# Patient Record
Sex: MALE | Race: WHITE | NOT HISPANIC OR LATINO | Employment: OTHER | URBAN - METROPOLITAN AREA
[De-identification: names, ages, dates, MRNs, and addresses within clinical notes are randomized per-mention and may not be internally consistent; named-entity substitution may affect disease eponyms.]

---

## 2017-10-30 ENCOUNTER — APPOINTMENT (OUTPATIENT)
Dept: OCCUPATIONAL MEDICINE | Facility: CLINIC | Age: 77
End: 2017-10-30

## 2017-10-30 PROCEDURE — 99000 SPECIMEN HANDLING OFFICE-LAB: CPT | Performed by: PHYSICIAN ASSISTANT

## 2019-02-22 ENCOUNTER — TRANSFERRED RECORDS (OUTPATIENT)
Dept: HEALTH INFORMATION MANAGEMENT | Facility: CLINIC | Age: 79
End: 2019-02-22

## 2020-05-18 ENCOUNTER — TRANSFERRED RECORDS (OUTPATIENT)
Dept: HEALTH INFORMATION MANAGEMENT | Facility: CLINIC | Age: 80
End: 2020-05-18

## 2020-05-19 ENCOUNTER — TRANSFERRED RECORDS (OUTPATIENT)
Dept: HEALTH INFORMATION MANAGEMENT | Facility: CLINIC | Age: 80
End: 2020-05-19

## 2020-06-11 ENCOUNTER — TRANSCRIBE ORDERS (OUTPATIENT)
Dept: OTHER | Age: 80
End: 2020-06-11

## 2020-06-11 DIAGNOSIS — R42 GIDDINESS: ICD-10-CM

## 2020-06-11 DIAGNOSIS — R42 DIZZINESS: Primary | ICD-10-CM

## 2020-06-17 ENCOUNTER — TELEPHONE (OUTPATIENT)
Dept: OTOLARYNGOLOGY | Facility: CLINIC | Age: 80
End: 2020-06-17

## 2020-06-22 NOTE — TELEPHONE ENCOUNTER
1. Have you noticed any changes in hearing? No  2. Do you have ringing, buzzing, or other sounds in your ears or head, this is also referred to as Tinnitus? Yes  3. When and where was your last hearing test? Doesn't know when but [facility]  4. Do you feel lightheaded or foggy? Yes  5. Do you have a spinning sensation? Yes  6. Is there any specific position that can bring on dizziness? random  7. Does looking up cause dizziness? No  8. Does getting in and our of bed cause dizziness? Yes  9. Does turning over in bed increase or cause dizziness? No  10. Does bending over cause dizziness? No  11. Is there anything that you can do to prevent the dizziness? Taken meds  12. Has the dizziness gotten better with time? No  13. Have you seen Physical Therapy for dizziness? (Please indicate clinic and as much of the location as possible): No  14. Are you being referred to a specific physician? No  15. Have you been evaluated/treated for your dizziness at any other location?  (If yes,obtian as much clinic/provider/locaiton as possible) Yes. (If yes answer the following questions:)   Have you seen any ENT, Neurology, or other providers for these symptoms?             Yes, If yes, where? [facility]  if yes, who?    Have you had any balance or Audiology testing? Yes, If yes, where? [facility] if yes, who?  Have you had an MRI or CT scan of your head or neck? Yes, If yes, where? [facility] if yes, who?     Would you like to receive your Release of Information by mail or e-mail?  mail

## 2020-06-23 NOTE — TELEPHONE ENCOUNTER
FUTURE VISIT INFORMATION      FUTURE VISIT INFORMATION:    Date: 7/21/2020    Time: 8:15AM    Location: [facility]  REFERRAL INFORMATION:    Referring provider:  [facility]    Referring providers clinic:  [facility]    Reason for visit/diagnosis  Vertigo- Referred Service, Zach Diaz MD     RECORDS REQUESTED FROM:       Clinic name Comments Records Status Imaging Status    [facility] 5/19/2020 ENT in patient note   *ENT notes dating as far back as 12/2001 2/22/19 audiogram     5/17/2020 [facility] Head   *imaging reports dating as far back as 2016 Scanned in EPIC req 6/23/2020 - PACS   [facility] imaging 2/6/2020 CT Head and CT [facility] Everywhere  req 6/23/2020 - PACS                             6/23/2020 1:33PM [facility] a [facility] ([facility] Film room to upload) - [facility]   7/8/2020 2:34PM all images in PACS - [facility]

## 2020-07-21 ENCOUNTER — VIRTUAL VISIT (OUTPATIENT)
Dept: OTOLARYNGOLOGY | Facility: CLINIC | Age: 80
End: 2020-07-21
Payer: COMMERCIAL

## 2020-07-21 ENCOUNTER — PRE VISIT (OUTPATIENT)
Dept: OTOLARYNGOLOGY | Facility: CLINIC | Age: 80
End: 2020-07-21

## 2020-07-21 VITALS — WEIGHT: 255 LBS | HEIGHT: 72 IN | BODY MASS INDEX: 34.54 KG/M2

## 2020-07-21 DIAGNOSIS — R42 DIZZINESS: Primary | ICD-10-CM

## 2020-07-21 RX ORDER — RIVAROXABAN 20 MG/1
TABLET, FILM COATED ORAL
COMMUNITY
Start: 2020-07-03 | End: 2023-01-16

## 2020-07-21 RX ORDER — ASPIRIN 81 MG/1
81 TABLET, CHEWABLE ORAL
COMMUNITY
Start: 2019-01-21 | End: 2023-01-16

## 2020-07-21 RX ORDER — ATORVASTATIN CALCIUM 80 MG/1
80 TABLET, FILM COATED ORAL AT BEDTIME
Status: ON HOLD | COMMUNITY
Start: 2019-12-27 | End: 2023-03-26

## 2020-07-21 RX ORDER — METFORMIN HCL 500 MG
TABLET, EXTENDED RELEASE 24 HR ORAL
COMMUNITY
Start: 2020-03-12 | End: 2023-01-16

## 2020-07-21 RX ORDER — SPIRONOLACTONE 25 MG/1
TABLET ORAL
COMMUNITY
Start: 2020-03-12 | End: 2023-01-16

## 2020-07-21 ASSESSMENT — PATIENT HEALTH QUESTIONNAIRE - PHQ9: SUM OF ALL RESPONSES TO PHQ QUESTIONS 1-9: 18

## 2020-07-21 ASSESSMENT — MIFFLIN-ST. JEOR: SCORE: 1914.67

## 2020-07-21 ASSESSMENT — PAIN SCALES - GENERAL: PAINLEVEL: EXTREME PAIN (8)

## 2020-07-21 NOTE — NURSING NOTE
Pt scored 18 on phq-9 but pt has someone who he sees and they check on him every 10 days. Vanessa GARCIA RN is aware of this also.     Merry Sorenson LPN

## 2020-07-21 NOTE — PATIENT INSTRUCTIONS
1. You were seen in the ENT Clinic today by Dr.Nissen.  If you have any questions or concerns after your appointment, please call   - Option 1: ENT Clinic: [phone]  - Option 2: Vanessa (Dr.Nissen's Nurse): [phone]    2.   Recommend return to PT for assessment and fall prevention    3.  Recommend BTE Hearing aids    Vanessa RN  Care Coordinator  [facility]- Otolaryngology  [phone]

## 2020-07-21 NOTE — PROGRESS NOTES
"David Thomson is a 80 year old male who is being evaluated via a billable telephone visit.      The patient has been notified of following:     \"This telephone visit will be conducted via a call between you and your physician/provider. We have found that certain health care needs can be provided without the need for a physical exam.  This service lets us provide the care you need with a short phone conversation.  If a prescription is necessary we can send it directly to your pharmacy.  If lab work is needed we can place an order for that and you can then stop by our lab to have the test done at a later time.    Telephone visits are billed at different rates depending on your insurance coverage. During this emergency period, for some insurers they may be billed the same as an in-person visit.  Please reach out to your insurance provider with any questions.    If during the course of the call the physician/provider feels a telephone visit is not appropriate, you will not be charged for this service.\"    Patient has given verbal consent for Telephone visit?  Yes    What phone number would you like to be contacted at? [phone]    How would you like to obtain your AVS? Mail a copy    Phone call duration: 51 minutes      CHIEF COMPLAINT:  Dizziness    HISTORY OF PRESENT ILLNESS: Pt is a 80 year old \"seen\" virtually today for dizziness.  He has had problems since October of last fall. He did have a brief problem in 2011 while in [place], but that passed and he had no problems until this past October.  Since October he has had issues, initially would get episodes of spinning vertigo with N/V and diarrhea.  He would be incapicitated for several hours, the spinning, N/V could last up to 4 hours. He doesn't remember hearing fluctuation or tinnitus change. 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 The right has " been worse than the left for years, again no fluctuation noted. Has had tinnitus for years, again no fluctuation or change with the episodes.  His medications were changed, new ones added and 3 others taken away in March. He hasn't had anymore of the severe episodes since then.  Now he will get where he will be off balance and may fall, lasts only for a second. He has trouble getting up, he thinks more from his hip which was replaced years ago. Once he is up, he is fine and can get around without issues, etc.  He drives, able to get around, etc.  He gets these falls maybe twice a week. Other than the falls, again he is ok.  He has been to physical therapy, doesn't feel much help. Hasn't seen them for awhile. He did have a VNG performed which showed no unilateral weakness to be present. Rotational Chair showed central signs.  CT has been negative. Cardiology and neurology has been consulted with no definitive findings.    ALLERGIES:    Allergies   Allergen Reactions     Benzalkonium      Lisinopril      Piroxicam      Polysorbate  [Bay Oil]      Prazosin      Other reaction(s): Nightmares     Urea      Other reaction(s): Eruption of skin, Eruption of skin       HABITS: Social History    Substance and Sexual Activity      Alcohol use: Not on file     History   Smoking Status     Never Smoker   Smokeless Tobacco     Never Used         PAST MEDICAL HISTORY: Reviewed from chart    Past Medical History:   Diagnosis Date     Hyperlipidaemia      Hypertension      Pre-diabetes        FAMILY HISTORY/SOCIAL HISTORY:   Family History   Problem Relation Age of Onset     Heart Disease Mother      Heart Disease Father       Social History     Socioeconomic History     Marital status: Single     Spouse name: Not on file     Number of children: Not on file     Years of education: Not on file     Highest education level: Not on file   Occupational History     Not on file   Social Needs     Financial resource strain: Not on file      "Food insecurity     Worry: Not on file     Inability: Not on file     Transportation needs     Medical: Not on file     Non-medical: Not on file   Tobacco Use     Smoking status: Never Smoker     Smokeless tobacco: Never Used   Substance and Sexual Activity     Alcohol use: Not on file     Drug use: Not on file     Sexual activity: Not on file   Lifestyle     Physical activity     Days per week: Not on file     Minutes per session: Not on file     Stress: Not on file   Relationships     Social connections     Talks on phone: Not on file     Gets together: Not on file     Attends [employer]ist service: Not on file     Active member of club or organization: Not on file     Attends meetings of clubs or organizations: Not on file     Relationship status: Not on file     Intimate partner violence     Fear of current or ex partner: Not on file     Emotionally abused: Not on file     Physically abused: Not on file     Forced sexual activity: Not on file   Other Topics Concern     Parent/sibling w/ CABG, MI or angioplasty before 65F 55M? 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 Wears bilateral hearing aids, ITE. Symmetrical discriminations.    VNG:  No asymmetry noted on calorics    Imaging negative      IMPRESSION AND PLAN:   1. Dizziness:  Discussed in detail today. Certainly sounds like \"episodes\" he was having last fall could be menieres with description of spinning for hours with N/V. Hasn't had those occur since March with changing of medication. He says 3 meds were taken away and new ones added, not sure what.  Again no \"episodes since then.  Complains now of falling 2 or 3 times a week.  No spins, just off balance and will fall. When he gets up he's ok and can function, no residual off balance.  He's not aware of any hearing " fluctuation, no change in tinnitus. Feel this sudden off balance not ear related, not sure what causes that. With VNG showing normal calorics and no associated hearing or tinnitus change, again difficult to say ear related or cause.  Would recommend return to PT for assessment and fall prevention.  2. Bilateral SNHL:  Would recommend BTE hearing aids for better amplification.  3. Bilateral Tinnitus: Result of SNHL, hearing aids best to help with this, monitor      Thank you very much for the opportunity to participate in the care of your patient.    Rick L Nissen MD

## 2020-07-21 NOTE — LETTER
"7/21/2020       RE: David Thomson  [address] [facility] [address]     Dear Colleague,    Thank you for referring your patient, David Thomson, to the [facility] EAR NOSE AND THROAT at [facility]. Please see a copy of my visit note below.    David Thomson is a 80 year old male who is being evaluated via a billable telephone visit.        Phone call duration: 51 minutes      CHIEF COMPLAINT:  Dizziness    HISTORY OF PRESENT ILLNESS: Pt is a 80 year old \"seen\" virtually today for dizziness.  He has had problems since October of last fall. He did have a brief problem in 2011 while in [place], but that passed and he had no problems until this past October.  Since October he has had issues, initially would get episodes of spinning vertigo with N/V and diarrhea.  He would be incapicitated for several hours, the spinning, N/V could last up to 4 hours. He doesn't remember hearing fluctuation or tinnitus change. 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 The right has been worse than the left for years, again no fluctuation noted. Has had tinnitus for years, again no fluctuation or change with the episodes.  His medications were changed, new ones added and 3 others taken away in March. He hasn't had anymore of the severe episodes since then.  Now he will get where he will be off balance and may fall, lasts only for a second. He has trouble getting up, he thinks more from his hip which was replaced years ago. Once he is up, he is fine and can get around without issues, etc.  He drives, able to get around, etc.  He gets these falls maybe twice a week. Other than the falls, again he is ok.  He has been to physical therapy, doesn't feel much help. Hasn't seen them for awhile. He did have a VNG performed which showed no unilateral weakness to be present. Rotational Chair " showed central signs.  CT has been negative. Cardiology and neurology has been consulted with no definitive findings.    ALLERGIES:    Allergies   Allergen Reactions     Benzalkonium      Lisinopril      Piroxicam      Polysorbate  [Bay Oil]      Prazosin      Other reaction(s): Nightmares     Urea      Other reaction(s): Eruption of skin, Eruption of skin       HABITS: Social History    Substance and Sexual Activity      Alcohol use: Not on file     History   Smoking Status     Never Smoker   Smokeless Tobacco     Never Used         PAST MEDICAL HISTORY: Reviewed from chart    Past Medical History:   Diagnosis Date     Hyperlipidaemia      Hypertension      Pre-diabetes        FAMILY HISTORY/SOCIAL HISTORY:   Family History   Problem Relation Age of Onset     Heart Disease Mother      Heart Disease Father       Social History     Socioeconomic History     Marital status: Single     Spouse name: Not on file     Number of children: Not on file     Years of education: Not on file     Highest education level: Not on file   Occupational History     Not on file   Social Needs     Financial resource strain: Not on file     Food insecurity     Worry: Not on file     Inability: Not on file     Transportation needs     Medical: Not on file     Non-medical: Not on file   Tobacco Use     Smoking status: Never Smoker     Smokeless tobacco: Never Used   Substance and Sexual Activity     Alcohol use: Not on file     Drug use: Not on file     Sexual activity: Not on file   Lifestyle     Physical activity     Days per week: Not on file     Minutes per session: Not on file     Stress: Not on file   Relationships     Social connections     Talks on phone: Not on file     Gets together: Not on file     Attends Cheondoism service: Not on file     Active member of club or organization: Not on file     Attends meetings of clubs or organizations: Not on file     Relationship status: Not on file     Intimate partner violence     Fear of  "current or ex partner: Not on file     Emotionally abused: Not on file     Physically abused: Not on file     Forced sexual activity: Not on file   Other Topics Concern     Parent/sibling w/ CABG, MI or angioplasty before 65F 55M? 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 Wears bilateral hearing aids, ITE. Symmetrical discriminations.    VNG:  No asymmetry noted on calorics    Imaging negative      IMPRESSION AND PLAN:   1. Dizziness:  Discussed in detail today. Certainly sounds like \"episodes\" he was having last fall could be menieres with description of spinning for hours with N/V. Hasn't had those occur since March with changing of medication. He says 3 meds were taken away and new ones added, not sure what.  Again no \"episodes since then.  Complains now of falling 2 or 3 times a week.  No spins, just off balance and will fall. When he gets up he's ok and can function, no residual off balance.  He's not aware of any hearing fluctuation, no change in tinnitus. Feel this sudden off balance not ear related, not sure what causes that. With VNG showing normal calorics and no associated hearing or tinnitus change, again difficult to say ear related or cause.  Would recommend return to PT for assessment and fall prevention.  2. Bilateral SNHL:  Would recommend BTE hearing aids for better amplification.  3. Bilateral Tinnitus: Result of SNHL, hearing aids best to help with this, monitor      Thank you very much for the opportunity to participate in the care of your patient.    Rick L Nissen MD                    "

## 2020-07-21 NOTE — LETTER
Hearing Aid Medical Clearance    David Thomson  July 22, 2020        This patient has received a medical examination and may be considered a suitable candidate for a hearing aid.         Physician:____________Rick Nissen, MD______________________________________

## 2020-07-23 ENCOUNTER — TELEPHONE (OUTPATIENT)
Dept: AUDIOLOGY | Facility: CLINIC | Age: 80
End: 2020-07-23

## 2020-12-23 ENCOUNTER — ANCILLARY PROCEDURE (OUTPATIENT)
Dept: GENERAL RADIOLOGY | Facility: CLINIC | Age: 80
End: 2020-12-23
Attending: PODIATRIST
Payer: COMMERCIAL

## 2020-12-23 ENCOUNTER — OFFICE VISIT (OUTPATIENT)
Dept: PODIATRY | Facility: CLINIC | Age: 80
End: 2020-12-23
Payer: COMMERCIAL

## 2020-12-23 VITALS
BODY MASS INDEX: 32.47 KG/M2 | DIASTOLIC BLOOD PRESSURE: 60 MMHG | SYSTOLIC BLOOD PRESSURE: 136 MMHG | HEIGHT: 73 IN | WEIGHT: 245 LBS

## 2020-12-23 DIAGNOSIS — M79.672 PAIN IN BOTH FEET: ICD-10-CM

## 2020-12-23 DIAGNOSIS — M19.072 PRIMARY OSTEOARTHRITIS OF LEFT FOOT: ICD-10-CM

## 2020-12-23 DIAGNOSIS — M79.671 PAIN IN BOTH FEET: ICD-10-CM

## 2020-12-23 DIAGNOSIS — M21.621 TAILOR'S BUNION OF RIGHT FOOT: ICD-10-CM

## 2020-12-23 DIAGNOSIS — L85.9 HYPERKERATOSIS: Primary | ICD-10-CM

## 2020-12-23 DIAGNOSIS — Q66.6 PES VALGUS: ICD-10-CM

## 2020-12-23 PROCEDURE — 11055 PARING/CUTG B9 HYPRKER LES 1: CPT | Performed by: PODIATRIST

## 2020-12-23 PROCEDURE — 73630 X-RAY EXAM OF FOOT: CPT | Mod: TC | Performed by: RADIOLOGY

## 2020-12-23 PROCEDURE — 99203 OFFICE O/P NEW LOW 30 MIN: CPT | Mod: 25 | Performed by: PODIATRIST

## 2020-12-23 ASSESSMENT — PAIN SCALES - GENERAL: PAINLEVEL: MODERATE PAIN (5)

## 2020-12-23 ASSESSMENT — MIFFLIN-ST. JEOR: SCORE: 1885.19

## 2020-12-23 NOTE — PROGRESS NOTES
HPI:  David Thomson is a 80 year old male who is seen in consultation at [facility] request of [facility].    Pt presents for eval of:   (Onset, Location, L/R, Character, Treatments, Injury if yes)    XR Left and Right foot 12/23/2020     Right 5th toe stepped on by a steer. Lateral and dorsal Left foot pain.    Works as a farmer and construction.      Weight management plan: Patient was referred to their PCP to discuss a diet and exercise plan.      ROS:  10 point ROS neg other than the symptoms noted above in the HPI.    Patient Active Problem List   Diagnosis     CARDIOVASCULAR SCREENING; LDL GOAL LESS THAN 100     Abnormal glucose     Hypertension     Hyperlipidemia with target LDL less than 100       PAST MEDICAL HISTORY:   Past Medical History:   Diagnosis Date     Hyperlipidaemia      Hypertension      Pre-diabetes         PAST SURGICAL HISTORY:   Past Surgical History:   Procedure Laterality Date     BACK SURGERY       GALLBLADDER SURGERY       HIP SURGERY       INSERT STIMULATOR DORSAL COLUMN Right 4/19/2016    Procedure: INSERT STIMULATOR DORSAL COLUMN;  Surgeon: Zoë Clemons DO;  Location: [facility] OR     NECK SURGERY       WRIST SURGERY          MEDICATIONS:   Current Outpatient Medications:      amLODIPine (NORVASC) 10 MG tablet, Take 1 tablet (10 mg) by mouth daily, Disp: , Rfl:      aspirin (ASA) 81 MG chewable tablet, Take 81 mg by mouth, Disp: , Rfl:      losartan (COZAAR) 50 MG tablet, Take 1 tablet (50 mg) by mouth daily, Disp: , Rfl:      pravastatin (PRAVACHOL) 40 MG tablet, Take 0.5 tablets (20 mg) by mouth daily, Disp: , Rfl:      spironolactone (ALDACTONE) 25 MG tablet, , Disp: , Rfl:      vitamin  B complex with vitamin C (VITAMIN  B COMPLEX) TABS, Take 2 tablets by mouth daily, Disp: , Rfl:      XARELTO ANTICOAGULANT 20 MG TABS tablet, , Disp: , Rfl:      atorvastatin (LIPITOR) 80 MG tablet, Take 40 mg by mouth, Disp: , Rfl:      clindamycin (CLINDAMAX) 1 % gel, Apply topically 2 times daily,  Disp: , Rfl:      hydrOXYzine (VISTARIL) 50 MG capsule, Take 1 capsule (50 mg) by mouth 4 times daily as needed (spasm or pain or itching), Disp: 120 capsule, Rfl: 0     metFORMIN (GLUCOPHAGE) 1000 MG tablet, Take 0.5 tablets (500 mg) by mouth 2 times daily (with meals), Disp: , Rfl:      metFORMIN (GLUCOPHAGE-XR) 500 MG 24 hr tablet, , Disp: , Rfl:      omeprazole (PRILOSEC) 20 MG DR capsule, , Disp: , Rfl:      oxyCODONE (ROXICODONE) 5 MG immediate release tablet, Take 1-2 tablets (5-10 mg) by mouth every 4 hours as needed for moderate to severe pain, Disp: 80 tablet, Rfl: 0     pantoprazole (PROTONIX) 40 MG enteric coated tablet, Take 1 tablet (40 mg) by mouth daily Take 30-60 minutes before a meal., Disp: , Rfl:      ALLERGIES:    Allergies   Allergen Reactions     Benzalkonium      Lisinopril      Piroxicam      Polysorbate  [Bay Oil]      Prazosin      Other reaction(s): Nightmares     Urea      Other reaction(s): Eruption of skin, Eruption of skin        SOCIAL HISTORY:   Social History     Socioeconomic History     Marital status: Single     Spouse name: Not on file     Number of children: Not on file     Years of education: Not on file     Highest education level: Not on file   Occupational History     Not on file   Social Needs     Financial resource strain: Not on file     Food insecurity     Worry: Not on file     Inability: Not on file     Transportation needs     Medical: Not on file     Non-medical: Not on file   Tobacco Use     Smoking status: Never Smoker     Smokeless tobacco: Never Used   Substance and Sexual Activity     Alcohol use: Not on file     Drug use: Not on file     Sexual activity: Not on file   Lifestyle     Physical activity     Days per week: Not on file     Minutes per session: Not on file     Stress: Not on file   Relationships     Social connections     Talks on phone: Not on file     Gets together: Not on file     Attends Nondenominational service: Not on file     Active member of club  "or organization: Not on file     Attends meetings of clubs or organizations: Not on file     Relationship status: Not on file     Intimate partner violence     Fear of current or ex partner: Not on file     Emotionally abused: Not on file     Physically abused: Not on file     Forced sexual activity: Not on file   Other Topics Concern     Parent/sibling w/ CABG, MI or angioplasty before 65F 55M? Not Asked   Social History Narrative     Not on file        FAMILY HISTORY:   Family History   Problem Relation Age of Onset     Heart Disease Mother      Heart Disease Father         EXAM:Vitals: /60 (BP Location: Left arm, Patient Position: Sitting, Cuff Size: Adult Regular)   Ht 1.854 m (6' 1\")   Wt 111.1 kg (245 lb)   BMI 32.32 kg/m    BMI= Body mass index is 32.32 kg/m .    General appearance: Patient is alert and fully cooperative with history & exam.  No sign of distress is noted during the visit.     Psychiatric: Affect is pleasant & appropriate.  Patient appears motivated to improve health.     Respiratory: Breathing is regular & unlabored while sitting.     HEENT: Hearing is intact to spoken word.  Speech is clear.  No gross evidence of visual impairment that would impact ambulation.     Vascular: DP & PT pulses are 1/4 bilateral, CFT delayed, varicosities noted bilateral.  Mild generalized edema.  Temperature warm to warm proximal to distal.     Neurologic: Lower extremity sensation is intact to light touch.  No evidence of weakness or contracture in the lower extremities.  No evidence of neuropathy.    Dermatologic: Mildly diminished texture turgor tone about the integument.  Painful nucleated hyperkeratotic lesion noted about the plantar lateral fifth metatarsal head.    Musculoskeletal: Patient is ambulatory without assistive device or brace.  Patient has gross forefoot valgus rigid hammertoes that are not reducible.  However with palpation of these there were no painful areas.  All symptoms today are " associated about the left lateral fifth metatarsal right foot hyperkeratosis.  He also describes some minor mild symptoms about the midfoot of the left foot and there is palpable crepitus throughout range of motion of the ankle, subtalar midtarsal joints bilateral.    Radiographs: Diminished calcaneal inclination angle.  Contracture of the lesser digits.  Elevated tailor's bunion angle.     ASSESSMENT:       ICD-10-CM    1. Hyperkeratosis  L85.9 TRIM HYPERKERATOTIC SKIN LESION, ONE   2. Pes valgus  Q66.6 TRIM HYPERKERATOTIC SKIN LESION, ONE   3. Tailor's bunion of right foot  M21.621    4. Primary osteoarthritis of left foot  M19.072         PLAN:  Reviewed patient's chart in [facility].      12/23/2020   Obtained and interpreted bilateral radiographs  Discussed etiology of osteoarthritis and treatment options.  Also discussed etiology and treatment options regarding pes valgus and hammertoes and tailor's bunions.  Also discussed the hyperkeratosis.  I debrided 1 symptomatic hyperkeratosis sharply and this provided relief.  Recommended at home debridement.  Written instructions to obtain assistance with nail debridement and/or hyperkeratosis with foot care certified nurses.  All questions were answered and he may follow-up as needed.    Nick Olvera DPM

## 2020-12-23 NOTE — PATIENT INSTRUCTIONS
"Nail Debridement    A high quality instrument makes trimming toenails MUCH easier.  Search ebay for any 5\" nail nipper manufactured by reliable brands such as Miltex, Integra or Jarit as these quality instruments will help manage difficult nails more effectively and comfortably. We use Miltex -SS.  A physician is not necessary to trim nails even if you are taking blood thinners or are diabetic.  Your family or care givers may help manage your toenails.      Trim or sand the nails once weekly.  Do not wait until they are long and painful or trimming will become too difficult and painful and will increase your risk of complications or infection.  A course file or 120 grit sandpaper on a sanding block can be helpful.  For very thick nails many people prefer battery operated goldsmith such as an Amope', Personal Pedi and Emjoi for regular use or heavy painful callouses or thick toenails.    Trim or skive any portion of nail that is thick, loose, crumbling, or not well attached. Do not tear the nail away, but rather cut them with a nail nipperor sand or sand them down.  You may follow up with your Podiatric Physician if you have pain, bleeding, infection, questions or other concerns.      You may also contact the following Registered Nurses for further help with nail debridement and minor hygiene concnerns.  They may come to your home or meet them at their clinic to trim your toenails and soak your feet, as well as monitor for any complications that would require evaluation by a Physician.      [facility]  Denise Lyons RN  Phone & text [phone]    [facility]  Nicole Hernandez RN  Office [phone]    [facility]  Shayy Dove RN  [phone]   Call or text for appointment  Some home visits and has a clinic at:  [address] [place] [id number]    [employer] [facility]  Stephanie Gutierres RN  [phone]    [facility]  [phone]  [place], [place], [place]    [facility]  [phone]  [email or web address] [facility]    For up to date list and to find foot care nurses in other communities visit [employer]es Association website:  afcna.org.     Calluses, Corns, IPKs, Porokeratosis    When there is excessive friction or pressure on the skin, the body responds by making the skin thicker.  While this may protect the deeper structures, the thickened skin can take up more space and thus increase pressure over a bony prominence or become an open sore or skin ulcer as this skin becomes less flexible.    Flat, diffuse thickening are simple calluses and they are usually caused by friction.  Often these are the result of rubbing on a shoe or going barefoot.    Calluses with a central core between the toes are called corns.  These often result from prominent joints on adjacent toes rubbing together.  Theses are often a symptom of bone malalignment and will usually recur unless the underlying bones are addressed.    Many of these lesions can be kept comfortable with routine maintenance. This consists of filing them with a Ped Egg, callus file, or 120 grit sandpaper on a block, every day during your bath or shower.  Most people prefer battery operated goldsmith such as an Amope', Personal Pedi and Emjoi for regular use or heavy painful callouses.  Heavy creams or ointments can be applied 1-2 times every day to keep them soft. Toe spacers can be used for corns, gel pads can be used for other lesions on the bottom of the foot. If there is a deformity noted, such as a prominent bone, often this can be addressed to minimize recurrence. However, sometimes the pressure and lesion simply migrates to another spot after surgery, so it is not a guaranteed cure.     If you have severe callouses and cracking, you may apply heavy ointments that you scoop up such as Cetaphil cream, Eucerin, Aquaphor or  Vaseline.  Be sure to obtain cream or ointment in these brands and not lotion (lotion is water based and not durable enough for feet). For more aggressive help apply heavy creams or ointment under occlusive dressings such as Saran Wrap or Jelly Feet while sleeping.   Jelly Feet can be obtained at [email or web address].     To be successful with managing hyperkeratotic skin, you must manage hygiene daily.  Apply the cream once or twice EVERY day.  At your bath or shower time is the easiest time to work on this when skin is most soft.  There is no medical or surgical treatment that will absolutely eliminate many of these symptoms.      Pedifix is a reliable source for all sorts of foot pads, cushions, or interdigital spacers and foot appliances. Go to www.Site Tour.FREEjit or request a catalog at 0-706-[employer].        Please call with any additional questions.

## 2020-12-23 NOTE — LETTER
12/23/2020         RE: David Thomson  [id number] [facility] [id number]        Dear Colleague,    Thank you for referring your patient, David Thomson, to the [facility]. Please see a copy of my visit note below.    HPI:  David Thomson is a 80 year old male who is seen in [facility].    Pt presents for eval of:   (Onset, Location, L/R, Character, Treatments, Injury if yes)    XR Left and Right foot 12/23/2020     Right 5th toe stepped on by a steer. Lateral and dorsal Left foot pain.    Works as a farmer and construction.      Weight management plan: Patient was referred to their PCP to discuss a diet and exercise plan.      ROS:  10 point ROS neg other than the symptoms noted above in the HPI.    Patient Active Problem List   Diagnosis     CARDIOVASCULAR SCREENING; LDL GOAL LESS THAN 100     Abnormal glucose     Hypertension     Hyperlipidemia with target LDL less than 100       PAST MEDICAL HISTORY:   Past Medical History:   Diagnosis Date     Hyperlipidaemia      Hypertension      Pre-diabetes         PAST SURGICAL HISTORY:   Past Surgical History:   Procedure Laterality Date     BACK SURGERY       GALLBLADDER SURGERY       HIP SURGERY       INSERT STIMULATOR DORSAL COLUMN Right 4/19/2016    Procedure: INSERT STIMULATOR DORSAL COLUMN;  Surgeon: Zoë Clemons DO;  Location: [facility] OR     NECK SURGERY       WRIST SURGERY          MEDICATIONS:   Current Outpatient Medications:      amLODIPine (NORVASC) 10 MG tablet, Take 1 tablet (10 mg) by mouth daily, Disp: , Rfl:      aspirin (ASA) 81 MG chewable tablet, Take 81 mg by mouth, Disp: , Rfl:      losartan (COZAAR) 50 MG tablet, Take 1 tablet (50 mg) by mouth daily, Disp: , Rfl:      pravastatin (PRAVACHOL) 40 MG tablet, Take 0.5 tablets (20 mg) by mouth daily, Disp: , Rfl:      spironolactone (ALDACTONE) 25 MG tablet, , Disp: , Rfl:      vitamin  B complex with vitamin C (VITAMIN  B COMPLEX)  TABS, Take 2 tablets by mouth daily, Disp: , Rfl:      XARELTO ANTICOAGULANT 20 MG TABS tablet, , Disp: , Rfl:      atorvastatin (LIPITOR) 80 MG tablet, Take 40 mg by mouth, Disp: , Rfl:      clindamycin (CLINDAMAX) 1 % gel, Apply topically 2 times daily, Disp: , Rfl:      hydrOXYzine (VISTARIL) 50 MG capsule, Take 1 capsule (50 mg) by mouth 4 times daily as needed (spasm or pain or itching), Disp: 120 capsule, Rfl: 0     metFORMIN (GLUCOPHAGE) 1000 MG tablet, Take 0.5 tablets (500 mg) by mouth 2 times daily (with meals), Disp: , Rfl:      metFORMIN (GLUCOPHAGE-XR) 500 MG 24 hr tablet, , Disp: , Rfl:      omeprazole (PRILOSEC) 20 MG DR capsule, , Disp: , Rfl:      oxyCODONE (ROXICODONE) 5 MG immediate release tablet, Take 1-2 tablets (5-10 mg) by mouth every 4 hours as needed for moderate to severe pain, Disp: 80 tablet, Rfl: 0     pantoprazole (PROTONIX) 40 MG enteric coated tablet, Take 1 tablet (40 mg) by mouth daily Take 30-60 minutes before a meal., Disp: , Rfl:      ALLERGIES:    Allergies   Allergen Reactions     Benzalkonium      Lisinopril      Piroxicam      Polysorbate  [Bay Oil]      Prazosin      Other reaction(s): Nightmares     Urea      Other reaction(s): Eruption of skin, Eruption of skin        SOCIAL HISTORY:   Social History     Socioeconomic History     Marital status: Single     Spouse name: Not on file     Number of children: Not on file     Years of education: Not on file     Highest education level: Not on file   Occupational History     Not on file   Social Needs     Financial resource strain: Not on file     Food insecurity     Worry: Not on file     Inability: Not on file     Transportation needs     Medical: Not on file     Non-medical: Not on file   Tobacco Use     Smoking status: Never Smoker     Smokeless tobacco: Never Used   Substance and Sexual Activity     Alcohol use: Not on file     Drug use: Not on file     Sexual activity: Not on file   Lifestyle     Physical activity     Days  "per week: Not on file     Minutes per session: Not on file     Stress: Not on file   Relationships     Social connections     Talks on phone: Not on file     Gets together: Not on file     Attends Protestant service: Not on file     Active member of club or organization: Not on file     Attends meetings of clubs or organizations: Not on file     Relationship status: Not on file     Intimate partner violence     Fear of current or ex partner: Not on file     Emotionally abused: Not on file     Physically abused: Not on file     Forced sexual activity: Not on file   Other Topics Concern     Parent/sibling w/ CABG, MI or angioplasty before 65F 55M? Not Asked   Social History Narrative     Not on file        FAMILY HISTORY:   Family History   Problem Relation Age of Onset     Heart Disease Mother      Heart Disease Father         EXAM:Vitals: /60 (BP Location: Left arm, Patient Position: Sitting, Cuff Size: Adult Regular)   Ht 1.854 m (6' 1\")   Wt 111.1 kg (245 lb)   BMI 32.32 kg/m    BMI= Body mass index is 32.32 kg/m .    General appearance: Patient is alert and fully cooperative with history & exam.  No sign of distress is noted during the visit.     Psychiatric: Affect is pleasant & appropriate.  Patient appears motivated to improve health.     Respiratory: Breathing is regular & unlabored while sitting.     HEENT: Hearing is intact to spoken word.  Speech is clear.  No gross evidence of visual impairment that would impact ambulation.     Vascular: DP & PT pulses are 1/4 bilateral, CFT delayed, varicosities noted bilateral.  Mild generalized edema.  Temperature warm to warm proximal to distal.     Neurologic: Lower extremity sensation is intact to light touch.  No evidence of weakness or contracture in the lower extremities.  No evidence of neuropathy.    Dermatologic: Mildly diminished texture turgor tone about the integument.  Painful nucleated hyperkeratotic lesion noted about the plantar lateral fifth " metatarsal head.    Musculoskeletal: Patient is ambulatory without assistive device or brace.  Patient has gross forefoot valgus rigid hammertoes that are not reducible.  However with palpation of these there were no painful areas.  All symptoms today are associated about the left lateral fifth metatarsal right foot hyperkeratosis.  He also describes some minor mild symptoms about the midfoot of the left foot and there is palpable crepitus throughout range of motion of the ankle, subtalar midtarsal joints bilateral.    Radiographs: Diminished calcaneal inclination angle.  Contracture of the lesser digits.  Elevated tailor's bunion angle.     ASSESSMENT:       ICD-10-CM    1. Hyperkeratosis  L85.9 TRIM HYPERKERATOTIC SKIN LESION, ONE   2. Pes valgus  Q66.6 TRIM HYPERKERATOTIC SKIN LESION, ONE   3. Tailor's bunion of right foot  M21.621    4. Primary osteoarthritis of left foot  M19.072         PLAN:  Reviewed patient's chart in [facility].      12/23/2020   Obtained and interpreted bilateral radiographs  Discussed etiology of osteoarthritis and treatment options.  Also discussed etiology and treatment options regarding pes valgus and hammertoes and tailor's bunions.  Also discussed the hyperkeratosis.  I debrided 1 symptomatic hyperkeratosis sharply and this provided relief.  Recommended at home debridement.  Written instructions to obtain assistance with nail debridement and/or hyperkeratosis with foot care certified nurses.  All questions were answered and he may follow-up as needed.    Nick Olvera DPM          Again, thank you for allowing me to participate in the care of your patient.        Sincerely,        Nick Olvera DPM

## 2021-06-01 ENCOUNTER — RECORDS - HEALTHEAST (OUTPATIENT)
Dept: ADMINISTRATIVE | Facility: CLINIC | Age: 81
End: 2021-06-01

## 2021-10-28 ENCOUNTER — TRANSFERRED RECORDS (OUTPATIENT)
Dept: HEALTH INFORMATION MANAGEMENT | Facility: CLINIC | Age: 81
End: 2021-10-28

## 2022-02-18 ENCOUNTER — TRANSFERRED RECORDS (OUTPATIENT)
Dept: HEALTH INFORMATION MANAGEMENT | Facility: CLINIC | Age: 82
End: 2022-02-18

## 2023-01-12 ENCOUNTER — MEDICAL CORRESPONDENCE (OUTPATIENT)
Dept: HEALTH INFORMATION MANAGEMENT | Facility: CLINIC | Age: 83
End: 2023-01-12

## 2023-01-15 ENCOUNTER — TRANSFERRED RECORDS (OUTPATIENT)
Dept: HEALTH INFORMATION MANAGEMENT | Facility: CLINIC | Age: 83
End: 2023-01-15

## 2023-01-16 ENCOUNTER — TRANSFERRED RECORDS (OUTPATIENT)
Dept: HEALTH INFORMATION MANAGEMENT | Facility: CLINIC | Age: 83
End: 2023-01-16

## 2023-01-16 ENCOUNTER — HOSPITAL ENCOUNTER (INPATIENT)
Facility: CLINIC | Age: 83
LOS: 6 days | Discharge: ANOTHER HOSPITAL | DRG: 559 | End: 2023-01-23
Attending: EMERGENCY MEDICINE | Admitting: HOSPITALIST
Payer: COMMERCIAL

## 2023-01-16 DIAGNOSIS — R52 INTRACTABLE PAIN: ICD-10-CM

## 2023-01-16 DIAGNOSIS — M25.552 HIP PAIN, LEFT: ICD-10-CM

## 2023-01-16 PROCEDURE — 96374 THER/PROPH/DIAG INJ IV PUSH: CPT | Performed by: EMERGENCY MEDICINE

## 2023-01-16 PROCEDURE — 250N000011 HC RX IP 250 OP 636: Performed by: EMERGENCY MEDICINE

## 2023-01-16 PROCEDURE — 99285 EMERGENCY DEPT VISIT HI MDM: CPT | Performed by: EMERGENCY MEDICINE

## 2023-01-16 PROCEDURE — 96375 TX/PRO/DX INJ NEW DRUG ADDON: CPT | Performed by: EMERGENCY MEDICINE

## 2023-01-16 PROCEDURE — 99222 1ST HOSP IP/OBS MODERATE 55: CPT | Mod: AI | Performed by: HOSPITALIST

## 2023-01-16 PROCEDURE — 99285 EMERGENCY DEPT VISIT HI MDM: CPT | Mod: 25 | Performed by: EMERGENCY MEDICINE

## 2023-01-16 PROCEDURE — 250N000013 HC RX MED GY IP 250 OP 250 PS 637: Performed by: HOSPITALIST

## 2023-01-16 PROCEDURE — G0378 HOSPITAL OBSERVATION PER HR: HCPCS

## 2023-01-16 PROCEDURE — 96372 THER/PROPH/DIAG INJ SC/IM: CPT | Performed by: EMERGENCY MEDICINE

## 2023-01-16 RX ORDER — GABAPENTIN 300 MG/1
300 CAPSULE ORAL AT BEDTIME
Status: DISCONTINUED | OUTPATIENT
Start: 2023-01-16 | End: 2023-01-19

## 2023-01-16 RX ORDER — APIXABAN 5 MG/1
5 TABLET, FILM COATED ORAL EVERY 12 HOURS
Status: ON HOLD | COMMUNITY
Start: 2022-09-28 | End: 2023-01-29

## 2023-01-16 RX ORDER — HYDROMORPHONE HCL IN WATER/PF 6 MG/30 ML
0.2 PATIENT CONTROLLED ANALGESIA SYRINGE INTRAVENOUS
Status: DISCONTINUED | OUTPATIENT
Start: 2023-01-16 | End: 2023-01-17

## 2023-01-16 RX ORDER — ASPIRIN 81 MG/1
81 TABLET ORAL AT BEDTIME
Status: DISCONTINUED | OUTPATIENT
Start: 2023-01-16 | End: 2023-01-23 | Stop reason: HOSPADM

## 2023-01-16 RX ORDER — FAMOTIDINE 20 MG/1
20 TABLET, FILM COATED ORAL AT BEDTIME
Status: ON HOLD | COMMUNITY
Start: 2022-09-28 | End: 2023-01-29

## 2023-01-16 RX ORDER — FLUTICASONE PROPIONATE 50 MCG
2 SPRAY, SUSPENSION (ML) NASAL DAILY
Status: DISCONTINUED | OUTPATIENT
Start: 2023-01-17 | End: 2023-01-23 | Stop reason: HOSPADM

## 2023-01-16 RX ORDER — HYDROXYZINE HYDROCHLORIDE 50 MG/1
50 TABLET, FILM COATED ORAL 4 TIMES DAILY PRN
Status: DISCONTINUED | OUTPATIENT
Start: 2023-01-16 | End: 2023-01-22

## 2023-01-16 RX ORDER — AMOXICILLIN 250 MG
1 CAPSULE ORAL 2 TIMES DAILY
Status: DISCONTINUED | OUTPATIENT
Start: 2023-01-16 | End: 2023-01-23 | Stop reason: HOSPADM

## 2023-01-16 RX ORDER — HYDROMORPHONE HYDROCHLORIDE 1 MG/ML
0.5 INJECTION, SOLUTION INTRAMUSCULAR; INTRAVENOUS; SUBCUTANEOUS
Status: DISCONTINUED | OUTPATIENT
Start: 2023-01-16 | End: 2023-01-16

## 2023-01-16 RX ORDER — OXYCODONE HYDROCHLORIDE 5 MG/1
5 TABLET ORAL EVERY 4 HOURS PRN
Status: DISCONTINUED | OUTPATIENT
Start: 2023-01-16 | End: 2023-01-19

## 2023-01-16 RX ORDER — ONDANSETRON 2 MG/ML
4 INJECTION INTRAMUSCULAR; INTRAVENOUS EVERY 6 HOURS PRN
Status: DISCONTINUED | OUTPATIENT
Start: 2023-01-16 | End: 2023-01-23 | Stop reason: HOSPADM

## 2023-01-16 RX ORDER — LORAZEPAM 2 MG/ML
1 INJECTION INTRAMUSCULAR ONCE
Status: COMPLETED | OUTPATIENT
Start: 2023-01-16 | End: 2023-01-16

## 2023-01-16 RX ORDER — ATORVASTATIN CALCIUM 40 MG/1
80 TABLET, FILM COATED ORAL AT BEDTIME
Status: DISCONTINUED | OUTPATIENT
Start: 2023-01-16 | End: 2023-01-23 | Stop reason: HOSPADM

## 2023-01-16 RX ORDER — AMOXICILLIN 250 MG
2 CAPSULE ORAL 2 TIMES DAILY
Status: DISCONTINUED | OUTPATIENT
Start: 2023-01-16 | End: 2023-01-23 | Stop reason: HOSPADM

## 2023-01-16 RX ORDER — GAUZE BANDAGE 2" X 2"
100 BANDAGE TOPICAL DAILY
Status: ON HOLD | COMMUNITY
Start: 2022-09-30 | End: 2023-03-26

## 2023-01-16 RX ORDER — HYDROMORPHONE HYDROCHLORIDE 1 MG/ML
0.5 INJECTION, SOLUTION INTRAMUSCULAR; INTRAVENOUS; SUBCUTANEOUS
Status: COMPLETED | OUTPATIENT
Start: 2023-01-16 | End: 2023-01-16

## 2023-01-16 RX ORDER — ONDANSETRON 4 MG/1
4 TABLET, ORALLY DISINTEGRATING ORAL EVERY 6 HOURS PRN
Status: DISCONTINUED | OUTPATIENT
Start: 2023-01-16 | End: 2023-01-23 | Stop reason: HOSPADM

## 2023-01-16 RX ORDER — SACUBITRIL AND VALSARTAN 97; 103 MG/1; MG/1
1 TABLET, FILM COATED ORAL 2 TIMES DAILY
Status: ON HOLD | COMMUNITY
Start: 2022-10-15 | End: 2023-03-16

## 2023-01-16 RX ORDER — ACETAMINOPHEN 325 MG/1
650 TABLET ORAL EVERY 6 HOURS PRN
Status: DISCONTINUED | OUTPATIENT
Start: 2023-01-16 | End: 2023-01-17

## 2023-01-16 RX ORDER — FLUTICASONE PROPIONATE 50 MCG
2 SPRAY, SUSPENSION (ML) NASAL DAILY
COMMUNITY
End: 2023-03-09

## 2023-01-16 RX ORDER — GABAPENTIN 300 MG/1
300 CAPSULE ORAL AT BEDTIME
Status: ON HOLD | COMMUNITY
Start: 2022-07-25 | End: 2023-01-24

## 2023-01-16 RX ORDER — NALOXONE HYDROCHLORIDE 0.4 MG/ML
0.2 INJECTION, SOLUTION INTRAMUSCULAR; INTRAVENOUS; SUBCUTANEOUS
Status: DISCONTINUED | OUTPATIENT
Start: 2023-01-16 | End: 2023-01-23 | Stop reason: HOSPADM

## 2023-01-16 RX ORDER — FAMOTIDINE 20 MG/1
20 TABLET, FILM COATED ORAL AT BEDTIME
Status: DISCONTINUED | OUTPATIENT
Start: 2023-01-16 | End: 2023-01-23 | Stop reason: HOSPADM

## 2023-01-16 RX ORDER — NALOXONE HYDROCHLORIDE 0.4 MG/ML
0.4 INJECTION, SOLUTION INTRAMUSCULAR; INTRAVENOUS; SUBCUTANEOUS
Status: DISCONTINUED | OUTPATIENT
Start: 2023-01-16 | End: 2023-01-23 | Stop reason: HOSPADM

## 2023-01-16 RX ORDER — FOLIC ACID 1 MG/1
1 TABLET ORAL DAILY
Status: DISCONTINUED | OUTPATIENT
Start: 2023-01-17 | End: 2023-01-23 | Stop reason: HOSPADM

## 2023-01-16 RX ORDER — FOLIC ACID 1 MG/1
1 TABLET ORAL DAILY
Status: ON HOLD | COMMUNITY
Start: 2022-11-05 | End: 2023-03-26

## 2023-01-16 RX ORDER — ACETAMINOPHEN 650 MG/1
650 SUPPOSITORY RECTAL EVERY 6 HOURS PRN
Status: DISCONTINUED | OUTPATIENT
Start: 2023-01-16 | End: 2023-01-17

## 2023-01-16 RX ORDER — METOPROLOL SUCCINATE 25 MG/1
12.5 TABLET, EXTENDED RELEASE ORAL DAILY
Status: ON HOLD | COMMUNITY
Start: 2022-11-12 | End: 2023-03-26

## 2023-01-16 RX ADMIN — FAMOTIDINE 20 MG: 20 TABLET, FILM COATED ORAL at 20:49

## 2023-01-16 RX ADMIN — ATORVASTATIN CALCIUM 80 MG: 40 TABLET, FILM COATED ORAL at 20:49

## 2023-01-16 RX ADMIN — HYDROMORPHONE HYDROCHLORIDE 0.5 MG: 1 INJECTION, SOLUTION INTRAMUSCULAR; INTRAVENOUS; SUBCUTANEOUS at 13:06

## 2023-01-16 RX ADMIN — SACUBITRIL AND VALSARTAN 1 TABLET: 97; 103 TABLET, FILM COATED ORAL at 20:48

## 2023-01-16 RX ADMIN — ASPIRIN 81 MG: 81 TABLET, COATED ORAL at 20:49

## 2023-01-16 RX ADMIN — APIXABAN 5 MG: 5 TABLET, FILM COATED ORAL at 20:49

## 2023-01-16 RX ADMIN — OXYCODONE HYDROCHLORIDE 5 MG: 5 TABLET ORAL at 23:52

## 2023-01-16 RX ADMIN — HYDROMORPHONE HYDROCHLORIDE 0.5 MG: 1 INJECTION, SOLUTION INTRAMUSCULAR; INTRAVENOUS; SUBCUTANEOUS at 14:23

## 2023-01-16 RX ADMIN — GABAPENTIN 300 MG: 300 CAPSULE ORAL at 20:49

## 2023-01-16 RX ADMIN — OXYCODONE HYDROCHLORIDE 5 MG: 5 TABLET ORAL at 19:44

## 2023-01-16 RX ADMIN — LORAZEPAM 1 MG: 2 INJECTION INTRAMUSCULAR; INTRAVENOUS at 15:04

## 2023-01-16 RX ADMIN — SENNOSIDES AND DOCUSATE SODIUM 1 TABLET: 50; 8.6 TABLET ORAL at 20:48

## 2023-01-16 ASSESSMENT — ACTIVITIES OF DAILY LIVING (ADL)
ADLS_ACUITY_SCORE: 37
ADLS_ACUITY_SCORE: 37
ADLS_ACUITY_SCORE: 39
ADLS_ACUITY_SCORE: 39
ADLS_ACUITY_SCORE: 37
ADLS_ACUITY_SCORE: 39

## 2023-01-16 NOTE — ED NOTES
ED Nursing criteria listed below was addressed during verbal handoff:     Abnormal vitals: No  Abnormal results: No  Med Reconciliation completed: Yes  Meds given in ED: Yes  Any Overdue Meds: No  Core Measures: N/A  Isolation: No  Special needs: No  Skin assessment: Yes    Observation Patient  Education provided: Yes

## 2023-01-16 NOTE — H&P
[facility]    History and Physical - Hospitalist Service       Date of Admission:  1/16/2023    Assessment & Plan      David Thomson is a 82 year old male admitted on 1/16/2023. He presented with intractable left hip pain    Left hip pain  Previous left hip replacement 1999  Suspected hardware failure   - hip replaced about 24 years prior   - about 1 month prior to admission the patient was having worsening hip pain   - [facility]   - to our ED today and was still unable to bear weight despite analgesics   - vitals stable   - labs unremarkable and no sign of infection   - [facility]: [facility] and not yet available   - will admit for observation   - pt ordered   - prn pain medications ordered   - stool softeners ordered to help avoid constipation   - continue home gabapentin   - continue home hydroxyzine   - will consult orthopedics    Coronary artery disease  Essential hypertension  History of CABG   - continue aspirin   - continue lipitor   - continue entresto   - continue metoprolol    Mechanical heart valve   - continue eliquis    Prediabetes   - not on metformin   - follow up outpatient     Diet:   heart healthy  DVT Prophylaxis: DOAC  Rice Catheter: Not present  Lines: None     Cardiac Monitoring: None  Code Status:   full code    Clinically Significant Risk Factors Present on Admission               # Drug Induced Coagulation Defect: home medication list includes an anticoagulant medication                 Disposition Plan      Expected Discharge Date: 01/17/2023                  Bryn Vera MD  Hospitalist Service  [facility]  Securely message with Page365 (more info)  Text page via [employer] Paging/Directory     ______________________________________________________________________    Chief Complaint   Hip pain    History is obtained from the patient    History of  Present Illness   David Thomson is a 82 year old male with previous left hip arthroplasty who presented with intractable hip pain. Patient's hip was replaced about 24 years prior. It was working well until a few months prior to admission. Hip pain started insidiously and got progressively worse. The patient went to the ED a few times for worsening hip pain and was stabilized each time prior with adjustments to his medications. Pain would get worse again, as it did prior to this admission. This time in the ED though, the patient's pain was not able to be controlled despite iv dilaudid and benzo's. When seen by this service in the ED, the patient reported that he was feeling ok as long as he was in bed. He reported being unable to bear weight due to pain though. No other concerns or new symptoms. Remaining ROS negative. No fevers, no chills, no chest pain, no cough, no difficulty in breathing, no abdominal pain, no nausea, no vomiting, no rashes, and no swelling.          Past Medical History    Past Medical History:   Diagnosis Date     Hyperlipidaemia      Hypertension      Pre-diabetes        Past Surgical History   Past Surgical History:   Procedure Laterality Date     BACK SURGERY       GALLBLADDER SURGERY       HIP SURGERY       INSERT STIMULATOR DORSAL COLUMN Right 4/19/2016    Procedure: INSERT STIMULATOR DORSAL COLUMN;  Surgeon: Zoë Clemons DO;  Location: [facility] OR     NECK SURGERY       WRIST SURGERY         Prior to Admission Medications   Prior to Admission Medications   Prescriptions Last Dose Informant Patient Reported? Taking?   ELIQUIS ANTICOAGULANT 5 MG tablet 1/16/2023 at am Care Giver Yes Yes   Sig: Take 5 mg by mouth every 12 hours Am and bedtime   ENTRESTO  MG per tablet 1/16/2023 at am Care Giver Yes Yes   Sig: Take 1 tablet by mouth 2 times daily Morning and bedtime   aspirin (ASA) 81 MG EC tablet 1/15/2023 at hs Care Giver Yes Yes   Sig: Take 81 mg by mouth At Bedtime    atorvastatin (LIPITOR) 80 MG tablet 1/15/2023 at hs Care Giver Yes Yes   Sig: Take 80 mg by mouth At Bedtime   famotidine (PEPCID) 20 MG tablet 1/15/2023 at hs Care Giver Yes Yes   Sig: Take 20 mg by mouth At Bedtime   fluticasone (FLONASE) 50 MCG/ACT nasal spray 1/16/2023 at am Care Giver Yes Yes   Sig: Spray 2 sprays into both nostrils daily   folic acid (FOLVITE) 1 MG tablet 1/16/2023 Care Giver Yes Yes   Sig: Take 1 mg by mouth daily   gabapentin (NEURONTIN) 300 MG capsule 1/15/2023 at hs Care Giver Yes Yes   Sig: Take 300 mg by mouth At Bedtime   hydrOXYzine (VISTARIL) 50 MG capsule Unknown Care Giver No Yes   Sig: Take 1 capsule (50 mg) by mouth 4 times daily as needed (spasm or pain or itching)   metoprolol succinate ER (TOPROL XL) 25 MG 24 hr tablet 1/16/2023 at am Care Giver Yes Yes   Sig: Take 12.5 mg by mouth daily   omeprazole (PRILOSEC) 20 MG DR capsule 1/16/2023 at am Care Giver Yes Yes   Sig: Take 20 mg by mouth 2 times daily Am and HS   vitamin B1 (THIAMINE) 100 MG tablet 1/16/2023 at am Care Giver Yes Yes   Sig: Take 100 mg by mouth daily      Facility-Administered Medications: None        Review of Systems    The 10 point Review of Systems is negative other than noted in the HPI      Physical Exam   Vital Signs: Temp: 98.2  F (36.8  C) Temp src: Oral BP: (!) 158/84 Pulse: 71   Resp: 18 SpO2: 98 %      Weight: 253 lbs 0 oz    Gen:  no acute distress; lying in bed  HEENT:  Anicteric sclera, hearing intact to voice  Resp:  breathing normally on room air. No rales, wheezing, or stridor  Card:  normal rate, normal rhythm. No murmurs appreciated. Mechanical click appreciated  Abd:  Soft, non-tender, non-distended, normoactive bowel sounds are present  Musc:  Normal strength. Decreased range of motion at left hip due to pain  Psych:  Alert; oriented to person, place and time, not anxious, not agitated  Extr:  no edema      Medical Decision Making       60 MINUTES SPENT BY ME on the date of service doing  chart review, history, exam, documentation & further activities per the note.  MANAGEMENT DISCUSSED with the following over the past 24 hours: Patient, ED provider, charge nurse   NOTE(S)/MEDICAL RECORDS REVIEWED over the past 24 hours: yes      Data

## 2023-01-16 NOTE — PROGRESS NOTES
S-(situation): Patient registered to Observation. Patient arrived to room 269 via cart from ED.     B-(background): Hip pain, left, intractable pain.     A-(assessment): Patient alert and oriented. Vital signs stable. Lung sounds clear. 9/10 pain in left hip. Saline locked. Scattered scabs and bruises on bilateral upper extremitates. Moderately impaired mobility on LLE.     R-(recommendations): Orders and observation goals reviewed with patient.     Nursing Observation criteria listed below was met:    Skin issues/needs documented:NA  Isolation needs addressed and Signage up: NA  Fall Prevention: Education given and documented: NA  Education Assessment documented:Yes  Admission Education Documented: Yes  New medication patient education completed and documented (Possible Side Effects of Common Medications handout): Yes  OBS video/handout Reviewed & Documented: Yes  Allergies Reviewed: Yes  Medication Reconciliation Complete: Yes  Home medications if not able to send immediately home with family stored here: NA  Reminder note placed in discharge instructions of home meds: NA  Patient has discharge needs (If yes, please explain): Yes  Patient discharge preferences addressed and charted on white board:  Yes  Provider notified that patient has arrived to the unit: Yes

## 2023-01-16 NOTE — ED TRIAGE NOTES
"PT c/o left hip pain.  Multiple falls over the last month -\"I just loose my balance\". .  Pt reports that he had a left hip replacement in 99 and fell a while ago, had a CT scan that showed a crew loose.  [facility] and prednisone, but are unable to get in for surgery anytime soon.  Concern for safety as he lives alone and cant move around his house.      Triage Assessment     Row Name 01/16/23 1238       Triage Assessment (Adult)    Airway WDL WDL       Respiratory WDL    Respiratory WDL WDL       Cardiac WDL    Cardiac WDL WDL              "

## 2023-01-16 NOTE — ED PROVIDER NOTES
History     Chief Complaint   Patient presents with     Hip Pain     HPI  David Thomson is a 82 year old male who presents with ongoing issues with left hip pain.  Patient had a previous hip replacement surgery and had done well since 1999.  Over the last few weeks he has had increasing left hip pain.  [facility] showing a loose screw or hardware and would probably require surgery unfortunately they are unsure when he gets surgery due to staffing issues.  [facility] due to pain issues that he has not been able to tolerate the pain at home.  He has previous back issues and does have a spine stimulator.  [facility] Ne[facility]in and added prednisone for his ongoing hip issues.  The discharge directions do not appear that he was placed on any pain meds.  He does have a topical capsain he  used without significant relief.  He was brought in by ambulance.  Paramedics apparently found the patient on his floor naked as he was unable to ambulate or get himself off the floor.    Allergies:  Allergies   Allergen Reactions     Benzalkonium      Lisinopril      Piroxicam      Polysorbate  [Bay Oil]      Prazosin      Other reaction(s): Nightmares     Urea      Other reaction(s): Eruption of skin, Eruption of skin       Problem List:    Patient Active Problem List    Diagnosis Date Noted     CARDIOVASCULAR SCREENING; LDL GOAL LESS THAN 100 03/03/2015     Priority: Medium     Abnormal glucose 03/03/2015     Priority: Medium     Problem list name updated by automated process. Provider to review       Hypertension 03/03/2015     Priority: Medium     Hyperlipidemia with target LDL less than 100 03/03/2015     Priority: Medium     Diagnosis updated by automated process. Provider to review and confirm.          Past Medical History:    Past Medical History:   Diagnosis Date     Hyperlipidaemia      Hypertension      Pre-diabetes        Past Surgical  History:    Past Surgical History:   Procedure Laterality Date     BACK SURGERY       GALLBLADDER SURGERY       HIP SURGERY       INSERT STIMULATOR DORSAL COLUMN Right 4/19/2016    Procedure: INSERT STIMULATOR DORSAL COLUMN;  Surgeon: Zoë Clemons DO;  Location: [facility] OR     NECK SURGERY       WRIST SURGERY         Family History:    Family History   Problem Relation Age of Onset     Heart Disease Mother      Heart Disease Father        Social History:  Marital Status:  Single [1]  Social History     Tobacco Use     Smoking status: Never     Smokeless tobacco: Never        Medications:    aspirin (ASA) 81 MG EC tablet  atorvastatin (LIPITOR) 80 MG tablet  ELIQUIS ANTICOAGULANT 5 MG tablet  ENTRESTO  MG per tablet  famotidine (PEPCID) 20 MG tablet  fluticasone (FLONASE) 50 MCG/ACT nasal spray  folic acid (FOLVITE) 1 MG tablet  gabapentin (NEURONTIN) 300 MG capsule  hydrOXYzine (VISTARIL) 50 MG capsule  metoprolol succinate ER (TOPROL XL) 25 MG 24 hr tablet  omeprazole (PRILOSEC) 20 MG DR capsule  vitamin B1 (THIAMINE) 100 MG tablet          Review of Systems all other systems are reviewed and are negative.    Physical Exam   BP: (!) 158/84  Pulse: 71  Temp: 98.2  F (36.8  C)  Resp: 18  Weight: 114.8 kg (253 lb)  SpO2: 98 %      Physical Exam General alert male in moderate distress.  Increased pain with movement.  No joint effusion at the hip.  Limited range of motion due to pain.  No significant leg swelling and negative Homans.  When patient attempted to sit up he had a audible click in his left hip.  Increased pain thereafter.    ED Course                 Procedures              Critical Care time:  none               No results found for this or any previous visit (from the past 24 hour(s)).    Medications   HYDROmorphone (PF) (DILAUDID) injection 0.5 mg (0.5 mg Intravenous Given 1/16/23 1423)   HYDROmorphone (PF) (DILAUDID) injection 0.5 mg (0.5 mg Intramuscular Given 1/16/23 1306)   LORazepam (ATIVAN)  injection 1 mg (1 mg Intravenous Given 1/16/23 1504)     Initially patient was given IM and subsequent IV Dilaudid without severe pain change.  He is given Ativan.  With recent CT imaging x2 x-rays were not repeated  Assessments & Plan (with Medical Decision Making)   David Thomson is a 82 year old male who presents with ongoing issues with left hip pain.  Patient had a previous hip replacement surgery and had done well since 1999.  Over the last few weeks he has had increasing left hip pain.  [facility] 2 CTs showing a loose screw or hardware and would probably require surgery unfortunately they are unsure when he gets surgery due to staffing issues.  [facility] due to pain issues that he has not been able to tolerate the pain at home.  He has previous back issues and does have a spine stimulator.  [facility] increased his Neurontin and added prednisone for his ongoing hip issues.  The discharge directions do not appear that he was placed on any pain meds.  He does have a topical capsain he  used without significant relief.  He was brought in by ambulance.  Paramedics apparently found the patient on his floor naked as he was unable to ambulate or get himself off the floor.  On presentation patient is moderate pain.  Difficulty with manipulation of the hip and he was unable to ambulate.  Despite doses of IM and IV Dilaudid and Ativan patient was unable to ambulate or tolerate.  We will attempt to get records from the [facility] as its not available in Care Everywhere regarding his CT and their plan for him.  I spoke to  from the hospital service and he will assume care on admission for pain control.  I have reviewed the nursing notes.    I have reviewed the findings, diagnosis, plan and need for follow up with the patient.       Medical Decision Making  The patient presented with a problem that is a chronic illness mild to moderate exacerbation,  progression, or side effect of treatment.    The patient's evaluation involved:  history and exam without other MDM data elements    The patient's management involved a decision regarding hospitalization.        New Prescriptions    No medications on file       Final diagnoses:   Hip pain, left   Intractable pain       1/16/2023   [facility] EMERGENCY DEPT     Pernell Hall MD  01/16/23 [id number]       Pernell Hall MD  01/16/23 8421

## 2023-01-16 NOTE — ED NOTES
[facility] called for [facility] - spoke with [facility] at the [facility] no beds avail.   [facility] notification line called - NOTIFICATION # [id number]

## 2023-01-17 ENCOUNTER — APPOINTMENT (OUTPATIENT)
Dept: PHYSICAL THERAPY | Facility: CLINIC | Age: 83
DRG: 559 | End: 2023-01-17
Attending: HOSPITALIST
Payer: COMMERCIAL

## 2023-01-17 ENCOUNTER — APPOINTMENT (OUTPATIENT)
Dept: CT IMAGING | Facility: CLINIC | Age: 83
DRG: 559 | End: 2023-01-17
Attending: NURSE PRACTITIONER
Payer: COMMERCIAL

## 2023-01-17 ENCOUNTER — APPOINTMENT (OUTPATIENT)
Dept: GENERAL RADIOLOGY | Facility: CLINIC | Age: 83
DRG: 559 | End: 2023-01-17
Attending: PEDIATRICS
Payer: COMMERCIAL

## 2023-01-17 PROBLEM — R52 INTRACTABLE PAIN: Status: ACTIVE | Noted: 2023-01-17

## 2023-01-17 PROBLEM — M25.552 HIP PAIN, LEFT: Status: ACTIVE | Noted: 2023-01-17

## 2023-01-17 LAB
ANION GAP SERPL CALCULATED.3IONS-SCNC: 7 MMOL/L (ref 3–14)
BUN SERPL-MCNC: 19 MG/DL (ref 7–30)
CALCIUM SERPL-MCNC: 8.8 MG/DL (ref 8.5–10.1)
CHLORIDE BLD-SCNC: 110 MMOL/L (ref 94–109)
CO2 SERPL-SCNC: 25 MMOL/L (ref 20–32)
CREAT SERPL-MCNC: 0.89 MG/DL (ref 0.66–1.25)
ERYTHROCYTE [DISTWIDTH] IN BLOOD BY AUTOMATED COUNT: 14.4 % (ref 10–15)
GFR SERPL CREATININE-BSD FRML MDRD: 87 ML/MIN/1.73M2
GLUCOSE BLD-MCNC: 168 MG/DL (ref 70–99)
HCT VFR BLD AUTO: 36.6 % (ref 40–53)
HGB BLD-MCNC: 12.4 G/DL (ref 13.3–17.7)
MCH RBC QN AUTO: 31.8 PG (ref 26.5–33)
MCHC RBC AUTO-ENTMCNC: 33.9 G/DL (ref 31.5–36.5)
MCV RBC AUTO: 94 FL (ref 78–100)
PLATELET # BLD AUTO: 139 10E3/UL (ref 150–450)
POTASSIUM BLD-SCNC: 3.7 MMOL/L (ref 3.4–5.3)
RBC # BLD AUTO: 3.9 10E6/UL (ref 4.4–5.9)
SODIUM SERPL-SCNC: 142 MMOL/L (ref 133–144)
WBC # BLD AUTO: 8.2 10E3/UL (ref 4–11)

## 2023-01-17 PROCEDURE — G0378 HOSPITAL OBSERVATION PER HR: HCPCS

## 2023-01-17 PROCEDURE — 73502 X-RAY EXAM HIP UNI 2-3 VIEWS: CPT

## 2023-01-17 PROCEDURE — 250N000013 HC RX MED GY IP 250 OP 250 PS 637: Performed by: PEDIATRICS

## 2023-01-17 PROCEDURE — 36415 COLL VENOUS BLD VENIPUNCTURE: CPT | Performed by: HOSPITALIST

## 2023-01-17 PROCEDURE — 250N000011 HC RX IP 250 OP 636: Performed by: HOSPITALIST

## 2023-01-17 PROCEDURE — 96376 TX/PRO/DX INJ SAME DRUG ADON: CPT

## 2023-01-17 PROCEDURE — 99222 1ST HOSP IP/OBS MODERATE 55: CPT | Performed by: PHYSICIAN ASSISTANT

## 2023-01-17 PROCEDURE — 80048 BASIC METABOLIC PNL TOTAL CA: CPT | Performed by: HOSPITALIST

## 2023-01-17 PROCEDURE — 72192 CT PELVIS W/O DYE: CPT

## 2023-01-17 PROCEDURE — 85027 COMPLETE CBC AUTOMATED: CPT | Performed by: HOSPITALIST

## 2023-01-17 PROCEDURE — 250N000013 HC RX MED GY IP 250 OP 250 PS 637: Performed by: NURSE PRACTITIONER

## 2023-01-17 PROCEDURE — 120N000001 HC R&B MED SURG/OB

## 2023-01-17 PROCEDURE — 250N000013 HC RX MED GY IP 250 OP 250 PS 637: Performed by: HOSPITALIST

## 2023-01-17 PROCEDURE — 97162 PT EVAL MOD COMPLEX 30 MIN: CPT | Mod: GP | Performed by: PHYSICAL THERAPIST

## 2023-01-17 RX ORDER — LIDOCAINE 4 G/G
2 PATCH TOPICAL
Status: DISCONTINUED | OUTPATIENT
Start: 2023-01-18 | End: 2023-01-17

## 2023-01-17 RX ORDER — HYDROMORPHONE HCL IN WATER/PF 6 MG/30 ML
0.2 PATIENT CONTROLLED ANALGESIA SYRINGE INTRAVENOUS
Status: DISCONTINUED | OUTPATIENT
Start: 2023-01-17 | End: 2023-01-23 | Stop reason: HOSPADM

## 2023-01-17 RX ORDER — HYDROMORPHONE HYDROCHLORIDE 1 MG/ML
0.5 INJECTION, SOLUTION INTRAMUSCULAR; INTRAVENOUS; SUBCUTANEOUS EVERY 4 HOURS PRN
Status: DISCONTINUED | OUTPATIENT
Start: 2023-01-17 | End: 2023-01-17

## 2023-01-17 RX ORDER — LIDOCAINE 4 G/G
2 PATCH TOPICAL EVERY 24 HOURS
Status: DISCONTINUED | OUTPATIENT
Start: 2023-01-17 | End: 2023-01-23 | Stop reason: HOSPADM

## 2023-01-17 RX ORDER — ACETAMINOPHEN 325 MG/1
975 TABLET ORAL 3 TIMES DAILY
Status: DISCONTINUED | OUTPATIENT
Start: 2023-01-17 | End: 2023-01-23 | Stop reason: HOSPADM

## 2023-01-17 RX ADMIN — OXYCODONE HYDROCHLORIDE 5 MG: 5 TABLET ORAL at 14:15

## 2023-01-17 RX ADMIN — GABAPENTIN 300 MG: 300 CAPSULE ORAL at 21:37

## 2023-01-17 RX ADMIN — SENNOSIDES AND DOCUSATE SODIUM 1 TABLET: 50; 8.6 TABLET ORAL at 21:37

## 2023-01-17 RX ADMIN — FAMOTIDINE 20 MG: 20 TABLET, FILM COATED ORAL at 21:38

## 2023-01-17 RX ADMIN — FOLIC ACID 1 MG: 1 TABLET ORAL at 08:28

## 2023-01-17 RX ADMIN — ATORVASTATIN CALCIUM 80 MG: 40 TABLET, FILM COATED ORAL at 21:38

## 2023-01-17 RX ADMIN — APIXABAN 5 MG: 5 TABLET, FILM COATED ORAL at 08:28

## 2023-01-17 RX ADMIN — SENNOSIDES AND DOCUSATE SODIUM 1 TABLET: 50; 8.6 TABLET ORAL at 08:28

## 2023-01-17 RX ADMIN — ACETAMINOPHEN 975 MG: 325 TABLET, FILM COATED ORAL at 21:37

## 2023-01-17 RX ADMIN — METOPROLOL SUCCINATE 12.5 MG: 25 TABLET, EXTENDED RELEASE ORAL at 08:28

## 2023-01-17 RX ADMIN — HYDROXYZINE HYDROCHLORIDE 50 MG: 50 TABLET, FILM COATED ORAL at 06:03

## 2023-01-17 RX ADMIN — ACETAMINOPHEN 975 MG: 325 TABLET, FILM COATED ORAL at 14:15

## 2023-01-17 RX ADMIN — APIXABAN 5 MG: 5 TABLET, FILM COATED ORAL at 21:38

## 2023-01-17 RX ADMIN — LIDOCAINE 2 PATCH: 560 PATCH PERCUTANEOUS; TOPICAL; TRANSDERMAL at 15:56

## 2023-01-17 RX ADMIN — SACUBITRIL AND VALSARTAN 1 TABLET: 97; 103 TABLET, FILM COATED ORAL at 21:37

## 2023-01-17 RX ADMIN — HYDROMORPHONE HYDROCHLORIDE 0.2 MG: 0.2 INJECTION, SOLUTION INTRAMUSCULAR; INTRAVENOUS; SUBCUTANEOUS at 15:57

## 2023-01-17 RX ADMIN — ASPIRIN 81 MG: 81 TABLET, COATED ORAL at 21:38

## 2023-01-17 RX ADMIN — FLUTICASONE PROPIONATE 2 SPRAY: 50 SPRAY, METERED NASAL at 08:28

## 2023-01-17 RX ADMIN — OXYCODONE HYDROCHLORIDE 5 MG: 5 TABLET ORAL at 06:03

## 2023-01-17 RX ADMIN — SACUBITRIL AND VALSARTAN 1 TABLET: 97; 103 TABLET, FILM COATED ORAL at 08:28

## 2023-01-17 RX ADMIN — ACETAMINOPHEN 975 MG: 325 TABLET, FILM COATED ORAL at 09:03

## 2023-01-17 ASSESSMENT — ACTIVITIES OF DAILY LIVING (ADL)
ADLS_ACUITY_SCORE: 39
CHANGE_IN_FUNCTIONAL_STATUS_SINCE_ONSET_OF_CURRENT_ILLNESS/INJURY: NO
DIFFICULTY_EATING/SWALLOWING: NO
WEAR_GLASSES_OR_BLIND: NO
ADLS_ACUITY_SCORE: 28
ADLS_ACUITY_SCORE: 38
ADLS_ACUITY_SCORE: 39
ADLS_ACUITY_SCORE: 39
FALL_HISTORY_WITHIN_LAST_SIX_MONTHS: YES
ADLS_ACUITY_SCORE: 39
ADLS_ACUITY_SCORE: 39
WALKING_OR_CLIMBING_STAIRS_DIFFICULTY: NO
CONCENTRATING,_REMEMBERING_OR_MAKING_DECISIONS_DIFFICULTY: NO
DOING_ERRANDS_INDEPENDENTLY_DIFFICULTY: NO
ADLS_ACUITY_SCORE: 39
ADLS_ACUITY_SCORE: 39
TOILETING_ISSUES: NO
ADLS_ACUITY_SCORE: 39
ADLS_ACUITY_SCORE: 39
DRESSING/BATHING_DIFFICULTY: NO
ADLS_ACUITY_SCORE: 38

## 2023-01-17 NOTE — PROGRESS NOTES
Ortho Note: (Full consult to come later)    1. Brief HPI-history of left hip arthroplasty 1999.  Left hip pain over the last 6 weeks and worked up at [facility] with a couple CT scans showing screw loose and pushing against the nerve per the patient's story.  No surgery was set up yet he states.  Pain worse now and cannot bear weight, sharp from lateral hip to knee, slightly better this morning.  2.  Ortho surgeons-discussed this case with both Dr. Carrera who is on-call today and Dr. Camacho who is on-call yesterday.  They are both agree this patient should be transferred secondary extensive work-up which has already been [facility] the [facility] and unfortunately none of the surgeons here would be doing this revision with a complex loose screw pushing it on a nerve.  3. Hospitalist-Dr. Moreno taken over for  this AM.  I sent secure text to Dr. Moreno with the above conclusion from the surgeons.    Ar Kitchen PA-C

## 2023-01-17 NOTE — CONSULTS
[facility]    Orthopedics Consultation     Date of Admission:  1/16/2023  Date of Consult (When I saw the patient): 01/17/23    Assessment & Plan     1. Pain-controlled better this morning with gabapentin and hydroxyzine, Tylenol, 2 doses of Dilaudid, oxycodone 5 mg.  2. DVT Prophylaxis/atrial fibrillation treatment-patient on Eliquis 5 mg twice a day  3.  Orthopedic surgeons-discussed this case with both Dr. Carrera who is on-call today and Dr. Camacho who is on-call yesterday.  They are both agree this patient should be transferred secondary extensive work-up which has already been [facility] the [facility] and unfortunately none of the surgeons here would be doing this revision with a complex loose screw pushing it on a nerve  4. Hospitalist-Dr. Moreno taken over for  this AM.  I sent secure text to Dr. Moreno with the above conclusion from the surgeons.  He also talked to Dr. Carrera directly.  5. Plan-transfer patient.  The thought is that since there is already an extensive work-up which has been started [facility] and the surgeons here would not be doing a big revision surgery that they would like the patient transferred.    David Thomson is a 82 year old male who was admitted on 1/16/2023. I was asked to see the patient for Dr. Carrera and Dr. Camacho because a consult was placed for orthopedics the night before.  I discussed this patient in depth with Dr. Carrera and Dr. Camacho as well as Dr. Moreno.    Active Problems:    * No active hospital problems. *      Ar Kitchen PA-C    Code Status    Full Code    Reason for Consult   Reason for consult: I was asked by Dr. Carrera in Dr. Camacho to evaluate this patient for the medicine service.    Primary Care Physician   Physician No Ref-Primary    Chief Complaint   Increasing left hip pain over the last 6 weeks and status post left total hip arthroplasty, possible prosthesis failure    History is obtained from the patient    History  of Present Illness   David Thomson is a 82 year old male who presents with increasing left hip pain over the last 6 weeks which has gotten to the point where he has difficulty bearing weight on the left lower extremity.  Patient has gone through an extensive work-up at the [facility] and he states had about 2 CT scans where they came up with the conclusion that there was a screw that was loose but it was pushing against a nerve.  No orthopedic surgery has been set up as of yet.  Patient has had decreased pain while in the hospital as of this morning but still has lateral left hip pain that radiates to his knee and is sharp in nature.    Past Medical History   I have reviewed this patient's medical history and updated it with pertinent information if needed.   Past Medical History:   Diagnosis Date     Hyperlipidaemia      Hypertension      Pre-diabetes        Past Surgical History   I have reviewed this patient's surgical history and updated it with pertinent information if needed.  Past Surgical History:   Procedure Laterality Date     BACK SURGERY       GALLBLADDER SURGERY       HIP SURGERY       INSERT STIMULATOR DORSAL COLUMN Right 4/19/2016    Procedure: INSERT STIMULATOR DORSAL COLUMN;  Surgeon: Zoë Clemons DO;  Location: [facility] OR     NECK SURGERY       WRIST SURGERY         Prior to Admission Medications   Prior to Admission Medications   Prescriptions Last Dose Informant Patient Reported? Taking?   ELIQUIS ANTICOAGULANT 5 MG tablet 1/16/2023 at am Care Giver Yes Yes   Sig: Take 5 mg by mouth every 12 hours Am and bedtime   ENTRESTO  MG per tablet 1/16/2023 at am Care Giver Yes Yes   Sig: Take 1 tablet by mouth 2 times daily Morning and bedtime   aspirin (ASA) 81 MG EC tablet 1/15/2023 at hs Care Giver Yes Yes   Sig: Take 81 mg by mouth At Bedtime   atorvastatin (LIPITOR) 80 MG tablet 1/15/2023 at hs Care Giver Yes Yes   Sig: Take 80 mg by mouth At Bedtime   famotidine (PEPCID) 20 MG tablet  1/15/2023 at hs Care Giver Yes Yes   Sig: Take 20 mg by mouth At Bedtime   fluticasone (FLONASE) 50 MCG/ACT nasal spray 1/16/2023 at am Care Giver Yes Yes   Sig: Spray 2 sprays into both nostrils daily   folic acid (FOLVITE) 1 MG tablet 1/16/2023 Care Giver Yes Yes   Sig: Take 1 mg by mouth daily   gabapentin (NEURONTIN) 300 MG capsule 1/15/2023 at hs Care Giver Yes Yes   Sig: Take 300 mg by mouth At Bedtime   hydrOXYzine (VISTARIL) 50 MG capsule Unknown Care Giver No Yes   Sig: Take 1 capsule (50 mg) by mouth 4 times daily as needed (spasm or pain or itching)   metoprolol succinate ER (TOPROL XL) 25 MG 24 hr tablet 1/16/2023 at am Care Giver Yes Yes   Sig: Take 12.5 mg by mouth daily   omeprazole (PRILOSEC) 20 MG DR capsule 1/16/2023 at am Care Giver Yes Yes   Sig: Take 20 mg by mouth 2 times daily Am and HS   vitamin B1 (THIAMINE) 100 MG tablet 1/16/2023 at am Care Giver Yes Yes   Sig: Take 100 mg by mouth daily      Facility-Administered Medications: None     Allergies   Allergies   Allergen Reactions     Benzalkonium      Lisinopril      Piroxicam      Polysorbate  [Bay Oil]      Prazosin      Other reaction(s): Nightmares     Urea      Other reaction(s): Eruption of skin, Eruption of skin       Social History   I have reviewed this patient's social history and updated it with pertinent information if needed. David Thomson  reports that he has never smoked. He has never used smokeless tobacco.    Family History   I have reviewed this patient's family history and updated it with pertinent information if needed.   Family History   Problem Relation Age of Onset     Heart Disease Mother      Heart Disease Father        Review of Systems   CONSTITUTIONAL: NEGATIVE for fever, chills  RESP: NEGATIVE for significant cough or SOB  CV: NEGATIVE for chest pain, palpitations or peripheral edema  MUSCULOSKELETAL: Increasing left hip pain over the last 6 weeks as mentioned above.  NEUROVASCULAR: No numbness tingling  burning or electrical pain.    Physical Exam   Temp: 98  F (36.7  C) Temp src: Oral BP: 135/77 Pulse: 65   Resp: 17 SpO2: 97 % O2 Device: None (Room air)    Vital Signs with Ranges  Temp:  [97.6  F (36.4  C)-98.2  F (36.8  C)] 98  F (36.7  C)  Pulse:  [62-71] 65  Resp:  [16-18] 17  BP: (113-158)/(58-84) 135/77  SpO2:  [95 %-98 %] 97 %  253 lbs 0 oz    OBJECTIVE:  CMS intact left LE, DF/PF intact, 5 out of 5 strength  Able to do a straight leg raise and fire quad on the left lower extremity.  Mild tenderness to palpation of left lateral hip   Left with minimal swelling and no erythema or ecchymosis  Incision well-healed with no erythema swelling or drainage.  Passive: Flexion to 70 degrees, internal rotation to 15 degrees, external rotation to 40 degrees.  All range of motion without catching locking or major pain, he does have some discomfort with internal and external rotation but it is not significant.    Bilateral calves soft and non tender    Constitutional: Awake, alert, cooperative, no apparent distress, and appears stated age.  Respiratory: No increased work of breathing, good air exchange  Cardiovascular: Not examined  GI: Not examined  Lymph/Hematologic: No no lymphadenopathy at the left hip  Genitourinary:  Deferred  Skin: No bruising or bleeding, normal skin color, texture, turgor, no redness, warmth, or swelling, no rashes, no lesions, no abnormal moles, nails normal without discoloration or clubbing and no jaundice.  Musculoskeletal: Exam as mentioned above.  Neurologic: Awake, alert, oriented to name  Neuropsychiatric: Calm, normal eye contact, alert, normal affect    Data   Results for orders placed or performed during the hospital encounter of 01/16/23 (from the past 24 hour(s))   Basic metabolic panel   Result Value Ref Range    Sodium 142 133 - 144 mmol/L    Potassium 3.7 3.4 - 5.3 mmol/L    Chloride 110 (H) 94 - 109 mmol/L    Carbon Dioxide (CO2) 25 20 - 32 mmol/L    Anion Gap 7 3 - 14 mmol/L     Urea Nitrogen 19 7 - 30 mg/dL    Creatinine 0.89 0.66 - 1.25 mg/dL    Calcium 8.8 8.5 - 10.1 mg/dL    Glucose 168 (H) 70 - 99 mg/dL    GFR Estimate 87 >60 mL/min/1.73m2   CBC with platelets   Result Value Ref Range    WBC Count 8.2 4.0 - 11.0 10e3/uL    RBC Count 3.90 (L) 4.40 - 5.90 10e6/uL    Hemoglobin 12.4 (L) 13.3 - 17.7 g/dL    Hematocrit 36.6 (L) 40.0 - 53.0 %    MCV 94 78 - 100 fL    MCH 31.8 26.5 - 33.0 pg    MCHC 33.9 31.5 - 36.5 g/dL    RDW 14.4 10.0 - 15.0 %    Platelet Count 139 (L) 150 - 450 10e3/uL

## 2023-01-17 NOTE — PROGRESS NOTES
Requiring IV pain medications.  Start capnography monitoring in setting of IV opioid use.  Switched to inpatient status.      ANA LILIA Valenzuela, CNP  Hospital Medicine Service, [facility]

## 2023-01-17 NOTE — PROGRESS NOTES
[facility]    Medicine Progress Note - Hospitalist Service    Date of Admission:  1/16/2023    Assessment & Plan      David Thomson is a 82 year old male, former [employer] who was admitted on 1/16/2023. He presented with intractable left hip pain.     # Intractable Left hip pain  # Previous left hip replacement 1999  # Suspected hardware failure  Continues to have significant left hip pain with position changes, mobility with radiating left thigh and left knee pain. He is unable to bear weight on left leg.  As needed oxycodone.  If unable to tolerate p.o. oxycodone or pain not controlled with p.o. oxycodone.  As needed IV opioid medication with parameters.  Continue stool softeners to help avoid constipation.  Continue home dose gabapentin.  Continue home dose hydroxyzine.  [facility]  Recommend we obtain CT during admission to assess for hardware failure, hairline fracture or dislocation.  Assessed by physical therapy.  Unsafe to discharge home.  Per physical therapy discharging to TCU not beneficial at this point given patient's level of pain and mobility.  [facility] declines [facility].  He is open to being transferred in the [facility].  Addendum, physical therapy states severe left hip pain with attempted left hip abduction.  Patient admitted to physical therapy he has been falling at home the past 3 weeks. ? Traumatic left hip injury.   Patient reported to physical therapy severe left hip pain agony, he could go shoot himself.  Revisited with patient.  Patient contracts for safety.  I will obtain CT imaging and discuss imaging with our orthopedic team.  Plans for possible transfer to higher level of care.     Orthopedic A & P  1. Pain-controlled better this morning with gabapentin and hydroxyzine, Tylenol, 2 doses of Dilaudid, oxycodone 5 mg.  2. DVT Prophylaxis/atrial fibrillation treatment-patient on Eliquis 5 mg twice a day  3.  Orthopedic  surgeons-discussed this case with both Dr. Carrera who is on-call today and Dr. Camacho who is on-call yesterday.  They are both agree this patient should be transferred secondary extensive work-up which [facility] and unfortunately none of the surgeons here would be doing this revision with a complex loose screw pushing it on a nerve  4. Hospitalist-Dr. Moreno taken over for  this AM.  I sent secure text to Dr. Moreno with the above conclusion from the surgeons.  He also talked to Dr. Carrera directly.  5. Plan-transfer patient.  The thought is that since there is already an extensive work-up which [facility] and the surgeons here would not be doing a big revision surgery that they would like the patient transferred.      # Coronary artery disease  # Essential hypertension  # History of CABG   - continue home dose aspirin   - continue home dose lipitor   - continue entresto   - continue metoprolol    # Mechanical heart valve   - continue home dose eliquis    # Prediabetes   - not on Metformin   - follow up outpatient       Diet: Combination Diet Low Saturated Fat Diet    DVT Prophylaxis: DOAC  Rice Catheter: Not present  Lines: None     Cardiac Monitoring: None  Code Status: Full Code        Disposition Plan      Expected Discharge Date: 01/17/2023        Discharge Comments: XR, PT Eval, determing if he can go home with planned future operation or needs to transfer to another facility for operation.        The patient's care was discussed with the Attending Physician, Dr. Moreno, Bedside Nurse, Care Coordinator/ and Patient.    ANA LILIA Valenzuela CNP  Hospitalist Service  [facility]  Securely message with [employer] (more info)  Text page via [employer] Paging/Directory   ______________________________________________________________________    Interval History   Significant left hip pain with activity, position changes  Some pain  relief last night slept at least 6 hours.   Hopping on right leg, unable to bear weight on left due to left hip pain  Does not [facility]  Former [employer]    Physical Exam   Vital Signs: Temp: (!) 96.6  F (35.9  C) Temp src: Axillary BP: 114/53 Pulse: 59   Resp: 13 SpO2: 95 % O2 Device: None (Room air)    Weight: 253 lbs 0 oz    General appearance: alert and cooperative  Lungs: clear to auscultation bilaterally  Heart: regular rate and rhythm, S1, S2 normal  Abdomen: soft, non-tender  Extremities:   Subjective severe pain with sudden left hip movements. Surgical scar left lateral hip.  Unable to bear weight left leg  Subjective radiating left knee pain  Neurologic: moving all 4 extremities spontaneously, no focal weakness.      Medical Decision Making     45 MINUTES SPENT BY ME on the date of service doing chart review, history, exam, documentation & further activities per the note.      Data     I have personally reviewed the following data over the past 24 hrs:    8.2  \   12.4 (L)   / 139 (L)     142 110 (H) 19 /  168 (H)   3.7 25 0.89 \

## 2023-01-17 NOTE — PROGRESS NOTES
PRIMARY DIAGNOSIS: ACUTE PAIN  OUTPATIENT/OBSERVATION GOALS TO BE MET BEFORE DISCHARGE:  1. Pain Status: Improved-controlled with oral pain medications.    2. Return to near baseline physical activity: No    3. Cleared for discharge by consultants (if involved): N/A    Discharge Planner Nurse   Safe discharge environment identified: Yes  Barriers to discharge: Yes       Entered by: Mirela Moore RN 01/16/2023 7:02 PM     Please review provider order for any additional goals.   Nurse to notify provider when observation goals have been met and patient is ready for discharge.

## 2023-01-17 NOTE — PROGRESS NOTES
"   01/17/23 1400   Appointment Info   Signing Clinician's Name / Credentials (PT) Loreto Leger PT, DPT, ATC, LAT       Present no   Living Environment   People in Home alone   Current Living Arrangements house   Home Accessibility   (no stiars)   Transportation Anticipated none   Living Environment Comments slab on grade, narrow environment unable to fit the walker in the home. flat bed. walk in shower. notes working to remodel home with environmental marla. .   Self-Care   Usual Activity Tolerance moderate   Current Activity Tolerance fair   Regular Exercise No   Fall history within last six months yes   Number of times patient has fallen within last six months   (too many to count, in the last month more than prior to but before hip worsened was falling once a week)   Activity/Exercise/Self-Care Comment IND with mobility and self cares until 1 month ago, significantly decline 1 week ago.   General Information   Onset of Illness/Injury or Date of Surgery 01/16/23   Referring Physician Dr. Vera   Patient/Family Therapy Goals Statement (PT) \"I just want to get the damn thing fixed\"   Pertinent History of Current Problem (include personal factors and/or comorbidities that impact the POC) Patient is a 82 year old male, registered OBSERVATION status from the ED due to left hip pain. Patient with history of R JUDE in 1999 and known spine stimulator for chroinc back pain. Per orthopedic PA note: \"discussed this case with both Dr. Carrera who is on-call today and Dr. Camacho who is on-call yesterday.  They are both agree this patient should be transferred secondary extensive [facility] and unfortunately none of the surgeons here would be doing this revision with a complex loose screw pushing it on a nerve\". Per Hernan BATRES requesting mobility assessment to determine disposition prior to surgical intervention. xray 1/17/23 notes :Postoperative " "changes left total hip arthroplasty. Components appear well seated. There is an area of endosteal thinning within the left femur which may represent a focal area of osteolysis. Right hip negative for fracture. Diffuse demineralization. Pelvis negative for fracture.\"   Existing Precautions/Restrictions fall   Weight-Bearing Status - LUE full weight-bearing   Weight-Bearing Status - RUE full weight-bearing   Weight-Bearing Status - LLE weight-bearing as tolerated   Weight-Bearing Status - RLE full weight-bearing   General Observations PT eval and treat for DC recs Activity orders: ambulate, WBAT per verbal from Dr. Carrera to Dr. Moreno, [facility]   Cognition   Affect/Mental Status (Cognition) agitated;WFL   Orientation Status (Cognition) oriented x 4   Follows Commands (Cognition) WFL   Pain Assessment   Patient Currently in Pain Yes, see Vital Sign flowsheet  (9/10 pain with assistance for hip abduction with shifting. in short sitting 8/10 with LLE supported, with WB 10/10)   Posture    Posture Forward head position;Protracted shoulders   Posture Comments most comfortable with hip flexed 60 degrees, reclined, knee flexed and lower leg supported   Range of Motion (ROM)   ROM Comment unable to formally asses LLE due to instability with hip abd attempt   Strength (Manual Muscle Testing)   Strength Comments fair quad set. painful SLR. Bilat UE WFL for increased WB during mobility   Bed Mobility   Bed Mobility supine-sit;sit-supine   Supine-Sit [place] (Bed Mobility) modified independence   Sit-Supine [place] (Bed Mobility) modified independence   Bed Mobility Limitations decreased ability to use legs for bridging/pushing   Impairments Contributing to Impaired Bed Mobility pain   Assistive Device (Bed Mobility) bed rails   Comment, (Bed Mobility) with moving LLE into bed, aided patient in managing the leg into abduction demonstrates crepitus with severe pain requiring 10 minutes for " calming.   Transfers   Transfers sit-stand transfer;bed-chair transfer;toilet transfer   Transfer Safety Concerns Noted decreased step length;decreased weight-shifting ability   Impairments Contributing to Impaired Transfers pain   Bed-Chair Transfer   Assistive Device (Bed-Chair Transfers) walker, front-wheeled   Bed-Chair [place] (Transfers) contact guard   Comment, (Bed-Chair Transfer) TTWB through LLE   Sit-Stand Transfer   Sit-Stand [place] (Transfers) minimum assist (75% patient effort)   Assistive Device (Sit-Stand Transfers) walker, front-wheeled   Toilet Transfer   [place] Level (Toilet Transfer) contact guard   Assistive Device (Toilet Transfer) grab bars/safety frame;walker, front-wheeled   Type (Toilet Transfer) stand-sit;sit-stand   Gait/Stairs (Locomotion)   [place] Level (Gait) contact guard   Assistive Device (Gait) walker, front-wheeled   Distance in Feet 10   Pattern (Gait) step-to   Deviations/Abnormal Patterns (Gait) stride length decreased;base of support, narrow;gait speed decreased;antalgic   Maintains Weight-bearing Status (Gait)   (TTWB LLE self selected)   Balance   Balance Comments IND short sitting. Sit to stand with right side trunk lean and poor stability, MIN assist to correct and fully rise. Standing with right weight shift and UE support   Sensory Examination   Sensory Perception Comments hypersenstive throughout LLE   Muscle Tone   Muscle Tone Comments quad and hamstring spasm   Clinical Impression   Criteria for Skilled Therapeutic Intervention Yes, treatment indicated   PT Diagnosis (PT) unstable left hip derangement. impaired mobility. muscle weakness, impaired WB tolerance, impaired balance, limited joint range of motion throughout LLE   Influenced by the following impairments frequent falls and left hip dysfunction progressively worsening   Functional limitations due to impairments unable to care for self or moblize safely in his home. use of walker for safe  mobility.   Clinical Presentation (PT Evaluation Complexity) Unstable/Unpredictable   Clinical Presentation Rationale left hip shift with severe pain, uncontrolled pain, unable to tolerate WB more than TTWB   Clinical Decision Making (Complexity) moderate complexity   Planned Therapy Interventions (PT) bed mobility training;gait training;home exercise program;ROM (range of motion);strengthening;patient/family education;transfer training;home program guidelines;progressive activity/exercise   Anticipated Equipment Needs at Discharge (PT)   (TBD at next level of care)   Risk & Benefits of therapy have been explained evaluation/treatment results reviewed;participants voiced agreement with care plan;participants included;patient   Clinical Impression Comments Patient presents with signs and symptoms consistent with unstable fracture. He would benefit from additional medical work up prior to further PT mobility training and discharge planning.   PT Total Evaluation Time   PT Claudia Moderate Complexity Minutes (40332) 45   Physical Therapy Goals   PT Frequency Daily   PT Predicted Duration/Target Date for Goal Attainment 01/24/23   PT Goals Bed Mobility;Transfers;Gait   PT: Bed Mobility Independent;Supine to/from sit   PT: Transfers Modified independent;Sit to/from stand;Assistive device   PT: Gait Modified independent;50 feet;Assistive device  (AD must be able to fit within the space of his home.)   PT Discharge Planning   PT Discharge Recommendation (DC Rec) Transitional Care Facility  (to be determined based on medical management and hip stability)   PT Rationale for DC Rec Patient from home alone, IND as 1 month ago. Notes falling weekly for the last 6 months and daily for the past month. Patient declines space for a walker to fit in his home and does not have support in the home at time of discharge. Given the instability of the LLE patient requires further medical work up and planning for the limb dysfunction prior  to continued mobilization and intervention with therapy. Patient would beenfit from TCU placement once the pain is controlled and limb stabilized for safe disposition.   PT Brief overview of current status MOD IND bed mobility with 9/10 pain with bedrail. LLE shifting and 10/10 painful with AAROM hip abduction in bed. Sit to/from stand, toilet transfer MIN assist with walker. Bed to chair transfer TTWB LLE and walker CGA. Ambulated 10' with TTWB LLE, walker and CGA for safety.   Total Session Time   Total Session Time (sum of timed and untimed services) 45     Thank you for your referral.  Loreto Leger, PT, DPT, ATC, LAT    [facility]  O: [phone]  E: [email or web address]

## 2023-01-17 NOTE — PLAN OF CARE
Goal Outcome Evaluation:      Plan of Care Reviewed With: patient    Overall Patient Progress: no changeOverall Patient Progress: no change    Outcome Evaluation: Patient continues admission related to L Hip pain has loose scews from past procedure. Patient is A&O X4, VSS, continues nonweight bearing, stands to use urinal. Continues to complain of pain in the left hip radiating to the left leg and knee, utilized PRN Oxy X3 Atarax X1. Has been having difficulty urinating, was able to void while standing 600ml. Will continue to monitor

## 2023-01-18 ENCOUNTER — APPOINTMENT (OUTPATIENT)
Dept: PHYSICAL THERAPY | Facility: CLINIC | Age: 83
DRG: 559 | End: 2023-01-18
Payer: COMMERCIAL

## 2023-01-18 ENCOUNTER — APPOINTMENT (OUTPATIENT)
Dept: GENERAL RADIOLOGY | Facility: CLINIC | Age: 83
DRG: 559 | End: 2023-01-18
Attending: NURSE PRACTITIONER
Payer: COMMERCIAL

## 2023-01-18 LAB
ABO/RH(D): NORMAL
ALBUMIN SERPL BCG-MCNC: 3.9 G/DL (ref 3.5–5.2)
ALBUMIN UR-MCNC: NEGATIVE MG/DL
ALP SERPL-CCNC: 86 U/L (ref 40–129)
ALT SERPL W P-5'-P-CCNC: 17 U/L (ref 10–50)
ANION GAP SERPL CALCULATED.3IONS-SCNC: 10 MMOL/L (ref 7–15)
ANTIBODY SCREEN: NEGATIVE
APPEARANCE UR: CLEAR
APTT PPP: 39 SECONDS (ref 22–38)
AST SERPL W P-5'-P-CCNC: 18 U/L (ref 10–50)
BACTERIA #/AREA URNS HPF: ABNORMAL /HPF
BILIRUB SERPL-MCNC: 1.8 MG/DL
BILIRUB UR QL STRIP: NEGATIVE
BUN SERPL-MCNC: 17.2 MG/DL (ref 8–23)
CALCIUM SERPL-MCNC: 9.2 MG/DL (ref 8.8–10.2)
CHLORIDE SERPL-SCNC: 102 MMOL/L (ref 98–107)
COLOR UR AUTO: YELLOW
CREAT SERPL-MCNC: 0.93 MG/DL (ref 0.67–1.17)
CRP SERPL-MCNC: <3 MG/L
DEPRECATED HCO3 PLAS-SCNC: 27 MMOL/L (ref 22–29)
ERYTHROCYTE [DISTWIDTH] IN BLOOD BY AUTOMATED COUNT: 14.6 % (ref 10–15)
FLUAV RNA SPEC QL NAA+PROBE: NEGATIVE
FLUBV RNA RESP QL NAA+PROBE: NEGATIVE
GFR SERPL CREATININE-BSD FRML MDRD: 83 ML/MIN/1.73M2
GLUCOSE SERPL-MCNC: 139 MG/DL (ref 70–99)
GLUCOSE UR STRIP-MCNC: NEGATIVE MG/DL
HCT VFR BLD AUTO: 39.5 % (ref 40–53)
HGB BLD-MCNC: 13.2 G/DL (ref 13.3–17.7)
HGB UR QL STRIP: NEGATIVE
HYALINE CASTS: 6 /LPF
INR PPP: 1.39 (ref 0.85–1.15)
KETONES UR STRIP-MCNC: NEGATIVE MG/DL
LEUKOCYTE ESTERASE UR QL STRIP: NEGATIVE
MCH RBC QN AUTO: 31.4 PG (ref 26.5–33)
MCHC RBC AUTO-ENTMCNC: 33.4 G/DL (ref 31.5–36.5)
MCV RBC AUTO: 94 FL (ref 78–100)
MUCOUS THREADS #/AREA URNS LPF: PRESENT /LPF
NITRATE UR QL: NEGATIVE
PH UR STRIP: 5.5 [PH] (ref 5–7)
PLATELET # BLD AUTO: 142 10E3/UL (ref 150–450)
POTASSIUM SERPL-SCNC: 3.9 MMOL/L (ref 3.4–5.3)
PROT SERPL-MCNC: 6.4 G/DL (ref 6.4–8.3)
RBC # BLD AUTO: 4.21 10E6/UL (ref 4.4–5.9)
RBC URINE: <1 /HPF
RSV RNA SPEC NAA+PROBE: NEGATIVE
SARS-COV-2 RNA RESP QL NAA+PROBE: NEGATIVE
SODIUM SERPL-SCNC: 139 MMOL/L (ref 136–145)
SP GR UR STRIP: 1.02 (ref 1–1.03)
SPECIMEN EXPIRATION DATE: NORMAL
UROBILINOGEN UR STRIP-MCNC: 2 MG/DL
WBC # BLD AUTO: 7.2 10E3/UL (ref 4–11)
WBC URINE: 1 /HPF

## 2023-01-18 PROCEDURE — 87637 SARSCOV2&INF A&B&RSV AMP PRB: CPT | Performed by: NURSE PRACTITIONER

## 2023-01-18 PROCEDURE — 71045 X-RAY EXAM CHEST 1 VIEW: CPT

## 2023-01-18 PROCEDURE — 97110 THERAPEUTIC EXERCISES: CPT | Mod: GP | Performed by: PHYSICAL THERAPIST

## 2023-01-18 PROCEDURE — 81003 URINALYSIS AUTO W/O SCOPE: CPT | Performed by: NURSE PRACTITIONER

## 2023-01-18 PROCEDURE — 250N000013 HC RX MED GY IP 250 OP 250 PS 637: Performed by: NURSE PRACTITIONER

## 2023-01-18 PROCEDURE — 250N000013 HC RX MED GY IP 250 OP 250 PS 637: Performed by: PEDIATRICS

## 2023-01-18 PROCEDURE — 86901 BLOOD TYPING SEROLOGIC RH(D): CPT | Performed by: NURSE PRACTITIONER

## 2023-01-18 PROCEDURE — 85027 COMPLETE CBC AUTOMATED: CPT | Performed by: NURSE PRACTITIONER

## 2023-01-18 PROCEDURE — 85610 PROTHROMBIN TIME: CPT | Performed by: NURSE PRACTITIONER

## 2023-01-18 PROCEDURE — 86140 C-REACTIVE PROTEIN: CPT | Performed by: NURSE PRACTITIONER

## 2023-01-18 PROCEDURE — 85730 THROMBOPLASTIN TIME PARTIAL: CPT | Performed by: NURSE PRACTITIONER

## 2023-01-18 PROCEDURE — 120N000001 HC R&B MED SURG/OB

## 2023-01-18 PROCEDURE — 36415 COLL VENOUS BLD VENIPUNCTURE: CPT | Performed by: NURSE PRACTITIONER

## 2023-01-18 PROCEDURE — 80053 COMPREHEN METABOLIC PANEL: CPT | Performed by: NURSE PRACTITIONER

## 2023-01-18 PROCEDURE — 250N000013 HC RX MED GY IP 250 OP 250 PS 637: Performed by: HOSPITALIST

## 2023-01-18 RX ORDER — CODEINE PHOSPHATE AND GUAIFENESIN 10; 100 MG/5ML; MG/5ML
5 SOLUTION ORAL EVERY 4 HOURS PRN
Status: DISCONTINUED | OUTPATIENT
Start: 2023-01-18 | End: 2023-01-19

## 2023-01-18 RX ORDER — BENZONATATE 100 MG/1
100 CAPSULE ORAL 3 TIMES DAILY PRN
Status: DISCONTINUED | OUTPATIENT
Start: 2023-01-18 | End: 2023-01-23 | Stop reason: HOSPADM

## 2023-01-18 RX ADMIN — SENNOSIDES AND DOCUSATE SODIUM 2 TABLET: 50; 8.6 TABLET ORAL at 08:06

## 2023-01-18 RX ADMIN — SACUBITRIL AND VALSARTAN 1 TABLET: 97; 103 TABLET, FILM COATED ORAL at 21:23

## 2023-01-18 RX ADMIN — GABAPENTIN 300 MG: 300 CAPSULE ORAL at 21:22

## 2023-01-18 RX ADMIN — OXYCODONE HYDROCHLORIDE 5 MG: 5 TABLET ORAL at 12:56

## 2023-01-18 RX ADMIN — ACETAMINOPHEN 975 MG: 325 TABLET, FILM COATED ORAL at 15:52

## 2023-01-18 RX ADMIN — SACUBITRIL AND VALSARTAN 1 TABLET: 97; 103 TABLET, FILM COATED ORAL at 08:05

## 2023-01-18 RX ADMIN — SENNOSIDES AND DOCUSATE SODIUM 2 TABLET: 50; 8.6 TABLET ORAL at 21:22

## 2023-01-18 RX ADMIN — OXYCODONE HYDROCHLORIDE 5 MG: 5 TABLET ORAL at 03:08

## 2023-01-18 RX ADMIN — ACETAMINOPHEN 975 MG: 325 TABLET, FILM COATED ORAL at 08:06

## 2023-01-18 RX ADMIN — OXYCODONE HYDROCHLORIDE 5 MG: 5 TABLET ORAL at 20:04

## 2023-01-18 RX ADMIN — FOLIC ACID 1 MG: 1 TABLET ORAL at 08:05

## 2023-01-18 RX ADMIN — LIDOCAINE 2 PATCH: 560 PATCH PERCUTANEOUS; TOPICAL; TRANSDERMAL at 15:53

## 2023-01-18 RX ADMIN — FLUTICASONE PROPIONATE 2 SPRAY: 50 SPRAY, METERED NASAL at 08:05

## 2023-01-18 RX ADMIN — ASPIRIN 81 MG: 81 TABLET, COATED ORAL at 21:23

## 2023-01-18 RX ADMIN — ATORVASTATIN CALCIUM 80 MG: 40 TABLET, FILM COATED ORAL at 21:23

## 2023-01-18 RX ADMIN — METOPROLOL SUCCINATE 12.5 MG: 25 TABLET, EXTENDED RELEASE ORAL at 08:05

## 2023-01-18 RX ADMIN — APIXABAN 5 MG: 5 TABLET, FILM COATED ORAL at 21:22

## 2023-01-18 RX ADMIN — FAMOTIDINE 20 MG: 20 TABLET, FILM COATED ORAL at 21:23

## 2023-01-18 RX ADMIN — ACETAMINOPHEN 975 MG: 325 TABLET, FILM COATED ORAL at 21:22

## 2023-01-18 RX ADMIN — OXYCODONE HYDROCHLORIDE 5 MG: 5 TABLET ORAL at 08:05

## 2023-01-18 RX ADMIN — APIXABAN 5 MG: 5 TABLET, FILM COATED ORAL at 08:05

## 2023-01-18 ASSESSMENT — ACTIVITIES OF DAILY LIVING (ADL)
ADLS_ACUITY_SCORE: 30
ADLS_ACUITY_SCORE: 30
ADLS_ACUITY_SCORE: 28
ADLS_ACUITY_SCORE: 28
ADLS_ACUITY_SCORE: 30
ADLS_ACUITY_SCORE: 30
ADLS_ACUITY_SCORE: 28
ADLS_ACUITY_SCORE: 28
ADLS_ACUITY_SCORE: 29
ADLS_ACUITY_SCORE: 30
ADLS_ACUITY_SCORE: 28
ADLS_ACUITY_SCORE: 30

## 2023-01-18 NOTE — PLAN OF CARE
Goal Outcome Evaluation:      Plan of Care Reviewed With: patient    Overall Patient Progress: no changeOverall Patient Progress: no change    Outcome Evaluation: Patient alert and oriented. Vital signs stable. L hip pain contuines today. IV dialudad and PO oxycodone given for pain. Walked with 1 assist to get into shower today.

## 2023-01-18 NOTE — UTILIZATION REVIEW
Admission Status; Secondary Review Determination     Admission Date: 1/16/2023 12:29 PM       Under the authority of the Utilization Management Committee, the utilization review process indicated a secondary review on the above patient.  The review outcome is based on review of the medical records, discussions with staff, and applying clinical experience noted on the date of the review.        (x)      Inpatient Status Appropriate - This patient's medical care is consistent with medical management for inpatient care and reasonable inpatient medical practice.       RATIONALE FOR DETERMINATION      Brief clinical presentation, information copied from the chart, abbreviated and edited for relevant content:     Patient is a 82 year old male HD#2 Left Hip Femoral Step Prosthesis Loosening s/p Left Total Hip Arthroplasty from [facility]. Xray: Pelvis showing loosening femoral stem with bone response distally to the stem.  No fracture dislocation or tumor.  CT: Femoral stem loosening noted. Admitted for pain control, ortho consult which have [facility] and likely surgical revision. Currently awaiting transfer.       At the time of admission with the information available to the attending physician, more than 2 nights hospital complex care was anticipated. Also, there was a risk of adverse outcome if patient was treated outpatient or observation. High intensity of services anticipated. Inpatient admission appropriate based on Medicare guidelines.       The information on this document is developed by the utilization review team in order for the business office to ensure compliance.  This only denotes the appropriateness of proper admission status and does not reflect the quality of care rendered.         The definitions of Inpatient Status and Observation Status used in making the determination above are those provided in the CMS  Coverage Manual, Chapter 1 and Chapter 6, section 70.4.      Sincerely,      Christiane Oneill MD   Utilization Review/ Case Management  [facility].

## 2023-01-18 NOTE — PROGRESS NOTES
[facility]    Medicine Progress Note - Hospitalist Service    Date of Admission:  1/16/2023    Assessment & Plan      David Thomson is a 82 year old male, former [employer], anticoagulated on Eliquis for mechanical heart valve, who was admitted on 1/16/2023. He presented with intractable left hip pain.     # Intractable Left hip pain, possible traumatic left hip injury. Reports of recent fall prior to admission.  # Previous left hip replacement 1999  # Suspected hardware failure  Left hip pain with subsequent radiating pain down left anterior thigh and right knee.  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  Moderate size left hip joint effusion.  No fracture or joint malalignment.  Postoperative and degenerative changes in lower lumbar spine and mild bilateral sacroiliac and right hip degenerative arthrosis.  Hemoglobin 13.2  Left hip pain requiring IV opioid medication.  Currently utilizing p.o. oxycodone with mild to moderate relief.   Continues to have significant left hip pain with position changes, mobility with radiating left thigh and left knee pain. He is unable to bear weight on left leg.   Continue stool softeners to help avoid constipation.  Continue home dose gabapentin.  Continue home dose hydroxyzine.  [facility] are [facility].  Recommend we obtain CT during admission to assess for hardware failure, hairline fracture or dislocation.  Assessed by physical therapy.  Unsafe to discharge home.  Per physical therapy discharging to TCU not beneficial at this point given patient's level of pain and mobility.  Discussion with patient regarding [facility] to [facility].  He is open to [facility] to the [facility] as long he will be seen by Ortho surgeon with possible surgery repair in the near future.  Patient declines to return back to the [facility].  He is open to being transferred in the [facility].  Our unit coordinator contacted   [facility] who directed her to contact [facility].  Our contact information relayed to [facility] team (Dr. Doris Alvarenga).  I also contacted [facility] emergency department who will relay information for Ortho team to call us back.  Per outside medical records [facility] orthopedic surgery consultation note dated January 12, 2023,  Plan was for patient to follow-up in clinic for further evaluation of his hardware.    Orthopedic A & P  1. Pain-controlled better this morning with gabapentin and hydroxyzine, Tylenol, 2 doses of Dilaudid, oxycodone 5 mg.  2. DVT Prophylaxis/atrial fibrillation treatment-patient on Eliquis 5 mg twice a day  3.  Orthopedic surgeons-discussed this case with both Dr. Carrera who is on-call today and Dr. Camacho who is on-call yesterday.  They are both agree this patient should be transferred secondary extensive work-up which has already [facility] and unfortunately none of the surgeons here would be doing this revision with a complex loose screw pushing it on a nerve  4. Hospitalist-Dr. Moreno taken over for  this AM.  I sent secure text to Dr. Moreno with the above conclusion from the surgeons.  He also talked to Dr. Carrera directly.  5. Plan-transfer patient.  The thought is that since there is already an extensive work-up which [facility] started [facility] and the surgeons here would not be doing a big revision surgery that they would like the patient transferred.    # Suicidal Ideation: Resolved. Contracts for safety.  01/17/2023, secondhand information, it was reported by physical therapy that patient was contemplating suicide due to excruciating left hip pain.  Patient has contracted for safety multiple times including during our visit today.    # Urinary Retention:  # History Benign prostatic hypertrophy with outflow obstruction:  # History laser TURP in 2007:  Urinary retention overnight bladder scan 599 straight cath x1  PRN Bladder scan. Straight cath >300 mL. Rice  catheter for urinary retention > 24 hours.   Check UA/UC    # Coronary artery disease  # Essential hypertension  # History of CABG   - continue home dose aspirin   - continue home dose lipitor   - continue entresto   - continue metoprolol    # Mechanical heart valve   - continue home dose eliquis    # Prediabetes   - not on Metformin   - follow up outpatient              Diet: Combination Diet Low Saturated Fat Diet    DVT Prophylaxis: DOAC  Rice Catheter: Not present  Lines: None     Cardiac Monitoring: None  Code Status: Full Code         Disposition Plan:    Discharge date to be determined  Destination/Discharge Comments: Tentative transfer to higher level of care    The patient's care was discussed with the Attending Physician, Dr. Moreno, Bedside Nurse, Care Coordinator/ and Patient.    ANA LILIA Valenzuela CNP  Hospitalist Service  [facility]  Securely message with [employer] (more info)  Text page via [facility] Paging/Directory   ______________________________________________________________________    Interval History   Leg pain moderate to severe, utilizing p.o. oxycodone.  Urinary retention overnight bladder scan 599 straight cath x1    Physical Exam   Vital Signs: Temp: 96.8  F (36  C) Temp src: Oral BP: 98/53 Pulse: 62   Resp: 16 SpO2: 92 % O2 Device: None (Room air)    Weight: 253 lbs 0 oz  General appearance: alert and cooperative, sitting in recliner  Lungs: clear to auscultation bilaterally  Heart: regular rate and rhythm, S1, S2 normal  Abdomen: soft, non-tender  Extremities: Subjective severe pain with sudden left hip movements or repositioning.  Surgical scar left lateral hip.  Unable to bear weight left leg   Subjective radiating left knee pain  Neurologic: moving all 4 extremities spontaneously, no focal weakness.           Medical Decision Making       45 MINUTES SPENT BY ME on the date of service doing chart review, history, exam, documentation & further  activities per the note.   Coordinating phone call to the [facility] team.    Data     I have personally reviewed the following data over the past 24 hrs:    7.2  \   13.2 (L)   / 142 (L)     139 102 17.2 /  139 (H)   3.9 27 0.93 \       ALT: 17 AST: 18 AP: 86 TBILI: 1.8 (H)   ALB: 3.9 TOT PROTEIN: 6.4 LIPASE: N/A       INR:  1.39 (H) PTT:  39 (H)   D-dimer:  N/A Fibrinogen:  N/A       Imaging results reviewed over the past 24 hrs:   Recent Results (from the past 24 hour(s))   CT Pelvis Bone wo Contrast    Narrative    CT PELVIS BONE WO CONTRAST 1/17/2023 4:24 PM    INDICATION: 82-year-old patient with left-sided hip pain. Previous  history of left total hip arthroplasty.  COMPARISON: 1/17/2023 radiographs.    TECHNIQUE: Noncontrast. Axial, sagittal and coronal thin-section  reconstruction. Dose reduction techniques were used.   CONTRAST: None.    FINDINGS:     BONES AND JOINTS:  -Left total hip arthroplasty. The acetabular component is intact. The  femoral component is symmetrically positioned within the acetabular  cup, without evidence of polyethylene wear. There is a circumferential  bone resorption at the interface of the femoral stem and bone-cement.  Additionally there is endosteal scalloping at the medial margin of the  femur proximal metadiaphysis cervical and this measures 2.4 cm in  length and involves roughly 50% of the cortical width. No evidence of  periprosthetic fracture component subsidence. Hypertrophic changes at  the left acetabular rim. Moderate-sized left hip joint effusion.  -Postoperative changes at the L4-5 interspace with interbody device.  Moderate-advanced lower lumbar facet arthrosis.  -Mild bilateral sacroiliac degenerative arthrosis.  -Mild right hip degenerative arthrosis.    MUSCULATURE AND SOFT TISSUES:  -Mild fatty atrophy of the left iliopsoas muscle. Advanced fatty  atrophy of the left gluteus minimus muscle. Mild fatty atrophy of the  left gluteus medius muscle.  -Implantable  stimulator device in the subcutaneous fat of the right  buttock, with lead extending cephalad.  -No free fluid in the pelvis.  -Colonic diverticulosis without evidence of diverticulitis.  -Small bilateral fat-containing inguinal hernias.  -Atherosclerotic calcification.      Impression    IMPRESSION:  1.  Left total hip arthroplasty. Femoral component loosening with  periprosthetic bone resorption and endosteal scalloping.  2.  Moderate-sized left hip joint effusion.  3.  No fracture or joint malalignment.  4.  Postoperative and degenerative changes in the lower lumbar spine.  5.  Mild bilateral sacroiliac and right hip degenerative arthrosis.      GUY ESPULVEDA MD         SYSTEM ID:  [id number]

## 2023-01-18 NOTE — PROGRESS NOTES
"[facility]    Progress note    82 year old male HD#2 Left Hip Femoral Step Prosthesis Loosening s/p Left Total Hip Arthroplasty from [facility]  S: Patient seen at bedside in no apparent distress, alert and pleasant.  He denies numbness, tingling or major pain issues except when he is ambulating.  Overall he feels his pain is getting a little bit better.  He states he still having trouble ambulating.  I discussed stem loosening noted on CT scan and x-ray with the patient.  Discussed how our surgeons do not do revisions often and we would like to get him in the hands of someone that does.  He understands this.  He states [facility] would be [facility] if [facility] would [facility] the [facility] done which he does not have as much confidence in.  He is okay transferring to a facility that has surgeons that do revision hips.  O: CMS intact left LE, DF/PF intact and 5 out of 5 strength.  Able to do a straight leg raise.       Left hip with no swelling and no erythema or ecchymosis        Gentle easy range of motion of left hip with no catching or locking but slight pain.       Bilateral calves soft and non tender    IMAGING:  Xray: Pelvis showing loosening femoral stem with bone response distally to the stem.  No fracture dislocation or tumor.  CT: Femoral stem loosening noted    Vital signs:  Temp: 97.1  F (36.2  C) Temp src: Oral BP: 114/64 Pulse: 56   Resp: 18 SpO2: 94 % O2 Device: None (Room air)     Weight: 114.8 kg (253 lb)  Estimated body mass index is 33.38 kg/m  as calculated from the following:    Height as of 12/23/20: 1.854 m (6' 1\").    Weight as of this encounter: 114.8 kg (253 lb).      Hemoglobin   Date Value Ref Range Status   01/18/2023 13.2 (L) 13.3 - 17.7 g/dL Final     INR   Date Value Ref Range Status   01/18/2023 1.39 (H) 0.85 - 1.15 Final   04/19/2016 1.10 0.86 - 1.14 Final         A/P:  1. Pain-controlled better this morning with gabapentin and hydroxyzine, Tylenol, " oxycodone 5 mg. Not taking Dilaudid IV anymore.  2. DVT Prophylaxis/atrial fibrillation treatment-patient on Eliquis 5 mg twice a day  3.  Orthopedic surgeons-discussed this case with both Dr. Carrera who is on-call yesterday and Dr. Camacho who is on-call on 1/16.  Unfortunately the surgeons here are not surgeons that do a lot of hip revisions and it is felt that the patient should have a surgeon that does a lot of hip revisions.  Transfer [facility] is thought to [facility] best or [facility].  4. Hospitalist-secure message sent to ELSI Roth this morning.  5. PT-recommending TCU however wants patient to be more stable before transfer.  6. Social Work-RN states SW working to see if he has coverage for TCU  7. Imaging: We have no access to [facility] or imaging from [facility].  Primary service/hospitalist ordered imaging to assist for better transfer.  Imaging shows signs of femoral stem loosening.  8. Plan-Ortho Surgeons would like patient transferred.  They stated to get the patients pain controlled then [facility], if he will be agreeable to it, or to a facility that has surgeons who do a lot of hip revisions.  They feel this would be in his best interest.      Ar Kitchen PA-C  1/18/2023

## 2023-01-18 NOTE — PLAN OF CARE
Goal Outcome Evaluation:      Plan of Care Reviewed With: patient    Overall Patient Progress: improvingOverall Patient Progress: improving    Outcome Evaluation: Patient is alert and oriented X4. VSS, O2 stable on RA. Continues to complain of L hip pain that radiates down his leg and into his knee. Utilized PRN Oxy X1 for pain. Complaint of urine retention this morning, was bladder scanned to show 599, attempt to urinate in urinal standing up was unsucessful, patient refused straight cath and was able to urinate after sitting on the toilet. Ambulates A1 with walker and partial weight bearing on L leg. Now inpatient status. Will continue to monitor.

## 2023-01-18 NOTE — CONSULTS
Care Management Initial Consult    General Information  Assessment completed with: chart review       Primary Care Provider verified and updated as needed:     Readmission within the last 30 days:        Reason for Consult: discharge planning  Advance Care Planning:          Communication Assessment  Patient's communication style: spoken language (English or Bilingual)    Hearing Difficulty or Deaf: yes   Wear Glasses or Blind: no    Cognitive  Cognitive/Neuro/Behavioral: WDL                      Living Environment:   People in home:       Current living Arrangements:        Able to return to prior arrangements:         Family/Social Support:  Care provided by:    Provides care for:                  Description of Support System:           Current Resources:   Patient receiving home care services:       Community Resources:    Equipment currently used at home: none  Supplies currently used at home:      Employment/Financial:  Employment Status:          Financial Concerns:        Lifestyle & Psychosocial Needs:  Social Determinants of Health     Tobacco Use: Not on file   Alcohol Use: Not on file   Financial Resource Strain: Not on file   Food Insecurity: Not on file   Transportation Needs: Not on file   Physical Activity: Not on file   Stress: Not on file   Social Connections: Not on file   Intimate Partner Violence: Not on file   Depression: At risk     PHQ-2 Score: 3   Housing Stability: Not on file       Functional Status:  Prior to admission patient needed assistance:          Mental Health Status:          Chemical Dependency Status:          Values/Beliefs:  Spiritual, Cultural Beliefs, Congregational Practices, Values that affect care:                 Additional Information:  Patient admitted to [facility] with Hip Pain.  [facility].  He stated to provider that he is [facility].  After calling around, writer found out that patient's primary care is at the  [facility], Dr. Sherri Gonzalez [phone], NSA for clinic is [phone].  Attempted to call Dr. Gonzalez's office.  Received a call back from a  staff at [facility], Jenny Koo [phone].  Jenny stated that she can see in the medical record that patient had an ortho consult.  Jenny states patient was to be called in the near future to schedule a follow up appt.  No surgery was discussed or scheduled in the medical record, per Jenny.      If the medical team at [facility] thinks patient needs immediate surgery, the [facility] transfer line can be called at   [phone].    [facility] [phone]/ [facility] [phone]  Care Management

## 2023-01-18 NOTE — PROGRESS NOTES
S-(situation): Patient changed to inpatient status today @1610.    B-(background): Patient status change due to observation goals not being met.     A-(assessment): Patient continues admission related to Left Hip pain, that radiates up his leg and into his L knee. Patients VSS, utilizes PRN Oxy, Atarax and Dilaudid for pain as needed. Ambulates A1 with walker. Bilat lungs clear diminished. Admission questions completed.    R-(recommendations): Will monitor patient per MD orders.      Inpatient nursing criteria listed below were met:    Adult Profile completedYes  Health care directives status obtained and documented: Yes  VTE prophylaxis orders: Elequis  (FYI: Asprin is not an approved anticoagulation for DVT prophylaxis)  SCD's Documented: Yes  Vaccine assessment done and vaccine ordered if needed. Yes  My Chart patient sign up addressed and documented: Yes  Care Plan initiated: Yes  Discharge planning review completed (admission navigator) Yes

## 2023-01-19 ENCOUNTER — APPOINTMENT (OUTPATIENT)
Dept: PHYSICAL THERAPY | Facility: CLINIC | Age: 83
DRG: 559 | End: 2023-01-19
Payer: COMMERCIAL

## 2023-01-19 LAB
CK SERPL-CCNC: 27 U/L (ref 39–308)
HOLD SPECIMEN: NORMAL

## 2023-01-19 PROCEDURE — 250N000013 HC RX MED GY IP 250 OP 250 PS 637: Performed by: INTERNAL MEDICINE

## 2023-01-19 PROCEDURE — 36415 COLL VENOUS BLD VENIPUNCTURE: CPT | Performed by: NURSE PRACTITIONER

## 2023-01-19 PROCEDURE — 120N000001 HC R&B MED SURG/OB

## 2023-01-19 PROCEDURE — 82550 ASSAY OF CK (CPK): CPT | Performed by: NURSE PRACTITIONER

## 2023-01-19 PROCEDURE — 97530 THERAPEUTIC ACTIVITIES: CPT | Mod: GP | Performed by: PHYSICAL THERAPIST

## 2023-01-19 PROCEDURE — 250N000013 HC RX MED GY IP 250 OP 250 PS 637: Performed by: NURSE PRACTITIONER

## 2023-01-19 PROCEDURE — 250N000013 HC RX MED GY IP 250 OP 250 PS 637: Performed by: PEDIATRICS

## 2023-01-19 PROCEDURE — 250N000013 HC RX MED GY IP 250 OP 250 PS 637: Performed by: HOSPITALIST

## 2023-01-19 RX ORDER — GABAPENTIN 300 MG/1
300 CAPSULE ORAL 3 TIMES DAILY
Status: DISCONTINUED | OUTPATIENT
Start: 2023-01-19 | End: 2023-01-20

## 2023-01-19 RX ORDER — GUAIFENESIN 200 MG/10ML
5 LIQUID ORAL EVERY 4 HOURS PRN
Status: DISCONTINUED | OUTPATIENT
Start: 2023-01-19 | End: 2023-01-23 | Stop reason: HOSPADM

## 2023-01-19 RX ADMIN — ACETAMINOPHEN 975 MG: 325 TABLET, FILM COATED ORAL at 13:12

## 2023-01-19 RX ADMIN — ACETAMINOPHEN 975 MG: 325 TABLET, FILM COATED ORAL at 21:31

## 2023-01-19 RX ADMIN — GABAPENTIN 300 MG: 300 CAPSULE ORAL at 21:31

## 2023-01-19 RX ADMIN — APIXABAN 5 MG: 5 TABLET, FILM COATED ORAL at 08:17

## 2023-01-19 RX ADMIN — SENNOSIDES AND DOCUSATE SODIUM 1 TABLET: 50; 8.6 TABLET ORAL at 08:17

## 2023-01-19 RX ADMIN — BENZONATATE 100 MG: 100 CAPSULE ORAL at 11:56

## 2023-01-19 RX ADMIN — ASPIRIN 81 MG: 81 TABLET, COATED ORAL at 21:30

## 2023-01-19 RX ADMIN — Medication 1 MG: at 22:50

## 2023-01-19 RX ADMIN — OXYCODONE HYDROCHLORIDE 5 MG: 5 TABLET ORAL at 08:29

## 2023-01-19 RX ADMIN — SENNOSIDES AND DOCUSATE SODIUM 1 TABLET: 50; 8.6 TABLET ORAL at 21:31

## 2023-01-19 RX ADMIN — SACUBITRIL AND VALSARTAN 1 TABLET: 97; 103 TABLET, FILM COATED ORAL at 21:30

## 2023-01-19 RX ADMIN — ACETAMINOPHEN 975 MG: 325 TABLET, FILM COATED ORAL at 08:16

## 2023-01-19 RX ADMIN — OXYCODONE HYDROCHLORIDE 5 MG: 5 TABLET ORAL at 03:31

## 2023-01-19 RX ADMIN — FAMOTIDINE 20 MG: 20 TABLET, FILM COATED ORAL at 21:31

## 2023-01-19 RX ADMIN — HYDROXYZINE HYDROCHLORIDE 50 MG: 50 TABLET, FILM COATED ORAL at 22:51

## 2023-01-19 RX ADMIN — TRAMADOL HYDROCHLORIDE 25 MG: 50 TABLET, COATED ORAL at 13:13

## 2023-01-19 RX ADMIN — GUAIFENESIN AND CODEINE PHOSPHATE 5 ML: 100; 10 SOLUTION ORAL at 00:42

## 2023-01-19 RX ADMIN — LIDOCAINE 2 PATCH: 560 PATCH PERCUTANEOUS; TOPICAL; TRANSDERMAL at 14:45

## 2023-01-19 RX ADMIN — GABAPENTIN 300 MG: 300 CAPSULE ORAL at 13:12

## 2023-01-19 RX ADMIN — APIXABAN 5 MG: 5 TABLET, FILM COATED ORAL at 21:31

## 2023-01-19 RX ADMIN — FOLIC ACID 1 MG: 1 TABLET ORAL at 08:17

## 2023-01-19 RX ADMIN — FLUTICASONE PROPIONATE 2 SPRAY: 50 SPRAY, METERED NASAL at 08:18

## 2023-01-19 RX ADMIN — TRAMADOL HYDROCHLORIDE 25 MG: 50 TABLET, COATED ORAL at 21:30

## 2023-01-19 RX ADMIN — METOPROLOL SUCCINATE 12.5 MG: 25 TABLET, EXTENDED RELEASE ORAL at 08:17

## 2023-01-19 RX ADMIN — GUAIFENESIN 5 ML: 200 SOLUTION ORAL at 10:59

## 2023-01-19 RX ADMIN — SACUBITRIL AND VALSARTAN 1 TABLET: 97; 103 TABLET, FILM COATED ORAL at 08:17

## 2023-01-19 RX ADMIN — ATORVASTATIN CALCIUM 80 MG: 40 TABLET, FILM COATED ORAL at 21:30

## 2023-01-19 ASSESSMENT — ACTIVITIES OF DAILY LIVING (ADL)
ADLS_ACUITY_SCORE: 30

## 2023-01-19 NOTE — PROGRESS NOTES
Care Management Follow Up    Length of Stay (days): 2    Expected Discharge Date: 01/23/2023     Concerns to be Addressed:       Patient plan of care discussed at interdisciplinary rounds: Yes    Anticipated Discharge Disposition: TCU     Anticipated Discharge Services:    Anticipated Discharge DME:      Patient/family educated on Medicare website which has current facility and service quality ratings:      Education Provided on the Discharge Plan:      Patient/Family in Agreement with the Plan:      Referrals Placed by CM/SW:      Private pay costs discussed: TBD    Additional Information:  Reviewed case with provider, Colleen.    Colleen spoke with the orthopedic team at the [facility].  [place] team states that patient can not be transferred to the [facility] for the hip procedure and will need to have pain controlled and transfer to a TCU until he can have outpatient surgery.    Unclear what insurance patient has.  Financial counselor looked into this and states patient has Medicare Part A and VA.    TCU referral pending at:    - [facility] (Main Phone: [phone] Admissions Phone: [phone] Fax: [phone])    Spoke with Jenny, \[facility]\"" VA utilization [phone].  She was able to provide phone contact for Carissa Watkins [phone].  Carissa is the community care specialist for the [facility].  Left Carissa a voicemail to see if she can assist with checking patient's VA service connection to see if he as coverage for TCU.    Discussed with patient and he is in agreement.    VIET Salgado  [facility] [phone]/ [place] [phone]  Care Management

## 2023-01-19 NOTE — DISCHARGE INSTRUCTIONS
VA follow up appointment:  Tuesday January 24th at 1:45pm  [facility] with Dr. Sherri Gonzalez  Any questions please call [phone]    Ortho appointment:  February 13th at 1:30

## 2023-01-19 NOTE — PROVIDER NOTIFICATION
Provider contacted regarding pt's request for PRN cough medicine. PRN tessalon and robitussin ordered.

## 2023-01-19 NOTE — PLAN OF CARE
Goal Outcome Evaluation:      Plan of Care Reviewed With: patient    Overall Patient Progress: improvingOverall Patient Progress: improving    Outcome Evaluation: patient A&O, prn oxy and robitussin, room air, ice on left knee for pain, edge of bed for meals, ck 27

## 2023-01-19 NOTE — PROGRESS NOTES
09:05 Call out to [facility]. Ortho resident pager [phone]  09:11 Received call back Dr. Miriam Thompson.  Discussed case with Dr. Thompson. As resident they cannot accept transfers and this writer will need to discuss case with staff Ortho physician. Dr. Mauro Busby [phone]  09:17: Paged Dr. Busby.  Received call back from Dr. Busby.  Discussed case with Dr. Busby who stated VA beds limited and  recommended manage pain control and transfer to skilled nursing facility and patient will be seen outpatient [facility] Ortho clinic.    Updated patient,  and Dr. Moreno.      ANA LILIA Valnezuela, [facility] Medicine Service, [facility].

## 2023-01-19 NOTE — PROGRESS NOTES
[facility]    Medicine Progress Note - Hospitalist Service    Date of Admission:  1/16/2023    Assessment & Plan      David Thomson is a 82 year old male, former [employer], anticoagulated on Eliquis for mechanical heart valve, who was admitted on 1/16/2023. He presented with intractable left hip pain.     # Intractable Left hip pain, possible traumatic left hip injury. Reports of recent fall prior to admission.  # Previous left hip replacement 1999  # Suspected hardware failure  Left hip pain with subsequent radiating pain down left anterior thigh and right knee.  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  Moderate size left hip joint effusion.  No fracture or joint malalignment.  Postoperative and degenerative changes in lower lumbar spine and mild bilateral sacroiliac and right hip degenerative arthrosis.  01/18: Hemoglobin 13.2  Negative inflammatory markers less likely for septic joint.   Check CK Total rule out rhabdo   Repeat inflammatory markers, CBC, CMP 01/20  Has not required IV hydromorphone in 24 hours.  Continue scheduled Tylenol 975 3 times daily  Discontinue p.o. oxycodone and schedule tramadol 25 mg twice daily.  Monitor for sedation/confusion.    Increase Gabapentin 300 mg at bedtime to 300 mg 3 times daily   Continue stool softeners to help avoid constipation.  Continue home dose hydroxyzine.  Assessed by physical therapy.  Unsafe to discharge home. Patient initially was not willing to [facility] to the [facility].  Long discussion with patient regarding ongoing left orthopedic management at the [facility].  Patient agrees to follow-up with the VA.    09:05 Call out to [facility]. Ortho resident pager [phone]  09:11 Received call back Dr. Miriam Thompson.  Discussed case with Dr. Thompson. As resident they cannot accept transfers and this writer will need to discuss case with staff Ortho physician. Dr. Mauro Busby  [phone]  09:17: Paged Dr. Busby.  Received call back from Dr. Busby.  Discussed case with Dr. Busby who stated [facility] beds limited and  recommended manage pain control and transfer to skilled nursing facility and patient will be seen outpatient [facility] Ortho clinic.  Updated patient,  and Dr. Moreno.  Patient is agreeable to TCU.    Orthopedic A & P 01/18  1. Pain-controlled better this morning with gabapentin and hydroxyzine, Tylenol, oxycodone 5 mg. Not taking Dilaudid IV anymore.  2. DVT Prophylaxis/atrial fibrillation treatment-patient on Eliquis 5 mg twice a day  3.  Orthopedic surgeons-discussed this case with both Dr. Carrera who is on-call yesterday and Dr. Camacho who is on-call on 1/16.  Unfortunately the surgeons here are not surgeons that do a lot of hip revisions and it is felt that the patient should have a surgeon that does a lot of hip revisions.  Transfer to the [facility] is thought to be best or else [facility] where [facility].  4. Hospitalist-secure message sent to ELSI Roth this morning.  5. PT-recommending TCU however wants patient to be more stable before transfer.  6. Social Work-RN states SW working to see if he has coverage for TCU  7. Imaging: We have no access to records or imaging from [facility].  Primary service/hospitalist ordered imaging to assist for better transfer.  Imaging shows signs of femoral stem loosening.  8. Plan-Ortho Surgeons would like patient transferred.  They stated to get the patients pain controlled then transfer back to [facility] where the work up took place, if he will be agreeable to it, or to a facility that has surgeons who do a lot of hip revisions.  They feel this would be in his best interest.        # Suicidal Ideation: Resolved. Contracts for safety.  01/17/2023, secondhand information, it was reported by physical therapy that patient was contemplating suicide due to excruciating left hip pain.  Patient has contracted for safety.    #  Bilateral Hand Paraesthesia, Chronic:   # Peripheral Neuropathy (H):  No acute neuro deficit.  Continue Gabapentin.    # Urinary Retention:  # History Benign prostatic hypertrophy with outflow obstruction:  # History laser TURP in 2007:  01/18: Urinary retention overnight bladder scan 599 straight cath x1  PRN Bladder scan. Straight cath >300 mL. Rice catheter for urinary retention > 24 hours.  01/18 Urinalysis negative for urinary tract infection.    # Non Productive Cough:  Onset 2 days ago.  Negative COVID and influenza screen.  Chest x-ray negative for acute airspace disease.  No hypoxia.  Not requiring supplemental oxygen.  Tessalon Perles, Robitussin with codeine changed to plain Robitussin in setting of opioid medication for left hip pain.  Anticoagulated on Eliquis, less likely for pulmonary embolism.  Change in vital signs, clinical status, hypoxia.  Reasonable CTA chest rule out PE.       # Coronary artery disease  # Essential hypertension  # History of CABG   - continue home dose aspirin   - continue home dose lipitor   - continue entresto   - continue metoprolol    # Mechanical heart valve   - continue home dose eliquis    # Prediabetes   - not on Metformin   - follow up outpatient             Diet: Combination Diet Low Saturated Fat Diet    DVT Prophylaxis: DOAC  Rice Catheter: Not present  Lines: None     Cardiac Monitoring: None  Code Status: Full Code        Disposition Plan      Expected Discharge Date: 01/23/2023      Destination: home  Discharge Comments: [facility] ortho recommendations: Outpatient ortho. Pain control and TCU.        The patient's care was discussed with the Attending Physician, Dr. Moreno, Bedside Nurse, Care Coordinator/ and Patient.    ANA LILIA Valenzuela CNP  Hospitalist Service  [facility]  Securely message with [employer] (more info)  Text page via [facility] Paging/Theoremy    ______________________________________________________________________    Interval History   Overnights nonproductive cough, chest pain or shortness of breath.  As needed Robitussin and Tessalon pearls.  Currently left hip pain mild to moderate, he was able to sleep after breakfast this morning.  Bilateral hand numbness, chronic per patient report.    Physical Exam   Vital Signs: Temp: 97.2  F (36.2  C) Temp src: Oral BP: 124/59 Pulse: 58   Resp: 16 SpO2: 93 % O2 Device: None (Room air)    Weight: 253 lbs 0 oz      General appearance: Sleeping soundly, easily wakes to voice and light tactile stimuli  Lungs: clear to auscultation bilaterally  Heart: regular rate and rhythm, S1, S2 normal  Abdomen: soft, obese, non-tender  Extremities: Active range of motion logrolled to right side without severe left hip pain.  Winced once otherwise comfortable.   Surgical scar left lateral hip.  Unable to bear weight left leg   Subjective radiating left knee pain  Neurologic: moving all 4 extremities spontaneously, no focal weakness.      Medical Decision Making     40 MINUTES SPENT BY ME on the date of service doing chart review, history, exam, documentation & further activities per the note.      Data     I have personally reviewed the following data over the past 24 hrs:    Imaging results reviewed over the past 24 hrs:   Recent Results (from the past 24 hour(s))   XR Chest Port 1 View    Narrative    CHEST ONE VIEW PORTABLE  1/18/2023 11:22 AM     HISTORY: Cough.    COMPARISON: None.    FINDINGS:  Postop changes of the chest indicate probable prior CABG.  There are epidural stimulation leads projected over the lower thoracic  region. Anterior inferior cervical spine fusion hardware is partially  imaged. Cardiac silhouette appears prominent which may be partially  due to the AP technique. Lungs are grossly clear. No pneumothorax or  significant pleural fluid collection is identified. Right  posterolateral sixth and seventh rib  fractures are of indeterminate  age but are likely chronic.       Impression    IMPRESSION: No definite etiology for patient's cough is identified.  Multiple probably chronic findings as described above.

## 2023-01-19 NOTE — PLAN OF CARE
Goal Outcome Evaluation:      Plan of Care Reviewed With: patient    Overall Patient Progress: no changeOverall Patient Progress: no change    Outcome Evaluation: A&Ox4. VSS. PRN Oxy x1 for pain. Robitussin x1 for cough. Voiding adequately this shift. Had BM. A1 w/gait belt and walker. Baseline numbness in both feet. Ice applied to L knee for pain that radiates from hip to knee.

## 2023-01-19 NOTE — PLAN OF CARE
Goal Outcome Evaluation:      Plan of Care Reviewed With: patient    Overall Patient Progress: no changeOverall Patient Progress: no change    Outcome Evaluation: Pt is A&O; VSS on room air. PRN Oxycodone given for hip/knee pain X2. UA collected. Bladder scan done at 1800 208ml. Ice applied to knee. 600 ml urine output with an unmeasured this shift.

## 2023-01-20 ENCOUNTER — APPOINTMENT (OUTPATIENT)
Dept: PHYSICAL THERAPY | Facility: CLINIC | Age: 83
DRG: 559 | End: 2023-01-20
Payer: COMMERCIAL

## 2023-01-20 LAB
ALBUMIN SERPL BCG-MCNC: 3.8 G/DL (ref 3.5–5.2)
ALP SERPL-CCNC: 84 U/L (ref 40–129)
ALT SERPL W P-5'-P-CCNC: 15 U/L (ref 10–50)
ANION GAP SERPL CALCULATED.3IONS-SCNC: 10 MMOL/L (ref 7–15)
AST SERPL W P-5'-P-CCNC: 18 U/L (ref 10–50)
BASOPHILS # BLD AUTO: 0 10E3/UL (ref 0–0.2)
BASOPHILS NFR BLD AUTO: 1 %
BILIRUB SERPL-MCNC: 2.2 MG/DL
BUN SERPL-MCNC: 23.1 MG/DL (ref 8–23)
CALCIUM SERPL-MCNC: 9.3 MG/DL (ref 8.8–10.2)
CHLORIDE SERPL-SCNC: 100 MMOL/L (ref 98–107)
CREAT SERPL-MCNC: 0.97 MG/DL (ref 0.67–1.17)
CRP SERPL-MCNC: 38.75 MG/L
DEPRECATED HCO3 PLAS-SCNC: 25 MMOL/L (ref 22–29)
EOSINOPHIL # BLD AUTO: 0.2 10E3/UL (ref 0–0.7)
EOSINOPHIL NFR BLD AUTO: 2 %
ERYTHROCYTE [DISTWIDTH] IN BLOOD BY AUTOMATED COUNT: 14.6 % (ref 10–15)
GFR SERPL CREATININE-BSD FRML MDRD: 79 ML/MIN/1.73M2
GLUCOSE SERPL-MCNC: 143 MG/DL (ref 70–99)
HCT VFR BLD AUTO: 37.7 % (ref 40–53)
HGB BLD-MCNC: 12.8 G/DL (ref 13.3–17.7)
IMM GRANULOCYTES # BLD: 0 10E3/UL
IMM GRANULOCYTES NFR BLD: 1 %
LACTATE SERPL-SCNC: 1 MMOL/L (ref 0.7–2)
LYMPHOCYTES # BLD AUTO: 1.6 10E3/UL (ref 0.8–5.3)
LYMPHOCYTES NFR BLD AUTO: 20 %
MCH RBC QN AUTO: 31.6 PG (ref 26.5–33)
MCHC RBC AUTO-ENTMCNC: 34 G/DL (ref 31.5–36.5)
MCV RBC AUTO: 93 FL (ref 78–100)
MONOCYTES # BLD AUTO: 0.6 10E3/UL (ref 0–1.3)
MONOCYTES NFR BLD AUTO: 8 %
NEUTROPHILS # BLD AUTO: 5.5 10E3/UL (ref 1.6–8.3)
NEUTROPHILS NFR BLD AUTO: 68 %
NRBC # BLD AUTO: 0 10E3/UL
NRBC BLD AUTO-RTO: 0 /100
PLATELET # BLD AUTO: 143 10E3/UL (ref 150–450)
POTASSIUM SERPL-SCNC: 4 MMOL/L (ref 3.4–5.3)
PROT SERPL-MCNC: 6.3 G/DL (ref 6.4–8.3)
RBC # BLD AUTO: 4.05 10E6/UL (ref 4.4–5.9)
SODIUM SERPL-SCNC: 135 MMOL/L (ref 136–145)
WBC # BLD AUTO: 8 10E3/UL (ref 4–11)

## 2023-01-20 PROCEDURE — 250N000013 HC RX MED GY IP 250 OP 250 PS 637: Performed by: HOSPITALIST

## 2023-01-20 PROCEDURE — 86140 C-REACTIVE PROTEIN: CPT | Performed by: NURSE PRACTITIONER

## 2023-01-20 PROCEDURE — 250N000013 HC RX MED GY IP 250 OP 250 PS 637: Performed by: PEDIATRICS

## 2023-01-20 PROCEDURE — 250N000011 HC RX IP 250 OP 636: Performed by: NURSE PRACTITIONER

## 2023-01-20 PROCEDURE — 80053 COMPREHEN METABOLIC PANEL: CPT | Performed by: NURSE PRACTITIONER

## 2023-01-20 PROCEDURE — 120N000001 HC R&B MED SURG/OB

## 2023-01-20 PROCEDURE — 97530 THERAPEUTIC ACTIVITIES: CPT | Mod: GP | Performed by: PHYSICAL THERAPIST

## 2023-01-20 PROCEDURE — 83605 ASSAY OF LACTIC ACID: CPT | Performed by: NURSE PRACTITIONER

## 2023-01-20 PROCEDURE — 85025 COMPLETE CBC W/AUTO DIFF WBC: CPT | Performed by: NURSE PRACTITIONER

## 2023-01-20 PROCEDURE — 36415 COLL VENOUS BLD VENIPUNCTURE: CPT | Performed by: NURSE PRACTITIONER

## 2023-01-20 PROCEDURE — 250N000013 HC RX MED GY IP 250 OP 250 PS 637: Performed by: NURSE PRACTITIONER

## 2023-01-20 RX ORDER — GABAPENTIN 300 MG/1
600 CAPSULE ORAL 3 TIMES DAILY
Status: DISCONTINUED | OUTPATIENT
Start: 2023-01-20 | End: 2023-01-21

## 2023-01-20 RX ORDER — TRAMADOL HYDROCHLORIDE 50 MG/1
50 TABLET ORAL 3 TIMES DAILY
Status: DISCONTINUED | OUTPATIENT
Start: 2023-01-20 | End: 2023-01-21

## 2023-01-20 RX ADMIN — SACUBITRIL AND VALSARTAN 1 TABLET: 97; 103 TABLET, FILM COATED ORAL at 08:36

## 2023-01-20 RX ADMIN — ACETAMINOPHEN 975 MG: 325 TABLET, FILM COATED ORAL at 08:30

## 2023-01-20 RX ADMIN — SENNOSIDES AND DOCUSATE SODIUM 1 TABLET: 50; 8.6 TABLET ORAL at 08:31

## 2023-01-20 RX ADMIN — SACUBITRIL AND VALSARTAN 1 TABLET: 97; 103 TABLET, FILM COATED ORAL at 22:12

## 2023-01-20 RX ADMIN — APIXABAN 5 MG: 5 TABLET, FILM COATED ORAL at 08:31

## 2023-01-20 RX ADMIN — GUAIFENESIN 5 ML: 200 SOLUTION ORAL at 08:30

## 2023-01-20 RX ADMIN — GABAPENTIN 300 MG: 300 CAPSULE ORAL at 13:05

## 2023-01-20 RX ADMIN — GABAPENTIN 600 MG: 300 CAPSULE ORAL at 20:47

## 2023-01-20 RX ADMIN — GABAPENTIN 300 MG: 300 CAPSULE ORAL at 08:31

## 2023-01-20 RX ADMIN — TRAMADOL HYDROCHLORIDE 25 MG: 50 TABLET, COATED ORAL at 08:31

## 2023-01-20 RX ADMIN — FAMOTIDINE 20 MG: 20 TABLET, FILM COATED ORAL at 20:47

## 2023-01-20 RX ADMIN — ASPIRIN 81 MG: 81 TABLET, COATED ORAL at 20:47

## 2023-01-20 RX ADMIN — TRAMADOL HYDROCHLORIDE 50 MG: 50 TABLET, COATED ORAL at 20:47

## 2023-01-20 RX ADMIN — ACETAMINOPHEN 975 MG: 325 TABLET, FILM COATED ORAL at 20:47

## 2023-01-20 RX ADMIN — METOPROLOL SUCCINATE 12.5 MG: 25 TABLET, EXTENDED RELEASE ORAL at 08:31

## 2023-01-20 RX ADMIN — TRAMADOL HYDROCHLORIDE 50 MG: 50 TABLET, COATED ORAL at 13:05

## 2023-01-20 RX ADMIN — SENNOSIDES AND DOCUSATE SODIUM 1 TABLET: 50; 8.6 TABLET ORAL at 20:47

## 2023-01-20 RX ADMIN — HYDROXYZINE HYDROCHLORIDE 50 MG: 50 TABLET, FILM COATED ORAL at 22:21

## 2023-01-20 RX ADMIN — ATORVASTATIN CALCIUM 80 MG: 40 TABLET, FILM COATED ORAL at 20:47

## 2023-01-20 RX ADMIN — LIDOCAINE 2 PATCH: 560 PATCH PERCUTANEOUS; TOPICAL; TRANSDERMAL at 13:05

## 2023-01-20 RX ADMIN — ACETAMINOPHEN 975 MG: 325 TABLET, FILM COATED ORAL at 13:05

## 2023-01-20 RX ADMIN — FOLIC ACID 1 MG: 1 TABLET ORAL at 08:31

## 2023-01-20 RX ADMIN — APIXABAN 5 MG: 5 TABLET, FILM COATED ORAL at 20:47

## 2023-01-20 RX ADMIN — FLUTICASONE PROPIONATE 2 SPRAY: 50 SPRAY, METERED NASAL at 08:36

## 2023-01-20 RX ADMIN — HYDROMORPHONE HYDROCHLORIDE 0.2 MG: 0.2 INJECTION, SOLUTION INTRAMUSCULAR; INTRAVENOUS; SUBCUTANEOUS at 07:26

## 2023-01-20 RX ADMIN — HYDROXYZINE HYDROCHLORIDE 50 MG: 50 TABLET, FILM COATED ORAL at 17:37

## 2023-01-20 ASSESSMENT — ACTIVITIES OF DAILY LIVING (ADL)
ADLS_ACUITY_SCORE: 30
ADLS_ACUITY_SCORE: 30
ADLS_ACUITY_SCORE: 28
ADLS_ACUITY_SCORE: 30
ADLS_ACUITY_SCORE: 28
ADLS_ACUITY_SCORE: 30
ADLS_ACUITY_SCORE: 30
ADLS_ACUITY_SCORE: 28
ADLS_ACUITY_SCORE: 28
ADLS_ACUITY_SCORE: 30

## 2023-01-20 NOTE — PLAN OF CARE
Problem: Plan of Care - These are the overarching goals to be used throughout the patient stay.    Goal: Plan of Care Review  Description: The Plan of Care Review/Shift note should be completed every shift.  The Outcome Evaluation is a brief statement about your assessment that the patient is improving, declining, or no change.  This information will be displayed automatically on your shift note.  Outcome: Progressing  Flowsheets (Taken 1/20/2023 0244)  Outcome Evaluation: Pt. A&O x4. VSS on RA. L hip and knee pain down leg 4/10. Scheduled meds given with no relief. Gave PRN Atarax at 2250 and patient sleeping since.  Plan of Care Reviewed With: patient  Overall Patient Progress: improving

## 2023-01-20 NOTE — PROGRESS NOTES
Care Management Follow Up    Length of Stay (days): 3    Expected Discharge Date: 01/23/2023     Concerns to be Addressed:       Patient plan of care discussed at interdisciplinary rounds: Yes    Anticipated Discharge Disposition:  TBD     Anticipated Discharge Services:    Anticipated Discharge DME:      Patient/family educated on Medicare website which has current facility and service quality ratings:    Education Provided on the Discharge Plan:    Patient/Family in Agreement with the Plan:      Referrals Placed by CM/SW:    Private pay costs discussed: Not applicable    Additional Information:  Spoke with Carissa Watkins [phone] at [facility].  Carissa was able to run VA benefits for patient and since he is only 60% service connected with the VA, he does NOT qualify for TCU through the VA.    Patient could potentially go to a TCU under Medicare Part A, with 20 days covered with a skilled need.      Once pain is managed, Care Management will assist with disposition.      VIET Salgado  [facility] [phone]/ [place] [phone]  Care Management

## 2023-01-20 NOTE — PLAN OF CARE
Goal Outcome Evaluation:      Plan of Care Reviewed With: patient    Overall Patient Progress: improvingOverall Patient Progress: improving    Outcome Evaluation: Patient alert and oriented. Vitals stable. Room air. L hip pain. Stands at bedside to use urine, 500mL ouptut. Sat at edge of bed to eat supper.

## 2023-01-20 NOTE — PLAN OF CARE
Goal Outcome Evaluation:      Plan of Care Reviewed With: patient    Overall Patient Progress: no changeOverall Patient Progress: no change    Outcome Evaluation: Pt is A&Ox4. VSS on RA. Pain rated 6/10 to L. Hip/Knee. Scheduled Tramadol, Gabapentin and Tylenol given with little relief. Ice pack applied to L. Knee with relief reported per patient.

## 2023-01-20 NOTE — PROGRESS NOTES
[facility]    Medicine Progress Note - Hospitalist Service    Date of Admission:  1/16/2023    Assessment & Plan      David Thomson is a 82 year old male, former [employer], anticoagulated on Eliquis for mechanical heart valve, who was admitted on 1/16/2023. He presented with intractable left hip pain.     # Intractable Left hip pain, possible traumatic left hip injury. Reports of recent fall prior to admission.  # Previous left hip replacement 1999  # Suspected hardware failure  Left hip pain with subsequent radiating pain down left anterior thigh and right knee.  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  Moderate size left hip joint effusion.  No fracture or joint malalignment.  Postoperative and degenerative changes in lower lumbar spine and mild bilateral sacroiliac and right hip degenerative arthrosis.   Assessed by physical therapy.  Unsafe to discharge home or TCU.   01/19: Hemoglobin 12.8  CRP <3.00->38.75  CK stable  Pain control:   Increase Tramadol from 25 mg BID to 50 mg TID  Increase gabapentin 300 mg 3 times daily to 600 mg 3 times daily  Monitor for sedation/confusion.    PRN every 2 hours IV Hydromorphone 0.2 mg  PRN 4 times daily Hydroxyzine 50 mg    Continue stool softeners to help avoid constipation.    01/20/2023  Multidisciplinary meeting, in patient's best interest, it is felt ethics consult warranted.  Discussed case with  on-call Dr. Schoen who suggested we contact [facility] since [facility] and is requiring parenteral pain management.  Local orthopedic team feel unable to manage.   Attempted to have our staff ortho providers [facility] to discuss case with [facility]. No response.   Discussed case with Rashad Kitchen Ortho PA [facility] who will discuss case with [facility] ortho.   Paged [facility] staff ortho and trauma. No response.   Paged [facility]  staff ortho back up. No response.  Discussed case with Dr. Schoen. If no response from [facility] tomorrow, contact [facility] and [facility] for ortho bed availability. Dr. Schoen not  on-call this weekend, but he is available if needed. Appreciate Rashad Kitchen and Dr. Schoen's assistance.       01/19/2023  09:05 Call out to [facility]. Ortho resident pager [phone]  09:11 Received call back Dr. Miriam Thompson.  Discussed case with Dr. Thompson. As resident they cannot accept transfers and this writer will need to discuss case with staff Ortho physician. Dr. Mauro Busby [phone]  09:17: Paged Dr. Busby.  Received call back from Dr. Busby.  Discussed case with Dr. Busby who stated [facility] and  recommended manage pain control and transfer to skilled nursing facility and patient will be [facility].  Updated patient,  and Dr. Moreno.  Patient is agreeable to TCU.    Orthopedic A & P 01/18  1. Pain-controlled better this morning with gabapentin and hydroxyzine, Tylenol, oxycodone 5 mg. Not taking Dilaudid IV anymore.  2. DVT Prophylaxis/atrial fibrillation treatment-patient on Eliquis 5 mg twice a day  3.  Orthopedic surgeons-discussed this case with both Dr. Carrera who is on-call yesterday and Dr. Camacho who is on-call on 1/16.  Unfortunately the surgeons here are not surgeons that do a lot of hip revisions and it is felt that the patient should have a surgeon that does a lot of hip revisions.  Transfer to the [facility] is thought to be [facility] or [facility]  4. Hospitalist-secure message sent to ELSI Roth this morning.  5. PT-recommending TCU however wants patient to be more stable before transfer.  6. Social Work-RN states SW working to see if he has coverage for TCU  7. Imaging: We have no access to records or imaging from [facility].  Primary service/hospitalist ordered imaging to assist for better transfer.   Imaging shows signs of femoral stem loosening.  8. Plan-Ortho Surgeons would like patient transferred.  They stated to get the patients pain controlled then [facility] to [facility], if he will be agreeable to it, or to a facility that has surgeons who do a lot of hip revisions.  They feel this would be in his best interest.        # Suicidal Ideation: Resolved. Contracts for safety.  01/17/2023, secondhand information, it was reported by physical therapy that patient was contemplating suicide due to excruciating left hip pain.  Patient has contracted for safety.    # Bilateral Hand Paraesthesia, Chronic:   # Peripheral Neuropathy (H):  No acute neuro deficit.  Continue Gabapentin.    # Urinary Retention:  # History Benign prostatic hypertrophy with outflow obstruction:  # History laser TURP in 2007:  01/18: Urinary retention overnight bladder scan 599 straight cath x1  PRN Bladder scan. Straight cath >300 mL. Rice catheter for urinary retention > 24 hours.  01/18 Urinalysis negative for urinary tract infection.    # Non Productive Cough: Improving  Negative COVID and influenza screen.  Chest x-ray negative for acute airspace disease.  No hypoxia.  Not requiring supplemental oxygen.  Tessalon Perles, Robitussin with codeine changed to plain Robitussin in setting of opioid medication for left hip pain.  Anticoagulated on Eliquis, less likely for pulmonary embolism.  Change in vital signs, clinical status, hypoxia.  Reasonable CTA chest rule out PE.       # Coronary artery disease  # Essential hypertension  # History of CABG   - continue home dose aspirin   - continue home dose lipitor   - continue entresto   - continue metoprolol    # Mechanical heart valve   - continue home dose eliquis    # Prediabetes   - not on Metformin   - follow up outpatient               Diet: Combination Diet Low Saturated Fat Diet    DVT Prophylaxis: DOAC  Rice Catheter: Not present  Lines: None     Cardiac  Monitoring: None  Code Status: Full Code      Clinically Significant Risk Factors     Disposition Plan     Expected Discharge Date: 01/23/2023      Destination: home  Discharge Comments: [facility]o. Pain control and TCU. need d/c disposition.        The patient's care was discussed with the Attending Physician, Dr. Moreno, Bedside Nurse, Care Coordinator/ and Patient.    ANA LILIA Valenzuela CNP  Hospitalist Service  [facility]  Securely message with [employer] (more info)  Text page via [employer] Paging/Directory   ______________________________________________________________________    Interval History   Required IV hydromorphone overnights  He is moving a little better but still gets frequent sharp left hip pains.    Physical Exam   Vital Signs: Temp: 97.8  F (36.6  C) Temp src: Oral BP: 106/56 Pulse: 61   Resp: 16 SpO2: 94 % O2 Device: None (Room air)    Weight: 253 lbs 0 oz  General appearance: Sleeping soundly, easily wakes to voice and light tactile stimuli  Lungs: clear to auscultation bilaterally  Heart: regular rate and rhythm, S1, S2 normal  Abdomen: soft, obese, non-tender  Extremities: Active range of motion logrolled to siting position, severe left hip pain once started adduction movement left hip    Surgical scar left lateral hip.  Unable to bear weight left leg   Subjective radiating left knee pain  Neurologic: moving all 4 extremities spontaneously, no focal weakness.           Medical Decision Making     60 MINUTES SPENT BY ME on the date of service doing chart review, history, exam, documentation & further activities per the note.      Data     I have personally reviewed the following data over the past 24 hrs:    8.0  \   12.8 (L)   / 143 (L)     135 (L) 100 23.1 (H) /  143 (H)   4.0 25 0.97 \       ALT: 15 AST: 18 AP: 84 TBILI: 2.2 (H)   ALB: 3.8 TOT PROTEIN: 6.3 (L) LIPASE: N/A       Procal: N/A CRP: 38.75 (H) Lactic Acid:  1.0          no

## 2023-01-20 NOTE — PLAN OF CARE
Goal Outcome Evaluation:      Plan of Care Reviewed With: patient          Outcome Evaluation: Pt is A&O x 4. Ice compress applied LLE. Complained of 6/10 pain of BLE, PRN Atarax given.   /56 (BP Location: Left arm)   Pulse 61   Temp 97.8  F (36.6  C) (Oral)   Resp 16   Wt 114.8 kg (253 lb)   SpO2 94%   BMI 33.38 kg/m

## 2023-01-21 PROCEDURE — 250N000013 HC RX MED GY IP 250 OP 250 PS 637: Performed by: NURSE PRACTITIONER

## 2023-01-21 PROCEDURE — 250N000011 HC RX IP 250 OP 636: Performed by: HOSPITALIST

## 2023-01-21 PROCEDURE — 250N000013 HC RX MED GY IP 250 OP 250 PS 637: Performed by: PEDIATRICS

## 2023-01-21 PROCEDURE — 250N000011 HC RX IP 250 OP 636: Performed by: NURSE PRACTITIONER

## 2023-01-21 PROCEDURE — 250N000013 HC RX MED GY IP 250 OP 250 PS 637: Performed by: HOSPITALIST

## 2023-01-21 PROCEDURE — 120N000001 HC R&B MED SURG/OB

## 2023-01-21 RX ORDER — GABAPENTIN 300 MG/1
900 CAPSULE ORAL 3 TIMES DAILY
Status: DISCONTINUED | OUTPATIENT
Start: 2023-01-21 | End: 2023-01-22

## 2023-01-21 RX ORDER — OXYCODONE HYDROCHLORIDE 5 MG/1
5 TABLET ORAL 4 TIMES DAILY
Status: DISCONTINUED | OUTPATIENT
Start: 2023-01-21 | End: 2023-01-21

## 2023-01-21 RX ADMIN — APIXABAN 5 MG: 5 TABLET, FILM COATED ORAL at 20:04

## 2023-01-21 RX ADMIN — ACETAMINOPHEN 975 MG: 325 TABLET, FILM COATED ORAL at 09:42

## 2023-01-21 RX ADMIN — METOPROLOL SUCCINATE 12.5 MG: 25 TABLET, EXTENDED RELEASE ORAL at 09:57

## 2023-01-21 RX ADMIN — OXYCODONE HYDROCHLORIDE 7.5 MG: 5 TABLET ORAL at 16:19

## 2023-01-21 RX ADMIN — ATORVASTATIN CALCIUM 80 MG: 40 TABLET, FILM COATED ORAL at 20:04

## 2023-01-21 RX ADMIN — SACUBITRIL AND VALSARTAN 1 TABLET: 97; 103 TABLET, FILM COATED ORAL at 20:21

## 2023-01-21 RX ADMIN — GABAPENTIN 600 MG: 300 CAPSULE ORAL at 09:42

## 2023-01-21 RX ADMIN — GABAPENTIN 900 MG: 300 CAPSULE ORAL at 20:04

## 2023-01-21 RX ADMIN — GABAPENTIN 900 MG: 300 CAPSULE ORAL at 13:48

## 2023-01-21 RX ADMIN — ACETAMINOPHEN 975 MG: 325 TABLET, FILM COATED ORAL at 13:48

## 2023-01-21 RX ADMIN — SACUBITRIL AND VALSARTAN 1 TABLET: 97; 103 TABLET, FILM COATED ORAL at 10:11

## 2023-01-21 RX ADMIN — ASPIRIN 81 MG: 81 TABLET, COATED ORAL at 20:04

## 2023-01-21 RX ADMIN — FOLIC ACID 1 MG: 1 TABLET ORAL at 09:42

## 2023-01-21 RX ADMIN — APIXABAN 5 MG: 5 TABLET, FILM COATED ORAL at 09:43

## 2023-01-21 RX ADMIN — FLUTICASONE PROPIONATE 2 SPRAY: 50 SPRAY, METERED NASAL at 10:11

## 2023-01-21 RX ADMIN — SENNOSIDES AND DOCUSATE SODIUM 1 TABLET: 50; 8.6 TABLET ORAL at 09:42

## 2023-01-21 RX ADMIN — HYDROMORPHONE HYDROCHLORIDE 0.2 MG: 0.2 INJECTION, SOLUTION INTRAMUSCULAR; INTRAVENOUS; SUBCUTANEOUS at 08:01

## 2023-01-21 RX ADMIN — ACETAMINOPHEN 975 MG: 325 TABLET, FILM COATED ORAL at 20:04

## 2023-01-21 RX ADMIN — OXYCODONE HYDROCHLORIDE 7.5 MG: 5 TABLET ORAL at 20:04

## 2023-01-21 RX ADMIN — ONDANSETRON 4 MG: 4 TABLET, ORALLY DISINTEGRATING ORAL at 14:26

## 2023-01-21 RX ADMIN — FAMOTIDINE 20 MG: 20 TABLET, FILM COATED ORAL at 20:04

## 2023-01-21 RX ADMIN — OXYCODONE HYDROCHLORIDE 5 MG: 5 TABLET ORAL at 09:43

## 2023-01-21 RX ADMIN — LIDOCAINE 2 PATCH: 560 PATCH PERCUTANEOUS; TOPICAL; TRANSDERMAL at 13:48

## 2023-01-21 ASSESSMENT — ACTIVITIES OF DAILY LIVING (ADL)
ADLS_ACUITY_SCORE: 30

## 2023-01-21 NOTE — PROGRESS NOTES
Received message from Dr. Schoen who spoke with Dr. Irene, head of ortho service line and she will assist in getting patient down to [facility]. Continue to work with SOC and ortho team. If there doesn't appear to be any plan forming tomorrow, check with [facility] and [facility] as previously discussed. Per Dr. Irene she didn't think surgery would happen on the weekend but we could possibly move Mr. Thomson to [facility].    Notified Charge SHERRI Walker to relay message update for morning rounds.       ANA LILIA Valenzuela, CNP  Hospital Medicine Service, [facility]

## 2023-01-21 NOTE — PLAN OF CARE
Goal Outcome Evaluation:                 Outcome Evaluation: Patient alert and oriented, VSS, LS clear, BSA, noted to have some frequency and small voids, bladder scanned for 5 ml, abdomen soft non tender. Patient prefers to stand at bedside with walker to use urinal, tolerated well with assistance to standing position right left hand/arm to protect left hip. Patient requesting time outside of room, provided with staff assisted wheelchair ride in hallways x 2-3 this shift. Patient reporting pain still after scheduled interventions, received atarax x .1. Patient reported lidocaine patches to lower back and ice packs to left knee/mid-leg on dorsal and underside aspects was helpful for his pain. Patient pending transfer to alternative hospital for orthopedic procedure.

## 2023-01-21 NOTE — PROGRESS NOTES
Received text message from Rashad Kitchen Ortho PA [facility]. He discussed case with Dr. Fallon, [facility] Ortho. Dr. Fallon accepted patient as long as they have a bed available.   I will follow up in am.    Colleen

## 2023-01-21 NOTE — PROGRESS NOTES
[facility]    Medicine Progress Note - Hospitalist Service    Date of Admission:  1/16/2023    Assessment & Plan      David Thomson is a 82 year old male, former [employer], anticoagulated on Eliquis for mechanical heart valve, who was admitted on 1/16/2023. He presented with intractable left hip pain.     # Intractable Left hip pain, possible traumatic left hip injury. Reports of recent fall prior to admission.  # Previous left hip replacement 1999  # Suspected hardware failure  Left hip pain with subsequent radiating pain down left anterior thigh and right knee.  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  Moderate size left hip joint effusion.  No fracture or joint malalignment.  Postoperative and degenerative changes in lower lumbar spine and mild bilateral sacroiliac and right hip degenerative arthrosis.   Assessed by physical therapy.  Unsafe to discharge home or TCU.   01/19: Hemoglobin 12.8  CRP <3.00->38.75  CK stable  Pain control:   Tramadol switched to oxycodone per patient request.  Oxycodone 5 mg every 4 hours, noontime RN reports little relief, increase oxycodone to 7.5 mg every 4 hours.  Increase gabapentin from 600 mg TID to 900 mg TID  Monitor for sedation/confusion.    PRN every 2 hours IV Hydromorphone 0.2 mg  PRN 4 times daily Hydroxyzine 50 mg    Continue stool softeners to help avoid constipation.    01/21/2023  Last night I was informed by José Manuel Garcia [facility] that he discussed case with Dr. Kavin Fallon, [facility] Ortho team who accepted patient pending bed availability.  This morning I contacted SOC, patient was not on waiting list.  He has been placed on waiting list and SOC will call with update later today.  If they do not call by end of day, I will touch base with them prior to off-duty shift.  I have updated patient on above.  He is concerned about ambulance cost.  Discussed with [facility]  , uncertain whether he might not get a bill, given [facility] patient, [facility] hopefully will cover ambulance cost and [facility] hospitalization.  Updated patient who agrees with above.   18:00 Called SOC, No beds.     01/20/2023  Multidisciplinary meeting, in patient's best interest, it is felt ethics consult warranted.  Discussed case with  on-call Dr. Schoen who suggested we contact [facility] orthopedic service about transfer since [facility] unwilling to [facility] in transfer and is requiring parenteral pain management.  Local orthopedic team feel unable to manage.   Attempted to have our staff ortho providers [facility] to discuss case with [facility]. No response.   Discussed case with Rashad Kitchen, Ortho PA [facility] who will discuss case with [facility] ortho.   Paged [facility] staff ortho and trauma. No response.   Paged [facility] staff ortho back up. No response.  Discussed case with Dr. Schoen. If no response from [facility] tomorrow, contact [facility] and [facility] for ortho bed availability. Dr. Schoen not  on-call this weekend, but he is available if needed. Appreciate Rashad Kitchen and Dr. Schoen's assistance.       01/19/2023  09:05 Call out to [facility]. Ortho resident pager [phone]  09:11 Received call back Dr. Miriam Thompson.  Discussed case with Dr. Thompson. As resident they cannot accept transfers and this writer will need to discuss case with staff Ortho physician. Dr. Mauro Busby [phone]  09:17: Paged Dr. Busby.  Received call back from Dr. Busby.  Discussed case with Dr. Busby who stated [facility] beds [facility] and  recommended manage pain control and transfer to skilled nursing facility and patient will be seen outpatient [facility]o [facility].  Updated patient,  and Dr. Moreno.  Patient is agreeable to TCU.    Orthopedic A & P 01/18  1. Pain-controlled better this morning with gabapentin and hydroxyzine, Tylenol, oxycodone 5 mg. Not taking Dilaudid IV anymore.  2. DVT Prophylaxis/atrial  fibrillation treatment-patient on Eliquis 5 mg twice a day  3.  Orthopedic surgeons-discussed this case with both Dr. Carrera who is on-call yesterday and Dr. Camacho who is on-call on 1/16.  Unfortunately the surgeons here are not surgeons that do a lot of hip revisions and it is felt that the patient should have a surgeon that does a lot of hip revisions.  Transfer to the [facility] is thought to be best or [facility].  4. Hospitalist-secure message sent to ELSI Roth this morning.  5. PT-recommending TCU however wants patient to be more stable before transfer.  6. Social Work-RN states SW working to see if he has coverage for TCU  7. Imaging: We have no access to records or imaging from [facility].  Primary service/hospitalist ordered imaging to assist for better transfer.  Imaging shows signs of femoral stem loosening.  8. Plan-Ortho Surgeons would like patient transferred.  They stated to get the patients pain controlled then [facility] to [facility], if he will be agreeable to it, or to a facility that has surgeons who do a lot of hip revisions.  They feel this would be in his best interest.        # Suicidal Ideation: Resolved. Contracts for safety.  01/17/2023, secondhand information, it was reported by physical therapy that patient was contemplating suicide due to excruciating left hip pain.  Patient has contracted for safety.    # Bilateral Hand Paraesthesia, Chronic:   # Peripheral Neuropathy (H):  No acute neuro deficit.  Continue Gabapentin.    # Urinary Retention:  # History Benign prostatic hypertrophy with outflow obstruction:  # History laser TURP in 2007:  01/18: Urinary retention overnight bladder scan 599 straight cath x1  PRN Bladder scan. Straight cath >300 mL. Rice catheter for urinary retention > 24 hours.  01/18 Urinalysis negative for urinary tract infection.    # Non Productive Cough: Improving  Negative COVID and influenza  screen.  Chest x-ray negative for acute airspace disease.  No hypoxia.  Not requiring supplemental oxygen.  Tessalon Perles, Robitussin with codeine changed to plain Robitussin in setting of opioid medication for left hip pain.  Anticoagulated on Eliquis, less likely for pulmonary embolism.  Change in vital signs, clinical status, hypoxia.  Reasonable CTA chest rule out PE.       # Coronary artery disease  # Essential hypertension  # History of CABG   - continue home dose aspirin   - continue home dose lipitor   - continue entresto   - continue metoprolol    # Mechanical heart valve   - continue home dose eliquis    # Prediabetes   - not on Metformin   - follow up outpatient                 Diet: Combination Diet Low Saturated Fat Diet    DVT Prophylaxis: DOAC  Rice Catheter: Not present  Lines: None     Cardiac Monitoring: None  Code Status: Full Code      Clinically Significant Risk Factors                                Disposition Plan         The patient's care was discussed with the Attending Physician, Dr. Alcantar, Bedside Nurse, Care Coordinator/ and Patient.    ANA LILIA Valenzuela CNP  Hospitalist Service  [facility]  Securely message with [employer] (more info)  Text page via [employer] Paging/Directory   ____________________________________________________  Interval History   Minimal to moderate relief from scheduled pain meds, requesting oxycodone back which seemed to help.     Physical Exam   Vital Signs: Temp: 96.8  F (36  C) Temp src: Oral BP: 126/68 Pulse: 66   Resp: 16 SpO2: 96 % O2 Device: None (Room air)    Weight: 253 lbs 0 oz  General appearance: alert and cooperative  Lungs: clear to auscultation bilaterally  Heart: regular rate and rhythm, S1, S2 normal  Abdomen: soft, obese, non-tender  Extremities: Severe left hip pain with hip abduction  Neurologic: moving all 4 extremities spontaneously, no focal weakness.        Medical Decision Making     45  MINUTES SPENT BY ME on the date of service doing chart review, history, exam, documentation & further activities per the note.      Data

## 2023-01-22 ENCOUNTER — APPOINTMENT (OUTPATIENT)
Dept: GENERAL RADIOLOGY | Facility: CLINIC | Age: 83
DRG: 559 | End: 2023-01-22
Attending: NURSE PRACTITIONER
Payer: COMMERCIAL

## 2023-01-22 ENCOUNTER — APPOINTMENT (OUTPATIENT)
Dept: CT IMAGING | Facility: CLINIC | Age: 83
DRG: 559 | End: 2023-01-22
Attending: NURSE PRACTITIONER
Payer: COMMERCIAL

## 2023-01-22 VITALS
HEART RATE: 62 BPM | RESPIRATION RATE: 15 BRPM | BODY MASS INDEX: 33.38 KG/M2 | TEMPERATURE: 98.9 F | OXYGEN SATURATION: 96 % | SYSTOLIC BLOOD PRESSURE: 139 MMHG | WEIGHT: 253 LBS | DIASTOLIC BLOOD PRESSURE: 72 MMHG

## 2023-01-22 PROBLEM — G92.9 TOXIC ENCEPHALOPATHY: Status: ACTIVE | Noted: 2023-01-22

## 2023-01-22 PROBLEM — R33.9 URINARY RETENTION: Chronic | Status: ACTIVE | Noted: 2023-01-22

## 2023-01-22 PROBLEM — N17.9 ACUTE KIDNEY INJURY (H): Status: ACTIVE | Noted: 2023-01-22

## 2023-01-22 LAB
ALBUMIN SERPL BCG-MCNC: 3.7 G/DL (ref 3.5–5.2)
ALBUMIN UR-MCNC: NEGATIVE MG/DL
ALP SERPL-CCNC: 81 U/L (ref 40–129)
ALT SERPL W P-5'-P-CCNC: 16 U/L (ref 10–50)
ANION GAP SERPL CALCULATED.3IONS-SCNC: 10 MMOL/L (ref 7–15)
ANION GAP SERPL CALCULATED.3IONS-SCNC: 12 MMOL/L (ref 7–15)
ANION GAP SERPL CALCULATED.3IONS-SCNC: 13 MMOL/L (ref 7–15)
ANION GAP SERPL CALCULATED.3IONS-SCNC: 13 MMOL/L (ref 7–15)
APPEARANCE UR: CLEAR
AST SERPL W P-5'-P-CCNC: 22 U/L (ref 10–50)
BASE EXCESS BLDV CALC-SCNC: -0.4 MMOL/L (ref -7.7–1.9)
BASE EXCESS BLDV CALC-SCNC: -2.1 MMOL/L (ref -7.7–1.9)
BASE EXCESS BLDV CALC-SCNC: 0.3 MMOL/L (ref -7.7–1.9)
BASOPHILS # BLD AUTO: 0 10E3/UL (ref 0–0.2)
BASOPHILS NFR BLD AUTO: 0 %
BILIRUB SERPL-MCNC: 1.7 MG/DL
BILIRUB UR QL STRIP: NEGATIVE
BUN SERPL-MCNC: 53.5 MG/DL (ref 8–23)
BUN SERPL-MCNC: 57.4 MG/DL (ref 8–23)
BUN SERPL-MCNC: 59.2 MG/DL (ref 8–23)
BUN SERPL-MCNC: 61.4 MG/DL (ref 8–23)
CALCIUM SERPL-MCNC: 8.6 MG/DL (ref 8.8–10.2)
CALCIUM SERPL-MCNC: 8.7 MG/DL (ref 8.8–10.2)
CALCIUM SERPL-MCNC: 8.7 MG/DL (ref 8.8–10.2)
CALCIUM SERPL-MCNC: 9.1 MG/DL (ref 8.8–10.2)
CHLORIDE SERPL-SCNC: 98 MMOL/L (ref 98–107)
CHLORIDE SERPL-SCNC: 99 MMOL/L (ref 98–107)
COLOR UR AUTO: YELLOW
CREAT SERPL-MCNC: 1.41 MG/DL (ref 0.67–1.17)
CREAT SERPL-MCNC: 1.57 MG/DL (ref 0.67–1.17)
CREAT SERPL-MCNC: 1.62 MG/DL (ref 0.67–1.17)
CREAT SERPL-MCNC: 1.65 MG/DL (ref 0.67–1.17)
CREAT UR-MCNC: 105.3 MG/DL
CRP SERPL-MCNC: 33.89 MG/L
DEPRECATED HCO3 PLAS-SCNC: 22 MMOL/L (ref 22–29)
DEPRECATED HCO3 PLAS-SCNC: 23 MMOL/L (ref 22–29)
DEPRECATED HCO3 PLAS-SCNC: 24 MMOL/L (ref 22–29)
DEPRECATED HCO3 PLAS-SCNC: 26 MMOL/L (ref 22–29)
EOSINOPHIL # BLD AUTO: 0.2 10E3/UL (ref 0–0.7)
EOSINOPHIL NFR BLD AUTO: 2 %
ERYTHROCYTE [DISTWIDTH] IN BLOOD BY AUTOMATED COUNT: 14.6 % (ref 10–15)
FLUAV RNA SPEC QL NAA+PROBE: NEGATIVE
FLUBV RNA RESP QL NAA+PROBE: NEGATIVE
FRACT EXCRET NA UR+SERPL-RTO: 0.5 %
GFR SERPL CREATININE-BSD FRML MDRD: 42 ML/MIN/1.73M2
GFR SERPL CREATININE-BSD FRML MDRD: 43 ML/MIN/1.73M2
GFR SERPL CREATININE-BSD FRML MDRD: 44 ML/MIN/1.73M2
GFR SERPL CREATININE-BSD FRML MDRD: 50 ML/MIN/1.73M2
GLUCOSE BLDC GLUCOMTR-MCNC: 249 MG/DL (ref 70–99)
GLUCOSE SERPL-MCNC: 153 MG/DL (ref 70–99)
GLUCOSE SERPL-MCNC: 176 MG/DL (ref 70–99)
GLUCOSE SERPL-MCNC: 198 MG/DL (ref 70–99)
GLUCOSE SERPL-MCNC: 206 MG/DL (ref 70–99)
GLUCOSE UR STRIP-MCNC: NEGATIVE MG/DL
HCO3 BLDV-SCNC: 24 MMOL/L (ref 21–28)
HCO3 BLDV-SCNC: 27 MMOL/L (ref 21–28)
HCO3 BLDV-SCNC: 27 MMOL/L (ref 21–28)
HCT VFR BLD AUTO: 37.7 % (ref 40–53)
HGB BLD-MCNC: 12.5 G/DL (ref 13.3–17.7)
HGB UR QL STRIP: NEGATIVE
HYALINE CASTS: 4 /LPF
IMM GRANULOCYTES # BLD: 0 10E3/UL
IMM GRANULOCYTES NFR BLD: 0 %
KETONES UR STRIP-MCNC: NEGATIVE MG/DL
LEUKOCYTE ESTERASE UR QL STRIP: NEGATIVE
LYMPHOCYTES # BLD AUTO: 1.2 10E3/UL (ref 0.8–5.3)
LYMPHOCYTES NFR BLD AUTO: 13 %
MAGNESIUM SERPL-MCNC: 2.2 MG/DL (ref 1.7–2.3)
MCH RBC QN AUTO: 31.3 PG (ref 26.5–33)
MCHC RBC AUTO-ENTMCNC: 33.2 G/DL (ref 31.5–36.5)
MCV RBC AUTO: 95 FL (ref 78–100)
MONOCYTES # BLD AUTO: 0.9 10E3/UL (ref 0–1.3)
MONOCYTES NFR BLD AUTO: 10 %
MUCOUS THREADS #/AREA URNS LPF: PRESENT /LPF
NEUTROPHILS # BLD AUTO: 6.8 10E3/UL (ref 1.6–8.3)
NEUTROPHILS NFR BLD AUTO: 75 %
NITRATE UR QL: NEGATIVE
NRBC # BLD AUTO: 0 10E3/UL
NRBC BLD AUTO-RTO: 0 /100
NT-PROBNP SERPL-MCNC: 361 PG/ML (ref 0–1800)
O2/TOTAL GAS SETTING VFR VENT: 21 %
O2/TOTAL GAS SETTING VFR VENT: 21 %
O2/TOTAL GAS SETTING VFR VENT: 28 %
PCO2 BLDV: 45 MM HG (ref 40–50)
PCO2 BLDV: 49 MM HG (ref 40–50)
PCO2 BLDV: 55 MM HG (ref 40–50)
PH BLDV: 7.3 [PH] (ref 7.32–7.43)
PH BLDV: 7.33 [PH] (ref 7.32–7.43)
PH BLDV: 7.35 [PH] (ref 7.32–7.43)
PH UR STRIP: 5 [PH] (ref 5–7)
PHOSPHATE SERPL-MCNC: 5.3 MG/DL (ref 2.5–4.5)
PLATELET # BLD AUTO: 148 10E3/UL (ref 150–450)
PO2 BLDV: 41 MM HG (ref 25–47)
PO2 BLDV: 60 MM HG (ref 25–47)
PO2 BLDV: 63 MM HG (ref 25–47)
POTASSIUM SERPL-SCNC: 4.9 MMOL/L (ref 3.4–5.3)
POTASSIUM SERPL-SCNC: 5.1 MMOL/L (ref 3.4–5.3)
POTASSIUM SERPL-SCNC: 5.2 MMOL/L (ref 3.4–5.3)
POTASSIUM SERPL-SCNC: 5.3 MMOL/L (ref 3.4–5.3)
PROT SERPL-MCNC: 6.6 G/DL (ref 6.4–8.3)
RBC # BLD AUTO: 3.99 10E6/UL (ref 4.4–5.9)
RBC URINE: 0 /HPF
RSV RNA SPEC NAA+PROBE: NEGATIVE
SARS-COV-2 RNA RESP QL NAA+PROBE: NEGATIVE
SODIUM SERPL-SCNC: 133 MMOL/L (ref 136–145)
SODIUM SERPL-SCNC: 134 MMOL/L (ref 136–145)
SODIUM SERPL-SCNC: 135 MMOL/L (ref 136–145)
SODIUM SERPL-SCNC: 136 MMOL/L (ref 136–145)
SODIUM UR-SCNC: 50 MMOL/L
SP GR UR STRIP: 1.02 (ref 1–1.03)
UROBILINOGEN UR STRIP-MCNC: NORMAL MG/DL
WBC # BLD AUTO: 9.1 10E3/UL (ref 4–11)
WBC URINE: 0 /HPF

## 2023-01-22 PROCEDURE — 80053 COMPREHEN METABOLIC PANEL: CPT | Performed by: NURSE PRACTITIONER

## 2023-01-22 PROCEDURE — 258N000002 HC RX IP 258 OP 250: Performed by: NURSE PRACTITIONER

## 2023-01-22 PROCEDURE — 83735 ASSAY OF MAGNESIUM: CPT | Performed by: NURSE PRACTITIONER

## 2023-01-22 PROCEDURE — 250N000013 HC RX MED GY IP 250 OP 250 PS 637: Performed by: PEDIATRICS

## 2023-01-22 PROCEDURE — 70450 CT HEAD/BRAIN W/O DYE: CPT | Mod: 76

## 2023-01-22 PROCEDURE — 250N000013 HC RX MED GY IP 250 OP 250 PS 637: Performed by: NURSE PRACTITIONER

## 2023-01-22 PROCEDURE — 84300 ASSAY OF URINE SODIUM: CPT | Performed by: FAMILY MEDICINE

## 2023-01-22 PROCEDURE — 83880 ASSAY OF NATRIURETIC PEPTIDE: CPT | Performed by: NURSE PRACTITIONER

## 2023-01-22 PROCEDURE — 120N000001 HC R&B MED SURG/OB

## 2023-01-22 PROCEDURE — 258N000003 HC RX IP 258 OP 636: Performed by: NURSE PRACTITIONER

## 2023-01-22 PROCEDURE — 85025 COMPLETE CBC W/AUTO DIFF WBC: CPT | Performed by: NURSE PRACTITIONER

## 2023-01-22 PROCEDURE — 84100 ASSAY OF PHOSPHORUS: CPT | Performed by: NURSE PRACTITIONER

## 2023-01-22 PROCEDURE — 258N000003 HC RX IP 258 OP 636: Performed by: FAMILY MEDICINE

## 2023-01-22 PROCEDURE — 36415 COLL VENOUS BLD VENIPUNCTURE: CPT | Performed by: FAMILY MEDICINE

## 2023-01-22 PROCEDURE — 250N000011 HC RX IP 250 OP 636: Performed by: HOSPITALIST

## 2023-01-22 PROCEDURE — 82310 ASSAY OF CALCIUM: CPT | Performed by: FAMILY MEDICINE

## 2023-01-22 PROCEDURE — 250N000013 HC RX MED GY IP 250 OP 250 PS 637: Performed by: HOSPITALIST

## 2023-01-22 PROCEDURE — 80048 BASIC METABOLIC PNL TOTAL CA: CPT | Performed by: NURSE PRACTITIONER

## 2023-01-22 PROCEDURE — 86140 C-REACTIVE PROTEIN: CPT | Performed by: NURSE PRACTITIONER

## 2023-01-22 PROCEDURE — 87637 SARSCOV2&INF A&B&RSV AMP PRB: CPT | Performed by: NURSE PRACTITIONER

## 2023-01-22 PROCEDURE — 81001 URINALYSIS AUTO W/SCOPE: CPT | Performed by: FAMILY MEDICINE

## 2023-01-22 PROCEDURE — 82803 BLOOD GASES ANY COMBINATION: CPT | Performed by: NURSE PRACTITIONER

## 2023-01-22 PROCEDURE — 70450 CT HEAD/BRAIN W/O DYE: CPT

## 2023-01-22 PROCEDURE — 36415 COLL VENOUS BLD VENIPUNCTURE: CPT | Performed by: NURSE PRACTITIONER

## 2023-01-22 PROCEDURE — 71045 X-RAY EXAM CHEST 1 VIEW: CPT

## 2023-01-22 RX ORDER — POLYETHYLENE GLYCOL 3350 17 G/17G
17 POWDER, FOR SOLUTION ORAL
Status: CANCELLED | OUTPATIENT
Start: 2023-01-24

## 2023-01-22 RX ORDER — ASPIRIN 81 MG/1
81 TABLET ORAL AT BEDTIME
Status: CANCELLED | OUTPATIENT
Start: 2023-01-22

## 2023-01-22 RX ORDER — NALOXONE HYDROCHLORIDE 0.4 MG/ML
0.4 INJECTION, SOLUTION INTRAMUSCULAR; INTRAVENOUS; SUBCUTANEOUS
Status: CANCELLED | OUTPATIENT
Start: 2023-01-22

## 2023-01-22 RX ORDER — ATORVASTATIN CALCIUM 40 MG/1
80 TABLET, FILM COATED ORAL AT BEDTIME
Status: CANCELLED | OUTPATIENT
Start: 2023-01-22

## 2023-01-22 RX ORDER — GABAPENTIN 300 MG/1
600 CAPSULE ORAL 3 TIMES DAILY
Status: DISCONTINUED | OUTPATIENT
Start: 2023-01-22 | End: 2023-01-23 | Stop reason: HOSPADM

## 2023-01-22 RX ORDER — NALOXONE HYDROCHLORIDE 0.4 MG/ML
0.2 INJECTION, SOLUTION INTRAMUSCULAR; INTRAVENOUS; SUBCUTANEOUS
Status: CANCELLED | OUTPATIENT
Start: 2023-01-22

## 2023-01-22 RX ORDER — FOLIC ACID 1 MG/1
1 TABLET ORAL DAILY
Status: CANCELLED | OUTPATIENT
Start: 2023-01-23

## 2023-01-22 RX ORDER — GUAIFENESIN 200 MG/10ML
5 LIQUID ORAL EVERY 4 HOURS PRN
Status: CANCELLED | OUTPATIENT
Start: 2023-01-22

## 2023-01-22 RX ORDER — SODIUM CHLORIDE 450 MG/100ML
INJECTION, SOLUTION INTRAVENOUS CONTINUOUS
Status: CANCELLED | OUTPATIENT
Start: 2023-01-22

## 2023-01-22 RX ORDER — AMOXICILLIN 250 MG
2 CAPSULE ORAL 2 TIMES DAILY
Status: CANCELLED | OUTPATIENT
Start: 2023-01-22

## 2023-01-22 RX ORDER — ONDANSETRON 4 MG/1
4 TABLET, ORALLY DISINTEGRATING ORAL EVERY 6 HOURS PRN
Status: CANCELLED | OUTPATIENT
Start: 2023-01-22

## 2023-01-22 RX ORDER — POLYETHYLENE GLYCOL 3350 17 G/17G
17 POWDER, FOR SOLUTION ORAL
Status: DISCONTINUED | OUTPATIENT
Start: 2023-01-22 | End: 2023-01-23 | Stop reason: HOSPADM

## 2023-01-22 RX ORDER — LIDOCAINE 4 G/G
2 PATCH TOPICAL EVERY 24 HOURS
Status: CANCELLED | OUTPATIENT
Start: 2023-01-23

## 2023-01-22 RX ORDER — AMOXICILLIN 250 MG
1 CAPSULE ORAL 2 TIMES DAILY
Status: CANCELLED | OUTPATIENT
Start: 2023-01-22

## 2023-01-22 RX ORDER — ONDANSETRON 2 MG/ML
4 INJECTION INTRAMUSCULAR; INTRAVENOUS EVERY 6 HOURS PRN
Status: CANCELLED | OUTPATIENT
Start: 2023-01-22

## 2023-01-22 RX ORDER — BISACODYL 10 MG
10 SUPPOSITORY, RECTAL RECTAL DAILY PRN
Status: CANCELLED | OUTPATIENT
Start: 2023-01-22

## 2023-01-22 RX ORDER — FLUTICASONE PROPIONATE 50 MCG
2 SPRAY, SUSPENSION (ML) NASAL DAILY
Status: CANCELLED | OUTPATIENT
Start: 2023-01-23

## 2023-01-22 RX ORDER — SODIUM CHLORIDE 450 MG/100ML
INJECTION, SOLUTION INTRAVENOUS CONTINUOUS
Status: DISCONTINUED | OUTPATIENT
Start: 2023-01-22 | End: 2023-01-23 | Stop reason: HOSPADM

## 2023-01-22 RX ORDER — FAMOTIDINE 20 MG/1
20 TABLET, FILM COATED ORAL AT BEDTIME
Status: CANCELLED | OUTPATIENT
Start: 2023-01-22

## 2023-01-22 RX ORDER — BISACODYL 10 MG
10 SUPPOSITORY, RECTAL RECTAL DAILY PRN
Status: DISCONTINUED | OUTPATIENT
Start: 2023-01-22 | End: 2023-01-23 | Stop reason: HOSPADM

## 2023-01-22 RX ORDER — ACETAMINOPHEN 325 MG/1
975 TABLET ORAL 3 TIMES DAILY
Status: CANCELLED | OUTPATIENT
Start: 2023-01-22

## 2023-01-22 RX ADMIN — ATORVASTATIN CALCIUM 80 MG: 40 TABLET, FILM COATED ORAL at 21:33

## 2023-01-22 RX ADMIN — ACETAMINOPHEN 975 MG: 325 TABLET, FILM COATED ORAL at 09:50

## 2023-01-22 RX ADMIN — ACETAMINOPHEN 975 MG: 325 TABLET, FILM COATED ORAL at 21:33

## 2023-01-22 RX ADMIN — OXYCODONE HYDROCHLORIDE 7.5 MG: 5 TABLET ORAL at 12:37

## 2023-01-22 RX ADMIN — LIDOCAINE 2 PATCH: 560 PATCH PERCUTANEOUS; TOPICAL; TRANSDERMAL at 13:24

## 2023-01-22 RX ADMIN — SACUBITRIL AND VALSARTAN 1 TABLET: 97; 103 TABLET, FILM COATED ORAL at 09:51

## 2023-01-22 RX ADMIN — ONDANSETRON 4 MG: 4 TABLET, ORALLY DISINTEGRATING ORAL at 13:51

## 2023-01-22 RX ADMIN — FAMOTIDINE 20 MG: 20 TABLET, FILM COATED ORAL at 21:34

## 2023-01-22 RX ADMIN — SODIUM CHLORIDE 250 ML: 9 INJECTION, SOLUTION INTRAVENOUS at 07:44

## 2023-01-22 RX ADMIN — SACUBITRIL AND VALSARTAN 1 TABLET: 97; 103 TABLET, FILM COATED ORAL at 21:33

## 2023-01-22 RX ADMIN — ACETAMINOPHEN 975 MG: 325 TABLET, FILM COATED ORAL at 13:24

## 2023-01-22 RX ADMIN — OXYCODONE HYDROCHLORIDE 7.5 MG: 5 TABLET ORAL at 07:44

## 2023-01-22 RX ADMIN — SODIUM CHLORIDE, POTASSIUM CHLORIDE, SODIUM LACTATE AND CALCIUM CHLORIDE 500 ML: 600; 310; 30; 20 INJECTION, SOLUTION INTRAVENOUS at 12:37

## 2023-01-22 RX ADMIN — SODIUM CHLORIDE: 4.5 INJECTION, SOLUTION INTRAVENOUS at 16:52

## 2023-01-22 RX ADMIN — POLYETHYLENE GLYCOL 3350 17 G: 17 POWDER, FOR SOLUTION ORAL at 13:24

## 2023-01-22 RX ADMIN — SENNOSIDES AND DOCUSATE SODIUM 1 TABLET: 50; 8.6 TABLET ORAL at 09:51

## 2023-01-22 RX ADMIN — GABAPENTIN 600 MG: 300 CAPSULE ORAL at 13:29

## 2023-01-22 RX ADMIN — METOPROLOL SUCCINATE 12.5 MG: 25 TABLET, EXTENDED RELEASE ORAL at 09:50

## 2023-01-22 RX ADMIN — SENNOSIDES AND DOCUSATE SODIUM 1 TABLET: 50; 8.6 TABLET ORAL at 21:34

## 2023-01-22 RX ADMIN — FOLIC ACID 1 MG: 1 TABLET ORAL at 09:51

## 2023-01-22 RX ADMIN — APIXABAN 5 MG: 5 TABLET, FILM COATED ORAL at 09:51

## 2023-01-22 RX ADMIN — FLUTICASONE PROPIONATE 2 SPRAY: 50 SPRAY, METERED NASAL at 09:50

## 2023-01-22 ASSESSMENT — ACTIVITIES OF DAILY LIVING (ADL)
ADLS_ACUITY_SCORE: 30
ADLS_ACUITY_SCORE: 29
ADLS_ACUITY_SCORE: 30

## 2023-01-22 NOTE — SIGNIFICANT EVENT
Significant Event Note    Time of event: 6:42 AM January 22, 2023    Description of event:  Patient has recurrent retention with over 500 on bladder scan.  New acute kidney injury as well with creatinine increasing from 0.9 up to 1.41 with BUN/creatinine ratio of 37.6 today (increased from 23 yesterday and 18 the day prior).      Plan:  -will place rollins catheter given ongoing retention and new acute kidney injury in case obstruction is contributing.  Start monitoring strict intake and output.  -perform UA and Fena on obtained urine to evaluate etiology of acute kidney injury further  -give  ml bolus over the next two hours given suspected prerenal etiology given BUN/creatinine ratio as above.  -re-evaluate renal function in a few hours.    Lashay Alcantar MD

## 2023-01-22 NOTE — SIGNIFICANT EVENT
Significant Event Note    Time of event: 5:08 PM January 22, 2023    Description of event:  Patient alternates between drowsiness and periods of alertness.  Confused.  Still requiring supplemental oxygen due to hypoxia.  Left hip pain controlled.  Needs 2 staff assist for transfers.     Plan:  Hold gabapentin, oxycodone, hydromorphone, aspirin and apixaban. CT head noted hypodense left frontoparietal subdural fluid collection without significant mass effect, midline shift or herniation.   No evidence of an acute transcortical confluent infarct. Chronic right cerebellar hemisphere lacunar infarct.  Presumed chronic small vessel ischemic changes.  There is questionable mild left frontal scalp swelling.     Remote history of possible traumatic fall prior to admission.  Given altered mental status, anticoagulated on Apixaban, will obtain MRI brain rule out acute infarct/hematoma/intracranial etiology.  I have paged neurology for further guidance regarding CT findings.    Dr. Ugarte neurology called back, reviewed imaging. No acute findings, agrees with MRI brain and repeat Head CT in 6 hours (at 20:00) from initial CT.     Discussed with: supervising physician, Dr. Alcantar.    ANA LILIA Valenzuela CNP

## 2023-01-22 NOTE — PROGRESS NOTES
[facility]    Medicine Progress Note - Hospitalist Service    Date of Admission:  1/16/2023    Assessment & Plan      David Thomson is a 82 year old male, former [employer], anticoagulated on Eliquis for mechanical heart valve, who was admitted on 1/16/2023 for intractable left hip pain with suspected hardware failure s/p possible traumatic left hip injury.     # Intractable Left hip pain, possible traumatic left hip injury. Reports of recent fall prior to admission.  # Previous left hip replacement 1999  # Suspected hardware failure  Left hip pain with subsequent radiating pain down left anterior thigh and right knee.  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  Moderate size left hip joint effusion.  No fracture or joint malalignment.  Postoperative and degenerative changes in lower lumbar spine and mild bilateral sacroiliac and right hip degenerative arthrosis.   Assessed by physical therapy.  Unsafe to discharge home or TCU.   01/19: Hemoglobin 12.8  CRP <3.00->38.75->33.89  CK 27  Pain control:   Oxycodone 7.5 mg every 4 hours  Gabapentin 900 mg TID-> decrease 600 mg TID in setting of renal insufficiency, if renal function continues to climb, may need decrease dose again.  Discontinued Hydroxyzine in setting of altered mental status  Use seldom if possible, PRN IV Hydromorphone 0.2 mg  Continue stool softeners to help avoid constipation.    01/22/2023  Overnights recurrent urinary retention 549 mL, Rice catheter placed. Creatinine 0.97-> 1.41.  Received IV NS fluid bolus this morning.  Recheck creatinine 1.65  Gabapentin adjusted.  Discontinued hydroxyzine.   complaints of lightheadedness when standing up this morning.  He is anticoagulated on apixaban lets check head CT rule out acute neuro etiology.  Suspect combination of opioids, possible hypercapnia, ISHAN, obesity hypoventilation syndrome.   When I saw him this morning he  appeared altered, I placed him on pulse oximetry 83 to 86%.  Started oxygen 2 L nasal cannula he recovered quickly less than one minute with oxygenation 95 to 96%. Capnography reading 48-50. VBG pH 7.30, PCO2 55, PO2 41, Bicarb 24  He does report he has no hip pain at the moment.  Transfer bed status  11:00 Contacted [facility], no beds.  12:25 [facility]. On divert.  12:28 [facility]. Attempted call, no answer x 2.   12:30 [facility]. No beds.  12:48 [facility]. No beds.  12:49 [facility]. Closed to transfers.  13:35 [facility], have beds, likely not today, will put patient on waiting list. Requested face sheet, insurance information, push all imaging to [facility].     I have left voicemail for emergency contact Phillip Thomson [phone] for call back.     Discussed with Dr. Alcantar  Discussed with Dr. Schoen who will contact [facility] and Dr. Pulido to see if they could facilitate bed availability at the .         01/21/2023  Last night I was informed by Rashad Kitchen, Ortho PA [facility] that he discussed case with Dr. Kavin Fallon, [facility] Ortho team who accepted patient pending bed availability.  This morning I contacted SOC, patient was not on waiting list.  He has been placed on waiting list and SOC will call with update later today.  If they do not call by end of day, I will touch base with them prior to off-duty shift.  I have updated patient on above.  He is concerned about ambulance cost.  Discussed with Dayana , uncertain whether he might not get a bill, given [facility] patient, [facility] hopefully will cover ambulance cost and [facility] hospitalization.  Updated patient who agrees with above.   18:00 Called SOC, No beds.     01/20/2023  Multidisciplinary meeting, in patient's best interest, it is felt ethics consult warranted.  Discussed case with  on-call Dr. Schoen who suggested we contact [facility] orthopedic service about transfer since [facility] unwilling to [facility] in  transfer and is requiring parenteral pain management.  Local orthopedic team feel unable to manage.   Attempted to have our staff ortho providers [facility] to discuss case with [facility]. No response.   Discussed case with Rashad Kitchen, Ortho PA [facility] who will discuss case with [facility] ortho.   Paged [facility] staff ortho and trauma. No response.   Paged [facility] staff ortho back up. No response.  Discussed case with Dr. Schoen. If no response from [facility] tomorrow, contact [facility] and [facility] for ortho bed availability. Dr. Schoen not  on-call this weekend, but he is available if needed. Appreciate Rashad Kitchen and Dr. Schoen's assistance.       01/19/2023  09:05 Call out to [facility]. Ortho resident pager [phone]  09:11 Received call back Dr. Miriam Thompson.  Discussed case with Dr. Thompson. As resident they cannot accept transfers and this writer will need to discuss case with staff Ortho physician. Dr. Mauro Busby [phone]  09:17: Paged Dr. Bsuby.  Received call back from Dr. Busby.  Discussed case with Dr. Busby who stated [facility] and  recommended manage pain control and transfer to skilled nursing facility and [facility] will [facility].  Updated patient,  and Dr. Moreno.  Patient is agreeable to TCU.    Orthopedic A & P 01/18  1. Pain-controlled better this morning with gabapentin and hydroxyzine, Tylenol, oxycodone 5 mg. Not taking Dilaudid IV anymore.  2. DVT Prophylaxis/atrial fibrillation treatment-patient on Eliquis 5 mg twice a day  3.  Orthopedic surgeons-discussed this case with both Dr. Carrera who is on-call yesterday and Dr. Camacho who is on-call on 1/16.  Unfortunately the surgeons here are not surgeons that do a lot of hip revisions and it is felt that the patient should have a surgeon that does a lot of hip revisions.  Transfer to the [facility] is thought to be best or [facility].  4.  Hospitalist-secure message sent to ELSI Roth this morning.  5. PT-recommending TCU however wants patient to be more stable before transfer.  6. Social Work-RN states SW working to see if he has coverage for TCU  7. Imaging: We have no access to records or imaging from [facility].  Primary service/hospitalist ordered imaging to assist for better transfer.  Imaging shows signs of femoral stem loosening.  8. Plan-Ortho Surgeons would like patient transferred.  They stated to get the patients pain controlled then [facility], if he will be agreeable to it, or to a facility that has surgeons who do a lot of hip revisions.  They feel this would be in his best interest.      # Acute Hypercapnic Respiratory Failure:  # Respiratory Acidosis: Improved  Differentials: medication side effect, CHF, PE, Pneumonia, possible undiagnosed ISHAN, Obesity hypoventilation syndrome   New oxygen parameters in setting of ISHAN, avoid CO2 retention  Serial VBGs, may require biPAP for persistent hypercapnia and/or decreased mental status.  Chest x-ray negative for acute airspace disease, , Negative covid and influenza  Anticoagulated on Eliquis, less likely for pulmonary embolism, not ruled out given worsening BRITTNEY.     # Acute Kidney Injury: Felt to be prerenal secondary to dehyrdration  Creatinine up trend 0.97->1.41->1.65, GFR 79->50->42; BUN 23.1->53.5->57.4  Received normal saline IV bolus 500 mL and LR IV bolus 500 mL, monitor potassium 5.3  Once LR bolus completed start IV maintenance NS 75 mL an hour  History of congestive heart failure, trying to avoid fluid overload.  Serial BMP every 12 hours. Encourage oral hydration  Renally adjust/discontinue medications.    # Encephalopathy, suspected combination possible toxic, metabolic: Secondary to combination of opioids, benzodiazepines, neuropathic medications and dehydration.    Altered mental status work-up: Head CT non contrast, VBG, capnography and  cardiac monitoring, aspiration and fall precautions, unfortunately he needs opioids for intractable left hip pain control.  Continue to monitor closely, we have finally got him to a point where he has no pain, adjust pain medication diligently.    # Rib Fractures: felt to be chronic. Chest x-ray 01/18: Right posterolateral sixth and seventh rib fractures are of indeterminate age but are likely chronic.   No reports of rib pain. Supportive care.     # Suicidal Ideation: Resolved. Contracts for safety.  01/17/2023, secondhand information, it was reported by physical therapy that patient was contemplating suicide due to excruciating left hip pain.  Patient has contracted for safety.    # Bilateral Hand Paraesthesia, Chronic:   # Peripheral Neuropathy (H):  No acute neuro deficit.  Continue Gabapentin.    # Urinary Retention:  # History Benign prostatic hypertrophy with outflow obstruction:  # History laser TURP in 2007:  Repeat urinary retention bladder scan 549 mL, rollins catheter placed.   01/18: Urinary retention overnight bladder scan 599 straight cath x1  UA/UC, renal artery ultrasound with kidneys tomorrow  Make sure on bowel regimen to help avoid constipation      # Non Productive Cough:   Negative COVID and influenza screen.  Chest x-ray negative for acute airspace disease.  No hypoxia.  Not requiring supplemental oxygen.  Tessalon Perles, Robitussin with codeine changed to plain Robitussin in setting of opioid medication for left hip pain.  Anticoagulated on Eliquis, less likely for pulmonary embolism.      # Coronary artery disease  # Essential hypertension  # History of CABG   - continue home dose aspirin   - continue home dose lipitor   - continue entresto   - continue metoprolol    # Mechanical heart valve   - continue home dose eliquis    # Prediabetes   - not on Metformin   - follow up outpatient                   Diet: Combination Diet Low Saturated Fat Diet    DVT Prophylaxis: DOAC  Rollins Catheter:  PRESENT, indication: Retention  Lines: None     Cardiac Monitoring: ACTIVE order. Indication: Tachyarrhythmias, acute (48 hours)  Code Status: Full Code      Clinically Significant Risk Factors                                Disposition Plan      Expected Discharge Date: 01/23/2023      Destination: home  Discharge Comments: Accepted by Dr. Kavin Fallon, [facility] Orth[facility]. Awaiting bed availability.    The patient's care was discussed with the Attending Physician, Dr. Alcantar, Bedside Nurse, Care Coordinator/, Patient and Patient's Family.    ANA LILIA Valenzuela CNP  Hospitalist Service  [facility]  Securely message with [employer] (more info)  Text page via [facility] Paging/Directory   ______________________________________________________________________    Interval History   Overnights Urinary retention 549 mL, rollins placed  Tried to stand this morning felt lightheaded  Currently no hip pain while lying in bed    Physical Exam   Vital Signs: Temp: 99  F (37.2  C) Temp src: Oral BP: 130/84 Pulse: 76   Resp: 16 SpO2: 96 % O2 Device: Nasal cannula Oxygen Delivery: 2 LPM  Weight: 253 lbs 0 oz    General appearance: Drowsy, wakes to voice and falls back asleep, follows commands appropriately  Head: Atraumatic, pupils reactive to light, R>L, patient reports history eye injury, kicked by horse?  Lungs: diminished lung sounds  Heart: regular, irregular S1, S2 normal  Abdomen: soft, obese, non-tender  Extremities: Left hip pain with sudden movements  Neurologic: upper extremity tremor, moving all 4 extremities spontaneously, no focal weakness.      Medical Decision Making     45 MINUTES SPENT BY ME on the date of service doing chart review, history, exam, documentation & further activities per the note.      Data     I have personally reviewed the following data over the past 24 hrs:    9.1  \   12.5 (L)   / 148 (L)     135 (L) 99 57.4 (H) /  176 (H)   5.3 23 1.65 (H) \       ALT: 16 AST:  22 AP: 81 TBILI: 1.7 (H)   ALB: 3.7 TOT PROTEIN: 6.6 LIPASE: N/A       Procal: N/A CRP: 33.89 (H) Lactic Acid: N/A

## 2023-01-22 NOTE — PLAN OF CARE
Goal Outcome Evaluation:                 Outcome Evaluation: Patient is alert and oriented, VSS. However, patient noted to be more lethargic this shift. When VS checked while patient sleeping. noted to have O2 sats below 90%, placed on 2 LPM nasal cannula. Patient this morning reporting feeling mildly lightheaded when standing at bedside, BP obtained 130/84. Patient experiencing hesitancy with voids at HS and during day shift, when went to void this a.m. patient was unable to void. Tried turnign water on and modifying position with no success. Bladder scaned 549, and rollins placed per provider order with 800 out. Patient tolerated well, reports increased abdominal comfort and reports might need to have bowel movement today.

## 2023-01-22 NOTE — PLAN OF CARE
Goal Outcome Evaluation:      Plan of Care Reviewed With: patient    Overall Patient Progress: no changeOverall Patient Progress: no change    Outcome Evaluation: Pt is A&O; VSS. Voiding adequately. Scheduled Oxycodone 7.5mg for pain control, see MAR. PRN Zofran and Dilaudid given, see MAR. Good oral intake. Ice compress applied LLE.

## 2023-01-23 ENCOUNTER — APPOINTMENT (OUTPATIENT)
Dept: ULTRASOUND IMAGING | Facility: CLINIC | Age: 83
DRG: 559 | End: 2023-01-23
Attending: STUDENT IN AN ORGANIZED HEALTH CARE EDUCATION/TRAINING PROGRAM
Payer: COMMERCIAL

## 2023-01-23 ENCOUNTER — APPOINTMENT (OUTPATIENT)
Dept: INTERVENTIONAL RADIOLOGY/VASCULAR | Facility: CLINIC | Age: 83
DRG: 559 | End: 2023-01-23
Attending: PHYSICIAN ASSISTANT
Payer: COMMERCIAL

## 2023-01-23 ENCOUNTER — HOSPITAL ENCOUNTER (EMERGENCY)
Facility: CLINIC | Age: 83
DRG: 559 | End: 2023-01-23
Attending: STUDENT IN AN ORGANIZED HEALTH CARE EDUCATION/TRAINING PROGRAM | Admitting: STUDENT IN AN ORGANIZED HEALTH CARE EDUCATION/TRAINING PROGRAM
Payer: COMMERCIAL

## 2023-01-23 ENCOUNTER — APPOINTMENT (OUTPATIENT)
Dept: PHYSICAL THERAPY | Facility: CLINIC | Age: 83
DRG: 559 | End: 2023-01-23
Attending: STUDENT IN AN ORGANIZED HEALTH CARE EDUCATION/TRAINING PROGRAM
Payer: COMMERCIAL

## 2023-01-23 ENCOUNTER — APPOINTMENT (OUTPATIENT)
Dept: CARDIOLOGY | Facility: CLINIC | Age: 83
DRG: 559 | End: 2023-01-23
Attending: STUDENT IN AN ORGANIZED HEALTH CARE EDUCATION/TRAINING PROGRAM
Payer: COMMERCIAL

## 2023-01-23 ENCOUNTER — HOSPITAL ENCOUNTER (INPATIENT)
Facility: CLINIC | Age: 83
LOS: 6 days | Discharge: HOME HEALTH | DRG: 559 | End: 2023-01-29
Attending: STUDENT IN AN ORGANIZED HEALTH CARE EDUCATION/TRAINING PROGRAM | Admitting: HOSPITALIST
Payer: COMMERCIAL

## 2023-01-23 DIAGNOSIS — R52 INTRACTABLE PAIN: ICD-10-CM

## 2023-01-23 DIAGNOSIS — M21.952 DEFORMITY OF LEFT HIP JOINT: Primary | ICD-10-CM

## 2023-01-23 DIAGNOSIS — S06.5XAA SUBDURAL HEMATOMA (H): Chronic | ICD-10-CM

## 2023-01-23 DIAGNOSIS — M25.552 HIP PAIN, LEFT: ICD-10-CM

## 2023-01-23 PROBLEM — M21.959 HIP DEFORMITY: Status: ACTIVE | Noted: 2023-01-23

## 2023-01-23 LAB
ALBUMIN SERPL BCG-MCNC: 3.5 G/DL (ref 3.5–5.2)
ALP SERPL-CCNC: 77 U/L (ref 40–129)
ALT SERPL W P-5'-P-CCNC: 9 U/L (ref 10–50)
ANION GAP SERPL CALCULATED.3IONS-SCNC: 12 MMOL/L (ref 7–15)
ANION GAP SERPL CALCULATED.3IONS-SCNC: 12 MMOL/L (ref 7–15)
APTT PPP: 48 SECONDS (ref 22–38)
AST SERPL W P-5'-P-CCNC: 29 U/L (ref 10–50)
ATRIAL RATE - MUSE: 68 BPM
BILIRUB SERPL-MCNC: 2 MG/DL
BUN SERPL-MCNC: 65.4 MG/DL (ref 8–23)
BUN SERPL-MCNC: 68.1 MG/DL (ref 8–23)
CALCIUM SERPL-MCNC: 8.6 MG/DL (ref 8.8–10.2)
CALCIUM SERPL-MCNC: 8.8 MG/DL (ref 8.8–10.2)
CHLORIDE SERPL-SCNC: 103 MMOL/L (ref 98–107)
CHLORIDE SERPL-SCNC: 106 MMOL/L (ref 98–107)
CREAT SERPL-MCNC: 1.45 MG/DL (ref 0.67–1.17)
CREAT SERPL-MCNC: 1.58 MG/DL (ref 0.67–1.17)
DEPRECATED HCO3 PLAS-SCNC: 20 MMOL/L (ref 22–29)
DEPRECATED HCO3 PLAS-SCNC: 21 MMOL/L (ref 22–29)
DIASTOLIC BLOOD PRESSURE - MUSE: NORMAL MMHG
ERYTHROCYTE [DISTWIDTH] IN BLOOD BY AUTOMATED COUNT: 15.3 % (ref 10–15)
FIBRINOGEN PPP-MCNC: 642 MG/DL (ref 170–490)
GFR SERPL CREATININE-BSD FRML MDRD: 44 ML/MIN/1.73M2
GFR SERPL CREATININE-BSD FRML MDRD: 49 ML/MIN/1.73M2
GLUCOSE SERPL-MCNC: 161 MG/DL (ref 70–99)
GLUCOSE SERPL-MCNC: 171 MG/DL (ref 70–99)
HBA1C MFR BLD: 5.5 %
HCT VFR BLD AUTO: 33.6 % (ref 40–53)
HGB BLD-MCNC: 11.1 G/DL (ref 13.3–17.7)
INR PPP: 1.66 (ref 0.85–1.15)
INTERPRETATION ECG - MUSE: NORMAL
LVEF ECHO: NORMAL
MCH RBC QN AUTO: 31.7 PG (ref 26.5–33)
MCHC RBC AUTO-ENTMCNC: 33 G/DL (ref 31.5–36.5)
MCV RBC AUTO: 96 FL (ref 78–100)
NT-PROBNP SERPL-MCNC: 544 PG/ML (ref 0–1800)
P AXIS - MUSE: 76 DEGREES
PLATELET # BLD AUTO: 122 10E3/UL (ref 150–450)
POTASSIUM SERPL-SCNC: 4.8 MMOL/L (ref 3.4–5.3)
POTASSIUM SERPL-SCNC: 5.1 MMOL/L (ref 3.4–5.3)
PR INTERVAL - MUSE: 212 MS
PROT SERPL-MCNC: 6.1 G/DL (ref 6.4–8.3)
QRS DURATION - MUSE: 174 MS
QT - MUSE: 438 MS
QTC - MUSE: 465 MS
R AXIS - MUSE: 9 DEGREES
RBC # BLD AUTO: 3.5 10E6/UL (ref 4.4–5.9)
SODIUM SERPL-SCNC: 136 MMOL/L (ref 136–145)
SODIUM SERPL-SCNC: 138 MMOL/L (ref 136–145)
SYSTOLIC BLOOD PRESSURE - MUSE: NORMAL MMHG
T AXIS - MUSE: 148 DEGREES
VENTRICULAR RATE- MUSE: 68 BPM
WBC # BLD AUTO: 7.1 10E3/UL (ref 4–11)

## 2023-01-23 PROCEDURE — 93010 ELECTROCARDIOGRAM REPORT: CPT | Performed by: INTERNAL MEDICINE

## 2023-01-23 PROCEDURE — 10005 FNA BX W/US GDN 1ST LES: CPT

## 2023-01-23 PROCEDURE — 250N000013 HC RX MED GY IP 250 OP 250 PS 637: Performed by: NURSE PRACTITIONER

## 2023-01-23 PROCEDURE — 250N000013 HC RX MED GY IP 250 OP 250 PS 637

## 2023-01-23 PROCEDURE — 36415 COLL VENOUS BLD VENIPUNCTURE: CPT | Performed by: NURSE PRACTITIONER

## 2023-01-23 PROCEDURE — 250N000009 HC RX 250: Performed by: RADIOLOGY

## 2023-01-23 PROCEDURE — 93005 ELECTROCARDIOGRAM TRACING: CPT

## 2023-01-23 PROCEDURE — 85384 FIBRINOGEN ACTIVITY: CPT

## 2023-01-23 PROCEDURE — 0S9B3ZZ DRAINAGE OF LEFT HIP JOINT, PERCUTANEOUS APPROACH: ICD-10-PCS | Performed by: RADIOLOGY

## 2023-01-23 PROCEDURE — 255N000002 HC RX 255 OP 636: Performed by: STUDENT IN AN ORGANIZED HEALTH CARE EDUCATION/TRAINING PROGRAM

## 2023-01-23 PROCEDURE — 97530 THERAPEUTIC ACTIVITIES: CPT | Mod: GP

## 2023-01-23 PROCEDURE — 80053 COMPREHEN METABOLIC PANEL: CPT

## 2023-01-23 PROCEDURE — 36415 COLL VENOUS BLD VENIPUNCTURE: CPT

## 2023-01-23 PROCEDURE — 76770 US EXAM ABDO BACK WALL COMP: CPT | Mod: 26 | Performed by: RADIOLOGY

## 2023-01-23 PROCEDURE — 999N000128 HC STATISTIC PERIPHERAL IV START W/O US GUIDANCE

## 2023-01-23 PROCEDURE — 87102 FUNGUS ISOLATION CULTURE: CPT | Performed by: PHYSICIAN ASSISTANT

## 2023-01-23 PROCEDURE — 83880 ASSAY OF NATRIURETIC PEPTIDE: CPT | Performed by: STUDENT IN AN ORGANIZED HEALTH CARE EDUCATION/TRAINING PROGRAM

## 2023-01-23 PROCEDURE — 250N000013 HC RX MED GY IP 250 OP 250 PS 637: Performed by: STUDENT IN AN ORGANIZED HEALTH CARE EDUCATION/TRAINING PROGRAM

## 2023-01-23 PROCEDURE — 87075 CULTR BACTERIA EXCEPT BLOOD: CPT | Performed by: PHYSICIAN ASSISTANT

## 2023-01-23 PROCEDURE — 93306 TTE W/DOPPLER COMPLETE: CPT | Mod: 26 | Performed by: INTERNAL MEDICINE

## 2023-01-23 PROCEDURE — 85027 COMPLETE CBC AUTOMATED: CPT

## 2023-01-23 PROCEDURE — 76770 US EXAM ABDO BACK WALL COMP: CPT

## 2023-01-23 PROCEDURE — 258N000003 HC RX IP 258 OP 636: Performed by: STUDENT IN AN ORGANIZED HEALTH CARE EDUCATION/TRAINING PROGRAM

## 2023-01-23 PROCEDURE — 89050 BODY FLUID CELL COUNT: CPT | Performed by: PHYSICIAN ASSISTANT

## 2023-01-23 PROCEDURE — 258N000002 HC RX IP 258 OP 250: Performed by: NURSE PRACTITIONER

## 2023-01-23 PROCEDURE — 99207 PR NO BILLABLE SERVICE THIS VISIT: CPT | Performed by: ORTHOPAEDIC SURGERY

## 2023-01-23 PROCEDURE — 85610 PROTHROMBIN TIME: CPT

## 2023-01-23 PROCEDURE — 10007 FNA BX W/FLUOR GDN 1ST LES: CPT | Mod: GC | Performed by: RADIOLOGY

## 2023-01-23 PROCEDURE — 97161 PT EVAL LOW COMPLEX 20 MIN: CPT | Mod: GP

## 2023-01-23 PROCEDURE — 85730 THROMBOPLASTIN TIME PARTIAL: CPT

## 2023-01-23 PROCEDURE — 83036 HEMOGLOBIN GLYCOSYLATED A1C: CPT | Performed by: STUDENT IN AN ORGANIZED HEALTH CARE EDUCATION/TRAINING PROGRAM

## 2023-01-23 PROCEDURE — 120N000011 HC R&B TRANSPLANT UMMC

## 2023-01-23 PROCEDURE — 99222 1ST HOSP IP/OBS MODERATE 55: CPT | Mod: GC | Performed by: NEUROLOGICAL SURGERY

## 2023-01-23 PROCEDURE — 99223 1ST HOSP IP/OBS HIGH 75: CPT | Mod: AI | Performed by: HOSPITALIST

## 2023-01-23 PROCEDURE — 999N000157 HC STATISTIC RCP TIME EA 10 MIN

## 2023-01-23 PROCEDURE — 999N000208 ECHOCARDIOGRAM COMPLETE

## 2023-01-23 RX ORDER — AMOXICILLIN 250 MG
2 CAPSULE ORAL 2 TIMES DAILY PRN
Status: DISCONTINUED | OUTPATIENT
Start: 2023-01-23 | End: 2023-01-29 | Stop reason: HOSPADM

## 2023-01-23 RX ORDER — FLUTICASONE PROPIONATE 50 MCG
2 SPRAY, SUSPENSION (ML) NASAL DAILY
Status: DISCONTINUED | OUTPATIENT
Start: 2023-01-23 | End: 2023-01-24

## 2023-01-23 RX ORDER — NALOXONE HYDROCHLORIDE 0.4 MG/ML
0.4 INJECTION, SOLUTION INTRAMUSCULAR; INTRAVENOUS; SUBCUTANEOUS
Status: DISCONTINUED | OUTPATIENT
Start: 2023-01-23 | End: 2023-01-29 | Stop reason: HOSPADM

## 2023-01-23 RX ORDER — GABAPENTIN 300 MG/1
300 CAPSULE ORAL AT BEDTIME
Status: DISCONTINUED | OUTPATIENT
Start: 2023-01-23 | End: 2023-01-23

## 2023-01-23 RX ORDER — ACETAMINOPHEN 325 MG/1
975 TABLET ORAL 3 TIMES DAILY
Status: DISCONTINUED | OUTPATIENT
Start: 2023-01-23 | End: 2023-01-29 | Stop reason: HOSPADM

## 2023-01-23 RX ORDER — ATORVASTATIN CALCIUM 40 MG/1
80 TABLET, FILM COATED ORAL AT BEDTIME
Status: DISCONTINUED | OUTPATIENT
Start: 2023-01-23 | End: 2023-01-29 | Stop reason: HOSPADM

## 2023-01-23 RX ORDER — ACETAMINOPHEN 325 MG/1
650 TABLET ORAL EVERY 6 HOURS PRN
Status: DISCONTINUED | OUTPATIENT
Start: 2023-01-23 | End: 2023-01-29 | Stop reason: HOSPADM

## 2023-01-23 RX ORDER — NALOXONE HYDROCHLORIDE 0.4 MG/ML
0.2 INJECTION, SOLUTION INTRAMUSCULAR; INTRAVENOUS; SUBCUTANEOUS
Status: DISCONTINUED | OUTPATIENT
Start: 2023-01-23 | End: 2023-01-23

## 2023-01-23 RX ORDER — LIDOCAINE HYDROCHLORIDE 10 MG/ML
1-30 INJECTION, SOLUTION EPIDURAL; INFILTRATION; INTRACAUDAL; PERINEURAL
Status: COMPLETED | OUTPATIENT
Start: 2023-01-23 | End: 2023-01-23

## 2023-01-23 RX ORDER — FOLIC ACID 1 MG/1
1 TABLET ORAL DAILY
Status: DISCONTINUED | OUTPATIENT
Start: 2023-01-23 | End: 2023-01-24

## 2023-01-23 RX ORDER — ONDANSETRON 4 MG/1
4 TABLET, ORALLY DISINTEGRATING ORAL EVERY 6 HOURS PRN
Status: DISCONTINUED | OUTPATIENT
Start: 2023-01-23 | End: 2023-01-29 | Stop reason: HOSPADM

## 2023-01-23 RX ORDER — HYDROXYZINE HYDROCHLORIDE 50 MG/1
50 TABLET, FILM COATED ORAL 4 TIMES DAILY PRN
Status: DISCONTINUED | OUTPATIENT
Start: 2023-01-23 | End: 2023-01-25

## 2023-01-23 RX ORDER — ACETAMINOPHEN 650 MG/1
650 SUPPOSITORY RECTAL EVERY 6 HOURS PRN
Status: DISCONTINUED | OUTPATIENT
Start: 2023-01-23 | End: 2023-01-29 | Stop reason: HOSPADM

## 2023-01-23 RX ORDER — AMOXICILLIN 250 MG
2 CAPSULE ORAL 2 TIMES DAILY
Status: DISCONTINUED | OUTPATIENT
Start: 2023-01-23 | End: 2023-01-29 | Stop reason: HOSPADM

## 2023-01-23 RX ORDER — BISACODYL 10 MG
10 SUPPOSITORY, RECTAL RECTAL DAILY PRN
Status: DISCONTINUED | OUTPATIENT
Start: 2023-01-23 | End: 2023-01-29 | Stop reason: HOSPADM

## 2023-01-23 RX ORDER — ONDANSETRON 2 MG/ML
4 INJECTION INTRAMUSCULAR; INTRAVENOUS EVERY 6 HOURS PRN
Status: DISCONTINUED | OUTPATIENT
Start: 2023-01-23 | End: 2023-01-29 | Stop reason: HOSPADM

## 2023-01-23 RX ORDER — NALOXONE HYDROCHLORIDE 0.4 MG/ML
0.4 INJECTION, SOLUTION INTRAMUSCULAR; INTRAVENOUS; SUBCUTANEOUS
Status: DISCONTINUED | OUTPATIENT
Start: 2023-01-23 | End: 2023-01-23

## 2023-01-23 RX ORDER — ONDANSETRON 2 MG/ML
4 INJECTION INTRAMUSCULAR; INTRAVENOUS EVERY 6 HOURS PRN
Status: DISCONTINUED | OUTPATIENT
Start: 2023-01-23 | End: 2023-01-23

## 2023-01-23 RX ORDER — NALOXONE HYDROCHLORIDE 0.4 MG/ML
0.2 INJECTION, SOLUTION INTRAMUSCULAR; INTRAVENOUS; SUBCUTANEOUS
Status: DISCONTINUED | OUTPATIENT
Start: 2023-01-23 | End: 2023-01-29 | Stop reason: HOSPADM

## 2023-01-23 RX ORDER — LIDOCAINE 40 MG/G
CREAM TOPICAL
Status: DISCONTINUED | OUTPATIENT
Start: 2023-01-23 | End: 2023-01-29 | Stop reason: HOSPADM

## 2023-01-23 RX ORDER — GABAPENTIN 300 MG/1
300 CAPSULE ORAL 3 TIMES DAILY
Status: DISCONTINUED | OUTPATIENT
Start: 2023-01-23 | End: 2023-01-24

## 2023-01-23 RX ORDER — ASPIRIN 81 MG/1
81 TABLET ORAL AT BEDTIME
Status: DISCONTINUED | OUTPATIENT
Start: 2023-01-23 | End: 2023-01-29 | Stop reason: HOSPADM

## 2023-01-23 RX ORDER — AMOXICILLIN 250 MG
1 CAPSULE ORAL 2 TIMES DAILY
Status: DISCONTINUED | OUTPATIENT
Start: 2023-01-23 | End: 2023-01-29 | Stop reason: HOSPADM

## 2023-01-23 RX ORDER — AMOXICILLIN 250 MG
1 CAPSULE ORAL 2 TIMES DAILY PRN
Status: DISCONTINUED | OUTPATIENT
Start: 2023-01-23 | End: 2023-01-29 | Stop reason: HOSPADM

## 2023-01-23 RX ORDER — FOLIC ACID 1 MG/1
1 TABLET ORAL DAILY
Status: DISCONTINUED | OUTPATIENT
Start: 2023-01-23 | End: 2023-01-29 | Stop reason: HOSPADM

## 2023-01-23 RX ORDER — FAMOTIDINE 20 MG/1
20 TABLET, FILM COATED ORAL AT BEDTIME
Status: DISCONTINUED | OUTPATIENT
Start: 2023-01-23 | End: 2023-01-24

## 2023-01-23 RX ORDER — OMEPRAZOLE
20 KIT
Status: DISCONTINUED | OUTPATIENT
Start: 2023-01-23 | End: 2023-01-23

## 2023-01-23 RX ORDER — PANTOPRAZOLE SODIUM 40 MG/1
40 TABLET, DELAYED RELEASE ORAL 2 TIMES DAILY
Status: DISCONTINUED | OUTPATIENT
Start: 2023-01-23 | End: 2023-01-29 | Stop reason: HOSPADM

## 2023-01-23 RX ORDER — SODIUM CHLORIDE 450 MG/100ML
INJECTION, SOLUTION INTRAVENOUS CONTINUOUS
Status: DISCONTINUED | OUTPATIENT
Start: 2023-01-23 | End: 2023-01-26

## 2023-01-23 RX ORDER — FLUTICASONE PROPIONATE 50 MCG
2 SPRAY, SUSPENSION (ML) NASAL DAILY
Status: DISCONTINUED | OUTPATIENT
Start: 2023-01-23 | End: 2023-01-29 | Stop reason: HOSPADM

## 2023-01-23 RX ORDER — GUAIFENESIN 200 MG/10ML
5 LIQUID ORAL EVERY 4 HOURS PRN
Status: DISCONTINUED | OUTPATIENT
Start: 2023-01-23 | End: 2023-01-29 | Stop reason: HOSPADM

## 2023-01-23 RX ORDER — ATORVASTATIN CALCIUM 40 MG/1
80 TABLET, FILM COATED ORAL EVERY EVENING
Status: DISCONTINUED | OUTPATIENT
Start: 2023-01-23 | End: 2023-01-23

## 2023-01-23 RX ORDER — POLYETHYLENE GLYCOL 3350 17 G/17G
17 POWDER, FOR SOLUTION ORAL
Status: DISCONTINUED | OUTPATIENT
Start: 2023-01-24 | End: 2023-01-29 | Stop reason: HOSPADM

## 2023-01-23 RX ORDER — LIDOCAINE 4 G/G
2 PATCH TOPICAL EVERY 24 HOURS
Status: DISCONTINUED | OUTPATIENT
Start: 2023-01-23 | End: 2023-01-29 | Stop reason: HOSPADM

## 2023-01-23 RX ORDER — ONDANSETRON 4 MG/1
4 TABLET, ORALLY DISINTEGRATING ORAL EVERY 6 HOURS PRN
Status: DISCONTINUED | OUTPATIENT
Start: 2023-01-23 | End: 2023-01-23

## 2023-01-23 RX ORDER — ATORVASTATIN CALCIUM 40 MG/1
80 TABLET, FILM COATED ORAL AT BEDTIME
Status: DISCONTINUED | OUTPATIENT
Start: 2023-01-23 | End: 2023-01-24

## 2023-01-23 RX ORDER — HYDROMORPHONE HCL IN WATER/PF 6 MG/30 ML
0.2 PATIENT CONTROLLED ANALGESIA SYRINGE INTRAVENOUS
Status: DISCONTINUED | OUTPATIENT
Start: 2023-01-23 | End: 2023-01-29 | Stop reason: HOSPADM

## 2023-01-23 RX ADMIN — LIDOCAINE HYDROCHLORIDE 4 ML: 10 INJECTION, SOLUTION EPIDURAL; INFILTRATION; INTRACAUDAL; PERINEURAL at 15:17

## 2023-01-23 RX ADMIN — Medication 40 MG: at 07:59

## 2023-01-23 RX ADMIN — LIDOCAINE PATCH 4% 2 PATCH: 40 PATCH TOPICAL at 17:02

## 2023-01-23 RX ADMIN — FLUTICASONE PROPIONATE 2 SPRAY: 50 SPRAY, METERED NASAL at 16:16

## 2023-01-23 RX ADMIN — THIAMINE HCL TAB 100 MG 100 MG: 100 TAB at 16:12

## 2023-01-23 RX ADMIN — GABAPENTIN 300 MG: 300 CAPSULE ORAL at 07:59

## 2023-01-23 RX ADMIN — Medication 12.5 MG: at 07:59

## 2023-01-23 RX ADMIN — SODIUM CHLORIDE: 4.5 INJECTION, SOLUTION INTRAVENOUS at 22:42

## 2023-01-23 RX ADMIN — SACUBITRIL AND VALSARTAN 1 TABLET: 97; 103 TABLET, FILM COATED ORAL at 20:21

## 2023-01-23 RX ADMIN — FOLIC ACID 1 MG: 1 TABLET ORAL at 16:14

## 2023-01-23 RX ADMIN — PANTOPRAZOLE SODIUM 40 MG: 40 TABLET, DELAYED RELEASE ORAL at 20:21

## 2023-01-23 RX ADMIN — Medication 12.5 MG: at 18:26

## 2023-01-23 RX ADMIN — ACETAMINOPHEN 650 MG: 325 TABLET ORAL at 08:01

## 2023-01-23 RX ADMIN — SODIUM CHLORIDE, POTASSIUM CHLORIDE, SODIUM LACTATE AND CALCIUM CHLORIDE 500 ML: 600; 310; 30; 20 INJECTION, SOLUTION INTRAVENOUS at 17:46

## 2023-01-23 RX ADMIN — HUMAN ALBUMIN MICROSPHERES AND PERFLUTREN 6 ML: 10; .22 INJECTION, SOLUTION INTRAVENOUS at 09:10

## 2023-01-23 RX ADMIN — ATORVASTATIN CALCIUM 80 MG: 40 TABLET, FILM COATED ORAL at 21:47

## 2023-01-23 RX ADMIN — GABAPENTIN 300 MG: 300 CAPSULE ORAL at 20:20

## 2023-01-23 RX ADMIN — ACETAMINOPHEN 975 MG: 325 TABLET, FILM COATED ORAL at 16:18

## 2023-01-23 RX ADMIN — FAMOTIDINE 20 MG: 20 TABLET, FILM COATED ORAL at 21:47

## 2023-01-23 RX ADMIN — ACETAMINOPHEN 975 MG: 325 TABLET, FILM COATED ORAL at 20:19

## 2023-01-23 ASSESSMENT — ACTIVITIES OF DAILY LIVING (ADL)
ADLS_ACUITY_SCORE: 39
ADLS_ACUITY_SCORE: 39
ADLS_ACUITY_SCORE: 43
ADLS_ACUITY_SCORE: 31
ADLS_ACUITY_SCORE: 28
ADLS_ACUITY_SCORE: 39
ADLS_ACUITY_SCORE: 43

## 2023-01-23 NOTE — PROGRESS NOTES
Verbal report given to Minda at [facility] unit 7A. Patient to be transported going to room 7211.

## 2023-01-23 NOTE — DISCHARGE SUMMARY
[facility]  Hospitalist Discharge Summary      Date of Admission:  1/16/2023  Date of Discharge:  01/22/2023  Discharging Provider: ANA LILIA Valenzuela CNP  Discharge Service: Hospitalist Service    Discharge Diagnoses   Delirium  Encephalopathy, suspect toxic, metabolic  Acute Hypercapnic Respiratory Failure  Acute Kidney Injury  Intractable Left Hip Pain  History Falls  JUDE (H)  ISHAN  CAD  CABG  HTN  CHF  AVR Mechanical Heart Valve  Atrial Fibrillation  Long term anticoagulation   Urinary Retention  BPH  Morbid Obesity          Follow-ups Needed After Discharge       Unresulted Labs Ordered in the Past 30 Days of this Admission     No orders found from 12/17/2022 to 1/17/2023.          Discharge Disposition   Discharged to home  Condition at discharge: Stable      Hospital Course   82 year old mal, former [employer] with history of atrial fibrillation, AVR anticoagulated on Apixaban, CAD, CABG, CHF, ISHAN, BPH,  HTN, JUDE in 1999 admitted on 1/16/2023 for intractable left hip pain with suspected hardware failure s/p possible traumatic left hip injury s/p possible falls prior to admission now with worsening altered mental status/delirium, acute kidney injury, intractable pain and found to have subdural fluid collection on CT done for altered mental status on 1/22.  No in-hospital falls but altered mental status continues to worsen.  Patient requires evaluation by medicine and possibly neurosurgery but also ortho to determine plan for hip although hardware failure seems subacute/chronic albeit worsening.    Patient accepted by Dr. Freedom Graham.  Multiple voice messages left for emergency contact ally Thomson [phone].  Tried calling second emergency contact listed Trina [phone], person who answered stated wrong number.     Please see inpatient hospital course for further details.        Consultations This Hospital Stay   PHYSICAL THERAPY ADULT IP CONSULT  ORTHOPEDIC SURGERY IP  CONSULT  CARE MANAGEMENT / SOCIAL WORK IP CONSULT  ETHICS IP CONSULT  ETHICS IP CONSULT    Code Status   Full Code    Time Spent on this Encounter   I, ANA LILIA Valenzuela CNP, personally saw the patient today and spent less than or equal to 30 minutes discharging this patient.        ANA LILIA Valenzuela CNP  [address] MEDICAL SURGICAL  [address] [facility]   [place] [address]  Phone: [phone]  ______________________________________________________________________    Physical Exam   Vital Signs: Temp: 98.9  F (37.2  C) Temp src: Axillary BP: 116/49 Pulse: 77   Resp: 14 SpO2: 94 % O2 Device: Nasal cannula Oxygen Delivery: 1.5 LPM  Weight: 253 lbs 0 oz  General appearance: Drowsy, wakes to voice and falls back to sleep  Lungs: diminished lung sounds bilaterally  Heart: regular, irregular S1, S2 normal  Abdomen: soft, obese, non-tender  Extremities: Left hip pain with sudden movements  Neurologic: upper extremity tremor, moving all 4 extremities spontaneously, no focal weakness.         Primary Care Physician   Physician No Ref-Primary    Discharge Orders   No discharge procedures on file.    Significant Results and Procedures   Most Recent 3 CBC's:Recent Labs   Lab Test 01/22/23  0554 01/20/23  0559 01/18/23  0609   WBC 9.1 8.0 7.2   HGB 12.5* 12.8* 13.2*   MCV 95 93 94   * 143* 142*     Most Recent 3 BMP's:Recent Labs   Lab Test 01/22/23  1415 01/22/23  1021 01/22/23  0554   * 135* 136   POTASSIUM 5.2 5.3 5.1   CHLORIDE 99 99 99   CO2 22 23 24   BUN 59.2* 57.4* 53.5*   CR 1.62* 1.65* 1.41*   ANIONGAP 12 13 13   ANDREW 8.7* 8.7* 9.1   * 176* 153*     Most Recent 2 LFT's:Recent Labs   Lab Test 01/22/23  0554 01/20/23  0559   AST 22 18   ALT 16 15   ALKPHOS 81 84   BILITOTAL 1.7* 2.2*     Most Recent 3 INR's:Recent Labs   Lab Test 01/18/23  0609 04/19/16  1410   INR 1.39* 1.10     Most Recent 3 BNP's:Recent Labs   Lab Test 01/22/23  1021   NTBNPI 361     Most Recent  Hemoglobin A1c:No lab results found.  Most Recent Urinalysis:Recent Labs   Lab Test 01/22/23  0701   COLOR Yellow   APPEARANCE Clear   URINEGLC Negative   URINEBILI Negative   URINEKETONE Negative   SG 1.025   UBLD Negative   URINEPH 5.0   PROTEIN Negative   NITRITE Negative   LEUKEST Negative   RBCU 0   WBCU 0     Most Recent CPK:Recent Labs   Lab Test 01/19/23  1239   CKT 27*   ,   Results for orders placed or performed during the hospital encounter of 01/16/23   XR Hip Left 2-3 Views    Narrative    XR HIP LEFT 2-3 VIEWS 1/17/2023 10:02 AM     HISTORY: s/p JUDE in 1999, acute left hip pain, [facility]  showed loosened femoral component, ?interim radiographic change    COMPARISON: None.         Impression    IMPRESSION: Postoperative changes left total hip arthroplasty.  Components appear well seated. There is an area of endosteal thinning  within the left femur which may represent a focal area of osteolysis.  Right hip negative for fracture. Diffuse demineralization. Pelvis  negative for fracture.    WYATT MCKEON MD         SYSTEM ID:  [id number]   CT Pelvis Bone wo Contrast    Narrative    CT PELVIS BONE WO CONTRAST 1/17/2023 4:24 PM    INDICATION: 82-year-old patient with left-sided hip pain. Previous  history of left total hip arthroplasty.  COMPARISON: 1/17/2023 radiographs.    TECHNIQUE: Noncontrast. Axial, sagittal and coronal thin-section  reconstruction. Dose reduction techniques were used.   CONTRAST: None.    FINDINGS:     BONES AND JOINTS:  -Left total hip arthroplasty. The acetabular component is intact. The  femoral component is symmetrically positioned within the acetabular  cup, without evidence of polyethylene wear. There is a circumferential  bone resorption at the interface of the femoral stem and bone-cement.  Additionally there is endosteal scalloping at the medial margin of the  femur proximal metadiaphysis cervical and this measures 2.4 cm in  length and involves roughly 50% of the  cortical width. No evidence of  periprosthetic fracture component subsidence. Hypertrophic changes at  the left acetabular rim. Moderate-sized left hip joint effusion.  -Postoperative changes at the L4-5 interspace with interbody device.  Moderate-advanced lower lumbar facet arthrosis.  -Mild bilateral sacroiliac degenerative arthrosis.  -Mild right hip degenerative arthrosis.    MUSCULATURE AND SOFT TISSUES:  -Mild fatty atrophy of the left iliopsoas muscle. Advanced fatty  atrophy of the left gluteus minimus muscle. Mild fatty atrophy of the  left gluteus medius muscle.  -Implantable stimulator device in the subcutaneous fat of the right  buttock, with lead extending cephalad.  -No free fluid in the pelvis.  -Colonic diverticulosis without evidence of diverticulitis.  -Small bilateral fat-containing inguinal hernias.  -Atherosclerotic calcification.      Impression    IMPRESSION:  1.  Left total hip arthroplasty. Femoral component loosening with  periprosthetic bone resorption and endosteal scalloping.  2.  Moderate-sized left hip joint effusion.  3.  No fracture or joint malalignment.  4.  Postoperative and degenerative changes in the lower lumbar spine.  5.  Mild bilateral sacroiliac and right hip degenerative arthrosis.      GUY SEPULVEDA MD         SYSTEM ID:  [id number]   XR Chest Port 1 View    Narrative    CHEST ONE VIEW PORTABLE  1/18/2023 11:22 AM     HISTORY: Cough.    COMPARISON: None.    FINDINGS:  Postop changes of the chest indicate probable prior CABG.  There are epidural stimulation leads projected over the lower thoracic  region. Anterior inferior cervical spine fusion hardware is partially  imaged. Cardiac silhouette appears prominent which may be partially  due to the AP technique. Lungs are grossly clear. No pneumothorax or  significant pleural fluid collection is identified. Right  posterolateral sixth and seventh rib fractures are of indeterminate  age but are likely chronic.        Impression    IMPRESSION: No definite etiology for patient's cough is identified.  Multiple probably chronic findings as described above.    PATY DE LEÓN MD         SYSTEM ID:  [id number]   CT Head w/o contrast*    Narrative    EXAM: CT HEAD W/O CONTRAST  LOCATION: [facility]  DATE/TIME: 1/22/2023 2:11 PM    INDICATION: Altered mental status.  COMPARISON: CT/CTA dated 05/18/2020.  TECHNIQUE: Routine CT Head without IV contrast. Multiplanar reformats. Dose reduction techniques were used.    FINDINGS:  INTRACRANIAL CONTENTS: A predominantly hypodense left frontoparietal subdural fluid collection measuring up to 4 mm (series 4 image 22). No significant mass effect, midline shift, or herniation. A questionable 1.0 cm left thalamocapsular hypodensity   (series 2 image 15). No evidence of an acute transcortical confluent infarct. A chronic right cerebellar hemisphere lacunar infarct. Mild presumed chronic small vessel ischemic changes. Mild generalized volume loss. No interval ventriculomegaly.     VISUALIZED ORBITS/SINUSES/MASTOIDS: No acute intraorbital abnormality. Suggestion of right staphyloma. Status post functional endoscopic sinus surgery. Mildly increased moderate mucosal thickening/secretions scattered about the paranasal sinuses. A   similar small right mastoid effusion.    BONES/SOFT TISSUES: No acute calvarial injury. Questionable mild left frontal scalp swelling.      Impression    IMPRESSION:  1.  A hypodense left frontoparietal subdural fluid collection without significant mass effect or midline shift.  2.  Suggestion of mild left frontal scalp swelling. Correlate with history of recent trauma. No acute calvarial injury.  3.  A questionable left thalamocapsular hypodensity, favored to represent volume averaging artifact. If there is concern for an acute infarct, further assessment with brain MRI should be considered.  4.  Mildly increased paranasal sinus disease with layering  secretions. Correlate with acute sinusitis..   XR Chest Port 1 View    Narrative    EXAM: XR CHEST PORT 1 VIEW  LOCATION: [facility]  DATE/TIME: 1/22/2023 3:09 PM    INDICATION: hypoxia  COMPARISON: 1/18/2023      Impression    IMPRESSION: No focal consolidation or pleural effusion. Stable enlarged cardiac silhouette. Median sternotomy. Mild pulmonary vascular congestion. Stable posterior right 6-7 rib fractures. Cervical spinal hardware. Metallic leads project over the lower   thoracic/upper lumbar spine.       Discharge Medications   Current Discharge Medication List      CONTINUE these medications which have NOT CHANGED    Details   aspirin (ASA) 81 MG EC tablet Take 81 mg by mouth At Bedtime      atorvastatin (LIPITOR) 80 MG tablet Take 80 mg by mouth At Bedtime      ELIQUIS ANTICOAGULANT 5 MG tablet Take 5 mg by mouth every 12 hours Am and bedtime      ENTRESTO  MG per tablet Take 1 tablet by mouth 2 times daily Morning and bedtime      famotidine (PEPCID) 20 MG tablet Take 20 mg by mouth At Bedtime      fluticasone (FLONASE) 50 MCG/ACT nasal spray Spray 2 sprays into both nostrils daily      folic acid (FOLVITE) 1 MG tablet Take 1 mg by mouth daily      gabapentin (NEURONTIN) 300 MG capsule Take 300 mg by mouth At Bedtime      hydrOXYzine (VISTARIL) 50 MG capsule Take 1 capsule (50 mg) by mouth 4 times daily as needed (spasm or pain or itching)  Qty: 120 capsule, Refills: 0    Associated Diagnoses: Surgery, elective      metoprolol succinate ER (TOPROL XL) 25 MG 24 hr tablet Take 12.5 mg by mouth daily      omeprazole (PRILOSEC) 20 MG DR capsule Take 20 mg by mouth 2 times daily Am and HS      vitamin B1 (THIAMINE) 100 MG tablet Take 100 mg by mouth daily           Allergies   Allergies   Allergen Reactions     Benzalkonium      Lisinopril      Piroxicam      Polysorbate  [Bay Oil]      Prazosin      Other reaction(s): Nightmares     Urea      Other reaction(s): Eruption  of skin, Eruption of skin

## 2023-01-23 NOTE — H&P
[facility]    History and Physical - Medicine Service, MAROON TEAM        Date of Admission:  1/23/2023    Assessment & Plan      David Thomson is a 82 year old male admitted on 1/23/2023. He has a history of atrial fibrillation on Elliquis, TAVR, JUDE in 1999 who initially presented to OSH for worsening left hip pain found to have loosening of his prior femoral stem placement. He was transferred to [facility] for evaluation and possible intervention by orthopedics. Also with new onset worsening altered mental status/delirium found to have stable subdural hematoma on CT x2 completed 1/22/2023.    Left hip pain  Left Hip Femoral Step Prosthesis Loosening  Hx left hip replacement (1999)  [facility] for 6 weeks and difficulty bearing weight despite optimized pain control regimen. XR showed loosening femoral stem with bone response distally to the stem. Pelvic CT confirmed loosening of the femoral stem with periprosthetic bone reabsorption and endosteal scalloping, but no fracture or joint malalignment. Also with moderate size left hip joint effusion. Patient transferred for assessment by orthopedics for possible surgical intervention.  - Orthopedics consult, appreciate recs  - PTA gabapentin 600mg TID (renal reduced dose)  - PTA oxycodone 7.5mg q4hr  - PTA IV dilaudid 0.2mg for breakthrough pain    Encephalopathy   Subdural Hematoma, stable  Hx of Dorsal Column Spinal Stimulator Insertion, 2016  Per chart review, patient was noted to have worsening encephalopathy over the 2-4 days leading up to transfer. Nursing at OSH reported patient was having hallucinating and noted irregularity of his left pupil. Stroke code was called 1/22.  No focal neurological deficits, facial asymmetry, or weakness of the extremities. Right pupil is small and regular, left pupil with teardrop irregularity.  Patient with history of lens  replacement in L eye. CT Head with left frontoparietal subdural fluid. No falls during admission at OSH. Case reviewed by OSH stroke neurologist who did not recommend any further treatment prior transfer. He is otherwise vitally stable, A&Ox4 on arrival. No leukocytosis and Lactic acid 1.0. No significant electrolyte abnormalities on BMP. VBG at OSH with pH 7.35 and pCO2 49.  UA noninfectious. Delirium possibly related to polypharmacy with IV pain meds.   - [facility] imaging and follow up on spinal stimulator as he will likely undergo hip surgery with ortho soon  - Q4hr neuro checks    BRITTNEY  On 1/22 patient noted to have Cr 1.57 (baseline 0.8-0.9). FeNa 0.5, likely pre-renal. UA non-infectious. Etiologies considered include dehydration, gabapentin (previously on 900mg TID until 1/22).   - Hold Entresto 97-103mg in setting of BRITTNEY    CAD  Hx of CABG  Essential HTN  TAVR  HLD  Denies any chest pain, palpitations this admission.    - Hold aspirin   - Hold Atorvastatin 80mg QHS   - Hold Entresto 97-103mg in setting of BRITTNEY   - continue Metoprolol 12.5mg   - Hold Eliquis in setting of subdural hematoma    GERD  - PTA omeprazole     Diet:  Regular  DVT Prophylaxis: Pneumatic Compression Devices  Rice Catheter: Not present  Fluids: none  Lines: None     Cardiac Monitoring: None  Code Status:  Full    Clinically Significant Risk Factors Present on Admission               # Drug Induced Coagulation Defect: home medication list includes an anticoagulant medication      # Acute Respiratory Failure: Documented O2 saturation < 91%.  Continue supplemental oxygen as needed             Disposition Plan         The patient's care and treatment plan will be formally discussed in the morning      Emilie Martines MD  Medicine Service, [facility]  Securely message with Zang (more info)  Text page via [facility] Paging/Directory   See signed in  "provider for up to date coverage information    Post-rounds addendum  Subjective: Pain controlled, denies recent orthopnea or PND.   Objective: Reclined in bed, no acute distress, heart RRR, abdomen soft/NT/ND, lungs with end expiratory wheezes, left groin/hip pain with movement of LLE, visible skin intact.   Assessment: 81 yo M with h/o CABGx3, TAVR, Afib on eliquis, and L hip replacement in 1999 transferred to [facility] for ortho evaluation, found to have stable SDH at OSH.  Plan:   - Ortho consult, recommendations pending  - Neurosurgery consult, SBP goal < 140 and hold anticoagulation  - TTE with normal EF  - [facility] with pre-operative risk assessment  - Pain control with Tylenol, oxycodone po prn, and dilaudid IV prn    Vargas Uriarte MD  Internal Medicine and Pediatrics, PGY2  ______________________________________________________________________    Chief Complaint   \"Transfer for hip surgery\"    History is obtained from the patient    History of Present Illness   David Thomson is a 82 year old male admitted on 1/23/2023. He has a history of atrial fibrillation on Elliquis, TAVR, JUDE in 1999 who initially presented to OSH for worsening left hip pain found to have loosening of his prior femoral stem placement. He was transferred to [facility] for evaluation and possible intervention by orthopedics. Also with new onset worsening altered mental status/delirium found to have stable subdural hematoma on CT x2 completed 1/22/2023.    Patient states that he noticed increasing left hip pain for 6 weeks and difficulty bearing weight despite trying to treat with oral medications recommended by his family medicine physician. He went to the [facility] where he was found to have a \"loose screw\" and it was recommended he be transferred to the  where he originally had his arthroplasty done in 1999. He states that his pain level for his left hip is currently at a 4 and controlled " on the regimen from the OSH. He denies any other concerns, including shortness of breath, chest pain, abdominal pain, diarrhea or constipation.       Past Medical History    Past Medical History:   Diagnosis Date     Hyperlipidaemia      Hypertension      Pre-diabetes        Past Surgical History   Past Surgical History:   Procedure Laterality Date     BACK SURGERY       GALLBLADDER SURGERY       HIP SURGERY       INSERT STIMULATOR DORSAL COLUMN Right 4/19/2016    Procedure: INSERT STIMULATOR DORSAL COLUMN;  Surgeon: Zoë Clemons DO;  Location: [facility] OR     NECK SURGERY       WRIST SURGERY         Prior to Admission Medications   Prior to Admission Medications   Prescriptions Last Dose Informant Patient Reported? Taking?   ELIQUIS ANTICOAGULANT 5 MG tablet  Care Giver Yes No   Sig: Take 5 mg by mouth every 12 hours Am and bedtime   ENTRESTO  MG per tablet  Care Giver Yes No   Sig: Take 1 tablet by mouth 2 times daily Morning and bedtime   aspirin (ASA) 81 MG EC tablet  Care Giver Yes No   Sig: Take 81 mg by mouth At Bedtime   atorvastatin (LIPITOR) 80 MG tablet  Care Giver Yes No   Sig: Take 80 mg by mouth At Bedtime   famotidine (PEPCID) 20 MG tablet  Care Giver Yes No   Sig: Take 20 mg by mouth At Bedtime   fluticasone (FLONASE) 50 MCG/ACT nasal spray  Care Giver Yes No   Sig: Spray 2 sprays into both nostrils daily   folic acid (FOLVITE) 1 MG tablet  Care Giver Yes No   Sig: Take 1 mg by mouth daily   gabapentin (NEURONTIN) 300 MG capsule  Care Giver Yes No   Sig: Take 300 mg by mouth At Bedtime   hydrOXYzine (VISTARIL) 50 MG capsule  Care Giver No No   Sig: Take 1 capsule (50 mg) by mouth 4 times daily as needed (spasm or pain or itching)   metoprolol succinate ER (TOPROL XL) 25 MG 24 hr tablet  Care Giver Yes No   Sig: Take 12.5 mg by mouth daily   omeprazole (PRILOSEC) 20 MG DR capsule  Care Giver Yes No   Sig: Take 20 mg by mouth 2 times daily Am and HS   vitamin B1 (THIAMINE) 100 MG tablet  Care  Giver Yes No   Sig: Take 100 mg by mouth daily      Facility-Administered Medications: None        Review of Systems    The 8 point Review of Systems is negative other than noted in the HPI or here.     Social History   I have reviewed this patient's social history and updated it with pertinent information if needed.  Social History     Tobacco Use     Smoking status: Never     Smokeless tobacco: Never       Family History   I have reviewed this patient's family history and updated it with pertinent information if needed.  Family History   Problem Relation Age of Onset     Heart Disease Mother      Heart Disease Father        Allergies   Allergies   Allergen Reactions     Benzalkonium      Lisinopril      Piroxicam      Polysorbate  [Bay Oil]      Prazosin      Other reaction(s): Nightmares     Urea      Other reaction(s): Eruption of skin, Eruption of skin        Physical Exam   Vital Signs: Temp: 98.9  F (37.2  C) Temp src: Oral BP: 108/55 Pulse: 61   Resp: 16 SpO2: 93 % O2 Device: Nasal cannula Oxygen Delivery: 1.5 LPM  Weight: 0 lbs 0 oz    Constitutional: awake, alert, cooperative, no apparent distress, and appears stated age  ENT: Normocephalic, atraumatic, Left pupil does reveal significant teardrop irregularity. Right pupil is small.   Respiratory: No increased work of breathing, good air exchange, clear to auscultation bilaterally, no crackles or wheezing  Cardiovascular: regular rate and rhythm, normal S1 and S2, and mechanical click appreciated  GI:  soft, non-distended, non-tender  Musculoskeletal: There is no redness, warmth, or swelling of the lower extremities  Neurologic: Awake, alert, oriented to name, place and time. Normal strength of the bilateral lower extremities, although decreased ROM of the L hip due to pain.    Data   CT Head w/o contrast*     Narrative     EXAM: CT HEAD W/O CONTRAST  LOCATION: [facility]  DATE/TIME: 1/22/2023 2:11 PM     INDICATION: Altered  mental status.  COMPARISON: CT/CTA dated 05/18/2020.  TECHNIQUE: Routine CT Head without IV contrast. Multiplanar reformats. Dose reduction techniques were used.     FINDINGS:  INTRACRANIAL CONTENTS: A predominantly hypodense left frontoparietal subdural fluid collection measuring up to 4 mm (series 4 image 22). No significant mass effect, midline shift, or herniation. A questionable 1.0 cm left thalamocapsular hypodensity   (series 2 image 15). No evidence of an acute transcortical confluent infarct. A chronic right cerebellar hemisphere lacunar infarct. Mild presumed chronic small vessel ischemic changes. Mild generalized volume loss. No interval ventriculomegaly.      VISUALIZED ORBITS/SINUSES/MASTOIDS: No acute intraorbital abnormality. Suggestion of right staphyloma. Status post functional endoscopic sinus surgery. Mildly increased moderate mucosal thickening/secretions scattered about the paranasal sinuses. A   similar small right mastoid effusion.     BONES/SOFT TISSUES: No acute calvarial injury. Questionable mild left frontal scalp swelling.        Impression     IMPRESSION:  1.  A hypodense left frontoparietal subdural fluid collection without significant mass effect or midline shift.  2.  Suggestion of mild left frontal scalp swelling. Correlate with history of recent trauma. No acute calvarial injury.  3.  A questionable left thalamocapsular hypodensity, favored to represent volume averaging artifact. If there is concern for an acute infarct, further assessment with brain MRI should be considered.  4.  Mildly increased paranasal sinus disease with layering secretions. Correlate with acute sinusitis..

## 2023-01-23 NOTE — PROGRESS NOTES
I saw the patient at bedside as a code stroke was called.  Nurse reports he has been hallucinating and noticed his left pupil was irregular.  No focal neurological deficits or weakness specifically.  Patient does have very small intracranial hematoma known with scheduled transfer out [facility].  Last CT approximately 2 hours ago.  Patient has no facial asymmetry.  Full strength in upper extremities.  Right pupil is small and regular.  Left pupil does reveal significant teardrop irregularity.  Patient does report to me he has had lens replacement in this eye.  It appears likely the pupillary changes may be related to this previous eye surgery.  No indication for further treatment at this time other than transfer as planned.  Stroke neurologist of note did sign an while I was in the room and in agreement with this.

## 2023-01-23 NOTE — PROCEDURES
[facility]    Procedure: IR Procedure Note    Date/Time: 1/23/2023 3:14 PM  Performed by: Irwin Ashton MD  Authorized by: Ar Friedman MD       UNIVERSAL PROTOCOL   Site Marked: NA  Prior Images Obtained and Reviewed:  Yes  Required items: Required blood products, implants, devices and special equipment available    Patient identity confirmed:  Verbally with patient, arm band, provided demographic data and hospital-assigned identification number  Patient was reevaluated immediately before administering moderate or deep sedation or anesthesia  Confirmation Checklist:  Patient's identity using two indicators, relevant allergies, procedure was appropriate and matched the consent or emergent situation and correct equipment/implants were available  Time out: Immediately prior to the procedure a time out was called    Universal Protocol: the Joint Commission Universal Protocol was followed    Preparation: Patient was prepped and draped in usual sterile fashion       ANESTHESIA    Anesthesia: Local infiltration  Local Anesthetic:  Lidocaine 1% without epinephrine      SEDATION    Patient Sedated: No    See dictated procedure note for full details.  Findings: L Hip aspiration, no fluid was able to be aspirated. Approximately 2 ml saline injected and aspirated successfully, sent for labs.    Specimens: none    Complications: None    Condition: Stable    Plan: L Hip aspiration, no fluid was able to be aspirated. Approximately 2 ml saline injected and aspirated successfully, sent for labs.      PROCEDURE  Describe Procedure: L Hip aspiration, no fluid was able to be aspirated. Approximately 2 ml saline injected and aspirated successfully, sent for labs.  Patient Tolerance:  Patient tolerated the procedure well with no immediate complications  Length of time physician/provider present for 1:1 monitoring during sedation: 20

## 2023-01-23 NOTE — PLAN OF CARE
/55 (BP Location: Left arm)   Pulse 61   Temp 98.9  F (37.2  C) (Oral)   Resp 16   SpO2 96%     [id number]  Patient was admitted from [facility] via EMS. He arrived w/ 3 totes. States he has clothes, shoes, and cellphone. He is VSS on 2L of O2 via oxy mask d/t desatting during sleep. He answered all orientation questions correctly although intermittent confusion at times. He is also lethargic and tele. Regular diet. R and L PIV SL. Denies nausea. Endorses 4/10 pain on LLE. Rice in place for retention, adequate output. No bm this shift. LLE . Scattered bruising. Assist x2, walker. Continue plan of care.

## 2023-01-23 NOTE — CONSULTS
Impression: left hip pain, left hip femoral step prosthesis loosening, left hip replacement 1999  -increasing left hip pain for 6 weeks  -elevated CRP    Plan: Fluoroscopy guided left hip aspiration  -send for cell count, gram stain and cultures    [facility] [id number]

## 2023-01-23 NOTE — PLAN OF CARE
Admitted/transferred from: [facility]  2 RN full   skin assessment completed by Minda Hamilton RN and Regla ECHOLS RN.  Skin assessment finding: Scattered bruising. Healed scars on his chest, back, LLE.   Interventions/actions: No adjustments needed at this time.      Will continue to monitor.                           no

## 2023-01-23 NOTE — CONSULTS
[facility] Orthopaedic Surgery Consultation    David Thomson MRN# [id number]   Age: 82 year old YOB: 1940   Date of Admission: 1/23/2023    Reason for consult: 81 yo M with h/o JUDE 1999, now 6 weeks L hip pain and loosening of fem stem placement.    Requesting physician: Freedom Graham*   Level of consult: Consult, follow and place orders            Impression and Recommendation (Resident / Clinician):   Impression:  David Thomson is a 82 year old male with a significant PMH of JUDE in 1999 and afib on Eliquis  who presents with left hip pain and has not been weightbearing for about 2 weeks.     Recommendations:  - operative plan pending hip aspiration results  - [facility] for [facility] intervention pending medical clearance    Activity: NWB LLE  Wound Cares/Dressing/Splint: none from an orthopedic standpoint  DVT PPx: DVT ppx per primary team  Antibiotic: Antibiotic selection per primary team  Cultures: Will follow cultures.   Labs: No additional labs at this time  Imaging: No further imaging needed at this time  PT/OT: Work on ROM, strengthening, gait training, mobility, and ADLs  Dispo: Per Primary team    Operative Plan: TBD      Thank you for allowing me to participate in this patient's care. Please do not hesitate to contact me with any questions or concerns.    Seb Logan DO  PGY-1  Orthopaedic Surgery  Pager: [phone]     Please page me directly with any questions/concerns during regular weekday hours before 5 pm. If there is no response, if it is a weekend, or if it is during evening hours then please page the orthopedic surgery resident on call.           Chief Complaint:   Left hip pain          History of Present Illness (Resident / Clinician):   David Thomson is a 82 year old male with PMH of JUDE in 1999 and afib on Eliquis with severe left hip pain and concerns for loosening of the components of his previous arthroplasty. Over the last few weeks he has  had increasing left hip pain.      [facility] showing a loose screw or hardware and would probably require surgery unfortunately they are unsure when he gets surgery due to staffing issues.  [facility] due to pain issues that he has not been able to tolerate the pain at home.  He has previous back issues and does have a spine stimulator.  [facility] and added prednisone for his ongoing hip issues.  The discharge directions do not appear that he was placed on any pain meds.  He does have a topical capsain he  used without significant relief.  He was brought in by ambulance.  Paramedics apparently found the patient on his floor naked as he was unable to ambulate or get himself off the floor.    This most recent visit in the ED at [facility], the patient's pain was not able to be controlled despite iv dilaudid and benzo's. When seen by the medicine service in the ED at [facility], the patient reported that he was feeling ok as long as he was in bed. He reported being unable to bear weight due to pain though. He also had some difficulties with urinary hesitancy vs retention.    Accepted to transfer to [facility] by Dr. Fallon. Patient arrived at [facility] 1/23/23 1AM for evaluation and possible intervention by orthopedics. Also with new onset worsening altered mental status/delirium found to have stable subdural hematoma on CT x2 completed 1/22/2023.    Today, patient endorses left hip pain and states that he is unable to bear weight on his left leg for about 2 weeks.  He has radiating pain from the left lateral going over the anterior thigh and down the leg medially and laterally to the foot.    History obtained from patient interview and chart review. All pertinent ROS information is included above.           Past Medical History:     Past Medical History:   Diagnosis Date     Hyperlipidaemia      Hypertension      Pre-diabetes      Patient  denies any personal history of bleeding disorders, clotting disorders, or adverse reactions to anesthesia.         Past Surgical History:     Past Surgical History:   Procedure Laterality Date     BACK SURGERY       GALLBLADDER SURGERY       HIP SURGERY       INSERT STIMULATOR DORSAL COLUMN Right 4/19/2016    Procedure: INSERT STIMULATOR DORSAL COLUMN;  Surgeon: Zoë Clemons DO;  Location: [facility] OR     NECK SURGERY       WRIST SURGERY              Social History:     Social History     Socioeconomic History     Marital status: Single     Spouse name: Not on file     Number of children: Not on file     Years of education: Not on file     Highest education level: Not on file   Occupational History     Not on file   Tobacco Use     Smoking status: Never     Smokeless tobacco: Never   Substance and Sexual Activity     Alcohol use: Not on file     Drug use: Not on file     Sexual activity: Not on file   Other Topics Concern     Parent/sibling w/ CABG, MI or angioplasty before 65F 55M? Not Asked   Social History Narrative     Not on file     Social Determinants of Health     Financial Resource Strain: Not on file   Food Insecurity: Not on file   Transportation Needs: Not on file   Physical Activity: Not on file   Stress: Not on file   Social Connections: Not on file   Intimate Partner Violence: Not on file   Housing Stability: Not on file             Family History:     Family History   Problem Relation Age of Onset     Heart Disease Mother      Heart Disease Father        Patient denies known family history of bleeding, clotting, or anesthesia related complications.            Allergies:     Allergies   Allergen Reactions     Benzalkonium      Lisinopril      Piroxicam      Polysorbate  [Bay Oil]      Prazosin      Other reaction(s): Nightmares     Urea      Other reaction(s): Eruption of skin, Eruption of skin             Medications:     Prior to Admission medications    Medication Sig Last Dose Taking? Auth  Provider Long Term End Date   aspirin (ASA) 81 MG EC tablet Take 81 mg by mouth At Bedtime   Reported, Patient No    atorvastatin (LIPITOR) 80 MG tablet Take 80 mg by mouth At Bedtime   Reported, Patient Yes    ELIQUIS ANTICOAGULANT 5 MG tablet Take 5 mg by mouth every 12 hours Am and bedtime   Reported, Patient     ENTRESTO  MG per tablet Take 1 tablet by mouth 2 times daily Morning and bedtime   Reported, Patient     famotidine (PEPCID) 20 MG tablet Take 20 mg by mouth At Bedtime   Reported, Patient     fluticasone (FLONASE) 50 MCG/ACT nasal spray Spray 2 sprays into both nostrils daily   Reported, Patient     folic acid (FOLVITE) 1 MG tablet Take 1 mg by mouth daily   Reported, Patient     gabapentin (NEURONTIN) 300 MG capsule Take 300 mg by mouth At Bedtime   Reported, Patient Yes    hydrOXYzine (VISTARIL) 50 MG capsule Take 1 capsule (50 mg) by mouth 4 times daily as needed (spasm or pain or itching)   Ham Gutierrez PA-C     metoprolol succinate ER (TOPROL XL) 25 MG 24 hr tablet Take 12.5 mg by mouth daily   Reported, Patient Yes    omeprazole (PRILOSEC) 20 MG DR capsule Take 20 mg by mouth 2 times daily Am and HS   Reported, Patient     vitamin B1 (THIAMINE) 100 MG tablet Take 100 mg by mouth daily   Reported, Patient       Medication reviewed with patient and in chart.           Physical Exam:     Vitals:    01/23/23 0146 01/23/23 0327 01/23/23 0620   BP: 108/55  97/54   BP Location: Left arm  Left arm   Pulse: 61  71   Resp: 16  16   Temp: 98.9  F (37.2  C)  98.4  F (36.9  C)   TempSrc: Oral  Oral   SpO2: 93% 96% 95%     General: Patient is awake, alert, and appropriate; following commands and is in NAD. Appears lethargic. Falling asleep during conversation, but easily woken and redirected.  Lungs: Breaths nonlabored, without wheezes or stridor  Heart/Cardiovascular: Regular pulse, no peripheral cyanosis  Right lower Extremity: No deformity, skin intact. No significant tenderness to  palpation over thigh, knee, leg, ankle/foot. No pain with ROM hip/knee/ankle. Motor intact distally TA/GSC/EHL/FHL. SILT sp/dp/tibial/saph/sural nerves. DP/PT pulses palpable, 2+, toes warm and well perfused   Left lower Extremity: Dressings from IR aspiration c/d/i over anterior hip. No deformity, skin intact. TTP over lateral thigh. No significant tenderness to palpation over knee, leg, ankle/foot. ROM exam of hip and knee limited by pain. No pain with ROM ankle. Motor intact distally TA/GSC/EHL/FHL. SILT sp/dp/tibial/saph/sural nerves. DP/PT pulses palpable, 2+, toes warm and well perfused           Imaging:   Review of imaging below demonstrates femoral stem loosening.     XR Chest Port 1 View    Result Date: 1/22/2023  EXAM: XR CHEST PORT 1 VIEW LOCATION: [facility] DATE/TIME: 1/22/2023 3:09 PM INDICATION: hypoxia COMPARISON: 1/18/2023     IMPRESSION: No focal consolidation or pleural effusion. Stable enlarged cardiac silhouette. Median sternotomy. Mild pulmonary vascular congestion. Stable posterior right 6-7 rib fractures. Cervical spinal hardware. Metallic leads project over the lower thoracic/upper lumbar spine.    XR Chest Port 1 View    Result Date: 1/20/2023  CHEST ONE VIEW PORTABLE  1/18/2023 11:22 AM HISTORY: Cough. COMPARISON: None. FINDINGS:  Postop changes of the chest indicate probable prior CABG. There are epidural stimulation leads projected over the lower thoracic region. Anterior inferior cervical spine fusion hardware is partially imaged. Cardiac silhouette appears prominent which may be partially due to the AP technique. Lungs are grossly clear. No pneumothorax or significant pleural fluid collection is identified. Right posterolateral sixth and seventh rib fractures are of indeterminate age but are likely chronic.     IMPRESSION: No definite etiology for patient's cough is identified. Multiple probably chronic findings as described above. PATY DE LEÓN MD    SYSTEM ID:  [id number]    XR Hip Left 2-3 Views    Result Date: 1/17/2023  XR HIP LEFT 2-3 VIEWS 1/17/2023 10:02 AM HISTORY: s/p JUDE in 1999, acute left hip pain, [facility] component, ?interim radiographic change COMPARISON: None.     IMPRESSION: Postoperative changes left total hip arthroplasty. Components appear well seated. There is an area of endosteal thinning within the left femur which may represent a focal area of osteolysis. Right hip negative for fracture. Diffuse demineralization. Pelvis negative for fracture. WYATT MCKEON MD   SYSTEM ID:  [id number]    CT Head w/o contrast*    Result Date: 1/22/2023  EXAM: CT HEAD W/O CONTRAST LOCATION: [facility] DATE/TIME: 1/22/2023 8:24 PM INDICATION: Confusion, anticoagulated on eliquis. compare with CT earlier todat at 14:11 COMPARISON: 11/22/2023 performed at 1405 TECHNIQUE: Routine CT Head without IV contrast. Multiplanar reformats. Dose reduction techniques were used. FINDINGS: INTRACRANIAL CONTENTS: Tiny subdural hematoma noted on prior exam overlying left frontoparietal region is less conspicuous on the current exam. No new hemorrhage. Mild presumed chronic small vessel ischemic changes. Mild generalized volume loss. No hydrocephalus. VISUALIZED ORBITS/SINUSES/MASTOIDS: No intraorbital abnormality. Mild mucosal thickening paranasal sinuses with small amount of scattered fluid. No middle ear or mastoid effusion. BONES/SOFT TISSUES: No acute abnormality.     IMPRESSION: 1.  Tiny subdural hematoma noted on prior exam overlying left frontoparietal region is less conspicuous on the current exam. 2.  No other significant change. 3.  No new hemorrhage.    CT Head w/o contrast*    Result Date: 1/22/2023  EXAM: CT HEAD W/O CONTRAST LOCATION: [facility] DATE/TIME: 1/22/2023 2:11 PM INDICATION: Altered mental status. COMPARISON: CT/CTA dated 05/18/2020. TECHNIQUE: Routine CT  Head without IV contrast. Multiplanar reformats. Dose reduction techniques were used. FINDINGS: INTRACRANIAL CONTENTS: A predominantly hypodense left frontoparietal subdural fluid collection measuring up to 4 mm (series 4 image 22). No significant mass effect, midline shift, or herniation. A questionable 1.0 cm left thalamocapsular hypodensity (series 2 image 15). No evidence of an acute transcortical confluent infarct. A chronic right cerebellar hemisphere lacunar infarct. Mild presumed chronic small vessel ischemic changes. Mild generalized volume loss. No interval ventriculomegaly. VISUALIZED ORBITS/SINUSES/MASTOIDS: No acute intraorbital abnormality. Suggestion of right staphyloma. Status post functional endoscopic sinus surgery. Mildly increased moderate mucosal thickening/secretions scattered about the paranasal sinuses. A similar small right mastoid effusion. BONES/SOFT TISSUES: No acute calvarial injury. Questionable mild left frontal scalp swelling.     IMPRESSION: 1.  A hypodense left frontoparietal subdural fluid collection without significant mass effect or midline shift. 2.  Suggestion of mild left frontal scalp swelling. Correlate with history of recent trauma. No acute calvarial injury. 3.  A questionable left thalamocapsular hypodensity, favored to represent volume averaging artifact. If there is concern for an acute infarct, further assessment with brain MRI should be considered. 4.  Mildly increased paranasal sinus disease with layering secretions. Correlate with acute sinusitis..    CT Pelvis Bone wo Contrast    Result Date: 1/17/2023  CT PELVIS BONE WO CONTRAST 1/17/2023 4:24 PM INDICATION: 82-year-old patient with left-sided hip pain. Previous history of left total hip arthroplasty. COMPARISON: 1/17/2023 radiographs. TECHNIQUE: Noncontrast. Axial, sagittal and coronal thin-section reconstruction. Dose reduction techniques were used. CONTRAST: None. FINDINGS: BONES AND JOINTS: -Left total hip  arthroplasty. The acetabular component is intact. The femoral component is symmetrically positioned within the acetabular cup, without evidence of polyethylene wear. There is a circumferential bone resorption at the interface of the femoral stem and bone-cement. Additionally there is endosteal scalloping at the medial margin of the femur proximal metadiaphysis cervical and this measures 2.4 cm in length and involves roughly 50% of the cortical width. No evidence of periprosthetic fracture component subsidence. Hypertrophic changes at the left acetabular rim. Moderate-sized left hip joint effusion. -Postoperative changes at the L4-5 interspace with interbody device. Moderate-advanced lower lumbar facet arthrosis. -Mild bilateral sacroiliac degenerative arthrosis. -Mild right hip degenerative arthrosis. MUSCULATURE AND SOFT TISSUES: -Mild fatty atrophy of the left iliopsoas muscle. Advanced fatty atrophy of the left gluteus minimus muscle. Mild fatty atrophy of the left gluteus medius muscle. -Implantable stimulator device in the subcutaneous fat of the right buttock, with lead extending cephalad. -No free fluid in the pelvis. -Colonic diverticulosis without evidence of diverticulitis. -Small bilateral fat-containing inguinal hernias. -Atherosclerotic calcification.     IMPRESSION: 1.  Left total hip arthroplasty. Femoral component loosening with periprosthetic bone resorption and endosteal scalloping. 2.  Moderate-sized left hip joint effusion. 3.  No fracture or joint malalignment. 4.  Postoperative and degenerative changes in the lower lumbar spine. 5.  Mild bilateral sacroiliac and right hip degenerative arthrosis. GUY SEPULVEDA MD   SYSTEM ID:  [id number]           Labs:   CBC:  Lab Results   Component Value Date    WBC 7.1 01/23/2023    HGB 11.1 (L) 01/23/2023     (L) 01/23/2023       BMP:  Lab Results   Component Value Date     01/23/2023    POTASSIUM 4.8 01/23/2023    CHLORIDE 103 01/23/2023     CO2 21 (L) 01/23/2023    BUN 68.1 (H) 01/23/2023    CR 1.58 (H) 01/23/2023    ANIONGAP 12 01/23/2023    ANDREW 8.6 (L) 01/23/2023     (H) 01/23/2023       Inflammatory Markers:  Lab Results   Component Value Date    WBC 7.1 01/23/2023    CRP 33.89 (H) 01/22/2023

## 2023-01-23 NOTE — PLAN OF CARE
"Vital signs:  Temp: 98.3  F (36.8  C) Temp src: Oral BP: 126/65 Pulse: 65   Resp: 16 SpO2: 98 % O2 Device: Nasal cannula Oxygen Delivery: 1 LPM Height: 185.4 cm (6' 0.99\") Weight: 119.7 kg (263 lb 14.3 oz)    7a-3p: VSS, weaned to 1L o2. Tolerated diet, pain controlled with tylenol. Pt is alert, oriented x4 with intermittent confusion/ delirium. Echo, renal US complete. Pt down for left hip needle aspiration. OOB to commode with 2x assist. +smear bm. Rice in place with adequate UOP.       Problem: Plan of Care - These are the overarching goals to be used throughout the patient stay.    Goal: Plan of Care Review  Description: The Plan of Care Review/Shift note should be completed every shift.  The Outcome Evaluation is a brief statement about your assessment that the patient is improving, declining, or no change.  This information will be displayed automatically on your shift note.  Outcome: Progressing  Flowsheets (Taken 1/23/2023 1420)  Plan of Care Reviewed With: patient  Overall Patient Progress: improving   Goal Outcome Evaluation:      Plan of Care Reviewed With: patient    Overall Patient Progress: improvingOverall Patient Progress: improving           "

## 2023-01-23 NOTE — PROGRESS NOTES
"   01/23/23 0915   Appointment Info   Signing Clinician's Name / Credentials (PT) Anna Salter, PT, DPT   Living Environment   People in Home alone   Current Living Arrangements house   Home Accessibility no concerns   Transportation Anticipated health plan transportation   Living Environment Comments Pt has no stair concerns; lives in a house alone; reports he has been working to remodel the house due to it being narrow to fit walker.   Self-Care   Usual Activity Tolerance moderate   Current Activity Tolerance fair   Regular Exercise No   Equipment Currently Used at Home walker, rolling;cane, quad   Fall history within last six months yes   Number of times patient has fallen within last six months   (too many times to count)   General Information   Onset of Illness/Injury or Date of Surgery 01/23/23   Referring Physician Freedom Graham MD   Patient/Family Therapy Goals Statement (PT) \"i want to get this hip fixed\"   Pertinent History of Current Problem (include personal factors and/or comorbidities that impact the POC) Per EMR: David Thomson is a 82 year old male admitted on 1/23/2023. He has a history of atrial fibrillation on Elliquis, TAVR, JUDE in 1999 who initially presented to OSH for worsening left hip pain found to have loosening of his prior femoral stem placement. He was transferred to [facility] for evaluation and possible intervention by orthopedics. Also with new onset worsening altered mental status/delirium found to have stable subdural hematoma on CT x2 completed 1/22/2023.   Weight-Bearing Status - LLE weight-bearing as tolerated   Cognition   Affect/Mental Status (Cognition) WFL;low arousal/lethargic   Orientation Status (Cognition) oriented x 4   Follows Commands (Cognition) WFL   Pain Assessment   Patient Currently in Pain Yes, see Vital Sign flowsheet   Posture    Posture Forward head position;Protracted shoulders   Range of Motion (ROM)   Range of Motion Hip, Left (Group)  (limited " secondary to pain)   Strength (Manual Muscle Testing)   Strength (Manual Muscle Testing) strength is WFL   Bed Mobility   Bed Mobility supine-sit;sit-supine   Supine-Sit Conger (Bed Mobility) modified independence   Sit-Supine Conger (Bed Mobility) modified independence   Bed Mobility Limitations decreased ability to use legs for bridging/pushing   Impairments Contributing to Impaired Bed Mobility pain   Assistive Device (Bed Mobility) bed rails   Transfers   Transfers sit-stand transfer;bed-chair transfer;toilet transfer   Maintains Weight-bearing Status (Transfers) verbal cues to maintain   Transfer Safety Concerns Noted decreased weight-shifting ability   Impairments Contributing to Impaired Transfers pain   Bed-Chair Transfer   Bed-Chair Conger (Transfers) contact guard   Assistive Device (Bed-Chair Transfers) walker, front-wheeled   Comment, (Bed-Chair Transfer) WBAT through LLE   Sit-Stand Transfer   Sit-Stand Conger (Transfers) minimum assist (75% patient effort)   Assistive Device (Sit-Stand Transfers) walker, front-wheeled   Gait/Stairs (Locomotion)   Conger Level (Gait) contact guard   Assistive Device (Gait) walker, front-wheeled   Distance in Feet 15   Pattern (Gait) step-to   Clinical Impression   Criteria for Skilled Therapeutic Intervention Yes, treatment indicated   PT Diagnosis (PT) impaired functional mobility   Influenced by the following impairments frequent falls and left hip dysfunction progressively worsening   Functional limitations due to impairments unable to care for self or moblize safely in his home. use of walker for safe mobility.   Clinical Presentation (PT Evaluation Complexity) Evolving/Changing   Clinical Presentation Rationale clinical judgement   Clinical Decision Making (Complexity) moderate complexity   Planned Therapy Interventions (PT) bed mobility training;gait training;home exercise program;ROM (range of motion);strengthening;patient/family  education;transfer training;home program guidelines;progressive activity/exercise;risk factor education   Anticipated Equipment Needs at Discharge (PT) other (see comments)  (TBD)   Risk & Benefits of therapy have been explained evaluation/treatment results reviewed;participants voiced agreement with care plan;participants included;patient   PT Total Evaluation Time   PT Eval, Low Complexity Minutes (55805) 8   Physical Therapy Goals   PT Frequency 5x/week   PT Predicted Duration/Target Date for Goal Attainment 02/06/23   PT Goals Bed Mobility;Transfers;Gait   PT: Bed Mobility Modified independent;Within precautions;Supine to/from sit   PT: Transfers Modified independent;Assistive device;Sit to/from stand;Bed to/from chair   PT: Gait Modified independent;Standard walker;150 feet   Therapeutic Activity   Therapeutic Activities: dynamic activities to improve functional performance Minutes (75558) 26   Symptoms Noted During/After Treatment Fatigue   Treatment Detail/Skilled Intervention Pt supine in bed upon arrival; agreeable to session but requests to use commode first. Initiated bed mobility trainign and transfer training to get pt to commode.Min A with bed mobility requiring cueing and assist with L hip off the bed. Able to push through R leg to lift hip up and log roll over the L side. Good UE strength to push off from sidelying. Once sat at EOB pt reports feeling pain in L leg. Cued to have L leg placed further forward to offload pressure. Cued to sit to stand with R leg placed slightly more behind to promote R weight shifft and reliance. Pt able to stand with CGAx1 but nursing staff also present in room to help in this instance. CGAx1 with stand pivot transfer to commode. Once completed; CGAx1 standing from commode to FWW. Amb 20' total in room with WBAT and heavy contact guard assist. Pain limiting further activity.   PT Discharge Planning   PT Plan standing tolerance, pivot transfer to chair, pre gait and gait  in room   PT Discharge Recommendation (DC Rec) Transitional Care Facility   PT Rationale for DC Rec Patient from home alone, IND as of 1 month ago. Notes frequent falls and given mobility limitations, falls and lack of support at home patient requires TCU placement at time of DC for mobility training and safe placement prior to and following surgery.   PT Brief overview of current status CGA with stand pivot transfer to chair and sit to stand   Total Session Time   Timed Code Treatment Minutes 26   Total Session Time (sum of timed and untimed services) 34

## 2023-01-23 NOTE — PROGRESS NOTES
[facility]  Transfer Triage Note    Date of call: 01/22/23  Time of call: 6:08 PM    Current Patient Location: [facility]   Current Level of Care: Med Surg    Vitals: Temp: 98.9  F (37.2  C) Temp src: Axillary BP: 116/49 Pulse: 77   Resp: 14 SpO2: 94 %   Weight: 114.8 kg (253 lb)  O2 Device: Nasal cannula at Oxygen Delivery: 1.5 LPM  Diagnosis: possible SDH, hip pain, acute kidney injury, delirium  Is COVID-19 a concern? No  Reason for requested transfer: Further diagnostic work up, management, and consultation for specialized care   Isolation Needs: None    Outside Records: Available  Additional records may be faxed to [phone].    Transfer accepted: Yes  Stability of Patient: Patient is vitally stable, with no critical labs, and will likely remain stable throughout the transfer process  Level of Care Needed: Med Surg  Telemetry Needed:  None  Expected Time of Arrival for Transfer: 0-8 hours  Arrival Location:  [facility]    Recommendations for Management and Stabilization: Given    Additional Comments:  82 year old man with history of atrial fibrillation, AVR, JUDE in 1999 with worsening hip pain now presenting after falls with severe hip pain since 1/16, now with worsening altered mental status/delirium, acute kidney injury, intractable pain and found to have subdural fluid collection on CT done for altered mental status on 1/22.  No in-hospital falls but altered mental status continues to worsen.    Patient requires evaluation by medicine and possibly neurosurgery but also ortho to determine plan for hip although hardware failure seems subacute/chronic albeit worsening.    Given worsening and need for multdisciplinary care will transfer to [facility].  Discussed with neurosurgery and plan for repeat heat CT at 2000.  Of note patient on apixaban and would typically reverse but doesn't appear acute and if  stable, perhaps prudent to hold instead of reverse- re-evaluate after next CT.   Stop apixaban for now    Plan transfer to [facility] and accepted to medicine service.  If needs hip operation on west can transfer after stabilized.     Freedom Graham MD

## 2023-01-23 NOTE — IR NOTE
Patient Name: David Thomson  Medical Record Number: [id number]  Today's Date: 1/23/2023    Procedure: Image guided left hip aspiration  Proceduralist: Dr. Friedman, Dr. Ashton  Pathology present: na    Procedure Start: 1438   Procedure end: 1512  Sedation medications administered: local lidocaine 1%.     Report given to: Pura POLANCO 7A  : jorge    Other Notes: Pt arrived to IR room 4 from . Consent reviewed. Pt denies any questions or concerns regarding procedure. Pt positioned supine and monitored per protocol. Pt tolerated procedure without any noted complications. Pt transferred back to .

## 2023-01-23 NOTE — PLAN OF CARE
Physical Therapy Discharge Summary    Reason for therapy discharge:    Change in medical status.    Progress towards therapy goal(s). See goals on Care Plan in [facility] electronic health record for goal details.  Goals not met.  Barriers to achieving goals:   transfered to higher level of care.    Therapy recommendation(s):    PT services to consult at next level of care      Thank you for your referral.  Loreto Leger, PT, DPT, ATC, LAT    [facility]  O: [phone]  E: [email or web address]

## 2023-01-23 NOTE — PROVIDER NOTIFICATION
RN was called to room to assess patient for neuro changes. Patients left pupil was irregular with slow response to light, lethargic, and visually hallucinating. Code stroke was called due to pupil changes. Blood sugar reading was 249.   Upon further assessment it was found that patient has had eye surgery in the left eye with lens placement. No prior pupil assessment was performed and assessing RN thought that this was a new change given that she did not get report of unequal pupils. Both ER provider and on-call provider agreed patient is stable, no stroke. No further work up needed, patient can still be transferred to [address] as previously planned.   Denise Engel RN

## 2023-01-23 NOTE — PLAN OF CARE
Goal Outcome Evaluation:      Plan of Care Reviewed With: patient    Overall Patient Progress: no changeOverall Patient Progress: no change    Outcome Evaluation: Patient change in mentation had fluctuated throughout the day. Patient has been confused throughout the shift with dituation, time and place, easily redirected. Patient has been sleeping throughout the shift. VSS on 1.5-2L O2. 0.45% NS 75ml/hr.  Eliquis held for left front oparietal subdural fluid collection, see CT results. Gabapentin held, due to acute kidney injury. Oxycodone held.  Urethral rollins in place with adequate output. Pt is an assist of 2 to ambulate/pivot transfer. Patient has been accepting to [facility].

## 2023-01-23 NOTE — CONSULTS
"[facility]       NEUROSURGERY CONSULTATION    This consultation was requested by Dr. Vargas Uriarte  from the Medicine service.      Reason for Consultation: New Subdural Hematoma in the setting of altered mental status.     HPI:  Mr. David Thomson is a 82 years old male patient with PTA Eliquis 5 mg q12 and ASA 81 mg daily with history of CABG, HTN, TAVR, HLD and left hip replacement on 1999. We have been consulted for evaluation of left sided \"tiny\" SDH that has been unchanged in the 6 hrs CT head follow up. Patient was transferred from OSH for possible surgical intervention by Ortho for \"loose screw\" from left hip arthroplasty performed here at the .     Per chart review, patient was noted to have worsening encephalopathy over the 2-4 days leading up to transfer. Patient was evaluated at OSH by stroke neurologist who did not recommend any further treatment prior. On arrival, A&Ox4 with delirium possibly related to polypharmacy with IV pain meds. Infectious work up turned out negative. Current laboratory showing acute kidney injury, increased levels in urea nitrogen and abnormally high blood glucose levels.      No recent fevers, chills, nausea, vomiting, chest pain, shortness of breath, and denies headaches, weakness, LOC, numbness/paresthesias in extremities, changes in sensation, taste, smell, nor trouble speaking.    PAST MEDICAL HISTORY:   Past Medical History:   Diagnosis Date     Hyperlipidaemia      Hypertension      Pre-diabetes        PAST SURGICAL HISTORY:   Past Surgical History:   Procedure Laterality Date     BACK SURGERY       GALLBLADDER SURGERY       HIP SURGERY       INSERT STIMULATOR DORSAL COLUMN Right 4/19/2016    Procedure: INSERT STIMULATOR DORSAL COLUMN;  Surgeon: Zoë Clemons DO;  Location: [facility] OR     NECK SURGERY       WRIST SURGERY         FAMILY HISTORY:   Family History   Problem Relation Age of Onset     Heart Disease Mother      " Heart Disease Father        SOCIAL HISTORY:   Social History     Tobacco Use     Smoking status: Never     Smokeless tobacco: Never   Substance Use Topics     Alcohol use: Not on file       MEDICATIONS:  No current outpatient medications on file.       Allergies:  Allergies   Allergen Reactions     Benzalkonium      Lisinopril      Piroxicam      Polysorbate  [Bay Oil]      Prazosin      Other reaction(s): Nightmares     Urea      Other reaction(s): Eruption of skin, Eruption of skin       ROS: 10 point ROS of systems including Constitutional, Eyes, Respiratory, Cardiovascular, Gastroenterology, Genitourinary, Integumentary, Muscularskeletal, Psychiatric were all negative except for pertinent positives noted in my HPI.    Physical exam:   Blood pressure 97/54, pulse 71, temperature 98.4  F (36.9  C), temperature source Oral, resp. rate 16, SpO2 95 %.  General: awake and alert  HEENT: Left sided tear drop pupil with a history of lens surgery. Right sided 3 mm pupil reactive. Finger counting in all the visual fields fields with both eyes.  PULM: breathing comfortably on room air  MSK: normal  NEUROLOGIC:  -- Awake; Alert; oriented x 3  -- Follows commands briskly  -- +repetition, calculation, and naming  -- Speech fluent, spontaneous. No aphasia or dysarthria.  -- no gaze preference. No apparent hemineglect.  Cranial Nerves:  -- visual fields full to confrontation, PERRL 3-2mm bilat and brisk, extraocular movements intact  -- face symmetrical, tongue midline  -- sensory V1-V3 intact bilaterally  -- palate elevates symmetrically, uvula midline  -- hearing grossly intact bilat  -- Trapezii 5/5 strength bilat symmetric  -- Cerebellar: Finger nose finger without dysmetria, intact rapid alternating motions bilaterally    Motor:  Normal bulk / tone; no tremor, rigidity, or bradykinesia.  No muscle wasting or fasciculations  No Pronator Drift. Left lower extremity ROM decreased due to pain.      Delt Bi Tri Hand  Flexion/  Extension Iliopsoas Quadriceps Hamstrings Tibialis Anterior Gastroc    C5 C6 C7 C8/T1 L2 L3 L4-S1 L4 S1   R 5 5 5 5 5 5 5 5 5   L 5 5 5 5 4 5 5 5 5     Sensory:  intact to LT x 4 extremities       Reflexes: no clonus       Bi Tri BR Pricila Pat Ach Bab     C5-6 C7-8 C6 UMN L2-4 S1 UMN   R 2+ 2+ 2+ Norm 2+ 2+ Norm   L 2+ 2+ 2+ Norm 2+ 2+ Norm      Gait: Deferred      IMAGING:  CT head 1/22/2022 at 14:11:   IMPRESSION:  1.  A hypodense left frontoparietal subdural fluid collection without significant mass effect or midline shift.  2.  Suggestion of mild left frontal scalp swelling. Correlate with history of recent trauma. No acute calvarial injury.  3.  A questionable left thalamocapsular hypodensity, favored to represent volume averaging artifact. If there is concern for an acute infarct, further assessment with brain MRI should be considered.  4.  Mildly increased paranasal sinus disease with layering secretions. Correlate with acute sinusitis.    CT head 1/22/2023 at 20:24:    1.  Tiny subdural hematoma noted on prior exam overlying left frontoparietal region is less conspicuous on the current exam.  2.  No other significant change.  3.  No new hemorrhage.      LABS:   Last Comprehensive Metabolic Panel:  Sodium   Date Value Ref Range Status   01/23/2023 136 136 - 145 mmol/L Final     Potassium   Date Value Ref Range Status   01/23/2023 4.8 3.4 - 5.3 mmol/L Final   01/17/2023 3.7 3.4 - 5.3 mmol/L Final   04/19/2016 4.2 3.4 - 5.3 mmol/L Final     Chloride   Date Value Ref Range Status   01/23/2023 103 98 - 107 mmol/L Final   01/17/2023 110 (H) 94 - 109 mmol/L Final     Carbon Dioxide (CO2)   Date Value Ref Range Status   01/23/2023 21 (L) 22 - 29 mmol/L Final   01/17/2023 25 20 - 32 mmol/L Final     Anion Gap   Date Value Ref Range Status   01/23/2023 12 7 - 15 mmol/L Final   01/17/2023 7 3 - 14 mmol/L Final     Glucose   Date Value Ref Range Status   01/23/2023 161 (H) 70 - 99 mg/dL Final   01/17/2023 168  (H) 70 - 99 mg/dL Final     GLUCOSE BY METER POCT   Date Value Ref Range Status   01/22/2023 249 (H) 70 - 99 mg/dL Final     Urea Nitrogen   Date Value Ref Range Status   01/23/2023 68.1 (H) 8.0 - 23.0 mg/dL Final   01/17/2023 19 7 - 30 mg/dL Final     Creatinine   Date Value Ref Range Status   01/23/2023 1.58 (H) 0.67 - 1.17 mg/dL Final   04/19/2016 0.85 0.66 - 1.25 mg/dL Final     GFR Estimate   Date Value Ref Range Status   01/23/2023 44 (L) >60 mL/min/1.73m2 Final     Comment:     Effective December 21, 2021 eGFRcr in adults is calculated using the 2021 CKD-EPI creatinine equation which includes age and gender (Kobe rushing al., [place], DOI: 10.1056/LSNZlw2595901)   04/19/2016 89 >60 mL/min/1.7m2 Final     Comment:     Non  GFR Calc     Calcium   Date Value Ref Range Status   01/23/2023 8.6 (L) 8.8 - 10.2 mg/dL Final     Lab Results   Component Value Date    WBC 7.1 01/23/2023     Lab Results   Component Value Date    RBC 3.50 01/23/2023     Lab Results   Component Value Date    HGB 11.1 01/23/2023     Lab Results   Component Value Date    HCT 33.6 01/23/2023     Lab Results   Component Value Date    MCV 96 01/23/2023     Lab Results   Component Value Date    MCH 31.7 01/23/2023     Lab Results   Component Value Date    MCHC 33.0 01/23/2023     Lab Results   Component Value Date    RDW 15.3 01/23/2023     Lab Results   Component Value Date     01/23/2023     INR   Date Value Ref Range Status   01/18/2023 1.39 (H) 0.85 - 1.15 Final   04/19/2016 1.10 0.86 - 1.14 Final      aPTT   Date Value Ref Range Status   01/18/2023 39 (H) 22 - 38 Seconds Final      ASSESSMENT:  Mr. David Thomson is a 82 years old male patient on Eliquis 5mg q 12 (for bioprostheticTAVR and DVT prophylaxis) and ASA 81 mg (for CABG), which have been held since 1/16 and 1/15 respectively, and history left hip replacement on 1999. Patient was transferred for evaluation by the Orthopedics team in our facility for possible left hip  revision in the setting of intractable pain. We have been consulted for a small left sided, frontoparietal SDH without changes on her 6 hr follow up CT scan in an OSH. Patient currently drowsy, alert and oriented x4, ROM decreased in the left lower extremity due to pain. Other wise, patient 5/5 in strength and following commands.       RECOMMENDATIONS:  - No neurosurgical intervention indicated at this time   - Continue holding anticoagulation. We would reassess after possible orthopaedic intervention  - SBP <140       Brady Moore MD  GenSurg/NSG Preliminary Resident  Department of Neurosurgery    The patient was discussed with Dr. Juan Luis Oh, neurosurgery chief resident, and discussed with Dr. Downing, neurosurgery staff.

## 2023-01-24 ENCOUNTER — APPOINTMENT (OUTPATIENT)
Dept: ULTRASOUND IMAGING | Facility: CLINIC | Age: 83
DRG: 559 | End: 2023-01-24
Attending: NURSE PRACTITIONER
Payer: COMMERCIAL

## 2023-01-24 LAB
ALBUMIN SERPL BCG-MCNC: 3.4 G/DL (ref 3.5–5.2)
ALP SERPL-CCNC: 70 U/L (ref 40–129)
ALT SERPL W P-5'-P-CCNC: 12 U/L (ref 10–50)
ANION GAP SERPL CALCULATED.3IONS-SCNC: 11 MMOL/L (ref 7–15)
ANION GAP SERPL CALCULATED.3IONS-SCNC: 11 MMOL/L (ref 7–15)
AST SERPL W P-5'-P-CCNC: 27 U/L (ref 10–50)
BASE EXCESS BLDV CALC-SCNC: -1.3 MMOL/L (ref -7.7–1.9)
BASE EXCESS BLDV CALC-SCNC: -1.5 MMOL/L (ref -7.7–1.9)
BASOPHILS # BLD AUTO: 0 10E3/UL (ref 0–0.2)
BASOPHILS NFR BLD AUTO: 1 %
BILIRUB SERPL-MCNC: 1.6 MG/DL
BUN SERPL-MCNC: 63 MG/DL (ref 8–23)
BUN SERPL-MCNC: 63 MG/DL (ref 8–23)
CALCIUM SERPL-MCNC: 8.3 MG/DL (ref 8.8–10.2)
CALCIUM SERPL-MCNC: 8.3 MG/DL (ref 8.8–10.2)
CHLORIDE SERPL-SCNC: 107 MMOL/L (ref 98–107)
CHLORIDE SERPL-SCNC: 107 MMOL/L (ref 98–107)
CREAT SERPL-MCNC: 1.37 MG/DL (ref 0.67–1.17)
CREAT SERPL-MCNC: 1.37 MG/DL (ref 0.67–1.17)
CRP SERPL-MCNC: 87.6 MG/L
DEPRECATED HCO3 PLAS-SCNC: 20 MMOL/L (ref 22–29)
DEPRECATED HCO3 PLAS-SCNC: 20 MMOL/L (ref 22–29)
EOSINOPHIL # BLD AUTO: 0.1 10E3/UL (ref 0–0.7)
EOSINOPHIL NFR BLD AUTO: 2 %
ERYTHROCYTE [DISTWIDTH] IN BLOOD BY AUTOMATED COUNT: 15.3 % (ref 10–15)
GFR SERPL CREATININE-BSD FRML MDRD: 52 ML/MIN/1.73M2
GFR SERPL CREATININE-BSD FRML MDRD: 52 ML/MIN/1.73M2
GLUCOSE SERPL-MCNC: 137 MG/DL (ref 70–99)
GLUCOSE SERPL-MCNC: 137 MG/DL (ref 70–99)
HCO3 BLDV-SCNC: 24 MMOL/L (ref 21–28)
HCO3 BLDV-SCNC: 25 MMOL/L (ref 21–28)
HCT VFR BLD AUTO: 31.8 % (ref 40–53)
HGB BLD-MCNC: 10.5 G/DL (ref 13.3–17.7)
IMM GRANULOCYTES # BLD: 0 10E3/UL
IMM GRANULOCYTES NFR BLD: 1 %
LYMPHOCYTES # BLD AUTO: 1.3 10E3/UL (ref 0.8–5.3)
LYMPHOCYTES NFR BLD AUTO: 22 %
MCH RBC QN AUTO: 31.6 PG (ref 26.5–33)
MCHC RBC AUTO-ENTMCNC: 33 G/DL (ref 31.5–36.5)
MCV RBC AUTO: 96 FL (ref 78–100)
MONOCYTES # BLD AUTO: 0.7 10E3/UL (ref 0–1.3)
MONOCYTES NFR BLD AUTO: 12 %
NEUTROPHILS # BLD AUTO: 3.6 10E3/UL (ref 1.6–8.3)
NEUTROPHILS NFR BLD AUTO: 62 %
NRBC # BLD AUTO: 0 10E3/UL
NRBC BLD AUTO-RTO: 0 /100
O2/TOTAL GAS SETTING VFR VENT: 1 %
O2/TOTAL GAS SETTING VFR VENT: 2 %
PCO2 BLDV: 44 MM HG (ref 40–50)
PCO2 BLDV: 48 MM HG (ref 40–50)
PH BLDV: 7.33 [PH] (ref 7.32–7.43)
PH BLDV: 7.35 [PH] (ref 7.32–7.43)
PLATELET # BLD AUTO: 130 10E3/UL (ref 150–450)
PO2 BLDV: 60 MM HG (ref 25–47)
PO2 BLDV: 88 MM HG (ref 25–47)
POTASSIUM SERPL-SCNC: 4.8 MMOL/L (ref 3.4–5.3)
POTASSIUM SERPL-SCNC: 4.8 MMOL/L (ref 3.4–5.3)
PROT SERPL-MCNC: 5.7 G/DL (ref 6.4–8.3)
RBC # BLD AUTO: 3.32 10E6/UL (ref 4.4–5.9)
SODIUM SERPL-SCNC: 138 MMOL/L (ref 136–145)
SODIUM SERPL-SCNC: 138 MMOL/L (ref 136–145)
WBC # BLD AUTO: 5.7 10E3/UL (ref 4–11)

## 2023-01-24 PROCEDURE — 99232 SBSQ HOSP IP/OBS MODERATE 35: CPT

## 2023-01-24 PROCEDURE — 250N000013 HC RX MED GY IP 250 OP 250 PS 637: Performed by: STUDENT IN AN ORGANIZED HEALTH CARE EDUCATION/TRAINING PROGRAM

## 2023-01-24 PROCEDURE — 82803 BLOOD GASES ANY COMBINATION: CPT | Performed by: NURSE PRACTITIONER

## 2023-01-24 PROCEDURE — 85025 COMPLETE CBC W/AUTO DIFF WBC: CPT | Performed by: NURSE PRACTITIONER

## 2023-01-24 PROCEDURE — 93975 VASCULAR STUDY: CPT

## 2023-01-24 PROCEDURE — 36415 COLL VENOUS BLD VENIPUNCTURE: CPT | Performed by: NURSE PRACTITIONER

## 2023-01-24 PROCEDURE — 250N000013 HC RX MED GY IP 250 OP 250 PS 637

## 2023-01-24 PROCEDURE — 258N000002 HC RX IP 258 OP 250: Performed by: NURSE PRACTITIONER

## 2023-01-24 PROCEDURE — 93975 VASCULAR STUDY: CPT | Mod: 26 | Performed by: STUDENT IN AN ORGANIZED HEALTH CARE EDUCATION/TRAINING PROGRAM

## 2023-01-24 PROCEDURE — 80053 COMPREHEN METABOLIC PANEL: CPT | Performed by: NURSE PRACTITIONER

## 2023-01-24 PROCEDURE — 250N000011 HC RX IP 250 OP 636: Performed by: STUDENT IN AN ORGANIZED HEALTH CARE EDUCATION/TRAINING PROGRAM

## 2023-01-24 PROCEDURE — 86140 C-REACTIVE PROTEIN: CPT | Performed by: NURSE PRACTITIONER

## 2023-01-24 PROCEDURE — 250N000011 HC RX IP 250 OP 636

## 2023-01-24 PROCEDURE — 99232 SBSQ HOSP IP/OBS MODERATE 35: CPT | Mod: GC | Performed by: INTERNAL MEDICINE

## 2023-01-24 PROCEDURE — 250N000013 HC RX MED GY IP 250 OP 250 PS 637: Performed by: NURSE PRACTITIONER

## 2023-01-24 PROCEDURE — 120N000011 HC R&B TRANSPLANT UMMC

## 2023-01-24 RX ORDER — ONDANSETRON 4 MG/1
4 TABLET, ORALLY DISINTEGRATING ORAL 3 TIMES DAILY PRN
COMMUNITY
End: 2023-03-09

## 2023-01-24 RX ORDER — OLANZAPINE 10 MG/2ML
5 INJECTION, POWDER, FOR SOLUTION INTRAMUSCULAR 2 TIMES DAILY PRN
Status: DISCONTINUED | OUTPATIENT
Start: 2023-01-24 | End: 2023-01-29 | Stop reason: HOSPADM

## 2023-01-24 RX ORDER — CODEINE PHOSPHATE AND GUAIFENESIN 10; 100 MG/5ML; MG/5ML
10 SOLUTION ORAL EVERY 4 HOURS PRN
COMMUNITY
End: 2023-03-09

## 2023-01-24 RX ORDER — HEPARIN SODIUM 5000 [USP'U]/.5ML
5000 INJECTION, SOLUTION INTRAVENOUS; SUBCUTANEOUS EVERY 8 HOURS
Status: DISCONTINUED | OUTPATIENT
Start: 2023-01-24 | End: 2023-01-29 | Stop reason: HOSPADM

## 2023-01-24 RX ORDER — GABAPENTIN 100 MG/1
100 CAPSULE ORAL DAILY PRN
Status: ON HOLD | COMMUNITY
End: 2023-01-24

## 2023-01-24 RX ORDER — GABAPENTIN 300 MG/1
300 CAPSULE ORAL 3 TIMES DAILY
Status: ON HOLD | COMMUNITY
Start: 2023-01-05 | End: 2023-01-29

## 2023-01-24 RX ORDER — GABAPENTIN 100 MG/1
100 CAPSULE ORAL 3 TIMES DAILY
Status: DISCONTINUED | OUTPATIENT
Start: 2023-01-24 | End: 2023-01-29 | Stop reason: HOSPADM

## 2023-01-24 RX ADMIN — PANTOPRAZOLE SODIUM 40 MG: 40 TABLET, DELAYED RELEASE ORAL at 09:08

## 2023-01-24 RX ADMIN — ACETAMINOPHEN 975 MG: 325 TABLET, FILM COATED ORAL at 09:05

## 2023-01-24 RX ADMIN — SENNOSIDES AND DOCUSATE SODIUM 1 TABLET: 50; 8.6 TABLET ORAL at 20:04

## 2023-01-24 RX ADMIN — FOLIC ACID 1 MG: 1 TABLET ORAL at 09:08

## 2023-01-24 RX ADMIN — ACETAMINOPHEN 975 MG: 325 TABLET, FILM COATED ORAL at 20:05

## 2023-01-24 RX ADMIN — ATORVASTATIN CALCIUM 80 MG: 40 TABLET, FILM COATED ORAL at 22:03

## 2023-01-24 RX ADMIN — ACETAMINOPHEN 975 MG: 325 TABLET, FILM COATED ORAL at 13:30

## 2023-01-24 RX ADMIN — FLUTICASONE PROPIONATE 2 SPRAY: 50 SPRAY, METERED NASAL at 09:09

## 2023-01-24 RX ADMIN — Medication 12.5 MG: at 09:09

## 2023-01-24 RX ADMIN — LIDOCAINE PATCH 4% 2 PATCH: 40 PATCH TOPICAL at 17:57

## 2023-01-24 RX ADMIN — OLANZAPINE 5 MG: 10 INJECTION, POWDER, FOR SOLUTION INTRAMUSCULAR at 23:37

## 2023-01-24 RX ADMIN — PANTOPRAZOLE SODIUM 40 MG: 40 TABLET, DELAYED RELEASE ORAL at 20:04

## 2023-01-24 RX ADMIN — GABAPENTIN 100 MG: 100 CAPSULE ORAL at 20:04

## 2023-01-24 RX ADMIN — HEPARIN SODIUM 5000 UNITS: 5000 INJECTION, SOLUTION INTRAVENOUS; SUBCUTANEOUS at 13:30

## 2023-01-24 RX ADMIN — GABAPENTIN 300 MG: 300 CAPSULE ORAL at 13:30

## 2023-01-24 RX ADMIN — THIAMINE HCL TAB 100 MG 100 MG: 100 TAB at 09:06

## 2023-01-24 RX ADMIN — GABAPENTIN 300 MG: 300 CAPSULE ORAL at 09:06

## 2023-01-24 RX ADMIN — HEPARIN SODIUM 5000 UNITS: 5000 INJECTION, SOLUTION INTRAVENOUS; SUBCUTANEOUS at 22:03

## 2023-01-24 RX ADMIN — SODIUM CHLORIDE: 4.5 INJECTION, SOLUTION INTRAVENOUS at 12:04

## 2023-01-24 ASSESSMENT — ACTIVITIES OF DAILY LIVING (ADL)
ADLS_ACUITY_SCORE: 35
DEPENDENT_IADLS:: MEDICATION MANAGEMENT
ADLS_ACUITY_SCORE: 42
ADLS_ACUITY_SCORE: 35
ADLS_ACUITY_SCORE: 42
ADLS_ACUITY_SCORE: 35
ADLS_ACUITY_SCORE: 43
ADLS_ACUITY_SCORE: 38
ADLS_ACUITY_SCORE: 38
ADLS_ACUITY_SCORE: 42
ADLS_ACUITY_SCORE: 35
ADLS_ACUITY_SCORE: 42
ADLS_ACUITY_SCORE: 35

## 2023-01-24 NOTE — PROGRESS NOTES
[facility]    Progress Note - Medicine Service, PAMELLA TEAM 5       Date of Admission:  1/23/2023    Assessment & Plan   David Thomson is a 82 year old male admitted on 1/23/2023. He has a history of atrial fibrillation on Elliquis, TAVR, JUDE in 1999 who initially presented to OSH for worsening left hip pain found to have loosening of his prior femoral stem placement. He was transferred to [facility] for evaluation and possible intervention by orthopedics. Also with new onset worsening altered mental status/delirium found to have stable subdural hematoma on CT x2 completed 1/22/2023.     Today:  -Weaned to room air  -Planning for transfer to [facility] for orthopedic surgery preparation    Left hip pain  Left Hip Femoral Step Prosthesis Loosening  Hx left hip replacement (1999)  [facility] and difficulty bearing weight despite optimized pain control regimen. XR showed loosening femoral stem with bone response distally to the stem. Pelvic CT confirmed loosening of the femoral stem with periprosthetic bone reabsorption and endosteal scalloping, but no fracture or joint malalignment. Also with moderate size left hip joint effusion. Patient transferred for assessment by orthopedics for possible surgical intervention.  - Orthopedics consult, appreciate recs  - PTA gabapentin 100mg TID (renal reduced dose, dose reduced to 1/24 per pharmacy recommendation)  - oxycodone 7.5mg q4hr  - IV dilaudid 0.2mg for breakthrough pain     Encephalopathy   Subdural Hematoma, stable  Hx of Dorsal Column Spinal Stimulator Insertion, 2016  -Neurosurgery consulted and following  -Plan to repeat head CT without contrast prior to orthopedic operation in coming days     Pre-renal BRITTNEY secondary to volume depletion  On 1/22 patient noted to have Cr 1.57 (baseline 0.8-0.9). FeNa 0.5, likely pre-renal. UA non-infectious. Etiologies  "considered include dehydration, gabapentin (previously on 900mg TID until 1/22).   - Hold Entresto 97-103mg in setting of BRITTNEY     CAD  Hx of CABG  Essential HTN  TAVR  HLD  Atrial fibrillation  Denies any chest pain, palpitations this admission.    - Hold aspirin   - Hold Atorvastatin 80mg QHS   - Hold Entresto 97-103mg in setting of BRITTNEY   - continue Metoprolol 12.5mg   - Hold Eliquis in setting of subdural hematoma, on chronically for Atrial fibrillation     GERD  - PTA omeprazole       Diet: Combination Diet Low Saturated Fat Diet    DVT Prophylaxis: Heparin SQ  Rice Catheter: PRESENT, indication: Retention, Retention  Fluids: None  Lines: None     Cardiac Monitoring: ACTIVE order. Indication: Tachyarrhythmias, acute (48 hours)  Code Status: Full Code      Clinically Significant Risk Factors                # Thrombocytopenia: Lowest platelets = 122 in last 2 days, will monitor for bleeding         # Obesity: Estimated body mass index is 34.82 kg/m  as calculated from the following:    Height as of this encounter: 1.854 m (6' 0.99\").    Weight as of this encounter: 119.7 kg (263 lb 14.3 oz)., PRESENT ON ADMISSION         Disposition Plan     Pending orthopedic surgical planning    The patient's care was discussed with the Attending Physician, Dr. Elliott, Bedside Nurse and Patient.    Vargas Uriarte MD  Medicine Service, [address]  Securely message with makerist (more info)  Text page via [facility] Paging/Directory   See signed in provider for up to date coverage information  ______________________________________________________________________    Interval History   Afebrile and hemodynamically stable overnight.  Weaned to room air.  Reports pain moderately controlled on left lower extremity.  No chest pain or increased work of breathing.    Physical Exam   Vital Signs: Temp: 98.1  F (36.7  C) Temp src: Oral BP: 104/60 Pulse: 67   Resp: 18 SpO2: 96 % O2 " Device: Nasal cannula Oxygen Delivery: 2 LPM  Weight: 263 lbs 14.25 oz    General: Sitting in chair in no acute distress   HEENT: Moist mucous membranes  CV: Regular rate and rhythm no murmur  Resp: Lungs clear bilaterally, breathing comfortably on room air  Abd: Soft nontender nondistended  Extremities: Left hip pain with movement of all 4 extremity, no edema  Skin: Visible skin intact  Neuro: Alert and oriented answering questions appropriately    Medical Decision Making         Data   ------------------------- PAST 24 HR DATA REVIEWED -----------------------------------------------

## 2023-01-24 NOTE — PHARMACY-ADMISSION MEDICATION HISTORY
Admission Medication History Completed by Pharmacy    See [facility] Admission Navigator for allergy information, preferred outpatient pharmacy, prior to admission medications and immunization status.     Medication History Sources:     [facility]    Obtained medication list from [facility], home service agency that comes on Mondays to set up patients medications ([phone]; fax [phone])    Last dose times based on MAR from [facility]    Changes made to PTA medication list (reason):    Added:   o Camphor-eucalyptus-menthol  o Carbamide peroxide  o Guaifenesin-codeine    Deleted:   o Hydroxyzine 50mg as needed for spasm or pain or itching (not on [facility] list or on list from [facility]) (prescription was from 2016)    Changed:   o Gabapentin 300mg by mouth at bedtime changed to 300mg by mouth three times daily for nerve pain (per [facility] most recent prescription on 1/5/2023; medication list from [facility] gabapentin dose is 100mg daily for pain and numbness at bed)    Additional Information:    Mr Thomson does not recall his medications and says a nurse comes to set them up. Obtained medication list from home care facility which was used to compile the list of PTA medications.    Message left with [facility]  at [phone] to see if able to supply any additional information.    [facility]ed [facility] to verify gabapentin prescriptions:   - Gabapentin 100mg three times daily for nerve pain prescribed and dispensed 12/22/2022 (#270 for 90 day supply)   - Gabapentin 300mg three times daily for nerve pain prescribed in ED and dispensed 1/5/2023 (#90 for 30 day supply)    Paged resident Dr. Uriarte to consider decrease inpatient gabapentin from 300mg three times daily to 100mg by mouth three times daily given this was new for patient on 1/5/2023 and if RN setting up medications he may not have been taking since it wasn't on RN medication list. Gabapentin dose  had been increased to 600mg three times daily at [facility].     Prior to Admission medications    Medication Sig Last Dose Taking? Auth Provider Long Term End Date   aspirin (ASA) 81 MG EC tablet Take 81 mg by mouth At Bedtime 1/21/2023 at 8 PM Yes Reported, Patient No    atorvastatin (LIPITOR) 80 MG tablet Take 80 mg by mouth At Bedtime 1/22/2023 at 0930 PM Yes Reported, Patient Yes    CAMPHOR-EUCALYPTUS-MENTHOL EX Apply thin layer four times a day as needed for itching  Yes Unknown, Entered By History     carbamide peroxide (DEBROX) 6.5 % otic solution 3 drops 3 times daily as needed for other 1-2 days prior to seeing nurse in ear wax clinic  Each ear or both ears  Yes Unknown, Entered By History     ELIQUIS ANTICOAGULANT 5 MG tablet Take 5 mg by mouth every 12 hours Am and bedtime 1/22/2023 at 0950 AM Yes Reported, Patient     ENTRESTO  MG per tablet Take 1 tablet by mouth 2 times daily Morning and bedtime 1/22/2023 at 930 PM Yes Reported, Patient     famotidine (PEPCID) 20 MG tablet Take 20 mg by mouth At Bedtime 1/22/2023 at 0930 PM Yes Reported, Patient     fluticasone (FLONASE) 50 MCG/ACT nasal spray Spray 2 sprays into both nostrils daily 1/22/2023 at 0950 AM Yes Reported, Patient     folic acid (FOLVITE) 1 MG tablet Take 1 mg by mouth daily 1/22/2023 at 0950 AM Yes Reported, Patient     gabapentin (NEURONTIN) 300 MG capsule Take 300 mg by mouth 3 times daily For numbness 1/22/2023 at 1329 Yes Unknown, Entered By History No    guaiFENesin-codeine (ROBITUSSIN AC) 100-10 MG/5ML solution Take 10 mLs by mouth every 4 hours as needed for cough 1/19/2023 at 0042 Yes Unknown, Entered By History     metoprolol succinate ER (TOPROL XL) 25 MG 24 hr tablet Take 12.5 mg by mouth daily 1/22/2023 at 0950 AM Yes Reported, Patient Yes    omeprazole (PRILOSEC) 20 MG DR capsule Take 20 mg by mouth 2 times daily Am and HS  Yes Reported, Patient     ondansetron (ZOFRAN ODT) 4 MG ODT tab Take 4 mg by mouth 3 times daily  as needed for nausea  Yes Unknown, Entered By History     vitamin B1 (THIAMINE) 100 MG tablet Take 100 mg by mouth daily  Yes Reported, Patient       Date completed: 01/24/23    Medication history completed by: Yola Almaraz, Pharm.D., BCPS, [facility]  Pager [phone]

## 2023-01-24 NOTE — PROGRESS NOTES
"/55 (BP Location: Left arm)   Pulse 64   Temp 98.2  F (36.8  C) (Oral)   Resp 18   Ht 1.854 m (6' 0.99\")   Wt 119.7 kg (263 lb 14.3 oz)   SpO2 92%   BMI 34.82 kg/m      Shift: 2474-0362  Isolation Status: none  VS: stable on 2L oxy mask, afebrile  Neuro: Aox4, but with intermittent confusion  Behaviors: pleasantly confused  BG: none  Labs: reviewed  Respiratory: WNL  Cardiac: WNL  Pain/Nausea: 8/10 hip pain, managed with scheduled Tyl  PRN: none given  Diet: Combination diet, NPO at midnight for renogram in AM  LDA: Left PIV infusing, Right PIV saline locked,  rollins in place with good output  GI/: 3 BM this evening  Mobility: Assist 2 with walker and gait belt  Plan: Continue plan of care. Will notify team with any changes.   "

## 2023-01-24 NOTE — PROGRESS NOTES
"[facility], [place]   Neurosurgery Progress Note:    Date of service: 1/24/2023    Assessment: David Thomson is a 82 year old male on Eliquis 5mg q 12 (for Atrial fibrillation) and ASA 81 mg (for CABG), which have been held since 1/16 and 1/15 respectively, and history left hip replacement on 1999. Patient was transferred for evaluation by the Orthopedics team in our facility for possible left hip revision. We have been consulted for a small left sided, frontoparietal SDH with decrease in size on 6 hour follow up CT.    Clinically Significant Risk Factors Present on Admission            # Obesity: Estimated body mass index is 34.82 kg/m  as calculated from the following:    Height as of this encounter: 1.854 m (6' 0.99\").    Weight as of this encounter: 119.7 kg (263 lb 14.3 oz).   # HTN  # Pre-diabetes          Recommendations:  - No neurosurgical intervention indicated at this time  - OK to start patient on subcutaneous Heparin at prophylactic dosing for DVT prophylaxis from neurosurgical perspective  - Continue to hold all other anticoagulation in anticipation of possible orthopedic intervention  - Recommend repeat CT scan before orthopedic surgery.  - Serial Neuro exams  - SBP <140  - All other cares per primary team  - We will continue to follow      Alessandra Pruitt PA-C  Neurosurgery Department  Pager: [phone]      I have spent a total of 35 minutes total time counseling, coordination, and chart review, over 50% of the time was spent in direct patient care.       Interval History:  No acute events overnight. Patient reports minor headache this morning. Neurologic exam stable.           Objective:   Temp:  [97.7  F (36.5  C)-98.5  F (36.9  C)] 97.7  F (36.5  C)  Pulse:  [60-69] 60  Resp:  [16-20] 18  BP: ()/(51-65) 102/54  SpO2:  [92 %-98 %] 94 %  I/O last 3 completed shifts:  In: 1460 [P.O.:960; IV Piggyback:500]  Out: 2105 [Urine:2105]    Gen: Appears comfortable, " NAD  Neurologic:  - Alert & Oriented x3  - Follows commands briskly  - Speech fluent, spontaneous. No aphasia or dysarthria.  - No gaze preference. No apparent hemineglect.  - Left pupil enlarged with teardrop irregularity, right pupil regular and reactive, EOMI  - Strong eye closure, jaw clench, and cheek puff  - Face symmetric with sensation intact to light touch  - Tongue protrudes midline  - Trapezii and sternocleidomastoid muscles 5/5 bilaterally  - No pronator drift     Del Tr Bi WE WF Gr   R 5 5 5 5 5 5   L 5 5 5 5 5 5    HF KE KF DF PF EHL   R 5 5 5 5 5 5   L 5 5 5 5 5 5     Reflexes 2+ throughout    Sensation intact and symmetric to light touch throughout    LABS  Recent Labs   Lab Test 23  0557 23  0644 23  0554   WBC 5.7 7.1 9.1   HGB 10.5* 11.1* 12.5*   MCV 96 96 95   * 122* 148*       Recent Labs   Lab Test 23  0557 23  2129 23  0644     138 138 136   POTASSIUM 4.8  4.8 5.1 4.8   CHLORIDE 107  107 106 103   CO2 20*  20* 20* 21*   BUN 63.0*  63.0* 65.4* 68.1*   CR 1.37*  1.37* 1.45* 1.58*   ANIONGAP 11  11 12 12   ANDREW 8.3*  8.3* 8.8 8.6*   *  137* 171* 161*       IMAGING    Recent Results (from the past 24 hour(s))   Echocardiogram Complete   Result Value    LVEF  55-60%    Narrative    [id number]  [id number]  [id number]  [id number]^LUDMILANOAH^AQUILES     [facility]  Echocardiography Laboratory  [address] [place] [id number]     Name: BIBI QUINONES  MRN: [id number]  : 1940  Study Date: 2023 08:25 AM  Age: 82 yrs  Gender: Male  Patient Location: [facility]  Reason For Study: Dizziness  Ordering Physician: AQUILES GUZMAN  Referring Physician: TOD MCCABE  Performed By: Saida Reyes  Height: 73 in     BP: 97/54 mmHg  ______________________________________________________________________________  Procedure  Complete Portable Echo Adult. Contrast Optison. Patient was given 6 ml  mixture  of 3 ml Optison and 6 ml saline. 3 ml wasted.     ______________________________________________________________________________  Interpretation Summary  Technically difficult study limited views obtained.  Left ventricular size, wall motion and function are normal. The ejection  fraction is 55-60%.  Right ventricular function, chamber size, wall motion, and thickness are  normal.  There is a bioprosthetic aortic valve (probably TAVR) with a mean gradient of  14 mmHg; details of the valve are unclear but this is likely within normal  limits.  IVC diameter <2.1 cm collapsing >50% with sniff suggests a normal RA pressure  of 3 mmHg.  No pericardial effusion is present.  There is no prior study for direct comparison.     ______________________________________________________________________________  Left Ventricle  Left ventricular size, wall motion and function are normal. The ejection  fraction is 55-60%. Left ventricular wall thickness cannot evaluate. Left  ventricular diastolic function is indeterminate.     Right Ventricle  Right ventricular function, chamber size, wall motion, and thickness are  normal.     Atria  The right atria appears normal. Moderate left atrial enlargement is present.     Mitral Valve  The mitral valve is normal. Mild mitral annular calcification is present.     Aortic Valve  There is a bioprosthetic aortic valve (probably TAVR) with a mean gradient of  14 mmHg; details of the valve are unclear but this is likely within normal  limits. The calculated aortic valve are is 1.2 cm^2.     Tricuspid Valve  The tricuspid valve is normal. The peak velocity of the tricuspid regurgitant  jet is not obtainable. Trace tricuspid insufficiency is present.     Pulmonic Valve  On Doppler interrogation, there is no significant stenosis or regurgitation.     Vessels  The inferior vena cava was normal in size with preserved respiratory  variability. The aorta root cannot be assessed. Mild dilatation  of the aorta  is present. Ascending aorta 4.1 cm. IVC diameter <2.1 cm collapsing >50% with  sniff suggests a normal RA pressure of 3 mmHg.     Pericardium  No pericardial effusion is present.     Compared to Previous Study  There is no prior study for direct comparison.     Attestation  I have personally viewed the imaging and agree with the interpretation and  report as documented by the fellow, Marco Rushing, and/or edited by me.     ______________________________________________________________________________  MMode/2D Measurements & Calculations  IVSd: 0.97 cm  LVIDd: 4.3 cm  LVIDs: 3.4 cm  LVPWd: 1.0 cm     FS: 19.5 %  LV mass(C)d: 139.9 grams  Ao root diam: 3.7 cm  asc Aorta Diam: 4.1 cm  LVOT diam: 2.1 cm  LVOT area: 3.5 cm2  LA Volume (BP): 70.6 ml  RWT: 0.48     Doppler Measurements & Calculations  MV E max jose juan: 51.9 cm/sec  MV A max jose juan: 72.3 cm/sec  MV E/A: 0.72  MV dec slope: 155.0 cm/sec2  MV dec time: 0.33 sec  Ao V2 max: 256.0 cm/sec  Ao max P.2 mmHg  Ao V2 mean: 156.0 cm/sec  Ao mean P.0 mmHg  Ao V2 VTI: 39.1 cm  ARUN(I,D): 1.2 cm2  ARUN(V,D): 1.1 cm2  LV V1 max P.7 mmHg  LV V1 max: 81.4 cm/sec  LV V1 VTI: 14.0 cm  SV(LVOT): 48.5 ml  AV Jose Juan Ratio (DI): 0.32  E/E' av.6     Lateral E/e': 4.3  Medial E/e': 7.0     ______________________________________________________________________________  Report approved by: Sheri Lucero 2023 09:45 AM         US Renal Complete Non-Vascular    Narrative    EXAMINATION: US RENAL COMPLETE NON-VASCULAR, 2023 10:46 AM     COMPARISON: None.    HISTORY: BRITTNEY with history of BPH    TECHNIQUE: The kidneys and bladder were scanned in the standard  fashion with specialized ultrasound transducer(s) using both gray  scale and limited color/spectral Doppler techniques.    FINDINGS:    Right kidney: Visualization is limited. Measures 11.6 cm in length.  Parenchyma is of normal thickness and echogenicity. No focal mass. No  hydronephrosis.    Left  kidney: Measures 12.6 cm in length. Parenchyma is of normal  thickness and echogenicity. 1.4 cm simple cyst in the superior pole.  No focal mass. No hydronephrosis.     Bladder: Decompressed by Rice catheter.      Impression    IMPRESSION:  1.  No hydronephrosis.  2.  Urinary bladder is decompressed by Rice catheter.    I have personally reviewed the examination and initial interpretation  and I agree with the findings.    MAGGI GARCIA MD         SYSTEM ID:  [id number]   IR Fine Needle Aspiration w Ultrasound    Narrative    PROCEDURES :  1. Ultrasound guided left hip aspiration  2. Fluoroscopic-guided left hip aspiration    Clinical History: Post left hip replacement, concern for prostatic  joint infection    Comparisons: 1/17/2023    Attending: FLO Friedman MD    Fellow: THEA Ashton MD    Medications: The patient was sedated for the procedure    Medications:   10 cc lidocaine for local anesthesia     Fluoroscopic time: 4.3 minutes    PROCEDURE: The patient understood the limitations, alternatives, and  risks of the procedure and requested the procedure be performed. Both  written and oral consent were obtained.    With the patient in the supine position limited pre-procedural  ultrasound performed demonstrated trace left hip joint effusion.    Under ultrasound guidance 20-gauge spinal needle was advanced into the  fluid. Multiple attempts were made at aspiration without significant  fluid.    Decision was made to convert fluoroscopic guidance. Under fluoroscopic  guidance a 20-gauge spinal needle was advanced to the prosthetic  lateral head neck junction. Multiple attempts were made at aspiration  without significant return of fluid. Approximately 1 to 2 cc of saline  was injected through the needle and aspirated successfully. Similar to  labs.      Impression    IMPRESSION:   No significant aspiration of fluid with ultrasound or fluoroscopic  guidance. Approximately 1-2 cc of saline was injected into the  hip  joint and aspirated and sent for labs.

## 2023-01-24 NOTE — CONSULTS
Care Management Initial Consult    General Information  Assessment completed with: Patient, VM-chart review,    Type of CM/SW Visit: Initial Assessment    Primary Care Provider verified and updated as needed: Yes ([facility], Dr. Sherri Gonzalez [phone], NSA for clinic is [phone])   Readmission within the last 30 days:        Reason for Consult: discharge planning (elevated readmission risk score)  Advance Care Planning:            Communication Assessment  Patient's communication style: spoken language (English or Bilingual)    Hearing Difficulty or Deaf: yes   Wear Glasses or Blind: yes    Cognitive  Cognitive/Neuro/Behavioral: .WDL except, orientation  Level of Consciousness: alert  Arousal Level: opens eyes spontaneously  Orientation: disoriented to (intermittently confused)  Mood/Behavior: calm, cooperative  Best Language: 0 - No aphasia  Speech: clear, spontaneous    Living Environment:   People in home: alone     Current living Arrangements: house      Able to return to prior arrangements: yes       Family/Social Support:  Care provided by: self, homecare agency  Provides care for: no one  Marital Status:    (Ex wife and son)          Description of Support System: Supportive         Current Resources:   Patient receiving home care services: Yes  Skilled Home Care Services: Skilled Nursing ([facility] Team [phone])  Community Resources: Home Care ([facility] Management [facility])  Equipment currently used at home: cane, quad, cane, straight, walker, rolling  Supplies currently used at home: None    Employment/Financial:  Employment Status:  (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)        Financial Concerns: No concerns identified           Lifestyle & Psychosocial Needs:  Social Determinants of Health     Tobacco Use: Not on file   Alcohol Use: Not on file   Financial Resource Strain: Not on file   Food Insecurity: Not on file    Transportation Needs: Not on file   Physical Activity: Not on file   Stress: Not on file   Social Connections: Not on file   Intimate Partner Violence: Not on file   Depression: At risk     PHQ-2 Score: 3   Housing Stability: Not on file       Functional Status:  Prior to admission patient needed assistance:   Dependent ADLs:: Independent  Dependent IADLs:: Medication Management       Mental Health Status:  Mental Health Status: No Current Concerns       Chemical Dependency Status:  Chemical Dependency Status: No Current Concerns             Values/Beliefs:  Spiritual, Cultural Beliefs, Holiness Practices, Values that affect care:                 Additional Information:    Pt transferred from [facility] for orthopedic evaluation secondary to worsening left hip pain found to have loosening of his prior femoral stem placement.  Pt with medical history significant for  atrial fibrillation on Elliquis, TAVR, JUDE in 1999.  PT/OT, Ortho and Neurosurgery consulted.  CMA d/t elevated readmission risk score.  PT currently anticipates TCU placement however final plan pending ortho recommendations and pending surgical intervention.  Possible transfer to  for on going medical management.  Per chart review [facility] CM team noted that pt is only 60% service connected through the [facility], he does NOT qualify for TCU through the [facility].  Pt has Medicare part A coverage which would provide 20 days of a TCU stay.      Met with pt.  Introduced RNCC role. Pt notes that his ex wife and son are primary support stystem.  Per pt he is normally independent with all his own care needs, still driving. .  Pt states he has a home health RN through [employer] that assists him with medication set up.  Pt aware that final discharge plan pending on going medical work up and final therapy recommendations.  Pt notes no concerns or questions at this time.     Writer spoke with [facility] [employer] who confirmed pt is open to this agency for every  other week RN visits for medication management.   Home care RN services are covered through the [facility].     RNCC will continue to monitor.    Previous Home Care Services:  [facility] [phone]  RN     Yasmin Clemens, RN BSN, PHN, ACM-RN  7A RN Care Coordinator  Phone: [phone]  Pager [phone]    To contact the weekend RNCC  [place] (0800 [phone] 1630) Saturday and Sunday    Units: 4A, 4C, 4E, 5A and 5B- Pager 1: [phone]    Units: 6A, 6B, 6C, 6D- Pager 2: [phone]    Units: 7A, 7B, 7C, 7D, and 5C-Pager 3: [phone]    [facility] ([phone]) Saturday and Sunday    Units: 5 Ortho, 8A, 10 ICU, & Pediatric Units-Pager 4: [phone]    1/24/2023 2:54 PM

## 2023-01-24 NOTE — PROGRESS NOTES
Brief Transfer Note    Please see daily progress note from medicine team for more details.     Plan to transfer patient to the [facility] from [facility] for orthopedic procedure.    [facility], could not bear weight. Hx of JUDE in 1999. Per Ortho, felt to be due loosening of the prothesis. He had aspiration done by IR 1/23. He also had encephalopathy prior to transfer - CT head from several days ago showed small subdural hematoma which has been stable on several CTs now. Seen by neurosurgery and OK for DVT ppx, recommend holding Eliquis for Afib. Encephalopathy now resolved. Ortho requests t/f to [facility] for orthopedic procedure yet to be determined.     Discussed with Dr. Juan Elliott ([facility]) and Dr. Catalina Coronado ([facility]) who accepts transfer. No beds available but anticipate transfer to [facility] in next 1-2 days.     Faraz Smith MD ([place])  Internal Medicine/Pediatrics  Hospitalist

## 2023-01-24 NOTE — PLAN OF CARE
"Shift: 9636-7841  /54 (BP Location: Left arm)   Pulse 60   Temp 97.7  F (36.5  C) (Oral)   Resp 18   Ht 1.854 m (6' 0.99\")   Wt 119.7 kg (263 lb 14.3 oz)   SpO2 94%   BMI 34.82 kg/m       VSS on 1L oxy mask, on Tele and cont pulse ox.  BG- none  Labs- AM labs pending  Pain/Nausea/PRN'S- denies pain and nausea.Schdeuled tylenol for pain.  Diet- low fat diet  LDA- L PIV infusing, right PIV SL  Gtt/IVF- 0.45% NS @75 ml/hr  GI/- Rice cathter 650 ml output, one BM overnight.  Activity- 1-2 Assist to commode  Plan- Renogram today 1/24    "

## 2023-01-24 NOTE — PROGRESS NOTES
Discussed with Triage MD. Dr. Eulalia Smith. Pt will be transferred to  in 1-2 days. Orthopedics has been consulted, as well as  Neurosurgery for SDH.

## 2023-01-25 ENCOUNTER — APPOINTMENT (OUTPATIENT)
Dept: PHYSICAL THERAPY | Facility: CLINIC | Age: 83
DRG: 559 | End: 2023-01-25
Attending: STUDENT IN AN ORGANIZED HEALTH CARE EDUCATION/TRAINING PROGRAM
Payer: COMMERCIAL

## 2023-01-25 LAB
ALBUMIN SERPL BCG-MCNC: 3.6 G/DL (ref 3.5–5.2)
ALBUMIN UR-MCNC: 20 MG/DL
ALP SERPL-CCNC: 68 U/L (ref 40–129)
ALT SERPL W P-5'-P-CCNC: 14 U/L (ref 10–50)
ANION GAP SERPL CALCULATED.3IONS-SCNC: 13 MMOL/L (ref 7–15)
APPEARANCE UR: ABNORMAL
AST SERPL W P-5'-P-CCNC: 29 U/L (ref 10–50)
BACTERIA #/AREA URNS HPF: ABNORMAL /HPF
BILIRUB SERPL-MCNC: 1.7 MG/DL
BILIRUB UR QL STRIP: NEGATIVE
BUN SERPL-MCNC: 42.1 MG/DL (ref 8–23)
CALCIUM SERPL-MCNC: 9.1 MG/DL (ref 8.8–10.2)
CHLORIDE SERPL-SCNC: 109 MMOL/L (ref 98–107)
COLOR UR AUTO: YELLOW
CREAT SERPL-MCNC: 1.19 MG/DL (ref 0.67–1.17)
DEPRECATED HCO3 PLAS-SCNC: 21 MMOL/L (ref 22–29)
ERYTHROCYTE [DISTWIDTH] IN BLOOD BY AUTOMATED COUNT: 14.7 % (ref 10–15)
GFR SERPL CREATININE-BSD FRML MDRD: 62 ML/MIN/1.73M2
GLUCOSE SERPL-MCNC: 98 MG/DL (ref 70–99)
GLUCOSE UR STRIP-MCNC: NEGATIVE MG/DL
HCT VFR BLD AUTO: 32.3 % (ref 40–53)
HGB BLD-MCNC: 10.5 G/DL (ref 13.3–17.7)
HGB UR QL STRIP: ABNORMAL
KETONES UR STRIP-MCNC: NEGATIVE MG/DL
LEUKOCYTE ESTERASE UR QL STRIP: ABNORMAL
MCH RBC QN AUTO: 31.3 PG (ref 26.5–33)
MCHC RBC AUTO-ENTMCNC: 32.5 G/DL (ref 31.5–36.5)
MCV RBC AUTO: 96 FL (ref 78–100)
MUCOUS THREADS #/AREA URNS LPF: PRESENT /LPF
NITRATE UR QL: NEGATIVE
PH UR STRIP: 5 [PH] (ref 5–7)
PLATELET # BLD AUTO: 139 10E3/UL (ref 150–450)
POTASSIUM SERPL-SCNC: 4.1 MMOL/L (ref 3.4–5.3)
PROT SERPL-MCNC: 6.2 G/DL (ref 6.4–8.3)
RBC # BLD AUTO: 3.36 10E6/UL (ref 4.4–5.9)
RBC URINE: 81 /HPF
SODIUM SERPL-SCNC: 143 MMOL/L (ref 136–145)
SP GR UR STRIP: 1.01 (ref 1–1.03)
SQUAMOUS EPITHELIAL: 1 /HPF
TRANSITIONAL EPI: <1 /HPF
UROBILINOGEN UR STRIP-MCNC: 2 MG/DL
WBC # BLD AUTO: 5.1 10E3/UL (ref 4–11)
WBC URINE: 58 /HPF

## 2023-01-25 PROCEDURE — 99232 SBSQ HOSP IP/OBS MODERATE 35: CPT | Performed by: NURSE PRACTITIONER

## 2023-01-25 PROCEDURE — 120N000011 HC R&B TRANSPLANT UMMC

## 2023-01-25 PROCEDURE — 97112 NEUROMUSCULAR REEDUCATION: CPT | Mod: GP

## 2023-01-25 PROCEDURE — 250N000011 HC RX IP 250 OP 636: Performed by: STUDENT IN AN ORGANIZED HEALTH CARE EDUCATION/TRAINING PROGRAM

## 2023-01-25 PROCEDURE — 93005 ELECTROCARDIOGRAM TRACING: CPT

## 2023-01-25 PROCEDURE — 36415 COLL VENOUS BLD VENIPUNCTURE: CPT | Performed by: NURSE PRACTITIONER

## 2023-01-25 PROCEDURE — 80053 COMPREHEN METABOLIC PANEL: CPT | Performed by: NURSE PRACTITIONER

## 2023-01-25 PROCEDURE — 97530 THERAPEUTIC ACTIVITIES: CPT | Mod: GP

## 2023-01-25 PROCEDURE — 87086 URINE CULTURE/COLONY COUNT: CPT | Performed by: STUDENT IN AN ORGANIZED HEALTH CARE EDUCATION/TRAINING PROGRAM

## 2023-01-25 PROCEDURE — 250N000013 HC RX MED GY IP 250 OP 250 PS 637: Performed by: NURSE PRACTITIONER

## 2023-01-25 PROCEDURE — 99232 SBSQ HOSP IP/OBS MODERATE 35: CPT | Mod: GC | Performed by: INTERNAL MEDICINE

## 2023-01-25 PROCEDURE — 93010 ELECTROCARDIOGRAM REPORT: CPT | Performed by: INTERNAL MEDICINE

## 2023-01-25 PROCEDURE — 250N000013 HC RX MED GY IP 250 OP 250 PS 637: Performed by: STUDENT IN AN ORGANIZED HEALTH CARE EDUCATION/TRAINING PROGRAM

## 2023-01-25 PROCEDURE — 258N000002 HC RX IP 258 OP 250: Performed by: NURSE PRACTITIONER

## 2023-01-25 PROCEDURE — 250N000011 HC RX IP 250 OP 636

## 2023-01-25 PROCEDURE — 81001 URINALYSIS AUTO W/SCOPE: CPT | Performed by: STUDENT IN AN ORGANIZED HEALTH CARE EDUCATION/TRAINING PROGRAM

## 2023-01-25 PROCEDURE — 85014 HEMATOCRIT: CPT | Performed by: STUDENT IN AN ORGANIZED HEALTH CARE EDUCATION/TRAINING PROGRAM

## 2023-01-25 RX ORDER — HALOPERIDOL 5 MG/ML
2 INJECTION INTRAMUSCULAR ONCE
Status: COMPLETED | OUTPATIENT
Start: 2023-01-25 | End: 2023-01-25

## 2023-01-25 RX ORDER — HALOPERIDOL 5 MG/ML
2 INJECTION INTRAMUSCULAR
Status: COMPLETED | OUTPATIENT
Start: 2023-01-25 | End: 2023-01-25

## 2023-01-25 RX ORDER — HALOPERIDOL 5 MG/ML
2 INJECTION INTRAMUSCULAR
Status: DISCONTINUED | OUTPATIENT
Start: 2023-01-25 | End: 2023-01-25

## 2023-01-25 RX ORDER — HALOPERIDOL 5 MG/ML
INJECTION INTRAMUSCULAR
Status: COMPLETED
Start: 2023-01-25 | End: 2023-01-25

## 2023-01-25 RX ADMIN — HEPARIN SODIUM 5000 UNITS: 5000 INJECTION, SOLUTION INTRAVENOUS; SUBCUTANEOUS at 16:21

## 2023-01-25 RX ADMIN — FOLIC ACID 1 MG: 1 TABLET ORAL at 10:07

## 2023-01-25 RX ADMIN — SENNOSIDES AND DOCUSATE SODIUM 2 TABLET: 50; 8.6 TABLET ORAL at 10:07

## 2023-01-25 RX ADMIN — THIAMINE HCL TAB 100 MG 100 MG: 100 TAB at 10:07

## 2023-01-25 RX ADMIN — ACETAMINOPHEN 975 MG: 325 TABLET, FILM COATED ORAL at 19:58

## 2023-01-25 RX ADMIN — HALOPERIDOL LACTATE 2 MG: 5 INJECTION, SOLUTION INTRAMUSCULAR at 04:29

## 2023-01-25 RX ADMIN — SODIUM CHLORIDE: 4.5 INJECTION, SOLUTION INTRAVENOUS at 02:03

## 2023-01-25 RX ADMIN — GABAPENTIN 100 MG: 100 CAPSULE ORAL at 19:58

## 2023-01-25 RX ADMIN — HEPARIN SODIUM 5000 UNITS: 5000 INJECTION, SOLUTION INTRAVENOUS; SUBCUTANEOUS at 08:33

## 2023-01-25 RX ADMIN — ACETAMINOPHEN 975 MG: 325 TABLET, FILM COATED ORAL at 10:04

## 2023-01-25 RX ADMIN — ACETAMINOPHEN 975 MG: 325 TABLET, FILM COATED ORAL at 14:18

## 2023-01-25 RX ADMIN — GABAPENTIN 100 MG: 100 CAPSULE ORAL at 14:18

## 2023-01-25 RX ADMIN — HALOPERIDOL LACTATE 2 MG: 5 INJECTION, SOLUTION INTRAMUSCULAR at 05:49

## 2023-01-25 RX ADMIN — ATORVASTATIN CALCIUM 80 MG: 40 TABLET, FILM COATED ORAL at 21:27

## 2023-01-25 RX ADMIN — FLUTICASONE PROPIONATE 2 SPRAY: 50 SPRAY, METERED NASAL at 10:05

## 2023-01-25 RX ADMIN — HALOPERIDOL LACTATE 5 MG: 5 INJECTION, SOLUTION INTRAMUSCULAR at 04:05

## 2023-01-25 RX ADMIN — SENNOSIDES AND DOCUSATE SODIUM 1 TABLET: 50; 8.6 TABLET ORAL at 19:58

## 2023-01-25 RX ADMIN — GABAPENTIN 100 MG: 100 CAPSULE ORAL at 10:06

## 2023-01-25 RX ADMIN — SODIUM CHLORIDE: 4.5 INJECTION, SOLUTION INTRAVENOUS at 15:48

## 2023-01-25 ASSESSMENT — ACTIVITIES OF DAILY LIVING (ADL)
ADLS_ACUITY_SCORE: 37
ADLS_ACUITY_SCORE: 35
ADLS_ACUITY_SCORE: 37
ADLS_ACUITY_SCORE: 35
ADLS_ACUITY_SCORE: 37
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
## 2023-01-25 NOTE — PROGRESS NOTES
[facility]    Progress Note - Medicine Service, P[facility]A TEAM 5       Date of Admission:  1/23/2023    Assessment & Plan   David Thomson is a 82 year old male admitted on 1/23/2023. He has a history of atrial fibrillation on Elliquis, TAVR, JUDE in 1999 who initially presented to OSH for worsening left hip pain found to have loosening of his prior femoral stem placement. He was transferred to [facility] for evaluation and possible intervention by orthopedics. Also with new onset worsening altered mental status/delirium found to have stable subdural hematoma on CT x2 completed 1/22/2023.     Today:  -Delirious overnight, received Haldol  -EKG with normal QTc    Left hip pain  Left Hip Femoral Step Prosthesis Loosening  Hx left hip replacement (1999)  [facility] 6 [facility] and difficulty bearing weight despite optimized pain control regimen. XR showed loosening femoral stem with bone response distally to the stem. Pelvic CT confirmed loosening of the femoral stem with periprosthetic bone reabsorption and endosteal scalloping, but no fracture or joint malalignment. Also with moderate size left hip joint effusion. Patient transferred for assessment by orthopedics for possible surgical intervention.  - Orthopedics consult, appreciate recs  - PTA gabapentin 100mg TID (renal reduced dose, dose reduced to 1/24 per pharmacy recommendation)  - oxycodone 7.5mg q4hr  - IV dilaudid 0.2mg for breakthrough pain     Subdural Hematoma, stable  Hx of Dorsal Column Spinal Stimulator Insertion, 2016  -Neurosurgery consulted and following  -Plan to repeat head CT without contrast prior to orthopedic operation in coming days     Hospital acquired delirium  Delirious the night of 1/24-1/25 trying to leave, talking about stolen money. Received Zyprexa 5mg, haldol 5mg, 2mg, and 2mg. Sleepy but oriented the following morning.  - 1:1 for  "delirium  - Delirium precautions  - Avoid benzos  - ensure adequate pain control    Pre-renal BRITTNEY secondary to volume depletion - improving  On 1/22 patient noted to have Cr 1.57 (baseline 0.8-0.9). FeNa 0.5, likely pre-renal. UA non-infectious. Etiologies considered include dehydration, gabapentin (previously on 900mg TID until 1/22).   - Hold Entresto 97-103mg in setting of BRITTNEY     CAD  Hx of CABG  Essential HTN  TAVR  HLD  Atrial fibrillation  Denies any chest pain, palpitations this admission.    - Hold aspirin   - Hold Atorvastatin 80mg QHS   - Hold Entresto 97-103mg in setting of BRITTNEY   - continue Metoprolol 12.5mg   - Hold Eliquis in setting of subdural hematoma, on chronically for Atrial fibrillation     GERD  - PTA omeprazole       Diet: Combination Diet Low Saturated Fat Diet    DVT Prophylaxis: Heparin SQ  Rice Catheter: PRESENT, indication: Retention, Retention  Fluids: None  Lines: None     Cardiac Monitoring: ACTIVE order. Indication: Tachyarrhythmias, acute (48 hours)  Code Status: Full Code      Clinically Significant Risk Factors          # Hypocalcemia: Lowest Ca = 8.3 mg/dL in last 2 days, will monitor and replace as appropriate     # Hypoalbuminemia: Lowest albumin = 3.4 g/dL at 1/24/2023  5:57 AM, will monitor as appropriate   # Thrombocytopenia: Lowest platelets = 122 in last 2 days, will monitor for bleeding         # Obesity: Estimated body mass index is 34.82 kg/m  as calculated from the following:    Height as of this encounter: 1.854 m (6' 0.99\").    Weight as of this encounter: 119.7 kg (263 lb 14.3 oz)., PRESENT ON ADMISSION         Disposition Plan     Pending orthopedic surgical planning    The patient's care was discussed with the Attending Physician, Dr. Elliott, Bedside Nurse and Patient.    Vargas Uriarte MD  Medicine Service, [facility] TEAM [address] [place] [address]  Securely message with [email or web address] (more info)  Text page via [employer] Paging/Directory   See " signed in provider for up to date coverage information  ______________________________________________________________________    Interval History   Delirious overnight.  Afebrile and hemodynamically stable.  Received Zyprexa and Haldol overnight.  Sleepy this morning and slightly confused but acting appropriately.  Ongoing left hip pain moderately controlled.    Physical Exam   Vital Signs: Temp: 97.7  F (36.5  C) Temp src: Oral BP: 99/55 Pulse: 66   Resp: 16 SpO2: 95 % O2 Device: Nasal cannula Oxygen Delivery: 2 LPM  Weight: 263 lbs 14.25 oz    General: Alert sitting in bed no acute distress  HEENT: Moist mucous membranes, anisicoria  CV: Regular rate and rhythm no murmur  Resp: Clear to auscultation bilaterally breathing comfortably on 2 L oxygen mask  Abd: Soft nontender nondistended  Extremities: Tenderness with movement of left lower extremity no swelling visible skin intact  Neuro: Alert, oriented to self, knows he is in the hospital but not that he is at [facility] not oriented day of the week.  Anisocoria, cranial nerves intact, 5 out of 5 strength in all extremities, intact sensation    Medical Decision Making         Data   ------------------------- PAST 24 HR DATA REVIEWED -----------------------------------------------

## 2023-01-25 NOTE — PLAN OF CARE
"Vital signs:  Temp: 97.8  F (36.6  C) Temp src: Oral BP: 105/56 Pulse: 52   Resp: 18 SpO2: 94 % O2 Device: None (Room air) Oxygen Delivery: 2 LPM Height: 185.4 cm (6' 0.99\") Weight: 119.7 kg (263 lb 14.3 oz)    7a-3p: VSS, pt requiring oxygen while sleeping due to sleep apnea, stable on RA while awake. Intermittently angela, EKG complete and MD aware, AM metop held.  Pt remains confused, lethargic for most of shift, but is calm, cooperative and following commands. Tolerated breakfast, +bm. Rice cath removed and pt voided, . Urine sample sent to lab. Oob to chair x2. Plan for transfer to [facility] when bed is available for orthopedic sx.     Problem: Plan of Care - These are the overarching goals to be used throughout the patient stay.    Goal: Plan of Care Review  Description: The Plan of Care Review/Shift note should be completed every shift.  The Outcome Evaluation is a brief statement about your assessment that the patient is improving, declining, or no change.  This information will be displayed automatically on your shift note.  Outcome: Progressing  Flowsheets (Taken 1/25/2023 1421)  Plan of Care Reviewed With: patient  Overall Patient Progress: improving   Goal Outcome Evaluation:      Plan of Care Reviewed With: patient    Overall Patient Progress: improvingOverall Patient Progress: improving           "
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
## 2023-01-25 NOTE — PLAN OF CARE
Orthopedic Surgery    New order for repeat left hip aspiration to be done with IR. Aerobic cultures were not able to be completed as fluid was sent to lab in anaerobic tube only.     IR consulted.       Dorota Wright PA-C  1/25/2023 4:26 PM  [facility]  Orthopaedic Surgery

## 2023-01-25 NOTE — PLAN OF CARE
"/52 (BP Location: Left arm)   Pulse 62   Temp 97.4  F (36.3  C) (Oral)   Resp 16   Ht 1.854 m (6' 0.99\")   Wt 119.7 kg (263 lb 14.3 oz)   SpO2 93%   BMI 34.82 kg/m       Patient consistently oriented to self. Intermittently disoriented to time, place, and situation. VS stable. Patient on room air. Patient denies pain. Patient denies nausea. Urine Output - rollins catheter in place. Bowel Function - Multiple BMs during shift. Nutrition - Combination low saturated fat. PIV - 1/5 NS @ 75 ml/hr. PIV - saline locked. Activity - Assist of one with walker. Bed alarm and chair alarm in use. Plan of Care - Will continue to monitor and notify care team of any changes. Plan for patient to transfer to [facility] in 1 to 2 days.   "

## 2023-01-25 NOTE — PROGRESS NOTES
"Orthopedic Surgery Progress Note   1/24/2023    Subjective: No acute events overnight. Pain well controlled at rest. Tolerating diet. Voiding spontaneously. Denies fever or chills, CP, SOB, numbness or tingling, motor dysfunction or weakness. Expresses frustration with prolonged hospital course.     Exam:  /52 (BP Location: Left arm)   Pulse 62   Temp 97.4  F (36.3  C) (Oral)   Resp 16   Ht 1.854 m (6' 0.99\")   Wt 119.7 kg (263 lb 14.3 oz)   SpO2 93%   BMI 34.82 kg/m    Gen: Awake, alert, NAD  Resp: breathing equal and non-labored  Extremities:  LLE: Toes wwp, DP 2+, bcr in all toes. Compartments soft and tolerates passive stretch of toes. +EHL/FHL/GSC/TA with /5 strength. SILT SP/DP/Sa/Morrison/T. Tenderness to palpation over lateral thigh. No pain with hip ROM.    Labs:  Recent Labs   Lab 01/24/23  0557 01/23/23  0644 01/22/23  0554 01/20/23  0559   WBC 5.7 7.1 9.1 8.0   HGB 10.5* 11.1* 12.5* 12.8*   * 122* 148* 143*     Recent Labs   Lab 01/24/23  0557 01/23/23  2129 01/23/23  0644 01/22/23  2152 01/22/23  2140 01/22/23  1415 01/22/23  1021     138 138 136  --  134*   < > 135*   POTASSIUM 4.8  4.8 5.1 4.8  --  4.9   < > 5.3   CHLORIDE 107  107 106 103  --  98   < > 99   CO2 20*  20* 20* 21*  --  26   < > 23   BUN 63.0*  63.0* 65.4* 68.1*  --  61.4*   < > 57.4*   CR 1.37*  1.37* 1.45* 1.58*  --  1.57*   < > 1.65*   *  137* 171* 161* 249* 206*   < > 176*   MAG  --   --   --   --   --   --  2.2   PHOS  --   --   --   --   --   --  5.3*    < > = values in this interval not displayed.     Recent Labs   Lab 01/23/23  1220 01/18/23  0609   INR 1.66* 1.39*   PTT 48* 39*     No lab results found in last 7 days.    Invalid input(s): ESR    Assessment:   David Thomson is a 82 year old male with a significant PMH of JUDE in 1999 and afib on Eliquis  who was admitted with left hip pain and inability to weightbear for the past 2 weeks. S/P left hip aspiration by IR on 1/23, cultures pending, " no growth to date    Plan for 1/24/23  - Plan for transfer to [place] pending bed availability and medical clearance.   - Continue to monitor culture results.  - Operative plan pending culture results.     Plan:  Activity: NWB LLE  Wound Cares/Dressing/Splint: none from an orthopedic standpoint  DVT PPx: DVT ppx per primary team  Antibiotic: Antibiotic selection per primary team  Cultures: Will follow cultures.   Labs: No additional labs at this time  Imaging: No further imaging needed at this time  PT/OT: Work on ROM, strengthening, gait training, mobility, and ADLs  Dispo: Per Primary team     Operative Plan: TBD    CURTIS BLANK PA-C  1/24/2023 6:21 PM  Orthopaedic Surgery     Thank you for allowing me to participate in this patient's care. Please page me directly any questions/concerns.   Securely message with the Vocera Web Console (learn more here)  Text page via [employer] Paging/Directory    If there is no response, if it is a weekend, or if it is during evening hours, please page the orthopaedic surgery resident on call via [employer] Paging/Directory

## 2023-01-25 NOTE — PROGRESS NOTES
"[facility], [place]   Neurosurgery Progress Note:    Date of service: 1/25/2023    Assessment: David Thomson is a 82 year old male on Eliquis 5mg q 12 (for Atrial fibrillation) and ASA 81 mg (for CABG), which have been held since 1/16 and 1/15 respectively, and history left hip replacement on 1999. Patient was transferred for evaluation by the Orthopedics team in our facility for possible left hip revision. We have been consulted for a small left sided, frontoparietal SDH with decrease in size on 6 hour follow up CT.    Clinically Significant Risk Factors Present on Admission            # Obesity: Estimated body mass index is 34.82 kg/m  as calculated from the following:    Height as of this encounter: 1.854 m (6' 0.99\").    Weight as of this encounter: 119.7 kg (263 lb 14.3 oz).   # HTN  # Pre-diabetes          Recommendations:  - No neurosurgical intervention indicated at this time  - OK to start patient on subcutaneous Heparin at prophylactic dosing from neurosurgical perspective  - Continue to hold all other anticoagulation in anticipation of possible orthopedic intervention  - Recommend repeat CT scan before orthopedic surgery, please page Neurosurgery to review imaging once completed.   - Serial Neuro exams  - SBP <140  - All other cares per primary team  - We will continue to follow      ANA LILIA Olivares, CNP  Department of Neurosurgery  Pager: [phone]        I have spent a total of 35 minutes total time counseling, coordination, and chart review, over 50% of the time was spent in direct patient care.       Interval History:  Continues to deny headache, nausea, vomiting.  Orthopedics plans to transfer patient to [place] for possible operative intervention.          Objective:   Temp:  [97.4  F (36.3  C)-97.9  F (36.6  C)] 97.7  F (36.5  C)  Pulse:  [60-80] 66  Resp:  [16] 16  BP: ()/(52-81) 99/55  SpO2:  [93 %-95 %] 95 %  I/O last 3 completed shifts:  In: 900 " [I.V.:900]  Out: 2870 [Urine:2870]    Gen: Appears comfortable, NAD  Neurologic:  - Alert & Oriented x3  - Follows commands briskly  - Speech fluent, spontaneous. No aphasia or dysarthria.  - No gaze preference. No apparent hemineglect.  - Left pupil enlarged with teardrop irregularity, right pupil regular and reactive, EOMI  - Strong eye closure, jaw clench, and cheek puff  - Face symmetric with sensation intact to light touch  - Tongue protrudes midline  - Trapezii and sternocleidomastoid muscles 5/5 bilaterally  - No pronator drift     Del Tr Bi WE WF Gr   R 5 5 5 5 5 5   L 5 5 5 5 5 5    HF KE KF DF PF EHL   R 5 5 5 5 5 5   L 5 5 5 5 5 5     Reflexes 2+ throughout    Sensation intact and symmetric to light touch throughout    LABS  Recent Labs   Lab Test 23  0557 23  0644 23  0554   WBC 5.7 7.1 9.1   HGB 10.5* 11.1* 12.5*   MCV 96 96 95   * 122* 148*       Recent Labs   Lab Test 23  0557 23  2129 23  0644     138 138 136   POTASSIUM 4.8  4.8 5.1 4.8   CHLORIDE 107  107 106 103   CO2 20*  20* 20* 21*   BUN 63.0*  63.0* 65.4* 68.1*   CR 1.37*  1.37* 1.45* 1.58*   ANIONGAP 11  11 12 12   ANDREW 8.3*  8.3* 8.8 8.6*   *  137* 171* 161*       IMAGING    Recent Results (from the past 24 hour(s))   Echocardiogram Complete   Result Value    LVEF  55-60%    Narrative    [id number]  [id number]  [id number]  [id number]^MEGAN^AQUILES     [facility],[place]  Echocardiography Laboratory  [address] [place] [id number]     Name: BIBI QUINONES  MRN: [id number]  : 1940  Study Date: 2023 08:25 AM  Age: 82 yrs  Gender: Male  Patient [facility]  Reason For Study: Dizziness  Ordering Physician: AQUILES GUZMAN  Referring Physician: TOD MCCABE  Performed By: Saida Reyes  Height: 73 in     BP: 97/54 mmHg  ______________________________________________________________________________  Procedure  Complete  Portable Echo Adult. Contrast Optison. Patient was given 6 ml mixture  of 3 ml Optison and 6 ml saline. 3 ml wasted.     ______________________________________________________________________________  Interpretation Summary  Technically difficult study limited views obtained.  Left ventricular size, wall motion and function are normal. The ejection  fraction is 55-60%.  Right ventricular function, chamber size, wall motion, and thickness are  normal.  There is a bioprosthetic aortic valve (probably TAVR) with a mean gradient of  14 mmHg; details of the valve are unclear but this is likely within normal  limits.  IVC diameter <2.1 cm collapsing >50% with sniff suggests a normal RA pressure  of 3 mmHg.  No pericardial effusion is present.  There is no prior study for direct comparison.     ______________________________________________________________________________  Left Ventricle  Left ventricular size, wall motion and function are normal. The ejection  fraction is 55-60%. Left ventricular wall thickness cannot evaluate. Left  ventricular diastolic function is indeterminate.     Right Ventricle  Right ventricular function, chamber size, wall motion, and thickness are  normal.     Atria  The right atria appears normal. Moderate left atrial enlargement is present.     Mitral Valve  The mitral valve is normal. Mild mitral annular calcification is present.     Aortic Valve  There is a bioprosthetic aortic valve (probably TAVR) with a mean gradient of  14 mmHg; details of the valve are unclear but this is likely within normal  limits. The calculated aortic valve are is 1.2 cm^2.     Tricuspid Valve  The tricuspid valve is normal. The peak velocity of the tricuspid regurgitant  jet is not obtainable. Trace tricuspid insufficiency is present.     Pulmonic Valve  On Doppler interrogation, there is no significant stenosis or regurgitation.     Vessels  The inferior vena cava was normal in size with preserved  respiratory  variability. The aorta root cannot be assessed. Mild dilatation of the aorta  is present. Ascending aorta 4.1 cm. IVC diameter <2.1 cm collapsing >50% with  sniff suggests a normal RA pressure of 3 mmHg.     Pericardium  No pericardial effusion is present.     Compared to Previous Study  There is no prior study for direct comparison.     Attestation  I have personally viewed the imaging and agree with the interpretation and  report as documented by the fellow, Marco Rushing, and/or edited by me.     ______________________________________________________________________________  MMode/2D Measurements & Calculations  IVSd: 0.97 cm  LVIDd: 4.3 cm  LVIDs: 3.4 cm  LVPWd: 1.0 cm     FS: 19.5 %  LV mass(C)d: 139.9 grams  Ao root diam: 3.7 cm  asc Aorta Diam: 4.1 cm  LVOT diam: 2.1 cm  LVOT area: 3.5 cm2  LA Volume (BP): 70.6 ml  RWT: 0.48     Doppler Measurements & Calculations  MV E max jose juan: 51.9 cm/sec  MV A max jose juan: 72.3 cm/sec  MV E/A: 0.72  MV dec slope: 155.0 cm/sec2  MV dec time: 0.33 sec  Ao V2 max: 256.0 cm/sec  Ao max P.2 mmHg  Ao V2 mean: 156.0 cm/sec  Ao mean P.0 mmHg  Ao V2 VTI: 39.1 cm  ARUN(I,D): 1.2 cm2  ARUN(V,D): 1.1 cm2  LV V1 max P.7 mmHg  LV V1 max: 81.4 cm/sec  LV V1 VTI: 14.0 cm  SV(LVOT): 48.5 ml  AV Jose Juan Ratio (DI): 0.32  E/E' av.6     Lateral E/e': 4.3  Medial E/e': 7.0     ______________________________________________________________________________  Report approved by: Sheri Lucero 2023 09:45 AM         US Renal Complete Non-Vascular    Narrative    EXAMINATION: US RENAL COMPLETE NON-VASCULAR, 2023 10:46 AM     COMPARISON: None.    HISTORY: BRITTNEY with history of BPH    TECHNIQUE: The kidneys and bladder were scanned in the standard  fashion with specialized ultrasound transducer(s) using both gray  scale and limited color/spectral Doppler techniques.    FINDINGS:    Right kidney: Visualization is limited. Measures 11.6 cm in length.  Parenchyma is of  normal thickness and echogenicity. No focal mass. No  hydronephrosis.    Left kidney: Measures 12.6 cm in length. Parenchyma is of normal  thickness and echogenicity. 1.4 cm simple cyst in the superior pole.  No focal mass. No hydronephrosis.     Bladder: Decompressed by Rice catheter.      Impression    IMPRESSION:  1.  No hydronephrosis.  2.  Urinary bladder is decompressed by Rice catheter.    I have personally reviewed the examination and initial interpretation  and I agree with the findings.    MAGGI GARCIA MD         SYSTEM ID:  [id number]   IR Fine Needle Aspiration w Ultrasound    Narrative    PROCEDURES :  1. Ultrasound guided left hip aspiration  2. Fluoroscopic-guided left hip aspiration    Clinical History: Post left hip replacement, concern for prostatic  joint infection    Comparisons: 1/17/2023    Attending: FLO Friedman MD    Fellow: THEA Ashton MD    Medications: The patient was sedated for the procedure    Medications:   10 cc lidocaine for local anesthesia     Fluoroscopic time: 4.3 minutes    PROCEDURE: The patient understood the limitations, alternatives, and  risks of the procedure and requested the procedure be performed. Both  written and oral consent were obtained.    With the patient in the supine position limited pre-procedural  ultrasound performed demonstrated trace left hip joint effusion.    Under ultrasound guidance 20-gauge spinal needle was advanced into the  fluid. Multiple attempts were made at aspiration without significant  fluid.    Decision was made to convert fluoroscopic guidance. Under fluoroscopic  guidance a 20-gauge spinal needle was advanced to the prosthetic  lateral head neck junction. Multiple attempts were made at aspiration  without significant return of fluid. Approximately 1 to 2 cc of saline  was injected through the needle and aspirated successfully. Similar to  labs.      Impression    IMPRESSION:   No significant aspiration of fluid with ultrasound or  fluoroscopic  guidance. Approximately 1-2 cc of saline was injected into the hip  joint and aspirated and sent for labs.

## 2023-01-26 ENCOUNTER — APPOINTMENT (OUTPATIENT)
Dept: INTERVENTIONAL RADIOLOGY/VASCULAR | Facility: CLINIC | Age: 83
DRG: 559 | End: 2023-01-26
Attending: NURSE PRACTITIONER
Payer: COMMERCIAL

## 2023-01-26 ENCOUNTER — APPOINTMENT (OUTPATIENT)
Dept: CT IMAGING | Facility: CLINIC | Age: 83
DRG: 559 | End: 2023-01-26
Attending: STUDENT IN AN ORGANIZED HEALTH CARE EDUCATION/TRAINING PROGRAM
Payer: COMMERCIAL

## 2023-01-26 ENCOUNTER — APPOINTMENT (OUTPATIENT)
Dept: PHYSICAL THERAPY | Facility: CLINIC | Age: 83
DRG: 559 | End: 2023-01-26
Attending: STUDENT IN AN ORGANIZED HEALTH CARE EDUCATION/TRAINING PROGRAM
Payer: COMMERCIAL

## 2023-01-26 LAB
ALBUMIN SERPL BCG-MCNC: 3.7 G/DL (ref 3.5–5.2)
ALP SERPL-CCNC: 73 U/L (ref 40–129)
ALT SERPL W P-5'-P-CCNC: 15 U/L (ref 10–50)
ANION GAP SERPL CALCULATED.3IONS-SCNC: 14 MMOL/L (ref 7–15)
AST SERPL W P-5'-P-CCNC: 28 U/L (ref 10–50)
ATRIAL RATE - MUSE: 54 BPM
BACTERIA UR CULT: ABNORMAL
BILIRUB SERPL-MCNC: 1.3 MG/DL
BUN SERPL-MCNC: 27.4 MG/DL (ref 8–23)
CALCIUM SERPL-MCNC: 9 MG/DL (ref 8.8–10.2)
CHLORIDE SERPL-SCNC: 107 MMOL/L (ref 98–107)
CREAT SERPL-MCNC: 0.99 MG/DL (ref 0.67–1.17)
CRP SERPL-MCNC: 45.5 MG/L
DEPRECATED HCO3 PLAS-SCNC: 20 MMOL/L (ref 22–29)
DIASTOLIC BLOOD PRESSURE - MUSE: NORMAL MMHG
ERYTHROCYTE [DISTWIDTH] IN BLOOD BY AUTOMATED COUNT: 14.6 % (ref 10–15)
GFR SERPL CREATININE-BSD FRML MDRD: 77 ML/MIN/1.73M2
GLUCOSE BLDC GLUCOMTR-MCNC: 121 MG/DL (ref 70–99)
GLUCOSE BLDC GLUCOMTR-MCNC: 143 MG/DL (ref 70–99)
GLUCOSE SERPL-MCNC: 95 MG/DL (ref 70–99)
GRAM STAIN RESULT: NORMAL
GRAM STAIN RESULT: NORMAL
HCT VFR BLD AUTO: 32.7 % (ref 40–53)
HGB BLD-MCNC: 11.1 G/DL (ref 13.3–17.7)
INTERPRETATION ECG - MUSE: NORMAL
MCH RBC QN AUTO: 31.5 PG (ref 26.5–33)
MCHC RBC AUTO-ENTMCNC: 33.9 G/DL (ref 31.5–36.5)
MCV RBC AUTO: 93 FL (ref 78–100)
P AXIS - MUSE: 37 DEGREES
PLATELET # BLD AUTO: 149 10E3/UL (ref 150–450)
POTASSIUM SERPL-SCNC: 4.1 MMOL/L (ref 3.4–5.3)
PR INTERVAL - MUSE: 216 MS
PROT SERPL-MCNC: 6.4 G/DL (ref 6.4–8.3)
QRS DURATION - MUSE: 178 MS
QT - MUSE: 476 MS
QTC - MUSE: 451 MS
R AXIS - MUSE: -26 DEGREES
RBC # BLD AUTO: 3.52 10E6/UL (ref 4.4–5.9)
SODIUM SERPL-SCNC: 141 MMOL/L (ref 136–145)
SYSTOLIC BLOOD PRESSURE - MUSE: NORMAL MMHG
T AXIS - MUSE: 66 DEGREES
VENTRICULAR RATE- MUSE: 54 BPM
WBC # BLD AUTO: 4.4 10E3/UL (ref 4–11)

## 2023-01-26 PROCEDURE — 70450 CT HEAD/BRAIN W/O DYE: CPT | Mod: MG

## 2023-01-26 PROCEDURE — 97110 THERAPEUTIC EXERCISES: CPT | Mod: GP

## 2023-01-26 PROCEDURE — 99232 SBSQ HOSP IP/OBS MODERATE 35: CPT | Performed by: NURSE PRACTITIONER

## 2023-01-26 PROCEDURE — 250N000013 HC RX MED GY IP 250 OP 250 PS 637: Performed by: STUDENT IN AN ORGANIZED HEALTH CARE EDUCATION/TRAINING PROGRAM

## 2023-01-26 PROCEDURE — 250N000013 HC RX MED GY IP 250 OP 250 PS 637

## 2023-01-26 PROCEDURE — G1010 CDSM STANSON: HCPCS | Mod: GC | Performed by: RADIOLOGY

## 2023-01-26 PROCEDURE — 87070 CULTURE OTHR SPECIMN AEROBIC: CPT | Performed by: PHYSICIAN ASSISTANT

## 2023-01-26 PROCEDURE — 0S9B3ZZ DRAINAGE OF LEFT HIP JOINT, PERCUTANEOUS APPROACH: ICD-10-PCS | Performed by: RADIOLOGY

## 2023-01-26 PROCEDURE — 99232 SBSQ HOSP IP/OBS MODERATE 35: CPT | Mod: GC | Performed by: INTERNAL MEDICINE

## 2023-01-26 PROCEDURE — 87075 CULTR BACTERIA EXCEPT BLOOD: CPT | Performed by: PHYSICIAN ASSISTANT

## 2023-01-26 PROCEDURE — 10007 FNA BX W/FLUOR GDN 1ST LES: CPT

## 2023-01-26 PROCEDURE — 250N000009 HC RX 250: Performed by: RADIOLOGY

## 2023-01-26 PROCEDURE — 70450 CT HEAD/BRAIN W/O DYE: CPT | Mod: 26 | Performed by: RADIOLOGY

## 2023-01-26 PROCEDURE — 36415 COLL VENOUS BLD VENIPUNCTURE: CPT | Performed by: STUDENT IN AN ORGANIZED HEALTH CARE EDUCATION/TRAINING PROGRAM

## 2023-01-26 PROCEDURE — 85027 COMPLETE CBC AUTOMATED: CPT | Performed by: STUDENT IN AN ORGANIZED HEALTH CARE EDUCATION/TRAINING PROGRAM

## 2023-01-26 PROCEDURE — 250N000013 HC RX MED GY IP 250 OP 250 PS 637: Performed by: NURSE PRACTITIONER

## 2023-01-26 PROCEDURE — 97530 THERAPEUTIC ACTIVITIES: CPT | Mod: GP

## 2023-01-26 PROCEDURE — 120N000011 HC R&B TRANSPLANT UMMC

## 2023-01-26 PROCEDURE — 83518 IMMUNOASSAY DIPSTICK: CPT | Performed by: PHYSICIAN ASSISTANT

## 2023-01-26 PROCEDURE — 86140 C-REACTIVE PROTEIN: CPT | Performed by: STUDENT IN AN ORGANIZED HEALTH CARE EDUCATION/TRAINING PROGRAM

## 2023-01-26 PROCEDURE — 250N000011 HC RX IP 250 OP 636: Performed by: STUDENT IN AN ORGANIZED HEALTH CARE EDUCATION/TRAINING PROGRAM

## 2023-01-26 PROCEDURE — 80053 COMPREHEN METABOLIC PANEL: CPT | Performed by: NURSE PRACTITIONER

## 2023-01-26 PROCEDURE — 77002 NEEDLE LOCALIZATION BY XRAY: CPT | Mod: 26 | Performed by: RADIOLOGY

## 2023-01-26 PROCEDURE — 258N000002 HC RX IP 258 OP 250: Performed by: NURSE PRACTITIONER

## 2023-01-26 PROCEDURE — 87205 SMEAR GRAM STAIN: CPT | Performed by: PHYSICIAN ASSISTANT

## 2023-01-26 PROCEDURE — 20610 DRAIN/INJ JOINT/BURSA W/O US: CPT | Mod: LT | Performed by: RADIOLOGY

## 2023-01-26 RX ADMIN — OXYCODONE HYDROCHLORIDE 7.5 MG: 5 TABLET ORAL at 04:59

## 2023-01-26 RX ADMIN — HEPARIN SODIUM 5000 UNITS: 5000 INJECTION, SOLUTION INTRAVENOUS; SUBCUTANEOUS at 00:28

## 2023-01-26 RX ADMIN — GABAPENTIN 100 MG: 100 CAPSULE ORAL at 08:00

## 2023-01-26 RX ADMIN — POLYETHYLENE GLYCOL 3350 17 G: 17 POWDER, FOR SOLUTION ORAL at 14:27

## 2023-01-26 RX ADMIN — OXYCODONE HYDROCHLORIDE 7.5 MG: 5 TABLET ORAL at 22:09

## 2023-01-26 RX ADMIN — SODIUM CHLORIDE: 4.5 INJECTION, SOLUTION INTRAVENOUS at 04:50

## 2023-01-26 RX ADMIN — ATORVASTATIN CALCIUM 80 MG: 40 TABLET, FILM COATED ORAL at 22:09

## 2023-01-26 RX ADMIN — FOLIC ACID 1 MG: 1 TABLET ORAL at 08:00

## 2023-01-26 RX ADMIN — LIDOCAINE HYDROCHLORIDE 10 ML: 10 INJECTION, SOLUTION EPIDURAL; INFILTRATION; INTRACAUDAL; PERINEURAL at 17:02

## 2023-01-26 RX ADMIN — ACETAMINOPHEN 975 MG: 325 TABLET, FILM COATED ORAL at 08:01

## 2023-01-26 RX ADMIN — THIAMINE HCL TAB 100 MG 100 MG: 100 TAB at 08:00

## 2023-01-26 RX ADMIN — SENNOSIDES AND DOCUSATE SODIUM 2 TABLET: 50; 8.6 TABLET ORAL at 08:00

## 2023-01-26 RX ADMIN — OXYCODONE HYDROCHLORIDE 7.5 MG: 5 TABLET ORAL at 09:47

## 2023-01-26 RX ADMIN — OXYCODONE HYDROCHLORIDE 7.5 MG: 5 TABLET ORAL at 17:55

## 2023-01-26 RX ADMIN — HEPARIN SODIUM 5000 UNITS: 5000 INJECTION, SOLUTION INTRAVENOUS; SUBCUTANEOUS at 16:09

## 2023-01-26 RX ADMIN — Medication 12.5 MG: at 08:00

## 2023-01-26 ASSESSMENT — ACTIVITIES OF DAILY LIVING (ADL)
ADLS_ACUITY_SCORE: 36
ADLS_ACUITY_SCORE: 37
ADLS_ACUITY_SCORE: 36
ADLS_ACUITY_SCORE: 37
ADLS_ACUITY_SCORE: 36
ADLS_ACUITY_SCORE: 36
ADLS_ACUITY_SCORE: 37
ADLS_ACUITY_SCORE: 37
ADLS_ACUITY_SCORE: 36
ADLS_ACUITY_SCORE: 37

## 2023-01-26 NOTE — PLAN OF CARE
"VS: /56 (BP Location: Right arm)   Pulse 52   Temp 97.8  F (36.6  C) (Oral)   Resp 18   Ht 1.854 m (6' 0.99\")   Wt 119.7 kg (263 lb 14.3 oz)   SpO2 94%   BMI 34.82 kg/m        Neuro: was able to arouse to voice at beginning of shift and was alert and oriented to self. Around 1900 after supper was alert and oriented X4   Cardio: has a tele on pulses has been in the 50's   Respiratory: is on 2L at night with oxymask   GI/: continent of bowel and bladder   Skin: intact   Diet:  Regular and has been able to tolerate it    Labs: pending   BG: none   LDA:  PIV with 1/2 NS at 75  Mobility:  Up with assist 1 and a walker   Pain: denied pain was given scheduled Tylenol   PRN medications: none   Changes: pt is more alert and plan to go to the [facility] for surgery   Plan of Care:  Pending                         "

## 2023-01-26 NOTE — PLAN OF CARE
"/59 (BP Location: Left arm)   Pulse 63   Temp 97.7  F (36.5  C) (Oral)   Resp 18   Ht 1.854 m (6' 0.99\")   Wt 119.7 kg (263 lb 14.3 oz)   SpO2 100%   BMI 34.82 kg/m   AVSS on 2L facemask. Patient continues to have left hip pain.Pt states that his pain is pretty bad with movement and not too bad at rest. Prn Oxycodone 7.5 mg x 1.  Patient denies nausea. Urine Output - voided x 2, not measured. Bowel Function - No BM overnight.  Activity - up with 1 assist. Sitter in room. Sitter reports 1 time pt was looking at his IV and touching it. Sitter was able to redirect him and tell him not to touch it. Sitter also reported that most of the time when the pt wanted to get up he would tell him but 1 time he started to get up suddenly without saying anything first.                           "

## 2023-01-26 NOTE — PROGRESS NOTES
"CLINICAL NUTRITION SERVICES - BRIEF NOTE    Received positive admission risk screen for \"unsure\" weight loss.  Per chart review, weight has increased in the last month and overall seems stable with years past.  Patient confused, but still eating per nursing documentation.  If concerned that confusion is impacting oral intake, recommend enter nutrition consult and/or order oral supplements (Ensure Plus High Protein) with meals.     Wt Readings from Last 15 Encounters:   01/23/23 119.7 kg (263 lb 14.3 oz)   01/16/23 114.8 kg (253 lb)   12/23/20 111.1 kg (245 lb)   07/21/20 115.7 kg (255 lb)   04/19/16 126.1 kg (278 lb)   03/03/15 129.1 kg (284 lb 9.6 oz)     Carissa Oglesby, MS, RD, LD, CCTD, CNSC  7A/Obs unit pager [phone]  Weekend pager [phone]    "

## 2023-01-26 NOTE — PLAN OF CARE
"Vital signs:  Temp: 97.5  F (36.4  C) Temp src: Oral BP: (!) 140/55 Pulse: 60   Resp: 18 SpO2: 97 % O2 Device: None (Room air) Oxygen Delivery: 2 LPM Height: 185.4 cm (6' 0.99\") Weight: 119.7 kg (263 lb 14.3 oz)    7a-7p: Pt is alert and oriented x3, VSS. PRN oxy given for pain x2. Up with x1 assist. +voids, no bm this shift, miralax given. Down for needle aspiration left hip. Repeat CT head complete. Plan for transfer to TCU.     Problem: Plan of Care - These are the overarching goals to be used throughout the patient stay.    Goal: Plan of Care Review  Description: The Plan of Care Review/Shift note should be completed every shift.  The Outcome Evaluation is a brief statement about your assessment that the patient is improving, declining, or no change.  This information will be displayed automatically on your shift note.  Outcome: Progressing  Flowsheets (Taken 1/26/2023 1741)  Plan of Care Reviewed With: patient  Overall Patient Progress: improving   Goal Outcome Evaluation:      Plan of Care Reviewed With: patient    Overall Patient Progress: improvingOverall Patient Progress: improving           "

## 2023-01-26 NOTE — PROGRESS NOTES
"[facility], [place]   Neurosurgery Progress Note:    Date of service: 1/25/2023    Assessment: David Thomson is a 82 year old male on Eliquis 5mg q 12 (for Atrial fibrillation) and ASA 81 mg (for CABG), which have been held since 1/16 and 1/15 respectively, and history left hip replacement on 1999. Patient was transferred for evaluation by the Orthopedics team in our facility for possible left hip revision. We have been consulted for a small left sided, frontoparietal SDH with decrease in size on 6 hour follow up CT.    Clinically Significant Risk Factors Present on Admission            # Obesity: Estimated body mass index is 34.82 kg/m  as calculated from the following:    Height as of this encounter: 1.854 m (6' 0.99\").    Weight as of this encounter: 119.7 kg (263 lb 14.3 oz).   # HTN  # Pre-diabetes          Recommendations:  - No neurosurgical intervention indicated at this time  - OK to start patient on subcutaneous Heparin at prophylactic dosing from neurosurgical perspective  - Continue to hold all other anticoagulation in anticipation of possible orthopedic intervention  - Recommend repeat CT scan before orthopedic surgery, please page Neurosurgery to review imaging once completed.   - Serial Neuro exams  - All other cares per primary team  - Neurosurgery will follow peripherally, please page Neurosurgery to review head CT prior to Orthopedic surgery or with any change in examination.        ANA LILIA Olivares, CNP  Department of Neurosurgery  Pager: [phone]        I have spent a total of 35 minutes total time counseling, coordination, and chart review, over 50% of the time was spent in direct patient care.       Interval History:  Denies headache today, states he has been intermittently dizzy when lying in bed, currently denies symptoms.  Orthopedics plans to repeat left hip aspiration in IR.          Objective:   Temp:  [97.6  F (36.4  C)-97.8  F (36.6  C)] 97.7  F " (36.5  C)  Pulse:  [52-64] 63  Resp:  [16-18] 18  BP: (105-146)/(56-63) 112/59  SpO2:  [94 %-100 %] 100 %  I/O last 3 completed shifts:  In: 1686.25 [P.O.:560; I.V.:1126.25]  Out: 1675 [Urine:1675]    Gen: Appears comfortable, NAD  Neurologic:  - Alert & Oriented x3  - Follows commands briskly  - Speech fluent, spontaneous. No aphasia or dysarthria.  - No gaze preference. No apparent hemineglect.  - Left pupil enlarged with teardrop irregularity, right pupil regular and reactive, EOMI  - Strong eye closure, jaw clench, and cheek puff  - Face symmetric with sensation intact to light touch  - Tongue protrudes midline  - Trapezii and sternocleidomastoid muscles 5/5 bilaterally  - No pronator drift     Del Tr Bi WE WF Gr   R 5 5 5 5 5 5   L 5 5 5 5 5 5    HF KE KF DF PF EHL   R 5 5 5 5 5 5   L 5 5 5 5 5 5     Reflexes 2+ throughout    Sensation intact and symmetric to light touch throughout    LABS  Recent Labs   Lab Test 23  0455 23  0737 23  0557   WBC 4.4 5.1 5.7   HGB 11.1* 10.5* 10.5*   MCV 93 96 96   * 139* 130*       Recent Labs   Lab Test 23  0455 23  0737 23  0557    143 138  138   POTASSIUM 4.1 4.1 4.8  4.8   CHLORIDE 107 109* 107  107   CO2 20* 21* 20*  20*   BUN 27.4* 42.1* 63.0*  63.0*   CR 0.99 1.19* 1.37*  1.37*   ANIONGAP 14 13 11  11   ANDREW 9.0 9.1 8.3*  8.3*   GLC 95 98 137*  137*       IMAGING    Recent Results (from the past 24 hour(s))   Echocardiogram Complete   Result Value    LVEF  55-60%    Narrative    [id number]  [id number]  [id number]  [id number]^MEGAN^[facility],[place]  Echocardiography Laboratory  [address] [place] [id number]     Name: BIBI QUINONES  MRN: [id number]  : 1940  Study Date: 2023 08:25 AM  Age: 82 yrs  Gender: Male  Patient Location: [facility]  Reason For Study: Dizziness  Ordering Physician: AQUILES GUZMAN  Referring Physician: TOD MCCABE  By: Saida Reyes  Height: 73 in     BP: 97/54 mmHg  ______________________________________________________________________________  Procedure  Complete Portable Echo Adult. Contrast Optison. Patient was given 6 ml mixture  of 3 ml Optison and 6 ml saline. 3 ml wasted.     ______________________________________________________________________________  Interpretation Summary  Technically difficult study limited views obtained.  Left ventricular size, wall motion and function are normal. The ejection  fraction is 55-60%.  Right ventricular function, chamber size, wall motion, and thickness are  normal.  There is a bioprosthetic aortic valve (probably TAVR) with a mean gradient of  14 mmHg; details of the valve are unclear but this is likely within normal  limits.  IVC diameter <2.1 cm collapsing >50% with sniff suggests a normal RA pressure  of 3 mmHg.  No pericardial effusion is present.  There is no prior study for direct comparison.     ______________________________________________________________________________  Left Ventricle  Left ventricular size, wall motion and function are normal. The ejection  fraction is 55-60%. Left ventricular wall thickness cannot evaluate. Left  ventricular diastolic function is indeterminate.     Right Ventricle  Right ventricular function, chamber size, wall motion, and thickness are  normal.     Atria  The right atria appears normal. Moderate left atrial enlargement is present.     Mitral Valve  The mitral valve is normal. Mild mitral annular calcification is present.     Aortic Valve  There is a bioprosthetic aortic valve (probably TAVR) with a mean gradient of  14 mmHg; details of the valve are unclear but this is likely within normal  limits. The calculated aortic valve are is 1.2 cm^2.     Tricuspid Valve  The tricuspid valve is normal. The peak velocity of the tricuspid regurgitant  jet is not obtainable. Trace tricuspid insufficiency is present.     Pulmonic Valve  On  Doppler interrogation, there is no significant stenosis or regurgitation.     Vessels  The inferior vena cava was normal in size with preserved respiratory  variability. The aorta root cannot be assessed. Mild dilatation of the aorta  is present. Ascending aorta 4.1 cm. IVC diameter <2.1 cm collapsing >50% with  sniff suggests a normal RA pressure of 3 mmHg.     Pericardium  No pericardial effusion is present.     Compared to Previous Study  There is no prior study for direct comparison.     Attestation  I have personally viewed the imaging and agree with the interpretation and  report as documented by the fellow, Marco Rushing, and/or edited by me.     ______________________________________________________________________________  MMode/2D Measurements & Calculations  IVSd: 0.97 cm  LVIDd: 4.3 cm  LVIDs: 3.4 cm  LVPWd: 1.0 cm     FS: 19.5 %  LV mass(C)d: 139.9 grams  Ao root diam: 3.7 cm  asc Aorta Diam: 4.1 cm  LVOT diam: 2.1 cm  LVOT area: 3.5 cm2  LA Volume (BP): 70.6 ml  RWT: 0.48     Doppler Measurements & Calculations  MV E max jose juan: 51.9 cm/sec  MV A max jose juan: 72.3 cm/sec  MV E/A: 0.72  MV dec slope: 155.0 cm/sec2  MV dec time: 0.33 sec  Ao V2 max: 256.0 cm/sec  Ao max P.2 mmHg  Ao V2 mean: 156.0 cm/sec  Ao mean P.0 mmHg  Ao V2 VTI: 39.1 cm  ARUN(I,D): 1.2 cm2  ARUN(V,D): 1.1 cm2  LV V1 max P.7 mmHg  LV V1 max: 81.4 cm/sec  LV V1 VTI: 14.0 cm  SV(LVOT): 48.5 ml  AV Jose Juan Ratio (DI): 0.32  E/E' av.6     Lateral E/e': 4.3  Medial E/e': 7.0     ______________________________________________________________________________  Report approved by: Sheri Lucero 2023 09:45 AM         US Renal Complete Non-Vascular    Narrative    EXAMINATION: US RENAL COMPLETE NON-VASCULAR, 2023 10:46 AM     COMPARISON: None.    HISTORY: BRITTNEY with history of BPH    TECHNIQUE: The kidneys and bladder were scanned in the standard  fashion with specialized ultrasound transducer(s) using both gray  scale and  limited color/spectral Doppler techniques.    FINDINGS:    Right kidney: Visualization is limited. Measures 11.6 cm in length.  Parenchyma is of normal thickness and echogenicity. No focal mass. No  hydronephrosis.    Left kidney: Measures 12.6 cm in length. Parenchyma is of normal  thickness and echogenicity. 1.4 cm simple cyst in the superior pole.  No focal mass. No hydronephrosis.     Bladder: Decompressed by Rice catheter.      Impression    IMPRESSION:  1.  No hydronephrosis.  2.  Urinary bladder is decompressed by Rice catheter.    I have personally reviewed the examination and initial interpretation  and I agree with the findings.    MAGGI GARCIA MD         SYSTEM ID:  [id number]   IR Fine Needle Aspiration w Ultrasound    Narrative    PROCEDURES :  1. Ultrasound guided left hip aspiration  2. Fluoroscopic-guided left hip aspiration    Clinical History: Post left hip replacement, concern for prostatic  joint infection    Comparisons: 1/17/2023    Attending: FLO Friedman MD    Fellow: THEA Ashton MD    Medications: The patient was sedated for the procedure    Medications:   10 cc lidocaine for local anesthesia     Fluoroscopic time: 4.3 minutes    PROCEDURE: The patient understood the limitations, alternatives, and  risks of the procedure and requested the procedure be performed. Both  written and oral consent were obtained.    With the patient in the supine position limited pre-procedural  ultrasound performed demonstrated trace left hip joint effusion.    Under ultrasound guidance 20-gauge spinal needle was advanced into the  fluid. Multiple attempts were made at aspiration without significant  fluid.    Decision was made to convert fluoroscopic guidance. Under fluoroscopic  guidance a 20-gauge spinal needle was advanced to the prosthetic  lateral head neck junction. Multiple attempts were made at aspiration  without significant return of fluid. Approximately 1 to 2 cc of saline  was injected through the  needle and aspirated successfully. Similar to  labs.      Impression    IMPRESSION:   No significant aspiration of fluid with ultrasound or fluoroscopic  guidance. Approximately 1-2 cc of saline was injected into the hip  joint and aspirated and sent for labs.

## 2023-01-26 NOTE — PROGRESS NOTES
"Orthopedic Surgery Progress Note   1/25/2023    Subjective: No acute events overnight. Pain well controlled at rest. Tolerating diet. Voiding spontaneously. Denies fever or chills, CP, SOB, numbness or tingling, motor dysfunction or weakness. Expresses frustration with prolonged hospital course, and expresses desire to discharge to home while awaiting final cultures to determine surgical plan.    Plan for repeat left hip aspiration today with IR to send for aerobic, anaerobic, gram stain and Synovasure.    Exam:  /64 (BP Location: Left arm, Cuff Size: Adult Regular)   Pulse 67   Temp 98.3  F (36.8  C) (Oral)   Resp 18   Ht 1.854 m (6' 0.99\")   Wt 119.7 kg (263 lb 14.3 oz)   SpO2 95%   BMI 34.82 kg/m    Gen: Awake, alert, NAD  Resp: breathing equal and non-labored  Extremities:  LLE: Toes wwp, DP 2+, bcr in all toes. Compartments soft and tolerates passive stretch of toes. +EHL/FHL/GSC/TA with /5 strength. SILT SP/DP/Sa/Morrison/T. Tenderness to palpation over lateral thigh. No pain with hip ROM.    Labs:    Recent Labs   Lab 01/26/23  0455 01/25/23  0737 01/24/23  0557 01/23/23  0644   WBC 4.4 5.1 5.7 7.1   HGB 11.1* 10.5* 10.5* 11.1*   * 139* 130* 122*     Recent Labs   Lab 01/26/23  1336 01/26/23  1003 01/26/23  0455 01/25/23  0737 01/24/23  0557 01/23/23  2129 01/22/23  1415 01/22/23  1021   NA  --   --  141 143 138  138 138   < > 135*   POTASSIUM  --   --  4.1 4.1 4.8  4.8 5.1   < > 5.3   CHLORIDE  --   --  107 109* 107  107 106   < > 99   CO2  --   --  20* 21* 20*  20* 20*   < > 23   BUN  --   --  27.4* 42.1* 63.0*  63.0* 65.4*   < > 57.4*   CR  --   --  0.99 1.19* 1.37*  1.37* 1.45*   < > 1.65*   * 143* 95 98 137*  137* 171*   < > 176*   MAG  --   --   --   --   --   --   --  2.2   PHOS  --   --   --   --   --   --   --  5.3*    < > = values in this interval not displayed.     Recent Labs   Lab 01/23/23  1220   INR 1.66*   PTT 48*     No lab results found in last 7 days.    Invalid " input(s): ESR    Assessment:   David Thomson is a 82 year old male with a significant PMH of JUDE in 1999 and afib on Eliquis  who was admitted with left hip pain and inability to weightbear for the past 2 weeks. S/P left hip aspiration by IR on 1/23, this was a dry tap and synovial washings were sent for cultures.  However due to incorrect specimens aerobic culture was not obtained.  Recommended repeat IR aspiration attempt to send for aerobic, anaerobic, gram stain and Synovasure.  This to be completed today.    Initially, plan was to send patient to [place] to await surgical management.  Patient requesting to discharge to home pending cultures.  Okay from an orthopedic standpoint to discharge home after IR hip aspiration and follow-up outpatient to discuss surgical options as long as cleared from a medical standpoint as well as safe from a PT/OT standpoint. OK for protected WBAT to the LLE with walker    Orthopedics will continue to chart check cultures, but follow peripherally.  Please page if patient is discharging and we will arrange outpatient follow-up.    Plan:  Activity: Protected WABT to the LLE with walker.   Wound Cares/Dressing/Splint: none from an orthopedic standpoint  DVT PPx: DVT ppx per primary team  Antibiotic: Antibiotic selection per primary team  Cultures: Will follow cultures.   Labs: No additional labs at this time  Imaging: No further imaging needed at this time  PT/OT: Work on ROM, strengthening, gait training, mobility, and ADLs  Dispo: Per Primary team. OK to discharge from ortho perspective after repeat hip aspiration.      Operative Plan: LORRIE BLANK PA-C  1/26/2023 3:10 PM  Orthopaedic Surgery     Thank you for allowing me to participate in this patient's care. Please page me directly any questions/concerns.   Securely message with the Voc[employer] Web Console (learn more here)  Text page via [employer] Paging/Directory    If there is no response, if it is a weekend, or if it is  during evening hours, please page the orthopaedic surgery resident on call via [facility] Paging/Directory

## 2023-01-26 NOTE — CONSULTS
"    Interventional Radiology Consult Service Note    Patient is on IR schedule 1/26 for a image guided left hips aspiration, send dx labs.   Labs WNL for procedure. COVID neg.    No NPO required.  Consent will be done prior to procedure.     Please contact the IR charge RN at [phone] for estimated time of procedure.     Case discussed with Dr. Viveros from IR and page out to Dorota Wright PA-C. This is a 82 year old male with a history of atrial fibrillation on Elliquis, TAVR, JUDE in 1999 who initially presented to OSH for worsening left hip pain found to have loosening of his prior femoral stem placement. He was transferred to [facility] 1/23/23 for evaluation and possible intervention by orthopedics. Also with new onset worsening altered mental status/delirium found to have stable subdural hematoma on CT x2 completed 1/22/2023. IR is consulted for repeat left hip aspiration. Left hip aspiration completed 1/23 was not a technical failure. There was no fluid to aspirate. Unclear that re-attempt of the same procedure will provide any different outcome. There is no new imaging to suggest there is fluid present in this joint. Will bring patient down and look with imaging.    Dx labs per orthopedics.     Expected date of discharge: TBD    Vitals:   /59 (BP Location: Left arm)   Pulse 63   Temp 97.7  F (36.5  C) (Oral)   Resp 18   Ht 1.854 m (6' 0.99\")   Wt 119.7 kg (263 lb 14.3 oz)   SpO2 100%   BMI 34.82 kg/m      Pertinent Labs:     Lab Results   Component Value Date    WBC 4.4 01/26/2023    WBC 5.1 01/25/2023    WBC 5.7 01/24/2023       Lab Results   Component Value Date    HGB 11.1 01/26/2023    HGB 10.5 01/25/2023    HGB 10.5 01/24/2023       Lab Results   Component Value Date     01/26/2023     01/25/2023     01/24/2023       Lab Results   Component Value Date    INR 1.66 (H) 01/23/2023    INR 1.10 04/19/2016    PTT 48 (H) 01/23/2023       Lab Results   Component Value Date    " POTASSIUM 4.1 01/26/2023    POTASSIUM 3.7 01/17/2023    POTASSIUM 4.2 04/19/2016        ANA LILIA Bhatia CNP  Interventional Radiology  Pager: [phone]

## 2023-01-26 NOTE — PROCEDURES
[facility]    Procedure: IR Procedure Note    Date/Time: 1/26/2023 5:30 PM  Performed by: Irwin Ashton MD  Authorized by: Solomon Viveros MD       UNIVERSAL PROTOCOL   Site Marked: NA  Prior Images Obtained and Reviewed:  Yes  Required items: Required blood products, implants, devices and special equipment available    Patient identity confirmed:  Verbally with patient, arm band, provided demographic data and hospital-assigned identification number  Patient was reevaluated immediately before administering moderate or deep sedation or anesthesia  Confirmation Checklist:  Patient's identity using two indicators, relevant allergies, procedure was appropriate and matched the consent or emergent situation and correct equipment/implants were available  Time out: Immediately prior to the procedure a time out was called    Universal Protocol: the Joint Commission Universal Protocol was followed    Preparation: Patient was prepped and draped in usual sterile fashion       ANESTHESIA    Anesthesia: Local infiltration  Local Anesthetic:  Lidocaine 1% without epinephrine    See dictated procedure note for full details.  Findings: L hip aspiration, no significant fluid aspirated. Saline injected into joint, able to aspirate 2 ml of joint fluid.     Specimens: none    Complications: None    Condition: Stable    Plan: L hip aspiration, no significant fluid aspirated. Saline injected into joint, able to aspirate 2 ml of joint fluid.       PROCEDURE  Describe Procedure: L hip aspiration, no significant fluid aspirated. Saline injected into joint, able to aspirate 2 ml of joint fluid.   Patient Tolerance:  Patient tolerated the procedure well with no immediate complications  Length of time physician/provider present for 1:1 monitoring during sedation: 30

## 2023-01-26 NOTE — PROGRESS NOTES
[facility]    Progress Note - Medicine Service, PAMELLA TEAM 5       Date of Admission:  1/23/2023    Assessment & Plan   David Thomson is a 82 year old male admitted on 1/23/2023. He has a history of atrial fibrillation on Elliquis, TAVR, JUDE in 1999 who initially presented to OSH for worsening left hip pain found to have loosening of his prior femoral stem placement. He was transferred to [facility] for evaluation and possible intervention by orthopedics. Also with new onset worsening altered mental status/delirium found to have stable subdural hematoma on CT x2 completed 1/22/2023.     Today:  -IR consult for repeat left hip aspiration  -Repeat head CT in anticipation of possible dischrge    Left hip pain  Left Hip Femoral Step Prosthesis Loosening  Hx left hip replacement (1999)  [facility] and difficulty bearing weight despite optimized pain control regimen. XR showed loosening femoral stem with bone response distally to the stem. Pelvic CT confirmed loosening of the femoral stem with periprosthetic bone reabsorption and endosteal scalloping, but no fracture or joint malalignment. Also with moderate size left hip joint effusion. Patient transferred for assessment by orthopedics for possible surgical intervention.  - Orthopedics consult, appreciate recs  - PTA gabapentin 100mg TID (renal reduced dose, dose reduced to 1/24 per pharmacy recommendation)  - oxycodone 7.5mg q4hr  - IV dilaudid 0.2mg for breakthrough pain     Subdural Hematoma, stable  Hx of Dorsal Column Spinal Stimulator Insertion, 2016  -Neurosurgery consulted and following  -Plan to repeat head CT without contrast prior to orthopedic operation in coming days     Hospital acquired delirium  Delirious the night of 1/24-1/25 trying to leave, talking about stolen money. Received Zyprexa 5mg, haldol 5mg, 2mg, and 2mg. Sleepy but oriented  "the following morning.  - 1:1 for delirium  - Delirium precautions  - Avoid benzos  - ensure adequate pain control    Pre-renal BRITTNEY secondary to volume depletion - improving  On 1/22 patient noted to have Cr 1.57 (baseline 0.8-0.9). FeNa 0.5, likely pre-renal. UA non-infectious. Etiologies considered include dehydration, gabapentin (previously on 900mg TID until 1/22).   - Hold Entresto 97-103mg in setting of BRITTNEY     CAD  Hx of CABG  Essential HTN  TAVR  HLD  Atrial fibrillation  Denies any chest pain, palpitations this admission.    - Hold aspirin   - Hold Atorvastatin 80mg QHS   - Hold Entresto 97-103mg in setting of BRITTNEY   - continue Metoprolol 12.5mg   - Hold Eliquis in setting of subdural hematoma, on chronically for Atrial fibrillation     GERD  - PTA omeprazole       Diet: Combination Diet Low Saturated Fat Diet    DVT Prophylaxis: Heparin SQ  Rice Catheter: Not present  Fluids: None  Lines: None     Cardiac Monitoring: ACTIVE order. Indication: Tachyarrhythmias, acute (48 hours)  Code Status: Full Code      Clinically Significant Risk Factors              # Hypoalbuminemia: Lowest albumin = 3.4 g/dL at 1/24/2023  5:57 AM, will monitor as appropriate           # Obesity: Estimated body mass index is 34.82 kg/m  as calculated from the following:    Height as of this encounter: 1.854 m (6' 0.99\").    Weight as of this encounter: 119.7 kg (263 lb 14.3 oz)., PRESENT ON ADMISSION         Disposition Plan     Pending orthopedic surgical planning    The patient's care was discussed with the Attending Physician, Dr. Elliott, Bedside Nurse and Patient.    Vargas Uriarte MD  Medicine Service, [facility] TEAM [address] [place] [address]  Securely message with [employer] (more info)  Text page via [facility] Paging/Directory   See signed in provider for up to date coverage information  ______________________________________________________________________    Interval History   Afebrile and " hemodynamically stable overnight.  No acute events.  Continues to have left lower extremity pain.    Physical Exam   Vital Signs: Temp: 97.7  F (36.5  C) Temp src: Oral BP: 112/59 Pulse: 63   Resp: 18 SpO2: 100 % O2 Device: Nasal cannula Oxygen Delivery: 2 LPM  Weight: 263 lbs 14.25 oz    General: Alert, sitting in bedside chair, no distress  HEENT: MMM  CV: RRR, no M  Resp: CTABL, breathing comfortable on room air  Abd: S, NT, ND  Extremities: No edema  Skin: visible skin intact  Neuro: A&O, answering questions appropriately      Medical Decision Making         Data   ------------------------- PAST 24 HR DATA REVIEWED -----------------------------------------------

## 2023-01-26 NOTE — IR NOTE
Patient Name: David Thomson  Medical Record Number: [id number]  Today's Date: 1/26/2023    Procedure: left hip aspiration  Proceduralist: Rosemary Viveros and Poli    Procedure Start: 1655  Procedure end: 1720Sedation medications administered: none     Report given to:     Other Notes: Pt arrived to IR room 1 from . Consent reviewed. Pt denies any questions or concerns regarding procedure. Pt positioned supine and monitored per protocol. Pt tolerated procedure without any noted complications. Pt transferred back to .

## 2023-01-27 ENCOUNTER — APPOINTMENT (OUTPATIENT)
Dept: OCCUPATIONAL THERAPY | Facility: CLINIC | Age: 83
DRG: 559 | End: 2023-01-27
Attending: STUDENT IN AN ORGANIZED HEALTH CARE EDUCATION/TRAINING PROGRAM
Payer: COMMERCIAL

## 2023-01-27 ENCOUNTER — APPOINTMENT (OUTPATIENT)
Dept: PHYSICAL THERAPY | Facility: CLINIC | Age: 83
DRG: 559 | End: 2023-01-27
Attending: STUDENT IN AN ORGANIZED HEALTH CARE EDUCATION/TRAINING PROGRAM
Payer: COMMERCIAL

## 2023-01-27 PROCEDURE — 250N000013 HC RX MED GY IP 250 OP 250 PS 637: Performed by: STUDENT IN AN ORGANIZED HEALTH CARE EDUCATION/TRAINING PROGRAM

## 2023-01-27 PROCEDURE — 97530 THERAPEUTIC ACTIVITIES: CPT | Mod: GO | Performed by: OCCUPATIONAL THERAPIST

## 2023-01-27 PROCEDURE — 120N000011 HC R&B TRANSPLANT UMMC

## 2023-01-27 PROCEDURE — 250N000011 HC RX IP 250 OP 636: Performed by: STUDENT IN AN ORGANIZED HEALTH CARE EDUCATION/TRAINING PROGRAM

## 2023-01-27 PROCEDURE — 250N000013 HC RX MED GY IP 250 OP 250 PS 637

## 2023-01-27 PROCEDURE — 250N000013 HC RX MED GY IP 250 OP 250 PS 637: Performed by: NURSE PRACTITIONER

## 2023-01-27 PROCEDURE — 97535 SELF CARE MNGMENT TRAINING: CPT | Mod: GO | Performed by: OCCUPATIONAL THERAPIST

## 2023-01-27 PROCEDURE — 97530 THERAPEUTIC ACTIVITIES: CPT | Mod: GP

## 2023-01-27 PROCEDURE — 97110 THERAPEUTIC EXERCISES: CPT | Mod: GP

## 2023-01-27 PROCEDURE — 97116 GAIT TRAINING THERAPY: CPT | Mod: GP

## 2023-01-27 PROCEDURE — 99232 SBSQ HOSP IP/OBS MODERATE 35: CPT | Mod: GC | Performed by: INTERNAL MEDICINE

## 2023-01-27 PROCEDURE — 97165 OT EVAL LOW COMPLEX 30 MIN: CPT | Mod: GO | Performed by: OCCUPATIONAL THERAPIST

## 2023-01-27 PROCEDURE — 97110 THERAPEUTIC EXERCISES: CPT | Mod: GO | Performed by: OCCUPATIONAL THERAPIST

## 2023-01-27 RX ADMIN — OXYCODONE HYDROCHLORIDE 7.5 MG: 5 TABLET ORAL at 16:52

## 2023-01-27 RX ADMIN — FLUTICASONE PROPIONATE 2 SPRAY: 50 SPRAY, METERED NASAL at 08:05

## 2023-01-27 RX ADMIN — SENNOSIDES AND DOCUSATE SODIUM 1 TABLET: 50; 8.6 TABLET ORAL at 20:54

## 2023-01-27 RX ADMIN — ATORVASTATIN CALCIUM 80 MG: 40 TABLET, FILM COATED ORAL at 20:54

## 2023-01-27 RX ADMIN — HEPARIN SODIUM 5000 UNITS: 5000 INJECTION, SOLUTION INTRAVENOUS; SUBCUTANEOUS at 00:28

## 2023-01-27 RX ADMIN — GABAPENTIN 100 MG: 100 CAPSULE ORAL at 20:53

## 2023-01-27 RX ADMIN — OXYCODONE HYDROCHLORIDE 7.5 MG: 5 TABLET ORAL at 10:50

## 2023-01-27 RX ADMIN — ACETAMINOPHEN 975 MG: 325 TABLET, FILM COATED ORAL at 08:08

## 2023-01-27 RX ADMIN — Medication 12.5 MG: at 08:03

## 2023-01-27 RX ADMIN — SENNOSIDES AND DOCUSATE SODIUM 2 TABLET: 50; 8.6 TABLET ORAL at 08:04

## 2023-01-27 RX ADMIN — THIAMINE HCL TAB 100 MG 100 MG: 100 TAB at 08:03

## 2023-01-27 RX ADMIN — GABAPENTIN 100 MG: 100 CAPSULE ORAL at 08:03

## 2023-01-27 RX ADMIN — FOLIC ACID 1 MG: 1 TABLET ORAL at 08:04

## 2023-01-27 RX ADMIN — ACETAMINOPHEN 975 MG: 325 TABLET, FILM COATED ORAL at 20:52

## 2023-01-27 ASSESSMENT — ACTIVITIES OF DAILY LIVING (ADL)
ADLS_ACUITY_SCORE: 36
PREVIOUS_RESPONSIBILITIES: MEAL PREP;HOUSEKEEPING;LAUNDRY;SHOPPING;YARDWORK;FINANCES;MEDICATION MANAGEMENT;DRIVING
ADLS_ACUITY_SCORE: 36

## 2023-01-27 NOTE — PLAN OF CARE
"/69 (BP Location: Left arm)   Pulse 75   Temp 98  F (36.7  C) (Oral)   Resp 18   Ht 1.854 m (6' 0.99\")   Wt 115.5 kg (254 lb 11.2 oz)   SpO2 92%   BMI 33.61 kg/m      Shift: 7a-730p  VS: stable on RA, afebrile  Cardiac: WDL, HRR, CMS intact  Neuro: Aox4, calm and cooperative but restless and impulsive at times. Does report numbness/tingling in BLE as well as tingling in BUE.   BG: None  Labs: lab holiday  Respiratory: Lungs diminished throughout, denies cough or SOB. Maintains oxygen saturation > 90% on RA.   Pain/Nausea/PRN: Does report LLE pain 8-10/10 and using PRN oxycodone for pain relief. Denies nausea this shift.   Diet: Low saturated fat and tolerates well.   LDA: PIV SL  GI/: Continent of bowel and bladder. LBM was 1/27/23. Voids without difficulty, not saving.   Skin: WDL.   Mobility: Up to bathroom in room with A1 using walker.   Plan: Continue to monitor, TCU recommended but patient does not want a TCU. Ortho consult completed, see note.     Will give report to oncoming nurse. Pt left in stable condition, care relinquished at this time.     "

## 2023-01-27 NOTE — PROGRESS NOTES
[facility]    Progress Note - Medicine Service, PAMELLA TEAM 5       Date of Admission:  1/23/2023    Assessment & Plan   David Thomson is a 82 year old male admitted on 1/23/2023. He has a history of atrial fibrillation on Elliquis, TAVR, JUDE in 1999 who initially presented to OSH for worsening left hip pain found to have loosening of his prior femoral stem placement. He was transferred to [facility] for evaluation and possible intervention by orthopedics. Also with new onset worsening altered mental status/delirium found to have stable subdural hematoma on CT x2 completed 1/22/2023.     Today:  - Discussed recommendation for TCU with patient today. 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 Given his history of multiple falls recently leading to a small SDH, this is not currently a safe option. Left message with son to gather collateral on safe home going plan.     Left hip pain  Left Hip Femoral Step Prosthesis Loosening  Hx left hip replacement (1999)  [facility] for [facility] weeks and difficulty bearing weight despite optimized pain control regimen. XR showed loosening femoral stem with bone response distally to the stem. Pelvic CT confirmed loosening of the femoral stem with periprosthetic bone reabsorption and endosteal scalloping, but no fracture or joint malalignment. Also with moderate size left hip joint effusion. Patient transferred for assessment by orthopedics for possible surgical intervention.  - Orthopedics consult, appreciate recs  - PTA gabapentin 100mg TID (renal reduced dose, dose reduced to 1/24 per pharmacy recommendation)  - oxycodone 7.5mg q4hr     Subdural Hematoma, stable  Hx of Dorsal Column Spinal Stimulator Insertion, 2016  -Neurosurgery consulted and following  -Plan to repeat head CT without contrast prior to orthopedic operation in coming days     Hospital acquired delirium  Delirious  "the night of 1/24-1/25 trying to leave, talking about stolen money. Received Zyprexa 5mg, haldol 5mg, 2mg, and 2mg. Sleepy but oriented the following morning.  - 1:1 for delirium  - Delirium precautions  - Avoid benzos  - ensure adequate pain control    Pre-renal BRITTNEY secondary to volume depletion - improving  On 1/22 patient noted to have Cr 1.57 (baseline 0.8-0.9). FeNa 0.5, likely pre-renal. UA non-infectious. Etiologies considered include dehydration, gabapentin (previously on 900mg TID until 1/22).   - Hold Entresto 97-103mg in setting of BRITTNEY     CAD  Hx of CABG  Essential HTN  TAVR  HLD  Atrial fibrillation  Denies any chest pain, palpitations this admission.    - Hold aspirin   - Hold Atorvastatin 80mg QHS   - Hold Entresto 97-103mg in setting of BRITTNEY   - continue Metoprolol 12.5mg   - Hold Eliquis in setting of subdural hematoma, on chronically for Atrial fibrillation     GERD  - PTA omeprazole       Diet: Combination Diet Low Saturated Fat Diet    DVT Prophylaxis: Heparin SQ  Rice Catheter: Not present  Fluids: None  Lines: None     Cardiac Monitoring: None  Code Status: Full Code      Clinically Significant Risk Factors              # Hypoalbuminemia: Lowest albumin = 3.4 g/dL at 1/24/2023  5:57 AM, will monitor as appropriate           # Obesity: Estimated body mass index is 34.82 kg/m  as calculated from the following:    Height as of this encounter: 1.854 m (6' 0.99\").    Weight as of this encounter: 119.7 kg (263 lb 14.3 oz).          Disposition Plan      PT/OT recommending TCU. Patient insistent 1/27 on going home but does not seem to understand the risks. Will get collateral from patients family today.    The patient's care was discussed with the Attending Physician, Dr. Elliott, Bedside Nurse and Patient.    Vargas Uriarte MD  Medicine Service, [facility] TEAM [address] [place] [address]  Securely message with [employer] (more info)  Text page via [facility] Paging/Directory   See " signed in provider for up to date coverage information  ______________________________________________________________________    Interval History   Afebrile and hemodynamically stable overnight. No acute events. Slightly confused per nursing this morning.  Discussion regarding possible discharge to TCU today.  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  Discussed the risks of going home including more falls and hitting his head making his subdural hematoma worse, patient seemed dismissed these concerns.  He expressed frustration with prolonged course hospital course and not you receiving surgery.    Physical Exam   Vital Signs: Temp: 98.2  F (36.8  C) Temp src: Oral BP: (!) 142/66 Pulse: 71   Resp: 18 SpO2: 92 % O2 Device: None (Room air)    Weight: 263 lbs 14.25 oz    General: Alert, sitting in bed, non-toxic and comfortable appearing  HEENT: MMM  CV: RRR, no M  Resp: CTABL, on 1L NC  Extremities: No edema  Skin: visible skin intact  Neuro: A&O, answering questions appropriately      Medical Decision Making         Data   ------------------------- PAST 24 HR DATA REVIEWED -----------------------------------------------

## 2023-01-27 NOTE — PLAN OF CARE
"BP (!) 140/69 (BP Location: Left arm)   Pulse 72   Temp 98.7  F (37.1  C) (Oral)   Resp 16   Ht 1.854 m (6' 0.99\")   Wt 119.7 kg (263 lb 14.3 oz)   SpO2 94%   BMI 34.82 kg/m      Shift: 7898-5150  VS: Vitally stable.   Neuro: Aox3. Disoriented to place.   Respiratory: RA   Cardiac: WDL  Pain/Nausea: PRN Oxycodone for pain.   Diet: Low fat diet   IV Acess: PIV SL   GI/: Voiding adequately. 1 BM during shift.    Skin: Generalized bruises.   Mobility: Assist 1x w/walker   Events/Education: Upcoming psych and ortho consult.   Plan: Continue with POC.   "

## 2023-01-27 NOTE — PROGRESS NOTES
01/27/23 1500   Appointment Info   Signing Clinician's Name / Credentials (OT) sher gaspar ot/l   Living Environment   People in Home alone   Current Living Arrangements house  (farm house)   Self-Care   Usual Activity Tolerance moderate   Current Activity Tolerance fair   Equipment Currently Used at Home cane, quad;cane, straight;grab bar, toilet;grab bar, tub/shower;walker, rolling   Fall history within last six months yes   Number of times patient has fallen within last six months   (too many to count)   Activity/Exercise/Self-Care Comment pt reports mod I wiht basic ADL at baseline.   Instrumental Activities of Daily Living (IADL)   Previous Responsibilities meal prep;housekeeping;laundry;shopping;yardwork;finances;medication management;driving   IADL Comments pt say RN every three weeks to set up medications.   General Information   Referring Physician Vargas Uriarte   Patient/Family Therapy Goal Statement (OT) I want to go home, I have bills to pay   Additional Occupational Profile Info/Pertinent History of Current Problem David Thomson is a 82 year old male admitted on 1/23/2023. He has a history of atrial fibrillation on Elliquis, TAVR, JUDE in 1999 who initially presented to OSH for worsening left hip pain found to have loosening of his prior femoral stem placement. He was transferred to [facility] for evaluation and possible intervention by orthopedics. Also with new onset worsening altered mental status/delirium found to have stable subdural hematoma on CT x2 completed 1/22/2023.   Existing Precautions/Restrictions fall   General Observations and Info pt argumentative at times, participating in OT but requiring encouragement.   Cognitive Status Examination   Orientation Status orientation to person, place and time   Affect/Mental Status (Cognitive) agitated   Memory Deficit moderate deficit   Executive Function Deficit insight/awareness of deficits   Cognitive Status Comments further testing required.    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   [facility] Domains assessed: orientation, memory, attention, executive functions   Visual Perception   Visual Impairment/Limitations WFL   Sensory   Sensory Quick Adds sensation intact   Range of Motion Comprehensive   General Range of Motion no range of motion deficits identified   Strength Comprehensive (MMT)   General Manual Muscle Testing (MMT) Assessment other (see comments)   Comment, General Manual Muscle Testing (MMT) Assessment overall deconditioning   Coordination   Coordination Comments pt with shuffling steps, B UE WFL   Bed Mobility   Bed Mobility supine-sit   Supine-Sit [place] (Bed Mobility) moderate assist (50% patient effort)   Transfers   Transfers bed-chair transfer;sit-stand transfer   Transfer Skill: Bed to Chair/Chair to Bed   Bed-Chair [place] (Transfers) minimum assist (75% patient effort)   Sit-Stand Transfer   Sit-Stand [place] (Transfers) minimum assist (75% patient effort)   Balance   Balance Assessment standing balance: dynamic   Balance Comments poor   Activities of Daily Living   BADL Assessment/Intervention lower body dressing   Lower Body Dressing Assessment/Training   [place] Level (Lower Body Dressing) maximum assist (25% patient effort)   Clinical Impression   Criteria for Skilled Therapeutic Interventions Met (OT) Yes, treatment indicated   OT Diagnosis decreased ADL I   Assessment of Occupational Performance 5 or more Performance Deficits   Identified Performance Deficits dressing, bathing, driving, cooking, home making, leisure.   Planned Therapy Interventions (OT) ADL retraining;IADL retraining;cognition;strengthening;transfer training;home program guidelines;progressive activity/exercise;risk factor education   Clinical Decision Making Complexity (OT) low  complexity   Risk & Benefits of therapy have been explained evaluation/treatment results reviewed;care plan/treatment goals reviewed;risks/benefits reviewed;participants voiced agreement with care plan;current/potential barriers reviewed;participants included;patient   Clinical Impression Comments Pt presents to OT with overall deconditioning and cignitive deficits leading to decreased ADL I   OT Total Evaluation Time   OT Eval, Low Complexity Minutes (16424) 10   OT Goals   Therapy Frequency (OT) 5 times/wk   OT Predicted Duration/Target Date for Goal Attainment 02/02/23   OT Goals Hygiene/Grooming;Lower Body Dressing;Toilet Transfer/Toileting;Cognition   OT: Hygiene/Grooming independent;while standing   OT: Lower Body Dressing Independent;including set-up/clothing retrieval   OT: Toilet Transfer/Toileting Independent;toilet transfer;cleaning and garment management   OT: Cognitive Patient/caregiver will verbalize understanding of cognitive assessment results/recommendations as needed for safe discharge planning

## 2023-01-28 LAB — GLUCOSE BLDC GLUCOMTR-MCNC: 164 MG/DL (ref 70–99)

## 2023-01-28 PROCEDURE — 120N000011 HC R&B TRANSPLANT UMMC

## 2023-01-28 PROCEDURE — 250N000013 HC RX MED GY IP 250 OP 250 PS 637

## 2023-01-28 PROCEDURE — 250N000011 HC RX IP 250 OP 636: Performed by: STUDENT IN AN ORGANIZED HEALTH CARE EDUCATION/TRAINING PROGRAM

## 2023-01-28 PROCEDURE — 99233 SBSQ HOSP IP/OBS HIGH 50: CPT | Performed by: INTERNAL MEDICINE

## 2023-01-28 PROCEDURE — 250N000013 HC RX MED GY IP 250 OP 250 PS 637: Performed by: NURSE PRACTITIONER

## 2023-01-28 PROCEDURE — 250N000013 HC RX MED GY IP 250 OP 250 PS 637: Performed by: STUDENT IN AN ORGANIZED HEALTH CARE EDUCATION/TRAINING PROGRAM

## 2023-01-28 RX ADMIN — ACETAMINOPHEN 975 MG: 325 TABLET, FILM COATED ORAL at 07:49

## 2023-01-28 RX ADMIN — GABAPENTIN 100 MG: 100 CAPSULE ORAL at 07:49

## 2023-01-28 RX ADMIN — ACETAMINOPHEN 975 MG: 325 TABLET, FILM COATED ORAL at 13:59

## 2023-01-28 RX ADMIN — SENNOSIDES AND DOCUSATE SODIUM 1 TABLET: 50; 8.6 TABLET ORAL at 20:13

## 2023-01-28 RX ADMIN — OXYCODONE HYDROCHLORIDE 7.5 MG: 5 TABLET ORAL at 23:55

## 2023-01-28 RX ADMIN — FOLIC ACID 1 MG: 1 TABLET ORAL at 07:48

## 2023-01-28 RX ADMIN — SENNOSIDES AND DOCUSATE SODIUM 1 TABLET: 50; 8.6 TABLET ORAL at 07:48

## 2023-01-28 RX ADMIN — THIAMINE HCL TAB 100 MG 100 MG: 100 TAB at 07:50

## 2023-01-28 RX ADMIN — FLUTICASONE PROPIONATE 2 SPRAY: 50 SPRAY, METERED NASAL at 07:49

## 2023-01-28 RX ADMIN — ATORVASTATIN CALCIUM 80 MG: 40 TABLET, FILM COATED ORAL at 21:32

## 2023-01-28 RX ADMIN — GABAPENTIN 100 MG: 100 CAPSULE ORAL at 13:59

## 2023-01-28 RX ADMIN — Medication 12.5 MG: at 07:48

## 2023-01-28 RX ADMIN — GABAPENTIN 100 MG: 100 CAPSULE ORAL at 20:13

## 2023-01-28 RX ADMIN — SACUBITRIL AND VALSARTAN 1 TABLET: 97; 103 TABLET, FILM COATED ORAL at 20:14

## 2023-01-28 RX ADMIN — POLYETHYLENE GLYCOL 3350 17 G: 17 POWDER, FOR SOLUTION ORAL at 13:59

## 2023-01-28 ASSESSMENT — ACTIVITIES OF DAILY LIVING (ADL)
ADLS_ACUITY_SCORE: 34
TRANSFERRING: 0-->ASSISTANCE NEEDED (DEVELOPMENTALLY APPROPRIATE)
ADLS_ACUITY_SCORE: 34
CHANGE_IN_FUNCTIONAL_STATUS_SINCE_ONSET_OF_CURRENT_ILLNESS/INJURY: YES
FALL_HISTORY_WITHIN_LAST_SIX_MONTHS: YES
ADLS_ACUITY_SCORE: 34
WEAR_GLASSES_OR_BLIND: YES
ADLS_ACUITY_SCORE: 36
ADLS_ACUITY_SCORE: 34
VISION_MANAGEMENT: GLASSES
ADLS_ACUITY_SCORE: 34
ADLS_ACUITY_SCORE: 34
DIFFICULTY_EATING/SWALLOWING: NO
DOING_ERRANDS_INDEPENDENTLY_DIFFICULTY: YES
ADLS_ACUITY_SCORE: 34
NUMBER_OF_TIMES_PATIENT_HAS_FALLEN_WITHIN_LAST_SIX_MONTHS: 10
CONCENTRATING,_REMEMBERING_OR_MAKING_DECISIONS_DIFFICULTY: NO
WALKING_OR_CLIMBING_STAIRS: AMBULATION DIFFICULTY, REQUIRES EQUIPMENT
ADLS_ACUITY_SCORE: 34
WALKING_OR_CLIMBING_STAIRS_DIFFICULTY: YES
ADLS_ACUITY_SCORE: 34
TOILETING_ISSUES: NO
DRESSING/BATHING_DIFFICULTY: NO
TRANSFERRING: 1-->ASSISTANCE (EQUIPMENT/PERSON) NEEDED

## 2023-01-28 NOTE — PLAN OF CARE
"1900-0730    Reason for Admission:L hip pain  Neuro: irregular pupil size, baseline from previous trauma. CT showed stable subdural hematoma. Intermittent confusion during night shift.  Behavior: WDL except impulsiveness   Activity: Up w/1 and walker/gait belt  Vital: /71 (BP Location: Left arm)   Pulse 81   Temp 99.5  F (37.5  C) (Oral)   Resp 18   Ht 1.854 m (6' 0.99\")   Wt 115.5 kg (254 lb 11.2 oz)   SpO2 93%   BMI 33.61 kg/m    LDAs: PIV  Cardiac: WDL  Respiratory: RA  GI/: GI frequency   Skin: wdl  Pain: PRN oral medication for pain  Diet: low sat fat     Plan: needs TCU placement      "

## 2023-01-28 NOTE — PROGRESS NOTES
"Care Management Follow Up    Additional Information:  PT & OT are recommending TCU. PT reporting pt lives alone, unsafe to discharge home at this time, he is below baseline.   SLUMS done by OT: score 22, range of 21-26 equals mild neurocognitive disorder.  Left hip aspiration was done yesterday and cultures are pending. Pt is medically ready for discharge.   Attempted to meet with pt today for discharge planning, but he was not available at the time. 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   Late this afternoon observed NA assisting pt to get out of bed and ambulate to the bathroom. Pt was unsteady, tended to lean backward, using a walker. Needed 1 assist.     On the facesheet sonPhillip and \"relative\" Trina, were listed. I called sonPhillip. He has not been involved and is not currently up to date. He recommended I call pt's wife, Trina or pt's son, Valente. He didn't have Valente's phone number handy and Valente is not listed on the facesheet. Noted Phillip's speech was slow, but he was appropriate.   Attempted to call Trina using the numbers on the facesheet: [phone], this was a wrong number, reached a different person. The other number listed, [phone], this was not a working number.   Asked nurseMagdaleno to see if pt had phone numbers for Trina or Valente. Magdaleno was able to obtain info from pt. Trina lives in [place], phone: [phone]. SonValente lives in [place], cell: [phone] or home: [phone].     Called Trina and spoke with her. She is pt's wife, they are currently . They have not lived together for 10 years. They file taxes together. She has texted with pt, but has not spoken with him since Mario. She has not spoken with a doctor (most likely since contact info was incorrect). I did not explore the reasons why they don't live together. She was aware he has been hospitalized. Briefly informed her pt had a hip aspiration, " waiting on culture results. Will have the doctor contact her tomorrow with a medical update. Informed her I help with discharge planning. Currently a TCU stay is recommended. Trina said she is not able to be a caregiver. She is willing to receive information, but otherwise is not able to be involved. She has breast cancer. Trina said pt has PTSD. [facility] Trina wasn't sure if Dr Trevino was a Psychologist of Psychiatrist. In the past Trina went on some of pt's visits with Dr Trevino.   Trina said pt's son, Valente is the best contact. Reviewed with Trina she would be his decision-maker, unless pt has a healthcare directive naming someone else as a decision-maker. She can defer this to another person, such as Valente, or collaborate with Valente. Trina said she will talk with Valente. I will also call Valente. Provided Trina with the phone number for 7A.     Called pt's son, Valente and reviewed pt was transferred [facility] to [facility] for further evaluation of his hip. A left hip aspiration was done and culture results are pending. Informed him pt also has a SDH. Will ask the doctor call him with update on medical condition and plan. Informed Valente pt has had some confusion here, had a Sitter, but that was discontinued. Valente said pt is not used to taking medications. He is on Oxycodone here. 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 Valente said pt was normal before, alert, stubborn; living independently. Has severe PTSD. Discussed with Valente working with pt & family on a discharge plan. It may be beneficial to have Valente on the phone when we speak with pt. Valente in agreement.    Updated Charge Nurse and pt's nurse, Magdaleno with information about pt's wife and son, Valente. Magdaleno reported pt was a POW and had an inpt Psych stay 12 months ago, details of this stay need to be confirmed.  Discussed with nursing assessing pt's room environment to make sure he feels safe, such as sufficient light, being careful when waking him up.      Plan:    --Request doctors call pt's wife, Trina and sonValente with update on pt's condition and plan.   --SW for discharge planning. TCU recommended. Would be beneficial to include sonValente. Message left for the Weekend SW. Since pt has some impaired cognition and insight it would be beneficial to have family involved.         Trina, wife (lives in [place]).  Phone: [phone]     Valente son (lives in [place])  Cell:  [phone]  Home:  [phone]           Christiane Sapp, RN Care Coordinator   [facility]  On 1- RNCC coverage for Unit 7A, call [phone] or Page: [phone].        To contact the weekend RNCC  [place] (0800 - 1630) Saturday and Sunday    Units: 4A, 4C, 4E, 5A and 5B- Pager 1: [phone]    Units: 6A, 6B, 6C, 6D- Pager 2: [phone]    Units: 7A, 7B, 7C, 7D, and 5C-Pager 3: [phone]

## 2023-01-28 NOTE — PROGRESS NOTES
[facility]    Medicine Progress Note - Medicine Service, PAMELLA TEAM 5    Date of Admission:  1/23/2023    Assessment & Plan   David Thomson is a 82 year old male admitted on 1/23/2023. He has a history of atrial fibrillation on Elliquis, TAVR, JUDE in 1999 who initially presented to OSH for worsening left hip pain found to have loosening of his prior femoral stem placement. He was transferred to [facility] for evaluation and possible intervention by orthopedics. Also with new onset worsening altered mental status/delirium found to have stable subdural hematoma on CT x2 completed 1/22/2023.     Main Plans for Today:  - Discussion with patient's wife Trina and son Valente about disposition. 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 Family concerned about him living alone and something happening and not being able to reach help. Valente will call patient today and have a discussion about possible TCU placement prior to surgery.   - Restart entresto as kidney function significantly improved    Left hip pain  Left Hip Femoral Step Prosthesis Loosening  Hx left hip replacement (1999)  [facility] and difficulty bearing weight despite optimized pain control regimen. XR showed loosening femoral stem with bone response distally to the stem. Pelvic CT confirmed loosening of the femoral stem with periprosthetic bone reabsorption and endosteal scalloping, but no fracture or joint malalignment. Also with moderate size left hip joint effusion. Patient transferred for assessment by orthopedics for possible surgical intervention.  - Orthopedics consult, appreciate recs  - PTA gabapentin 100mg TID (renal reduced dose, dose reduced to 1/24 per pharmacy recommendation)  - oxycodone 7.5mg q4hr     Subdural Hematoma, stable  Hx of Dorsal Column Spinal  "Stimulator Insertion, 2016  -Neurosurgery consulted and following  -Plan to repeat head CT without contrast prior to orthopedic operation in coming days  -Holding aspirin and apixiban     Hospital acquired delirium  Delirious the night of 1/24-1/25 trying to leave, talking about stolen money. Received Zyprexa 5mg, haldol 5mg, 2mg, and 2mg. Sleepy but oriented the following morning.  - Able to discontinue sitter 1/27  - Delirium precautions  - Avoid benzos  - ensure adequate pain control    Pre-renal BRITTNEY secondary to volume depletion - improving  On 1/22 patient noted to have Cr 1.57 (baseline 0.8-0.9). FeNa 0.5, likely pre-renal. UA non-infectious. Etiologies considered include dehydration, gabapentin (previously on 900mg TID until 1/22).  - Restart Entresto 97-103mg with normalization of kidney function     CAD  Hx of CABG  Essential HTN  TAVR  HLD  Atrial fibrillation  Denies any chest pain, palpitations this admission.    - Hold aspirin   - Hold Atorvastatin 80mg QHS   - Hold Entresto 97-103mg in setting of BRITTNEY   - continue Metoprolol 12.5mg   - Hold Eliquis in setting of subdural hematoma, on chronically for Atrial fibrillation     GERD  - PTA omeprazole         Diet: Combination Diet Low Saturated Fat Diet    DVT Prophylaxis: Heparin SQ  Rice Catheter: Not present  Lines: None     Cardiac Monitoring: None  Code Status: Full Code      Clinically Significant Risk Factors              # Hypoalbuminemia: Lowest albumin = 3.4 g/dL at 1/24/2023  5:57 AM, will monitor as appropriate           # Obesity: Estimated body mass index is 33.61 kg/m  as calculated from the following:    Height as of this encounter: 1.854 m (6' 0.99\").    Weight as of this encounter: 115.5 kg (254 lb 11.2 oz).          Disposition Plan      Expected Discharge Date: 01/30/2023        Discharge Comments: 1/27: patient no longer wants to go to TCU. Frustrated with prolonged hospitalization. Ortho coming by this afternoon to discuss surgical " "planning.     Does he qualify/have coverage for any home nursing or home PT/OT?          Lamine Elliott MD  Medicine Service, MAROON TEAM 5  [facility]  Securely message with [employer] (more info)  Text page via [employer] Paging/Directory   See signed in provider for up to date coverage information  ______________________________________________________________________    Interval History   No acute events overnight. Patient did not sleep very well. Pain in hip persists but tolerable. Still not willing to consider TCU. Given permission to speak with patient wife Trina and son Valente.     Physical Exam   /64 (BP Location: Left arm)   Pulse 85   Temp 98.8  F (37.1  C) (Oral)   Resp 18   Ht 1.854 m (6' 0.99\")   Wt 115.5 kg (254 lb 11.2 oz)   SpO2 91%   BMI 33.61 kg/m    General: AAOx3, well appearing man in NAD  Skin: no rashes or bruising  CV: RRR, normal S1S2, no murmur  Resp: CTAB, no wheeze, rhonchi   Abd: Soft, nontender, nondistended, BS+, no masses appreciated  Extremities: warm and well perfused, no edema        Medical Decision Making       50 MINUTES SPENT BY ME on the date of service doing chart review, history, exam, documentation & further activities per the note.      Data         Imaging results reviewed over the past 24 hrs:   No results found for this or any previous visit (from the past 24 hour(s)).  "

## 2023-01-28 NOTE — PROGRESS NOTES
Care Management Follow Up    Length of Stay (days): 4    Expected Discharge Date: 01/27/2023     Concerns to be Addressed:  Discharge planning   Patient plan of care discussed at interdisciplinary rounds: Yes    Anticipated Discharge Disposition:  TCU     Anticipated Discharge Services:    Anticipated Discharge DME:      Additional Information:  In 7A Discharge Rounds it was reported pt is confused; sitter was discontinued yesterday. Left hip aspiration done yesterday.   Attempted x2 to meet with pt for discharge planning but he was receiving cares from nursing.   Spoke with wife, Trina and son, Valente. Will ask doctor to call them tomorrow with a medical update.   Nurse, Magdaleno, reports pt was a POW. Discussed making sure pt feels safe in his room environment.     Full note to follow.     I called Admissions and asked them to update the facesheet with the following contact information.      Trina, wife (lives in [place]).  Phone: [phone]    Valente, son (lives in [place])  Cell:  [phone]  Home:  [phone]        Christiane Sapp, SHERRI Care Coordinator   [facility]  On 1- RNCC coverage for Unit 7A, call [phone] or Page: [phone].

## 2023-01-28 NOTE — PLAN OF CARE
"/64 (BP Location: Left arm)   Pulse 85   Temp 98.8  F (37.1  C) (Oral)   Resp 18   Ht 1.854 m (6' 0.99\")   Wt 115.5 kg (254 lb 11.2 oz)   SpO2 91%   BMI 33.61 kg/m      Shift: 7a-3p  VS: stable on RA, afebrile  Cardiac: WDL, HRR, CMS intact  Neuro: Aox4, calm and cooperative but restless and impulsive at times. Does report numbness/tingling in BLE as well as tingling in BUE.   BG: None  Labs: none  Respiratory: Lungs diminished throughout, does report productive cough but denies SOB. Maintains oxygen saturation > 90% on RA.   Pain/Nausea/PRN: Does report LLE pain 5-8/10 and using PRN oxycodone for pain relief. Denies nausea this shift.   Diet: Low saturated fat and tolerates well.   LDA: PIV SL  GI/: Continent of bowel and bladder. LBM was 1/27/23. Voids without difficulty, not saving.   Skin: WDL.   Mobility: Up to bathroom in room with A1 using walker.   Plan: Continue to monitor, pending placement     Will give report to oncoming nurse. Pt left in stable condition, care relinquished at this time.              "

## 2023-01-28 NOTE — PROGRESS NOTES
Brief Orthopedic Update    IR aspiration on 1/26 results are NGTD thus far. We will continue to monitor. He will likely need a revision arthroplasty at some point. OK from an orthopedic standpoint to discharge and return the same day of his surgery.     Discussed with senior resident, Dr. Fried, and orthopedic surgery attending, Dr. Fallon.     Seb Logan, DO  PGY-1  Orthopaedic Surgery  Pager: [phone]     Please page me directly with any questions/concerns during regular weekday hours before 5 pm. If there is no response, if it is a weekend, or if it is during evening hours then please page the orthopedic surgery resident on call.

## 2023-01-29 VITALS
HEART RATE: 70 BPM | WEIGHT: 254.2 LBS | TEMPERATURE: 98.5 F | RESPIRATION RATE: 18 BRPM | OXYGEN SATURATION: 92 % | DIASTOLIC BLOOD PRESSURE: 71 MMHG | SYSTOLIC BLOOD PRESSURE: 112 MMHG | BODY MASS INDEX: 33.69 KG/M2 | HEIGHT: 73 IN

## 2023-01-29 PROBLEM — S06.5XAA SUBDURAL HEMATOMA (H): Chronic | Status: ACTIVE | Noted: 2023-01-29

## 2023-01-29 LAB
ALPHA DEFENSINS 1+2+3 SNV QL IA: NEGATIVE
PRELIM TEST AFFECT FURTHER TEST SPEC: YES
SPECIMEN SOURCE SNV: NORMAL

## 2023-01-29 PROCEDURE — 250N000013 HC RX MED GY IP 250 OP 250 PS 637: Performed by: NURSE PRACTITIONER

## 2023-01-29 PROCEDURE — 250N000013 HC RX MED GY IP 250 OP 250 PS 637: Performed by: STUDENT IN AN ORGANIZED HEALTH CARE EDUCATION/TRAINING PROGRAM

## 2023-01-29 PROCEDURE — 250N000011 HC RX IP 250 OP 636: Performed by: STUDENT IN AN ORGANIZED HEALTH CARE EDUCATION/TRAINING PROGRAM

## 2023-01-29 PROCEDURE — 99239 HOSP IP/OBS DSCHRG MGMT >30: CPT | Mod: GC | Performed by: INTERNAL MEDICINE

## 2023-01-29 RX ORDER — OXYCODONE HYDROCHLORIDE 5 MG/1
5 TABLET ORAL EVERY 6 HOURS PRN
Qty: 14 TABLET | Refills: 0 | Status: SHIPPED | OUTPATIENT
Start: 2023-01-29 | End: 2023-03-08

## 2023-01-29 RX ORDER — AMOXICILLIN 250 MG
2 CAPSULE ORAL 2 TIMES DAILY PRN
Qty: 60 TABLET | Refills: 3 | Status: SHIPPED | OUTPATIENT
Start: 2023-01-29 | End: 2023-03-09

## 2023-01-29 RX ORDER — GABAPENTIN 100 MG/1
100 CAPSULE ORAL 3 TIMES DAILY
Qty: 90 CAPSULE | Refills: 3 | Status: ON HOLD | OUTPATIENT
Start: 2023-01-29 | End: 2023-03-16

## 2023-01-29 RX ADMIN — GABAPENTIN 100 MG: 100 CAPSULE ORAL at 07:30

## 2023-01-29 RX ADMIN — SACUBITRIL AND VALSARTAN 1 TABLET: 97; 103 TABLET, FILM COATED ORAL at 07:34

## 2023-01-29 RX ADMIN — SENNOSIDES AND DOCUSATE SODIUM 2 TABLET: 50; 8.6 TABLET ORAL at 07:30

## 2023-01-29 RX ADMIN — FLUTICASONE PROPIONATE 2 SPRAY: 50 SPRAY, METERED NASAL at 07:32

## 2023-01-29 RX ADMIN — Medication 12.5 MG: at 07:34

## 2023-01-29 RX ADMIN — ACETAMINOPHEN 975 MG: 325 TABLET, FILM COATED ORAL at 07:30

## 2023-01-29 RX ADMIN — FOLIC ACID 1 MG: 1 TABLET ORAL at 07:30

## 2023-01-29 RX ADMIN — THIAMINE HCL TAB 100 MG 100 MG: 100 TAB at 07:30

## 2023-01-29 RX ADMIN — HEPARIN SODIUM 5000 UNITS: 5000 INJECTION, SOLUTION INTRAVENOUS; SUBCUTANEOUS at 02:54

## 2023-01-29 RX ADMIN — GUAIFENESIN 5 ML: 200 SOLUTION ORAL at 02:54

## 2023-01-29 ASSESSMENT — ACTIVITIES OF DAILY LIVING (ADL)
ADLS_ACUITY_SCORE: 34

## 2023-01-29 NOTE — PLAN OF CARE
"/66 (BP Location: Left arm)   Pulse 76   Temp 99.3  F (37.4  C) (Oral)   Resp 17   Ht 1.854 m (6' 0.99\")   Wt 115.3 kg (254 lb 3.2 oz)   SpO2 94%   BMI 33.54 kg/m      [id number]  Neuro: Pt. alert & Ox4. Bed alarm on for safety.   Behavior: Pt. calm & cooperative with cares.   Activity: Pt. up with SBA & walker.   Vital: T-99.3 oral, OVSS on RA  LDAs: Left PIV SL  BG: N/A  Cardiac: WNL  Respiratory: LS diminished. PRN Robitussin x1 for cough.   GI/: Pt. voiding spontaneously with adequate amounts, stools x this shift.   Skin: Intact  Pain/Nausea: Pt. c/o left leg & left foot pain & headache & given prn Oxycodone. Pt. denies nausea.   Diet: Low fat  Labs/Imaging: Morning labs pending.   Plan: Continue to follow POC & notify MD with change in status.                           "

## 2023-01-29 NOTE — PROGRESS NOTES
Care Management Discharge Note    Discharge Date: 01/30/2023       Discharge Disposition: Home with services    Discharge Services: Home Care     Discharge DME: NA     Discharge Transportation:  Family to provide    Private pay costs discussed: Not applicable    PAS Confirmation Code: NA   Patient/family educated on Medicare website which has current facility and service quality ratings: NA    Education Provided on the Discharge Plan:yes    Persons Notified of Discharge Plans: Patient  Patient/Family in Agreement with the Plan: pt not agreeable to TCU, plans to discharge to home with resumption of home care      Handoff Referral Completed: No, [facility]    Additional Information:  Received update from MD team that pt is medically ready for discharge to home with home care support.  Pt is open to [facility] [place] care for RN to assist with medication management.  Home  PT/OT is recommended at discharge which pt is open to.  [facility] not have PT/OT available in the pt's area. Discussed with pt and he would like to have home PT/OT and is ok to switching to another agency.  Referral made to [facility] and they are able to accept for home RN/PT/OT.  Orders placed.  Patient reports that he has all the equipment at home that he needs including a 4WW and a cane. His son will provide transportation to home.  RNCC will remain available if further needs arise.      [facility](RN/PT/OT)  Phone: [phone]  Fax: [phone]         [facility](previous agency for RN)   Phone: [phone]  Fax: [phone]      [facility] [place]  Phone: [phone]  Pager: [phone]    SEARCHABLE in [facility] - search CARE COORDINATOR     [place] & [place] ([phone]) Saturday & Sunday; ([phone]) [facility] Recognized Holidays     Units: 4A, 4C, 4E, 5A & 5B   Pager: [phone]    Units: 6A & 6B    Pager: [phone]    Units: 6C & 6D   Pager: [phone]    Units: 7A, 7B, 7C, 7D &  5C    Pager: [phone]    Units: [facility] ED, 5 Ortho, 5 Med/Surg, 6 Med/Surg, 8A, 10 ICU, & [facility]    Pager: [phone]

## 2023-01-29 NOTE — PLAN OF CARE
DISCHARGE:  Patient with orders to discharge to home.     Discharge instructions, medications & follow ups reviewed with patient. Copy of discharge summary given to patient. PIV removed. Belongings sent with patient.     Patient in stable condition. AVSS. Patient had no further questions regarding discharge instructions and medications. Patient transferred out by w/c and & left with family.  Plan for follow-up with PCP and neurology.

## 2023-01-29 NOTE — DISCHARGE SUMMARY
[facility]  Discharge Summary - Medicine & Pediatrics       Date of Admission:  1/23/2023  Date of Discharge:  1/29/2023 12:30 PM  Discharging Provider: Dr. Elliott  Discharge Service: Medicine Service, PAMELLA TEAM 5    Discharge Diagnoses   Acute on chronic left hip pain  Left hip femoral step prosthesis loosening  H/o left hip replacement in 1999  Subdural hematoma, stable  H/o dorsal column spinal stimulator insertion, 2016  Hospital acquired delirium  Pre-renal BRITTNEY secondary to volume depletion    Follow-ups Needed After Discharge   Follow-up Appointments     Follow Up ([facility])      Follow up with primary care provider, Physician No Ref-Primary, within 7   days for hospital follow- up.    Orthopedic surgery will contact you regarding surgical planning.  Follow up with Neurosurgery in 2 weeks. Repeat head CT in 2 weeks.    Appointments on [place] and/or [facility] (with [facility] or [facility]   provider or service). Call [phone] if you haven't heard regarding   these appointments within 7 days of discharge.             Unresulted Labs Ordered in the Past 30 Days of this Admission     Date and Time Order Name Status Description    1/26/2023 10:55 AM Alpha Defensin LFA Synovial Fluid In process     1/26/2023 10:55 AM Anaerobic bacterial culture Preliminary     1/26/2023 10:55 AM Synovial fluid Aerobic Bacterial Culture Routine Preliminary     1/23/2023  1:57 PM Fungal or Yeast Culture Routine Preliminary     1/23/2023  1:51 PM Anaerobic bacterial culture Preliminary       These results will be followed up by Orthopedic Surgery.    Discharge Disposition   Discharged to home  Condition at discharge: Fair    Hospital Course   David Thomson is a 82 year old male admitted on 1/23/2023. He has a history of atrial fibrillation on Elliquis, TAVR, JUDE in 1999 who was transferred to [facility] from [facility] on 1/23/2023 for further evaluation of  left hip prosthesis issues and SDH.  The following problems were addressed during his hospitalization:    Left hip pain  Left Hip Femoral Step Prosthesis Loosening  Hx left hip replacement (1999)  [facility] and difficulty bearing weight despite optimized pain control regimen. XR showed loosening femoral stem with bone response distally to the stem. Pelvic CT confirmed loosening of the femoral stem with periprosthetic bone reabsorption and endosteal scalloping, but no fracture or joint malalignment. Also with moderate size left hip joint effusion. Patient transferred for assessment by orthopedics for possible surgical intervention. Orthopedic Surgery was consulted and assisted with management during his hospitalization. Imaging of his hip included CT pelvis at [facility] significant for femoral component loosening with periosthetic bone resorption and endosteal scalloping and moderate-sized left hip effusion. He underwent two left hip arthrocenteses with Interventional Radiology without significant fluid aspiration. Aspirated fluid cultures did not isolate any organisms. Discharged on Tylenol, gabapentin 100mg TID (renally dosed), oxycodone 5mg q6h prn. PT and OT worked with him during his hospital stay and recommended discharge to TCU. Multiple conversations were had with the patient and family regarding recommendation to go to TCU, but ultimately patient decided to discharge home with home care. He was able to articulate the risks associated with this decision and had capacity at the time of this decision. Plan will be for Orthopedic surgery to contact patient regarding surgical planning after results of hip aspiration.     Subdural Hematoma, stable  Hx of Dorsal Column Spinal Stimulator Insertion, 2016  At [facility] he was noted to have AMS and CT head without contrast showed small left frontoparietal subdural hematoma. Neursurgery was consulted  at [facility] and assisted with management. His last CT head on 1/26 showed grossly stable small subdural hematoma along the left cerebral convexity measuring up to 3 mm. His apixaban and ASA were held during his stay and will continue to be held at discharge. He will follow up with Neurosurgery 2 weeks after discharge with a repeat CT head without contrast.     Hospital acquired delirium  Patient had multiple episodes of delirium during his hospital stay with one episode of agitation requiring prn antipsychotics (Zyprexa and Haldol) for his and staff's safety. Delirium resolved prior to discharge and was not presented during his decision to discharge to home.    Pre-renal BRITTNEY secondary to volume depletion   On 1/22 patient noted to have Cr 1.57 (baseline 0.8-0.9). FeNa 0.5, likely pre-renal. UA non-infectious. PTA Entresto was held. Renal ultrasound was unremarkable. His kidney function normalized prior to discharge and Entresto was restarted.     CAD  Hx of CABG  Essential HTN  TAVR  HLD  Atrial fibrillation  No symptoms of heart failure or CAD during his hospital stay. ASA and Eliquis (for A fib) were held due to SDH. Entresto was held during part of his hopsitalization for BRITTNEY but was restarted prior to discharge.      GERD  - PTA omeprazole      Consultations This Hospital Stay   ORTHOPAEDIC SURGERY ADULT/PEDS IP CONSULT  NEUROSURGERY ADULT IP CONSULT  OCCUPATIONAL THERAPY ADULT IP CONSULT  PHYSICAL THERAPY ADULT IP CONSULT  INTERVENTIONAL RADIOLOGY ADULT/PEDS IP CONSULT  NURSING TO CONSULT FOR VASCULAR ACCESS CARE IP CONSULT  NURSING TO CONSULT FOR VASCULAR ACCESS CARE IP CONSULT  CARE MANAGEMENT / SOCIAL WORK IP CONSULT  INTERVENTIONAL RADIOLOGY ADULT/PEDS IP CONSULT    Code Status   Full Code       The patient was discussed with Dr. Earl Uriarte MD  [address] [address]  Phone:  [phone]  ______________________________________________________________________    Physical Exam   Vital Signs: Temp: 98.5  F (36.9  C) Temp src: Oral BP: 112/71 Pulse: 70   Resp: 18 SpO2: 92 % O2 Device: None (Room air)    Weight: 254 lbs 3.2 oz     General Appearance: Sitting in bedside chair, no distress, non-toxic appearing  Respiratory: Clear to auscultation bilaterally, normal work of breathing  Cardiovascular: Regular rate and rhythm, no murmur  GI: Soft, non-tender, nondistended  Skin: Visible skin intact  Neurologic: Alert and oriented, normal judgement.  Normal strength, antalgic gait, unchanged anisocoria other cranial nerves grossly intact.      Primary Care Physician   Physician No Ref-Primary    Discharge Orders      CT Head w/o contrast*     Home Care Referral      Neurosurgery Referral      Reason for your hospital stay    You were admitted to the hospital due to left leg pain.  You saw the orthopedic surgeons and had drainage from your left hip by the interventional radiologist.  The orthopedic surgeons will contact you regarding outpatient surgical planning for your left hip.  It is important that you continue to use your cane and walker at home to get around to reduce your risk of falls.  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     Activity    Your activity upon discharge: ambulate in house with walker, no heavy lifting, pushing, pulling with the implant side for 2 months, and no driving while on analgesics     Follow Up ([facility])    Follow up with primary care provider, Physician No Ref-Primary, within 7 days for hospital follow- up.    Orthopedic surgery will contact you regarding surgical planning.  Follow up with Neurosurgery in 2 weeks. Repeat head CT in 2 weeks.    Appointments on [place] and/or [facility] (with [facility] or [facility] provider or service). Call [phone] if you haven't heard  regarding these appointments within 7 days of discharge.     Diet    Follow this diet upon discharge: Orders Placed This Encounter      Combination Diet Low Saturated Fat Diet       Significant Results and Procedures   Most Recent 3 CBC's:Recent Labs   Lab Test 01/26/23  0455 01/25/23  0737 01/24/23  0557   WBC 4.4 5.1 5.7   HGB 11.1* 10.5* 10.5*   MCV 93 96 96   * 139* 130*     Most Recent 3 BMP's:Recent Labs   Lab Test 01/28/23  1950 01/26/23  1336 01/26/23  1003 01/26/23  0455 01/25/23  0737 01/24/23  0557   NA  --   --   --  141 143 138  138   POTASSIUM  --   --   --  4.1 4.1 4.8  4.8   CHLORIDE  --   --   --  107 109* 107  107   CO2  --   --   --  20* 21* 20*  20*   BUN  --   --   --  27.4* 42.1* 63.0*  63.0*   CR  --   --   --  0.99 1.19* 1.37*  1.37*   ANIONGAP  --   --   --  14 13 11  11   ANDREW  --   --   --  9.0 9.1 8.3*  8.3*   * 121* 143* 95 98 137*  137*     Most Recent 2 LFT's:Recent Labs   Lab Test 01/26/23  0455 01/25/23  0737   AST 28 29   ALT 15 14   ALKPHOS 73 68   BILITOTAL 1.3* 1.7*     Most Recent 3 INR's:Recent Labs   Lab Test 01/23/23  1220 01/18/23  0609 04/19/16  1410   INR 1.66* 1.39* 1.10     Most Recent 3 Troponin's:No lab results found.  Most Recent 3 BNP's:Recent Labs   Lab Test 01/23/23  0644 01/22/23  1021   NTBNPI 544 361     Most Recent ESR & CRP:Recent Labs   Lab Test 01/26/23 0455   CRPI 45.50*   ,   Results for orders placed or performed during the hospital encounter of 01/23/23   US Renal Complete Non-Vascular    Narrative    EXAMINATION: US RENAL COMPLETE NON-VASCULAR, 1/23/2023 10:46 AM     COMPARISON: None.    HISTORY: BRITTNEY with history of BPH    TECHNIQUE: The kidneys and bladder were scanned in the standard  fashion with specialized ultrasound transducer(s) using both gray  scale and limited color/spectral Doppler techniques.    FINDINGS:    Right kidney: Visualization is limited. Measures 11.6 cm in length.  Parenchyma is of normal thickness and  echogenicity. No focal mass. No  hydronephrosis.    Left kidney: Measures 12.6 cm in length. Parenchyma is of normal  thickness and echogenicity. 1.4 cm simple cyst in the superior pole.  No focal mass. No hydronephrosis.     Bladder: Decompressed by Rice catheter.      Impression    IMPRESSION:  1.  No hydronephrosis.  2.  Urinary bladder is decompressed by Rice catheter.    I have personally reviewed the examination and initial interpretation  and I agree with the findings.    MAGGI GARCIA MD         SYSTEM ID:  [id number]   IR Fine Needle Aspiration w Ultrasound    Narrative    PROCEDURES :  1. Ultrasound guided left hip aspiration  2. Fluoroscopic-guided left hip aspiration    Clinical History: Post left hip replacement, concern for prostatic  joint infection    Comparisons: 1/17/2023    Attending: FLO Friedman MD    Fellow: THEA Ashton MD    Medications: The patient was not sedated for the procedure    Medications:   10 cc lidocaine for local anesthesia     Fluoroscopic time: 4.3 minutes    PROCEDURE/FINDINGS: The patient understood the limitations,  alternatives, and risks of the procedure and requested the procedure  be performed. Both written and oral consent were obtained.    With the patient in the supine position limited pre-procedural  ultrasound performed demonstrated hypoechoic area surrounding the  prosthesis suggesting trace left hip joint effusion.    Under ultrasound guidance 20-gauge spinal needle was advanced into the  presumed fluid. Multiple attempts were made at aspiration without  ability to aspirate any fluid.    Decision was made to convert fluoroscopic guidance. Under fluoroscopic  guidance a 20-gauge spinal needle was advanced to the prosthetic  lateral head neck junction. Multiple attempts were made at aspiration  without significant return of fluid. Approximately 2cc of saline was  injected through the needle and aspirated successfully. This was sent  for requested labs.       Impression    IMPRESSION:   No significant aspiration of fluid with ultrasound or fluoroscopic  guidance. Approximately 2 cc of saline was injected into the hip joint  and aspirated and sent for labs.    Procedure performed by Dr. Ashton under my supervision.  I, Dr. Donna Friedman, was present for the entire procedure.    I have personally reviewed the examination and initial interpretation  and I agree with the findings.    DONNA FRIEDMAN MD         SYSTEM ID:  [id number]   US Renal Complete w Arterial Duplex    Narrative    RENAL COMPLETE WITH DUPLEX ULTRASOUND 1/24/2023 8:50 AM    CLINICAL HISTORY: BRITTNEY, Urinary retention, hx: BPH.    COMPARISONS: 1/23/2023.    ORDERING PROVIDER: TOD MCCABE    TECHNIQUE: Duplex Doppler evaluation of the abdominal aorta and renal  arteries performed. Velocity measurements obtained with angle  correction at or less than 60 degrees.     Findings:  Exam limited due to shadowing from overlying bowel gas.    AORTA:       Suprarenal: 52 cm/s       Infrarenal: 70 cm/s    RIGHT KIDNEY:       Renal artery:            Origin: Not seen             Mid: Not seen             Hilum: Not seen.     Renal vein: patent, 17 cm/s         Renal artery - aortic ratio: 0.33         Arcuate artery resistive index (superior / mid / inferior): 0.70  / 0.75 / 0.79         Parenchyma: Better evaluated on yesterdays grayscale exam.    LEFT KIDNEY:       Renal artery:            Origin: Not seen             Mid: 56 cm/s            Distal: 80 cm/s    Renal vein: patent, 23 cm/s.         Renal artery - aortic ratio: 1.54         Arcuate artery resistive index (superior / mid / inferior): 0.68  / 0.73 / 0.79         Parenchyma: Better evaluated on yesterdays grayscale exam..      Impression    IMPRESSION:   1. Extensive bowel gas shadowing leading to non-visualization of the  right renal artery and the origin of the left renal artery.   2. Nonspecific borderline elevated arcuate artery  resistive indices.    Guidelines:  Diagnostic criteria suggestive of > 60% diameter stenosis  Renal artery:       Peak systolic velocity > 180 cm/s       RAR > 3.5       Turbulent flow    Arcuate artery Resistive Index Normal < 0.7    I have personally reviewed the examination and initial interpretation  and I agree with the findings.    NAI CELIS MD         SYSTEM ID:  [id number]   IR Fine Needle Aspiration w Ultrasound    Narrative    PROCEDURES :  Fluoroscopic-guided left hip aspiration  ?  Clinical History: Post left hip replacement, concern for prosthetic  joint infection  ?  Comparisons: 1/24/2023  ?  Attending: JEFERSON Viveros MD  ?  Fellow: THEA Ashton MD  ?  Medications: The patient was not sedated for the procedure  ?  Medications:   10 cc lidocaine for local anesthesia   ?  Fluoroscopic time: 4.3 minutes  ?  PROCEDURE/FINDINGS: The patient understood the limitations,  alternatives, and risks of the procedure and requested the procedure  be performed. Both written and oral consent were obtained.  ?  With the patient in the supine position under fluoroscopic guidance a  20-gauge spinal needle was advanced to the prosthetic lateral head  neck junction. Multiple attempts were made at aspiration without  significant return of fluid. Approximately 15cc of saline was injected  through the needle and  2 cc aspirated successfully. This was sent for  requested labs.  ?  ???????????????????????????????????????????????????????????????    Impression    IMPRESSION:   No significant aspiration of fluid with ultrasound or fluoroscopic  guidance. Approximately 15 cc of saline was injected into the hip  joint and 2 cc aspirated and sent for labs.  ?   CT Head w/o Contrast    Narrative    EXAM: CT HEAD W/O CONTRAST  1/26/2023 4:07 PM     HISTORY:  follow up SDH       COMPARISON:  1/22/2023    TECHNIQUE: Using multidetector thin collimation helical acquisition  technique, axial, coronal and sagittal CT images from the  skull base  to the vertex were obtained without intravenous contrast.   (topogram) image(s) also obtained and reviewed.    FINDINGS:  Small subdural hemorrhage along the left cerebral convexity measuring  up to 3 mm in diameter (series 6 image 28). No new acute intracranial  hemorrhage, mass effect, or midline shift. No acute loss of gray-white  matter differentiation in the cerebral hemispheres. Ventricles are  proportionate to the cerebral sulci. Clear basal cisterns. Mild  generalized parenchymal volume loss. Nonspecific patchy  hypoattenuation of the periventricular and subcortical white matter  favored to represent chronic small vessel ischemic disease given  patient's age.    The bony calvaria and the bones of the skull base are normal.  Scattered mucosal thickening of the paranasal sinuses. Trace bilateral  mastoid effusions. Grossly normal orbits.       Impression    IMPRESSION:   1. Grossly stable small subdural hematoma along the left cerebral  convexity measuring up to 3 mm.  2. No new acute intracranial findings.     I have personally reviewed the examination and initial interpretation  and I agree with the findings.    TIKI WHELAN MD         SYSTEM ID:  [id number]   Echocardiogram Complete     Value    LVEF  55-60%    Narrative    [id number]  [id number]  [id number]  [id number]^MEGAN^AQUILES     [facility],[place]  Echocardiography Laboratory  [address] [place] [id number]     Name: BIBI QUINONES  MRN: [id number]  : 1940  Study Date: 2023 08:25 AM  Age: 82 yrs  Gender: Male  Patient Location: [facility]  Reason For Study: Dizziness  Ordering Physician: AQUILES GUZMAN  Referring Physician: TOD MCCABE  Performed By: Saida Reyes  Height: 73 in     BP: 97/54 mmHg  ______________________________________________________________________________  Procedure  Complete Portable Echo Adult. Contrast Optison. Patient was given 6 ml mixture  of 3  ml Optison and 6 ml saline. 3 ml wasted.     ______________________________________________________________________________  Interpretation Summary  Technically difficult study limited views obtained.  Left ventricular size, wall motion and function are normal. The ejection  fraction is 55-60%.  Right ventricular function, chamber size, wall motion, and thickness are  normal.  There is a bioprosthetic aortic valve (probably TAVR) with a mean gradient of  14 mmHg; details of the valve are unclear but this is likely within normal  limits.  IVC diameter <2.1 cm collapsing >50% with sniff suggests a normal RA pressure  of 3 mmHg.  No pericardial effusion is present.  There is no prior study for direct comparison.     ______________________________________________________________________________  Left Ventricle  Left ventricular size, wall motion and function are normal. The ejection  fraction is 55-60%. Left ventricular wall thickness cannot evaluate. Left  ventricular diastolic function is indeterminate.     Right Ventricle  Right ventricular function, chamber size, wall motion, and thickness are  normal.     Atria  The right atria appears normal. Moderate left atrial enlargement is present.     Mitral Valve  The mitral valve is normal. Mild mitral annular calcification is present.     Aortic Valve  There is a bioprosthetic aortic valve (probably TAVR) with a mean gradient of  14 mmHg; details of the valve are unclear but this is likely within normal  limits. The calculated aortic valve are is 1.2 cm^2.     Tricuspid Valve  The tricuspid valve is normal. The peak velocity of the tricuspid regurgitant  jet is not obtainable. Trace tricuspid insufficiency is present.     Pulmonic Valve  On Doppler interrogation, there is no significant stenosis or regurgitation.     Vessels  The inferior vena cava was normal in size with preserved respiratory  variability. The aorta root cannot be assessed. Mild dilatation of the  aorta  is present. Ascending aorta 4.1 cm. IVC diameter <2.1 cm collapsing >50% with  sniff suggests a normal RA pressure of 3 mmHg.     Pericardium  No pericardial effusion is present.     Compared to Previous Study  There is no prior study for direct comparison.     Attestation  I have personally viewed the imaging and agree with the interpretation and  report as documented by the fellow, Marco Rushing, and/or edited by me.     ______________________________________________________________________________  MMode/2D Measurements & Calculations  IVSd: 0.97 cm  LVIDd: 4.3 cm  LVIDs: 3.4 cm  LVPWd: 1.0 cm     FS: 19.5 %  LV mass(C)d: 139.9 grams  Ao root diam: 3.7 cm  asc Aorta Diam: 4.1 cm  LVOT diam: 2.1 cm  LVOT area: 3.5 cm2  LA Volume (BP): 70.6 ml  RWT: 0.48     Doppler Measurements & Calculations  MV E max jose juan: 51.9 cm/sec  MV A max jose juan: 72.3 cm/sec  MV E/A: 0.72  MV dec slope: 155.0 cm/sec2  MV dec time: 0.33 sec  Ao V2 max: 256.0 cm/sec  Ao max P.2 mmHg  Ao V2 mean: 156.0 cm/sec  Ao mean P.0 mmHg  Ao V2 VTI: 39.1 cm  ARUN(I,D): 1.2 cm2  ARUN(V,D): 1.1 cm2  LV V1 max P.7 mmHg  LV V1 max: 81.4 cm/sec  LV V1 VTI: 14.0 cm  SV(LVOT): 48.5 ml  AV Jose Juan Ratio (DI): 0.32  E/E' av.6     Lateral E/e': 4.3  Medial E/e': 7.0     ______________________________________________________________________________  Report approved by: Sheri Lucero 2023 09:45 AM               Discharge Medications   Current Discharge Medication List      START taking these medications    Details   !! oxyCODONE (ROXICODONE) 5 MG tablet Take 1 tablet (5 mg) by mouth every 6 hours as needed for pain  Qty: 14 tablet, Refills: 0    Associated Diagnoses: Deformity of left hip joint      !! oxyCODONE 7.5 MG TABS Take 5 mg by mouth every 6 hours as needed for moderate pain (4-6)  Qty: 14 tablet, Refills: 0    Associated Diagnoses: Deformity of left hip joint; Hip pain, left; Intractable pain      senna-docusate  (SENOKOT-S/PERICOLACE) 8.6-50 MG tablet Take 2 tablets by mouth 2 times daily as needed for constipation  Qty: 60 tablet, Refills: 3    Associated Diagnoses: Deformity of left hip joint; Hip pain, left; Intractable pain       !! - Potential duplicate medications found. Please discuss with provider.      CONTINUE these medications which have CHANGED    Details   gabapentin (NEURONTIN) 100 MG capsule Take 1 capsule (100 mg) by mouth 3 times daily  Qty: 90 capsule, Refills: 3    Associated Diagnoses: Deformity of left hip joint; Hip pain, left; Intractable pain         CONTINUE these medications which have NOT CHANGED    Details   atorvastatin (LIPITOR) 80 MG tablet Take 80 mg by mouth At Bedtime      CAMPHOR-EUCALYPTUS-MENTHOL EX Apply thin layer four times a day as needed for itching      carbamide peroxide (DEBROX) 6.5 % otic solution 3 drops 3 times daily as needed for other 1-2 days prior to seeing nurse in ear wax clinic  Each ear or both ears      ENTRESTO  MG per tablet Take 1 tablet by mouth 2 times daily Morning and bedtime      fluticasone (FLONASE) 50 MCG/ACT nasal spray Spray 2 sprays into both nostrils daily      folic acid (FOLVITE) 1 MG tablet Take 1 mg by mouth daily      guaiFENesin-codeine (ROBITUSSIN AC) 100-10 MG/5ML solution Take 10 mLs by mouth every 4 hours as needed for cough      metoprolol succinate ER (TOPROL XL) 25 MG 24 hr tablet Take 12.5 mg by mouth daily      omeprazole (PRILOSEC) 20 MG DR capsule Take 20 mg by mouth 2 times daily Am and HS      ondansetron (ZOFRAN ODT) 4 MG ODT tab Take 4 mg by mouth 3 times daily as needed for nausea      vitamin B1 (THIAMINE) 100 MG tablet Take 100 mg by mouth daily         STOP taking these medications       aspirin (ASA) 81 MG EC tablet Comments:   Reason for Stopping:         ELIQUIS ANTICOAGULANT 5 MG tablet Comments:   Reason for Stopping:         famotidine (PEPCID) 20 MG tablet Comments:   Reason for Stopping:             Allergies    Allergies   Allergen Reactions     Benzalkonium      Lisinopril      Piroxicam      Polysorbate  [Bay Oil]      Prazosin      Other reaction(s): Nightmares     Urea      Other reaction(s): Eruption of skin, Eruption of skin

## 2023-01-30 ENCOUNTER — TELEPHONE (OUTPATIENT)
Dept: NEUROSURGERY | Facility: CLINIC | Age: 83
End: 2023-01-30

## 2023-01-30 ENCOUNTER — TELEPHONE (OUTPATIENT)
Dept: ORTHOPEDICS | Facility: CLINIC | Age: 83
End: 2023-01-30

## 2023-01-30 NOTE — TELEPHONE ENCOUNTER
----- Message from Malena Faye PA-C sent at 1/30/2023 10:16 AM CST -----  Regarding: jamal malhotra  Please schedule follow up with Dr. Malhotra in the next week for follow up left hip implant loosening.     Thanks!  Malena

## 2023-01-30 NOTE — TELEPHONE ENCOUNTER
1st attempt at workque- Talked with patient's relative. He is currently admitted in [facility]    Referred by Vargas Uriarte MD  Subdural hematoma [S06.5XAA]  CT Head - 1/26/23 - [facility]

## 2023-01-30 NOTE — TELEPHONE ENCOUNTER
Patient was called to schedule an appointment with Dr. Fallon per the message below.  He thought that [facility]  He was not able to write down the address and was not able to have the writer leave a message with the address.  The itinerary was placed in the mail.  He was scheduled with Dr. Fallon on Friday 2/3/23.      The first number that was called the person that answered stated that kinga was not at that number and gave the new number of [phone].  This number did reach Kinga.

## 2023-01-30 NOTE — PLAN OF CARE
Occupational Therapy Discharge Summary    Reason for therapy discharge:    Discharged to home.    Progress towards therapy goal(s). See goals on Care Plan in [facility] electronic health record for goal details.  Goals partially met.  Barriers to achieving goals:   discharge from facility.    Therapy recommendation(s):    Continued therapy is recommended.  Rationale/Recommendations:  TCU was recommended, however, pt returning home. Recommend assist as needed for safety. Pt not seen by discharging therapist.

## 2023-01-30 NOTE — PLAN OF CARE
Physical Therapy Discharge Summary    Reason for therapy discharge:    All goals and outcomes met, no further needs identified.    Progress towards therapy goal(s). See goals on Care Plan in [facility] electronic health record for goal details.  Goals met    Therapy recommendation(s):    Continued therapy is recommended.  Rationale/Recommendations:  Orthopedic surgery to contact patient regarding surgical planning after results of hip aspiration and recommending continued rehab post op.

## 2023-01-31 ENCOUNTER — PATIENT OUTREACH (OUTPATIENT)
Dept: CARE COORDINATION | Facility: CLINIC | Age: 83
End: 2023-01-31

## 2023-01-31 LAB — BACTERIA SNV CULT: NO GROWTH

## 2023-01-31 NOTE — TELEPHONE ENCOUNTER
Action January 31, 2023 10:55 AM MT   Action Taken Sent a request for imaging from [facility] [phone].     Action February 1, 2023 4:21 PM MT   Action Taken Imaging resolved.     Action February 6, 2023 10:39 AM MT   Action Taken Called [facility] and left a voicemail for the medical records dept asking about 1999 records, left callback information. Also sent a fax for more information about the OP report.     DIAGNOSIS: Left Hip - Loosening Implant   APPOINTMENT DATE: 02/03/2023   NOTES STATUS DETAILS   DISCHARGE SUMMARY from hospital Internal 01/23/2023 to 01/29/2023 - [facility]     01/16/2023 to 01/23/2023 - [facility]   DISCHARGE REPORT from the ER Care Everywhere 09/08/2016- [facility]   OPERATIVE REPORT Internal 04/19/2016 - Spinal Cord Stimulator Removal  And Replacement RT Gluteal    N/A: LT JUDE in 1999   MEDICATION LIST Internal  Care Everywhere    IMPLANT RECORD/STICKER Internal    LABS     CBC/DIFF Internal    INJECTIONS DONE IN RADIOLOGY Internal 01/23/2023 - LT Hip Aspiration   CT SCAN PACS Internal:  01/17/2023 - [facility]    Quiles:  09/08/2016- [facility]     XRAYS (IMAGES & REPORTS) PACS Internal:  01/17/2023 - LT Hip    Quiles:  09/08/2016 - LT Hip

## 2023-01-31 NOTE — PROGRESS NOTES
[facility] Contact  [facility]/Voicemail     Clinical Data: Transitional Care Management Outreach     Outreach attempted x 2.  Left message on patient's voicemail, providing [facility]'s 24/7 scheduling and nurse triage phone number [phone] ([phone]) for questions/concerns and/or to schedule an appt with an [facility] provider, if they do not have a PCP.      Plan:  [facility] will do no further outreaches at this time.     ARNOLDO Carmona  [phone]  [facility]    *Connected Care Resource Team does NOT follow patient ongoing. Referrals are identified based on internal discharge reports and the outreach is to ensure patient has an understanding of their discharge instructions.

## 2023-02-02 ENCOUNTER — TELEPHONE (OUTPATIENT)
Dept: NEUROSURGERY | Facility: CLINIC | Age: 83
End: 2023-02-02

## 2023-02-02 NOTE — TELEPHONE ENCOUNTER
2nd attempt at workque referral - Patient was not available LVM    Referred by Vargas Uriarte MD  Subdural hematoma [S06.5XAA]  CT Head - 1/26/23 - [facility]

## 2023-02-03 ENCOUNTER — PRE VISIT (OUTPATIENT)
Dept: ORTHOPEDICS | Facility: CLINIC | Age: 83
End: 2023-02-03

## 2023-02-03 ENCOUNTER — OFFICE VISIT (OUTPATIENT)
Dept: ORTHOPEDICS | Facility: CLINIC | Age: 83
End: 2023-02-03
Payer: COMMERCIAL

## 2023-02-03 DIAGNOSIS — Z96.649 MECHANICAL LOOSENING OF PROSTHETIC HIP, SUBSEQUENT ENCOUNTER: Primary | ICD-10-CM

## 2023-02-03 DIAGNOSIS — T84.038D MECHANICAL LOOSENING OF PROSTHETIC HIP, SUBSEQUENT ENCOUNTER: Primary | ICD-10-CM

## 2023-02-03 PROCEDURE — 99204 OFFICE O/P NEW MOD 45 MIN: CPT | Performed by: ORTHOPAEDIC SURGERY

## 2023-02-03 NOTE — PROGRESS NOTES
Orthopaedic Surgery Clinic Note    Chief Complaint:  Left hip pain.    History:  I had the opportunity to meet this 82-year-old patient today for the above chief complaint of about 5-6 years' duration.  He had an index primary left total hip arthroplasty performed at the [facility] in 1999.  He denies any early postoperative complications such as infection or dislocation.  He denies ever having had a dislocation of his left hip.  He recalls being discharged postop day #1.  He states that he did well for a long time.  He reports that starting about 5 to 6 years ago he started to have increasing pain in the left hip, mainly in the thigh radiating down to the knee.  He denies groin pain.  He denies any history of clearly associated or directly antecedent trauma.  He reports that appears to have been simply spontaneous onset.  It has gotten worse enough to the point where he rates it now at 9/10 in severity.  He denies any recent fevers, chills, shortness of breath, chest pain, or incisional drainage.  He is unsure if he feels one leg is longer than the other, but his friend who accompanies him today does endorse that he does appear to walk with a significant limp.  He currently uses a 4 pronged cane to help ambulate.  He was recently admitted to hospital at the [facility] on 1/23/2023 as a transfer from [facility] due to significant pain and inability to care for himself.  In fact, he was apparently even found laying down on the floor naked by the paramedics.  He had been unable to weight-bear for about 2 weeks prior to that admission.  [facility] and recalls the discussion of a revision at that time.  Treatment so far have included a spine stimulator, Neurontin, prednisone, and capsaicin cream.  While admitted at [facility], he did undergo attempts at aspiration by interventional radiology as part of his  work-up.  They were unable to obtain any purulent material for aspiration.  They did inject saline into the joint to irrigate it, and this was sent for cultures which have returned as negative.  No organisms were seen on Gram stain.  Alpha defensin also returned as negative.  Most recent CRP was 45.5 on 1/26/2023.  This was down from 87.60 on 1/24/2023.  He does have a history of atrial fibrillation on Eliquis.    Past Medical History:  Past Medical History:   Diagnosis Date     Hyperlipidaemia      Hypertension      Pre-diabetes    The patient's past medical history also includes a subdural hematoma with a head CT scan performed at [facility] that showed a small left frontoparietal subdural hematoma, and a repeat head CT scan on 1/26/2023 showing a grossly stable small subdural hematoma.  The plan was to follow-up with neurosurgery in 2 weeks after discharge with a repeat head CT scan without contrast.  It also includes history of a dorsal column spinal stimulator in 2016, hospital-acquired delirium, prerenal BRITTNEY, CAD, history of CABG, hypertension, history of TAVR, GERD, and as noted atrial fibrillation on Eliquis.    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    Medications:  Current Outpatient Medications   Medication Sig Dispense Refill     atorvastatin (LIPITOR) 80 MG tablet Take 80 mg by mouth At Bedtime       CAMPHOR-EUCALYPTUS-MENTHOL EX Apply thin layer four times a day as needed for  itching       carbamide peroxide (DEBROX) 6.5 % otic solution 3 drops 3 times daily as needed for other 1-2 days prior to seeing nurse in ear wax clinic  Each ear or both ears       ENTRESTO  MG per tablet Take 1 tablet by mouth 2 times daily Morning and bedtime       fluticasone (FLONASE) 50 MCG/ACT nasal spray Spray 2 sprays into both nostrils daily       folic acid (FOLVITE) 1 MG tablet Take 1 mg by mouth daily       gabapentin (NEURONTIN) 100 MG capsule Take 1 capsule (100 mg) by mouth 3 times daily 90 capsule 3     guaiFENesin-codeine (ROBITUSSIN AC) 100-10 MG/5ML solution Take 10 mLs by mouth every 4 hours as needed for cough       metoprolol succinate ER (TOPROL XL) 25 MG 24 hr tablet Take 12.5 mg by mouth daily       omeprazole (PRILOSEC) 20 MG DR capsule Take 20 mg by mouth 2 times daily Am and HS       ondansetron (ZOFRAN ODT) 4 MG ODT tab Take 4 mg by mouth 3 times daily as needed for nausea       oxyCODONE (ROXICODONE) 5 MG tablet Take 1 tablet (5 mg) by mouth every 6 hours as needed for pain 14 tablet 0     oxyCODONE 7.5 MG TABS Take 5 mg by mouth every 6 hours as needed for moderate pain (4-6) 14 tablet 0     senna-docusate (SENOKOT-S/PERICOLACE) 8.6-50 MG tablet Take 2 tablets by mouth 2 times daily as needed for constipation 60 tablet 3     vitamin B1 (THIAMINE) 100 MG tablet Take 100 mg by mouth daily         Exam:  Vital Signs:  There were no vitals filed for this visit.   Vital signs from 1/28/2023 included /64, heart rate 85, temperature 98.8 F, respirations 18, and O2 sats 91%.    General: On examination today, the patient is a cooperative, awake, and alert adult sitting upright in a manual wheelchair in no acute distress.  Normal shoewear, removed for examination on the left.  Has a 4 pronged cane with him . Gets up into a standing position with some assistance.  He is accompanied by an adult friend.  Psych: Normal appearing mood and affect.   Pulm: Respirations are nonlabored and  regular on room air.  Dermatologic: The skin on the left thigh and hip is intact without visible open wounds, blisters, or any skin that appears to be at immediate obvious risk.  There are is a well-healed longitudinal scar over the posterolateral aspect of the left hip without any obvious overt signs for infection, drainage, or dehiscence.  Musculoskeletal: Mild tenderness palpation around his incision.  Nontender to palpation in the groin.  Tender to palpation in the proximal femur down to the mid diaphyseal region.  Circulation: Ipsilateral left DP and PT pulses are nonpalpable at this time, but are both dopplerable.  No pitting edema.  Neurologic: Light touch sensation is endorsed as intact in all dermatomes of the ipsilateral foot. There is 5/5 tibialis anterior, EHL, EDL, gastroc/soleus, FHL, FDL, PTT, and peroneal strength.     Imaging:  Independent review of imaging studies was performed including left hip radiographs dated 1/17/2023 with comparison radiographs from 9/8/2016. The relevant findings were discussed with the patient.  Total hip arthroplasty in place with uncemented left acetabular component and cemented stem.  Interval development of osteolysis appears to have occurred around the stem.  No clear signs for prosthesis migration.  No obvious evidence for periprosthetic fracture.  The hip appears to be reduced.  Also reviewed the results of the 1/17/2023 left hip CT scan with the patient as well, which suggest the presence of femoral stem loosening involving circumferential lucency around the stem, though without periprosthetic fracture or subsidence.  The medial lesion appears to measure 2.4 cm in length, and roughly 50% of the cortical width.    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     Plan:  The diagnosis, prognosis and treatment options, both nonsurgical and surgical, were discussed with the patient all in layman's terms.  Full  opportunity was given to the patient to participate in the shared decision-making process.  We had a thorough discussion about the options.  At the conclusion of our discussion, the patient verbalized his wish to proceed with a revision left total hip arthroplasty.  I discussed that this would hopefully involve just a stem revision with a head/liner exchange, but that findings at surgery may result in a revision of the acetabular component as well.  I discussed the particular difficulties with revision surgery and the increased risk of complications (such as infection and dislocation) and subsequent re-revisions.  I discussed the possibility of an extended trochanteric osteotomy being performed at surgery.  The nature of a revision left total hip arthroplasty, and the risks, benefits, and alternatives were all discussed in layman's terms.  I discussed that given the complexity of revision hip arthroplasty I recommended to him that this performed by one of my partners with more experience in this. All questions that Mr. Thomson had at the time were answered to the best of my ability, and he verbalized understanding of and agreement with the treatment plan.

## 2023-02-03 NOTE — LETTER
2/3/2023         RE: David Thomson  [address] [facility] [address]        Dear Colleague,    Thank you for referring your patient, David Thomson, to the [facility] [place]. Please see a copy of my visit note below.    Orthopaedic Surgery Clinic Note    Chief Complaint:  Left hip pain.    History:  I had the opportunity to meet this 82-year-old patient today for the above chief complaint of about 5-6 years' duration.  He had an index primary left total hip arthroplasty performed at the [facility] in 1999.  He denies any early postoperative complications such as infection or dislocation.  He denies ever having had a dislocation of his left hip.  He recalls being discharged postop day #1.  He states that he did well for a long time.  He reports that starting about 5 to 6 years ago he started to have increasing pain in the left hip, mainly in the thigh radiating down to the knee.  He denies groin pain.  He denies any history of clearly associated or directly antecedent trauma.  He reports that appears to have been simply spontaneous onset.  It has gotten worse enough to the point where he rates it now at 9/10 in severity.  He denies any recent fevers, chills, shortness of breath, chest pain, or incisional drainage.  He is unsure if he feels one leg is longer than the other, but his friend who accompanies him today does endorse that he does appear to walk with a significant limp.  He currently uses a 4 pronged cane to help ambulate.  He was recently admitted to hospital at the [facility] on 1/23/2023 as a transfer from [facility] due to significant pain and inability to care for himself.  In fact, he was apparently even found laying down on the floor naked by the paramedics.  He had been unable to weight-bear for about 2 weeks prior to that admission.  [facility] and recalls the  discussion of a revision at that time.  Treatment so far have included a spine stimulator, Neurontin, prednisone, and capsaicin cream.  While admitted at [facility], he did undergo attempts at aspiration by interventional radiology as part of his work-up.  They were unable to obtain any purulent material for aspiration.  They did inject saline into the joint to irrigate it, and this was sent for cultures which have returned as negative.  No organisms were seen on Gram stain.  Alpha defensin also returned as negative.  Most recent CRP was 45.5 on 1/26/2023.  This was down from 87.60 on 1/24/2023.  He does have a history of atrial fibrillation on Eliquis.    Past Medical History:  Past Medical History:   Diagnosis Date     Hyperlipidaemia      Hypertension      Pre-diabetes    The patient's past medical history also includes a subdural hematoma with a head CT scan performed at [facility] that showed a small left frontoparietal subdural hematoma, and a repeat head CT scan on 1/26/2023 showing a grossly stable small subdural hematoma.  The plan was to follow-up with neurosurgery in 2 weeks after discharge with a repeat head CT scan without contrast.  It also includes history of a dorsal column spinal stimulator in 2016, hospital-acquired delirium, prerenal BRITTNEY, CAD, history of CABG, hypertension, history of TAVR, GERD, and as noted atrial fibrillation on Eliquis.    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    Medications:  Current Outpatient Medications   Medication Sig Dispense Refill     atorvastatin (LIPITOR) 80 MG tablet Take 80 mg by mouth At Bedtime       CAMPHOR-EUCALYPTUS-MENTHOL EX Apply thin layer four times a day as needed for itching       carbamide peroxide (DEBROX) 6.5 % otic solution 3 drops 3 times daily as needed for other 1-2 days prior to seeing nurse in ear wax clinic  Each ear or both ears       ENTRESTO  MG per tablet Take 1 tablet by mouth 2 times daily Morning and bedtime       fluticasone (FLONASE) 50 MCG/ACT nasal spray Spray 2 sprays into both nostrils daily       folic acid (FOLVITE) 1 MG tablet Take 1 mg by mouth daily       gabapentin (NEURONTIN) 100 MG capsule Take 1 capsule (100 mg) by mouth 3 times daily 90 capsule 3     guaiFENesin-codeine (ROBITUSSIN AC) 100-10 MG/5ML solution Take 10 mLs by mouth every 4 hours as needed for cough       metoprolol succinate ER (TOPROL XL) 25 MG 24 hr tablet Take 12.5 mg by mouth daily       omeprazole (PRILOSEC) 20 MG DR capsule Take 20 mg by mouth 2 times daily Am and HS       ondansetron (ZOFRAN ODT) 4 MG ODT tab Take 4 mg by mouth 3 times daily as needed for nausea       oxyCODONE (ROXICODONE) 5 MG tablet Take 1 tablet (5 mg) by mouth every 6 hours as needed for pain 14 tablet 0     oxyCODONE 7.5 MG TABS Take 5 mg by mouth every 6 hours as needed for moderate pain (4-6) 14 tablet 0     senna-docusate (SENOKOT-S/PERICOLACE) 8.6-50 MG tablet Take 2 tablets by mouth 2 times daily as needed for constipation 60 tablet 3     vitamin B1 (THIAMINE) 100 MG tablet Take 100 mg by mouth daily         Exam:  Vital Signs:  There were no vitals filed for this visit.   Vital signs from 1/28/2023 included /64, heart rate 85, temperature 98.8 F, respirations 18, and O2 sats 91%.    General: On examination today, the patient is a cooperative, awake, and alert adult sitting upright in a manual wheelchair in no acute  distress.  Normal shoewear, removed for examination on the left.  Has a 4 pronged cane with him . Gets up into a standing position with some assistance.  He is accompanied by an adult friend.  Psych: Normal appearing mood and affect.   Pulm: Respirations are nonlabored and regular on room air.  Dermatologic: The skin on the left thigh and hip is intact without visible open wounds, blisters, or any skin that appears to be at immediate obvious risk.  There are is a well-healed longitudinal scar over the posterolateral aspect of the left hip without any obvious overt signs for infection, drainage, or dehiscence.  Musculoskeletal: Mild tenderness palpation around his incision.  Nontender to palpation in the groin.  Tender to palpation in the proximal femur down to the mid diaphyseal region.  Circulation: Ipsilateral left DP and PT pulses are nonpalpable at this time, but are both dopplerable.  No pitting edema.  Neurologic: Light touch sensation is endorsed as intact in all dermatomes of the ipsilateral foot. There is 5/5 tibialis anterior, EHL, EDL, gastroc/soleus, FHL, FDL, PTT, and peroneal strength.     Imaging:  Independent review of imaging studies was performed including left hip radiographs dated 1/17/2023 with comparison radiographs from 9/8/2016. The relevant findings were discussed with the patient.  Total hip arthroplasty in place with uncemented left acetabular component and cemented stem.  Interval development of osteolysis appears to have occurred around the stem.  No clear signs for prosthesis migration.  No obvious evidence for periprosthetic fracture.  The hip appears to be reduced.  Also reviewed the results of the 1/17/2023 left hip CT scan with the patient as well, which suggest the presence of femoral stem loosening involving circumferential lucency around the stem, though without periprosthetic fracture or subsidence.  The medial lesion appears to measure 2.4 cm in length, and roughly 50% of the  cortical width.    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     Plan:  The diagnosis, prognosis and treatment options, both nonsurgical and surgical, were discussed with the patient all in layman's terms.  Full opportunity was given to the patient to participate in the shared decision-making process.  We had a thorough discussion about the options.  At the conclusion of our discussion, the patient verbalized his wish to proceed with a revision left total hip arthroplasty.  I discussed that this would hopefully involve just a stem revision with a head/liner exchange, but that findings at surgery may result in a revision of the acetabular component as well.  I discussed the particular difficulties with revision surgery and the increased risk of complications (such as infection and dislocation) and subsequent re-revisions.  I discussed the possibility of an extended trochanteric osteotomy being performed at surgery.  The nature of a revision left total hip arthroplasty, and the risks, benefits, and alternatives were all discussed in layman's terms.  I discussed that given the complexity of revision hip arthroplasty I recommended to him that this performed by one of my partners with more experience in this. All questions that Mr. Thomson had at the time were answered to the best of my ability, and he verbalized understanding of and agreement with the treatment plan.        Again, thank you for allowing me to participate in the care of your patient.        Sincerely,        Kavin Fallon MD

## 2023-02-03 NOTE — LETTER
Date:February 6, 2023      Provider requested that no letter be sent. Do not send.       [facility]

## 2023-02-03 NOTE — NURSING NOTE
Reason For Visit:   Chief Complaint   Patient presents with     Consult     Left hip pain, loosening JUDE. JUDE 1999.       There were no vitals taken for this visit.         Romi Hurd, ATC

## 2023-02-06 LAB — BACTERIA SNV CULT: NORMAL

## 2023-02-07 ENCOUNTER — TELEPHONE (OUTPATIENT)
Dept: ORTHOPEDICS | Facility: CLINIC | Age: 83
End: 2023-02-07

## 2023-02-07 ENCOUNTER — HOSPITAL ENCOUNTER (OUTPATIENT)
Dept: CT IMAGING | Facility: CLINIC | Age: 83
Discharge: HOME | End: 2023-02-07
Admitting: STUDENT IN AN ORGANIZED HEALTH CARE EDUCATION/TRAINING PROGRAM
Payer: COMMERCIAL

## 2023-02-07 DIAGNOSIS — Z96.649 MECHANICAL LOOSENING OF PROSTHETIC HIP, SUBSEQUENT ENCOUNTER: Primary | ICD-10-CM

## 2023-02-07 DIAGNOSIS — T84.038D MECHANICAL LOOSENING OF PROSTHETIC HIP, SUBSEQUENT ENCOUNTER: Primary | ICD-10-CM

## 2023-02-07 DIAGNOSIS — S06.5XAA SUBDURAL HEMATOMA (H): Chronic | ICD-10-CM

## 2023-02-07 PROCEDURE — 70450 CT HEAD/BRAIN W/O DYE: CPT

## 2023-02-07 NOTE — TELEPHONE ENCOUNTER
Phoned Carmen elam, she agreed it would be best to call patient directly regarding scheduling.    Writer phoned patient on behalf of Dr. Fallon and Dr. Evans who recommend patient see Dr Evans in clinic to discuss revision of left hip arthroplasty.     Patient agreed to be seen tomorrow at 11:30am, appt scheduled.    Minda Ramon RN on 2/7/2023 at 10:36 AM

## 2023-02-07 NOTE — PROGRESS NOTES
Appropriate assistive devices provided during their visit. yes (Yes, No, N/A) walk (list device)    Exam table and/or cart  placed in the lowest position. yes (Yes, No, N/A)    Brakes on tables/carts/wheelchairs used at all times. yes (Yes, No, N/A)    Non slip footwear applied. na (Yes, No, NA)    Patient was accompanied by staff throughout visit. yes (Yes, No, N/A)    Equipment safety straps used. na (Yes, No, N/A)    Assist with toileting. na (Yes, No, N/A)       Daisy Kevin  12:31 PM.  February 7, 2023

## 2023-02-07 NOTE — TELEPHONE ENCOUNTER
[facility] Call Center    Phone Message    May a detailed message be left on voicemail: yes     Reason for Call: Other: Carmen is calling because the patient has not heard anything to get his surgery. Please call Carmen [phone]     Action Taken: Other: [facility]    Travel Screening: Not Applicable

## 2023-02-07 NOTE — TELEPHONE ENCOUNTER
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   APPOINTMENT DATE: 02/03/2023   NOTES STATUS DETAILS   OFFICE NOTE from referring provider Internal 02/03/2023 - Kavin Fallon MD - [facility]   DISCHARGE SUMMARY from hospital Internal 01/23/2023 to 01/29/2023 - [facility]     01/16/2023 to 01/23/2023 - [facility] ED   DISCHARGE REPORT from the ER Care Everywhere 09/08/2016- [facility] ED   OPERATIVE REPORT Internal      Media Tab 04/19/2016 - Spinal Cord Stimulator Removal  And Replacement RT Gluteal    11/30/1999 - LT Hip JUDE @ [facility]   MEDICATION LIST Internal  Care Everywhere    IMPLANT RECORD/STICKER Internal    LABS     CBC/DIFF Internal    INJECTIONS DONE IN RADIOLOGY Internal 01/23/2023 - LT Hip Aspiration   CT SCAN PACS Internal:  01/17/2023 - Pelvis    [place]:  09/08/2016- Pelvis     XRAYS (IMAGES & REPORTS) PACS Internal:  01/17/2023 - LT Hip    [place]:  09/08/2016 - LT Hip

## 2023-02-08 ENCOUNTER — PRE VISIT (OUTPATIENT)
Dept: ORTHOPEDICS | Facility: CLINIC | Age: 83
End: 2023-02-08

## 2023-02-08 ENCOUNTER — ANCILLARY PROCEDURE (OUTPATIENT)
Dept: GENERAL RADIOLOGY | Facility: CLINIC | Age: 83
End: 2023-02-08
Attending: STUDENT IN AN ORGANIZED HEALTH CARE EDUCATION/TRAINING PROGRAM
Payer: COMMERCIAL

## 2023-02-08 ENCOUNTER — OFFICE VISIT (OUTPATIENT)
Dept: ORTHOPEDICS | Facility: CLINIC | Age: 83
End: 2023-02-08
Payer: COMMERCIAL

## 2023-02-08 DIAGNOSIS — T84.011A FAILURE OF LEFT TOTAL HIP ARTHROPLASTY, INITIAL ENCOUNTER (H): Primary | ICD-10-CM

## 2023-02-08 DIAGNOSIS — Z96.649 MECHANICAL LOOSENING OF PROSTHETIC HIP, SUBSEQUENT ENCOUNTER: ICD-10-CM

## 2023-02-08 DIAGNOSIS — T84.038D MECHANICAL LOOSENING OF PROSTHETIC HIP, SUBSEQUENT ENCOUNTER: ICD-10-CM

## 2023-02-08 PROCEDURE — 73502 X-RAY EXAM HIP UNI 2-3 VIEWS: CPT | Mod: LT | Performed by: RADIOLOGY

## 2023-02-08 PROCEDURE — 99214 OFFICE O/P EST MOD 30 MIN: CPT | Performed by: STUDENT IN AN ORGANIZED HEALTH CARE EDUCATION/TRAINING PROGRAM

## 2023-02-08 RX ORDER — CEFAZOLIN SODIUM 2 G/50ML
2 SOLUTION INTRAVENOUS
Status: CANCELLED | OUTPATIENT
Start: 2023-02-08

## 2023-02-08 RX ORDER — TRANEXAMIC ACID 650 MG/1
1950 TABLET ORAL ONCE
Status: CANCELLED | OUTPATIENT
Start: 2023-02-08 | End: 2023-02-08

## 2023-02-08 RX ORDER — CEFAZOLIN SODIUM 2 G/50ML
2 SOLUTION INTRAVENOUS SEE ADMIN INSTRUCTIONS
Status: CANCELLED | OUTPATIENT
Start: 2023-02-08

## 2023-02-08 ASSESSMENT — HOOS JR
WALKING ON UNEVEN SURFACE: MODERATE
RISING FROM SITTING: MODERATE
SITTING: MODERATE
LYING IN BED (TURNING OVER, MAINTAINING HIP POSITION): SEVERE
GOING UP OR DOWN STAIRS: SEVERE
BENDING TO THE FLOOR TO PICK UP OBJECT: SEVERE
HOOS JR TOTAL INTERVAL SCORE: 43.34

## 2023-02-08 NOTE — PROGRESS NOTES
"    [facility]  Orthopaedic Surgery Consultation by Wilbert Evans M.D.    David Thomson MRN# [id number]   Age: 82 year old YOB: 1940     Requesting physician: No ref. provider found  No Ref-Primary, Physician     Background history:  DX:  1. Dilated cardiomyopathy  2. Alcohol dependence disorder  3. Atrial fibrillation on Eliquis  4. Hereditary elliptocytosis  5. Type 2 diabetes mellitus  6. Subdural hematoma  7. Hyperlipidemia  8. Hypertension  9. Urinary retention  10. Dorsal column spinal stimulator 2016  11. Prerenal BRITTNEY  12. Coronary artery disease and history of coronary artery bypass graft  13. History of TAVR    TREATMENTS:  1. TAVR  2. CABG  3. Spinal stimulator  4. 1999, left total hip arthroplasty [facility] size 60 mm cup, +5 mm femoral head.  Unknown .           History of Present Illness:   82 year old male who presents to our clinic because of chronic left hip pain.  Patient was seen last week by Dr. Fallon who referred patient to our clinic.    From Dr. Fallon's note:  \"82-year-old patient today for the above chief complaint of about 5-6 years' duration.  He had an index primary left total hip arthroplasty performed at the [facility] in 1999.  He denies any early postoperative complications such as infection or dislocation.  He denies ever having had a dislocation of his left hip.  He recalls being discharged postop day #1.  He states that he did well for a long time.  He reports that starting about 5 to 6 years ago he started to have increasing pain in the left hip, mainly in the thigh radiating down to the knee.  He denies groin pain.  He denies any history of clearly associated or directly antecedent trauma.  He reports that appears to have been simply spontaneous onset.  It has gotten worse enough to the point where he rates it now at 9/10 in severity.  He denies any recent fevers, chills, shortness of breath, chest pain, or incisional drainage.  He is " unsure if he feels one leg is longer than the other, but his friend who accompanies him today does endorse that he does appear to walk with a significant limp.  He currently uses a 4 pronged cane to help ambulate.  He was recently admitted to hospital at the [facility] on 1/23/2023 as a transfer from [facility] due to significant pain and inability to care for himself.  In fact, he was apparently even found laying down on the floor naked by the paramedics.  He had been unable to weight-bear for about 2 weeks prior to that admission.  [facility] and recalls the discussion of a revision at that time.  Treatment so far have included a spine stimulator, Neurontin, prednisone, and capsaicin cream.  While admitted at [facility], he did undergo attempts at aspiration by interventional radiology as part of his work-up.  They were unable to obtain any purulent material for aspiration.  They did inject saline into the joint to irrigate it, and this was sent for cultures which have returned as negative.  No organisms were seen on Gram stain.  Alpha defensin also returned as negative.  Most recent CRP was 45.5 on 1/26/2023.  This was down from 87.60 on 1/24/2023.  He does have a history of atrial fibrillation on Eliquis.    Patient does endorse buttock pain.  No rating pain down his leg.    Social:   Occupation: Farming and construction  Living situation: Lives alone.  Family nearby.  Hobbies / Sports: Curling, softball.    Smoking: No  Alcohol: No  Illicit drug use: No         Physical Exam:     EXAMINATION pertinent findings:   PSYCH: Pleasant, healthy-appearing, alert, oriented x3, cooperative. Normal mood and affect.  VITAL SIGNS: There were no vitals taken for this visit.  Reviewed nursing intake notes.   There is no height or weight on file to calculate BMI.  RESP: non labored breathing   ABD: benign, soft,  non-tender, no acute peritoneal findings  SKIN: grossly normal   LYMPHATIC: grossly normal, no adenopathy, no extremity edema  NEURO: grossly normal , no motor deficits  VASCULAR: satisfactory perfusion of all extremities   MUSCULOSKELETAL:     Gait: Not tested today.  Hips:   L hip: Incision is clean, dry and intact.  Localized over the posterior lateral hip.  Well-healed.  No signs of infection.  ROM not extensively tested due to pain.     Left LE:   Thigh and leg compartments soft and compressible   +Quad/TA/GSC/FHL/EHL   SILT DP/SP/Chapis/Saph/Tib nerve distributions   Palpable dorsalis pedis pulse          Data:   All laboratory data reviewed    Aspiration results 1/26/2023:  Aerobic/anaerobic cultures no growth to date  Gram stain no organisms seen  Synovasure testing negative    All imaging studies reviewed by me personally.    XR pelvis/hip left 1/17/2023 and 9/8/2016:  My interpretation: Status post left total hip arthroplasty with cemented femoral component in slight A to P alignment.  Distal cement plug of approximately 6 cm.  Progressive lucency around femoral component compared to previous imaging studies.  No clear signs of migration of femoral component. Fracture through distal part of femoral stem.  No signs of periprosthetic fractures.  Appearance of lucency behind acetabular uncemented component with 2 screws in situ.  Asymmetric positioning of femoral head and acetabular liner suggestive of polyethylene wear.    CT pelvis/hip left 1/17/2023:  My interpretation:Status post left total hip arthroplasty with cemented femoral component in slight A to P alignment.  Distal cement plug of approximately 6 cm.  Progressive lucency around femoral component compared to previous imaging studies.  No clear signs of migration of femoral component.  No signs of periprosthetic fractures.  Acetabular component appears to be well fixed with 2 screws in situ.  Asymmetric positioning of femoral head and acetabular liner  suggestive of polyethylene wear.           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  Potentially bone loss behind acetabulum as well.     Plan:  I extensively discussed today's findings including etiology of pain, diagnosis, treatment options both surgical and nonsurgical in depth with patient.  At this point in time would be my recommendation to consider revision left total hip arthroplasty.  This would involve the revision of femoral component.  Cement will be removed to the best of our abilities and where deemed well fixed could remain in place.  A new cemented component that is well fixed (potentially cement and cement ) would be placed.  Intraoperative cultures will be obtained.  A liner exchange will be done.  The acetabular component will be inspected intraoperatively as well.  If deemed necessary this would be revised as well.  Possibility of bone grafting was discussed.  We discussed the procedure in detail.  We also discussed the risks and complications which include but are not limited to persistent pain, infection, DVT, dislocations, fractures, neurovascular damage of the above-mentioned procedure.  We discussed the necessity for future revisions.  Patient understands and agrees to the treatment plan as set forth.  We will work on scheduling this for patient in the near future.  Before surgery he will be seen by the anesthesiology department.  All questions were answered.    Thank you for your referral.      Wilbert Evans MD, PhD     Adult Reconstruction  [facility] Department of Orthopaedic Surgery        DATA for DOCUMENTATION:         Past Medical History:     Patient  Active Problem List   Diagnosis     CARDIOVASCULAR SCREENING; LDL GOAL LESS THAN 100     Abnormal glucose     Hypertension     Hyperlipidemia with target LDL less than 100     Hip pain, left     Intractable pain     Acute kidney injury (H)     Toxic encephalopathy     Urinary retention     Hip deformity     Subdural hematoma     Past Medical History:   Diagnosis Date     Hyperlipidaemia      Hypertension      Pre-diabetes        Also see scanned health assessment forms.       Past Surgical History:     Past Surgical History:   Procedure Laterality Date     BACK SURGERY       GALLBLADDER SURGERY       HIP SURGERY       INSERT STIMULATOR DORSAL COLUMN Right 4/19/2016    Procedure: INSERT STIMULATOR DORSAL COLUMN;  Surgeon: Zoë Clemons DO;  Location: [facility] OR     IR FINE NEEDLE ASPIRATION W ULTRASOUND  1/23/2023     IR FINE NEEDLE ASPIRATION W ULTRASOUND  1/26/2023     NECK SURGERY       WRIST SURGERY              Social History:     Social History     Socioeconomic History     Marital status: Single     Spouse name: Not on file     Number of children: Not on file     Years of education: Not on file     Highest education level: Not on file   Occupational History     Not on file   Tobacco Use     Smoking status: Never     Smokeless tobacco: Never   Substance and Sexual Activity     Alcohol use: Not on file     Drug use: Not on file     Sexual activity: Not on file   Other Topics Concern     Parent/sibling w/ CABG, MI or angioplasty before 65F 55M? Not Asked   Social History Narrative     Not on file     Social Determinants of Health     Financial Resource Strain: Not on file   Food Insecurity: Not on file   Transportation Needs: Not on file   Physical Activity: Not on file   Stress: Not on file   Social Connections: Not on file   Intimate Partner Violence: Not on file   Housing Stability: Not on file            Family History:       Family History   Problem Relation Age of Onset     Heart Disease Mother      Heart  Disease Father             Medications:     Current Outpatient Medications   Medication Sig     atorvastatin (LIPITOR) 80 MG tablet Take 80 mg by mouth At Bedtime     CAMPHOR-EUCALYPTUS-MENTHOL EX Apply thin layer four times a day as needed for itching     carbamide peroxide (DEBROX) 6.5 % otic solution 3 drops 3 times daily as needed for other 1-2 days prior to seeing nurse in ear wax clinic  Each ear or both ears     ENTRESTO  MG per tablet Take 1 tablet by mouth 2 times daily Morning and bedtime     fluticasone (FLONASE) 50 MCG/ACT nasal spray Spray 2 sprays into both nostrils daily     folic acid (FOLVITE) 1 MG tablet Take 1 mg by mouth daily     gabapentin (NEURONTIN) 100 MG capsule Take 1 capsule (100 mg) by mouth 3 times daily     guaiFENesin-codeine (ROBITUSSIN AC) 100-10 MG/5ML solution Take 10 mLs by mouth every 4 hours as needed for cough     metoprolol succinate ER (TOPROL XL) 25 MG 24 hr tablet Take 12.5 mg by mouth daily     omeprazole (PRILOSEC) 20 MG DR capsule Take 20 mg by mouth 2 times daily Am and HS     ondansetron (ZOFRAN ODT) 4 MG ODT tab Take 4 mg by mouth 3 times daily as needed for nausea     oxyCODONE (ROXICODONE) 5 MG tablet Take 1 tablet (5 mg) by mouth every 6 hours as needed for pain     oxyCODONE 7.5 MG TABS Take 5 mg by mouth every 6 hours as needed for moderate pain (4-6)     senna-docusate (SENOKOT-S/PERICOLACE) 8.6-50 MG tablet Take 2 tablets by mouth 2 times daily as needed for constipation     vitamin B1 (THIAMINE) 100 MG tablet Take 100 mg by mouth daily     No current facility-administered medications for this visit.              Review of Systems:   A comprehensive 10 point review of systems (constitutional, ENT, cardiac, peripheral vascular, lymphatic, respiratory, GI, , Musculoskeletal, skin, Neurological) was performed and found to be negative except as described in this note.     See intake form completed by patient

## 2023-02-08 NOTE — LETTER
"    2/8/2023         RE: David Thomson  [address] [facility] [address]        Dear Colleague,    Thank you for referring your patient, David Thomson, to the [facility] [place]. Please see a copy of my visit note below.        [facility]  Orthopaedic Surgery Consultation by Wilbert Evans M.D.    David Thomson MRN# [id number]   Age: 82 year old YOB: 1940     Requesting physician: No ref. provider found  No Ref-Primary, Physician     Background history:  DX:  1. Dilated cardiomyopathy  2. Alcohol dependence disorder  3. Atrial fibrillation on Eliquis  4. Hereditary elliptocytosis  5. Type 2 diabetes mellitus  6. Subdural hematoma  7. Hyperlipidemia  8. Hypertension  9. Urinary retention  10. Dorsal column spinal stimulator 2016  11. Prerenal BRITTNEY  12. Coronary artery disease and history of coronary artery bypass graft  13. History of TAVR    TREATMENTS:  1. TAVR  2. CABG  3. Spinal stimulator  4. 1999, left total hip arthroplasty [facility] size 60 mm cup, +5 mm femoral head.  Unknown .           History of Present Illness:   82 year old male who presents to our clinic because of chronic left hip pain.  Patient was seen last week by Dr. Fallon who referred patient to our clinic.    From Dr. Fallon's note:  \"82-year-old patient today for the above chief complaint of about 5-6 years' duration.  He had an index primary left total hip arthroplasty performed at the [facility] in 1999.  He denies any early postoperative complications such as infection or dislocation.  He denies ever having had a dislocation of his left hip.  He recalls being discharged postop day #1.  He states that he did well for a long time.  He reports that starting about 5 to 6 years ago he started to have increasing pain in the left hip, mainly in the thigh radiating down to the knee.  He denies groin pain.  He denies any history of clearly associated or directly " antecedent trauma.  He reports that appears to have been simply spontaneous onset.  It has gotten worse enough to the point where he rates it now at 9/10 in severity.  He denies any recent fevers, chills, shortness of breath, chest pain, or incisional drainage.  He is unsure if he feels one leg is longer than the other, but his friend who accompanies him today does endorse that he does appear to walk with a significant limp.  He currently uses a 4 pronged cane to help ambulate.  He was recently admitted to hospital at the [facility] on 1/23/2023 as a transfer from [facility] due to significant pain and inability to care for himself.  In fact, he was apparently even found laying down on the floor naked by the paramedics.  He had been unable to weight-bear for about 2 weeks prior to that admission.  [facility] and recalls the discussion of a revision at that time.  Treatment so far have included a spine stimulator, Neurontin, prednisone, and capsaicin cream.  While admitted at [facility], he did undergo attempts at aspiration by interventional radiology as part of his work-up.  They were unable to obtain any purulent material for aspiration.  They did inject saline into the joint to irrigate it, and this was sent for cultures which have returned as negative.  No organisms were seen on Gram stain.  Alpha defensin also returned as negative.  Most recent CRP was 45.5 on 1/26/2023.  This was down from 87.60 on 1/24/2023.  He does have a history of atrial fibrillation on Eliquis.    Patient does endorse buttock pain.  No rating pain down his leg.    Social:   Occupation: Farming and construction  Living situation: Lives alone.  Family nearby.  Hobbies / Sports: Curling, softball.    Smoking: No  Alcohol: No  Illicit drug use: No         Physical Exam:     EXAMINATION pertinent findings:   PSYCH: Pleasant,  healthy-appearing, alert, oriented x3, cooperative. Normal mood and affect.  VITAL SIGNS: There were no vitals taken for this visit.  Reviewed nursing intake notes.   There is no height or weight on file to calculate BMI.  RESP: non labored breathing   ABD: benign, soft, non-tender, no acute peritoneal findings  SKIN: grossly normal   LYMPHATIC: grossly normal, no adenopathy, no extremity edema  NEURO: grossly normal , no motor deficits  VASCULAR: satisfactory perfusion of all extremities   MUSCULOSKELETAL:     Gait: Not tested today.  Hips:   L hip: Incision is clean, dry and intact.  Localized over the posterior lateral hip.  Well-healed.  No signs of infection.  ROM not extensively tested due to pain.     Left LE:   Thigh and leg compartments soft and compressible   +Quad/TA/GSC/FHL/EHL   SILT DP/SP/Chapis/Saph/Tib nerve distributions   Palpable dorsalis pedis pulse          Data:   All laboratory data reviewed    Aspiration results 1/26/2023:  Aerobic/anaerobic cultures no growth to date  Gram stain no organisms seen  Synovasure testing negative    All imaging studies reviewed by me personally.    XR pelvis/hip left 1/17/2023 and 9/8/2016:  My interpretation: Status post left total hip arthroplasty with cemented femoral component in slight A to P alignment.  Distal cement plug of approximately 6 cm.  Progressive lucency around femoral component compared to previous imaging studies.  No clear signs of migration of femoral component.  No signs of periprosthetic fractures.  Appearance of lucency behind acetabular uncemented component with 2 screws in situ.  Asymmetric positioning of femoral head and acetabular liner suggestive of polyethylene wear.    CT pelvis/hip left 1/17/2023:  My interpretation:Status post left total hip arthroplasty with cemented femoral component in slight A to P alignment.  Distal cement plug of approximately 6 cm.  Progressive lucency around femoral component compared to previous imaging  studies.  No clear signs of migration of femoral component.  No signs of periprosthetic fractures.  Acetabular component appears to be well fixed with 2 screws in situ.  Asymmetric positioning of femoral head and acetabular liner suggestive of polyethylene wear.           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  Potentially bone loss behind acetabulum as well.     Plan:  I extensively discussed today's findings including etiology of pain, diagnosis, treatment options both surgical and nonsurgical in depth with patient.  At this point in time would be my recommendation to consider revision left total hip arthroplasty.  This would involve the revision of femoral component.  Cement will be removed to the best of our abilities and where deemed well fixed could remain in place.  A new cemented component that is well fixed (potentially cement and cement ) would be placed.  Intraoperative cultures will be obtained.  A liner exchange will be done.  The acetabular component will be inspected intraoperatively as well.  If deemed necessary this would be revised as well.  Possibility of bone grafting was discussed.  We discussed the procedure in detail.  We also discussed the risks and complications which include but are not limited to persistent pain, infection, DVT, dislocations, fractures, neurovascular damage of the above-mentioned procedure.  We discussed the necessity for future revisions.  Patient understands and agrees to the treatment plan as set forth.  We will work on scheduling this for patient in the near future.  Before surgery he will be seen by the anesthesiology department.  All questions were answered.    Thank you for your  referral.      Wilbert Evans MD, PhD     Adult Reconstruction  [facility] Department of Orthopaedic Surgery        DATA for DOCUMENTATION:         Past Medical History:     Patient Active Problem List   Diagnosis     CARDIOVASCULAR SCREENING; LDL GOAL LESS THAN 100     Abnormal glucose     Hypertension     Hyperlipidemia with target LDL less than 100     Hip pain, left     Intractable pain     Acute kidney injury (H)     Toxic encephalopathy     Urinary retention     Hip deformity     Subdural hematoma     Past Medical History:   Diagnosis Date     Hyperlipidaemia      Hypertension      Pre-diabetes        Also see scanned health assessment forms.       Past Surgical History:     Past Surgical History:   Procedure Laterality Date     BACK SURGERY       GALLBLADDER SURGERY       HIP SURGERY       INSERT STIMULATOR DORSAL COLUMN Right 4/19/2016    Procedure: INSERT STIMULATOR DORSAL COLUMN;  Surgeon: Zoë Clemons DO;  Location: [facility] OR     IR FINE NEEDLE ASPIRATION W ULTRASOUND  1/23/2023     IR FINE NEEDLE ASPIRATION W ULTRASOUND  1/26/2023     NECK SURGERY       WRIST SURGERY              Social History:     Social History     Socioeconomic History     Marital status: Single     Spouse name: Not on file     Number of children: Not on file     Years of education: Not on file     Highest education level: Not on file   Occupational History     Not on file   Tobacco Use     Smoking status: Never     Smokeless tobacco: Never   Substance and Sexual Activity     Alcohol use: Not on file     Drug use: Not on file     Sexual activity: Not on file   Other Topics Concern     Parent/sibling w/ CABG, MI or angioplasty before 65F 55M? Not Asked   Social History Narrative     Not on file     Social Determinants of Health     Financial Resource Strain: Not on file   Food Insecurity: Not on file   Transportation Needs: Not on file   Physical Activity: Not on file   Stress: Not on file    Social Connections: Not on file   Intimate Partner Violence: Not on file   Housing Stability: Not on file            Family History:       Family History   Problem Relation Age of Onset     Heart Disease Mother      Heart Disease Father             Medications:     Current Outpatient Medications   Medication Sig     atorvastatin (LIPITOR) 80 MG tablet Take 80 mg by mouth At Bedtime     CAMPHOR-EUCALYPTUS-MENTHOL EX Apply thin layer four times a day as needed for itching     carbamide peroxide (DEBROX) 6.5 % otic solution 3 drops 3 times daily as needed for other 1-2 days prior to seeing nurse in ear wax clinic  Each ear or both ears     ENTRESTO  MG per tablet Take 1 tablet by mouth 2 times daily Morning and bedtime     fluticasone (FLONASE) 50 MCG/ACT nasal spray Spray 2 sprays into both nostrils daily     folic acid (FOLVITE) 1 MG tablet Take 1 mg by mouth daily     gabapentin (NEURONTIN) 100 MG capsule Take 1 capsule (100 mg) by mouth 3 times daily     guaiFENesin-codeine (ROBITUSSIN AC) 100-10 MG/5ML solution Take 10 mLs by mouth every 4 hours as needed for cough     metoprolol succinate ER (TOPROL XL) 25 MG 24 hr tablet Take 12.5 mg by mouth daily     omeprazole (PRILOSEC) 20 MG DR capsule Take 20 mg by mouth 2 times daily Am and HS     ondansetron (ZOFRAN ODT) 4 MG ODT tab Take 4 mg by mouth 3 times daily as needed for nausea     oxyCODONE (ROXICODONE) 5 MG tablet Take 1 tablet (5 mg) by mouth every 6 hours as needed for pain     oxyCODONE 7.5 MG TABS Take 5 mg by mouth every 6 hours as needed for moderate pain (4-6)     senna-docusate (SENOKOT-S/PERICOLACE) 8.6-50 MG tablet Take 2 tablets by mouth 2 times daily as needed for constipation     vitamin B1 (THIAMINE) 100 MG tablet Take 100 mg by mouth daily     No current facility-administered medications for this visit.              Review of Systems:   A comprehensive 10 point review of systems (constitutional, ENT, cardiac, peripheral vascular,  lymphatic, respiratory, GI, , Musculoskeletal, skin, Neurological) was performed and found to be negative except as described in this note.     See intake form completed by patient          Again, thank you for allowing me to participate in the care of your patient.        Sincerely,        Wilbert Evans MD

## 2023-02-08 NOTE — LETTER
Date:February 9, 2023      Provider requested that no letter be sent. Do not send.       [facility]

## 2023-02-08 NOTE — NURSING NOTE
Teaching Flowsheet   Relevant Diagnosis: hip revision arthroplasty  Teaching Topic:       Patient understands they will need a preop exam within 30 days of date of surgery. Patient understands the expected length of stay and the expectation of outpatient physical therapy. Discussed dental/non-sterile procedure prophylaxis. Discussed stoplight tool and how to use it with patient.      Person(s) involved in teaching:   Patient     Motivation Level:  Asks Questions: Yes  Eager to Learn: Yes  Cooperative: Yes  Receptive (willing/able to accept information): Yes  Any cultural factors/Restoration beliefs that may influence understanding or compliance? No  Comments: none     Patient demonstrates understanding of the following:  Reason for the appointment, diagnosis and treatment plan: Yes  Knowledge of proper use of medications and conditions for which they are ordered (with special attention to potential side effects or drug interactions): Yes  Which situations necessitate calling provider and whom to contact: Yes       Teaching Concerns Addressed:   Comments: none     Proper use and care of (medical equip, care aids, etc.): Yes  Nutritional needs and diet plan: Yes  Pain management techniques: Yes  Wound Care: Yes  How and/when to access community resources: Yes     Instructional Materials Used/Given: Preoperative teaching packet, surgical soap x2, dental card, total joint booklet, welcome letter, stoplight tool.       Time spent with patient: 30 minutes.

## 2023-02-09 LAB — BACTERIA SNV CULT: NORMAL

## 2023-02-10 ENCOUNTER — TELEPHONE (OUTPATIENT)
Dept: ORTHOPEDICS | Facility: CLINIC | Age: 83
End: 2023-02-10

## 2023-02-10 ENCOUNTER — TRANSFERRED RECORDS (OUTPATIENT)
Dept: HEALTH INFORMATION MANAGEMENT | Facility: CLINIC | Age: 83
End: 2023-02-10

## 2023-02-10 NOTE — TELEPHONE ENCOUNTER
Attempted to reach patient to schedule surgery for left JUDE revision.      Left message for patient to call the surgery scheduling line at [phone].     Esa Carter Surgery Scheduler

## 2023-02-10 NOTE — TELEPHONE ENCOUNTER
[facility] Call Center    Phone Message    May a detailed message be left on voicemail: yes     Reason for Call Patient waiting on call . Patient stated He been waiting for Months to be Scheduled for Surgery on Left Hip     Action Taken: Message routed to:  [facility]    Travel Screening: Not Applicable

## 2023-02-13 ENCOUNTER — TELEPHONE (OUTPATIENT)
Dept: NEUROSURGERY | Facility: CLINIC | Age: 83
End: 2023-02-13

## 2023-02-20 LAB — BACTERIA SNV CULT: NO GROWTH

## 2023-02-21 NOTE — TELEPHONE ENCOUNTER
Pt calling back to be scheduled for surgery with Dr. Evans - pt needs to be reached at ph# [phone]

## 2023-02-21 NOTE — TELEPHONE ENCOUNTER
Spoke to patient about surgery dates. Updated phone numbers for patient. Patient stated that his ex-wife got the phone call but didn't inform him.     Next available surgery date is in May. Patient was hoping to schedule sooner.     Will consult with Dr Evans if patient is a candidate for [facility].       Esa Carter, Surgery Scheduler

## 2023-02-22 ENCOUNTER — TELEPHONE (OUTPATIENT)
Dept: ORTHOPEDICS | Facility: CLINIC | Age: 83
End: 2023-02-22

## 2023-02-22 DIAGNOSIS — T84.011A FAILURE OF LEFT TOTAL HIP ARTHROPLASTY, INITIAL ENCOUNTER (H): Primary | ICD-10-CM

## 2023-02-22 NOTE — TELEPHONE ENCOUNTER
[facility] Call Center    Phone Message    May a detailed message be left on voicemail: no     Reason for Call: Other: Patient calling again about getting surgery scheduled (see TE 02/10)  He has a hard time hearing the phone, so please call him a few times for him to answer      Action Taken: Message routed to:  [facility]    Travel Screening: Not Applicable

## 2023-02-24 NOTE — TELEPHONE ENCOUNTER
Called patient to provide update that writer is still looking for a surgery date.     See 2/10/23 telephone encounter.     Closing encounter.      Esa Carter, Surgery Scheduler

## 2023-02-24 NOTE — TELEPHONE ENCOUNTER
Huddled with Dr Evans, patient is not a candidate for [facility].     Writer continues to find a surgery date for the patient.      Called patient to inform him that as soon as a date becomes available, he will be notified. Patient did not want to schedule for May as he wanted a sooner date.       Esa Carter, Surgery Scheduler

## 2023-02-28 ENCOUNTER — OFFICE VISIT (OUTPATIENT)
Dept: NEUROSURGERY | Facility: CLINIC | Age: 83
End: 2023-02-28
Attending: STUDENT IN AN ORGANIZED HEALTH CARE EDUCATION/TRAINING PROGRAM
Payer: COMMERCIAL

## 2023-02-28 VITALS — OXYGEN SATURATION: 92 % | HEART RATE: 70 BPM | SYSTOLIC BLOOD PRESSURE: 112 MMHG | DIASTOLIC BLOOD PRESSURE: 71 MMHG

## 2023-02-28 DIAGNOSIS — S06.5XAA SUBDURAL HEMATOMA (H): Chronic | ICD-10-CM

## 2023-02-28 PROCEDURE — 99203 OFFICE O/P NEW LOW 30 MIN: CPT | Performed by: PHYSICIAN ASSISTANT

## 2023-02-28 NOTE — NURSING NOTE
"David Thomson is a 82 year old male who presents for:  Chief Complaint   Patient presents with     Consult     SDH; CT review        Initial Vitals:  /71   Pulse 70   SpO2 92%  Estimated body mass index is 33.54 kg/m  as calculated from the following:    Height as of 1/23/23: 6' 0.99\" (1.854 m).    Weight as of 1/29/23: 254 lb 3.2 oz (115.3 kg).. There is no height or weight on file to calculate BSA. BP completed using cuff size: large  Data Unavailable    Nursing Comments:     Star Wheatley MA    "

## 2023-02-28 NOTE — LETTER
2/28/2023         RE: David Thomson  [address] [facility] [address]        Dear Colleague,    Thank you for referring your patient, David Thomson, to the [facility]UR[facility]. Please see a copy of my visit note below.    Dr. Scar Martins  [facility]  Neurosurgery Clinic Visit      CC: subdural hematoma    Primary care Provider: No Ref-Primary, Physician    [facility] Provider[facility]      Reason For Visit:   I was asked to consult on the patient for SDH.      HPI: David Thomson is a 82 year old male who presents for evaluation of his chief complaint of subdural hematoma follow-up.  He states that he has had a lot of falls lately, mostly related to a lot of pain in his left hip.  He is unsure when exactly he fell and hit his head, but there is a CT scan from 1/22/23 that describes a left frontoparietal hypodense subdural fluid collection without significant mass effect or shift.  He denies having any headaches or dizziness.  He states that his imbalance and falls have been related to his left hip pain.  He is supposed to have additional surgery, he states, but has been told that this cannot happen until sometime in May this year.    Past Medical History:   Diagnosis Date     Hyperlipidaemia      Hypertension      Pre-diabetes        Past Medical History reviewed with patient during visit.    Past Surgical History:   Procedure Laterality Date     BACK SURGERY       GALLBLADDER SURGERY       HIP SURGERY       INSERT STIMULATOR DORSAL COLUMN Right 4/19/2016    Procedure: INSERT STIMULATOR DORSAL COLUMN;  Surgeon: Zoë Clemons DO;  Location: [facility] OR     IR FINE NEEDLE ASPIRATION W ULTRASOUND  1/23/2023     IR FINE NEEDLE ASPIRATION W ULTRASOUND  1/26/2023     NECK SURGERY       WRIST SURGERY       Past Surgical History reviewed with patient during visit.    Current Outpatient Medications   Medication     atorvastatin (LIPITOR) 80 MG tablet      CAMPHOR-EUCALYPTUS-MENTHOL EX     carbamide peroxide (DEBROX) 6.5 % otic solution     ENTRESTO  MG per tablet     fluticasone (FLONASE) 50 MCG/ACT nasal spray     folic acid (FOLVITE) 1 MG tablet     gabapentin (NEURONTIN) 100 MG capsule     guaiFENesin-codeine (ROBITUSSIN AC) 100-10 MG/5ML solution     metoprolol succinate ER (TOPROL XL) 25 MG 24 hr tablet     omeprazole (PRILOSEC) 20 MG DR capsule     ondansetron (ZOFRAN ODT) 4 MG ODT tab     oxyCODONE (ROXICODONE) 5 MG tablet     oxyCODONE 7.5 MG TABS     senna-docusate (SENOKOT-S/PERICOLACE) 8.6-50 MG tablet     vitamin B1 (THIAMINE) 100 MG tablet     No current facility-administered medications for this visit.       Allergies   Allergen Reactions     Benzalkonium      Lisinopril      Piroxicam      Polysorbate  [Bay Oil]      Prazosin      Other reaction(s): Nightmares     Urea      Other reaction(s): Eruption of skin, Eruption of skin       Social History     Socioeconomic History     Marital status: Single     Spouse name: None     Number of children: None     Years of education: None     Highest education level: None   Tobacco Use     Smoking status: Never     Smokeless tobacco: Never       Family History   Problem Relation Age of Onset     Heart Disease Mother      Heart Disease Father           ROS: 10 point ROS neg other than the symptoms noted above in the HPI.    Vital Signs: /71   Pulse 70   SpO2 92%     Examination:  Constitutional:  Alert, well nourished, NAD.  HEENT: Normocephalic, atraumatic.   Pulmonary:  Without shortness of breath, normal effort.   Lymph: no lymphadenopathy to low back or LE.   Integumentary: Skin is free of rashes or lesions.   Cardiovascular:  No pitting edema of BLE.    Psych: Normal affect, no apparent distress    Neurological:  Awake  Alert  Oriented x 3  Speech clear  Cranial nerves II - XII grossly intact  Motor exam     Sensation normal to bilateral upper and lower extremities.  Imaging:   CT OF THE HEAD  WITHOUT CONTRAST   2/7/2023 12:40 PM      COMPARISON: Head CT 1/26/2023     HISTORY:  follow up SDH; Subdural hematoma      TECHNIQUE:  Axial CT images of the head from the skull base to the  vertex were acquired without IV contrast.     FINDINGS:   INTRACRANIAL CONTENTS: No intracranial hemorrhage, extraaxial  collection, or mass effect.  No CT evidence of acute infarct.   Mild  presumed chronic small vessel ischemic change with mild generalized  volume loss.     VISUALIZED ORBITS/SINUSES/MASTOIDS: Bilateral cataract resections.   Moderate presumed paranasal sinus mucosal thickening. Pneumatized  secretions in the right maxillary antrum. Extensively opacified right  mastoid air cells.     OSSEOUS STRUCTURES/SOFT TISSUES: No significant abnormality.                                                                      IMPRESSION:  1.  Resolved subdural hematoma. No intracranial blood products.  2.  Mild presumed chronic small vessel ischemic change and generalized  volume loss.  3.  No hydrocephalus.  4.  Evidence of acute and chronic sinusitis, progressed compared to  1/26/2023.  5.  Persistent moderate right mastoid opacification.     Radiation dose for this scan was reduced using automated exposure  control, adjustment of the mA and/or kV according to patient size, or  iterative reconstruction technique     JUANITA GALO MD     Assessment/Plan:     Subdural hematoma    David Thomson is a 82 year old male.  I did have a discussion with the patient regarding his symptoms.  We did review his head CT from February 7, 2023.  It is unclear when he initially sustained this injury, but head CT from January 22 of this year is the first scan we see it first indicated.  Follow-up CT scan done on February 7 of this year shows interval resolution, with no acute pathology.  He can return to our office on an as-needed basis.  No additional follow-up necessary.  He voiced agreement and understanding.        Cali Merrill  [facility]STOP[facility] ROS[facility] Neurosurgery  [address] [address]    Tel [phone]      The use of Dragon/[employer] dictation services may have been used to construct the content in this note; any grammatical or spelling errors are non-intentional. Please contact the author of this note directly if you are in need of any clarification.        Again, thank you for allowing me to participate in the care of your patient.        Sincerely,        Cali Merrill PA-C

## 2023-02-28 NOTE — PROGRESS NOTES
Dr. Scar Martins  [facility]  Neurosurgery Clinic Visit      CC: subdural hematoma    Primary care Provider: No Ref-Primary, Physician    [facility] Provider[facility]      Reason For Visit:   I was asked to consult on the patient for SDH.      HPI: David Thomson is a 82 year old male who presents for evaluation of his chief complaint of subdural hematoma follow-up.  He states that he has had a lot of falls lately, mostly related to a lot of pain in his left hip.  He is unsure when exactly he fell and hit his head, but there is a CT scan from 1/22/23 that describes a left frontoparietal hypodense subdural fluid collection without significant mass effect or shift.  He denies having any headaches or dizziness.  He states that his imbalance and falls have been related to his left hip pain.  He is supposed to have additional surgery, he states, but has been told that this cannot happen until sometime in May this year.    Past Medical History:   Diagnosis Date     Hyperlipidaemia      Hypertension      Pre-diabetes        Past Medical History reviewed with patient during visit.    Past Surgical History:   Procedure Laterality Date     BACK SURGERY       GALLBLADDER SURGERY       HIP SURGERY       INSERT STIMULATOR DORSAL COLUMN Right 4/19/2016    Procedure: INSERT STIMULATOR DORSAL COLUMN;  Surgeon: Zoë Clemons DO;  Location: [facility] OR     IR FINE NEEDLE ASPIRATION W ULTRASOUND  1/23/2023     IR FINE NEEDLE ASPIRATION W ULTRASOUND  1/26/2023     NECK SURGERY       WRIST SURGERY       Past Surgical History reviewed with patient during visit.    Current Outpatient Medications   Medication     atorvastatin (LIPITOR) 80 MG tablet     CAMPHOR-EUCALYPTUS-MENTHOL EX     carbamide peroxide (DEBROX) 6.5 % otic solution     ENTRESTO  MG per tablet     fluticasone (FLONASE) 50 MCG/ACT nasal spray     folic acid (FOLVITE) 1 MG tablet     gabapentin (NEURONTIN) 100 MG capsule     guaiFENesin-codeine  (ROBITUSSIN AC) 100-10 MG/5ML solution     metoprolol succinate ER (TOPROL XL) 25 MG 24 hr tablet     omeprazole (PRILOSEC) 20 MG DR capsule     ondansetron (ZOFRAN ODT) 4 MG ODT tab     oxyCODONE (ROXICODONE) 5 MG tablet     oxyCODONE 7.5 MG TABS     senna-docusate (SENOKOT-S/PERICOLACE) 8.6-50 MG tablet     vitamin B1 (THIAMINE) 100 MG tablet     No current facility-administered medications for this visit.       Allergies   Allergen Reactions     Benzalkonium      Lisinopril      Piroxicam      Polysorbate  [Bay Oil]      Prazosin      Other reaction(s): Nightmares     Urea      Other reaction(s): Eruption of skin, Eruption of skin       Social History     Socioeconomic History     Marital status: Single     Spouse name: None     Number of children: None     Years of education: None     Highest education level: None   Tobacco Use     Smoking status: Never     Smokeless tobacco: Never       Family History   Problem Relation Age of Onset     Heart Disease Mother      Heart Disease Father           ROS: 10 point ROS neg other than the symptoms noted above in the HPI.    Vital Signs: /71   Pulse 70   SpO2 92%     Examination:  Constitutional:  Alert, well nourished, NAD.  HEENT: Normocephalic, atraumatic.   Pulmonary:  Without shortness of breath, normal effort.   Lymph: no lymphadenopathy to low back or LE.   Integumentary: Skin is free of rashes or lesions.   Cardiovascular:  No pitting edema of BLE.    Psych: Normal affect, no apparent distress    Neurological:  Awake  Alert  Oriented x 3  Speech clear  Cranial nerves II - XII grossly intact  Motor exam     Sensation normal to bilateral upper and lower extremities.  Imaging:   CT OF THE HEAD WITHOUT CONTRAST   2/7/2023 12:40 PM      COMPARISON: Head CT 1/26/2023     HISTORY:  follow up SDH; Subdural hematoma      TECHNIQUE:  Axial CT images of the head from the skull base to the  vertex were acquired without IV contrast.     FINDINGS:   INTRACRANIAL  CONTENTS: No intracranial hemorrhage, extraaxial  collection, or mass effect.  No CT evidence of acute infarct.   Mild  presumed chronic small vessel ischemic change with mild generalized  volume loss.     VISUALIZED ORBITS/SINUSES/MASTOIDS: Bilateral cataract resections.   Moderate presumed paranasal sinus mucosal thickening. Pneumatized  secretions in the right maxillary antrum. Extensively opacified right  mastoid air cells.     OSSEOUS STRUCTURES/SOFT TISSUES: No significant abnormality.                                                                      IMPRESSION:  1.  Resolved subdural hematoma. No intracranial blood products.  2.  Mild presumed chronic small vessel ischemic change and generalized  volume loss.  3.  No hydrocephalus.  4.  Evidence of acute and chronic sinusitis, progressed compared to  1/26/2023.  5.  Persistent moderate right mastoid opacification.     Radiation dose for this scan was reduced using automated exposure  control, adjustment of the mA and/or kV according to patient size, or  iterative reconstruction technique     JUANITA GALO MD     Assessment/Plan:     Subdural hematoma    David Thomson is a 82 year old male.  I did have a discussion with the patient regarding his symptoms.  We did review his head CT from February 7, 2023.  It is unclear when he initially sustained this injury, but head CT from January 22 of this year is the first scan we see it first indicated.  Follow-up CT scan done on February 7 of this year shows interval resolution, with no acute pathology.  He can return to our office on an as-needed basis.  No additional follow-up necessary.  He voiced agreement and understanding.        Cali Merrill PA-C  [facility] Neurosurgery  [address] [address]    Tel [phone]      The use of [email or web address] dictation services may have been used to construct the content in this note; any grammatical  or spelling errors are non-intentional. Please contact the author of this note directly if you are in need of any clarification.

## 2023-03-01 NOTE — TELEPHONE ENCOUNTER
FUTURE VISIT INFORMATION      SURGERY INFORMATION:    Date: 3/15/23    Location: [facility] or    Surgeon:  Wilbert Evans MD    Anesthesia Type:  choice    Procedure: REVISION, TOTAL ARTHROPLASTY, HIP LEFT    RECORDS REQUESTED FROM:       Pertinent Medical History: hypertension    Most recent EKG+ Tracin23    Most recent ECHO: 23

## 2023-03-01 NOTE — TELEPHONE ENCOUNTER
Home care post op PT order entered for after surgery with [facility] per patient request.    Minda Ramon RN on 3/1/2023 at 1:17 PM

## 2023-03-01 NOTE — TELEPHONE ENCOUNTER
Scheduled surgery    Patient is hoping to continue with home PT with [facility] (through [place] [place]).     Type of surgery: left JUDE revision  Location of surgery: [facility] OR  Date and time of surgery: 3/15/23  Surgeon: Cristina  Pre-Op Appt Date: 3/9/23 with PAC  Post-Op Appt Date: 4/3/23    Packet sent out: Yes  Pre-cert/Authorization completed:  No  Date: 3.1.23      Esa Carter, Surgery Scheduler

## 2023-03-07 RX ORDER — BENZONATATE 100 MG/1
100 CAPSULE ORAL 3 TIMES DAILY PRN
COMMUNITY
End: 2023-03-09

## 2023-03-08 ENCOUNTER — DOCUMENTATION ONLY (OUTPATIENT)
Dept: ORTHOPEDICS | Facility: CLINIC | Age: 83
End: 2023-03-08

## 2023-03-08 NOTE — PROGRESS NOTES
Received Completed forms Yes   Faxed Forms Emailed form to:   [email or web address]   Sent to HIM (Date) 3/7/23

## 2023-03-08 NOTE — PROGRESS NOTES
Ortho Navigator Note      Pre-op Date 3/9/23 PAC     Medical Clearance  Cleared     Labs WNR     COVID Test Date No longer indicated      Skin  Intact      Activity: Ambulates independently with walker when out of home and quad cane in home.      Equipment Need Patient will likely need a walker for discharge. Defer to PT/OT for recs.       Meds to Hold Held all supplements 14 days prior to surgery  Hold DOS: Folic acid, vitamin B1  * Medication recommendations are not intended to be exhaustive; they are limited to common medications that are potentially dangerous if incorrectly managed   NPO Instructions  Defer to PAN RN     Pre-op Joint Education Complete? Completed by phone    Discharge Plan Patient state that he was told by Dr Evans that he would discharge home on DOS although there is no documentation of that. Patient is medically complex and is scheduled at 1300 which is not consistent with a DOS discharge. Message sent to Dr. Mares RN to clarify and have not heard back as of 3/9.    /Transportation [place] will be .  is physically able to care for patient.      Barriers to Discharge No barriers to discharge.      Additional Info/   Special Needs : Right sided hearing loss - no hearing aid.  The patient has a dorsal column spinal stimulator in place         03/08/23 9683   Discharge Planning   Patient/Family Anticipates Transition to home with family   Concerns to be Addressed no discharge needs identified   Living Arrangements   People in Home alone   Type of Residence Private Residence   Is your private residence a single family home or apartment? Single family home   Number of Stairs, Within Home, Primary none   Stair Railings, Within Home, Primary none   Once home, are you able to live on one level? Yes   Bathroom Shower/Tub Walk-in shower   Equipment Currently Used at Home cane, quad;walker, standard   Support System   Support Systems Children  (Daughter (Ronan))   Do  you have someone available to stay with you one or two nights once you are home? Yes   Medical Clearance   Date of Physical 03/09/23   Clinic Name [facility]   It is recommended that you call and check with any specialty providers before surgery to see if you need surgical clearance.  Do you see any specialty providers outside of your primary care provider? No   Blood   Known Bleeding Disorder or Coagulopathy No   Does the patient have any Amish/cultural preferences related to blood products? No   Education   Has the patient scheduled or completed pre-op total joint education, either in class or online, in the last 12 months? No  (No technology. Educated 1:1 by phone by writer)   Patient attended total joint pre-op class/received pre-op teaching  online

## 2023-03-09 ENCOUNTER — PRE VISIT (OUTPATIENT)
Dept: SURGERY | Facility: CLINIC | Age: 83
End: 2023-03-09

## 2023-03-09 ENCOUNTER — OFFICE VISIT (OUTPATIENT)
Dept: SURGERY | Facility: CLINIC | Age: 83
End: 2023-03-09
Payer: COMMERCIAL

## 2023-03-09 ENCOUNTER — TELEPHONE (OUTPATIENT)
Dept: SURGERY | Facility: CLINIC | Age: 83
End: 2023-03-09

## 2023-03-09 ENCOUNTER — ANESTHESIA EVENT (OUTPATIENT)
Dept: SURGERY | Facility: CLINIC | Age: 83
DRG: 467 | End: 2023-03-09
Payer: MEDICARE

## 2023-03-09 VITALS
WEIGHT: 260 LBS | TEMPERATURE: 97.4 F | HEIGHT: 73 IN | DIASTOLIC BLOOD PRESSURE: 80 MMHG | HEART RATE: 63 BPM | SYSTOLIC BLOOD PRESSURE: 137 MMHG | BODY MASS INDEX: 34.46 KG/M2 | OXYGEN SATURATION: 98 %

## 2023-03-09 DIAGNOSIS — T84.011A FAILURE OF LEFT TOTAL HIP ARTHROPLASTY, INITIAL ENCOUNTER (H): ICD-10-CM

## 2023-03-09 DIAGNOSIS — Z01.818 PRE-OP EXAMINATION: Primary | ICD-10-CM

## 2023-03-09 PROCEDURE — 99205 OFFICE O/P NEW HI 60 MIN: CPT | Performed by: PHYSICIAN ASSISTANT

## 2023-03-09 ASSESSMENT — ENCOUNTER SYMPTOMS: DYSRHYTHMIAS: 1

## 2023-03-09 ASSESSMENT — PAIN SCALES - GENERAL: PAINLEVEL: WORST PAIN (10)

## 2023-03-09 ASSESSMENT — LIFESTYLE VARIABLES: TOBACCO_USE: 0

## 2023-03-09 NOTE — TELEPHONE ENCOUNTER
Updated David of his lab appointment at the [facility], on March 14th at 8:00 am.  David expressed understanding.  Norma Carlson RN

## 2023-03-09 NOTE — PATIENT INSTRUCTIONS
Preparing for Your Surgery      Name:  David Thomson   MRN:  [id number]   :  1940   Today's Date:  3/9/2023       Arriving for surgery:  Surgery date:  3/15/23  Arrival time:  12:30 pm     Surgeries and procedures: Adult patients can have 2 visitors all through the surgery process.   Visiting hours: 8 a.m. to 8:30 p.m.   Hospital: Adult patients and children under age 18 can have 4 visitor at a time     No visitors under the age of 5 are allowed for hospital patients.  Double occupancy rooms: Patients can have only two visitors at a time.   Patients with disabilities: Can have a support person with them (family member, service provider     Or someone well informed about their needs) plus the allowed number of visitors   Patients confirmed or suspected to have symptoms of COVID 19 or flu:     No visitors allowed for adult patients.   Children (under age 18) can have 1 named visitor.   People who are sick or showing symptoms of COVID 19 or flu:    Are not allowed to visit patients--we can only make exceptions in special situations.     Please follow these guidelines for your visit:   Arrive wearing a mask over your mouth and nose; we will give you a medical mask to wear    If you arrive wearing a cloth mask.   Keep it on during your entire visit, even when in patient's room.   If you don't wear a mask we'll ask you to leave.   Clean your hands with alcohol hand . Do this when you arrive at and leave the building and patient room,    And again after you touch your mask or anything in the room.   You can t visit if you have a fever, cough, shortness of breath, muscle aches, headaches, sore throat    Or diarrhea    Stay 6 feet away from others during your visit and between visits   Go directly to and from the room you are visiting.   Stay in the patient s room during your visit. Limit going to other places in the hospital as much as possible   Leave bags and jackets at home or in the car.   For  everyone s health, please don t come and go during your visit. That includes for smoking   during your visit.     Please come to:     [facility] [address]  [address] [place]  [address]    - parking is available in front of [facility] from 5:15AM to 8:00PM. If you prefer, park your car in the Green Lot.    -Proceed to the 3rd floor, check in at the Adult Surgery Waiting Lounge. [phone]    If an escort is needed stop at the Information Desk in the lobby. Inform the information person that you are here for surgery. An escort to the Adult Surgery Waiting Lounge will be provided.    What can I eat or drink?  -  You may eat and drink normally up to 8 hours prior to arrival time. (Until 4:30 am)  -  You may have clear liquids until 2 hours prior to arrival time. (Until 10:30 am)    Examples of clear liquids:  Water  Clear broth  Juices (apple, white grape, white cranberry  and cider) without pulp  Noncarbonated, powder based beverages  (lemonade and Chiki-Aid)  Sodas (Sprite, 7-Up, ginger ale and seltzer)  Coffee or tea (without milk or cream)  Gatorade    -  No Alcohol for at least 24 hours before surgery.     Which medicines can I take?    Hold Aspirin for 7 days before surgery.   Hold Multivitamins for 7 days before surgery.  Hold Supplements for 7 days before surgery.  Hold Ibuprofen (Advil, Motrin) for 1 day(s) before surgery--unless otherwise directed by surgeon.  Hold Naproxen (Aleve) for 4 days before surgery.    -  DO NOT take these medications the day of surgery: (I will call you if there are any medication changes - SHERRI Singleton)  Folic acid  Vitamin B1    -  PLEASE TAKE these medications the day of surgery or the night before depending on your usual schedule:  Lipitor  Entresto  Flonase nasal spray  Gabapentin  Metoprolol  Omeprazole  Ondansetron if needed  Senokot    How do I prepare myself?  - Please take 2 showers (one  the night prior to surgery and one the morning of surgery) using Scrubcare or Hibiclens soap.    Use this soap only from the neck to your toes.     Leave the soap on your skin for one minute--then rinse thoroughly.      You may use your own shampoo and conditioner. No other hair products.   - Please remove all jewelry and body piercings.  - No lotions, deodorants or fragrance.  - No makeup or fingernail polish.   - Bring your ID and insurance card.    -If you have a Deep Brain Stimulator, Spinal Cord Stimulator, or any Neuro Stimulator device---you must bring the remote control to the hospital.      ALL PATIENTS GOING HOME THE SAME DAY OF SURGERY ARE REQUIRED TO HAVE A RESPONSIBLE ADULT TO DRIVE AND BE IN ATTENDANCE WITH THEM FOR 24 HOURS FOLLOWING SURGERY.    Covid testing policy as of 12/06/2022  Your surgeon will notify and schedule you for a COVID test if one is needed before surgery--please direct any questions or COVID symptoms to your surgeon      Questions or Concerns:    - For any questions regarding the day of surgery or your hospital stay, please contact the Pre Admission Nursing Office at [phone].     - If you have health changes between today and your surgery, please call your surgeon.     - For questions after surgery, please call your surgeons office.
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
## 2023-03-09 NOTE — PHARMACY - PREOPERATIVE ASSESSMENT CENTER
Preoperative Assessment Center Medication History Note    Medication history completed on March 9, 2023 by this writer. See Epic admission navigator for prior to admission medications. Operating room staff will still need to confirm medications and last dose information on day of surgery.     Medication history interview sources  Patient interview: No, spoke to Mariah his home are nurse  Care Everywhere records: Yes  Surescripts pharmacy refill records: Yes  Other (if applicable): None    Changes made to PTA medication list  Added: None    Deleted:   -- benzonatate  -- debrox  -- flonase  -- robitussin  -- zofran  -- senokot    Changed:   -- updated gabapentin dose  -- updated metoprolol dose    Additional medication history information (including reliability of information, actions taken by pharmacist):    -- No recent (within 30 days) course of antibiotics  -- No recent (within 30 days) course of systemic steroids  -- Reports not being on blood thinning medications at this time per discussion with home healthcare nurse   -- Declines being on any other prescription or over-the-counter medications    Prior to Admission medications    Medication Sig Last Dose Taking? Auth Provider Long Term End Date   atorvastatin (LIPITOR) 80 MG tablet Take 80 mg by mouth At Bedtime Taking Yes Reported, Patient Yes    CAMPHOR-EUCALYPTUS-MENTHOL EX Apply thin layer four times a day as needed for itching Taking Yes Unknown, Entered By History     ENTRESTO  MG per tablet Take 1 tablet by mouth 2 times daily Morning and bedtime Taking Yes Reported, Patient     folic acid (FOLVITE) 1 MG tablet Take 1 mg by mouth daily Taking Yes Reported, Patient     gabapentin (NEURONTIN) 100 MG capsule Take 1 capsule (100 mg) by mouth 3 times daily  Patient taking differently: Take 100 mg by mouth 2 times daily Taking Yes Lamine Elliott MD Yes    metoprolol succinate ER (TOPROL XL) 25 MG 24 hr tablet Take 25 mg by mouth daily Taking Yes  Reported, Patient Yes    omeprazole (PRILOSEC) 20 MG DR capsule Take 20 mg by mouth 2 times daily Am and HS Taking Yes Reported, Patient     vitamin B1 (THIAMINE) 100 MG tablet Take 100 mg by mouth daily Taking Yes Reported, Patient           Medication history completed by: Suhail Herrera RPH

## 2023-03-10 ENCOUNTER — TELEPHONE (OUTPATIENT)
Dept: ORTHOPEDICS | Facility: CLINIC | Age: 83
End: 2023-03-10

## 2023-03-10 NOTE — TELEPHONE ENCOUNTER
[facility] Call Center    Phone Message    May a detailed message be left on voicemail: yes     Reason for Call: Other: Homecare Nurse Carmen called to get clarification on whether he will be staying overnight or what the process will be after surgery. Also a medication was relayed incorrectly Metoprolol 25mg, patient has been taking a half tab and not a full tab. Please call and advise     Action Taken: Other: [facility]    Travel Screening: Not Applicable

## 2023-03-10 NOTE — TELEPHONE ENCOUNTER
Left a confidential VM for SHERRI Morales  for patient.  Writer stated since he is having surgery around 1pm and with his comorbities it may be unlikely patient would discharge same day.  Just explained to prepare for an over night stay just in case.  Also, left instructions to contact PCP or prescribing doctor regarding the message about Metoprolol.    Call back number given in case she has further questions.    Minda Ramon RN on 3/10/2023 at 9:55 AM

## 2023-03-13 LAB
ABO/RH(D): NORMAL
ANTIBODY SCREEN: NEGATIVE
SPECIMEN EXPIRATION DATE: NORMAL

## 2023-03-14 ENCOUNTER — LAB (OUTPATIENT)
Dept: LAB | Facility: CLINIC | Age: 83
End: 2023-03-14
Payer: COMMERCIAL

## 2023-03-14 DIAGNOSIS — T84.011A FAILURE OF LEFT TOTAL HIP ARTHROPLASTY, INITIAL ENCOUNTER (H): ICD-10-CM

## 2023-03-14 DIAGNOSIS — Z01.818 PRE-OP EXAMINATION: ICD-10-CM

## 2023-03-14 LAB
ANION GAP SERPL CALCULATED.3IONS-SCNC: 11 MMOL/L (ref 7–15)
BUN SERPL-MCNC: 9.4 MG/DL (ref 8–23)
CALCIUM SERPL-MCNC: 8.9 MG/DL (ref 8.8–10.2)
CHLORIDE SERPL-SCNC: 105 MMOL/L (ref 98–107)
CREAT SERPL-MCNC: 0.95 MG/DL (ref 0.67–1.17)
DEPRECATED HCO3 PLAS-SCNC: 27 MMOL/L (ref 22–29)
ERYTHROCYTE [DISTWIDTH] IN BLOOD BY AUTOMATED COUNT: 14.5 % (ref 10–15)
FERRITIN SERPL-MCNC: 116 NG/ML (ref 31–409)
GFR SERPL CREATININE-BSD FRML MDRD: 81 ML/MIN/1.73M2
GLUCOSE SERPL-MCNC: 132 MG/DL (ref 70–99)
HCT VFR BLD AUTO: 38.8 % (ref 40–53)
HGB BLD-MCNC: 12.9 G/DL (ref 13.3–17.7)
IRON BINDING CAPACITY (ROCHE): 258 UG/DL (ref 240–430)
IRON SATN MFR SERPL: 32 % (ref 15–46)
IRON SERPL-MCNC: 83 UG/DL (ref 61–157)
MCH RBC QN AUTO: 31.2 PG (ref 26.5–33)
MCHC RBC AUTO-ENTMCNC: 33.2 G/DL (ref 31.5–36.5)
MCV RBC AUTO: 94 FL (ref 78–100)
PLATELET # BLD AUTO: 204 10E3/UL (ref 150–450)
POTASSIUM SERPL-SCNC: 3.4 MMOL/L (ref 3.4–5.3)
RBC # BLD AUTO: 4.13 10E6/UL (ref 4.4–5.9)
SODIUM SERPL-SCNC: 143 MMOL/L (ref 136–145)
WBC # BLD AUTO: 4.6 10E3/UL (ref 4–11)

## 2023-03-14 PROCEDURE — 86901 BLOOD TYPING SEROLOGIC RH(D): CPT

## 2023-03-14 PROCEDURE — 86900 BLOOD TYPING SEROLOGIC ABO: CPT

## 2023-03-14 PROCEDURE — 82728 ASSAY OF FERRITIN: CPT

## 2023-03-14 PROCEDURE — 80048 BASIC METABOLIC PNL TOTAL CA: CPT

## 2023-03-14 PROCEDURE — 83540 ASSAY OF IRON: CPT

## 2023-03-14 PROCEDURE — 36415 COLL VENOUS BLD VENIPUNCTURE: CPT

## 2023-03-14 PROCEDURE — 83550 IRON BINDING TEST: CPT

## 2023-03-14 PROCEDURE — 85027 COMPLETE CBC AUTOMATED: CPT

## 2023-03-14 PROCEDURE — 86850 RBC ANTIBODY SCREEN: CPT

## 2023-03-15 ENCOUNTER — ANESTHESIA (OUTPATIENT)
Dept: SURGERY | Facility: CLINIC | Age: 83
DRG: 467 | End: 2023-03-15
Payer: MEDICARE

## 2023-03-15 ENCOUNTER — HOSPITAL ENCOUNTER (INPATIENT)
Facility: CLINIC | Age: 83
LOS: 1 days | Discharge: HOME HEALTH | DRG: 467 | End: 2023-03-16
Attending: STUDENT IN AN ORGANIZED HEALTH CARE EDUCATION/TRAINING PROGRAM | Admitting: STUDENT IN AN ORGANIZED HEALTH CARE EDUCATION/TRAINING PROGRAM
Payer: MEDICARE

## 2023-03-15 ENCOUNTER — APPOINTMENT (OUTPATIENT)
Dept: GENERAL RADIOLOGY | Facility: CLINIC | Age: 83
DRG: 467 | End: 2023-03-15
Attending: STUDENT IN AN ORGANIZED HEALTH CARE EDUCATION/TRAINING PROGRAM
Payer: MEDICARE

## 2023-03-15 DIAGNOSIS — M25.552 HIP PAIN, LEFT: ICD-10-CM

## 2023-03-15 DIAGNOSIS — M21.952 DEFORMITY OF LEFT HIP JOINT: ICD-10-CM

## 2023-03-15 DIAGNOSIS — Z96.649 S/P REVISION OF TOTAL HIP: Primary | ICD-10-CM

## 2023-03-15 DIAGNOSIS — R52 INTRACTABLE PAIN: ICD-10-CM

## 2023-03-15 LAB
ALBUMIN UR-MCNC: 20 MG/DL
APPEARANCE UR: ABNORMAL
BACTERIA #/AREA URNS HPF: ABNORMAL /HPF
BILIRUB UR QL STRIP: NEGATIVE
COLOR UR AUTO: YELLOW
GLUCOSE BLDC GLUCOMTR-MCNC: 129 MG/DL (ref 70–99)
GLUCOSE UR STRIP-MCNC: NEGATIVE MG/DL
HGB UR QL STRIP: NEGATIVE
HYALINE CASTS: 3 /LPF
KETONES UR STRIP-MCNC: NEGATIVE MG/DL
LEUKOCYTE ESTERASE UR QL STRIP: ABNORMAL
MUCOUS THREADS #/AREA URNS LPF: PRESENT /LPF
NITRATE UR QL: NEGATIVE
PH UR STRIP: 5.5 [PH] (ref 5–7)
RBC URINE: 2 /HPF
SP GR UR STRIP: 1.02 (ref 1–1.03)
SQUAMOUS EPITHELIAL: <1 /HPF
UROBILINOGEN UR STRIP-MCNC: 2 MG/DL
WBC URINE: 80 /HPF

## 2023-03-15 PROCEDURE — 87086 URINE CULTURE/COLONY COUNT: CPT | Performed by: PHYSICIAN ASSISTANT

## 2023-03-15 PROCEDURE — 87070 CULTURE OTHR SPECIMN AEROBIC: CPT | Performed by: STUDENT IN AN ORGANIZED HEALTH CARE EDUCATION/TRAINING PROGRAM

## 2023-03-15 PROCEDURE — 250N000011 HC RX IP 250 OP 636: Performed by: STUDENT IN AN ORGANIZED HEALTH CARE EDUCATION/TRAINING PROGRAM

## 2023-03-15 PROCEDURE — 99204 OFFICE O/P NEW MOD 45 MIN: CPT | Performed by: PHYSICIAN ASSISTANT

## 2023-03-15 PROCEDURE — 81001 URINALYSIS AUTO W/SCOPE: CPT | Performed by: PHYSICIAN ASSISTANT

## 2023-03-15 PROCEDURE — 710N000010 HC RECOVERY PHASE 1, LEVEL 2, PER MIN: Performed by: STUDENT IN AN ORGANIZED HEALTH CARE EDUCATION/TRAINING PROGRAM

## 2023-03-15 PROCEDURE — 250N000009 HC RX 250: Performed by: NURSE ANESTHETIST, CERTIFIED REGISTERED

## 2023-03-15 PROCEDURE — 258N000003 HC RX IP 258 OP 636: Performed by: NURSE ANESTHETIST, CERTIFIED REGISTERED

## 2023-03-15 PROCEDURE — 250N000013 HC RX MED GY IP 250 OP 250 PS 637: Performed by: STUDENT IN AN ORGANIZED HEALTH CARE EDUCATION/TRAINING PROGRAM

## 2023-03-15 PROCEDURE — 250N000025 HC SEVOFLURANE, PER MIN: Performed by: STUDENT IN AN ORGANIZED HEALTH CARE EDUCATION/TRAINING PROGRAM

## 2023-03-15 PROCEDURE — 250N000011 HC RX IP 250 OP 636: Performed by: NURSE ANESTHETIST, CERTIFIED REGISTERED

## 2023-03-15 PROCEDURE — 999N000065 XR PELVIS AND HIP PORTABLE LEFT 2 VIEWS

## 2023-03-15 PROCEDURE — 272N000001 HC OR GENERAL SUPPLY STERILE: Performed by: STUDENT IN AN ORGANIZED HEALTH CARE EDUCATION/TRAINING PROGRAM

## 2023-03-15 PROCEDURE — 27487 REVISE/REPLACE KNEE JOINT: CPT | Mod: LT | Performed by: STUDENT IN AN ORGANIZED HEALTH CARE EDUCATION/TRAINING PROGRAM

## 2023-03-15 PROCEDURE — C1713 ANCHOR/SCREW BN/BN,TIS/BN: HCPCS | Performed by: STUDENT IN AN ORGANIZED HEALTH CARE EDUCATION/TRAINING PROGRAM

## 2023-03-15 PROCEDURE — 370N000017 HC ANESTHESIA TECHNICAL FEE, PER MIN: Performed by: STUDENT IN AN ORGANIZED HEALTH CARE EDUCATION/TRAINING PROGRAM

## 2023-03-15 PROCEDURE — 258N000003 HC RX IP 258 OP 636: Performed by: STUDENT IN AN ORGANIZED HEALTH CARE EDUCATION/TRAINING PROGRAM

## 2023-03-15 PROCEDURE — 250N000009 HC RX 250: Performed by: STUDENT IN AN ORGANIZED HEALTH CARE EDUCATION/TRAINING PROGRAM

## 2023-03-15 PROCEDURE — 250N000013 HC RX MED GY IP 250 OP 250 PS 637: Performed by: ORTHOPAEDIC SURGERY

## 2023-03-15 PROCEDURE — 87075 CULTR BACTERIA EXCEPT BLOOD: CPT | Performed by: STUDENT IN AN ORGANIZED HEALTH CARE EDUCATION/TRAINING PROGRAM

## 2023-03-15 PROCEDURE — 271N000001 HC OR GENERAL SUPPLY NON-STERILE: Performed by: STUDENT IN AN ORGANIZED HEALTH CARE EDUCATION/TRAINING PROGRAM

## 2023-03-15 PROCEDURE — 258N000001 HC RX 258: Performed by: STUDENT IN AN ORGANIZED HEALTH CARE EDUCATION/TRAINING PROGRAM

## 2023-03-15 PROCEDURE — C1762 CONN TISS, HUMAN(INC FASCIA): HCPCS | Performed by: STUDENT IN AN ORGANIZED HEALTH CARE EDUCATION/TRAINING PROGRAM

## 2023-03-15 PROCEDURE — 999N000141 HC STATISTIC PRE-PROCEDURE NURSING ASSESSMENT: Performed by: STUDENT IN AN ORGANIZED HEALTH CARE EDUCATION/TRAINING PROGRAM

## 2023-03-15 PROCEDURE — 0SRB029 REPLACEMENT OF LEFT HIP JOINT WITH METAL ON POLYETHYLENE SYNTHETIC SUBSTITUTE, CEMENTED, OPEN APPROACH: ICD-10-PCS | Performed by: STUDENT IN AN ORGANIZED HEALTH CARE EDUCATION/TRAINING PROGRAM

## 2023-03-15 PROCEDURE — C1776 JOINT DEVICE (IMPLANTABLE): HCPCS | Performed by: STUDENT IN AN ORGANIZED HEALTH CARE EDUCATION/TRAINING PROGRAM

## 2023-03-15 PROCEDURE — 360N000078 HC SURGERY LEVEL 5, PER MIN: Performed by: STUDENT IN AN ORGANIZED HEALTH CARE EDUCATION/TRAINING PROGRAM

## 2023-03-15 PROCEDURE — 0QU50KZ SUPPLEMENT LEFT ACETABULUM WITH NONAUTOLOGOUS TISSUE SUBSTITUTE, OPEN APPROACH: ICD-10-PCS | Performed by: STUDENT IN AN ORGANIZED HEALTH CARE EDUCATION/TRAINING PROGRAM

## 2023-03-15 PROCEDURE — 0SPB0JZ REMOVAL OF SYNTHETIC SUBSTITUTE FROM LEFT HIP JOINT, OPEN APPROACH: ICD-10-PCS | Performed by: STUDENT IN AN ORGANIZED HEALTH CARE EDUCATION/TRAINING PROGRAM

## 2023-03-15 PROCEDURE — 258N000003 HC RX IP 258 OP 636: Performed by: ORTHOPAEDIC SURGERY

## 2023-03-15 DEVICE — BUCK FEMORAL CEMENT RESTRICTOR 25MM
Type: IMPLANTABLE DEVICE | Site: HIP | Status: FUNCTIONAL
Brand: BUCK

## 2023-03-15 DEVICE — TOBRA FULL DOSE ANTIBIOTIC BONE CEMENT, 10 PACK CATALOG NUMBER IS 6197-9-010
Type: IMPLANTABLE DEVICE | Site: HIP | Status: FUNCTIONAL
Brand: SIMPLEX

## 2023-03-15 DEVICE — V40 FEMORAL HEAD
Type: IMPLANTABLE DEVICE | Site: HIP | Status: FUNCTIONAL
Brand: LFIT

## 2023-03-15 DEVICE — GRAFT BONE CHIPS CANC CUBE 30CC 100330: Type: IMPLANTABLE DEVICE | Site: HIP | Status: FUNCTIONAL

## 2023-03-15 DEVICE — RIMFIT CUP
Type: IMPLANTABLE DEVICE | Site: HIP | Status: FUNCTIONAL
Brand: EXETER

## 2023-03-15 RX ORDER — NALOXONE HYDROCHLORIDE 0.4 MG/ML
0.2 INJECTION, SOLUTION INTRAMUSCULAR; INTRAVENOUS; SUBCUTANEOUS
Status: DISCONTINUED | OUTPATIENT
Start: 2023-03-15 | End: 2023-03-16 | Stop reason: HOSPADM

## 2023-03-15 RX ORDER — ONDANSETRON 4 MG/1
4 TABLET, ORALLY DISINTEGRATING ORAL EVERY 30 MIN PRN
Status: DISCONTINUED | OUTPATIENT
Start: 2023-03-15 | End: 2023-03-16

## 2023-03-15 RX ORDER — OXYCODONE HYDROCHLORIDE 10 MG/1
10 TABLET ORAL EVERY 4 HOURS PRN
Status: DISCONTINUED | OUTPATIENT
Start: 2023-03-15 | End: 2023-03-16 | Stop reason: HOSPADM

## 2023-03-15 RX ORDER — FENTANYL CITRATE 50 UG/ML
25 INJECTION, SOLUTION INTRAMUSCULAR; INTRAVENOUS EVERY 5 MIN PRN
Status: DISCONTINUED | OUTPATIENT
Start: 2023-03-15 | End: 2023-03-16

## 2023-03-15 RX ORDER — SODIUM CHLORIDE, SODIUM LACTATE, POTASSIUM CHLORIDE, CALCIUM CHLORIDE 600; 310; 30; 20 MG/100ML; MG/100ML; MG/100ML; MG/100ML
INJECTION, SOLUTION INTRAVENOUS CONTINUOUS PRN
Status: DISCONTINUED | OUTPATIENT
Start: 2023-03-15 | End: 2023-03-15

## 2023-03-15 RX ORDER — CEFAZOLIN SODIUM/WATER 2 G/20 ML
2 SYRINGE (ML) INTRAVENOUS
Status: DISCONTINUED | OUTPATIENT
Start: 2023-03-15 | End: 2023-03-15 | Stop reason: HOSPADM

## 2023-03-15 RX ORDER — PROPOFOL 10 MG/ML
INJECTION, EMULSION INTRAVENOUS PRN
Status: DISCONTINUED | OUTPATIENT
Start: 2023-03-15 | End: 2023-03-15

## 2023-03-15 RX ORDER — METHOCARBAMOL 500 MG/1
500 TABLET, FILM COATED ORAL EVERY 6 HOURS PRN
Status: DISCONTINUED | OUTPATIENT
Start: 2023-03-15 | End: 2023-03-16 | Stop reason: HOSPADM

## 2023-03-15 RX ORDER — DEXAMETHASONE SODIUM PHOSPHATE 4 MG/ML
INJECTION, SOLUTION INTRA-ARTICULAR; INTRALESIONAL; INTRAMUSCULAR; INTRAVENOUS; SOFT TISSUE PRN
Status: DISCONTINUED | OUTPATIENT
Start: 2023-03-15 | End: 2023-03-15

## 2023-03-15 RX ORDER — HYDROMORPHONE HYDROCHLORIDE 1 MG/ML
0.2 INJECTION, SOLUTION INTRAMUSCULAR; INTRAVENOUS; SUBCUTANEOUS EVERY 5 MIN PRN
Status: DISCONTINUED | OUTPATIENT
Start: 2023-03-15 | End: 2023-03-16

## 2023-03-15 RX ORDER — SODIUM CHLORIDE, SODIUM LACTATE, POTASSIUM CHLORIDE, CALCIUM CHLORIDE 600; 310; 30; 20 MG/100ML; MG/100ML; MG/100ML; MG/100ML
INJECTION, SOLUTION INTRAVENOUS CONTINUOUS
Status: DISCONTINUED | OUTPATIENT
Start: 2023-03-15 | End: 2023-03-16 | Stop reason: HOSPADM

## 2023-03-15 RX ORDER — PROCHLORPERAZINE MALEATE 5 MG
5 TABLET ORAL EVERY 6 HOURS PRN
Status: DISCONTINUED | OUTPATIENT
Start: 2023-03-15 | End: 2023-03-16 | Stop reason: HOSPADM

## 2023-03-15 RX ORDER — PANTOPRAZOLE SODIUM 40 MG/1
40 TABLET, DELAYED RELEASE ORAL 2 TIMES DAILY
Status: DISCONTINUED | OUTPATIENT
Start: 2023-03-16 | End: 2023-03-16 | Stop reason: HOSPADM

## 2023-03-15 RX ORDER — HYDROMORPHONE HYDROCHLORIDE 1 MG/ML
0.4 INJECTION, SOLUTION INTRAMUSCULAR; INTRAVENOUS; SUBCUTANEOUS EVERY 5 MIN PRN
Status: DISCONTINUED | OUTPATIENT
Start: 2023-03-15 | End: 2023-03-16

## 2023-03-15 RX ORDER — ATORVASTATIN CALCIUM 80 MG/1
80 TABLET, FILM COATED ORAL AT BEDTIME
Status: DISCONTINUED | OUTPATIENT
Start: 2023-03-16 | End: 2023-03-16 | Stop reason: HOSPADM

## 2023-03-15 RX ORDER — FENTANYL CITRATE 50 UG/ML
50 INJECTION, SOLUTION INTRAMUSCULAR; INTRAVENOUS EVERY 5 MIN PRN
Status: DISCONTINUED | OUTPATIENT
Start: 2023-03-15 | End: 2023-03-16

## 2023-03-15 RX ORDER — ACETAMINOPHEN 325 MG/1
975 TABLET ORAL EVERY 8 HOURS
Status: DISCONTINUED | OUTPATIENT
Start: 2023-03-15 | End: 2023-03-16 | Stop reason: HOSPADM

## 2023-03-15 RX ORDER — CEFAZOLIN SODIUM 2 G/100ML
2 INJECTION, SOLUTION INTRAVENOUS EVERY 8 HOURS
Status: DISCONTINUED | OUTPATIENT
Start: 2023-03-16 | End: 2023-03-16 | Stop reason: HOSPADM

## 2023-03-15 RX ORDER — NALOXONE HYDROCHLORIDE 0.4 MG/ML
0.4 INJECTION, SOLUTION INTRAMUSCULAR; INTRAVENOUS; SUBCUTANEOUS
Status: DISCONTINUED | OUTPATIENT
Start: 2023-03-15 | End: 2023-03-16 | Stop reason: HOSPADM

## 2023-03-15 RX ORDER — POLYETHYLENE GLYCOL 3350 17 G/17G
17 POWDER, FOR SOLUTION ORAL DAILY
Status: DISCONTINUED | OUTPATIENT
Start: 2023-03-16 | End: 2023-03-16 | Stop reason: HOSPADM

## 2023-03-15 RX ORDER — ACETAMINOPHEN 325 MG/1
650 TABLET ORAL EVERY 4 HOURS PRN
Status: DISCONTINUED | OUTPATIENT
Start: 2023-03-18 | End: 2023-03-16 | Stop reason: HOSPADM

## 2023-03-15 RX ORDER — GLYCOPYRROLATE 0.2 MG/ML
INJECTION, SOLUTION INTRAMUSCULAR; INTRAVENOUS PRN
Status: DISCONTINUED | OUTPATIENT
Start: 2023-03-15 | End: 2023-03-15

## 2023-03-15 RX ORDER — HYDROMORPHONE HYDROCHLORIDE 1 MG/ML
0.2 INJECTION, SOLUTION INTRAMUSCULAR; INTRAVENOUS; SUBCUTANEOUS
Status: DISCONTINUED | OUTPATIENT
Start: 2023-03-15 | End: 2023-03-16 | Stop reason: HOSPADM

## 2023-03-15 RX ORDER — OXYCODONE HYDROCHLORIDE 5 MG/1
5 TABLET ORAL ONCE
Status: COMPLETED | OUTPATIENT
Start: 2023-03-15 | End: 2023-03-15

## 2023-03-15 RX ORDER — CEFAZOLIN SODIUM/WATER 2 G/20 ML
2 SYRINGE (ML) INTRAVENOUS SEE ADMIN INSTRUCTIONS
Status: DISCONTINUED | OUTPATIENT
Start: 2023-03-15 | End: 2023-03-15 | Stop reason: HOSPADM

## 2023-03-15 RX ORDER — MAGNESIUM HYDROXIDE 1200 MG/15ML
LIQUID ORAL PRN
Status: DISCONTINUED | OUTPATIENT
Start: 2023-03-15 | End: 2023-03-15 | Stop reason: HOSPADM

## 2023-03-15 RX ORDER — TRANEXAMIC ACID 650 MG/1
1950 TABLET ORAL ONCE
Status: COMPLETED | OUTPATIENT
Start: 2023-03-15 | End: 2023-03-15

## 2023-03-15 RX ORDER — HYDROMORPHONE HYDROCHLORIDE 1 MG/ML
0.4 INJECTION, SOLUTION INTRAMUSCULAR; INTRAVENOUS; SUBCUTANEOUS
Status: DISCONTINUED | OUTPATIENT
Start: 2023-03-15 | End: 2023-03-16 | Stop reason: HOSPADM

## 2023-03-15 RX ORDER — FENTANYL CITRATE 50 UG/ML
INJECTION, SOLUTION INTRAMUSCULAR; INTRAVENOUS PRN
Status: DISCONTINUED | OUTPATIENT
Start: 2023-03-15 | End: 2023-03-15

## 2023-03-15 RX ORDER — PHENYLEPHRINE HCL IN 0.9% NACL 50MG/250ML
.5-1.25 PLASTIC BAG, INJECTION (ML) INTRAVENOUS CONTINUOUS
Status: DISCONTINUED | OUTPATIENT
Start: 2023-03-15 | End: 2023-03-16

## 2023-03-15 RX ORDER — OXYCODONE HYDROCHLORIDE 5 MG/1
5 TABLET ORAL EVERY 4 HOURS PRN
Status: DISCONTINUED | OUTPATIENT
Start: 2023-03-15 | End: 2023-03-16 | Stop reason: HOSPADM

## 2023-03-15 RX ORDER — ONDANSETRON 2 MG/ML
INJECTION INTRAMUSCULAR; INTRAVENOUS PRN
Status: DISCONTINUED | OUTPATIENT
Start: 2023-03-15 | End: 2023-03-15

## 2023-03-15 RX ORDER — FOLIC ACID 1 MG/1
1 TABLET ORAL DAILY
Status: DISCONTINUED | OUTPATIENT
Start: 2023-03-16 | End: 2023-03-16 | Stop reason: HOSPADM

## 2023-03-15 RX ORDER — ASPIRIN 81 MG/1
81 TABLET ORAL 2 TIMES DAILY
Status: DISCONTINUED | OUTPATIENT
Start: 2023-03-16 | End: 2023-03-16 | Stop reason: HOSPADM

## 2023-03-15 RX ORDER — ONDANSETRON 2 MG/ML
4 INJECTION INTRAMUSCULAR; INTRAVENOUS EVERY 30 MIN PRN
Status: DISCONTINUED | OUTPATIENT
Start: 2023-03-15 | End: 2023-03-16

## 2023-03-15 RX ORDER — ONDANSETRON 4 MG/1
4 TABLET, ORALLY DISINTEGRATING ORAL EVERY 6 HOURS PRN
Status: DISCONTINUED | OUTPATIENT
Start: 2023-03-15 | End: 2023-03-16 | Stop reason: HOSPADM

## 2023-03-15 RX ORDER — BISACODYL 10 MG
10 SUPPOSITORY, RECTAL RECTAL DAILY PRN
Status: DISCONTINUED | OUTPATIENT
Start: 2023-03-15 | End: 2023-03-16 | Stop reason: HOSPADM

## 2023-03-15 RX ORDER — LIDOCAINE 40 MG/G
CREAM TOPICAL
Status: DISCONTINUED | OUTPATIENT
Start: 2023-03-15 | End: 2023-03-16 | Stop reason: HOSPADM

## 2023-03-15 RX ORDER — LIDOCAINE HYDROCHLORIDE 20 MG/ML
INJECTION, SOLUTION INFILTRATION; PERINEURAL PRN
Status: DISCONTINUED | OUTPATIENT
Start: 2023-03-15 | End: 2023-03-15

## 2023-03-15 RX ORDER — AMOXICILLIN 250 MG
1 CAPSULE ORAL 2 TIMES DAILY
Status: DISCONTINUED | OUTPATIENT
Start: 2023-03-15 | End: 2023-03-16 | Stop reason: HOSPADM

## 2023-03-15 RX ORDER — ONDANSETRON 2 MG/ML
4 INJECTION INTRAMUSCULAR; INTRAVENOUS EVERY 6 HOURS PRN
Status: DISCONTINUED | OUTPATIENT
Start: 2023-03-15 | End: 2023-03-16 | Stop reason: HOSPADM

## 2023-03-15 RX ADMIN — ACETAMINOPHEN 975 MG: 325 TABLET ORAL at 20:03

## 2023-03-15 RX ADMIN — SODIUM CHLORIDE, POTASSIUM CHLORIDE, SODIUM LACTATE AND CALCIUM CHLORIDE: 600; 310; 30; 20 INJECTION, SOLUTION INTRAVENOUS at 13:26

## 2023-03-15 RX ADMIN — HYDROMORPHONE HYDROCHLORIDE 0.25 MG: 1 INJECTION, SOLUTION INTRAMUSCULAR; INTRAVENOUS; SUBCUTANEOUS at 17:23

## 2023-03-15 RX ADMIN — SENNOSIDES AND DOCUSATE SODIUM 1 TABLET: 50; 8.6 TABLET ORAL at 21:48

## 2023-03-15 RX ADMIN — Medication 0.3 MCG/KG/MIN: at 13:53

## 2023-03-15 RX ADMIN — PROPOFOL 50 MG: 10 INJECTION, EMULSION INTRAVENOUS at 13:29

## 2023-03-15 RX ADMIN — SODIUM CHLORIDE, POTASSIUM CHLORIDE, SODIUM LACTATE AND CALCIUM CHLORIDE: 600; 310; 30; 20 INJECTION, SOLUTION INTRAVENOUS at 18:06

## 2023-03-15 RX ADMIN — SODIUM CHLORIDE, POTASSIUM CHLORIDE, SODIUM LACTATE AND CALCIUM CHLORIDE: 600; 310; 30; 20 INJECTION, SOLUTION INTRAVENOUS at 15:24

## 2023-03-15 RX ADMIN — FENTANYL CITRATE 50 MCG: 50 INJECTION, SOLUTION INTRAMUSCULAR; INTRAVENOUS at 14:26

## 2023-03-15 RX ADMIN — Medication 2 G: at 17:34

## 2023-03-15 RX ADMIN — PROPOFOL 50 MG: 10 INJECTION, EMULSION INTRAVENOUS at 13:27

## 2023-03-15 RX ADMIN — FENTANYL CITRATE 50 MCG: 50 INJECTION, SOLUTION INTRAMUSCULAR; INTRAVENOUS at 16:01

## 2023-03-15 RX ADMIN — Medication 50 MG: at 13:30

## 2023-03-15 RX ADMIN — OXYCODONE HYDROCHLORIDE 5 MG: 5 TABLET ORAL at 20:03

## 2023-03-15 RX ADMIN — HYDROMORPHONE HYDROCHLORIDE 0.25 MG: 1 INJECTION, SOLUTION INTRAMUSCULAR; INTRAVENOUS; SUBCUTANEOUS at 18:21

## 2023-03-15 RX ADMIN — PROPOFOL 50 MG: 10 INJECTION, EMULSION INTRAVENOUS at 13:28

## 2023-03-15 RX ADMIN — TRANEXAMIC ACID 1950 MG: 650 TABLET ORAL at 10:54

## 2023-03-15 RX ADMIN — ONDANSETRON 4 MG: 2 INJECTION INTRAMUSCULAR; INTRAVENOUS at 18:30

## 2023-03-15 RX ADMIN — HYDROMORPHONE HYDROCHLORIDE 0.25 MG: 1 INJECTION, SOLUTION INTRAMUSCULAR; INTRAVENOUS; SUBCUTANEOUS at 19:26

## 2023-03-15 RX ADMIN — LIDOCAINE HYDROCHLORIDE 100 MG: 20 INJECTION, SOLUTION INFILTRATION; PERINEURAL at 13:26

## 2023-03-15 RX ADMIN — FENTANYL CITRATE 100 MCG: 50 INJECTION, SOLUTION INTRAMUSCULAR; INTRAVENOUS at 13:26

## 2023-03-15 RX ADMIN — SUGAMMADEX 200 MG: 100 INJECTION, SOLUTION INTRAVENOUS at 18:35

## 2023-03-15 RX ADMIN — SUGAMMADEX 200 MG: 100 INJECTION, SOLUTION INTRAVENOUS at 18:44

## 2023-03-15 RX ADMIN — Medication 2 G: at 13:34

## 2023-03-15 RX ADMIN — GLYCOPYRROLATE 0.2 MG: 0.2 INJECTION, SOLUTION INTRAMUSCULAR; INTRAVENOUS at 16:19

## 2023-03-15 RX ADMIN — PHENYLEPHRINE HYDROCHLORIDE 100 MCG: 10 INJECTION INTRAVENOUS at 18:13

## 2023-03-15 RX ADMIN — DEXAMETHASONE SODIUM PHOSPHATE 4 MG: 4 INJECTION, SOLUTION INTRA-ARTICULAR; INTRALESIONAL; INTRAMUSCULAR; INTRAVENOUS; SOFT TISSUE at 14:21

## 2023-03-15 RX ADMIN — SODIUM CHLORIDE, POTASSIUM CHLORIDE, SODIUM LACTATE AND CALCIUM CHLORIDE: 600; 310; 30; 20 INJECTION, SOLUTION INTRAVENOUS at 21:38

## 2023-03-15 RX ADMIN — PHENYLEPHRINE HYDROCHLORIDE 100 MCG: 10 INJECTION INTRAVENOUS at 16:46

## 2023-03-15 RX ADMIN — METHOCARBAMOL 500 MG: 500 TABLET ORAL at 20:03

## 2023-03-15 RX ADMIN — HYDROMORPHONE HYDROCHLORIDE 0.25 MG: 1 INJECTION, SOLUTION INTRAMUSCULAR; INTRAVENOUS; SUBCUTANEOUS at 16:50

## 2023-03-15 ASSESSMENT — ACTIVITIES OF DAILY LIVING (ADL)
ADLS_ACUITY_SCORE: 26
ADLS_ACUITY_SCORE: 26
ADLS_ACUITY_SCORE: 24
ADLS_ACUITY_SCORE: 26

## 2023-03-15 ASSESSMENT — LIFESTYLE VARIABLES: TOBACCO_USE: 0

## 2023-03-15 ASSESSMENT — ENCOUNTER SYMPTOMS: DYSRHYTHMIAS: 1
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
## 2023-03-16 ENCOUNTER — APPOINTMENT (OUTPATIENT)
Dept: GENERAL RADIOLOGY | Facility: CLINIC | Age: 83
DRG: 467 | End: 2023-03-16
Attending: CLINICAL NURSE SPECIALIST
Payer: MEDICARE

## 2023-03-16 ENCOUNTER — APPOINTMENT (OUTPATIENT)
Dept: PHYSICAL THERAPY | Facility: CLINIC | Age: 83
DRG: 467 | End: 2023-03-16
Attending: STUDENT IN AN ORGANIZED HEALTH CARE EDUCATION/TRAINING PROGRAM
Payer: MEDICARE

## 2023-03-16 VITALS
WEIGHT: 258.38 LBS | RESPIRATION RATE: 13 BRPM | BODY MASS INDEX: 34.24 KG/M2 | HEIGHT: 73 IN | HEART RATE: 85 BPM | TEMPERATURE: 97.1 F | DIASTOLIC BLOOD PRESSURE: 60 MMHG | SYSTOLIC BLOOD PRESSURE: 105 MMHG | OXYGEN SATURATION: 96 %

## 2023-03-16 PROBLEM — Z96.649 S/P REVISION OF TOTAL HIP: Status: ACTIVE | Noted: 2023-03-16

## 2023-03-16 PROBLEM — M21.952: Status: ACTIVE | Noted: 2023-03-16

## 2023-03-16 LAB
ANION GAP SERPL CALCULATED.3IONS-SCNC: 11 MMOL/L (ref 7–15)
BACTERIA UR CULT: ABNORMAL
BUN SERPL-MCNC: 14.1 MG/DL (ref 8–23)
CALCIUM SERPL-MCNC: 8.5 MG/DL (ref 8.8–10.2)
CHLORIDE SERPL-SCNC: 101 MMOL/L (ref 98–107)
CREAT SERPL-MCNC: 0.95 MG/DL (ref 0.67–1.17)
DEPRECATED HCO3 PLAS-SCNC: 27 MMOL/L (ref 22–29)
GFR SERPL CREATININE-BSD FRML MDRD: 81 ML/MIN/1.73M2
GLUCOSE BLDC GLUCOMTR-MCNC: 171 MG/DL (ref 70–99)
GLUCOSE SERPL-MCNC: 234 MG/DL (ref 70–99)
HGB BLD-MCNC: 9.4 G/DL (ref 13.3–17.7)
HOLD SPECIMEN: NORMAL
POTASSIUM SERPL-SCNC: 4.6 MMOL/L (ref 3.4–5.3)
SODIUM SERPL-SCNC: 139 MMOL/L (ref 136–145)

## 2023-03-16 PROCEDURE — 250N000013 HC RX MED GY IP 250 OP 250 PS 637: Performed by: ORTHOPAEDIC SURGERY

## 2023-03-16 PROCEDURE — 999N000065 XR PELVIS AND HIP LEFT 2 VIEWS

## 2023-03-16 PROCEDURE — 85018 HEMOGLOBIN: CPT | Performed by: ORTHOPAEDIC SURGERY

## 2023-03-16 PROCEDURE — 250N000013 HC RX MED GY IP 250 OP 250 PS 637: Performed by: PHYSICIAN ASSISTANT

## 2023-03-16 PROCEDURE — 99232 SBSQ HOSP IP/OBS MODERATE 35: CPT | Performed by: INTERNAL MEDICINE

## 2023-03-16 PROCEDURE — 80048 BASIC METABOLIC PNL TOTAL CA: CPT | Performed by: PHYSICIAN ASSISTANT

## 2023-03-16 PROCEDURE — 82962 GLUCOSE BLOOD TEST: CPT

## 2023-03-16 PROCEDURE — 36415 COLL VENOUS BLD VENIPUNCTURE: CPT | Performed by: ORTHOPAEDIC SURGERY

## 2023-03-16 PROCEDURE — 120N000002 HC R&B MED SURG/OB UMMC

## 2023-03-16 PROCEDURE — 999N000111 HC STATISTIC OT IP EVAL DEFER

## 2023-03-16 PROCEDURE — 97161 PT EVAL LOW COMPLEX 20 MIN: CPT | Mod: GP

## 2023-03-16 PROCEDURE — 97530 THERAPEUTIC ACTIVITIES: CPT | Mod: GP

## 2023-03-16 PROCEDURE — 250N000011 HC RX IP 250 OP 636: Performed by: ORTHOPAEDIC SURGERY

## 2023-03-16 RX ORDER — LEVOFLOXACIN 500 MG/1
500 TABLET, FILM COATED ORAL DAILY
Qty: 5 TABLET | Refills: 0 | Status: ON HOLD | OUTPATIENT
Start: 2023-03-16 | End: 2023-03-21

## 2023-03-16 RX ORDER — LEVOFLOXACIN 500 MG/1
500 TABLET, FILM COATED ORAL DAILY
Status: DISCONTINUED | OUTPATIENT
Start: 2023-03-16 | End: 2023-03-16 | Stop reason: HOSPADM

## 2023-03-16 RX ORDER — OXYCODONE HYDROCHLORIDE 5 MG/1
5 TABLET ORAL EVERY 4 HOURS PRN
Qty: 26 TABLET | Refills: 0 | Status: ON HOLD | OUTPATIENT
Start: 2023-03-16 | End: 2023-03-21

## 2023-03-16 RX ORDER — GABAPENTIN 100 MG/1
100 CAPSULE ORAL 2 TIMES DAILY
Status: ON HOLD | COMMUNITY
Start: 2023-03-16 | End: 2023-03-26

## 2023-03-16 RX ORDER — SACUBITRIL AND VALSARTAN 97; 103 MG/1; MG/1
1 TABLET, FILM COATED ORAL 2 TIMES DAILY
Status: ON HOLD | COMMUNITY
Start: 2023-03-17 | End: 2023-03-26

## 2023-03-16 RX ORDER — ACETAMINOPHEN 325 MG/1
650 TABLET ORAL EVERY 4 HOURS PRN
Qty: 60 TABLET | Refills: 0 | Status: ON HOLD | OUTPATIENT
Start: 2023-03-18 | End: 2023-03-21

## 2023-03-16 RX ORDER — AMOXICILLIN 250 MG
1 CAPSULE ORAL 2 TIMES DAILY
Qty: 30 TABLET | Refills: 0 | Status: ON HOLD | OUTPATIENT
Start: 2023-03-16 | End: 2023-03-26

## 2023-03-16 RX ORDER — POLYETHYLENE GLYCOL 3350 17 G/17G
17 POWDER, FOR SOLUTION ORAL DAILY
Qty: 10 PACKET | Refills: 0 | Status: ON HOLD | OUTPATIENT
Start: 2023-03-16 | End: 2023-03-26

## 2023-03-16 RX ADMIN — CEFAZOLIN SODIUM 2 G: 2 INJECTION, SOLUTION INTRAVENOUS at 09:57

## 2023-03-16 RX ADMIN — ASPIRIN 81 MG: 81 TABLET, COATED ORAL at 07:51

## 2023-03-16 RX ADMIN — PANTOPRAZOLE SODIUM 40 MG: 40 TABLET, DELAYED RELEASE ORAL at 07:51

## 2023-03-16 RX ADMIN — OXYCODONE HYDROCHLORIDE 5 MG: 5 TABLET ORAL at 01:24

## 2023-03-16 RX ADMIN — THIAMINE HCL TAB 100 MG 100 MG: 100 TAB at 07:51

## 2023-03-16 RX ADMIN — FOLIC ACID 1 MG: 1 TABLET ORAL at 07:51

## 2023-03-16 RX ADMIN — METOPROLOL SUCCINATE 12.5 MG: 25 TABLET, FILM COATED, EXTENDED RELEASE ORAL at 07:51

## 2023-03-16 RX ADMIN — METHOCARBAMOL 500 MG: 500 TABLET ORAL at 03:13

## 2023-03-16 RX ADMIN — ATORVASTATIN CALCIUM 80 MG: 80 TABLET, FILM COATED ORAL at 01:24

## 2023-03-16 RX ADMIN — CEFAZOLIN SODIUM 2 G: 2 INJECTION, SOLUTION INTRAVENOUS at 01:36

## 2023-03-16 RX ADMIN — PANTOPRAZOLE SODIUM 40 MG: 40 TABLET, DELAYED RELEASE ORAL at 01:24

## 2023-03-16 RX ADMIN — ACETAMINOPHEN 975 MG: 325 TABLET ORAL at 03:08

## 2023-03-16 ASSESSMENT — ACTIVITIES OF DAILY LIVING (ADL)
ADLS_ACUITY_SCORE: 26
ADLS_ACUITY_SCORE: 28
ADLS_ACUITY_SCORE: 28
ADLS_ACUITY_SCORE: 26

## 2023-03-16 NOTE — PLAN OF CARE
Occupational Therapy: Orders received. Chart reviewed and discussed with care team.? Occupational Therapy not indicated due to per discussion with PT, pt performing near functional baseline with no IP OT needs.? Defer discharge recommendations to ortho.? Will complete orders.

## 2023-03-16 NOTE — PROVIDER NOTIFICATION
[facility] and  notified in person that the patient is refusing to take home all of the discharge medications. Both [facility] and  stated to monitor the patient and chart the discharge medication refusal.

## 2023-03-16 NOTE — OR NURSING
PACU to Inpatient Nursing Handoff    Patient David Thomson is a 82 year old male who speaks English.   Procedure Procedure(s):  REVISION, TOTAL ARTHROPLASTY, HIP LEFT   Surgeon(s) Primary: Wilbert Evans MD  Fellow - Assisting: Aly Boykin MD     Allergies   Allergen Reactions     Benzalkonium      Lisinopril      Piroxicam      Polysorbate  [Bay Oil]      Prazosin      Other reaction(s): Nightmares     Urea      Other reaction(s): Eruption of skin, Eruption of skin       Isolation  [unfilled]     Past Medical History   has a past medical history of Anemia, Atrial fibrillation (H), Bilateral low back pain with left-sided sciatica, Borderline personality disorder (H), BPH (benign prostatic hyperplasia), CAD (coronary artery disease), Cerebral artery occlusion with cerebral infarction (H), Cervical spondylosis with myelopathy, Depression, Diarrhea, Falls frequently, Gastroesophageal reflux disease with esophagitis, Hereditary elliptocytosis (H), History of subdural hematoma, Hyperlipidaemia, Hypertension, Neuropathy, ISHAN (obstructive sleep apnea), Pre-diabetes, PTSD (post-traumatic stress disorder), S/P TAVR (transcatheter aortic valve replacement), Thrombocytopenia (H), and Vertigo.    Anesthesia General   Dermatome Level     Preop Meds TXA - time given: 1054   Nerve block Not applicable   Intraop Meds dexamethasone (Decadron)  fentanyl (Sublimaze): 200 mcg total  hydromorphone (Dilaudid): 1 mg total  ondansetron (Zofran): last given at 1830  Trevor for BP throughout   Local Meds Yes   Antibiotics cefazolin (Ancef) - last given at 1734     Pain Patient Currently in Pain: yes   PACU meds  acetaminophen (Tylenol): 975 mg (total dose) last given at 2003   oxycodone (Roxicodone): 5 mg (total dose) last given at 2003   Robaxin: 500 mg at 2003   PCA / epidural No   Capnography     Telemetry ECG Rhythm: Sinus rhythm   Inpatient Telemetry Monitor Ordered? No        Labs Glucose Lab Results   Component Value Date      03/15/2023     01/17/2023       Hgb Lab Results   Component Value Date    HGB 12.9 03/14/2023       INR Lab Results   Component Value Date    INR 1.66 01/23/2023    INR 1.10 04/19/2016      PACU Imaging Completed     Wound/Incision Incision/Surgical Site 04/19/16 Right Back (Active)   Number of days: 2521       Incision/Surgical Site 03/15/23 Left Hip (Active)   Incision Assessment UTV 03/15/23 1915   Kelly-Incision Assessment UTV 03/15/23 1915   Closure NEHA 03/15/23 1915   Dressing Intervention Clean, dry, intact 03/15/23 1915   Number of days: 0      CMS        Equipment ice pack   Other LDA       IV Access Peripheral IV 03/15/23 Right Hand (Active)   Site Assessment L 03/15/23 1915   Line Status Infusing 03/15/23 1915   Phlebitis Scale 0-->no symptoms 03/15/23 1915   Number of days: 0       Peripheral IV 03/15/23 Left Hand (Active)   Site Assessment [facility] 03/15/23 1915   Line Status Saline locked 03/15/23 1915   Phlebitis Scale 0-->no symptoms 03/15/23 1915   Number of days: 0      Blood Products Not applicable  mL   Intake/Output Date 03/15/23 0700 - 03/16/23 0659   Shift 5959-3208 7460-3259 5029-0392 24 Hour Total   INTAKE   I.V. 700 1700  2400   Shift Total(mL/kg) 700(5.97) 1700(14.51)  2400(20.48)   OUTPUT   Blood  600  600   Shift Total(mL/kg)  600(5.12)  600(5.12)   Weight (kg) 117.2 117.2 117.2 117.2      Drains / Rice     Time of void PreOp Void Prior to Procedure: 0800 (03/15/23 1047)    PostOp      Diapered? No   Bladder Scan Bladder Scan Volume (mL): 146 ml (03/15/23 1906)   PO    tolerating sips and applesauce     Vitals    B/P: 108/64  T: 98.4  F (36.9  C)    Temp src: Axillary  P:  Pulse: 83 (03/15/23 1930)          R: 12  O2:  SpO2: 93 %    O2 Device: None (Room air) (03/15/23 1915)              Family/support present none   Patient belongings     Patient transported on cart   DC meds/scripts (obs/outpt) Not applicable   Inpatient Pain Meds Released? Yes       Special  needs/considerations None   Tasks needing completion None, was bladder scanned at 2015 for 345       Melia Serrano, RN  [facility]COM [id number]

## 2023-03-16 NOTE — OP NOTE
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    Post-operative diagnosis Same as pre-operative diagnosis    Procedure: Procedure(s):  1. COMPLEX REVISION, TOTAL ARTHROPLASTY, HIP LEFT  2. Impaction bone grafting acetabulum with allograft 60 ml    Surgeon: Surgeon(s) and Role:     * Wilbert Evans MD - Primary     * Aly Boykin MD - Fellow - Assisting  Anesthesia: General   Estimated Blood Loss: 600 mL from 3/15/2023  1:18 PM to 3/15/2023  7:15 PM      Drains: None  Specimens:   ID Type Source Tests Collected by Time Destination   A : Left Hip Tissue 1 Tissue Hip, Left ANAEROBIC BACTERIAL CULTURE ROUTINE, AEROBIC BACTERIAL CULTURE ROUTINE Wilbert Evans MD 3/15/2023  2:14 PM    B : Left Hip Tissue 2 Tissue Hip, Left ANAEROBIC BACTERIAL CULTURE ROUTINE, AEROBIC BACTERIAL CULTURE ROUTINE Wilbert Evans MD 3/15/2023  2:14 PM    C : Left Hip Tissue 3 Tissue Hip, Left ANAEROBIC BACTERIAL CULTURE ROUTINE, AEROBIC BACTERIAL CULTURE ROUTINE Wilbert Evans MD 3/15/2023  2:14 PM      Findings:   Anterolateral approach.  3 specimens obtained for aerobic/anaerobic cultures.  Macroscopically no signs of infection.  Grossly loose femoral component.  Fractured femoral component with distal tip firmly embedded and cement mantle.  Complex removal of distal part fractured femoral component and cement mantle.  Acetabular component of unknown .  Clear osteolysis behind acetabular component with presence of small particle disease.   Uncomplicated removal of acetabular component.  Extensive bone loss in acetabular dome, pubic root and os ischium.  Impaction bone grafting 60 cc in acetabular dome, pelvic root and os ischium.  Cementation of acetabular component with slightly higher hip center.  Uncomplicated cementation of femoral component. Intraoperative stability with full extension and ER, flexion to 90 with IR to max tolerance at approximately 50 degrees, and in the sleep position.  Appropriate limb length.        Complications: None.  Implants:   Implant Name Type Inv. Item Serial No.  Lot No. LRB No. Used Action   TOTAL HIP STEM AND FEMORAL HEAD WITH BONE CEMENT Total Joint Component/Insert   DENIA  Left 3 Explanted   GRAFT BONE CHIPS CAN CUBE 30CC [id number] - [id number] Bone/Tissue/Biologic GRAFT BONE CHIPS CAN CUBE 30CC [id number] [employer]  Left 1 Implanted   ACETABULAR SHELL, LINER AND SCREW  Total Joint Component/Insert   DENIA  Left 3 Explanted   GRAFT BONE CHIPS CAN CUBE 30CC 056819 - [id number] Bone/Tissue/Biologic GRAFT BONE CHIPS CAN CUBE 30CC [id number] [id number] [employer]  Left 1 Implanted   BONE CEMENT SIMPLEX W/TOBRAMYCIN 6197-9-001 - [id number] Cement, Bone BONE CEMENT SIMPLEX W/TOBRAMYCIN 6197-9-001  [employer] [id number] Left 2 Implanted   EXETER X3 RIMFIT CUP OD 56 MM ID 36 MM Total Joint Component/Insert   DENIA [id number] Left 1 Implanted   EXETER X3 RIMFIT CUP OD 56 MM ID 32 MM Total Joint Component/Insert   DENIA [id number] Left 1 Wasted   BONE CEMENT RESTRICTOR CORNELL FEMORAL 25MM 318125 - [id number] Cement, Bone BONE CEMENT RESTRICTOR CORNELL FEMORAL 25MM [id number]  [employer]-R [id number] Left 1 Implanted   BONE CEMENT SIMPLEX W/TOBRAMYCIN 6197-9-001 - [id number] Cement, Bone BONE CEMENT SIMPLEX W/TOBRAMYCIN 6197-9-001  [employer] [id number] Left 2 Implanted   EXETER V40 CEMENTED HIP LONG STEM, OFFSET 44 ZACH N3, LENGTH 200MM Total Joint  Component/Insert  [id number] Age of Learning [id number] Left 1 Implanted   IMP HEAD FEMORAL STRK LFIT V40 36MM +10 6260-9-336 - [id number] Total Joint Component/Insert IMP HEAD FEMORAL STRK LFIT V40 36MM +10 6260-9-336  [employer] [id number] Left 1 Implanted       COMPONENTS USED:  1.  [place] [employer] 200 mm long stem size 44-3   2.  [place] 36 mm metal head +10 mm neck length  3.  [place] X.3 56 mm acetabular component  4.  60 cc of cancellous bone graft.      DESCRIPTION OF PROCEDURE: Patient was seen in the preoperative area where we again reviewed the risks, benefits, and alternatives to the complex revision total hip arthroplasty.  Patient understands and agrees to the treatment plan as set forth. Informed written consent was obtained.  The operative left hip was marked with an indelible marker after patient confirmation of the operative site.  All the patient's questions were answered.  The patient was taken to the operating room and underwent induction of  General  anesthesia.  The patient was placed on the operating table in the lateral decubitus position with the operative site up.   An SCD was placed on the nonoperative leg.   Bony prominences were well padded and was secured to the table with the Rivear positioner. The patient was prepped and draped in the normal sterile fashion.         After the completion of a timeout procedure the previously used straight incision to the hip joint was performed making a incision over the greater trochanter and taking this down through the subcutaneous tissue.  Similarly to previous surgical intervention an anterolateral approach to the hip was chosen.  The  IT band was identified and then split in the direction of its fibers. A charnley retractor was placed and hemostasis was obtained.   We now identified the anterior third portion of the vastus lateralis and gluteus medius musculature.  This was partially detached from the greater trochanter consistent with a failed  repair previously.  The anterior third of vastus lateralis and gluteus medius were then further peeled off the greater trochanter and retracted anteriorly.  An abundance of scar tissue was encountered.  Excision of scar tissue was performed along the superior margin of the acetabular component until good visualization of the superior dome was obtained.  The reflecting head of the rectus femoris musculature was incised and retracted.  Self retainers and Homans were placed along the inferior and anterior acetabular component.  Extensive scar tissue was excised.  Multiple tissue samples were obtained and sent for aerobic and anaerobic cultures.  Once most of the scar tissue was excised around the hip arthroplasty and near complete 360 degree visualization of acetabular component was obtained the hip was dislocated.  The femoral component was easily removed as this was clearly loose.  Upon removal it was confirmed that the femoral component was indeed fractured at the distal third.  The remaining metal piece was still in situ.  The leg was positioned in figure-of-four with foot down in the sterile pouch of the revision drapes.  Femoral retractor was placed underneath the femur to obtain adequate exposure.  Fractured cement mantle residue was removed.  Extensive small particle disease with osteolysis and associated tissue was removed using a combination of Agn, curette, rongeur and pituitary.  Using the [place] cement removal tools the cement was removed from the femoral canal.  We now visualized the remaining part of the stem embedded well inside the cement mantle.  Using [place] cement extractor tools and K wires we carefully removed the cement mantle around the residual stem.  Great care was taken not to perforate the femoral cortices.  After approximately 45 to 60 minutes we were able to safely remove the residual metal component.  We then were able to safely drill a 5 0 millimeter drill through the cement plug.   Using the [place] cement removal tools as well as curettes and the TrackaPhone cement removal tool we were able to take out most of the cement from the femoral canal.  Using a curette it was verified that no perforations were made intraoperatively.  We then broached the proximal femoral canal to accommodate for a 44-3 femoral component.    We then turned our attention to the acetabulum.  The acetabulum was exposed by retracting the proximal femur posteriorly.  A wide Hohmann was placed inferiorly.  Another Hohmann was placed anteriorly.  Using 1/4 inch osteotome the polyethylene liner was removed.  This was a high walled 26 mm polyethylene liner of unknown .  The 2 screws were removed.  Upon further inspection it was clear that there was significant small particle disease and intraoperative assessment was deemed that the acetabular bone component was unlikely to be well fixed.  Using the explant the acetabular component was removed without significant bone loss.  Inspection of the pelvis showed extensive bone loss of superior dome, towards the pubic root and towards the ischium.  All of the small particle disease was excised using a combination of Gan, curette and rongeurs/pituitary.  The pelvis was copiously irrigated.    Now we turned our attention to bone grafting the large defects as described before.  Impaction grafting using the acetabular and fractures was performed up to 58 mm.  Because of the poor bone quality and was decided to proceed with placement of a cemented acetabular component.   The bone bed was copiously irrigated and dried with a lap pad.  Simultaneously the antibiotic loaded simplex cement was mixed on the back table.  After 3 minutes and 30 seconds the cement was placed into the superior area of the acetabulum and pressurized for 1 minute.  Next the definitive 56 mm all polyliner accommodating for a 36 mm head was placed taking into account the appropriate inclination and anteversion.   Cement cured and all excess cement was removed.  A temporary Ray-Efren to protect the cup was placed.      Attention was turned to the femur.  The 44-3 trial fit well.    A trial neck was placed with a +5 head.  The hip was reduced.  The leg length was found to be significantly short and therefore it was decided to place the definitive 200 mm 44-3 stem higher in approximately 10 mm below the tip of the trochanter to allow for adequate tissue tensioning.    The hip was dislocated and the trial femoral component was removed.  The femur was exposed.  A 25 mm cement restrictor was placed at the appropriate depth.  The intramedullary canal was copiously irrigated and dried with a Ray-Efren.  Simultaneously the antibiotic loaded simplex cement was mixed on the back table.  The centralizer with 1 leg on the lateral aspect of the prosthesis was placed.  After 4 minutes the cement was inserted into the intramedullary canal and pressurized for 1 minute.  Now the definitive 200 mm 44-3 Tad stem was placed at the appropriate depth taking to account the appropriate anteversion.  Cement was cured and excess cement was removed.   The hip was trialed with various head lengths and the +10 head had the best fit.  Again, the  stability was examined and  was found to be secure and stable in full extension and external rotation of > 45 degrees as well as flexion of 90 degrees combined with internal rotation until maximum allowance approximately at 50 degrees.  Leg lengths were judged to be within 5 mm of symmetry compared to preoperatively as evaluated by limb palpation. The hip was dislocated and the trial head was removed.  The trunion was washed and dried and the final +10 mm 36 mm chromium cobalt head was impacted into place.  The hip was reduced and again stable as listed above.       The hip was lavaged with dilute betaine irrigant followed by sterile saline.  Periarticular injection of ropivicaine, toradol, and epi was injected  into the soft tissue.  The anterior third of the vastus lateralis and gluteus medius were reinserted to the greater trochanter with transosseous Vicryl #1 sutures.  The remaining part of the muscular tendinous flap was closed using #1 Vicryl sutures. The IT and gluteal fascia was closed with #1 Vicryl figure of 8 interrupted sutures.  The deep dermal layer was closed with 0 Vicryl and 2-0 Vicryl. The skin was closed with 3-0 PDS, Steri-Strips and a sterile dressing consisting of alginate and Tegaderm. The patient was positioned supine and awoken from anesthesia.  The patient was transferred to the PACU in stable condition.     The -22 modifier is appended to the CPT coding for this surgery due to the following reasons:  1) This was a complex revision operation of left total hip arthroplasty with extensive scar formation .  2) unknown implants were placed on acetabular side which required extensive time in discussion in an attempt to identify the .    3) the fractured and well embedded distal part of femoral stem resulted in a long time to safely remove this without perforating the femur  4) the extensive acetabular bone loss due to small particle disease requiring additional impaction bone grafting with 60 cc of allograft.  5) The extra time and effort involved in this surgery associated with 50% greater time than normally for this procedure.       POSTOPERATIVE PLAN:  The patient will be admitted to the floor for pain control and monitoring.  Weight bearing: As tolerated  Anticoagulation: Aspirin 162 mg daily for 4 weeks  Precautions:  Anterior hip precautions  Antibiotics during hospitalization   x-ray in PACU  Follow-up on intraoperative cultures  PT/OT  Patient will discharge POD 1-2 with family when all requirements are met.  Clinic visit 2 and 6 weeks          Wilbert Evans MD      Adult Reconstruction  [facility] Department of Orthopaedic Surgery  Pager [phone]  [phone]

## 2023-03-16 NOTE — BRIEF OP NOTE
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]  Post-operative diagnosis Same as pre-operative diagnosis    Procedure: Procedure(s):  REVISION, TOTAL ARTHROPLASTY, HIP LEFT  Surgeon: Surgeon(s) and Role:     * Wilbert Evans MD - Primary     * Aly Boykin MD - Fellow - Assisting  Anesthesia: General   Estimated Blood Loss: 600 mL from 3/15/2023  1:18 PM to 3/15/2023  7:15 PM      Drains: None  Specimens:   ID Type Source Tests Collected by Time Destination   A : Left Hip Tissue 1 Tissue Hip, Left ANAEROBIC BACTERIAL CULTURE ROUTINE, AEROBIC BACTERIAL CULTURE ROUTINE Wilbert Evans MD 3/15/2023  2:14 PM    B : Left Hip Tissue 2 Tissue Hip, Left ANAEROBIC BACTERIAL CULTURE ROUTINE, AEROBIC BACTERIAL CULTURE ROUTINE Wilbert Evans MD 3/15/2023  2:14 PM    C : Left Hip Tissue 3 Tissue Hip, Left ANAEROBIC BACTERIAL CULTURE ROUTINE, AEROBIC BACTERIAL CULTURE ROUTINE Wilbert Evans MD 3/15/2023  2:14 PM      Findings:   See operative report. All components revised.   Complications: None.  Implants:   Implant Name Type Inv. Item Serial No.  Lot No. LRB No. Used Action   TOTAL HIP STEM AND FEMORAL HEAD WITH BONE CEMENT Total Joint Component/Insert   DENIA  Left 3 Explanted   GRAFT BONE CHIPS CANC CUBE 30CC 626396 - [id number] Bone/Tissue/Biologic GRAFT BONE CHIPS CANC CUBE 30CC [id number] [email or web address]  Left 1 Implanted   ACETABULAR SHELL, LINER AND SCREW  Total Joint Component/Insert   DENIA  Left 3 Explanted   GRAFT BONE CHIPS CANC CUBE 30CC 206704 - [id number] Bone/Tissue/Biologic GRAFT BONE CHIPS CANC CUBE 30CC 215072 [id number] [email or web address]  Left 1 Implanted   BONE CEMENT SIMPLEX W/TOBRAMYCIN 6197-9-001 - [id number] Cement, Bone BONE CEMENT SIMPLEX W/TOBRAMYCIN 6197-9-001  [employer] [id number] Left 2  Implanted   EXETER X3 RIMFIT CUP OD 56 MM ID 36 MM Total Joint Component/Insert   DENIA [id number] Left 1 Implanted   EXETER X3 RIMFIT CUP OD 56 MM ID 32 MM Total Joint Component/Insert   DENIA [id number] Left 1 Wasted   BONE CEMENT RESTRICTOR CORNELL FEMORAL 25MM 914351 - [id number] Cement, Bone BONE CEMENT RESTRICTOR CORNELL FEMORAL 25MM [id number]  [employer]R [id number] Left 1 Implanted   BONE CEMENT SIMPLEX W/TOBRAMYCIN 6197-9-001 - [id number] Cement, Bone BONE CEMENT SIMPLEX W/TOBRAMYCIN 6197-9-001  [employer] [id number] Left 2 Implanted   EXETER V40 CEMENTED HIP LONG STEM, OFFSET 44 ZACH N3, LENGTH 200MM Total Joint Component/Insert  [id number] DENIA [id number] Left 1 Implanted   IMP HEAD FEMORAL STRK LFIT V40 36MM +10 6260-9-336 - [id number] Total Joint Component/Insert IMP HEAD FEMORAL STRK LFIT V40 36MM +10 6260-9336  [id number] Left 1 Implanted       Assessment: David Thomson is a 82 year old male s/p L revision JUDE on 3/15.    Plan:  WBAT   Anterior hip precautions  Postop abx: ancef until discharge  Pain control  DVT ppx  PT/OT, OOB  Appreciate medicine recs  F/U 2 weeks  Dispo planning    Future Appointments   Date Time Provider Department Center   3/16/2023  9:15 AM Rosa Black, PT [facility] [place]   3/16/2023 10:00 AM Emilie Osboren, OTR [facility] [place]   3/16/2023  2:30 PM Rosa Black, PT [facility] [place]   4/3/2023  2:30 PM Homer Burns, PARobbieC [facility]       Aly Boykin MD  Adult Reconstructive Surgery Fellow  Department of Orthopaedic Surgery, [facility]

## 2023-03-16 NOTE — PHARMACY-ADMISSION MEDICATION HISTORY
Pharmacy medication reconciliation completed at pre-op exam on 3/9. Please see pharmacist note from that date for additional details on prior to admission medications.    Thank you,    Lashay Reddy, PharmD

## 2023-03-16 NOTE — OR NURSING
Pt complaining of numbness in R hand and R leg. As time passed in PACU, pt states it is not getting better and is different than his baseline numbness/tingling in R foot. Notified both MD Yi and Ortho MD Gurrola. MD Yi to bedside to assess. No new orders, ok for pt to transfer upstairs to Ortho floor. Updated bedside RNCarmen.

## 2023-03-16 NOTE — PLAN OF CARE
Pt A/O X 4. Afebrile. VSS. Lungs-Clear bilaterally with both anterior and posterior. IS encouraged. Bowels-Active in all four quadrants, last BM 3-15-23. Voids spontaneously without difficulty in the bathroom. Denies nausea and vomiting. CMS and Neuro's are intact. Denies numbness and tingling in all extremities. Has slight pain in the left hip, ICE applied, and pain is manageable. Pt declined pain medications when offered. Pt also refused to take home the discharge medications except for PO Levaquin, NP Ruby Coughlin and  notified. Is on a Regular diet and appetite was Good this shift. Left hip incisional dressing has a moderate amount of serosanguinous drainage that is contained under the dressing that is in place. Pt up in room with SBA, gait belt, and a FWW. PIV removed from the left hand for discharge. Bilateral heels are elevated off the bed. Pt is able to make needs known, and call light is within reach. Pt. discharged at 13:50PM to home, and left with personal belongings. Pt. received complete discharge paperwork and the PO medication Levaquin as filled by discharge pharmacy. Pt. was given times of last dose for all discharge medications in writing on discharge medication sheets. Discharge teaching included PO medication, pain management, activity restrictions, dressing changes, and signs and symptoms of infection. Pt. had no further questions at the time of discharge and no unmet needs were identified.

## 2023-03-16 NOTE — PLAN OF CARE
Physical Therapy Discharge Summary    Reason for therapy discharge:    All goals and outcomes met, no further needs identified.    Progress towards therapy goal(s). See goals on Care Plan in [facility] electronic health record for goal details.  Goals met    Therapy recommendation(s):    Continued therapy is recommended.  Rationale/Recommendations:  home safety eval, progress balance and L LE strength and ROM post JUDE.
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
## 2023-03-16 NOTE — PROGRESS NOTES
Orthopaedic Surgery Progress Note 03/16/2023    S: No acute events overnight.  Pain controlled on oral meds. Denies numbness or tingling in the affected extremity. chest pain (-), SOB(-), nausea/vomiting(-). Tolerating oral diet. BM(-) flatus(+). Voiding spontaneously. He has been up and ambulating.    O:  Temp: (!) 96.4  F (35.8  C) Temp src: Oral BP: 114/58 Pulse: 73   Resp: 12 SpO2: 95 % O2 Device: Nasal cannula Oxygen Delivery: 2 LPM    Exam:  Gen: No acute distress, resting comfortably in bed.  Resp: Non-labored breathing  MSK:    LLE:  - Dressing with some shadowing distally; otherwise c/d/i  - SILT femoral/tibial/sural/saphenous/DP/SP nerves  - Fires Quad, TA, EHL, FHL, GaSC  - DP pulses 2+, foot wwp      Recent Labs   Lab 03/14/23  0732   WBC 4.6   HGB 12.9*          Culture results: NGTD    Assessment: David Thomson is a 82 year old male s/p revision L JUDE on 3/15/2023 with Dr. Evans. Doing well.    Plan 3/16:  Antibiotics until discharge  Monitor cultures  PT/OT  OK to discharge to home after passes PT/OT    Plan:  Activity: Up with assist.  Weight bearing status:  WBAT weeks.  Antibiotics: Ancef until discharge   Diet: Begin with clear fluids and progress diet as tolerated.  DVT prophylaxis: ASA and mechanical while in the hospital, discharge on ASA x4 weeks.  Pain management: transition from IV to orals as tolerated.   X-rays: Complete  Physical Therapy:  ROM, ADL's.  Occupational Therapy: ADL's.  Labs: Trend Hgb on POD #1.  Cultures: Pending, follow culture results closely.  Consults: PT, OT. Medicine, appreciate assistance in caring for this patient.  Follow-up: Clinic with Orthopedic Clinic in 2 weeks    Future Appointments   Date Time Provider Department Center   3/16/2023  9:15 AM Rosa Black PT [facility] [place]   3/16/2023 10:00 AM Emilie Osborne, OTR [facility] [place]   3/16/2023  2:30 PM Rosa Black PT [facility] [place]   4/3/2023  2:30 PM Homer Burns, [facility]        Disposition: ]Pending progress with therapies, pain control on orals, and medical stability, anticipate discharge to home on POD #1.    Patient discussed with .      Tika Garcia MD  PGY-4  Orthopaedic Surgery

## 2023-03-16 NOTE — PLAN OF CARE
"VS: /58 (BP Location: Left arm)   Pulse 73   Temp (!) 96.4  F (35.8  C) (Oral)   Resp 12   Ht 1.854 m (6' 1\")   Wt 117.2 kg (258 lb 6.1 oz)   SpO2 95%   BMI 34.09 kg/m      O2: O2 SATS >90% denies chest pain,  or SBO   Output: Voids adequately in the bathroom   Last BM: 3/15/13 BS present all x4 quadrants    Activity: Up ad modesta, SBA with walker and gait belt   Up for meals? N/A   Skin: Dry and intact   Pain: Pain managed with  prn oxycodone. Robaxin and scheduled tylenol   CMS: AOX4 Denies numbness and tingling   Dressing: CDI    Diet: Regular diet    LDA: R PIV Infusing , L PIV S/L   Equipment: IV Pole Gait Belt,.JENNIFERW abhishek., PCDs   Plan: Continue to monitor  and update,    Additional Info:                              "

## 2023-03-16 NOTE — PROGRESS NOTES
03/16/23 1000   Appointment Info   Signing Clinician's Name / Credentials (PT) Rosa Black DPREMINGTON   Living Environment   People in Home alone   Current Living Arrangements house   Home Accessibility no concerns   Living Environment Comments walk in shower, all needs on one floor   Self-Care   Usual Activity Tolerance moderate   Current Activity Tolerance moderate   Regular Exercise No   Equipment Currently Used at Home walker, rolling;cane, quad   Fall history within last six months yes   Number of times patient has fallen within last six months   (daily falls)   Activity/Exercise/Self-Care Comment pt Angelica with quad cane in home, uses 4WW out of home. Pt reports IND with all cares and mobility, though has balance defectis and reports falling nearly every day due to L LE impairments combined with balance. He does recieve  PT services at baseline   General Information   Onset of Illness/Injury or Date of Surgery 03/15/23   Referring Physician Dr. Evans   Pertinent History of Current Problem (include personal factors and/or comorbidities that impact the POC) L anterior JUDE   Existing Precautions/Restrictions fall;no hip ER;no hip ADD past midline;no hip hyperextension;no pivoting or twisting   Weight-Bearing Status - LLE weight-bearing as tolerated   Cognition   Affect/Mental Status (Cognition) WNL   Orientation Status (Cognition) oriented x 4   Follows Commands (Cognition) WNL   Pain Assessment   Patient Currently in Pain No   Integumentary/Edema   Integumentary/Edema Comments consitent with L anterior JUDE   Posture    Posture Kyphosis   Range of Motion (ROM)   ROM Comment WNL   Strength (Manual Muscle Testing)   Strength Comments WFL- good quad contraction, able to complete SLR well   Bed Mobility   Comment, (Bed Mobility) IND   Transfers   Comment, (Transfers) Angelica STS to 4WW   Gait/Stairs (Locomotion)   Comment, (Gait/Stairs) pt ambulates Angelica with 4WW x250', steady with walker, good weight acceptance  through surgical LE. Pt does take a few steps in room without AD, SBA, mild unsteadiness   Balance   Balance Comments Angelica with 4WW for functional activity, no LOB throughout. However, pt does note baseline balance defecits and falls every day.   Sensory Examination   Sensory Perception patient reports no sensory changes   Clinical Impression   Criteria for Skilled Therapeutic Intervention Yes, treatment indicated   PT Diagnosis (PT) impaired functional mobility   Influenced by the following impairments baseline balance defecits, L LE anterior hip precautions   Functional limitations due to impairments gait, falls risk   Clinical Presentation (PT Evaluation Complexity) Stable/Uncomplicated   Clinical Presentation Rationale PMH, clinician impression   Clinical Decision Making (Complexity) low complexity   Planned Therapy Interventions (PT) home exercise program;patient/family education;progressive activity/exercise;risk factor education   Risk & Benefits of therapy have been explained evaluation/treatment results reviewed;care plan/treatment goals reviewed;risks/benefits reviewed;current/potential barriers reviewed;participants voiced agreement with care plan;participants included;patient   Clinical Impression Comments pt near his baseline, unfortunately has many falls at home at baseline, but this is not an acute change in function.   PT Total Evaluation Time   PT Eval, Low Complexity Minutes (72094) 10   Plan of Care Review   Plan of Care Reviewed With patient   Physical Therapy Goals   PT Frequency One time eval and treatment only   PT Predicted Duration/Target Date for Goal Attainment 03/16/23   PT Goals PT Goal 1;PT Goal 2   PT: Goal 1 pt will demo ability to be IND with JUDE HEP to progress strength and ROM post op  (met)   PT: Goal 2 pt will identify proper anterior hip precautions  (met)   Interventions   Interventions Quick Adds Therapeutic Activity   Therapeutic Activity   Therapeutic Activities: dynamic  "activities to improve functional performance Minutes (81877) 10   Symptoms Noted During/After Treatment None   Treatment Detail/Skilled Intervention PT: educated on anterior hip precautions with handout provided, pt able to verbalize understanding. Instructed through JUDE HEP to progress strenth and ROM post op- tolerates well, good understanding. Discussed his home safety/falls- PT recommend he use 4WW at home as he is Angelica with this. Pt declining this recommendation stating it doesn't fit  or \"work in his home\". Pt does report HH PT working with him- given balance/falls are baseline recommend continued HH PT for home safety eval. PT also recommend shower stool at home, but pt reports has never fallen in shower and no room for stool. Pt agreeable with plan, wants to resuem [facility] services, PT relayed this to RNCC. Pt with no concerns for home.   PT Discharge Planning   PT Plan discharge   PT Discharge Recommendation (DC Rec)   (defer to ortho)   PT Rationale for DC Rec pt is near his baseline- unfortunately does have many falls at home. He was getting HH PT prior to surgery and given falls hx would benefit from continued therapy for his baseline balance defecits. Recommend he use 4WW at all times at home, but pt reports he will only use his quad cane.   PT Brief overview of current status Angelica with 4WW use   Total Session Time   Timed Code Treatment Minutes 10   Total Session Time (sum of timed and untimed services) 20     [facility]  OUTPATIENT PHYSICAL THERAPY EVALUATION  PLAN OF TREATMENT FOR OUTPATIENT REHABILITATION  (COMPLETE FOR INITIAL CLAIMS ONLY)  Patient's Last Name, First Name, M.I.  YOB: 1940  David Thomson                        Provider's Name  [facility] Medical Record No.  [id number]                             Onset Date:  03/15/23   Start of Care Date:      Type:     _X_PT   ___OT   ___SLP Medical Diagnosis:   "               PT Diagnosis:  impaired functional mobility Visits from SOC:  1     See note for plan of treatment, functional goals and certification details    I CERTIFY THE NEED FOR THESE SERVICES FURNISHED UNDER        THIS PLAN OF TREATMENT AND WHILE UNDER MY CARE     (Physician co-signature of this document indicates review and certification of the therapy plan).

## 2023-03-16 NOTE — PROGRESS NOTES
Care Management Discharge Note    Discharge Date: 03/16/2023       Discharge Disposition:  Home with home care    Discharge Services:    [facility], [place]   Phone  [phone]  Fax  [phone]    Home PT    Discharge Transportation: Friend or family     Private pay costs discussed: Not applicable    Additional Information:  DEB orders placed for [facility] home physical therapy. RNCC available as needed.    Abigail Garces RN, BSN  Care Coordinator, [facility] Orth[facility]  Phone [phone]  Pager [phone]

## 2023-03-16 NOTE — PROGRESS NOTES
[facility]    Medicine Progress Note - Hospitalist Service, GOLD TEAM 19    Date of Admission:  3/15/2023    Assessment & Plan     David Thomson is a 82 year old male with a history of CAD s/p CABG, aortic stenosis s/p AVR, A.Fib/flutter, chronic HF, HTN, DM2, ISHAN, hereditary elliptocytosis, anemia, thrombocytopenia, SDH, vertigo, neuropathy, recurrent falls, alcohol use disorder, chronic pain s/p dorsal column spinal stimulator, GERD, and BPH who underwent revision left total arthroplasty on 3/15/23.     #S/p Revision Left Total Arthroplasty on 3/15/23: By Dr. Evans.  mL. Last BM 3/15. Pain controlled.  - Management per Orthopedics.  Doing well.      #CAD s/p CABG in 2015  #Chronic HFpEF (EF 55-60%)  #Aortic Stenosis s/p AVR in 2016  #A.Fib/Flutter  #HTN  Echo 1/23/23 with EF 55-60%, indeterminate diastolic fxn, and probable TAVR which is wnl. Home ASA and Eliquis on hold since 1/2023 hospitalization d/t SDH. Euvolemic without cardiopulmonary sx currently. BP normotensive.  - Caution with IVF bonita-operatively. Discontinue once tolerating PO. Sooner if hypoxia, edema, or SOB develop.  - Continue PTA Atorvastatin  - Hold PTA Entresto. Resume as BP/labs allow. Likely 1-2 days.   - Continue PTA Toprol XL 12.5 mg daily.  - Follow up with Cardiology to discuss long-term anticoagulation plan in setting of recent bleeding event.     #Recent Left Frontoparietal SDH: Dx during 1/2023 admission in setting of frequent falls on AC (which was held). Evaluated by NSG on 2/28 with review of imaging from 2/7 showing resolution of SDH. No acute concerns.  - Fall precuations  - Closely monitor neuro status as ASA 81 mg BID started for DVT ppx.  - Low threshold for Head CT if AMS or other neuro changes    Stable at current.      #Chronic Anemia  #Hx of Thrombocytopenia  #Hereditary Elliptocytosis  Pre-op Hgb 12.9 and Plts 204 (wnl) on 3/14.  mL intra-op.  - Daily  CBC  Hgb now 9.4.     #Abnormal UA: UA on 3/15 with large LE, 80 WBC. Last UCx on 1/25/23 grew Klebsiella resistant to Ampicillin and Cefazolin.  - Defer antibiotics while awaiting UCx. Low threshold to initiate.     #Hx of Hospital Acquired Delirium  #Depression, PTSD, Borderline Personality Disorder  Not on any PTA medications. Multiple episodes of delirium + agitation during 1/2023 admission requiring PRN antipsychotics.  - Delirium precautions  - Caution with opiates and other sedating medications.  - Consider PRN Zyprexa and Haldol if s/sx of delirium (previously effective).        Disposition Plan      Expected Discharge Date: 03/16/2023,  9:00 AM                Fred Toure MD  Hospitalist Service, GOLD TEAM [address] [place] [address]  Securely message with [employer] info)  Text page via [facility] Paging/Directory   See signed in provider for up to date coverage information  ______________________________________________________________________    Interval History   Interval events reviewed.   States doing well  Denies fever or chills.   No cough or cp or sob.   No LH or dizziness.    No NV or pain abdomen.   No dysuria or bowel complaint.   No new sensory or motor complaint.     No other new or acute medical concern    Physical Exam   Vital Signs: Temp: 97.1  F (36.2  C) Temp src: Oral BP: 105/60 Pulse: 85   Resp: 13 SpO2: 96 % O2 Device: Nasal cannula Oxygen Delivery: 2 LPM  Weight: 258 lbs 6.07 oz    General Appearance: Awake, interactive, NAD  HEENT: AT/NC, Anicteric, Moist MM  Respiratory: Normal work of breathing. On RA.   Cardiovascular: RRR  GI/Abd: Soft. NT. ND.   Extremities:  No pedal edema.  Neuro: AO x 4, Grossly non focal.   Psychiatry: Stable mood.    Medical Decision Making     40 MINUTES SPENT BY ME on the date of service doing chart review, history, exam, documentation & further activities per the note.      Data     I have personally reviewed the  following data over the past 24 hrs:    N/A  \   9.4 (L)   / N/A     139 101 14.1 /  171 (H)   4.6 27 0.95 \       Imaging results reviewed over the past 24 hrs:   Recent Results (from the past 24 hour(s))   XR Pelvis w Hip Port Left G/E 2 Views    Narrative    EXAM: XR PELVIS AND HIP PORTABLE LEFT 2 VIEWS  LOCATION: [facility]  DATE/TIME: 3/15/2023 8:02 PM    INDICATION: Status post Hip surgery  COMPARISON: 02/08/2023      Impression    IMPRESSION: Status post interval removal of the left acetabular component. No definite hardware complication. Expected postsurgical soft tissue edema and subcutaneous emphysema. No acute fracture or malalignment. Otherwise unchanged.

## 2023-03-16 NOTE — ANESTHESIA CARE TRANSFER NOTE
Patient: David Thomson    Procedure: Procedure(s):  REVISION, TOTAL ARTHROPLASTY, HIP LEFT       Diagnosis: Failure of left total hip arthroplasty, initial encounter (H) [T84.011A]  Diagnosis Additional Information: No value filed.    Anesthesia Type:   General     Note:    Oropharynx: oropharynx clear of all foreign objects and spontaneously breathing  Level of Consciousness: awake  Oxygen Supplementation: room air (pt refused oxygen mask)    Independent Airway: airway patency satisfactory and stable  Dentition: dentition unchanged  Vital Signs Stable: post-procedure vital signs reviewed and stable  Report to RN Given: handoff report given  Patient transferred to: PACU    Handoff Report: Identifed the Patient, Identified the Reponsible Provider, Reviewed the pertinent medical history, Discussed the surgical course, Reviewed Intra-OP anesthesia mangement and issues during anesthesia, Set expectations for post-procedure period and Allowed opportunity for questions and acknowledgement of understanding      Vitals:  Vitals Value Taken Time   /64 03/15/23 1930   Temp 36.9    Pulse 81 03/15/23 1934   Resp 11 03/15/23 1934   SpO2 91% 03/15/23 1934   Vitals shown include unvalidated device data.    Electronically Signed By: ANA LILIA Vazquez CRNA  March 15, 2023  7:34 PM

## 2023-03-17 ENCOUNTER — APPOINTMENT (OUTPATIENT)
Dept: GENERAL RADIOLOGY | Facility: CLINIC | Age: 83
DRG: 812 | End: 2023-03-17
Attending: PHYSICIAN ASSISTANT
Payer: COMMERCIAL

## 2023-03-17 ENCOUNTER — CARE COORDINATION (OUTPATIENT)
Dept: ORTHOPEDICS | Facility: CLINIC | Age: 83
End: 2023-03-17

## 2023-03-17 ENCOUNTER — PATIENT OUTREACH (OUTPATIENT)
Dept: CARE COORDINATION | Facility: CLINIC | Age: 83
End: 2023-03-17

## 2023-03-17 ENCOUNTER — HOSPITAL ENCOUNTER (INPATIENT)
Facility: CLINIC | Age: 83
LOS: 9 days | Discharge: NURSING, REHAB OR LONG-TERM CARE | DRG: 812 | End: 2023-03-27
Attending: PHYSICIAN ASSISTANT | Admitting: NURSE PRACTITIONER
Payer: COMMERCIAL

## 2023-03-17 DIAGNOSIS — R52 INTRACTABLE PAIN: ICD-10-CM

## 2023-03-17 DIAGNOSIS — K30 INDIGESTION: ICD-10-CM

## 2023-03-17 DIAGNOSIS — R29.6 FREQUENT FALLS: ICD-10-CM

## 2023-03-17 DIAGNOSIS — D64.9 ANEMIA, UNSPECIFIED TYPE: ICD-10-CM

## 2023-03-17 DIAGNOSIS — G89.18 ACUTE POST-OPERATIVE PAIN: ICD-10-CM

## 2023-03-17 DIAGNOSIS — R53.1 WEAKNESS GENERALIZED: ICD-10-CM

## 2023-03-17 DIAGNOSIS — Z96.649 S/P REVISION OF TOTAL HIP: ICD-10-CM

## 2023-03-17 DIAGNOSIS — I47.29 NSVT (NONSUSTAINED VENTRICULAR TACHYCARDIA) (H): ICD-10-CM

## 2023-03-17 DIAGNOSIS — M25.552 HIP PAIN, LEFT: ICD-10-CM

## 2023-03-17 DIAGNOSIS — M25.552 LEFT HIP PAIN: ICD-10-CM

## 2023-03-17 DIAGNOSIS — I47.29 NON-SUSTAINED VENTRICULAR TACHYCARDIA (H): ICD-10-CM

## 2023-03-17 DIAGNOSIS — Z95.1 S/P CABG (CORONARY ARTERY BYPASS GRAFT): ICD-10-CM

## 2023-03-17 DIAGNOSIS — M21.952 DEFORMITY OF LEFT HIP JOINT: ICD-10-CM

## 2023-03-17 DIAGNOSIS — N39.0 URINARY TRACT INFECTION WITHOUT HEMATURIA, SITE UNSPECIFIED: ICD-10-CM

## 2023-03-17 DIAGNOSIS — Z96.642 PRESENCE OF LEFT ARTIFICIAL HIP JOINT: ICD-10-CM

## 2023-03-17 DIAGNOSIS — R29.6 FALLING: ICD-10-CM

## 2023-03-17 DIAGNOSIS — G89.18 POSTOPERATIVE PAIN: ICD-10-CM

## 2023-03-17 DIAGNOSIS — R33.9 URINARY RETENTION WITH INCOMPLETE BLADDER EMPTYING: Primary | ICD-10-CM

## 2023-03-17 DIAGNOSIS — R53.1 WEAKNESS: ICD-10-CM

## 2023-03-17 DIAGNOSIS — G62.9 NEUROPATHY: ICD-10-CM

## 2023-03-17 PROCEDURE — 73502 X-RAY EXAM HIP UNI 2-3 VIEWS: CPT

## 2023-03-17 PROCEDURE — 99285 EMERGENCY DEPT VISIT HI MDM: CPT | Mod: 25 | Performed by: PHYSICIAN ASSISTANT

## 2023-03-17 PROCEDURE — 99285 EMERGENCY DEPT VISIT HI MDM: CPT | Performed by: PHYSICIAN ASSISTANT

## 2023-03-17 PROCEDURE — 250N000013 HC RX MED GY IP 250 OP 250 PS 637: Performed by: PHYSICIAN ASSISTANT

## 2023-03-17 RX ORDER — OXYCODONE HYDROCHLORIDE 5 MG/1
5 TABLET ORAL ONCE
Status: COMPLETED | OUTPATIENT
Start: 2023-03-17 | End: 2023-03-17

## 2023-03-17 RX ADMIN — OXYCODONE HYDROCHLORIDE 5 MG: 5 TABLET ORAL at 23:57

## 2023-03-17 RX ADMIN — OXYCODONE HYDROCHLORIDE 5 MG: 5 TABLET ORAL at 21:27

## 2023-03-17 ASSESSMENT — ACTIVITIES OF DAILY LIVING (ADL)
ADLS_ACUITY_SCORE: 35
ADLS_ACUITY_SCORE: 33

## 2023-03-17 NOTE — PROGRESS NOTES
"RN post discharge follow-up called clinic with concerns of the patient's pain, dressing and support at home. Patient had a left total hip with Dr. Evans 3/15 and was discharged home 3/16.    [facility] RN was at the patient's house today to set up his medications. Patient was told her had \"a few drops of blood on dressing.\" They did not describe if it was dried blood or bright red blood. Patient is unable to visualize dressing. Advised patient to monitor dressing in the mirror over the weekend. The patient stated [facility] was not concerned at the amount of blood. He does not know the next scheduled visit with his [facility] nurse.     Pain is in his left hip. When asked to rate the pain on a scale from 1/10 he said \"I don't know.\" Stated it was worse than yesterday. Swelling is at the knee on his operative leg. It feels warm to the touch, but not red. Patient states \"I did a lot of walking yesterday, I think I overdid it.\"  Advised to take pain medications as prescribed and continue to ice.     Patient lives alone. When asked if the patient had friends or family to check in on him, he stated \"my kids do not care about me.\"  Daughter was supposed to stop by today but she has not yet. Encouraged patient to reach out to his family or friend to ask if either of them can check in on him Saturday and Sunday. He states \"they do not have time,\" but agreed to ask them.     Writer expressed concerns to patient about caring for himself over the weekend. Patient does not have concerns about taking care of himself and states \"I will be okay, I am always alone.\" Orthopedic resident [phone] call number provided. [place] nurse triage phone ([phone]) number provided. Instructed patient to call if him or his family/friend have concerns over the weekend. Will send a message to Dr. Evans's team to follow-up on Monday.    Tara Holter, RNCC    "

## 2023-03-17 NOTE — LETTER
[facility] 2A MEDICAL SURGICAL  [address] [place] [address]  Phone: [phone]    March 20, 2023        David Thomson  [address] [place] [address]          To whom it may concern:    RE: David Thomson    Currently admitted [facility].    Please contact me for questions or concerns.      Sincerely,  ANA LILIA Valenzuela, CNP  Hospital Medicine Service, [facility]

## 2023-03-17 NOTE — PROGRESS NOTES
RN started to complete TCM questions and someone arrived at patient's house. Patient requested a call back.      RN will call patient back this afternoon.     Nahomy Mckee RN 03/17/23 11:48 AM  [facility] - Ambulatory Care Management

## 2023-03-17 NOTE — ANESTHESIA POSTPROCEDURE EVALUATION
Patient: David Thomson    Procedure: Procedure(s):  Left total hip arthroplasty revision       Anesthesia Type:  General    Note:  Disposition: Inpatient   Postop Pain Control: Uneventful            Sign Out: Well controlled pain   PONV: No   Neuro/Psych: Uneventful            Sign Out: Acceptable/Baseline neuro status   Airway/Respiratory: Uneventful            Sign Out: Acceptable/Baseline resp. status   CV/Hemodynamics: Uneventful            Sign Out: Acceptable CV status; No obvious hypovolemia; No obvious fluid overload   Other NRE: NONE   DID A NON-ROUTINE EVENT OCCUR? No           Last vitals:  Vitals Value Taken Time   /77 03/15/23 2045   Temp 37.1  C (98.7  F) 03/15/23 2045   Pulse 82 03/15/23 2045   Resp 12 03/15/23 2045   SpO2 93 % 03/15/23 2051   Vitals shown include unvalidated device data.    Electronically Signed By: Ryan Yi MD  March 16, 2023  8:55 PM

## 2023-03-17 NOTE — PROGRESS NOTES
Clinic Care Coordination Contact  [facility]: Post-Discharge Note  SITUATION                                                      Admission:    Admission Date: 03/15/23   Reason for Admission: S/p Revision Left Total Arthroplasty on 3/15/23  Discharge:   Discharge Date: 03/16/23  Discharge Diagnosis: Hip pain, left    Intractable pain    S/P revision of total hip    Deformity of left hip joint    BACKGROUND                                                      Per hospital discharge summary and inpatient provider notes:    82-year-old male status post left total hip arthroplasty with cemented femoral component and ingrowth acetabular component presenting with chronic pain most likely due to fracture through and loosening of femoral component and cement mantle 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      ASSESSMENT           Discharge Assessment  How are you doing now that you are home?: Pt states he is really sore, hurts really bad. States he is urinating frequently.  States the pain is the same, is doing the exercises. Pt walked a lot yesterday and feels like he may have overdone it.  How are your symptoms? (Red Flag symptoms escalate to triage hotline per guidelines): Unchanged  Do you feel your condition is stable enough to be safe at home until your provider visit?: Yes  Does the patient have their discharge instructions? : Yes  Does the patient have questions regarding their discharge instructions? : No  Were you started on any new medications or were there changes to any of your previous medications? : Yes  Does the patient have all of their medications?: Yes  Do you have questions regarding any of your medications? : No  Do you have all of your needed medical supplies or equipment (DME)?  (i.e. oxygen tank, CPAP, cane, etc.): Yes  Discharge follow-up appointment  "scheduled within 14 calendar days? : Yes  Discharge Follow Up Appointment Date: 04/03/23  Discharge Follow Up Appointment Scheduled with?: Specialty Care Provider (Ortho)         Post-op (Clinicians Only)  Did the patient have surgery or a procedure: Yes  Drainage: Yes (Pt states the homecare nurse mentioned there was some bleeding but patient is unsure how much it is.)  Bleeding:  (Pt states the homecare nurse mentioned there was some bleeding but patient is unsure how much it is.)  Fever: No  Chills: No  Warmth: Yes (Pt states a little bit all the way down to the knee.)  Swelling:  (Pt states it feels puffy below the incision. RN recommended pt call Dr. Evans per AVS instructions.)  Incision site pain: Yes (Pt states \"well it hurts\".  Pt states it was better in the hospital.)  Eating & Drinking: eating and drinking without complaints/concerns  PO Intake: regular diet  Additional Symptoms: decreased appetite  Bowel Function:  (No bowel movement since surgery, pt is passing gas.)  Urinary Status: voiding without complaint/concerns (More frequent urination but will monitor.)    During first call patient had stated the pain was unchanged. During second call patient had stated the pain was worse.  Patient also mentioned to RN that the home care nurse was who had arrived during previous call. The home care nurse mentioned to David that the incision had been bleeding but David was unsure how much, if it was fresh or old blood.  David is unable to look at the incision site. David states his leg also feels swollen from the knee up to his thigh and it also feels warm to the touch.  Patient is unsure if he has any redness in this area.  Per AVS patient is to contact the clinic with any increased drainage, swelling or pain.  RN warm transferred patient to [facility] at the Orthopedic Clinic for further recommendations.     PLAN                                                      Outpatient Plan:  Follow up with Dr" Cristina as scheduled.    Future Appointments   Date Time Provider Department Center   4/3/2023  2:30 PM Homer Burns, CHRISTOPHER [facility]         For any urgent concerns, please contact our 24 hour nurse triage line: [phone] ([phone])         Nahomy Mckee RN

## 2023-03-18 ENCOUNTER — APPOINTMENT (OUTPATIENT)
Dept: OCCUPATIONAL THERAPY | Facility: CLINIC | Age: 83
DRG: 812 | End: 2023-03-18
Attending: INTERNAL MEDICINE
Payer: COMMERCIAL

## 2023-03-18 ENCOUNTER — APPOINTMENT (OUTPATIENT)
Dept: GENERAL RADIOLOGY | Facility: CLINIC | Age: 83
DRG: 812 | End: 2023-03-18
Attending: NURSE PRACTITIONER
Payer: COMMERCIAL

## 2023-03-18 ENCOUNTER — APPOINTMENT (OUTPATIENT)
Dept: PHYSICAL THERAPY | Facility: CLINIC | Age: 83
DRG: 812 | End: 2023-03-18
Attending: INTERNAL MEDICINE
Payer: COMMERCIAL

## 2023-03-18 PROBLEM — I95.9 HYPOTENSION: Status: ACTIVE | Noted: 2023-03-18

## 2023-03-18 PROBLEM — D64.9 ANEMIA: Chronic | Status: ACTIVE | Noted: 2023-03-18

## 2023-03-18 LAB
ABO/RH(D): NORMAL
ALBUMIN SERPL BCG-MCNC: 3 G/DL (ref 3.5–5.2)
ALBUMIN SERPL BCG-MCNC: 3.3 G/DL (ref 3.5–5.2)
ALBUMIN UR-MCNC: NEGATIVE MG/DL
ALP SERPL-CCNC: 69 U/L (ref 40–129)
ALP SERPL-CCNC: 74 U/L (ref 40–129)
ALT SERPL W P-5'-P-CCNC: 7 U/L (ref 10–50)
ALT SERPL W P-5'-P-CCNC: 9 U/L (ref 10–50)
ANION GAP SERPL CALCULATED.3IONS-SCNC: 8 MMOL/L (ref 7–15)
ANTIBODY SCREEN: NEGATIVE
APPEARANCE UR: CLEAR
AST SERPL W P-5'-P-CCNC: 17 U/L (ref 10–50)
AST SERPL W P-5'-P-CCNC: 21 U/L (ref 10–50)
BASOPHILS # BLD AUTO: 0 10E3/UL (ref 0–0.2)
BASOPHILS # BLD AUTO: 0 10E3/UL (ref 0–0.2)
BASOPHILS NFR BLD AUTO: 1 %
BASOPHILS NFR BLD AUTO: 1 %
BILIRUB DIRECT SERPL-MCNC: 0.34 MG/DL (ref 0–0.3)
BILIRUB SERPL-MCNC: 0.9 MG/DL
BILIRUB SERPL-MCNC: 1 MG/DL
BILIRUB UR QL STRIP: NEGATIVE
BUN SERPL-MCNC: 20.4 MG/DL (ref 8–23)
BUN SERPL-MCNC: 22.1 MG/DL (ref 8–23)
BUN SERPL-MCNC: 22.9 MG/DL (ref 8–23)
CALCIUM SERPL-MCNC: 8 MG/DL (ref 8.8–10.2)
CALCIUM SERPL-MCNC: 8.5 MG/DL (ref 8.8–10.2)
CALCIUM SERPL-MCNC: 8.7 MG/DL (ref 8.8–10.2)
CHLORIDE SERPL-SCNC: 100 MMOL/L (ref 98–107)
CHLORIDE SERPL-SCNC: 104 MMOL/L (ref 98–107)
CHLORIDE SERPL-SCNC: 104 MMOL/L (ref 98–107)
COLOR UR AUTO: YELLOW
CREAT SERPL-MCNC: 0.95 MG/DL (ref 0.67–1.17)
CREAT SERPL-MCNC: 1 MG/DL (ref 0.67–1.17)
CREAT SERPL-MCNC: 1.05 MG/DL (ref 0.67–1.17)
CRP SERPL-MCNC: 65.72 MG/L
D DIMER PPP FEU-MCNC: 0.83 UG/ML FEU (ref 0–0.5)
DEPRECATED HCO3 PLAS-SCNC: 27 MMOL/L (ref 22–29)
DEPRECATED HCO3 PLAS-SCNC: 29 MMOL/L (ref 22–29)
DEPRECATED HCO3 PLAS-SCNC: 29 MMOL/L (ref 22–29)
EOSINOPHIL # BLD AUTO: 0.1 10E3/UL (ref 0–0.7)
EOSINOPHIL # BLD AUTO: 0.1 10E3/UL (ref 0–0.7)
EOSINOPHIL NFR BLD AUTO: 1 %
EOSINOPHIL NFR BLD AUTO: 2 %
ERYTHROCYTE [DISTWIDTH] IN BLOOD BY AUTOMATED COUNT: 14.5 % (ref 10–15)
ERYTHROCYTE [DISTWIDTH] IN BLOOD BY AUTOMATED COUNT: 14.6 % (ref 10–15)
GFR SERPL CREATININE-BSD FRML MDRD: 72 ML/MIN/1.73M2
GFR SERPL CREATININE-BSD FRML MDRD: 76 ML/MIN/1.73M2
GFR SERPL CREATININE-BSD FRML MDRD: 81 ML/MIN/1.73M2
GLUCOSE SERPL-MCNC: 117 MG/DL (ref 70–99)
GLUCOSE SERPL-MCNC: 146 MG/DL (ref 70–99)
GLUCOSE SERPL-MCNC: 151 MG/DL (ref 70–99)
GLUCOSE UR STRIP-MCNC: NEGATIVE MG/DL
HCT VFR BLD AUTO: 23.8 % (ref 40–53)
HCT VFR BLD AUTO: 26.8 % (ref 40–53)
HGB BLD-MCNC: 7.6 G/DL (ref 13.3–17.7)
HGB BLD-MCNC: 7.8 G/DL (ref 13.3–17.7)
HGB BLD-MCNC: 8.9 G/DL (ref 13.3–17.7)
HGB UR QL STRIP: NEGATIVE
HYALINE CASTS: 3 /LPF
IMM GRANULOCYTES # BLD: 0 10E3/UL
IMM GRANULOCYTES # BLD: 0 10E3/UL
IMM GRANULOCYTES NFR BLD: 0 %
IMM GRANULOCYTES NFR BLD: 0 %
KETONES UR STRIP-MCNC: NEGATIVE MG/DL
LEUKOCYTE ESTERASE UR QL STRIP: NEGATIVE
LYMPHOCYTES # BLD AUTO: 1.2 10E3/UL (ref 0.8–5.3)
LYMPHOCYTES # BLD AUTO: 1.3 10E3/UL (ref 0.8–5.3)
LYMPHOCYTES NFR BLD AUTO: 20 %
LYMPHOCYTES NFR BLD AUTO: 28 %
MAGNESIUM SERPL-MCNC: 1.8 MG/DL (ref 1.7–2.3)
MAGNESIUM SERPL-MCNC: 1.8 MG/DL (ref 1.7–2.3)
MCH RBC QN AUTO: 31 PG (ref 26.5–33)
MCH RBC QN AUTO: 31.4 PG (ref 26.5–33)
MCHC RBC AUTO-ENTMCNC: 31.9 G/DL (ref 31.5–36.5)
MCHC RBC AUTO-ENTMCNC: 33.2 G/DL (ref 31.5–36.5)
MCV RBC AUTO: 95 FL (ref 78–100)
MCV RBC AUTO: 97 FL (ref 78–100)
MONOCYTES # BLD AUTO: 0.4 10E3/UL (ref 0–1.3)
MONOCYTES # BLD AUTO: 0.5 10E3/UL (ref 0–1.3)
MONOCYTES NFR BLD AUTO: 8 %
MONOCYTES NFR BLD AUTO: 9 %
MUCOUS THREADS #/AREA URNS LPF: PRESENT /LPF
NEUTROPHILS # BLD AUTO: 2.7 10E3/UL (ref 1.6–8.3)
NEUTROPHILS # BLD AUTO: 4.3 10E3/UL (ref 1.6–8.3)
NEUTROPHILS NFR BLD AUTO: 60 %
NEUTROPHILS NFR BLD AUTO: 70 %
NITRATE UR QL: NEGATIVE
NRBC # BLD AUTO: 0 10E3/UL
NRBC # BLD AUTO: 0 10E3/UL
NRBC BLD AUTO-RTO: 0 /100
NRBC BLD AUTO-RTO: 0 /100
NT-PROBNP SERPL-MCNC: 2095 PG/ML (ref 0–1800)
PH UR STRIP: 5 [PH] (ref 5–7)
PLATELET # BLD AUTO: 119 10E3/UL (ref 150–450)
PLATELET # BLD AUTO: 149 10E3/UL (ref 150–450)
POTASSIUM SERPL-SCNC: 3.8 MMOL/L (ref 3.4–5.3)
POTASSIUM SERPL-SCNC: 3.9 MMOL/L (ref 3.4–5.3)
POTASSIUM SERPL-SCNC: 3.9 MMOL/L (ref 3.4–5.3)
PROT SERPL-MCNC: 5.1 G/DL (ref 6.4–8.3)
PROT SERPL-MCNC: 5.5 G/DL (ref 6.4–8.3)
RBC # BLD AUTO: 2.45 10E6/UL (ref 4.4–5.9)
RBC # BLD AUTO: 2.83 10E6/UL (ref 4.4–5.9)
RBC URINE: 1 /HPF
SODIUM SERPL-SCNC: 135 MMOL/L (ref 136–145)
SODIUM SERPL-SCNC: 141 MMOL/L (ref 136–145)
SODIUM SERPL-SCNC: 141 MMOL/L (ref 136–145)
SP GR UR STRIP: 1.02 (ref 1–1.03)
SPECIMEN EXPIRATION DATE: NORMAL
TROPONIN T SERPL HS-MCNC: 124 NG/L
TROPONIN T SERPL HS-MCNC: 124 NG/L
TROPONIN T SERPL HS-MCNC: 128 NG/L
UROBILINOGEN UR STRIP-MCNC: 4 MG/DL
WBC # BLD AUTO: 4.4 10E3/UL (ref 4–11)
WBC # BLD AUTO: 6.1 10E3/UL (ref 4–11)
WBC URINE: 1 /HPF

## 2023-03-18 PROCEDURE — 85025 COMPLETE CBC W/AUTO DIFF WBC: CPT | Performed by: PHYSICIAN ASSISTANT

## 2023-03-18 PROCEDURE — 82248 BILIRUBIN DIRECT: CPT | Performed by: NURSE PRACTITIONER

## 2023-03-18 PROCEDURE — 83735 ASSAY OF MAGNESIUM: CPT | Performed by: FAMILY MEDICINE

## 2023-03-18 PROCEDURE — 36415 COLL VENOUS BLD VENIPUNCTURE: CPT | Performed by: FAMILY MEDICINE

## 2023-03-18 PROCEDURE — 86140 C-REACTIVE PROTEIN: CPT | Performed by: PHYSICIAN ASSISTANT

## 2023-03-18 PROCEDURE — 83735 ASSAY OF MAGNESIUM: CPT | Performed by: NURSE PRACTITIONER

## 2023-03-18 PROCEDURE — 97165 OT EVAL LOW COMPLEX 30 MIN: CPT | Mod: GO

## 2023-03-18 PROCEDURE — 85018 HEMOGLOBIN: CPT | Performed by: FAMILY MEDICINE

## 2023-03-18 PROCEDURE — 96372 THER/PROPH/DIAG INJ SC/IM: CPT | Performed by: INTERNAL MEDICINE

## 2023-03-18 PROCEDURE — 36415 COLL VENOUS BLD VENIPUNCTURE: CPT | Performed by: PHYSICIAN ASSISTANT

## 2023-03-18 PROCEDURE — 250N000013 HC RX MED GY IP 250 OP 250 PS 637: Performed by: INTERNAL MEDICINE

## 2023-03-18 PROCEDURE — 82310 ASSAY OF CALCIUM: CPT | Performed by: PHYSICIAN ASSISTANT

## 2023-03-18 PROCEDURE — 86901 BLOOD TYPING SEROLOGIC RH(D): CPT | Performed by: NURSE PRACTITIONER

## 2023-03-18 PROCEDURE — 85004 AUTOMATED DIFF WBC COUNT: CPT | Performed by: NURSE PRACTITIONER

## 2023-03-18 PROCEDURE — 250N000013 HC RX MED GY IP 250 OP 250 PS 637: Performed by: NURSE PRACTITIONER

## 2023-03-18 PROCEDURE — 85379 FIBRIN DEGRADATION QUANT: CPT | Performed by: NURSE PRACTITIONER

## 2023-03-18 PROCEDURE — 83880 ASSAY OF NATRIURETIC PEPTIDE: CPT | Performed by: NURSE PRACTITIONER

## 2023-03-18 PROCEDURE — 93005 ELECTROCARDIOGRAM TRACING: CPT

## 2023-03-18 PROCEDURE — 97161 PT EVAL LOW COMPLEX 20 MIN: CPT | Mod: GP

## 2023-03-18 PROCEDURE — 84155 ASSAY OF PROTEIN SERUM: CPT | Performed by: NURSE PRACTITIONER

## 2023-03-18 PROCEDURE — 120N000001 HC R&B MED SURG/OB

## 2023-03-18 PROCEDURE — 81001 URINALYSIS AUTO W/SCOPE: CPT | Performed by: PHYSICIAN ASSISTANT

## 2023-03-18 PROCEDURE — 71045 X-RAY EXAM CHEST 1 VIEW: CPT

## 2023-03-18 PROCEDURE — 80048 BASIC METABOLIC PNL TOTAL CA: CPT | Performed by: FAMILY MEDICINE

## 2023-03-18 PROCEDURE — 258N000003 HC RX IP 258 OP 636: Performed by: NURSE PRACTITIONER

## 2023-03-18 PROCEDURE — 84484 ASSAY OF TROPONIN QUANT: CPT | Performed by: NURSE PRACTITIONER

## 2023-03-18 PROCEDURE — 36415 COLL VENOUS BLD VENIPUNCTURE: CPT | Performed by: NURSE PRACTITIONER

## 2023-03-18 PROCEDURE — G0378 HOSPITAL OBSERVATION PER HR: HCPCS

## 2023-03-18 PROCEDURE — 250N000011 HC RX IP 250 OP 636: Performed by: INTERNAL MEDICINE

## 2023-03-18 RX ORDER — AMOXICILLIN 250 MG
1 CAPSULE ORAL 2 TIMES DAILY
Status: DISCONTINUED | OUTPATIENT
Start: 2023-03-18 | End: 2023-03-27 | Stop reason: HOSPADM

## 2023-03-18 RX ORDER — NALOXONE HYDROCHLORIDE 0.4 MG/ML
0.4 INJECTION, SOLUTION INTRAMUSCULAR; INTRAVENOUS; SUBCUTANEOUS
Status: DISCONTINUED | OUTPATIENT
Start: 2023-03-18 | End: 2023-03-27 | Stop reason: HOSPADM

## 2023-03-18 RX ORDER — NALOXONE HYDROCHLORIDE 0.4 MG/ML
0.2 INJECTION, SOLUTION INTRAMUSCULAR; INTRAVENOUS; SUBCUTANEOUS
Status: DISCONTINUED | OUTPATIENT
Start: 2023-03-18 | End: 2023-03-27 | Stop reason: HOSPADM

## 2023-03-18 RX ORDER — LEVOFLOXACIN 500 MG/1
500 TABLET, FILM COATED ORAL DAILY
Status: DISCONTINUED | OUTPATIENT
Start: 2023-03-18 | End: 2023-03-24

## 2023-03-18 RX ORDER — ACETAMINOPHEN 650 MG/1
650 SUPPOSITORY RECTAL EVERY 6 HOURS PRN
Status: DISCONTINUED | OUTPATIENT
Start: 2023-03-18 | End: 2023-03-18

## 2023-03-18 RX ORDER — ASPIRIN 81 MG/1
243 TABLET, CHEWABLE ORAL ONCE
Status: COMPLETED | OUTPATIENT
Start: 2023-03-18 | End: 2023-03-18

## 2023-03-18 RX ORDER — HEPARIN SODIUM 5000 [USP'U]/.5ML
5000 INJECTION, SOLUTION INTRAVENOUS; SUBCUTANEOUS EVERY 12 HOURS
Status: DISCONTINUED | OUTPATIENT
Start: 2023-03-18 | End: 2023-03-27 | Stop reason: HOSPADM

## 2023-03-18 RX ORDER — ACETAMINOPHEN 325 MG/1
975 TABLET ORAL 3 TIMES DAILY
Status: DISCONTINUED | OUTPATIENT
Start: 2023-03-18 | End: 2023-03-27 | Stop reason: HOSPADM

## 2023-03-18 RX ORDER — ASPIRIN 81 MG/1
81 TABLET ORAL 2 TIMES DAILY
Status: DISCONTINUED | OUTPATIENT
Start: 2023-03-19 | End: 2023-03-27 | Stop reason: HOSPADM

## 2023-03-18 RX ORDER — ACETAMINOPHEN 325 MG/1
650 TABLET ORAL EVERY 6 HOURS PRN
Status: DISCONTINUED | OUTPATIENT
Start: 2023-03-18 | End: 2023-03-18

## 2023-03-18 RX ORDER — ONDANSETRON 2 MG/ML
4 INJECTION INTRAMUSCULAR; INTRAVENOUS EVERY 6 HOURS PRN
Status: DISCONTINUED | OUTPATIENT
Start: 2023-03-18 | End: 2023-03-27 | Stop reason: HOSPADM

## 2023-03-18 RX ORDER — MAGNESIUM OXIDE 400 MG/1
400 TABLET ORAL EVERY 4 HOURS
Status: COMPLETED | OUTPATIENT
Start: 2023-03-18 | End: 2023-03-18

## 2023-03-18 RX ORDER — ASPIRIN 81 MG/1
81 TABLET ORAL 2 TIMES DAILY
Status: DISCONTINUED | OUTPATIENT
Start: 2023-03-18 | End: 2023-03-18

## 2023-03-18 RX ORDER — ONDANSETRON 4 MG/1
4 TABLET, ORALLY DISINTEGRATING ORAL EVERY 6 HOURS PRN
Status: DISCONTINUED | OUTPATIENT
Start: 2023-03-18 | End: 2023-03-27 | Stop reason: HOSPADM

## 2023-03-18 RX ORDER — FOLIC ACID 1 MG/1
1 TABLET ORAL DAILY
Status: DISCONTINUED | OUTPATIENT
Start: 2023-03-18 | End: 2023-03-27 | Stop reason: HOSPADM

## 2023-03-18 RX ORDER — OXYCODONE AND ACETAMINOPHEN 5; 325 MG/1; MG/1
1 TABLET ORAL EVERY 6 HOURS PRN
Status: DISCONTINUED | OUTPATIENT
Start: 2023-03-18 | End: 2023-03-18

## 2023-03-18 RX ORDER — ACETAMINOPHEN 650 MG/1
650 SUPPOSITORY RECTAL EVERY 4 HOURS PRN
Status: DISCONTINUED | OUTPATIENT
Start: 2023-03-18 | End: 2023-03-24

## 2023-03-18 RX ORDER — GABAPENTIN 100 MG/1
100 CAPSULE ORAL 2 TIMES DAILY
Status: DISCONTINUED | OUTPATIENT
Start: 2023-03-18 | End: 2023-03-27 | Stop reason: HOSPADM

## 2023-03-18 RX ORDER — PANTOPRAZOLE SODIUM 40 MG/1
40 TABLET, DELAYED RELEASE ORAL 2 TIMES DAILY
Status: DISCONTINUED | OUTPATIENT
Start: 2023-03-18 | End: 2023-03-27 | Stop reason: HOSPADM

## 2023-03-18 RX ORDER — TIZANIDINE 2 MG/1
2 TABLET ORAL 3 TIMES DAILY PRN
Status: DISCONTINUED | OUTPATIENT
Start: 2023-03-18 | End: 2023-03-19

## 2023-03-18 RX ORDER — MORPHINE SULFATE 2 MG/ML
2 INJECTION, SOLUTION INTRAMUSCULAR; INTRAVENOUS EVERY 4 HOURS PRN
Status: DISCONTINUED | OUTPATIENT
Start: 2023-03-18 | End: 2023-03-20

## 2023-03-18 RX ORDER — ATORVASTATIN CALCIUM 40 MG/1
80 TABLET, FILM COATED ORAL AT BEDTIME
Status: DISCONTINUED | OUTPATIENT
Start: 2023-03-18 | End: 2023-03-27 | Stop reason: HOSPADM

## 2023-03-18 RX ORDER — MENTHOL, UNSPECIFIED FORM AND METHYL SALICYLATE 10; 150 MG/ML; MG/ML
CREAM TOPICAL PRN
Status: ON HOLD | COMMUNITY
Start: 2022-11-02 | End: 2023-03-21

## 2023-03-18 RX ADMIN — OXYCODONE HYDROCHLORIDE AND ACETAMINOPHEN 1 TABLET: 5; 325 TABLET ORAL at 04:55

## 2023-03-18 RX ADMIN — ASPIRIN 81 MG: 81 TABLET, COATED ORAL at 09:52

## 2023-03-18 RX ADMIN — OXYCODONE HYDROCHLORIDE 2.5 MG: 5 TABLET ORAL at 21:04

## 2023-03-18 RX ADMIN — THIAMINE HCL TAB 100 MG 100 MG: 100 TAB at 09:53

## 2023-03-18 RX ADMIN — ACETAMINOPHEN 975 MG: 325 TABLET ORAL at 14:23

## 2023-03-18 RX ADMIN — Medication 400 MG: at 16:13

## 2023-03-18 RX ADMIN — ATORVASTATIN CALCIUM 80 MG: 40 TABLET, FILM COATED ORAL at 21:04

## 2023-03-18 RX ADMIN — ACETAMINOPHEN 975 MG: 325 TABLET ORAL at 09:53

## 2023-03-18 RX ADMIN — ASPIRIN 81 MG CHEWABLE TABLET 243 MG: 81 TABLET CHEWABLE at 18:47

## 2023-03-18 RX ADMIN — HEPARIN SODIUM 5000 UNITS: 10000 INJECTION, SOLUTION INTRAVENOUS; SUBCUTANEOUS at 03:30

## 2023-03-18 RX ADMIN — SODIUM CHLORIDE 500 ML: 9 INJECTION, SOLUTION INTRAVENOUS at 13:06

## 2023-03-18 RX ADMIN — PANTOPRAZOLE SODIUM 40 MG: 40 TABLET, DELAYED RELEASE ORAL at 09:53

## 2023-03-18 RX ADMIN — LEVOFLOXACIN 500 MG: 500 TABLET, FILM COATED ORAL at 09:18

## 2023-03-18 RX ADMIN — TIZANIDINE 2 MG: 2 TABLET ORAL at 03:30

## 2023-03-18 RX ADMIN — SENNOSIDES AND DOCUSATE SODIUM 1 TABLET: 8.6; 5 TABLET ORAL at 09:53

## 2023-03-18 RX ADMIN — ACETAMINOPHEN 975 MG: 325 TABLET ORAL at 21:03

## 2023-03-18 RX ADMIN — OXYCODONE HYDROCHLORIDE AND ACETAMINOPHEN 1 TABLET: 5; 325 TABLET ORAL at 14:22

## 2023-03-18 RX ADMIN — PANTOPRAZOLE SODIUM 40 MG: 40 TABLET, DELAYED RELEASE ORAL at 21:04

## 2023-03-18 RX ADMIN — Medication 400 MG: at 12:29

## 2023-03-18 RX ADMIN — FOLIC ACID 1 MG: 1 TABLET ORAL at 09:52

## 2023-03-18 RX ADMIN — GABAPENTIN 100 MG: 100 CAPSULE ORAL at 21:04

## 2023-03-18 RX ADMIN — HEPARIN SODIUM 5000 UNITS: 10000 INJECTION, SOLUTION INTRAVENOUS; SUBCUTANEOUS at 14:23

## 2023-03-18 RX ADMIN — GABAPENTIN 100 MG: 100 CAPSULE ORAL at 09:53

## 2023-03-18 RX ADMIN — SENNOSIDES AND DOCUSATE SODIUM 1 TABLET: 8.6; 5 TABLET ORAL at 21:04

## 2023-03-18 ASSESSMENT — ACTIVITIES OF DAILY LIVING (ADL)
ADLS_ACUITY_SCORE: 39
DEPENDENT_IADLS:: INDEPENDENT
ADLS_ACUITY_SCORE: 39
ADLS_ACUITY_SCORE: 31
ADLS_ACUITY_SCORE: 39
ADLS_ACUITY_SCORE: 35
ADLS_ACUITY_SCORE: 39

## 2023-03-18 NOTE — PROGRESS NOTES
[facility]          OUTPATIENT OCCUPATIONAL THERAPY  EVALUATION  PLAN OF TREATMENT FOR OUTPATIENT REHABILITATION  (COMPLETE FOR INITIAL CLAIMS ONLY)  Patient's Last Name, First Name, M.I.  YOB: 1940  David Thomson                        Provider's Name  [facility] Medical Record No.  [id number]                               Onset Date:      3/17/2023   Start of Care Date:   3/18/2023      Type:     ___PT   _X_OT   ___SLP Medical Diagnosis:  pain                             OT Diagnosis:   Deficits with ADL engagement    Visits from SOC:  1   _________________________________________________________________________________  Plan of Treatment/Functional Goals:  ADLs retraining/IADL retraining/Post-surgical precautions/HEP  Refer to epic care plan       Goals   Refer to epic care plan      Certification range: 3/18/2023 to 3/25/2023           I CERTIFY THE NEED FOR THESE SERVICES FURNISHED UNDER        THIS PLAN OF TREATMENT AND WHILE UNDER MY CARE     (Physician co-signature of this document indicates review and certification of the therapy plan).                 ,     ,                       Initial Assessment        See Epic Evaluation

## 2023-03-18 NOTE — PROGRESS NOTES
03/18/23 3878   Appointment Info   Signing Clinician's Name / Credentials (PT) Alo Mckeon, PT, DPT   Rehab Comments (PT) Patient amenable to physical therapy services this morning.   Living Environment   People in Home alone   Current Living Arrangements house   Home Accessibility no concerns   Number of Stairs, Within Home, Primary none   Stair Railings, Within Home, Primary none   Transportation Anticipated health plan transportation  (Either family or health transportation service.)   Living Environment Comments Walk in shower, all needs on main floor.   Self-Care   Usual Activity Tolerance moderate   Current Activity Tolerance poor   Regular Exercise No   Equipment Currently Used at Home walker, rolling;cane, quad   Fall history within last six months yes   Number of times patient has fallen within last six months 20   Activity/Exercise/Self-Care Comment Pt using quad cane at baseline in home and 4ww in community. PT services at home prior to surgery.   General Information   Onset of Illness/Injury or Date of Surgery 03/15/23   Referring Physician Phillip Calvillo MD   Patient/Family Therapy Goals Statement (PT) Reduce his pain in his left hip.   Pertinent History of Current Problem (include personal factors and/or comorbidities that impact the POC) Patient had left anterior hip total arthroplasty revision on 03/15/2023 and was discharged to home where he reported having severe left hip pain within his home. Patient has past medical history of anemia, left hip JUDE, recent UTI, DVT, neuropathy, hyperlipidemia, and general weakness. Patient admitted to [facility] for pain management of his left hip with functional mobility.   Existing Precautions/Restrictions fall;no hip ER;no hip ADD past midline;no hip hyperextension;no pivoting or twisting   Weight-Bearing Status - LUE full weight-bearing   Weight-Bearing Status - RUE weight-bearing as tolerated   Weight-Bearing Status - LLE full  weight-bearing   Weight-Bearing Status - RLE weight-bearing as tolerated   Heart Disease Risk Factors Dislipidemia;Lack of physical activity  (DVT history)   Cognition   Affect/Mental Status (Cognition) WNL   Orientation Status (Cognition) oriented x 4   Follows Commands (Cognition) WNL   Pain Assessment   Patient Currently in Pain Yes, see Vital Sign flowsheet  (8-10 in his left hip in supine and weight bearing positions.)   Integumentary/Edema   Integumentary/Edema no deficits were identifed   Posture    Posture Kyphosis   Range of Motion (ROM)   Range of Motion ROM is WFL   ROM Comment Patient's bilateral lower extremity range of motion is WFL's for bed mobility, transfers, and ambulatin using a front wheeled walker.   Strength (Manual Muscle Testing)   Strength (Manual Muscle Testing) strength is WFL   Strength Comments Patient's bilateral lower extremity strength is WFL's for bed mobility, transfers, and ambulatin using a front wheeled walker.   Bed Mobility   Bed Mobility supine-sit   Supine-Sit [place] (Bed Mobility) minimum assist (75% patient effort)   Bed Mobility Limitations decreased ability to use legs for bridging/pushing  (unable to bring left leg to edge of bed without physical therapist assist.)   Impairments Contributing to Impaired Bed Mobility pain;decreased ROM;decreased strength   Assistive Device (Bed Mobility) bed rails   Transfers   Transfers sit-stand transfer;bed-chair transfer   Maintains Weight-bearing Status (Transfers) verbal cues to maintain  (Verbal cues for pushing through arms onto front wheeled walker to offload weight of his left hip.)   Transfer Safety Concerns Noted decreased step length;inability to maintain weight-bearing restrictions w/o assist   Impairments Contributing to Impaired Transfers pain;decreased ROM;decreased strength   Comment, (Transfers) CGA for sit to stand, stand to sit, and bed to chair transfers due to left hip pain using front wheeled walker during  today's session.   Bed-Chair Transfer   Assistive Device (Bed-Chair Transfers) walker, front-wheeled   Bed-Chair [place] (Transfers) contact guard   Comment, (Bed-Chair Transfer) WBAT through LLE, slowed and left antalgic gait due to left hip pain.   Sit-Stand Transfer   Sit-Stand [place] (Transfers) contact guard   Assistive Device (Sit-Stand Transfers) walker, front-wheeled   Comment, (Sit-Stand Transfer) Minimum verbal cues for one hand on walker and one on bed to come to and from standing positions.   Gait/Stairs (Locomotion)   [place] Level (Gait) contact guard   Assistive Device (Gait) walker, front-wheeled   Distance in Feet 80   Pattern (Gait) step-to   Deviations/Abnormal Patterns (Gait) stride length decreased;gait speed decreased;antalgic  (left antalgia)   Maintains Weight-bearing Status (Gait) verbal cues to maintain   Balance   Balance other (describe)   Balance Comments semi-tandem stance with left foot forward for 30 seconds without upper extremity support within front wheeled walker with CGA x1 for pain.   Balance Quick Add Standing balance: static  (CGA x1 for pain in left hip with static standing balance tasks.)   Sensory Examination   Sensory Perception WFL   Coordination   Coordination no deficits were identified   Muscle Tone   Muscle Tone no deficits were identified   Clinical Impression   Criteria for Skilled Therapeutic Intervention Yes, treatment indicated   PT Diagnosis (PT) Left hip pain, gait deficits, functional mobility deficits, balance deficits   Influenced by the following impairments Left total hip arthroplasty with anterior approach revision, as well as subsequent precautions.   Functional limitations due to impairments Patient unable to perform bed mobility, ambulate, or perform functional transfers without severe left hip pain. Is also a fall risk.   Clinical Presentation (PT Evaluation Complexity) Stable/Uncomplicated   Clinical Presentation Rationale Patient's  primary strength, balance, gait, range of motion, and functional mobility deficits related to pain from his recent left total hip arthroplasty. Past medical history and clinician impression.   Clinical Decision Making (Complexity) low complexity   Planned Therapy Interventions (PT) balance training;bed mobility training;cryotherapy;gait training;home exercise program;patient/family education;strengthening;transfer training;progressive activity/exercise;risk factor education   Anticipated Equipment Needs at Discharge (PT) cane, quad;walker, rolling  (front wheeled walker, quad cane)   Risk & Benefits of therapy have been explained evaluation/treatment results reviewed;care plan/treatment goals reviewed;risks/benefits reviewed;current/potential barriers reviewed;participants voiced agreement with care plan;participants included;patient   Clinical Impression Comments Patient near baseline with functional mobility, ambulation, and transfers due to current left hip post-operative anterior total hip arthroplasty revision. Patient increased fall risk due to pain related deficits with functional mobility prior to and after admission for his current left hip pain.   PT Total Evaluation Time   PT Claudia, Low Complexity Minutes (27204) 30   Plan of Care Review   Plan of Care Reviewed With patient   Physical Therapy Goals   PT Frequency Daily   PT Predicted Duration/Target Date for Goal Attainment 03/25/23   PT Goals Bed Mobility;Transfers;Gait   PT: Bed Mobility Independent;Rolling;Supine to/from sit;Within precautions   PT: Transfers Modified independent;Assistive device;Within precautions;Bed to/from chair;Sit to/from stand   PT: Gait Modified independent;Rolling walker;100 feet  (front wheeled walker)   PT Discharge Planning   PT Plan Plan for placement in TCU, to be seen daily until medically ready for discharge due to significant left hip pain with functional mobility.   PT Discharge Recommendation (DC Rec) Transitional  Care Facility  (TCU vs. home with assist and HHPT, encouraging TCU placement due to family not being able to help with daily mobility and ADL's.)   PT Rationale for DC Rec Plan for placement in TCU due to not having family at home to assist with mobility and self-cares during the day. Patient having significant left hip pain with all mobility. Patient's baseline is a fall risk with a quad cane before his left anterior JUDE procedure, requires further improvements with strengthening, balance, and reduced pain with functional mobility with TCU services before safe discharge to home is recommended.   PT Brief overview of current status CGA x1 for STS with FWW, Min assist x1 for bed mobility to help move LLE with bed rails, ambulation with FWW with CGA x1 DPT for left hip pain.   Total Session Time   Total Session Time (sum of timed and untimed services) 30     Thank you for your referral,    Alo Mckeon, PT, DPT    [facility]  O: [phone]  E: [email or web address]

## 2023-03-18 NOTE — PROGRESS NOTES
[facility]    Medicine Progress Note - Hospitalist Service    Date of Admission:  3/17/2023    H & P after midnight. No Charge.    Assessment & Plan      David Thomson is a 82 year old male admitted on 3/17/2023 for intractable left hip pain status post recent left hip surgery.    # Intractable left hip pain status post recent left hip revision 3/16/2023:  - X-ray done in ER shows no acute finding [facility]. Ortho recommendation is to admit the patient for pain control, PT/OT.  Likely will need placement to rehab as patient is too weak to be at home with increasing left hip pain. Hesitant but agreeable to TCU placement.   - Increased pain with activity, got up only once during this shift per RN report.  - Continue scheduled acetaminophen-check LFTs  - Given on scheduled acetaminophen and to avoid above max usage for acetaminophen, utilize oxycodone 2.5 mg and will schedule for consistent pain management. continue daily stool softeners.   History hospital acquired delirium. Monitor mental status closely.     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 Transfuse for Hgb < 7  Iron studies stable.     # Recent UTI:  03/16: Urine culture showed > 100,000 CFU/mL Klebsiella Oxytoca. Sensitive to Flouroquinolones. Was started on Levaquin. Today day one of seven.    # Urinary Retention:  # BPH (H)  Straight cath x 2 for  ml, 700 mL. Place rollins catheter for third urinary retention episode    # Renal Insufficiency (H): Creatinine stable. Renally dose medications    # CHF (H): not in acute exacerbation.   Home dose entresto on hold. 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 Responded to NS  mL recheck /46  Held metoprolol  this am. Parameters provided for beta blocker. On PRN Tizanidine which can have hypotension properties, hold Tizanidine for now.   Continue to monitor blood pressures closely.     # Atrial Fibrillation (H)  Not in RVR. On aspirin, not on antiarrthymics    # Generalized weakness:   # Physical Deconditioning:  - PT/OT/SW    # Hyperlipidemia: continue home Statin    # Neuropathy.  Resume home Gabapentin.    # Sleep Apnea: Patient reports does not tolerate CPAP at home. PRN low flow oxygen while inpatient.     # History Subdural Hematoma:       Diet: Combination Diet Low Saturated Fat Na <2400mg Diet, No Caffeine Diet    DVT Prophylaxis: Heparin SQ  Rice Catheter: Not present  Lines: None     Cardiac Monitoring: ACTIVE order. Indication: other  Code Status: Full Code      Clinically Significant Risk Factors Present on Admission        Disposition Plan      Expected Discharge Date: 03/20/2023      Destination: home;inpatient rehabilitation facility  Discharge Comments: TCU at discharge, sent referrals        The patient's care was discussed with the Attending Physician, Dr. Alcantar, Bedside Nurse, Care Coordinator/ and Patient.    ANA LILIA Valenzuela CNP  Hospitalist Service  [facility]  Securely message with [employer] (more info)  Text page via [facility] Paging/Directory   ______________________________________________________________________    Interval History   Urinary retention, straight cath for ~ 800 mL  Endorses left hip pain radiating down left leg  Denies chest pain or shortness of breath  History sleep apnea, does not tolerate home cpap    Physical Exam   Vital Signs: Temp: 98.5  F (36.9  C) Temp src: Oral BP: 117/46 Pulse: 63   Resp: 18 SpO2: 99 % O2 Device: None (Room air)    Weight: 252 lbs 6.83 oz    General appearance: drowsy, wakes easily to voice. Follows commands appropriately  Lungs: clear to auscultation bilaterally  Heart: regular rate and rhythm, S1, S2  normal  Abdomen: soft, obese, non-tender  Extremities:  left hip surgical incision covered bandage, tenderness expected, no significant swelling. No wound drainage seeping through bandage  Neurologic: moving all 4 extremities spontaneously, no focal weakness.      Medical Decision Making     H & P after midnight. No Charge.    Data     I have personally reviewed the following data over the past 24 hrs:    6.1  \   7.8 (L)   / 149 (L)     141 104 22.1 /  146 (H)   3.9 29 1.05 \       Procal: N/A CRP: 65.72 (H) Lactic Acid: N/A         Imaging results reviewed over the past 24 hrs:   Recent Results (from the past 24 hour(s))   XR Pelvis w Hip Left 1 View    Narrative    EXAM: XR PELVIS AND HIP LEFT 1 VIEW  LOCATION: [facility]  DATE/TIME: 3/17/2023 9:53 PM    INDICATION: post op pain s p left hip replacement  COMPARISON: None.      Impression    IMPRESSION: Postop changes of a recent left total hip arthroplasty. No fracture or dislocation. No degenerative changes in the right hip.

## 2023-03-18 NOTE — CONSULTS
Care Management Initial Consult    General Information  Assessment completed with: Patient,    Type of CM/SW Visit: Initial Assessment    Primary Care Provider verified and updated as needed:     Readmission within the last 30 days: previous discharge plan unsuccessful      Reason for Consult: discharge planning  Advance Care Planning:            Communication Assessment  Patient's communication style: spoken language (English or Bilingual)    Hearing Difficulty or Deaf: no        Cognitive  Cognitive/Neuro/Behavioral: WDL  Level of Consciousness: alert  Arousal Level: opens eyes spontaneously  Orientation: oriented x 4  Mood/Behavior: calm, cooperative          Living Environment:   People in home: alone     Current living Arrangements: house      Able to return to prior arrangements: yes       Family/Social Support:  Care provided by: self  Provides care for: no one  Marital Status: Single  Children          Description of Support System: Supportive, Involved    Support Assessment: Adequate family and caregiver support, Adequate social supports    Current Resources:   Patient receiving home care services: No     Community Resources: None  Equipment currently used at home: walker, standard  Supplies currently used at home: None    Employment/Financial:  Employment Status: employed part-time        Financial Concerns: No concerns identified           Lifestyle & Psychosocial Needs:  Social Determinants of Health     Tobacco Use: Low Risk      Smoking Tobacco Use: Never     Smokeless Tobacco Use: Never     Passive Exposure: Not on file   Alcohol Use: Not on file   Financial Resource Strain: Not on file   Food Insecurity: Not on file   Transportation Needs: Not on file   Physical Activity: Not on file   Stress: Not on file   Social Connections: Not on file   Intimate Partner Violence: Not on file   Depression: Not at risk     PHQ-2 Score: 0   Housing Stability: Not on file       Functional Status:  Prior to admission  "patient needed assistance:   Dependent ADLs:: Ambulation-walker  Dependent IADLs:: Independent       Mental Health Status:  Mental Health Status: No Current Concerns       Chemical Dependency Status:  Chemical Dependency Status: No Current Concerns             Values/Beliefs:  Spiritual, Cultural Beliefs, Evangelical Practices, Values that affect care: no               Additional Information:  Care Management met with patient at bedside. Introduced self and explained role.     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 PT did not start as patient returned to ED d/t pain. Patient would like to return to home after discharge and states he \"will be fine once pain is under control\".     PT/OT recommendation is for TCU placement. CM went over recommendation for TCU placement with patient - he is resistive and does not think he will need TCU but agreed to referrals being sent. Referrals sent to:     Destination    Service Provider Request Status Selected Services Address Phone Fax Patient Preferred   [facility] ([facility])  Pending - Request Sent N/A [address] [address] [phone] --   [facility] ([facility])  Pending - Request Sent N/A [address] [facility] [address] [phone] --   [facility] ()  Pending - Request Sent N/A [address] [facility] [address] [phone] --   [facility] ([facility])  Pending - Request Sent N/A [address] [id number] [phone] --   [facility] ([facility])  Pending - [facility] Sent N/A [address] [facility] [facility] [address] [phone] --     Care Management will continue to follow for discharge planning.     Jennifer Cee, [facility]  Inpatient Care Coordinator   [facility] [phone]  [facility] " [phone]

## 2023-03-18 NOTE — PROGRESS NOTES
S-(situation): Patient changed to inpatient status    B-(background): Patient status change due to observation goals not being met.     A-(assessment): Patient is alert and oriented. Reports pain from left hip as well as right calf to right hip. Currently using scheduled Tylenol and prn Oxycodone. Ambulated in armenta x1, with assist of 1, walker and belt. Bladder scan for 852 ml and straight cath 700 ml out.     R-(recommendations): Will monitor patient per MD orders.       Inpatient nursing criteria listed below were met:    Adult Profile completeds  Health care directives status obtained and documented: Yes  VTE prophylaxis orders: Heparin  (FYI: Asprin is not an approved anticoagulation for DVT prophylaxis)  SCD's Documented: No  Vaccine assessment done and vaccine ordered if needed. Yes  My Chart patient sign up addressed and documented: Yes  Care Plan initiated: Yes  Discharge planning review completed (admission navigator) Yes

## 2023-03-18 NOTE — PROGRESS NOTES
"   03/18/23 1100   Appointment Info   Signing Clinician's Name / Credentials (OT) Shoshana Hernandez, OTR/L       Present no   Living Environment   People in Home alone   Current Living Arrangements house   Home Accessibility no concerns   Living Environment Comments Walk in shower, all needs on main floor.   Self-Care   Usual Activity Tolerance moderate   Current Activity Tolerance moderate   Regular Exercise No   Equipment Currently Used at Home walker, rolling;cane, quad   Fall history within last six months yes   Number of times patient has fallen within last six months 20   Activity/Exercise/Self-Care Comment Pt using quad cane at baseline in home and 4ww in community. PT services at home prior to surgery.   Instrumental Activities of Daily Living (IADL)   IADL Comments Reporting being able to manage what he can and gets help when he can for IADLs.   General Information   Onset of Illness/Injury or Date of Surgery 03/18/23   Referring Physician Phillip Calvillo MD   Patient/Family Therapy Goal Statement (OT) \"figure out the pain\"   Existing Precautions/Restrictions fall  (fall;no hip ER;no hip ADD past midline;no hip hyperextension;no pivoting or twisting)   Left Upper Extremity (Weight-bearing Status) full weight-bearing (FWB)   Right Upper Extremity (Weight-bearing Status) full weight-bearing (FWB)   Left Lower Extremity (Weight-bearing Status) full weight-bearing (FWB)   Right Lower Extremity (Weight-bearing Status) full weight-bearing (FWB)   General Observations and Info David Thomson is a 82 year old male admitted on 3/17/2023 for intractable left hip pain status post recent left hip surgery. Recent discharge home and unable to manage pain. Patient was ambulating in home but reporting \"overdoing it\" resulting in unmanageable pain prior to admission. OT consult completed.   Cognitive Status Examination   Orientation Status orientation to person, place and time   Cognitive Status Comments " No formal cognitive concerns identified   Sensory   Sensory Quick Adds sensation intact   Pain Assessment   Patient Currently in Pain Yes, see Vital Sign flowsheet   Range of Motion Comprehensive   Comment, General Range of Motion UB ROM- WFL   Strength Comprehensive (MMT)   Comment, General Manual Muscle Testing (MMT) Assessment No UB MMT deficits   Muscle Tone Assessment   Muscle Tone Quick Adds No deficits were identified   Coordination   Upper Extremity Coordination No deficits were identified   Gross Motor Coordination No deficits were identified   Fine Motor Coordination No deficits were identified   Transfers   Transfer Comments Patient just got up to chair with PT- denial of further transfers at this time.   Balance   Balance Comments Baseline balance deficits   Activities of Daily Living   BADL Assessment/Intervention lower body dressing;grooming   Lower Body Dressing Assessment/Training   Comment, (Lower Body Dressing) Patient does not have appropriate equiptment for dressing needs in home setting- provided AE equiptment and where to get supplies. Patient stating family/friends unable to get supplies for him so was unclear how he would get sock-aid and/or reacher. Trial of both with min A to support LB dressing needs. Patient having significant pain with trials resulting in increased time needed to complete. Dicussion on tips for safe management of LB dressing with socks, undergarments, and pants. Patient verbalized undersanding but unable to complete without assistance. Would benefit from continued trials.   Grooming Assessment/Training   Comment, (Grooming) Based on balance deficits at baseline- patient would benefit from set-up of grooming supplies and/or completing in seated position.   Clinical Impression   Criteria for Skilled Therapeutic Interventions Met (OT) Yes, treatment indicated   OT Diagnosis Impaired ADL engagement due to uncontrolled pain   Influenced by the following impairments pain   OT  Problem List-Impairments impacting ADL problems related to;activity tolerance impaired;other (see comments);post-surgical precautions  (ADLs)   ADL comments/analysis Due to deficits with pain mangement in home setting s/p L hip surgery- admitts to poor engagement in ADLs. Currently requires min A to complete with adaptive equiptment. Patient does not have these supplies in home setting and was unclear about how he was going to retreive sock aid/walker. Educational handout provided. Anticipate patient needing set-up assistance for grooming hygeines and would need simple meal-prep due to balance deficits and current pain status.   Assessment of Occupational Performance 1-3 Performance Deficits   Identified Performance Deficits Dressing, grooming, toileting, meal-prep   Planned Therapy Interventions (OT) ADL retraining;IADL retraining;cognition;strengthening;transfer training;home program guidelines;progressive activity/exercise;risk factor education   Clinical Decision Making Complexity (OT) low complexity   Anticipated Equipment Needs Upon Discharge (OT)   (Would benefit from reacher and sock aid.)   Risk & Benefits of therapy have been explained evaluation/treatment results reviewed;care plan/treatment goals reviewed;risks/benefits reviewed;participants voiced agreement with care plan;current/potential barriers reviewed;participants included;patient   Clinical Impression Comments s/p L hip surgery with uncontrolled pain resulting in re-admission to hospital. Patient currently limited due to pain impacting engagement in ADLs/IADL tasks in the home. Patient lives alone and reporting unclear comments about family/friends being able to provide assistance for patient while pain is benig managed.   OT Total Evaluation Time   OT Eval, Low Complexity Minutes (80727) 20   OT Goals   Therapy Frequency (OT) 3 times/wk   OT Predicted Duration/Target Date for Goal Attainment 03/25/23   OT Goals Hygiene/Grooming;Lower Body  Dressing;Toilet Transfer/Toileting   OT: Hygiene/Grooming modified independent   OT: Lower Body Dressing Modified independent   OT: Toilet Transfer/Toileting Modified independent   OT Discharge Planning   OT Plan 1/3 Saturday, progress safe engagement and independence with ADLs pending pain mangement/AE needs.   OT Discharge Recommendation (DC Rec) home with home care occupational therapy;Transitional Care Facility   OT Rationale for DC Rec Patient previously from home alone, admitted due to uncontrolled pain s/p L hip surgery. PLOF IND with ADLs/IADLs and using quad cane/FWW for mobility with baseline balance deficits. Currently patient requires min A for LB dressing with AE needs. Reporting not having supports in place to get assistance with ADLs in home setting. Currently paitent would benefit from set-up for ADLs, meal prep due to pain level impacting engagement. OT recommending TCU to assist with further safe independence with ADLs prior to return home. If patient able to get supports in home setting from family/friends for ADL/IADL management pending pain levels then would benefit from HHOT services upon discharge.   OT Brief overview of current status min A with AE needs for LB dressing, set-up grooming. Denial of transfer with OT d/t completing with PT this morning. No cog deficits noted.     Thank you for the referral.   CAREY Hawkins   [facility]    Email: [email or web address]  Phone: [phone]

## 2023-03-18 NOTE — PROGRESS NOTES
S-(situation): Patient registered to Observation. Patient arrived to room 253 via cart from ED    B-(background): Pt to ED with increased L hip pain and drainage to hip dressing.     A-(assessment): VSS. Oral temp 100.1 MD aware. Dressing CDI on L hip. CMS intact. LS clear throughout. Rash noted to upper L thigh. MD aware.     R-(recommendations): Orders and observation goals reviewed with patient    Nursing Observation criteria listed below was met:    Skin issues/needs documented:Yes  Isolation needs addressed and Signage up: No  Fall Prevention: Education given and documented: Yes  Education Assessment documented:Yes  Admission Education Documented: Yes  New medication patient education completed and documented (Possible Side Effects of Common Medications handout): Yes  OBS video/handout Reviewed & Documented: Yes  Allergies Reviewed: Yes  Medication Reconciliation Complete: No. Pt unsure of what medications he takes at home. Pt states that he has a list at home but forgot it and he does not have anyone that can bring it in.   Home medications if not able to send immediately home with family stored here: NA  Reminder note placed in discharge instructions of home meds: NA  Patient has discharge needs (If yes, please explain): Yes  Patient discharge preferences addressed and charted on white board:  Yes  Provider notified that patient has arrived to the unit: Yes

## 2023-03-18 NOTE — PROGRESS NOTES
Chart review and noted that patient was discharged home with [facility]. Call placed for med rec and on call nurse from [facility] returned call and reported that they do not have him on file and that it would take a couple days for them to admit him and he was probably admitted into our facility prior to them getting to him. RN caring for patient verified with patient and patient reported that it was for sure [facility] that was at his house yesterday setting up his medications. Med reconcile is going to be followed up by Jayna Barber as she is in contact with [facility] as well.

## 2023-03-18 NOTE — PROGRESS NOTES
"PRIMARY DIAGNOSIS: \"GENERIC\" NURSING  OUTPATIENT/OBSERVATION GOALS TO BE MET BEFORE DISCHARGE:  1. ADLs back to baseline: No    2. Activity and level of assistance: Assist of 1 with walker and belt.     3. Pain status: Improved-controlled with oral pain medications.    4. Return to near baseline physical activity: No     Discharge Planner Nurse   Safe discharge environment identified: No, social work following  Barriers to discharge: Yes       Entered by: Lexy Leger RN 03/18/2023 3:02 PM     Please review provider order for any additional goals.   Nurse to notify provider when observation goals have been met and patient is ready for discharge.  "

## 2023-03-18 NOTE — SIGNIFICANT EVENT
Significant Event Note    Time of event: 17:45 PM March 18, 2023    Description of event:  ST Elevation seen on telemetry strip. Denies chest pain.  History CAD, CABG s/p TAVR for Aortic Stenosis  EKG Stat  Stat labs: Troponin I x 3, CBC, CMP, BNP, D Dimer  Chest x-ray    Troponin 128, BNP 2095, Hemoglobin 7.6, Platelets 119    Plan:  Stat aspirin 243 mg po x 1 (received 81 mg asa this morning)  EKG and telemetry strips reviewed by Dr. Alcantar.   [place] block appearing as ST Elevation.  Demand ischemia and elevated dimer in setting of recent hip surgery.    Troponin trend every 2 hours x 2 occurrences. Next troponin draw at 20:00. For uptrend Troponin start IV therapeutic heparin  Cardiac monitoring, vital signs every 4 hours  PRN EKG for chest pain  Echo Monday    Hold off on transfer for now.     Discussed with: bedside nurse and supervising physician, Dr. Ortiz Roth, ANA LILIA CNP
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
## 2023-03-18 NOTE — PROGRESS NOTES
PRIMARY DIAGNOSIS: ACUTE PAIN  OUTPATIENT/OBSERVATION GOALS TO BE MET BEFORE DISCHARGE:  1. Pain Status: Improved-controlled with oral pain medications.    2. Return to near baseline physical activity: No    3. Cleared for discharge by consultants (if involved): No    Discharge Planner Nurse   Safe discharge environment identified: No  Barriers to discharge: Yes       Entered by: Iman Schwartz RN 03/18/2023 7:48 AM     Please review provider order for any additional goals.   Nurse to notify provider when observation goals have been met and patient is ready for discharge.

## 2023-03-18 NOTE — PROGRESS NOTES
MD notified of abnormal blood pressure results and bladder scan results of 832. We will await new order's.

## 2023-03-19 ENCOUNTER — APPOINTMENT (OUTPATIENT)
Dept: PHYSICAL THERAPY | Facility: CLINIC | Age: 83
DRG: 812 | End: 2023-03-19
Attending: FAMILY MEDICINE
Payer: COMMERCIAL

## 2023-03-19 LAB
ALBUMIN SERPL BCG-MCNC: 3.5 G/DL (ref 3.5–5.2)
ALP SERPL-CCNC: 80 U/L (ref 40–129)
ALT SERPL W P-5'-P-CCNC: 7 U/L (ref 10–50)
ANION GAP SERPL CALCULATED.3IONS-SCNC: 11 MMOL/L (ref 7–15)
AST SERPL W P-5'-P-CCNC: 19 U/L (ref 10–50)
BILIRUB SERPL-MCNC: 1.2 MG/DL
BUN SERPL-MCNC: 19.4 MG/DL (ref 8–23)
CALCIUM SERPL-MCNC: 8.8 MG/DL (ref 8.8–10.2)
CHLORIDE SERPL-SCNC: 102 MMOL/L (ref 98–107)
CREAT SERPL-MCNC: 0.99 MG/DL (ref 0.67–1.17)
CRP SERPL-MCNC: 77.71 MG/L
DEPRECATED HCO3 PLAS-SCNC: 26 MMOL/L (ref 22–29)
ERYTHROCYTE [DISTWIDTH] IN BLOOD BY AUTOMATED COUNT: 14.6 % (ref 10–15)
GFR SERPL CREATININE-BSD FRML MDRD: 77 ML/MIN/1.73M2
GLUCOSE SERPL-MCNC: 170 MG/DL (ref 70–99)
HCT VFR BLD AUTO: 26.5 % (ref 40–53)
HGB BLD-MCNC: 8.6 G/DL (ref 13.3–17.7)
MAGNESIUM SERPL-MCNC: 1.8 MG/DL (ref 1.7–2.3)
MAGNESIUM SERPL-MCNC: 1.9 MG/DL (ref 1.7–2.3)
MCH RBC QN AUTO: 30.9 PG (ref 26.5–33)
MCHC RBC AUTO-ENTMCNC: 32.5 G/DL (ref 31.5–36.5)
MCV RBC AUTO: 95 FL (ref 78–100)
PLATELET # BLD AUTO: 167 10E3/UL (ref 150–450)
POTASSIUM SERPL-SCNC: 3.8 MMOL/L (ref 3.4–5.3)
PROT SERPL-MCNC: 5.9 G/DL (ref 6.4–8.3)
RBC # BLD AUTO: 2.78 10E6/UL (ref 4.4–5.9)
SODIUM SERPL-SCNC: 139 MMOL/L (ref 136–145)
TROPONIN T SERPL HS-MCNC: 125 NG/L
TROPONIN T SERPL HS-MCNC: 135 NG/L
TROPONIN T SERPL HS-MCNC: 140 NG/L
TROPONIN T SERPL HS-MCNC: 146 NG/L
WBC # BLD AUTO: 5.3 10E3/UL (ref 4–11)

## 2023-03-19 PROCEDURE — 86140 C-REACTIVE PROTEIN: CPT | Performed by: FAMILY MEDICINE

## 2023-03-19 PROCEDURE — 85027 COMPLETE CBC AUTOMATED: CPT | Performed by: INTERNAL MEDICINE

## 2023-03-19 PROCEDURE — 84484 ASSAY OF TROPONIN QUANT: CPT | Performed by: NURSE PRACTITIONER

## 2023-03-19 PROCEDURE — 99232 SBSQ HOSP IP/OBS MODERATE 35: CPT | Performed by: NURSE PRACTITIONER

## 2023-03-19 PROCEDURE — 80053 COMPREHEN METABOLIC PANEL: CPT | Performed by: INTERNAL MEDICINE

## 2023-03-19 PROCEDURE — 250N000013 HC RX MED GY IP 250 OP 250 PS 637: Performed by: INTERNAL MEDICINE

## 2023-03-19 PROCEDURE — 250N000013 HC RX MED GY IP 250 OP 250 PS 637: Performed by: NURSE PRACTITIONER

## 2023-03-19 PROCEDURE — 84484 ASSAY OF TROPONIN QUANT: CPT | Performed by: FAMILY MEDICINE

## 2023-03-19 PROCEDURE — 83735 ASSAY OF MAGNESIUM: CPT | Performed by: INTERNAL MEDICINE

## 2023-03-19 PROCEDURE — 36415 COLL VENOUS BLD VENIPUNCTURE: CPT | Performed by: NURSE PRACTITIONER

## 2023-03-19 PROCEDURE — 83735 ASSAY OF MAGNESIUM: CPT | Performed by: NURSE PRACTITIONER

## 2023-03-19 PROCEDURE — 36415 COLL VENOUS BLD VENIPUNCTURE: CPT | Performed by: INTERNAL MEDICINE

## 2023-03-19 PROCEDURE — 36415 COLL VENOUS BLD VENIPUNCTURE: CPT | Performed by: FAMILY MEDICINE

## 2023-03-19 PROCEDURE — 97110 THERAPEUTIC EXERCISES: CPT | Mod: GP

## 2023-03-19 PROCEDURE — 120N000001 HC R&B MED SURG/OB

## 2023-03-19 PROCEDURE — 97530 THERAPEUTIC ACTIVITIES: CPT | Mod: GP

## 2023-03-19 PROCEDURE — 250N000011 HC RX IP 250 OP 636: Performed by: INTERNAL MEDICINE

## 2023-03-19 RX ORDER — TROLAMINE SALICYLATE 10 G/100G
CREAM TOPICAL 4 TIMES DAILY
Status: DISCONTINUED | OUTPATIENT
Start: 2023-03-19 | End: 2023-03-24

## 2023-03-19 RX ORDER — HYDROXYZINE HYDROCHLORIDE 50 MG/1
50 TABLET, FILM COATED ORAL EVERY 6 HOURS PRN
Status: DISCONTINUED | OUTPATIENT
Start: 2023-03-19 | End: 2023-03-27 | Stop reason: HOSPADM

## 2023-03-19 RX ORDER — OXYCODONE HYDROCHLORIDE 5 MG/1
5 TABLET ORAL EVERY 4 HOURS PRN
Status: DISCONTINUED | OUTPATIENT
Start: 2023-03-19 | End: 2023-03-21

## 2023-03-19 RX ORDER — POTASSIUM CHLORIDE 1500 MG/1
20 TABLET, EXTENDED RELEASE ORAL ONCE
Status: COMPLETED | OUTPATIENT
Start: 2023-03-19 | End: 2023-03-19

## 2023-03-19 RX ORDER — DOCUSATE SODIUM 100 MG/1
100 CAPSULE, LIQUID FILLED ORAL 2 TIMES DAILY
Status: DISCONTINUED | OUTPATIENT
Start: 2023-03-19 | End: 2023-03-27 | Stop reason: HOSPADM

## 2023-03-19 RX ORDER — MAGNESIUM OXIDE 400 MG/1
400 TABLET ORAL EVERY 4 HOURS
Status: COMPLETED | OUTPATIENT
Start: 2023-03-19 | End: 2023-03-19

## 2023-03-19 RX ORDER — HYDROXYZINE HYDROCHLORIDE 25 MG/1
25 TABLET, FILM COATED ORAL EVERY 6 HOURS PRN
Status: DISCONTINUED | OUTPATIENT
Start: 2023-03-19 | End: 2023-03-27 | Stop reason: HOSPADM

## 2023-03-19 RX ADMIN — ATORVASTATIN CALCIUM 80 MG: 40 TABLET, FILM COATED ORAL at 20:30

## 2023-03-19 RX ADMIN — POTASSIUM CHLORIDE 20 MEQ: 1500 TABLET, EXTENDED RELEASE ORAL at 08:49

## 2023-03-19 RX ADMIN — ASPIRIN 81 MG: 81 TABLET, COATED ORAL at 20:30

## 2023-03-19 RX ADMIN — HEPARIN SODIUM 5000 UNITS: 10000 INJECTION, SOLUTION INTRAVENOUS; SUBCUTANEOUS at 12:45

## 2023-03-19 RX ADMIN — SACUBITRIL AND VALSARTAN 1 TABLET: 97; 103 TABLET, FILM COATED ORAL at 09:28

## 2023-03-19 RX ADMIN — PANTOPRAZOLE SODIUM 40 MG: 40 TABLET, DELAYED RELEASE ORAL at 20:30

## 2023-03-19 RX ADMIN — GABAPENTIN 100 MG: 100 CAPSULE ORAL at 08:49

## 2023-03-19 RX ADMIN — Medication: at 15:43

## 2023-03-19 RX ADMIN — Medication: at 13:40

## 2023-03-19 RX ADMIN — PANTOPRAZOLE SODIUM 40 MG: 40 TABLET, DELAYED RELEASE ORAL at 08:49

## 2023-03-19 RX ADMIN — OXYCODONE HYDROCHLORIDE 2.5 MG: 5 TABLET ORAL at 08:49

## 2023-03-19 RX ADMIN — DOCUSATE SODIUM 100 MG: 100 CAPSULE, LIQUID FILLED ORAL at 20:31

## 2023-03-19 RX ADMIN — Medication 400 MG: at 15:43

## 2023-03-19 RX ADMIN — THIAMINE HCL TAB 100 MG 100 MG: 100 TAB at 08:49

## 2023-03-19 RX ADMIN — FOLIC ACID 1 MG: 1 TABLET ORAL at 08:49

## 2023-03-19 RX ADMIN — SENNOSIDES AND DOCUSATE SODIUM 1 TABLET: 8.6; 5 TABLET ORAL at 20:29

## 2023-03-19 RX ADMIN — ACETAMINOPHEN 975 MG: 325 TABLET ORAL at 13:39

## 2023-03-19 RX ADMIN — LEVOFLOXACIN 500 MG: 500 TABLET, FILM COATED ORAL at 08:51

## 2023-03-19 RX ADMIN — OXYCODONE HYDROCHLORIDE 2.5 MG: 5 TABLET ORAL at 13:40

## 2023-03-19 RX ADMIN — METOPROLOL SUCCINATE 12.5 MG: 25 TABLET, EXTENDED RELEASE ORAL at 08:49

## 2023-03-19 RX ADMIN — OXYCODONE HYDROCHLORIDE 2.5 MG: 5 TABLET ORAL at 20:30

## 2023-03-19 RX ADMIN — HEPARIN SODIUM 5000 UNITS: 10000 INJECTION, SOLUTION INTRAVENOUS; SUBCUTANEOUS at 02:13

## 2023-03-19 RX ADMIN — Medication 1 SPRAY: at 02:13

## 2023-03-19 RX ADMIN — ASPIRIN 81 MG: 81 TABLET, COATED ORAL at 08:49

## 2023-03-19 RX ADMIN — ACETAMINOPHEN 975 MG: 325 TABLET ORAL at 20:29

## 2023-03-19 RX ADMIN — GABAPENTIN 100 MG: 100 CAPSULE ORAL at 20:30

## 2023-03-19 RX ADMIN — HYDROXYZINE HYDROCHLORIDE 25 MG: 25 TABLET, FILM COATED ORAL at 12:03

## 2023-03-19 RX ADMIN — MORPHINE SULFATE 2 MG: 2 INJECTION, SOLUTION INTRAMUSCULAR; INTRAVENOUS at 05:45

## 2023-03-19 RX ADMIN — Medication 400 MG: at 12:02

## 2023-03-19 RX ADMIN — SACUBITRIL AND VALSARTAN 1 TABLET: 97; 103 TABLET, FILM COATED ORAL at 20:38

## 2023-03-19 RX ADMIN — DOCUSATE SODIUM 100 MG: 100 CAPSULE, LIQUID FILLED ORAL at 09:28

## 2023-03-19 RX ADMIN — MORPHINE SULFATE 2 MG: 2 INJECTION, SOLUTION INTRAMUSCULAR; INTRAVENOUS at 00:58

## 2023-03-19 RX ADMIN — SENNOSIDES AND DOCUSATE SODIUM 1 TABLET: 8.6; 5 TABLET ORAL at 08:49

## 2023-03-19 RX ADMIN — Medication: at 19:00

## 2023-03-19 RX ADMIN — HYDROXYZINE HYDROCHLORIDE 50 MG: 50 TABLET, FILM COATED ORAL at 18:06

## 2023-03-19 ASSESSMENT — ACTIVITIES OF DAILY LIVING (ADL)
ADLS_ACUITY_SCORE: 37
TRANSFERRING: 1-->ASSISTANCE (EQUIPMENT/PERSON) NEEDED
ADLS_ACUITY_SCORE: 39
ADLS_ACUITY_SCORE: 30
ADLS_ACUITY_SCORE: 39
DRESSING/BATHING_DIFFICULTY: NO
ADLS_ACUITY_SCORE: 37
ADLS_ACUITY_SCORE: 30
EQUIPMENT_CURRENTLY_USED_AT_HOME: WALKER, STANDARD
CHANGE_IN_FUNCTIONAL_STATUS_SINCE_ONSET_OF_CURRENT_ILLNESS/INJURY: YES
ADLS_ACUITY_SCORE: 39
CONCENTRATING,_REMEMBERING_OR_MAKING_DECISIONS_DIFFICULTY: NO
DIFFICULTY_EATING/SWALLOWING: NO
DOING_ERRANDS_INDEPENDENTLY_DIFFICULTY: NO
TRANSFERRING: 0-->ASSISTANCE NEEDED (DEVELOPMENTALLY APPROPRIATE)
ADLS_ACUITY_SCORE: 37
FALL_HISTORY_WITHIN_LAST_SIX_MONTHS: YES
TOILETING_ISSUES: NO
WALKING_OR_CLIMBING_STAIRS: AMBULATION DIFFICULTY, REQUIRES EQUIPMENT
WEAR_GLASSES_OR_BLIND: YES
ADLS_ACUITY_SCORE: 39
NUMBER_OF_TIMES_PATIENT_HAS_FALLEN_WITHIN_LAST_SIX_MONTHS: 5
ADLS_ACUITY_SCORE: 39
WALKING_OR_CLIMBING_STAIRS_DIFFICULTY: YES

## 2023-03-19 NOTE — DISCHARGE INSTRUCTIONS
Follow up with orthopedics 04/03/2023 as previously scheduled    Urology team will be calling [facility] to set up this appointment.  If you do not hear back from them within 48 hrs please call them   [phone]

## 2023-03-19 NOTE — PROVIDER NOTIFICATION
Provider notified regarding possible order for rollins cath as pt has been straight cathed x2 within the last 24 hours. Has now scanned for over 400cc per bladder management protocol, after attempting to urinate pt had a little over 50cc out. Provider agreed that rollins cath would be best moving forward. Order placed.

## 2023-03-19 NOTE — PROVIDER NOTIFICATION
DATE:  3/19/2023   TIME OF RECEIPT FROM LAB:  1310  LAB TEST:  Troponin  LAB VALUE:  146  RESULTS GIVEN WITH READ-BACK TO (PROVIDER):  ANA LILIA Dias  TIME LAB VALUE REPORTED TO PROVIDER:   1313

## 2023-03-19 NOTE — PLAN OF CARE
Goal Outcome Evaluation:                 Outcome Evaluation: Pt is A&Ox4. Scheduled Norco and Tylenol given for left hip pain. Atarax added for pain management. Riec cath is patent. Tele SR with BBB. Up in chair with meals. Ambulated in armenta. C/O of sore throat, provider notified. Magnesium and Potassium replaced this shift. Recheck on 3/20. Troponin continues to trend up. Currently being in monitored. Pt is asymptomatic. Scant drainage on dressing.

## 2023-03-19 NOTE — PROGRESS NOTES
[facility]    Medicine Progress Note - Hospitalist Service    Date of Admission:  3/17/2023    Assessment & Plan      David Thomson is a 82 year old male admitted on 3/17/2023 for intractable left hip pain status post recent left hip surgery.    # Intractable left hip pain status post recent left hip revision 3/16/2023:  - X-ray done in ER shows no acute finding [facility]. [facility]o recommendation is to admit the patient for pain control, PT/OT.  Likely will need placement to rehab as patient is too weak to be at home with increasing left hip pain. Hesitant but agreeable to TCU placement.   Requiring additional pain medication for breakthrough pain. PRN Oxycodone and PRN Hydroxyzine ordered with parameters. PRN Tizanidine discontinued due to hypotension. History hospital acquired delirium. Monitor mental status closely.     # Post Operative Anemia: hemoglobin 9.4 ->8.9->7.8->8.6  Hemoglobin improved this morning.  Type and Screen. Transfuse for Hgb < 7  Iron studies stable.     # Demand Ischemia:  # Chronic Mild ST Elevation:  # Known Left Bundle Branch Block:  Denies chest pain or dyspnea.   03/18 Received oral  mg x 1  Continue subcutaneous heparin for VTE prophylaxis  Continue Troponin trend every 4 hours for 2 more occurrences  For angina or respiratory decompensation, plan to start IV theraputic heparin and discuss case with cardiologist.     # Recent UTI:  03/16: Urine culture showed > 100,000 CFU/mL Klebsiella Oxytoca. Sensitive to Flouroquinolones. Was started on Levaquin. Today two one of seven.    # Urinary Retention:  # BPH (H)  Straight cath past 24 hours for PVR > 500 mL. Rollins catheter placed overnights. Will be discharged with rollins catheter in place for outpatient urology follow up.     # Renal Insufficiency (H): Creatinine stable. Renally dose medications    # CHF (H): not in acute exacerbation.   Home dose Entresto  restarted 03/19 given elevated BNP which could be elevated due to demand ischemia in setting of recent surgery.    # CAD (H)  # CABG s/p Bioprosthetic AVR for Aortic Stenosis:  Denies chest pain    # Elevated D Dimer: felt to be related to recent surgery. No hypoxia. Not on supplemental oxygen.     # Primary Hypertension:  # Transient Hypotension: Resolved  Home dose Metoprolol and Entresto with parameters  Discontinued Tizanidine  Continue to monitor blood pressures closely.     # Atrial Fibrillation (H)  Not in RVR. On aspirin, not on antiarrthymics    # Generalized weakness:   # Physical Deconditioning:  - PT/OT/SW    # Hyperlipidemia: continue home Statin    # Neuropathy.  Resume home Gabapentin.    # Sleep Apnea: Patient reports does not tolerate CPAP at home. PRN low flow oxygen while inpatient.     # History Subdural Hematoma:      Diet: Combination Diet Low Saturated Fat Na <2400mg Diet, No Caffeine Diet    DVT Prophylaxis: Heparin SQ  Rice Catheter: PRESENT, indication: Retention  Lines: None     Cardiac Monitoring: ACTIVE order. Indication: Chest pain/ ACS rule out (24 hours)  Code Status: Full Code      Clinically Significant Risk Factors Present on Admission        Disposition Plan     Expected Discharge Date: 03/20/2023      Destination: home;inpatient rehabilitation facility  Discharge Comments: TCU at discharge, sent referrals        The patient's care was discussed with the Attending Physician, Dr. Alcantar, Bedside Nurse, Care Coordinator/ and Patient.    ANA LILIA Valenzuela CNP  Hospitalist Service  [facility]  Securely message with [employer] (more info)  Text page via [facility] Paging/Directory   ______________________________________________________________________    Interval History   See A & P    Physical Exam   Vital Signs: Temp: 98.2  F (36.8  C) Temp src: Oral BP: 112/62 Pulse: 91   Resp: 18 SpO2: 97 % O2 Device: None (Room air)    Weight: 252 lbs  6.83 oz    General appearance: alert, Follows commands appropriately  Lungs: clear to auscultation bilaterally  Heart: regular rate and rhythm, S1, S2 normal  Abdomen: soft, obese, non-tender  Extremities:  left hip surgical incision covered bandage, tenderness expected along surgical site.    Neurologic: moving all 4 extremities spontaneously, no focal weakness.            Medical Decision Making     35 MINUTES SPENT BY ME on the date of service doing chart review, history, exam, documentation & further activities per the note.      Data     I have personally reviewed the following data over the past 24 hrs:    5.3  \   8.6 (L)   / 167     139 102 19.4 /  170 (H)   3.8 26 0.99 \       ALT: 7 (L) AST: 19 AP: 80 TBILI: 1.2   ALB: 3.5 TOT PROTEIN: 5.9 (L) LIPASE: N/A       Trop: 135 (HH) BNP: 2,095 (H)       Procal: N/A CRP: 77.71 (H) Lactic Acid: N/A       INR:  N/A PTT:  N/A   D-dimer:  0.83 (H) Fibrinogen:  N/A

## 2023-03-19 NOTE — PLAN OF CARE
"Goal Outcome Evaluation:      Plan of Care Reviewed With: patient    Overall Patient Progress: improvingOverall Patient Progress: improving    Outcome Evaluation: Pt A&Ox4. VSS on RA. Systolic pressures remain in the 110's after hypotensive episodes during the day yesterday 03/18. Straight cath output of 300 mL at the beginning of the shift, rollins cath placed at 0500 as pt was unable to empty bladder. Adequate output. Tele reads SR with PVC's. Ambulated to bathroom multiple times, hallway x1. Pain remains 7-10/10 per pt report. Scheduled PO oxy given before bed, not scheduled again until 0900. PRN IV morphine given x2 for 10/10 pain. States he \"just wants to go home\".      "

## 2023-03-19 NOTE — PROVIDER NOTIFICATION
DATE:  3/18/2023   TIME OF RECEIPT FROM LAB:  2035  LAB TEST:  Troponin   LAB VALUE:  124  RESULTS GIVEN WITH READ-BACK TO (PROVIDER):  THELMA Calvillo MD  TIME LAB VALUE REPORTED TO PROVIDER:   2036    No orders at this time will continue to monitor ordered troponin levels

## 2023-03-20 ENCOUNTER — APPOINTMENT (OUTPATIENT)
Dept: OCCUPATIONAL THERAPY | Facility: CLINIC | Age: 83
DRG: 812 | End: 2023-03-20
Attending: FAMILY MEDICINE
Payer: COMMERCIAL

## 2023-03-20 ENCOUNTER — TELEPHONE (OUTPATIENT)
Dept: UROLOGY | Facility: CLINIC | Age: 83
End: 2023-03-20

## 2023-03-20 ENCOUNTER — APPOINTMENT (OUTPATIENT)
Dept: PHYSICAL THERAPY | Facility: CLINIC | Age: 83
DRG: 812 | End: 2023-03-20
Attending: FAMILY MEDICINE
Payer: COMMERCIAL

## 2023-03-20 LAB
BACTERIA TISS BX CULT: NO GROWTH
CREAT SERPL-MCNC: 0.94 MG/DL (ref 0.67–1.17)
GFR SERPL CREATININE-BSD FRML MDRD: 82 ML/MIN/1.73M2
HOLD SPECIMEN: NORMAL
MAGNESIUM SERPL-MCNC: 2 MG/DL (ref 1.7–2.3)
POTASSIUM SERPL-SCNC: 3.9 MMOL/L (ref 3.4–5.3)
TROPONIN T SERPL HS-MCNC: 185 NG/L
TROPONIN T SERPL HS-MCNC: 188 NG/L

## 2023-03-20 PROCEDURE — 83735 ASSAY OF MAGNESIUM: CPT | Performed by: NURSE PRACTITIONER

## 2023-03-20 PROCEDURE — 120N000001 HC R&B MED SURG/OB

## 2023-03-20 PROCEDURE — 250N000011 HC RX IP 250 OP 636: Performed by: INTERNAL MEDICINE

## 2023-03-20 PROCEDURE — 250N000013 HC RX MED GY IP 250 OP 250 PS 637: Performed by: INTERNAL MEDICINE

## 2023-03-20 PROCEDURE — 97535 SELF CARE MNGMENT TRAINING: CPT | Mod: GO | Performed by: SPECIALIST/TECHNOLOGIST

## 2023-03-20 PROCEDURE — 84484 ASSAY OF TROPONIN QUANT: CPT | Performed by: NURSE PRACTITIONER

## 2023-03-20 PROCEDURE — 84132 ASSAY OF SERUM POTASSIUM: CPT | Performed by: NURSE PRACTITIONER

## 2023-03-20 PROCEDURE — 93005 ELECTROCARDIOGRAM TRACING: CPT

## 2023-03-20 PROCEDURE — 36415 COLL VENOUS BLD VENIPUNCTURE: CPT | Performed by: NURSE PRACTITIONER

## 2023-03-20 PROCEDURE — 82565 ASSAY OF CREATININE: CPT | Performed by: NURSE PRACTITIONER

## 2023-03-20 PROCEDURE — 250N000013 HC RX MED GY IP 250 OP 250 PS 637: Performed by: NURSE PRACTITIONER

## 2023-03-20 PROCEDURE — 97530 THERAPEUTIC ACTIVITIES: CPT | Mod: GP

## 2023-03-20 PROCEDURE — 99232 SBSQ HOSP IP/OBS MODERATE 35: CPT | Performed by: NURSE PRACTITIONER

## 2023-03-20 RX ORDER — MAGNESIUM OXIDE 400 MG/1
400 TABLET ORAL EVERY 4 HOURS
Status: COMPLETED | OUTPATIENT
Start: 2023-03-20 | End: 2023-03-20

## 2023-03-20 RX ADMIN — GABAPENTIN 100 MG: 100 CAPSULE ORAL at 08:45

## 2023-03-20 RX ADMIN — OXYCODONE HYDROCHLORIDE 2.5 MG: 5 TABLET ORAL at 08:44

## 2023-03-20 RX ADMIN — ACETAMINOPHEN 975 MG: 325 TABLET ORAL at 20:39

## 2023-03-20 RX ADMIN — DOCUSATE SODIUM 100 MG: 100 CAPSULE, LIQUID FILLED ORAL at 08:45

## 2023-03-20 RX ADMIN — SACUBITRIL AND VALSARTAN 1 TABLET: 97; 103 TABLET, FILM COATED ORAL at 20:39

## 2023-03-20 RX ADMIN — SENNOSIDES AND DOCUSATE SODIUM 1 TABLET: 8.6; 5 TABLET ORAL at 08:45

## 2023-03-20 RX ADMIN — METOPROLOL SUCCINATE 12.5 MG: 25 TABLET, EXTENDED RELEASE ORAL at 08:44

## 2023-03-20 RX ADMIN — GABAPENTIN 100 MG: 100 CAPSULE ORAL at 20:39

## 2023-03-20 RX ADMIN — ACETAMINOPHEN 975 MG: 325 TABLET ORAL at 13:23

## 2023-03-20 RX ADMIN — HEPARIN SODIUM 5000 UNITS: 10000 INJECTION, SOLUTION INTRAVENOUS; SUBCUTANEOUS at 13:25

## 2023-03-20 RX ADMIN — Medication 400 MG: at 07:04

## 2023-03-20 RX ADMIN — DOCUSATE SODIUM 100 MG: 100 CAPSULE, LIQUID FILLED ORAL at 20:39

## 2023-03-20 RX ADMIN — Medication: at 16:11

## 2023-03-20 RX ADMIN — OXYCODONE HYDROCHLORIDE 5 MG: 5 TABLET ORAL at 18:50

## 2023-03-20 RX ADMIN — HEPARIN SODIUM 5000 UNITS: 10000 INJECTION, SOLUTION INTRAVENOUS; SUBCUTANEOUS at 01:47

## 2023-03-20 RX ADMIN — ASPIRIN 81 MG: 81 TABLET, COATED ORAL at 08:45

## 2023-03-20 RX ADMIN — SENNOSIDES AND DOCUSATE SODIUM 1 TABLET: 8.6; 5 TABLET ORAL at 20:39

## 2023-03-20 RX ADMIN — Medication 1 SPRAY: at 23:51

## 2023-03-20 RX ADMIN — Medication 1 SPRAY: at 13:55

## 2023-03-20 RX ADMIN — OXYCODONE HYDROCHLORIDE 5 MG: 5 TABLET ORAL at 10:10

## 2023-03-20 RX ADMIN — Medication 400 MG: at 10:11

## 2023-03-20 RX ADMIN — ASPIRIN 81 MG: 81 TABLET, COATED ORAL at 20:40

## 2023-03-20 RX ADMIN — OXYCODONE HYDROCHLORIDE 5 MG: 5 TABLET ORAL at 04:34

## 2023-03-20 RX ADMIN — THIAMINE HCL TAB 100 MG 100 MG: 100 TAB at 08:45

## 2023-03-20 RX ADMIN — PANTOPRAZOLE SODIUM 40 MG: 40 TABLET, DELAYED RELEASE ORAL at 20:47

## 2023-03-20 RX ADMIN — Medication: at 11:13

## 2023-03-20 RX ADMIN — Medication: at 08:50

## 2023-03-20 RX ADMIN — HYDROXYZINE HYDROCHLORIDE 50 MG: 50 TABLET, FILM COATED ORAL at 01:48

## 2023-03-20 RX ADMIN — ATORVASTATIN CALCIUM 80 MG: 40 TABLET, FILM COATED ORAL at 20:38

## 2023-03-20 RX ADMIN — OXYCODONE HYDROCHLORIDE 2.5 MG: 5 TABLET ORAL at 13:23

## 2023-03-20 RX ADMIN — PANTOPRAZOLE SODIUM 40 MG: 40 TABLET, DELAYED RELEASE ORAL at 08:45

## 2023-03-20 RX ADMIN — ACETAMINOPHEN 975 MG: 325 TABLET ORAL at 08:44

## 2023-03-20 RX ADMIN — Medication 1 MG: at 01:48

## 2023-03-20 RX ADMIN — LEVOFLOXACIN 500 MG: 500 TABLET, FILM COATED ORAL at 08:47

## 2023-03-20 RX ADMIN — HYDROXYZINE HYDROCHLORIDE 25 MG: 25 TABLET, FILM COATED ORAL at 10:11

## 2023-03-20 RX ADMIN — SACUBITRIL AND VALSARTAN 1 TABLET: 97; 103 TABLET, FILM COATED ORAL at 08:46

## 2023-03-20 RX ADMIN — OXYCODONE HYDROCHLORIDE 2.5 MG: 5 TABLET ORAL at 20:38

## 2023-03-20 RX ADMIN — FOLIC ACID 1 MG: 1 TABLET ORAL at 08:45

## 2023-03-20 RX ADMIN — Medication: at 23:51

## 2023-03-20 ASSESSMENT — ACTIVITIES OF DAILY LIVING (ADL)
ADLS_ACUITY_SCORE: 30
ADLS_ACUITY_SCORE: 26

## 2023-03-20 NOTE — PROGRESS NOTES
Read - 7:34 pm  [facility] Room 253, W.B. He just had a short run of v tach (7 beats). Electrolytes ok this morning but haven't been checked since.      TW  Read - 7:34 pm  Asymptomatic, sleeping at the time      BN  Phillip Calvillo - 7:38 pm  Is BP stable? Ty      TW  Read - 7:51 pm  101/50 and 115/50, HR 70s      TOVA Calvillo - 8:00 pm  Let's check mag and monitor ty

## 2023-03-20 NOTE — PLAN OF CARE
Goal Outcome Evaluation:    Plan of Care Reviewed With: patient    Overall Patient Progress: improving    Outcome Evaluation: Pt's VSS. Afebrile. Tele shows SR with BBB. Pt continues to report left hip pain radiating down to past his knee. Given atarax and scheduled tylenol. At 0430, pt reported higher pain, so oxycodone 5 mg given. Pt up with 1 assist and ambulating to bathroom but did not have a BM. Rice in, great urine output.

## 2023-03-20 NOTE — TELEPHONE ENCOUNTER
[facility] Call Center    Phone Message    May a detailed message be left on voicemail: yes     Reason for Call: Appointment Intake    Referring Provider Name: Colleen Roth APRN CNP  Diagnosis and/or Symptoms: retention    Patient is being referred for retention.  Sending encounter per guidelines. Please review and follow-up with patient for scheduling.     Action Taken: Message routed to:  [facility] Urology    Travel Screening: Not Applicable

## 2023-03-20 NOTE — PROGRESS NOTES
Care Management Follow Up    Length of Stay (days): 2    Expected Discharge Date: 03/20/2023     Concerns to be Addressed: discharge planning     Patient plan of care discussed at interdisciplinary rounds: Yes    Anticipated Discharge Disposition: Transitional Care     Anticipated Discharge Services: None  Anticipated Discharge DME: Rites[facility]    Patient/family educated on Medicare website which has current facility and service quality ratings: yes  Education Provided on the Discharge Plan:    Patient/Family in Agreement with the Plan: yes    Referrals Placed by CM/SW:    Private pay costs discussed: Not applicable    Additional Information:  Writer continues to follow with TCU placement [facility] (Main Phone: [phone] Admissions Phone: [phone] Fax: [phone]) unable to take til tomorrow if room available, [facility] (Admissions Phone: [phone] Main Phone: [phone] Fax: [phone]) message left,   [facility] (Phone: [phone] Admissions: [phone] Fax: [phone]) Talked with Paulette who is running his benefits. CTS continue to follow.  Cynthia Child RN   Inpatient Care Coordinator  [facility] [phone]  [facility] [phone]        Cynthia Child RN

## 2023-03-20 NOTE — PROGRESS NOTES
[facility]    Medicine Progress Note - Hospitalist Service    Date of Admission:  3/17/2023    Assessment & Plan      David Thomson is a 82 year old male admitted on 3/17/2023 for intractable left hip pain status post recent left hip surgery.    # Intractable left hip pain status post recent left hip revision 3/16/2023:  - X-ray done in ER shows no acute finding and orthopedic surgery from the [place] was consulted. Ortho recommendation is to admit the patient for pain control, PT/OT.   On scheduled Tylenol and scheduled low-dose oxycodone 2.5 mg 3 times daily.  He required additional pain medication for breakthrough pain and as needed oxycodone 5 mg 4 hours and as needed Hydroxyzine  ordered. Opioids dosed with caution due to history of hospital-acquired delirium.  His Tizanidine was discontinued due to systolic blood pressure 87.  He responded to IV fluids.  Systolic blood pressures remained in the high 90s to 110's.  Postoperative anemia hemoglobin 9.4-> 8.9->7.8->8.6.  Patient's iron studies were stable.  Elevated by physical therapy who recommended TCU placement.  Initially patient was hesitant to agree to TCU but he does agree now and per care management team patient has been accepted at [facility].  Plan discharge 03/21     # Demand Ischemia and elevated d dimer felt to be secondary to postoperative status.  # Chronic Mild ST Elevation:  # Known Left Bundle Branch Block:  # Non Sustained V-Tachycardia:  Over the weekend noted to have ST elevation which was found to be chronic and mild in setting of known left bundle branch block.  He received full dose aspirin 325 mg x 1.  Denied chest pain.  No hypoxia.  He was on subcutaneous heparin for VTE prophylaxis.  D-dimer 0.83.  troponins were trended with noted elevation 140s with uptrend 188 today.  Demand ischemia and elevated troponin felt to be secondary to postoperative status.  Overnights of 03/20, patient had  7 beat run of V. tach and remained asymptomatic.  Patient continued to remain chest pain-free with repeat EKGs negative for acute ST-T elevation.  [facility] cardiology Dr. Dhaliwal who recommended given patient asymptomatic with mildly elevated troponins from cardiology standpoint can discharge with outpatient cardiology follow-up.  In regards to NSVT, can do 7-day event monitor and follow-up outpatient cardiology.    # Thrombocytopenia, Chronic: Platelet uptrend 167    # Recent UTI:  03/16: Urine culture showed > 100,000 CFU/mL Klebsiella Oxytoca. Sensitive to Flouroquinolones. Was started on Levaquin. Today 3 of 7.     # Urinary Retention:  # BPH (H)  Straight cath past 24 hours for PVR > 500 mL. Rollins catheter placed overnights. Will be discharged with rollins catheter in place for outpatient urology follow up.     # Renal Insufficiency (H): Creatinine stable. Renally dose medications      # Chronic HFpEF (EF 55-60%)  - not in acute exacerbation.   - Home dose Entresto restarted 03/19 given elevated BNP which could be elevated due to demand ischemia in setting of recent surgery.    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   Denies chest pain    # Elevated D Dimer: felt to be related to recent surgery. No hypoxia. Not on supplemental oxygen.     # Primary Hypertension:  # Transient Hypotension: Resolved  Home dose Metoprolol and Entresto with parameters  Discontinued Tizanidine due to episode of hypotension  Continue to monitor blood pressures closely.     # Atrial Fibrillation (H)  Not in RVR. On aspirin, not on antiarrythmic. Home dose Eliquis on hold in setting of recent subdural hematoma.     # Generalized weakness:   # Physical Deconditioning:  - PT/OT/SW    # Hyperlipidemia: continue home Statin    # Neuropathy.  Resume home Gabapentin.    # Sleep Apnea: Patient reports does not tolerate CPAP  at home. PRN low flow oxygen while inpatient.     # History Left Frontoparietal Subdural Hematoma (January 2023) in setting of frequent falls  - Evaluated by [facility] on 2/28 with review of imaging from 2/7 showing resolution of SDH.   - Home dose Eliquis on hold              Diet: Combination Diet Low Saturated Fat Na <2400mg Diet, No Caffeine Diet    DVT Prophylaxis: Heparin SQ  Rice Catheter: PRESENT, indication: Retention  Lines: None     Cardiac Monitoring: ACTIVE order. Indication: Tachyarrhythmias, acute (48 hours)  Code Status: Full Code      Clinically Significant Risk Factors        Disposition Plan      Expected Discharge Date: 03/21/2023,  9:00 AM    Destination: home;inpatient rehabilitation facility  Discharge Comments: TCU tomorrow am        The patient's care was discussed with the Attending Physician, Dr. Alcantar, Bedside Nurse, Care Coordinator/, Patient and Cardiology Consultant(s).    ANA LILIA Valenzuela CNP  Hospitalist Service  [facility]  Securely message with [employer] (more info)  Text page via [employer] Paging/Directory   ______________________________________________________________________    Interval History   No acute medical events overnight  Patient's only concern today is that he was due to appear in court today and he is requesting provider to write a letter stating he is currently in the hospital.  This was done and given to unit coordinator to fax to fax number provided by patient.    Physical Exam   Vital Signs: Temp: 96.8  F (36  C) Temp src: Oral BP: 93/55 Pulse: 74   Resp: 16 SpO2: 94 % O2 Device: None (Room air)    Weight: 252 lbs 6.83 oz    General appearance: alert, Follows commands appropriately, Not in acute distress  Lungs: clear to auscultation bilaterally  Heart: regular rate and rhythm, S1, S2 normal  Abdomen: soft, obese, non-tender  : Rice catheter present  Extremities:  left hip surgical incision covered  bandage, tenderness expected along surgical site.    Neurologic: moving all 4 extremities spontaneously, no focal weakness.            Medical Decision Making     35 MINUTES SPENT BY ME on the date of service doing chart review, history, exam, documentation & further activities per the note.     Data     I have personally reviewed the following data over the past 24 hrs:    N/A  \   N/A   / N/A     N/A N/A N/A /  N/A   3.9 N/A 0.94 \       Trop: 188 () BNP: N/A

## 2023-03-20 NOTE — PLAN OF CARE
"Goal Outcome Evaluation:      Plan of Care Reviewed With: patient    Overall Patient Progress: no changeOverall Patient Progress: no change    Outcome Evaluation: Pt is A&O; VSS; on RA. Rice in place w/ adequate urine output. Patient has been drowsy throughout the afternoon. Patient ambulated in hallway X1 with A1. PRN Oxycodone given for pain. Ice therapy to aid in alleviating pain.    /51 (BP Location: Left arm)   Pulse 77   Temp 98.7  F (37.1  C) (Oral)   Resp 20   Ht 1.854 m (6' 1\")   Wt 114.5 kg (252 lb 6.8 oz)   SpO2 95%   BMI 33.30 kg/m        "

## 2023-03-20 NOTE — PLAN OF CARE
Goal Outcome Evaluation:      Plan of Care Reviewed With: patient    Overall Patient Progress: improvingOverall Patient Progress: improving    Outcome Evaluation: patient A&O, vss bp 93/55, room air, left hip pain radiating down to left knee and to buttocks, PRN oxy and atarax given x1 very drowsy after, ambulated halls x2, ekg completed for elevated troponin, rollins in place, PRN dry mouth spray given

## 2023-03-20 NOTE — PROGRESS NOTES
Care Management Follow Up    Length of Stay (days): 2    Expected Discharge Date: 03/20/2023     Concerns to be Addressed: discharge planning     Patient plan of care discussed at interdisciplinary rounds: Yes    Anticipated Discharge Disposition: Transitional Care     Anticipated Discharge Services: None  Anticipated Discharge DME: Ritesh    Patient/family educated on Medicare website which has current facility and service quality ratings: yes  Education Provided on the Discharge Plan:    Patient/Family in Agreement with the Plan: yes    Referrals Placed by CM/SW:    Private pay costs discussed: Not applicable    Additional Information:  Patient has been accepted at [facility] (Phone: [phone] Admissions: [phone] Fax: [phone]) for morning on 3/21/23. Writer will arrange ride after PT consult at 1500.  PAS# [id number]    Esequiel (friend willing to tx) [phone] please call am of 3/21 with time.    Cynthia Child RN   Inpatient Care Coordinator  [facility] [phone]  [facility] [phone]      Cynthia Child RN

## 2023-03-20 NOTE — PLAN OF CARE
Goal Outcome Evaluation:      Plan of Care Reviewed With: patient    Overall Patient Progress: no changeOverall Patient Progress: no change    Outcome Evaluation: Patient continues to report significant left hip pain all the way down below his knee. Dressing has 2 small areas of drainage. He has been up ambulating in the [place]menta x 3 this afternoon. No further drainage after walking. Rice in place with good output. This evening pt had a 7 beat run of v tach. He was sleeping at the time and asymptomatic. Vitals ok. Magnesium rechecked and WNL.

## 2023-03-21 ENCOUNTER — APPOINTMENT (OUTPATIENT)
Dept: GENERAL RADIOLOGY | Facility: CLINIC | Age: 83
DRG: 812 | End: 2023-03-21
Attending: FAMILY MEDICINE
Payer: COMMERCIAL

## 2023-03-21 ENCOUNTER — APPOINTMENT (OUTPATIENT)
Dept: PHYSICAL THERAPY | Facility: CLINIC | Age: 83
DRG: 812 | End: 2023-03-21
Attending: FAMILY MEDICINE
Payer: COMMERCIAL

## 2023-03-21 LAB
MAGNESIUM SERPL-MCNC: 2.1 MG/DL (ref 1.7–2.3)
PLATELET # BLD AUTO: 207 10E3/UL (ref 150–450)
POTASSIUM SERPL-SCNC: 4 MMOL/L (ref 3.4–5.3)

## 2023-03-21 PROCEDURE — 250N000013 HC RX MED GY IP 250 OP 250 PS 637: Performed by: FAMILY MEDICINE

## 2023-03-21 PROCEDURE — 85049 AUTOMATED PLATELET COUNT: CPT | Performed by: INTERNAL MEDICINE

## 2023-03-21 PROCEDURE — 74018 RADEX ABDOMEN 1 VIEW: CPT

## 2023-03-21 PROCEDURE — 250N000013 HC RX MED GY IP 250 OP 250 PS 637: Performed by: INTERNAL MEDICINE

## 2023-03-21 PROCEDURE — 250N000011 HC RX IP 250 OP 636: Performed by: INTERNAL MEDICINE

## 2023-03-21 PROCEDURE — 97530 THERAPEUTIC ACTIVITIES: CPT | Mod: GP | Performed by: PHYSICAL THERAPIST

## 2023-03-21 PROCEDURE — 99233 SBSQ HOSP IP/OBS HIGH 50: CPT | Performed by: FAMILY MEDICINE

## 2023-03-21 PROCEDURE — 120N000001 HC R&B MED SURG/OB

## 2023-03-21 PROCEDURE — 84132 ASSAY OF SERUM POTASSIUM: CPT | Performed by: NURSE PRACTITIONER

## 2023-03-21 PROCEDURE — 250N000013 HC RX MED GY IP 250 OP 250 PS 637: Performed by: NURSE PRACTITIONER

## 2023-03-21 PROCEDURE — 36415 COLL VENOUS BLD VENIPUNCTURE: CPT | Performed by: INTERNAL MEDICINE

## 2023-03-21 PROCEDURE — 83735 ASSAY OF MAGNESIUM: CPT | Performed by: NURSE PRACTITIONER

## 2023-03-21 RX ORDER — LEVOFLOXACIN 500 MG/1
500 TABLET, FILM COATED ORAL DAILY
Qty: 3 TABLET | Refills: 0 | Status: SHIPPED | OUTPATIENT
Start: 2023-03-22 | End: 2023-03-26

## 2023-03-21 RX ORDER — ACETAMINOPHEN 500 MG
1000 TABLET ORAL 3 TIMES DAILY
Refills: 0 | DISCHARGE
Start: 2023-03-21 | End: 2023-03-26

## 2023-03-21 RX ORDER — DOCUSATE SODIUM 100 MG/1
100 CAPSULE, LIQUID FILLED ORAL 3 TIMES DAILY PRN
DISCHARGE
Start: 2023-03-21 | End: 2023-03-26

## 2023-03-21 RX ORDER — TROLAMINE SALICYLATE 10 G/100G
CREAM TOPICAL 4 TIMES DAILY
Qty: 100 G | Refills: 0 | DISCHARGE
Start: 2023-03-21 | End: 2023-03-26

## 2023-03-21 RX ORDER — OXYCODONE HYDROCHLORIDE 5 MG/1
2.5 TABLET ORAL EVERY 4 HOURS PRN
Qty: 10 TABLET | Refills: 0 | Status: SHIPPED | OUTPATIENT
Start: 2023-03-21 | End: 2023-03-27

## 2023-03-21 RX ORDER — OXYCODONE HYDROCHLORIDE 5 MG/1
2.5 TABLET ORAL
Qty: 10 TABLET | Refills: 0 | Status: SHIPPED | OUTPATIENT
Start: 2023-03-21 | End: 2023-03-27

## 2023-03-21 RX ADMIN — OXYCODONE HYDROCHLORIDE 2.5 MG: 5 TABLET ORAL at 16:17

## 2023-03-21 RX ADMIN — GABAPENTIN 100 MG: 100 CAPSULE ORAL at 19:57

## 2023-03-21 RX ADMIN — OXYCODONE HYDROCHLORIDE 2.5 MG: 5 TABLET ORAL at 09:45

## 2023-03-21 RX ADMIN — DOCUSATE SODIUM 100 MG: 100 CAPSULE, LIQUID FILLED ORAL at 09:46

## 2023-03-21 RX ADMIN — FOLIC ACID 1 MG: 1 TABLET ORAL at 09:46

## 2023-03-21 RX ADMIN — GABAPENTIN 100 MG: 100 CAPSULE ORAL at 09:45

## 2023-03-21 RX ADMIN — ATORVASTATIN CALCIUM 80 MG: 40 TABLET, FILM COATED ORAL at 19:57

## 2023-03-21 RX ADMIN — DOCUSATE SODIUM 100 MG: 100 CAPSULE, LIQUID FILLED ORAL at 19:57

## 2023-03-21 RX ADMIN — LEVOFLOXACIN 500 MG: 500 TABLET, FILM COATED ORAL at 09:51

## 2023-03-21 RX ADMIN — PANTOPRAZOLE SODIUM 40 MG: 40 TABLET, DELAYED RELEASE ORAL at 09:45

## 2023-03-21 RX ADMIN — METOPROLOL SUCCINATE 12.5 MG: 25 TABLET, EXTENDED RELEASE ORAL at 09:45

## 2023-03-21 RX ADMIN — OXYCODONE HYDROCHLORIDE 2.5 MG: 5 TABLET ORAL at 22:38

## 2023-03-21 RX ADMIN — ASPIRIN 81 MG: 81 TABLET, COATED ORAL at 09:44

## 2023-03-21 RX ADMIN — ACETAMINOPHEN 975 MG: 325 TABLET ORAL at 16:17

## 2023-03-21 RX ADMIN — HEPARIN SODIUM 5000 UNITS: 10000 INJECTION, SOLUTION INTRAVENOUS; SUBCUTANEOUS at 02:28

## 2023-03-21 RX ADMIN — ASPIRIN 81 MG: 81 TABLET, COATED ORAL at 19:57

## 2023-03-21 RX ADMIN — SACUBITRIL AND VALSARTAN 1 TABLET: 97; 103 TABLET, FILM COATED ORAL at 09:51

## 2023-03-21 RX ADMIN — Medication: at 13:09

## 2023-03-21 RX ADMIN — OXYCODONE HYDROCHLORIDE 2.5 MG: 5 TABLET ORAL at 19:04

## 2023-03-21 RX ADMIN — SACUBITRIL AND VALSARTAN 1 TABLET: 97; 103 TABLET, FILM COATED ORAL at 19:58

## 2023-03-21 RX ADMIN — HEPARIN SODIUM 5000 UNITS: 10000 INJECTION, SOLUTION INTRAVENOUS; SUBCUTANEOUS at 13:11

## 2023-03-21 RX ADMIN — Medication 2.5 MG: at 13:11

## 2023-03-21 RX ADMIN — ACETAMINOPHEN 975 MG: 325 TABLET ORAL at 19:58

## 2023-03-21 RX ADMIN — Medication: at 16:18

## 2023-03-21 RX ADMIN — PANTOPRAZOLE SODIUM 40 MG: 40 TABLET, DELAYED RELEASE ORAL at 19:57

## 2023-03-21 RX ADMIN — SENNOSIDES AND DOCUSATE SODIUM 1 TABLET: 8.6; 5 TABLET ORAL at 19:57

## 2023-03-21 RX ADMIN — Medication: at 19:58

## 2023-03-21 RX ADMIN — SENNOSIDES AND DOCUSATE SODIUM 1 TABLET: 8.6; 5 TABLET ORAL at 09:45

## 2023-03-21 RX ADMIN — OXYCODONE HYDROCHLORIDE 5 MG: 5 TABLET ORAL at 02:48

## 2023-03-21 RX ADMIN — THIAMINE HCL TAB 100 MG 100 MG: 100 TAB at 09:46

## 2023-03-21 RX ADMIN — ACETAMINOPHEN 975 MG: 325 TABLET ORAL at 09:44

## 2023-03-21 ASSESSMENT — ACTIVITIES OF DAILY LIVING (ADL)
ADLS_ACUITY_SCORE: 30
ADLS_ACUITY_SCORE: 30
ADLS_ACUITY_SCORE: 26
ADLS_ACUITY_SCORE: 30

## 2023-03-21 NOTE — PLAN OF CARE
Physical Therapy Discharge Summary    Reason for therapy discharge:    Discharged to transitional care facility.    Progress towards therapy goal(s). See goals on Care Plan in [facility] electronic health record for goal details.  Goals partially met.  Barriers to achieving goals:   limited tolerance for therapy and discharge from facility.    Therapy recommendation(s):    Continued therapy is recommended.  Rationale/Recommendations:   . Patient would benefit from continued skilled therapeutic intervention in order to progress them towards a higher level of function in accordance with their surgeon's protocol.      Thank you for your referral.  Loreto Leger, PT, DPT, ATC, [facility]  O: [phone]  E: [email or web address]

## 2023-03-21 NOTE — PROGRESS NOTES
[facility]    Medicine Progress Note - Hospitalist Service    Date of Admission:  3/17/2023    Assessment & Plan   David Thomson is a 82 year old male admitted on 3/17/2023 for intractable left hip pain status post recent left hip surgery.  Patient was set up for discharge to TCU early this morning however today patient is incredibly upset reporting he never agreed to that because that was our plan but never his because he wants to go [place], his pain is still out of control, and he is very displeased with his stay so far.  Discussed with patient that discharge that had been planned for this morning will be cancelled, will focus on improving pain control, have physical therapy evaluate once control is better and see what a safe discharge plan would be.  Patient is agreeable to stay with that goal.      # Intractable left hip pain status post recent left hip revision 3/16/2023:  - X-ray done in ER shows no acute finding and orthopedic surgery from the [facility] was consulted. On scheduled Tylenol and scheduled low-dose oxycodone 2.5 mg 3 times daily.  He required additional pain medication for breakthrough pain and as needed oxycodone 5 mg 4 hours and as needed Hydroxyzine ordered. Opioids have dosed with caution due to history of hospital-acquired delirium.  His Tizanidine was discontinued due to systolic blood pressure 87.  He responded to IV fluids.  Systolic blood pressures remained in the high 90s to 110's.  Postoperative anemia hemoglobin 9.4-> 8.9->7.8->8.6.  Patient's iron studies were stable.  Elevated by physical therapy who recommended TCU placement.  Initially patient was hesitant to agree to TCU but he does agree now and per care management team patient has been accepted at [facility] with plan to discharge today.  No refusing as above.  -will increase schedule oxycodone to 2.5 every 4 hours while awake  -change prn oxycodone to 2.5 mg every 4 hours as needed  for pain  -continue scheduled Tylenol and lidoderm patch  -have physical therapy re-evaluate this afternoon  -encouraged patient to be open to TCU or going home with someone who can assist in providing the 24/7 support needed if still recommended after pain better controlled.       # Demand Ischemia and elevated d dimer felt to be secondary to postoperative status.  # Chronic Mild ST Elevation:  # Known Left Bundle Branch Block:  # Non Sustained V-Tachycardia:  Over the weekend noted to have ST elevation which was found to be chronic and mild in setting of known left bundle branch block.  He received full dose aspirin 325 mg x 1.  Denied chest pain.  No hypoxia.  He was on subcutaneous heparin for VTE prophylaxis.  D-dimer 0.83.  troponins were trended with noted elevation 140s with uptrend 188 today.  Demand ischemia and elevated troponin felt to be secondary to postoperative status.  Overnights of 03/20, patient had 7 beat run of V. tach and remained asymptomatic.  Patient continued to remain chest pain-free with repeat EKGs negative for acute ST-T elevation.    [facility] cardiology Dr. Dhaliwal consulted on 3/21 who recommended given patient asymptomatic with mildly elevated troponins from cardiology standpoint can discharge with outpatient cardiology follow-up.  In regards to NSVT, can do 7-day event monitor and follow-up outpatient cardiology.    # Thrombocytopenia, Chronic: Platelet uptrend 167    # Recent UTI:  03/16: Urine culture showed > 100,000 CFU/mL Klebsiella Oxytoca. Sensitive to Flouroquinolones. Was started on Levaquin. Today 4 of 7.     # Urinary Retention:  # BPH (H)  Straight cath past 24 hours for PVR > 500 mL. Rollins catheter placed. Will be discharged with rollins catheter in place for outpatient urology follow up.     # Renal Insufficiency (H): Creatinine stable. Renally dose medications      # Chronic HFpEF (EF 55-60%)  - not in acute exacerbation.   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   Denies chest pain    # Elevated D Dimer: felt to be related to recent surgery. No hypoxia. Not on supplemental oxygen.     # Primary Hypertension:  # Transient Hypotension: Resolved  Home dose Metoprolol and Entresto with parameters  Discontinued Tizanidine due to episode of hypotension  Continue to monitor blood pressures closely.     # Atrial Fibrillation (H)  Not in RVR. On aspirin, not on antiarrythmic. Home dose Eliquis on hold in setting of recent subdural hematoma.     # Generalized weakness:   # Physical Deconditioning:  - PT/OT/SW    # Hyperlipidemia: continue home Statin    # Neuropathy.  Resume home Gabapentin.    # Sleep Apnea: Patient reports does not tolerate CPAP at home. PRN low flow oxygen while inpatient.     # History Left Frontoparietal Subdural Hematoma (January 2023) in setting of frequent falls  - Evaluated by [facility] on 2/28 with review of imaging from 2/7 showing resolution of SDH.   - Home dose Eliquis on hold    Diet: Combination Diet Low Saturated Fat Na <2400mg Diet, No Caffeine Diet    DVT Prophylaxis: Heparin SQ  Rice Catheter: PRESENT, indication: Retention  Lines: None     Cardiac Monitoring: ACTIVE order. Indication: Tachyarrhythmias, acute (48 hours)  Code Status: Full Code      Clinically Significant Risk Factors              # Hypoalbuminemia: Lowest albumin = 3 g/dL at 3/18/2023  5:17 PM, will monitor as appropriate           # Obesity: Estimated body mass index is 33.3 kg/m  as calculated from the following:    Height as of this encounter: 1.854 m (6' 1\").    Weight as of this encounter: 114.5 kg (252 lb 6.8 oz)., PRESENT ON ADMISSION         Disposition Plan      Unknown at this time    Lashay Alcantar MD  Hospitalist Service  [facility]  Securely message with " [employer] (more info)  Text page via [facility] Paging/Directory   ______________________________________________________________________    Interval History   Patient has been vitally stable, slept well.  Work up and is very upset by the discharge plan and states he doesn't remember ever agreeing to TCU and it was forced on him and he will not be going.  He reports his pain is not well controlled.  No new symptoms    Physical Exam   Vital Signs: Temp: 97.2  F (36.2  C) Temp src: Oral BP: 107/56 Pulse: 91   Resp: 18 SpO2: 95 % O2 Device: None (Room air)    Weight: 252 lbs 6.83 oz    Constitutional: awake, alert, yelling and agitated at the start of the conversation but more calm by the end, no acute medical distress  Respiratory: No increased work of breathing, good air exchange, clear to auscultation bilaterally, no crackles or wheezing  Cardiovascular: irregularly irregular rhythm   Neurologic: Awake, alert, oriented to name, place and situation and can articulate very clearly why he does not want to go to [facility] and what his goals and wishes are    Medical Decision Making       55 MINUTES SPENT BY ME on the date of service doing chart review, history, exam, documentation & further activities per the note.      Data     I have personally reviewed the following data over the past 24 hrs:    N/A  \   N/A   / 207     N/A N/A N/A /  N/A   4.0 N/A N/A \

## 2023-03-21 NOTE — PROGRESS NOTES
Faxed orders to [facility] Fax: [phone] and packet with 2 narc scripts will be sent with patient.

## 2023-03-21 NOTE — TELEPHONE ENCOUNTER
Called pt, he is still admitted in the hospital. He dos not know any plans for discharge. He is frustrated that the hospital says one thing and the clinic says the other. States the catheter has been in for 3 weeks now. The pt doesn't know discharge plans currently, scheduling at this time does not make sense. Pt hung up on RN.    Elly JUSTICE RN Specialty Triage 3/21/2023 3:59 PM

## 2023-03-21 NOTE — PROGRESS NOTES
Care Management Follow Up    Length of Stay (days): 3    Expected Discharge Date: 03/21/2023     Concerns to be Addressed: discharge planning       Patient plan of care discussed at interdisciplinary rounds: Yes    Anticipated Discharge Disposition: Home, Home Care, Transitional Care (TCU recommended.  Patient is declining TCU placement.)  Disposition Comments: Patient declining TCU recommendation today. Patient's goal is to return home.    Anticipated Discharge Services: None    Anticipated Discharge DME: Ritesh    Patient/family educated on Medicare website which has current facility and service quality ratings: yes    Education Provided on the Discharge Plan:  yes    Patient/Family in Agreement with the Plan: Patient is planning to return home after the hospital stay.     Referrals Placed by CM/SW: Post Acute Facilities    Private pay costs discussed: Not applicable    Additional Information:  Patient has decided today that he is not in agreement with going to the TCU at [facility] or any other TCU at this time.  Patient wants to return home after the hospital stay.  Per provider, patient will be staying in the hospital another day for further pain management.  Assessment will continue for discharge planning needs.  Writer has called and [facility] with [facility] that patient will not be admitting to their TCU.      Plan as of yesterday and this morning was for patient's friend, Esequiel, to transport patient.  Writer had spoken with Esequiel and he arrived at the hospital for transport during the time patient was declining T[facility] to [facility].      Writer visited with patient.  He stated he wants to return home after the hospital stay and does not want to go to a TCU.  (Patient has heard of others that went to [facility] that didn't have a good experience).  Discussed home care and [facility]  Discussed that friend, Esequiel, is here and patient stated he is willing to  visit with Esequiel.  Esequiel did report to writer that family is not involved with patient and that he most likely will be the one to transport when patient is ready for discharge.  Writer apologized to Esequiel for the confusion this morning.  Esequiel was very pleasant and understanding.      Care Management will continue to follow and assist with discharge planning.       VIET Willingham  [facility]   [phone]

## 2023-03-21 NOTE — PLAN OF CARE
Goal Outcome Evaluation:      Plan of Care Reviewed With: patient    Overall Patient Progress: no changeOverall Patient Progress: no change    Outcome Evaluation: Pt A&Ox4. VSS on room air. Pt c/o pain in left hip and left knee, prn Oxycodone given & ice applied to hip. Aquacel to left hip intact, drainage outlined, no new drainage. Aspercreme applied to LLE. Rice in place for retention, urine output adequate, cath care completed. PRN biotene administered for dry mouth. Tele shows SR with 1st degree AV block and bundle branch block. IS level 2000, lung sounds diminished and clear in all quadrants. BLE edema noted, legs elevated. Pupils unequal, left pupil has irregular shape and sluggish to light, right pupil 2mm with brisk reaction to light. 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

## 2023-03-22 ENCOUNTER — APPOINTMENT (OUTPATIENT)
Dept: PHYSICAL THERAPY | Facility: CLINIC | Age: 83
DRG: 812 | End: 2023-03-22
Attending: FAMILY MEDICINE
Payer: COMMERCIAL

## 2023-03-22 ENCOUNTER — APPOINTMENT (OUTPATIENT)
Dept: OCCUPATIONAL THERAPY | Facility: CLINIC | Age: 83
DRG: 812 | End: 2023-03-22
Attending: FAMILY MEDICINE
Payer: COMMERCIAL

## 2023-03-22 PROBLEM — I25.10 CAD (CORONARY ARTERY DISEASE): Status: ACTIVE | Noted: 2023-03-22

## 2023-03-22 PROBLEM — N40.1 BENIGN PROSTATIC HYPERPLASIA WITH URINARY RETENTION: Status: ACTIVE | Noted: 2023-03-22

## 2023-03-22 PROBLEM — Z86.79 HISTORY OF SUBDURAL HEMATOMA: Status: ACTIVE | Noted: 2023-03-22

## 2023-03-22 PROBLEM — R33.8 BENIGN PROSTATIC HYPERPLASIA WITH URINARY RETENTION: Status: ACTIVE | Noted: 2023-03-22

## 2023-03-22 PROBLEM — E66.9 OBESITY: Status: ACTIVE | Noted: 2023-03-22

## 2023-03-22 PROBLEM — D62 ANEMIA DUE TO BLOOD LOSS, ACUTE: Status: ACTIVE | Noted: 2023-03-22

## 2023-03-22 PROBLEM — G47.33 OSA (OBSTRUCTIVE SLEEP APNEA): Status: ACTIVE | Noted: 2023-03-22

## 2023-03-22 PROBLEM — I50.32 CHRONIC HEART FAILURE WITH PRESERVED EJECTION FRACTION (H): Status: ACTIVE | Noted: 2023-03-22

## 2023-03-22 PROBLEM — K30 INDIGESTION: Status: ACTIVE | Noted: 2023-03-22

## 2023-03-22 PROBLEM — N39.0 UTI DUE TO KLEBSIELLA SPECIES: Status: ACTIVE | Noted: 2023-03-22

## 2023-03-22 PROBLEM — Z95.1 S/P CABG (CORONARY ARTERY BYPASS GRAFT): Status: ACTIVE | Noted: 2023-03-22

## 2023-03-22 PROBLEM — D69.6 THROMBOCYTOPENIA (H): Status: ACTIVE | Noted: 2023-03-22

## 2023-03-22 PROBLEM — Z95.2 S/P AVR: Status: ACTIVE | Noted: 2023-03-22

## 2023-03-22 PROBLEM — B96.89 UTI DUE TO KLEBSIELLA SPECIES: Status: ACTIVE | Noted: 2023-03-22

## 2023-03-22 LAB
ANION GAP SERPL CALCULATED.3IONS-SCNC: 11 MMOL/L (ref 7–15)
BACTERIA TISS BX CULT: NORMAL
BUN SERPL-MCNC: 19.9 MG/DL (ref 8–23)
CALCIUM SERPL-MCNC: 8.8 MG/DL (ref 8.8–10.2)
CHLORIDE SERPL-SCNC: 101 MMOL/L (ref 98–107)
CREAT SERPL-MCNC: 1.02 MG/DL (ref 0.67–1.17)
DEPRECATED HCO3 PLAS-SCNC: 25 MMOL/L (ref 22–29)
GFR SERPL CREATININE-BSD FRML MDRD: 74 ML/MIN/1.73M2
GLUCOSE SERPL-MCNC: 138 MG/DL (ref 70–99)
HGB BLD-MCNC: 8.9 G/DL (ref 13.3–17.7)
MAGNESIUM SERPL-MCNC: 2 MG/DL (ref 1.7–2.3)
POTASSIUM SERPL-SCNC: 4.1 MMOL/L (ref 3.4–5.3)
SODIUM SERPL-SCNC: 137 MMOL/L (ref 136–145)

## 2023-03-22 PROCEDURE — 99233 SBSQ HOSP IP/OBS HIGH 50: CPT | Performed by: PEDIATRICS

## 2023-03-22 PROCEDURE — 36415 COLL VENOUS BLD VENIPUNCTURE: CPT | Performed by: FAMILY MEDICINE

## 2023-03-22 PROCEDURE — 83735 ASSAY OF MAGNESIUM: CPT | Performed by: FAMILY MEDICINE

## 2023-03-22 PROCEDURE — 250N000013 HC RX MED GY IP 250 OP 250 PS 637: Performed by: INTERNAL MEDICINE

## 2023-03-22 PROCEDURE — 250N000013 HC RX MED GY IP 250 OP 250 PS 637: Performed by: NURSE PRACTITIONER

## 2023-03-22 PROCEDURE — 97530 THERAPEUTIC ACTIVITIES: CPT | Mod: GP | Performed by: PHYSICAL THERAPIST

## 2023-03-22 PROCEDURE — 250N000013 HC RX MED GY IP 250 OP 250 PS 637: Performed by: PEDIATRICS

## 2023-03-22 PROCEDURE — 85018 HEMOGLOBIN: CPT | Performed by: FAMILY MEDICINE

## 2023-03-22 PROCEDURE — 250N000011 HC RX IP 250 OP 636: Performed by: INTERNAL MEDICINE

## 2023-03-22 PROCEDURE — 120N000001 HC R&B MED SURG/OB

## 2023-03-22 PROCEDURE — 80048 BASIC METABOLIC PNL TOTAL CA: CPT | Performed by: FAMILY MEDICINE

## 2023-03-22 PROCEDURE — 97530 THERAPEUTIC ACTIVITIES: CPT | Mod: GO | Performed by: SPECIALIST/TECHNOLOGIST

## 2023-03-22 PROCEDURE — 250N000013 HC RX MED GY IP 250 OP 250 PS 637: Performed by: FAMILY MEDICINE

## 2023-03-22 RX ORDER — MAGNESIUM OXIDE 400 MG/1
400 TABLET ORAL EVERY 4 HOURS
Status: COMPLETED | OUTPATIENT
Start: 2023-03-22 | End: 2023-03-22

## 2023-03-22 RX ORDER — MAGNESIUM HYDROXIDE/ALUMINUM HYDROXICE/SIMETHICONE 120; 1200; 1200 MG/30ML; MG/30ML; MG/30ML
30 SUSPENSION ORAL EVERY 4 HOURS PRN
Status: DISCONTINUED | OUTPATIENT
Start: 2023-03-22 | End: 2023-03-27 | Stop reason: HOSPADM

## 2023-03-22 RX ORDER — SUCRALFATE ORAL 1 G/10ML
1 SUSPENSION ORAL
Status: DISCONTINUED | OUTPATIENT
Start: 2023-03-22 | End: 2023-03-27 | Stop reason: HOSPADM

## 2023-03-22 RX ADMIN — ACETAMINOPHEN 975 MG: 325 TABLET ORAL at 08:38

## 2023-03-22 RX ADMIN — ATORVASTATIN CALCIUM 80 MG: 40 TABLET, FILM COATED ORAL at 21:05

## 2023-03-22 RX ADMIN — ASPIRIN 81 MG: 81 TABLET, COATED ORAL at 08:39

## 2023-03-22 RX ADMIN — LEVOFLOXACIN 500 MG: 500 TABLET, FILM COATED ORAL at 08:40

## 2023-03-22 RX ADMIN — Medication: at 20:36

## 2023-03-22 RX ADMIN — SUCRALFATE 1 G: 1 SUSPENSION ORAL at 18:27

## 2023-03-22 RX ADMIN — FOLIC ACID 1 MG: 1 TABLET ORAL at 08:39

## 2023-03-22 RX ADMIN — Medication: at 17:51

## 2023-03-22 RX ADMIN — PANTOPRAZOLE SODIUM 40 MG: 40 TABLET, DELAYED RELEASE ORAL at 21:05

## 2023-03-22 RX ADMIN — Medication: at 08:42

## 2023-03-22 RX ADMIN — HEPARIN SODIUM 5000 UNITS: 10000 INJECTION, SOLUTION INTRAVENOUS; SUBCUTANEOUS at 13:07

## 2023-03-22 RX ADMIN — METOPROLOL SUCCINATE 12.5 MG: 25 TABLET, EXTENDED RELEASE ORAL at 08:40

## 2023-03-22 RX ADMIN — OXYCODONE HYDROCHLORIDE 2.5 MG: 5 TABLET ORAL at 13:07

## 2023-03-22 RX ADMIN — SACUBITRIL AND VALSARTAN 1 TABLET: 97; 103 TABLET, FILM COATED ORAL at 21:26

## 2023-03-22 RX ADMIN — ACETAMINOPHEN 975 MG: 325 TABLET ORAL at 13:07

## 2023-03-22 RX ADMIN — SACUBITRIL AND VALSARTAN 1 TABLET: 97; 103 TABLET, FILM COATED ORAL at 08:39

## 2023-03-22 RX ADMIN — SENNOSIDES AND DOCUSATE SODIUM 1 TABLET: 8.6; 5 TABLET ORAL at 21:05

## 2023-03-22 RX ADMIN — PANTOPRAZOLE SODIUM 40 MG: 40 TABLET, DELAYED RELEASE ORAL at 08:38

## 2023-03-22 RX ADMIN — THIAMINE HCL TAB 100 MG 100 MG: 100 TAB at 08:39

## 2023-03-22 RX ADMIN — DOCUSATE SODIUM 100 MG: 100 CAPSULE, LIQUID FILLED ORAL at 21:05

## 2023-03-22 RX ADMIN — SENNOSIDES AND DOCUSATE SODIUM 1 TABLET: 8.6; 5 TABLET ORAL at 08:40

## 2023-03-22 RX ADMIN — Medication 400 MG: at 16:31

## 2023-03-22 RX ADMIN — GABAPENTIN 100 MG: 100 CAPSULE ORAL at 08:38

## 2023-03-22 RX ADMIN — OXYCODONE HYDROCHLORIDE 2.5 MG: 5 TABLET ORAL at 06:48

## 2023-03-22 RX ADMIN — ACETAMINOPHEN 975 MG: 325 TABLET ORAL at 21:05

## 2023-03-22 RX ADMIN — OXYCODONE HYDROCHLORIDE 2.5 MG: 5 TABLET ORAL at 21:06

## 2023-03-22 RX ADMIN — ALUMINUM HYDROXIDE, MAGNESIUM HYDROXIDE, AND SIMETHICONE 30 ML: 200; 200; 20 SUSPENSION ORAL at 20:36

## 2023-03-22 RX ADMIN — SUCRALFATE 1 G: 1 SUSPENSION ORAL at 21:06

## 2023-03-22 RX ADMIN — OXYCODONE HYDROCHLORIDE 2.5 MG: 5 TABLET ORAL at 11:02

## 2023-03-22 RX ADMIN — ASPIRIN 81 MG: 81 TABLET, COATED ORAL at 21:05

## 2023-03-22 RX ADMIN — ALUMINUM HYDROXIDE, MAGNESIUM HYDROXIDE, AND SIMETHICONE 30 ML: 200; 200; 20 SUSPENSION ORAL at 16:31

## 2023-03-22 RX ADMIN — GABAPENTIN 100 MG: 100 CAPSULE ORAL at 21:05

## 2023-03-22 RX ADMIN — Medication 400 MG: at 11:02

## 2023-03-22 RX ADMIN — DOCUSATE SODIUM 100 MG: 100 CAPSULE, LIQUID FILLED ORAL at 08:38

## 2023-03-22 RX ADMIN — OXYCODONE HYDROCHLORIDE 2.5 MG: 5 TABLET ORAL at 17:50

## 2023-03-22 RX ADMIN — Medication: at 11:02

## 2023-03-22 RX ADMIN — HEPARIN SODIUM 5000 UNITS: 10000 INJECTION, SOLUTION INTRAVENOUS; SUBCUTANEOUS at 00:39

## 2023-03-22 ASSESSMENT — ACTIVITIES OF DAILY LIVING (ADL)
ADLS_ACUITY_SCORE: 30

## 2023-03-22 NOTE — PROGRESS NOTES
"Care Management Follow Up    Length of Stay (days): 4    Expected Discharge Date: 03/23/2023     Concerns to be Addressed: discharge planning       Patient plan of care discussed at interdisciplinary rounds: Yes    Anticipated Discharge Disposition: Transitional Care, Home, Home Care  Disposition Comments: Patient now partially in agreement to possible TCU placement and in agreement with writer sending referrals    Anticipated Discharge Services: None    Anticipated Discharge DME: Walker    Patient/family educated on Medicare website which has current facility and service quality ratings: yes    Education Provided on the Discharge Plan:  yes      Patient/Family in Agreement with the Plan: yes    Referrals Placed by CM/SW: Post Acute Facilities    Private pay costs discussed: Not applicable    Additional Information:  Hospital PT services has worked with patient twice today.  First visit, patient walked about 300 ft using a walker.  It is reported that patient cannot use a walker in the home, as there is not enough room for a walker to fit through and it has been reported that the home is cluttered.  Patient reports he uses his cane in the home.  During second visit with PT services, patient used a cane to walk and did not do well and balance was very poor.  After much discussion with PT staff person about how he was going to manage at home, patient reluctantly agreed that he needs to go to a TCU.      Writer visited with patient regarding TCU and area facilities.  Writer provided a list of Medicare certified TCU's.  Patient prefers not to go to [facility] due to \"stories he has heard from neighbors.\"  Discussed other area facilities.  Referrals are pending at:         [facility] ([facility])  Pending - Request Sent N/A [address] [address] [phone] --   Internal Comment last updated by Cynthia Child, RN 3/20/2023 1100    3/20 6166 follow-up email sent-sb  3/20 " "no bed today maybe 3/21            [facility] ([facility])  Pending - Request Sent N/A [address] [facility] [address] [phone] --   Internal Comment last updated by Cynthia Child RN 3/20/2023 1101    3/20 message left            [facility] ([facility])  Pending - Request Sent N/A [address], [address], [facility] [address] [phone] --   Internal Comment last updated by Tania Richardson MA 3/20/2023 1129    3/20 1128 LVM for Nelda-sb            [facility] ([facility])  Pending - Request Sent N/A [address], [facility] [address] [phone] --       Writer had been working with [facility] (Phone: [phone]) throughout the day today.  They stated that they had discharged this patient on 3/17/23 due to \"not having a safe plan in place at home\".  They agency re-assessed patient and have declined to accept for services.  [facility] was able to send out other referrals to other home care agencies.  9 other agencies declined, but [facility] (Phone: [phone] Fax: [phone]) Yifan Fonseca has accepted patient for home care services with the soonest start of care date as 3/28/23, if ok and if patient decides again that he will be going home and not to TCU.      Care Management will continue to follow and assist with discharge planning.      VIET Willingham  [facility]   [phone]     "

## 2023-03-22 NOTE — PLAN OF CARE
Goal Outcome Evaluation:      Plan of Care Reviewed With: patient    Overall Patient Progress: improvingOverall Patient Progress: improving    Outcome Evaluation: Abdominal xray results relayed to Dr. Calvillo. Continues to have pain on left hip radiating to left knew. Oxycodone given. Rice draining well. BM noted x3 this shift. Ambulatd x2 this halls.

## 2023-03-22 NOTE — PLAN OF CARE
Goal Outcome Evaluation:      Plan of Care Reviewed With: patient    Overall Patient Progress: improvingOverall Patient Progress: improving    Outcome Evaluation: Pt A&Ox4. VSS on RA. Continues to have pain in LLE. PRN and scheduled oxy given. Scheduled tylenol also administered. Tele continues to show SR with 1st degree AV block, as well as a BBB. Rice continues to have adequate output. Pt was originally supposed to discharge this AM, but due to miscommunication and refusal of TCU placement. Ambulated halls x3. Had an additional consult with PT in order to figure out what next steps are going to look like. Will revisist more tomorrow. Abdominal xray done right at 1900.

## 2023-03-22 NOTE — PROGRESS NOTES
[facility]    Medicine Progress Note - Hospitalist Service    Date of Admission:  3/17/2023    Assessment & Plan   David Thomson is a 82 year old male with multiple complex chronic medical problems re-hospitalized on 3/17/2023 for intractable left hip pain after recent left hip revision total arthroplasty on 3/15/23.  Acute left hip pain postoperatively has improved, he is tolerating advancing activity, and chronic medical problems are stable such that he is medically stable for discharge but he does not yet have a safe disposition plan.    Intractable left hip pain status post recent left hip revision 3/15/2023  Acute blood loss anemia after hip revision surgery  He presented with clinical findings suspicious for bloody seroma at the surgical site.  X-ray done in ER 3/17 showed no acute finding and orthopedic surgery from the [place] was consulted and advised nonoperative management.  It is suspected that the patient had worsening pain postoperatively on 3/16 probably due to anesthetic block wearing off.  Management of postoperative pain has improved with scheduled doses of Tylenol and oxycodone as well as Lidoderm patch.  Opioids have dosed with caution due to history of hospital-acquired delirium.  Previous tizanidine was discontinued due to transient hypotension with systolic blood pressure 87 that responded to IV fluids.   Postoperative hemoglobin has decreased from 12.9 preoperatively to 9.4-> 8.9->7.8->8.6 and has stabilized now at 8-9.  Ongoing active bleeding is not suspected, but acute blood losses with acute blood loss anemia from surgery are suspected.  Patient's iron studies were stable.  He has been repeatedly re-evaluated by physical therapy who recommended TCU placement because he remains very unsteady trying to use a cane and is unable to use a walker in his current home environment with which he is more steady.  Discharge planning remains in  progress.  -continue scheduled Tylenol, oxycodone, and lidoderm patch  -Recommended discharge to TCU for more aggressive rehabilitation, patient will continue to consider    Demand Ischemia and elevated d dimer felt to be secondary to postoperative status  Chronic left Bundle Branch Block with chronic Mild ST Elevation on EKG  CAD with previous CABG  Non Sustained Ventricular tachycardia  Noted to have ST elevation associated with chronic left bundle branch block.  He has not had angina.  Troponin has been elevated but trending downward and is likely due to demand ischemia from known coronary artery disease with previous CABG and recent surgery as well as acute blood loss anemia.   D-dimer mildly elevated at 0.83 in context of recent orthopedic surgery.  Elevated D-dimer has been attributed to postoperative status.  Overnight 03/20, patient had 7 beat run of V. tach and remained asymptomatic.  Patient continued to remain chest pain-free with stable troponin and no recurrence of ventricular dysrhythmia.    -[facility] cardiology Dr. Dhaliwal consulted on 3/21 who recommended given patient asymptomatic with mildly elevated troponins from cardiology standpoint can discharge with outpatient cardiology follow-up.  In regards to NSVT, cardiology advised 7-day event monitor after discharge with outpatient cardiology follow-up.  -Discontinue cardiac monitoring    Thrombocytopenia  Mild thrombocytopenia noted at admission and has since resolved.  He may have had consumption associated with recent surgery and postoperative bleeding.  -Follow platelet count    Recent Klebsiella UTI  BPH with urinary retention  03/16 Urine culture showed > 100,000 CFU/mL Klebsiella Oxytoca sensitive to Flouroquinolones. Was started on Levaquin.  Has had recurrent urinary retention associated with known BPH.  Was able to void spontaneously but had repeated episodes of intermittent bladder catheterization demonstrating PVR > 500 mL.  Urinary  catheter placed during this hospitalization.  Urinary retention may be exacerbated by a combination of opiate analgesics and decreased mobility.  -Continue levofloxacin, today 5 of 7  -Continue urinary catheter including at hospital discharge, anticipate voiding trial at TCU as he weans off of narcotics with potential outpatient urology follow-up if necessary    Chronic HFpEF (EF 55-60%)  Stable and not in acute exacerbation.   -Continue chronic doses of Entresto and metoprolol    s/p Bioprosthetic AVR for Aortic Stenosis:  Echo 1/23/23 with probable TAVR within normal limits.   -No acute intervention    Primary Hypertension:  Intermittent hypotension  Continuing to have intermittent transient low blood pressure readings that have not required specific intervention and from which she has been asymptomatic.  Suspect hypotension is due to a combination of chronic vasoactive medications, new addition of scheduled doses of opiate analgesics, and acute blood loss anemia.  Tizanidine was discontinued due to hypotension.  -May continue chronic doses of Metoprolol and Entresto with parameters about when to hold doses due to low blood pressure  -Continue to monitor blood pressures closely    Paroxysmal Atrial Fibrillation (H)  Generally maintaining sinus rhythm during hospitalization.  Chronically on metoprolol.  Previously had been taking Eliquis which was stopped when he developed subdural hematoma in January 2023.   -Continuing metoprolol  -Not treating with anticoagulation at this time    Generalized weakness  Physical Deconditioning  Multifactorial  -Continue PT/OT/SW for disposition planning    Hyperlipidemia  Chronic and presumed stable   -continue chronic statin    Neuropathy  Chronic and clinically stable   -Continue chronic dose of Gabapentin.    Obstructive sleep Apnea  Chronic and patient reported he does not tolerate CPAP.   -Use low flow oxygen PRN while hospitalized     History Left Frontoparietal Subdural  Hematoma (January 2023) in setting of frequent falls  Evaluated by [facility] on 2/28 with review of imaging from 2/7 showing resolution of SDH.  Neurosurgery has advised previously against restarting chronic Eliquis  -Outpatient follow-up with neurosurgery to determine when to resume chronic prophylactic anticoagulation       Diet: Combination Diet Low Saturated Fat Na <2400mg Diet, No Caffeine Diet  Diet    DVT Prophylaxis: Heparin SQ  Rice Catheter: PRESENT, indication: Retention  Lines: None     Cardiac Monitoring: ACTIVE order. Indication: Tachyarrhythmias, acute (48 hours)  Code Status: Full Code      Clinically Significant Risk Factors                         Disposition Plan      Expected Discharge Date: 03/23/2023      Destination: home with help/services  Discharge Comments: TCU vs home          Trever Moreno MD  Hospitalist Service  [facility]  Securely message with "[employer] (more info)  Text page via [employer] Paging/Directory   ______________________________________________________________________    Interval History   There were no significant overnight events.  He did have good bowel movements yesterday after taking laxatives.  Today he has had some indigestion that improved after a dose of Maalox but has since recurred.  He has not vomited.  He had a bowel movement again this morning but not this afternoon.  His left hip pain is better although he continues to have pain from about the mid thigh up toward his posterior buttock and low back.  He has been afebrile and generally stable hemodynamically.  He has had intermittent low blood pressure readings that have resolved without intervention at which time he has not been symptomatic.  His hip pain is constant but aggravated by certain positions and movements.  PT reports that he has been able to ambulate using a walker.  However, when attempting to use a cane, he was very off balance and had difficulty ambulating  any significant distance.  Patient says that on the day after his hip surgery he was able to walk up and down the hallways of the other hospital.  He was not having severe left hip pain at that time.  He is not sure whether he had an anesthetic nerve block at the time of his surgery.  He is tolerating oral intake.  Urine output has been adequate.    Physical Exam   Vital Signs: Temp: 97.8  F (36.6  C) Temp src: Oral BP: 94/56 Pulse: 79   Resp: 18 SpO2: 93 % O2 Device: None (Room air)     Patient Vitals for the past 24 hrs:   BP Temp Temp src Pulse Resp SpO2   03/22/23 1608 94/56 -- -- -- -- --   03/22/23 1538 (!) 85/57 97.8  F (36.6  C) Oral 79 18 93 %   03/22/23 1105 104/54 96.9  F (36.1  C) Oral 74 16 96 %   03/22/23 0840 103/57 -- -- 87 -- --   03/22/23 0754 108/52 97.4  F (36.3  C) Oral 74 18 92 %   03/22/23 0640 117/57 -- -- -- -- --   03/22/23 0243 98/51 98.3  F (36.8  C) Oral 81 18 98 %   03/21/23 2329 111/57 98.5  F (36.9  C) Oral 79 18 97 %   03/21/23 2029 118/58 98.9  F (37.2  C) Oral 75 18 96 %     Weight: 252 lbs 6.83 oz Body mass index is 33.3 kg/m .    Vitals:    03/17/23 1954 03/18/23 0226   Weight: 117 kg (258 lb) 114.5 kg (252 lb 6.8 oz)     Wt Readings from Last 4 Encounters:   03/18/23 114.5 kg (252 lb 6.8 oz)   03/15/23 117.2 kg (258 lb 6.1 oz)   03/09/23 117.9 kg (260 lb)   01/29/23 115.3 kg (254 lb 3.2 oz)       Intake/Output Summary (Last 24 hours) at 3/22/2023 1729  Last data filed at 3/22/2023 0900  Gross per 24 hour   Intake 540 ml   Output 525 ml   Net 15 ml     General Appearance: Obese elderly man without signs of acute distress while sitting up in a chair  Neuro: Alert and maintains wakefulness and attention, no confusion evident    Medical Decision Making     61 MINUTES SPENT BY ME on the date of service doing chart review, history, exam, documentation & further activities per the note.  NOTE(S)/MEDICAL RECORDS REVIEWED over the past 24 hours: Reviewed hospital discharge summary from  "March 15 to March 16 which indicated \"met PT/OT goals for safe mobility\".  Also reviewed results of preoperative hemoglobin 12.9 on March 14.      Data     I have personally reviewed the following data over the past 24 hrs:    N/A  \   8.9 (L)   / N/A     137 101 19.9 /  138 (H)   4.1 25 1.02 \          Cardiac monitor results reviewed over the past 24 hours: no arrhythmias, left bundle branch block evident  Imaging results reviewed over the past 24 hrs:   Recent Results (from the past 24 hour(s))   XR Abdomen Port 1 View    Narrative    EXAM: XR ABDOMEN PORT 1 VIEW  LOCATION: [facility]  DATE/TIME: 3/21/2023 7:21 PM    INDICATION: nausea, bloating, abdominal pain following orthopedic surgery last week. Passing flatus and having stool today still.  evaluate for etiology  COMPARISON: None.      Impression    IMPRESSION: Thecal electrode over the lower thoracic spine. Left total hip arthroplasty. Osseous structures intact. Normal bowel gas pattern. Lung bases clear.     Recent Labs   Lab 03/22/23  0559 03/21/23  0604 03/20/23  0534 03/19/23  0559 03/18/23  1717 03/18/23  0829 03/18/23  0108   WBC  --   --   --  5.3 4.4  --  6.1   HGB 8.9*  --   --  8.6* 7.6*   < > 8.9*   MCV  --   --   --  95 97  --  95   PLT  --  207  --  167 119*  --  149*     --   --  139 135*   < > 141   POTASSIUM 4.1 4.0 3.9 3.8 3.9   < > 3.8   CHLORIDE 101  --   --  102 100   < > 104   CO2 25  --   --  26 27   < > 29   BUN 19.9  --   --  19.4 22.9   < > 20.4   CR 1.02  --  0.94 0.99 0.95   < > 1.00   ANIONGAP 11  --   --  11 8   < > 8   ANDREW 8.8  --   --  8.8 8.0*   < > 8.7*   *  --   --  170* 117*   < > 151*   ALBUMIN  --   --   --  3.5 3.0*   < >  --    PROTTOTAL  --   --   --  5.9* 5.1*   < >  --    BILITOTAL  --   --   --  1.2 0.9   < >  --    ALKPHOS  --   --   --  80 69   < >  --    ALT  --   --   --  7* 7*   < >  --    AST  --   --   --  19 17   < >  --     < > = values in this interval not " displayed.

## 2023-03-22 NOTE — DISCHARGE SUMMARY
ORTHOPAEDIC SURGERY DISCHARGE SUMMARY     Date of Admission: 3/15/2023  Date of Discharge: 3/16/2023  1:43 PM  Disposition: Home  Staff Physician: Dr. Evans   Primary Care Provider: No Ref-Primary, Physician    DISCHARGE DIAGNOSIS:  Failure of left total hip arthroplasty    PROCEDURES: Procedure(s):  Left total hip arthroplasty revision with Dr. 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 Operative versus nonoperative options were presented to the patient, and due to his significant symptoms, decision was made to undergo operative intervention.     HOSPITAL COURSE:    The patient was admitted following the above listed procedures for pain control and rehabilitation. David Thomson did well post-operatively. Medicine was consulted post operatively to aid in management of medical co-morbidities. The patient received routine nursing cares and at the time of discharge was medically stable. Vital signs were stable throughout admission. The patient is tolerating a regular diet and is voiding spontaneously. All PT/OT goals have been met for safe mobility. Pain is now controlled on oral medications which will be available on discharge. Stool softeners have been used while taking pain medications to help prevent constipation. David Thomson is deemed medically safe to discharge.     Antibiotics:  Ancef given periop and 24 hours postop.   DVT prophylaxis:  ASA 162mg initiated after surgery and will be continued for 4 weeks.   PT Progress:  Has met PT/OT goals for safe mobility.    Pain Meds:  Weaned off all IV pain meds by discharge.  Inpatient Events: No significant events or  complications.       FOLLOWUP:    Follow up 2 week wound check     Future Appointments   Date Time Provider Department Center   4/3/2023  2:30 PM Homer Burns PA-C [facility]       Orthopaedic Surgery appointments are at the [facility] [place] ([address] [place] [id number]). Call [phone] to schedule a follow-up appointment at this location with your provider.     PLANNED DISCHARGE ORDERS:      Discharge Medication List as of 3/16/2023  1:00 PM        START taking these medications    Details   aspirin (ASA) 81 MG EC tablet Take 1 tablet (81 mg) by mouth 2 times daily, Disp-60 tablet, R-0, E-Prescribe      polyethylene glycol (MIRALAX) 17 g packet Take 17 g by mouth daily, Disp-10 packet, R-0, E-Prescribe      senna-docusate (SENOKOT-S/PERICOLACE) 8.6-50 MG tablet Take 1 tablet by mouth 2 times daily, Disp-30 tablet, R-0, E-Prescribe      acetaminophen (TYLENOL) 325 MG tablet Take 2 tablets (650 mg) by mouth every 4 hours as needed for other, Disp-60 tablet, R-0, E-Prescribe      levofloxacin (LEVAQUIN) 500 MG tablet Take 1 tablet (500 mg) by mouth daily, Disp-5 tablet, R-0, E-Prescribe      oxyCODONE (ROXICODONE) 5 MG tablet Take 1 tablet (5 mg) by mouth every 4 hours as needed for moderate pain (4-6), Disp-26 tablet, R-0, E-Prescribe      tiZANidine (ZANAFLEX) 4 MG tablet Take 0.5 tablets (2 mg) by mouth 3 times daily as needed for muscle spasms, Disp-30 tablet, R-0, E-Prescribe           CONTINUE these medications which have CHANGED    Details   ENTRESTO  MG per tablet Take 1 tablet by mouth 2 times daily Morning and bedtime, GREG, HistoricalHOLD FOR SBP<110      gabapentin (NEURONTIN) 100 MG capsule Take 1 capsule (100 mg) by mouth 2 times daily, Historical           CONTINUE these medications which have NOT CHANGED    Details   atorvastatin (LIPITOR) 80 MG tablet Take 80 mg by mouth At Bedtime, Historical      folic acid (FOLVITE) 1 MG tablet Take 1 mg by mouth  daily, Historical      metoprolol succinate ER (TOPROL XL) 25 MG 24 hr tablet Take 12.5 mg by mouth daily, Historical      omeprazole (PRILOSEC) 20 MG DR capsule Take 20 mg by mouth 2 times daily Am and HS, Historical      vitamin B1 (THIAMINE) 100 MG tablet Take 100 mg by mouth daily, Historical      CAMPHOR-EUCALYPTUS-MENTHOL EX Apply thin layer four times a day as needed for itching, Historical               Discharge Procedure Orders   Home Care Referral   Referral Priority: Routine: Next available opening Referral Type: Home Health Therapies & Aides   Number of Visits Requested: 1     Reason for your hospital stay   Order Comments: REVISION, TOTAL ARTHROPLASTY, HIP LEFT     Activity   Order Comments: Your activity upon discharge: activity as tolerated    WBAT   Anterior hip precautions     Order Specific Question Answer Comments   Is discharge order? Yes      When to contact your care team   Order Comments: Call Dr. Evans  if you have any of the following: temperature greater than 101.3  or less than 96.5,  increased shortness of breath, increased drainage, increased swelling, or increased pain.    Contact phone number is      Wound care and dressings   Order Comments: Instructions to care for your wound at home: ice to area for comfort, keep wound clean and dry, may get incision wet in shower but do not soak or scrub, reinforce dressing as needed, and remove dressing in 7 days.     Adult [facility] Follow-up and recommended labs and tests   Order Comments: Follow up with Dr Evans as scheduled.      Appointments at [facility] at [address] [place] [id number] ([facility])     Call [phone] if you haven't heard regarding these appointments within 7 days of discharge.     Crutches DME   Order Comments: DME Documentation: Describe the reason for need to support medical necessity: Impaired gait status post hip surgery. I, the undersigned, certify  that the above prescribed supplies are medically necessary for this patient and is both reasonable and necessary in reference to accepted standards of medical practice in the treatment of this patient's condition and is not prescribed as a convenience.     Order Specific Question Answer Comments   DME Provider: [employer]    Crutch Type: Standard    Crutches Add On: NA    Length of Need: Lifetime      Cane DME   Order Comments: DME Documentation: Describe the reason for need to support medical necessity: Impaired gait status post hip surgery. I, the undersigned, certify that the above prescribed supplies are medically necessary for this patient and is both reasonable and necessary in reference to accepted standards of medical practice in the treatment of this patient's condition and is not prescribed as a convenience.     Order Specific Question Answer Comments   DME Provider: [employer]    Cane Type: Single Tip    Reminder: Patient can typically get 1 every 5 years      Walker DME   Order Comments: : DME Documentation: Describe the reason for need to support medical necessity: Impaired gait status post hip surgery. I, the undersigned, certify that the above prescribed supplies are medically necessary for this patient and is both reasonable and necessary in reference to accepted standards of medical practice in the treatment of this patient's condition and is not prescribed as a convenience.     Order Specific Question Answer Comments   DME Provider: [employer]    Walker Type: Standard (2 Wheel)    Accessories: N/A      Diet   Order Comments: Follow this diet upon discharge: Orders Placed This Encounter      Advance Diet as Tolerated: Regular Diet Adult     Order Specific Question Answer Comments   Is discharge order? Yes        Tika Garcia PGY4  Orthopedic Surgery

## 2023-03-23 ENCOUNTER — APPOINTMENT (OUTPATIENT)
Dept: PHYSICAL THERAPY | Facility: CLINIC | Age: 83
DRG: 812 | End: 2023-03-23
Attending: FAMILY MEDICINE
Payer: COMMERCIAL

## 2023-03-23 LAB
ALBUMIN SERPL BCG-MCNC: 3.2 G/DL (ref 3.5–5.2)
ALP SERPL-CCNC: 82 U/L (ref 40–129)
ALT SERPL W P-5'-P-CCNC: 10 U/L (ref 10–50)
ANION GAP SERPL CALCULATED.3IONS-SCNC: 9 MMOL/L (ref 7–15)
AST SERPL W P-5'-P-CCNC: 21 U/L (ref 10–50)
BILIRUB SERPL-MCNC: 1.3 MG/DL
BUN SERPL-MCNC: 20.9 MG/DL (ref 8–23)
CALCIUM SERPL-MCNC: 8.8 MG/DL (ref 8.8–10.2)
CHLORIDE SERPL-SCNC: 100 MMOL/L (ref 98–107)
CREAT SERPL-MCNC: 1.14 MG/DL (ref 0.67–1.17)
DEPRECATED HCO3 PLAS-SCNC: 26 MMOL/L (ref 22–29)
GFR SERPL CREATININE-BSD FRML MDRD: 65 ML/MIN/1.73M2
GLUCOSE SERPL-MCNC: 144 MG/DL (ref 70–99)
HGB BLD-MCNC: 8.5 G/DL (ref 13.3–17.7)
MAGNESIUM SERPL-MCNC: 2.1 MG/DL (ref 1.7–2.3)
PLATELET # BLD AUTO: 213 10E3/UL (ref 150–450)
POTASSIUM SERPL-SCNC: 4 MMOL/L (ref 3.4–5.3)
PROT SERPL-MCNC: 5.9 G/DL (ref 6.4–8.3)
SODIUM SERPL-SCNC: 135 MMOL/L (ref 136–145)

## 2023-03-23 PROCEDURE — 120N000001 HC R&B MED SURG/OB

## 2023-03-23 PROCEDURE — 83735 ASSAY OF MAGNESIUM: CPT | Performed by: FAMILY MEDICINE

## 2023-03-23 PROCEDURE — 97110 THERAPEUTIC EXERCISES: CPT | Mod: GP | Performed by: PHYSICAL THERAPIST

## 2023-03-23 PROCEDURE — 99232 SBSQ HOSP IP/OBS MODERATE 35: CPT | Performed by: PEDIATRICS

## 2023-03-23 PROCEDURE — 250N000013 HC RX MED GY IP 250 OP 250 PS 637: Performed by: INTERNAL MEDICINE

## 2023-03-23 PROCEDURE — 85049 AUTOMATED PLATELET COUNT: CPT | Performed by: PEDIATRICS

## 2023-03-23 PROCEDURE — 85018 HEMOGLOBIN: CPT | Performed by: PEDIATRICS

## 2023-03-23 PROCEDURE — 97116 GAIT TRAINING THERAPY: CPT | Mod: GP | Performed by: PHYSICAL THERAPIST

## 2023-03-23 PROCEDURE — 80053 COMPREHEN METABOLIC PANEL: CPT | Performed by: PEDIATRICS

## 2023-03-23 PROCEDURE — 250N000013 HC RX MED GY IP 250 OP 250 PS 637: Performed by: NURSE PRACTITIONER

## 2023-03-23 PROCEDURE — 36415 COLL VENOUS BLD VENIPUNCTURE: CPT | Performed by: PEDIATRICS

## 2023-03-23 PROCEDURE — 250N000013 HC RX MED GY IP 250 OP 250 PS 637: Performed by: FAMILY MEDICINE

## 2023-03-23 PROCEDURE — 250N000013 HC RX MED GY IP 250 OP 250 PS 637: Performed by: PEDIATRICS

## 2023-03-23 PROCEDURE — 250N000011 HC RX IP 250 OP 636: Performed by: INTERNAL MEDICINE

## 2023-03-23 RX ADMIN — OXYCODONE HYDROCHLORIDE 2.5 MG: 5 TABLET ORAL at 21:18

## 2023-03-23 RX ADMIN — PANTOPRAZOLE SODIUM 40 MG: 40 TABLET, DELAYED RELEASE ORAL at 20:11

## 2023-03-23 RX ADMIN — ALUMINUM HYDROXIDE, MAGNESIUM HYDROXIDE, AND SIMETHICONE 30 ML: 200; 200; 20 SUSPENSION ORAL at 10:21

## 2023-03-23 RX ADMIN — SUCRALFATE 1 G: 1 SUSPENSION ORAL at 18:01

## 2023-03-23 RX ADMIN — HEPARIN SODIUM 5000 UNITS: 10000 INJECTION, SOLUTION INTRAVENOUS; SUBCUTANEOUS at 01:00

## 2023-03-23 RX ADMIN — SUCRALFATE 1 G: 1 SUSPENSION ORAL at 12:34

## 2023-03-23 RX ADMIN — ASPIRIN 81 MG: 81 TABLET, COATED ORAL at 10:07

## 2023-03-23 RX ADMIN — GABAPENTIN 100 MG: 100 CAPSULE ORAL at 10:07

## 2023-03-23 RX ADMIN — ACETAMINOPHEN 975 MG: 325 TABLET ORAL at 10:06

## 2023-03-23 RX ADMIN — OXYCODONE HYDROCHLORIDE 2.5 MG: 5 TABLET ORAL at 10:05

## 2023-03-23 RX ADMIN — Medication: at 20:11

## 2023-03-23 RX ADMIN — ACETAMINOPHEN 975 MG: 325 TABLET ORAL at 20:11

## 2023-03-23 RX ADMIN — Medication: at 12:34

## 2023-03-23 RX ADMIN — OXYCODONE HYDROCHLORIDE 2.5 MG: 5 TABLET ORAL at 15:28

## 2023-03-23 RX ADMIN — DOCUSATE SODIUM 100 MG: 100 CAPSULE, LIQUID FILLED ORAL at 20:11

## 2023-03-23 RX ADMIN — Medication 2.5 MG: at 01:10

## 2023-03-23 RX ADMIN — SUCRALFATE 1 G: 1 SUSPENSION ORAL at 06:13

## 2023-03-23 RX ADMIN — DOCUSATE SODIUM 100 MG: 100 CAPSULE, LIQUID FILLED ORAL at 10:06

## 2023-03-23 RX ADMIN — GABAPENTIN 100 MG: 100 CAPSULE ORAL at 20:11

## 2023-03-23 RX ADMIN — FOLIC ACID 1 MG: 1 TABLET ORAL at 10:07

## 2023-03-23 RX ADMIN — Medication 1 SPRAY: at 02:52

## 2023-03-23 RX ADMIN — PANTOPRAZOLE SODIUM 40 MG: 40 TABLET, DELAYED RELEASE ORAL at 10:07

## 2023-03-23 RX ADMIN — THIAMINE HCL TAB 100 MG 100 MG: 100 TAB at 10:06

## 2023-03-23 RX ADMIN — ATORVASTATIN CALCIUM 80 MG: 40 TABLET, FILM COATED ORAL at 20:11

## 2023-03-23 RX ADMIN — SUCRALFATE 1 G: 1 SUSPENSION ORAL at 21:18

## 2023-03-23 RX ADMIN — LEVOFLOXACIN 500 MG: 500 TABLET, FILM COATED ORAL at 10:21

## 2023-03-23 RX ADMIN — OXYCODONE HYDROCHLORIDE 2.5 MG: 5 TABLET ORAL at 18:01

## 2023-03-23 RX ADMIN — Medication: at 18:03

## 2023-03-23 RX ADMIN — SENNOSIDES AND DOCUSATE SODIUM 1 TABLET: 8.6; 5 TABLET ORAL at 10:05

## 2023-03-23 RX ADMIN — SENNOSIDES AND DOCUSATE SODIUM 1 TABLET: 8.6; 5 TABLET ORAL at 20:11

## 2023-03-23 RX ADMIN — OXYCODONE HYDROCHLORIDE 2.5 MG: 5 TABLET ORAL at 06:12

## 2023-03-23 RX ADMIN — ACETAMINOPHEN 975 MG: 325 TABLET ORAL at 15:29

## 2023-03-23 ASSESSMENT — ACTIVITIES OF DAILY LIVING (ADL)
ADLS_ACUITY_SCORE: 30
ADLS_ACUITY_SCORE: 28
ADLS_ACUITY_SCORE: 30

## 2023-03-23 NOTE — PLAN OF CARE
Goal Outcome Evaluation:      Plan of Care Reviewed With: patient    Overall Patient Progress: improvingOverall Patient Progress: improving    Outcome Evaluation: Patient is alert and oriented. VVS on RA. Patient is a standby assist. Scheduled Tylenol and Oxycodone, as well as PRN Oxycodone given for pain management. Given x1 dose of Maalox for upset stomach. Patient slept well between cares. Patient also walked through the armenta x1 this morning with walker and gait belt.

## 2023-03-23 NOTE — PROGRESS NOTES
[facility]    Medicine Progress Note - Hospitalist Service    Date of Admission:  3/17/2023    Assessment & Plan   David Thomson is a 82 year old male with multiple complex chronic medical problems re-hospitalized on 3/17/2023 for intractable left hip pain after recent left hip revision total arthroplasty on 3/15/23.  Acute left hip pain postoperatively has improved, he is tolerating advancing activity, and chronic medical problems are stable.  However, he continues to be intermittently hypotensive for reasons that are not completely clear.  He also continues to have intermittent abdominal pain of unclear specific cause although could be due to constipation associated with use of narcotics.      Intractable left hip pain status post recent left hip revision 3/15/2023  Acute blood loss anemia after hip revision surgery  He presented with clinical findings suspicious for bloody seroma at the surgical site.  X-ray done in ER 3/17 showed no acute finding and orthopedic surgery from the [facility] was consulted and advised nonoperative management.  It is suspected that the patient had worsening pain postoperatively on 3/16 probably due to anesthetic block wearing off.  Management of postoperative pain has improved with scheduled doses of Tylenol and oxycodone as well as Lidoderm patch.  Opioids have dosed with caution due to history of hospital-acquired delirium.  Previous tizanidine was discontinued due to transient hypotension with systolic blood pressure 87 that responded to IV fluids.   Postoperative hemoglobin has decreased from 12.9 preoperatively to 9.4-> 8.9->7.8->8.6 and has stabilized now at 8-9.  Ongoing active bleeding is not suspected, but acute blood losses with acute blood loss anemia from surgery are suspected.  Patient's iron studies were stable.  He has been repeatedly re-evaluated by physical therapy who recommended TCU placement because he remains very unsteady  trying to use a cane and is unable to use a walker in his current home environment with which he is more steady.  Discharge planning remains in progress.  -continue scheduled Tylenol, oxycodone, and lidoderm patch  -Recommended discharge to TCU for more aggressive rehabilitation, patient will continue to consider    Abdominal pain  Constipation due to opiates  Continues to have intermittent abdominal pain that he believes is due to constipation.  Bowel habits are slower than usual after he started scheduled doses of opiates, but he is having regular bowel movements with use of laxatives.  Abdominal pain transiently resolves after doses of an antacid raising suspicion for possible nonulcer dyspepsia.  He is at risk for ulcer disease associated with aspirin use.  Abdominal exam is generally reassuring.  -Continue antacids including as needed use of Maalox as needed for breakthrough symptoms  -Hold aspirin and Lovenox for now  -Continue laxatives    Demand Ischemia and elevated d dimer felt to be secondary to postoperative status  Chronic left Bundle Branch Block with chronic Mild ST Elevation on EKG  CAD with previous CABG  Non Sustained Ventricular tachycardia  Noted to have ST elevation associated with chronic left bundle branch block.  He has not had angina.  Troponin has been elevated but trending downward and is likely due to demand ischemia from known coronary artery disease with previous CABG and recent surgery as well as acute blood loss anemia.   D-dimer mildly elevated at 0.83 in context of recent orthopedic surgery.  Elevated D-dimer has been attributed to postoperative status.  Overnight 03/20, patient had 7 beat run of V. tach and remained asymptomatic.  Patient continued to remain chest pain-free with stable troponin and no recurrence of ventricular dysrhythmia.    -[facility] cardiology Dr. Dhaliwal consulted on 3/21 who recommended given patient asymptomatic with mildly elevated troponins from  cardiology standpoint can discharge with outpatient cardiology follow-up.  In regards to NSVT, cardiology advised 7-day event monitor after discharge with outpatient cardiology follow-up.    Thrombocytopenia  Mild thrombocytopenia noted at admission and has since resolved.  He may have had consumption associated with recent surgery and postoperative bleeding.  -Follow platelet count    Recent Klebsiella UTI  BPH with urinary retention  03/16 Urine culture showed > 100,000 CFU/mL Klebsiella Oxytoca sensitive to Flouroquinolones. Was started on Levaquin.  Has had recurrent urinary retention associated with known BPH.  Was able to void spontaneously but had repeated episodes of intermittent bladder catheterization demonstrating PVR > 500 mL.  Urinary catheter placed during this hospitalization.  Urinary retention may be exacerbated by a combination of opiate analgesics and decreased mobility.  -Continue levofloxacin, today 6 of 7  -Continue urinary catheter including at hospital discharge, anticipate voiding trial at TCU as he weans off of narcotics with potential outpatient urology follow-up if necessary    Chronic HFpEF (EF 55-60%)  Stable and not in acute exacerbation.   -Continue chronic doses of Entresto and metoprolol    s/p Bioprosthetic AVR for Aortic Stenosis  Echo 1/23/23 with probable TAVR within normal limits.   -No acute intervention    Primary Hypertension  Intermittent hypotension  Continuing to have intermittent transient low blood pressure readings that have not required specific intervention and from which he has been asymptomatic.  Suspect hypotension is due to a combination of chronic vasoactive medications, new addition of scheduled doses of opiate analgesics, and acute blood loss anemia.  Tizanidine was discontinued due to hypotension.  -hold chronic doses of Metoprolol and Entresto, anticipate restarting them once he is not hypotensive for at least 24 hours although may need to restart them at  lower doses than previously due to hypotension  -Continue to monitor blood pressures closely    Paroxysmal Atrial Fibrillation (H)  Generally maintaining sinus rhythm during hospitalization.  Chronically on metoprolol.  Previously had been taking Eliquis which was stopped when he developed subdural hematoma in January 2023.   -Holding metoprolol due to hypotension  -Not treating with anticoagulation at this time    Generalized weakness  Physical Deconditioning  Multifactorial  -Continue PT/OT/SW for disposition planning    Hyperlipidemia  Chronic and presumed stable   -continue chronic statin    Neuropathy  Chronic and clinically stable   -Continue chronic dose of Gabapentin.    Obstructive sleep Apnea  Chronic and patient reported he does not tolerate CPAP.   -Use low flow oxygen PRN while hospitalized     History Left Frontoparietal Subdural Hematoma (January 2023) in setting of frequent falls  Evaluated by [facility] on 2/28 with review of imaging from 2/7 showing resolution of SDH.  Neurosurgery has advised previously against restarting chronic Eliquis  -Outpatient follow-up with neurosurgery to determine when to resume chronic prophylactic anticoagulation         Diet: Combination Diet Low Saturated Fat Na <2400mg Diet, No Caffeine Diet  Diet    DVT Prophylaxis: Heparin SQ  Rice Catheter: PRESENT, indication: Retention  Lines: None     Cardiac Monitoring: None  Code Status: Full Code      Clinically Significant Risk Factors                         Disposition Plan   Anticipate discharge to TCU once medically stable and bed is available and patient is agreeable with TCU, possibly tomorrow          Trever Moreno MD  Hospitalist Service  [facility]  Securely message with [employer] (more info)  Text page via [facility] Paging/Directory   ______________________________________________________________________    Interval History   He says he again had an episode of abdominal pain  "this morning.  He points to his right lower abdomen although says his abdominal pain is usually in the mid abdomen.  After taking a dose of Maalox, his abdominal pain resolved today as it had done yesterday as well.  He did have a good bowel movement today after that episode of abdominal pain.  He denies any associated nausea.  He is tolerating good oral intake.  There is been no blood in his stool.  He again was hypotensive this morning for about an hour.  He denies any dizziness or other new symptoms at the signs of hypotension.  Hypotension occurred today despite holding morning doses of Entresto and Toprol-XL this morning although he had received both of those medications yesterday including Entresto last evening.  He remains afebrile with normal heart rate.  Oxygenation remained stable.  He has been able to ambulate using a walker but still cannot ambulate safely use a cane.    Additional history is obtained from the patient.  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  He says the surgery was performed at [facility].  He says that the left hand and wrist infection was work-related and that he thinks he had a workman compensation case related to that infection at that time.  He says he fully recovered after treatment of that infection and has not been left with any chronic limitations using his left hand or wrist.  He says he has no lingering Workmen's compensation issues related to that injury and infection.  He says his left hip was replaced years ago because it \"wore out\" apparently from arthritis.  He says his original left hip replacement surgery had nothing to do with Workmen's Compensation.  Accordingly, his recent left hip revision replacement surgery a week ago also had nothing " to do with Workmen's Compensation.  He shares the same first and last name with a son who he thinks does have an ongoing Workmen's Compensation case after injuries sustained while he was at work.    Physical Exam   Vital Signs: Temp: 98  F (36.7  C) Temp src: Oral BP: 94/52 Pulse: 79   Resp: 18 SpO2: 94 % O2 Device: None (Room air)     Patient Vitals for the past 24 hrs:   BP Temp Temp src Pulse Resp SpO2 Weight   03/23/23 1100 94/52 -- -- 79 -- -- --   03/23/23 1045 (!) 89/44 -- -- 76 -- -- --   03/23/23 1041 (!) 88/46 -- -- 77 -- -- --   03/23/23 1030 (!) 83/47 -- -- 77 -- -- --   03/23/23 1017 92/46 -- -- 72 -- -- --   03/23/23 1013 (!) 74/47 -- -- 76 -- -- --   03/23/23 1012 (!) 83/44 -- -- -- -- -- --   03/23/23 1006 (!) 83/46 -- -- -- -- -- --   03/23/23 1005 (!) 83/46 -- -- -- -- -- --   03/23/23 0616 125/57 98  F (36.7  C) Oral 73 18 94 % --   03/23/23 0456 -- -- -- -- -- -- 114.4 kg (252 lb 3.3 oz)   03/23/23 0351 113/60 (!) 96.6  F (35.9  C) Oral 66 18 95 % --   03/23/23 0200 -- -- -- -- 18 -- --   03/23/23 0021 93/56 97.1  F (36.2  C) Oral 74 20 94 % --   03/22/23 1849 -- 97.2  F (36.2  C) Oral -- 18 -- --   03/22/23 1608 94/56 -- -- -- -- -- --   03/22/23 1538 (!) 85/57 97.8  F (36.6  C) Oral 79 18 93 % --     Weight: 252 lbs 3.3 oz   Vitals:    03/17/23 1954 03/18/23 0226 03/23/23 0456   Weight: 117 kg (258 lb) 114.5 kg (252 lb 6.8 oz) 114.4 kg (252 lb 3.3 oz)       Intake/Output Summary (Last 24 hours) at 3/23/2023 1110  Last data filed at 3/23/2023 0357  Gross per 24 hour   Intake --   Output 1050 ml   Net -1050 ml     General Appearance: No acute distress resting in bed  Respiratory: Normal respiratory effort, clear lungs  Cardiovascular: Regular rate and rhythm, good radial pulse, normal capillary refill, minimal peripheral edema in the lower legs  GI: Normal bowel sounds, nondistended abdomen, soft, no abdominal tenderness currently  Other: Alert and maintains wakefulness and attention, no  confusion    Medical Decision Making           Data     I have personally reviewed the following data over the past 24 hrs:    N/A  \   8.5 (L)   / 213     135 (L) 100 20.9 /  144 (H)   4.0 26 1.14 \       ALT: 10 AST: 21 AP: 82 TBILI: 1.3 (H)   ALB: 3.2 (L) TOT PROTEIN: 5.9 (L) LIPASE: N/A         Recent Labs   Lab 03/23/23  0554 03/22/23  0559 03/21/23  0604 03/20/23  0534 03/19/23  0559 03/18/23  1717 03/18/23  0829 03/18/23  0108   WBC  --   --   --   --  5.3 4.4  --  6.1   HGB 8.5* 8.9*  --   --  8.6* 7.6*   < > 8.9*   MCV  --   --   --   --  95 97  --  95     --  207  --  167 119*  --  149*   * 137  --   --  139 135*   < > 141   POTASSIUM 4.0 4.1 4.0 3.9 3.8 3.9   < > 3.8   CHLORIDE 100 101  --   --  102 100   < > 104   CO2 26 25  --   --  26 27   < > 29   BUN 20.9 19.9  --   --  19.4 22.9   < > 20.4   CR 1.14 1.02  --  0.94 0.99 0.95   < > 1.00   ANIONGAP 9 11  --   --  11 8   < > 8   ANDREW 8.8 8.8  --   --  8.8 8.0*   < > 8.7*   * 138*  --   --  170* 117*   < > 151*   ALBUMIN 3.2*  --   --   --  3.5 3.0*   < >  --    PROTTOTAL 5.9*  --   --   --  5.9* 5.1*   < >  --    BILITOTAL 1.3*  --   --   --  1.2 0.9   < >  --    ALKPHOS 82  --   --   --  80 69   < >  --    ALT 10  --   --   --  7* 7*   < >  --    AST 21  --   --   --  19 17   < >  --     < > = values in this interval not displayed.

## 2023-03-23 NOTE — PLAN OF CARE
Goal Outcome Evaluation:      Plan of Care Reviewed With: patient    Overall Patient Progress: improvingOverall Patient Progress: improving    Outcome Evaluation: Pt A&Ox4. VSS on RA other than one episode of asymptomatic hypotension. MD notified. Formed BMx1 today, although multiple attemps and trips to the bathroom. Complains of abdominal pain, carafate and PRN maalox ordered. Each administered x1. Scheduled tylenol and 2.5 mg oxy for pain management. Ambulated halls several times. See care management note regarding PT's evaluation of ambulation with quad cane. Ambulated several times SBA with walker in hallway this shift. Multiple staff members have encouraged pt throughout the day to remain open minded when it comes to options for discharge due to safety concerns with ambulation.

## 2023-03-24 ENCOUNTER — APPOINTMENT (OUTPATIENT)
Dept: PHYSICAL THERAPY | Facility: CLINIC | Age: 83
DRG: 812 | End: 2023-03-24
Attending: FAMILY MEDICINE
Payer: COMMERCIAL

## 2023-03-24 LAB
BILIRUB DIRECT SERPL-MCNC: 0.35 MG/DL (ref 0–0.3)
BILIRUB SERPL-MCNC: 1.2 MG/DL
HGB BLD-MCNC: 8.7 G/DL (ref 13.3–17.7)
MAGNESIUM SERPL-MCNC: 2.2 MG/DL (ref 1.7–2.3)
PLATELET # BLD AUTO: 223 10E3/UL (ref 150–450)
POTASSIUM SERPL-SCNC: 4 MMOL/L (ref 3.4–5.3)

## 2023-03-24 PROCEDURE — 85049 AUTOMATED PLATELET COUNT: CPT | Performed by: INTERNAL MEDICINE

## 2023-03-24 PROCEDURE — 84132 ASSAY OF SERUM POTASSIUM: CPT | Performed by: PEDIATRICS

## 2023-03-24 PROCEDURE — 250N000013 HC RX MED GY IP 250 OP 250 PS 637: Performed by: FAMILY MEDICINE

## 2023-03-24 PROCEDURE — 99232 SBSQ HOSP IP/OBS MODERATE 35: CPT | Performed by: PEDIATRICS

## 2023-03-24 PROCEDURE — 83735 ASSAY OF MAGNESIUM: CPT | Performed by: PEDIATRICS

## 2023-03-24 PROCEDURE — 82248 BILIRUBIN DIRECT: CPT | Performed by: PEDIATRICS

## 2023-03-24 PROCEDURE — 120N000001 HC R&B MED SURG/OB

## 2023-03-24 PROCEDURE — 250N000013 HC RX MED GY IP 250 OP 250 PS 637: Performed by: PEDIATRICS

## 2023-03-24 PROCEDURE — 250N000013 HC RX MED GY IP 250 OP 250 PS 637: Performed by: INTERNAL MEDICINE

## 2023-03-24 PROCEDURE — 85018 HEMOGLOBIN: CPT | Performed by: PEDIATRICS

## 2023-03-24 PROCEDURE — 36415 COLL VENOUS BLD VENIPUNCTURE: CPT | Performed by: PEDIATRICS

## 2023-03-24 PROCEDURE — 250N000013 HC RX MED GY IP 250 OP 250 PS 637: Performed by: NURSE PRACTITIONER

## 2023-03-24 PROCEDURE — 97116 GAIT TRAINING THERAPY: CPT | Mod: GP

## 2023-03-24 PROCEDURE — 250N000011 HC RX IP 250 OP 636: Performed by: INTERNAL MEDICINE

## 2023-03-24 RX ORDER — LIDOCAINE 4 G/G
2 PATCH TOPICAL
Status: DISCONTINUED | OUTPATIENT
Start: 2023-03-24 | End: 2023-03-27 | Stop reason: HOSPADM

## 2023-03-24 RX ORDER — TROLAMINE SALICYLATE 10 G/100G
CREAM TOPICAL 4 TIMES DAILY PRN
Status: DISCONTINUED | OUTPATIENT
Start: 2023-03-24 | End: 2023-03-27 | Stop reason: HOSPADM

## 2023-03-24 RX ADMIN — PANTOPRAZOLE SODIUM 40 MG: 40 TABLET, DELAYED RELEASE ORAL at 21:46

## 2023-03-24 RX ADMIN — ASPIRIN 81 MG: 81 TABLET, COATED ORAL at 09:12

## 2023-03-24 RX ADMIN — OXYCODONE HYDROCHLORIDE 2.5 MG: 5 TABLET ORAL at 18:09

## 2023-03-24 RX ADMIN — ACETAMINOPHEN 975 MG: 325 TABLET ORAL at 14:11

## 2023-03-24 RX ADMIN — TROLAMINE SALICYLATE: 10 CREAM TOPICAL at 21:47

## 2023-03-24 RX ADMIN — HEPARIN SODIUM 5000 UNITS: 10000 INJECTION, SOLUTION INTRAVENOUS; SUBCUTANEOUS at 21:47

## 2023-03-24 RX ADMIN — HEPARIN SODIUM 5000 UNITS: 10000 INJECTION, SOLUTION INTRAVENOUS; SUBCUTANEOUS at 09:13

## 2023-03-24 RX ADMIN — SUCRALFATE 1 G: 1 SUSPENSION ORAL at 21:46

## 2023-03-24 RX ADMIN — ASPIRIN 81 MG: 81 TABLET, COATED ORAL at 21:46

## 2023-03-24 RX ADMIN — LEVOFLOXACIN 500 MG: 500 TABLET, FILM COATED ORAL at 09:12

## 2023-03-24 RX ADMIN — SUCRALFATE 1 G: 1 SUSPENSION ORAL at 16:04

## 2023-03-24 RX ADMIN — OXYCODONE HYDROCHLORIDE 2.5 MG: 5 TABLET ORAL at 14:12

## 2023-03-24 RX ADMIN — ACETAMINOPHEN 975 MG: 325 TABLET ORAL at 21:46

## 2023-03-24 RX ADMIN — ALUMINUM HYDROXIDE, MAGNESIUM HYDROXIDE, AND SIMETHICONE 30 ML: 200; 200; 20 SUSPENSION ORAL at 18:09

## 2023-03-24 RX ADMIN — DOCUSATE SODIUM 100 MG: 100 CAPSULE, LIQUID FILLED ORAL at 21:46

## 2023-03-24 RX ADMIN — SUCRALFATE 1 G: 1 SUSPENSION ORAL at 10:31

## 2023-03-24 RX ADMIN — SENNOSIDES AND DOCUSATE SODIUM 1 TABLET: 8.6; 5 TABLET ORAL at 09:12

## 2023-03-24 RX ADMIN — OXYCODONE HYDROCHLORIDE 2.5 MG: 5 TABLET ORAL at 21:45

## 2023-03-24 RX ADMIN — GABAPENTIN 100 MG: 100 CAPSULE ORAL at 21:45

## 2023-03-24 RX ADMIN — ACETAMINOPHEN 975 MG: 325 TABLET ORAL at 10:31

## 2023-03-24 RX ADMIN — SENNOSIDES AND DOCUSATE SODIUM 1 TABLET: 8.6; 5 TABLET ORAL at 21:46

## 2023-03-24 RX ADMIN — OXYCODONE HYDROCHLORIDE 2.5 MG: 5 TABLET ORAL at 09:12

## 2023-03-24 RX ADMIN — SUCRALFATE 1 G: 1 SUSPENSION ORAL at 06:34

## 2023-03-24 RX ADMIN — OXYCODONE HYDROCHLORIDE 2.5 MG: 5 TABLET ORAL at 06:35

## 2023-03-24 RX ADMIN — Medication: at 09:13

## 2023-03-24 RX ADMIN — FOLIC ACID 1 MG: 1 TABLET ORAL at 09:12

## 2023-03-24 RX ADMIN — DOCUSATE SODIUM 100 MG: 100 CAPSULE, LIQUID FILLED ORAL at 09:12

## 2023-03-24 RX ADMIN — PANTOPRAZOLE SODIUM 40 MG: 40 TABLET, DELAYED RELEASE ORAL at 09:12

## 2023-03-24 RX ADMIN — ATORVASTATIN CALCIUM 80 MG: 40 TABLET, FILM COATED ORAL at 21:44

## 2023-03-24 RX ADMIN — THIAMINE HCL TAB 100 MG 100 MG: 100 TAB at 09:12

## 2023-03-24 RX ADMIN — TROLAMINE SALICYLATE: 10 CREAM TOPICAL at 16:04

## 2023-03-24 RX ADMIN — LIDOCAINE 2 PATCH: 560 PATCH PERCUTANEOUS; TOPICAL; TRANSDERMAL at 10:31

## 2023-03-24 RX ADMIN — GABAPENTIN 100 MG: 100 CAPSULE ORAL at 09:12

## 2023-03-24 ASSESSMENT — ACTIVITIES OF DAILY LIVING (ADL)
ADLS_ACUITY_SCORE: 28

## 2023-03-24 NOTE — PLAN OF CARE
Goal Outcome Evaluation:      Plan of Care Reviewed With: patient    Overall Patient Progress: improvingOverall Patient Progress: improving     Patient alert and oriented. Vital signs stable. Patient rested well throughout the night and complained of minimal pain, 4-5/10. Scheduled tylenol and oxycodone. Patient got a shower last night. Up and ambulating in halls SBA X3. Patient also stated that abdominal discomfort is improving. Scheduled senna and colace. Mg and K+ are rechecks in the AM.

## 2023-03-24 NOTE — PROVIDER NOTIFICATION
Read - 11:31 am  253: Hi doctor, recent BP of patient David is 94/56, MAP of 66.      JESSIE Moreno - 11:35 am  noted. no changes advised

## 2023-03-24 NOTE — PLAN OF CARE
Goal Outcome Evaluation:      Plan of Care Reviewed With: patient    Overall Patient Progress: improvingOverall Patient Progress: improving    Outcome Evaluation: Pt A&Ox4. VSS other than 1x episode of hypotension (74/47) this AM. See provider notification note. Scheduled tylenol and oxycodone for pain management, no PRN's needed this shift. Multiple attempts for BM, small x1 otherwise passing gas. Ambulated halls few times today. Pt continues to be educated on safe ambulation and safe living situations upon discharge. Pt still unsure whether he will agree to a TCU going forward.

## 2023-03-24 NOTE — PROGRESS NOTES
[facility]    Medicine Progress Note - Hospitalist Service    Date of Admission:  3/17/2023    Assessment & Plan   David Thomson is a 82 year old male with multiple complex chronic medical problems re-hospitalized on 3/17/2023 for intractable left hip pain after recent left hip revision total arthroplasty on 3/15/23.  Acute left hip pain postoperatively has improved, he is tolerating advancing activity, and chronic medical problems are stable.  However, he continues to be intermittently hypotensive for reasons that are not completely clear.  He also continues to have intermittent abdominal pain of unclear specific cause although could be due to constipation associated with use of narcotics.      Intractable left hip pain status post recent left hip revision 3/15/2023  Acute blood loss anemia after hip revision surgery  He presented with clinical findings suspicious for bloody seroma at the surgical site.  X-ray done in ER 3/17 showed no acute finding and orthopedic surgery from the [facility] was consulted and advised nonoperative management.  It is suspected that the patient had worsening pain postoperatively on 3/16 probably due to anesthetic block wearing off.  Management of postoperative pain has improved with scheduled doses of Tylenol and oxycodone.  Opioids have dosed with caution due to history of hospital-acquired delirium.  Previous tizanidine was discontinued due to transient hypotension with systolic blood pressure 87 that responded to IV fluids.   He continues to have intermittent severe pain in the mid to distal lateral left thigh distal to the surgical incision and in the region of the iliotibial band.  That pain occurs in certain sitting positions but not in other positions.  Examination of the distal lateral left thigh is generally reassuring suggesting soft tissue strain.  Postoperative hemoglobin decreased from 12.9 preoperatively to 9.4-> 8.9->7.8->8.6 and has  stabilized now at 8-9.  Ongoing active bleeding is not suspected, but acute blood losses with acute blood loss anemia from surgery are suspected.  Patient's iron studies were stable.  He has been repeatedly re-evaluated by physical therapy who recommends TCU placement because he remains very unsteady trying to use a cane and is unable to use a walker in his current home environment with which he is more steady.  Discharge planning remains in progress.  -continue scheduled Tylenol, oxycodone, and add topical lidoderm patches to affected painful area of the distal lateral left thigh today  -Recommended discharge to TCU for more aggressive rehabilitation, patient expresses agreement today    Abdominal pain  Constipation due to opiates  Continues to have intermittent abdominal pain that he believes is due to constipation.  Bowel habits are slower than usual after he started scheduled doses of opiates, but he is having regular bowel movements with use of laxatives.  Abdominal pain transiently resolves after doses of an antacid raising suspicion for possible nonulcer dyspepsia.  He is at risk for ulcer disease associated with aspirin use.  Abdominal exam is generally reassuring.  Constipation has resolved and abdominal pain is improving.  -Continue antacids including as needed use of Maalox as needed for breakthrough symptoms  -Resume aspirin and SC heparin  -Continue laxatives while on scheduled doses of opiates    Demand Ischemia and elevated d dimer felt to be secondary to postoperative status  Chronic left Bundle Branch Block with chronic Mild ST Elevation on EKG  CAD with previous CABG  Non Sustained Ventricular tachycardia  Noted to have ST elevation associated with chronic left bundle branch block.  He has not had angina.  Troponin has been elevated but trending downward and is likely due to demand ischemia from known coronary artery disease with previous CABG and recent surgery as well as acute blood loss anemia.    D-dimer mildly elevated at 0.83 in context of recent orthopedic surgery.  Elevated D-dimer has been attributed to postoperative status.  Overnight 03/20, patient had 7 beat run of V. tach and remained asymptomatic.  Patient continued to remain chest pain-free with stable troponin and no recurrence of ventricular dysrhythmia.    -[facility] cardiology Dr. Dhaliwal consulted on 3/21 who recommended given patient asymptomatic with mildly elevated troponins from cardiology standpoint can discharge with outpatient cardiology follow-up.  In regards to NSVT, cardiology advised 7-day event monitor after discharge with outpatient cardiology follow-up    Thrombocytopenia  Mild thrombocytopenia noted at admission and has since resolved.  He may have had consumption associated with recent surgery and postoperative bleeding.  -Follow platelet count    Recent Klebsiella UTI  BPH with urinary retention  03/16 Urine culture showed > 100,000 CFU/mL Klebsiella Oxytoca sensitive to Flouroquinolones. Was started on Levaquin.  Has had recurrent urinary retention associated with known BPH.  Was able to void spontaneously but had repeated episodes of intermittent bladder catheterization demonstrating PVR > 500 mL.  Urinary catheter placed during this hospitalization.  Urinary retention may be exacerbated by a combination of opiate analgesics and decreased mobility.  -Continue levofloxacin, today 7 of 7 so discontinuing levofloxacin after today's dose  -Continue urinary catheter including at hospital discharge, anticipate voiding trial at TCU as he weans off of narcotics with potential outpatient urology follow-up if necessary    Chronic HFpEF (EF 55-60%)  Recurrent hypotension  Chronic CHF is stable and not in acute exacerbation.  However, he continues to have recurrent severe hypotension which is probably multifactorial including effect of metoprolol and Entresto and recent acute blood loss anemia.  Regular use of opiates may also  contribute to hypotension.  -discontinuing chronic doses of Entresto and metoprolol because of recurring hypotension  -Monitor clinically for worsening CHF including daily weights    s/p Bioprosthetic AVR for Aortic Stenosis  Echo 1/23/23 with probable TAVR within normal limits.   -No acute intervention    Primary Hypertension  Intermittent hypotension  Continuing to have intermittent transient low blood pressure readings that have not required specific intervention and from which he has been asymptomatic.  Suspect hypotension is due to a combination of chronic vasoactive medications, new addition of scheduled doses of opiate analgesics, and acute blood loss anemia.  Tizanidine was discontinued due to hypotension.  -Stopping chronic doses of Metoprolol and Entresto and not anticipate restarting them probably for a few weeks until his blood pressure is consistently rising as he recovers from recent surgery and acute blood loss anemia  -Continue to monitor blood pressures closely    Paroxysmal Atrial Fibrillation  Generally maintaining sinus rhythm during hospitalization.  Chronically on metoprolol.  Previously had been taking Eliquis which was stopped when he developed subdural hematoma in January 2023.   -Holding metoprolol due to hypotension  -Not treating with therapeutic anticoagulation at this time    Generalized weakness  Physical Deconditioning  Multifactorial  -Continue PT/OT/SW for disposition planning    Hyperlipidemia  Chronic and presumed stable   -continue chronic statin    Neuropathy  Chronic and clinically stable   -Continue chronic dose of Gabapentin.    Obstructive sleep Apnea  Chronic and patient reported he does not tolerate CPAP.   -Use low flow oxygen PRN while hospitalized     History Left Frontoparietal Subdural Hematoma (January 2023) in setting of frequent falls  Evaluated by [facility] on 2/28 with review of imaging from 2/7 showing resolution of SDH.  Neurosurgery has advised  previously against restarting chronic Eliquis  -Outpatient follow-up with neurosurgery to determine when to resume chronic prophylactic anticoagulation           Diet: Combination Diet Low Saturated Fat Na <2400mg Diet, No Caffeine Diet  Diet    DVT Prophylaxis: Heparin SQ  Rice Catheter: PRESENT, indication: Retention  Lines: None     Cardiac Monitoring: None  Code Status: Full Code      Clinically Significant Risk Factors                         Disposition Plan   Anticipate discharge to TCU when medically stable, possibly 1 to 2 days          Trever Moreno MD  Hospitalist Service  [facility]  Securely message with [employer] (more info)  Text page via [employer] Paging/Directory   ______________________________________________________________________    Interval History   He says he was feeling really good last evening.  He was ambulating in the halls using the walker yesterday.  He was not having worsening pain in his left thigh with standing and ambulating.  He had no significant overnight events.  However, after waking this morning and trying to sit in a chair at breakfast, he had abrupt onset of severe pain in the lateral distal left thigh.  He felt like he his muscle was tearing in that area.  Pain was so severe that he had to reposition and ultimately went back to bed.  His distal lateral left thigh pain has improved after lying in bed.  He remains afebrile.  Blood pressure has been normal yesterday after noon after metoprolol and Entresto were held.  This morning after getting up for breakfast, he again had transient low blood pressure reading although was asymptomatic from hypotension.  He had not received morning doses of metoprolol or Entresto today nor had he received Entresto last night.  Blood pressure normalized without intervention within 5 minutes.  Oxygenation remains normal and he denies shortness of breath.  He is tolerating good oral intake.  He says his abdominal  pain is subsiding and he has not needed to take any extra doses of antacids today.  He is having regular bowel movements.  He has had good urine output.    Physical Exam   Vital Signs: Temp: 98.9  F (37.2  C) Temp src: Oral BP: 94/50 Pulse: 78   Resp: 18 SpO2: 94 % O2 Device: None (Room air)     Patient Vitals for the past 24 hrs:   BP Temp Temp src Pulse Resp SpO2   03/24/23 0925 94/50 -- -- -- -- --   03/24/23 0921 (!) 88/41 -- -- -- -- --   03/24/23 0757 115/60 98.9  F (37.2  C) Oral 78 18 94 %   03/24/23 0240 113/68 97.8  F (36.6  C) Oral 83 14 98 %   03/23/23 2309 106/57 98.7  F (37.1  C) Oral 80 20 96 %   03/23/23 1932 108/49 98.7  F (37.1  C) Oral 72 18 --   03/23/23 1529 100/50 97.8  F (36.6  C) Oral 77 16 93 %   03/23/23 1315 99/49 -- -- 79 -- --   03/23/23 1254 103/54 -- -- 82 -- --   03/23/23 1215 103/60 -- -- 78 -- --   03/23/23 1145 104/54 -- -- 78 -- --   03/23/23 1130 96/50 -- -- 75 -- --   03/23/23 1115 107/57 -- -- 82 -- --   03/23/23 1100 94/52 -- -- 79 -- --   03/23/23 1045 (!) 89/44 -- -- 76 -- --   03/23/23 1041 (!) 88/46 -- -- 77 -- --   03/23/23 1030 (!) 83/47 -- -- 77 -- --   03/23/23 1017 92/46 -- -- 72 -- --   03/23/23 1013 (!) 74/47 -- -- 76 -- --   03/23/23 1012 (!) 83/44 -- -- -- -- --   03/23/23 1006 (!) 83/46 -- -- -- -- --   03/23/23 1005 (!) 83/46 -- -- -- -- --     Weight: 252 lbs 3.3 oz     Intake/Output Summary (Last 24 hours) at 3/24/2023 1030  Last data filed at 3/24/2023 0900  Gross per 24 hour   Intake 1040 ml   Output 2275 ml   Net -1235 ml       General Appearance: Obese elderly man without signs of acute distress while lying supine in bed  Respiratory: Normal respiratory effort, clear lungs  Cardiovascular: Irregularly irregular heart rate and rhythm, good radial pulse, normal capillary refill, mild peripheral edema in the lower legs  GI: Obese abdomen, soft, nontender  Skin: Pale color, dry dressing overlying lateral left thigh surgical wound without surrounding skin  erythema or ecchymosis and no palpable underlying hematoma  Other: Left thigh otherwise appears normal to inspection, tenderness is present along the mid to distal left lateral iliotibial band without any palpable tissue disruption, crepitus, or fluctuance and palpation in that area reproduces his left thigh pain, patient reports increased pain in the same area with passive flexion at the hip and knee although pain is diminished when the knee is extended despite persistent flexion of the hip, does not have much pain with external rotation of the hip but does report increased pain in the same area of the distal left lateral thigh with internal rotation of the left hip    Medical Decision Making           Data     I have personally reviewed the following data over the past 24 hrs:    N/A  \   8.7 (L)   / 223     N/A N/A N/A /  N/A   4.0 N/A N/A \       ALT: N/A AST: N/A AP: N/A TBILI: 1.2   ALB: N/A TOT PROTEIN: N/A LIPASE: N/A        Magnesium 2.2  Direct bilirubin 0.35 with 0.3 the upper limit of normal    Recent Labs   Lab 03/24/23  0553 03/23/23  0554 03/22/23  0559 03/21/23  0604 03/20/23  0534 03/19/23  0559 03/18/23  1717 03/18/23  0829 03/18/23  0108   WBC  --   --   --   --   --  5.3 4.4  --  6.1   HGB 8.7* 8.5* 8.9*  --   --  8.6* 7.6*   < > 8.9*   MCV  --   --   --   --   --  95 97  --  95    213  --  207  --  167 119*  --  149*   NA  --  135* 137  --   --  139 135*   < > 141   POTASSIUM 4.0 4.0 4.1 4.0 3.9 3.8 3.9   < > 3.8   CHLORIDE  --  100 101  --   --  102 100   < > 104   CO2  --  26 25  --   --  26 27   < > 29   BUN  --  20.9 19.9  --   --  19.4 22.9   < > 20.4   CR  --  1.14 1.02  --  0.94 0.99 0.95   < > 1.00   ANIONGAP  --  9 11  --   --  11 8   < > 8   ANDREW  --  8.8 8.8  --   --  8.8 8.0*   < > 8.7*   GLC  --  144* 138*  --   --  170* 117*   < > 151*   ALBUMIN  --  3.2*  --   --   --  3.5 3.0*   < >  --    PROTTOTAL  --  5.9*  --   --   --  5.9* 5.1*   < >  --    BILITOTAL 1.2 1.3*  --   --    --  1.2 0.9   < >  --    ALKPHOS  --  82  --   --   --  80 69   < >  --    ALT  --  10  --   --   --  7* 7*   < >  --    AST  --  21  --   --   --  19 17   < >  --     < > = values in this interval not displayed.

## 2023-03-24 NOTE — PROVIDER NOTIFICATION
Trever Moreno MD updated.    just Jacoby ROE's recent BP: 94/50. Metropolol and sacubitril-valsartan (ENTRESTO) withheld as per pharmacist. Thank you.

## 2023-03-24 NOTE — PROGRESS NOTES
Care Management Follow Up    Length of Stay (days): 6    Expected Discharge Date: 03/25/2023     Concerns to be Addressed: discharge planning     Patient plan of care discussed at interdisciplinary rounds: Yes    Anticipated Discharge Disposition: Transitional Care, Home, Home Care  Disposition Comments: Patient now partially in agreement to possible TCU placement and in agreement with writer sending referrals  Anticipated Discharge Services: None  Anticipated Discharge DME: Rites[facility]    Patient/family educated on Medicare website which has current facility and service quality ratings: yes  Education Provided on the Discharge Plan:    Patient/Family in Agreement with the Plan: yes    Referrals Placed by CM/SW: Post Acute Facilities  Private pay costs discussed: Not applicable    Additional Information:  Per physician, patient is not medically stable for discharge today.     Care Management met with patient today. Patient asked for assistance calling worker's comp. CM and patient called [employer] P: [phone]. CM explained situation that worker's comp is for son - David Thomson IV, not patient (David Schumacherr III). [employer] stated they would send notification to Medicare advising patient is not worker's comp.     Patient was uncertain about TCU placement with CM today, but per physician, he is agreeable to TCU placement.     CM received call from Kel at [facility] (Main Phone: [phone] Admissions Phone: [phone] Fax: [phone]). Cannot accept patient over weekend, but are planning on Monday admit. Patient's physician updated with this information.       Jennifer Cee, \[facility] [place] [facility]\""  Inpatient Care Coordinator   [facility] [phone]  [facility] [phone]

## 2023-03-25 PROBLEM — I48.0 PAROXYSMAL ATRIAL FIBRILLATION (H): Status: ACTIVE | Noted: 2023-03-25

## 2023-03-25 PROBLEM — I44.7 LBBB (LEFT BUNDLE BRANCH BLOCK): Status: ACTIVE | Noted: 2023-03-25

## 2023-03-25 PROBLEM — I47.29 NSVT (NONSUSTAINED VENTRICULAR TACHYCARDIA) (H): Status: ACTIVE | Noted: 2023-03-25

## 2023-03-25 PROBLEM — I24.89 DEMAND ISCHEMIA (H): Status: ACTIVE | Noted: 2023-03-25

## 2023-03-25 PROBLEM — G62.9 NEUROPATHY: Status: ACTIVE | Noted: 2023-03-25

## 2023-03-25 LAB
HOLD SPECIMEN: NORMAL
MAGNESIUM SERPL-MCNC: 2.2 MG/DL (ref 1.7–2.3)
POTASSIUM SERPL-SCNC: 4 MMOL/L (ref 3.4–5.3)

## 2023-03-25 PROCEDURE — 250N000011 HC RX IP 250 OP 636: Performed by: INTERNAL MEDICINE

## 2023-03-25 PROCEDURE — 250N000013 HC RX MED GY IP 250 OP 250 PS 637: Performed by: INTERNAL MEDICINE

## 2023-03-25 PROCEDURE — 36415 COLL VENOUS BLD VENIPUNCTURE: CPT | Performed by: PEDIATRICS

## 2023-03-25 PROCEDURE — 120N000001 HC R&B MED SURG/OB

## 2023-03-25 PROCEDURE — 83735 ASSAY OF MAGNESIUM: CPT | Performed by: PEDIATRICS

## 2023-03-25 PROCEDURE — 84132 ASSAY OF SERUM POTASSIUM: CPT | Performed by: PEDIATRICS

## 2023-03-25 PROCEDURE — 250N000013 HC RX MED GY IP 250 OP 250 PS 637: Performed by: FAMILY MEDICINE

## 2023-03-25 PROCEDURE — 250N000013 HC RX MED GY IP 250 OP 250 PS 637: Performed by: PEDIATRICS

## 2023-03-25 PROCEDURE — 99231 SBSQ HOSP IP/OBS SF/LOW 25: CPT | Performed by: PEDIATRICS

## 2023-03-25 PROCEDURE — 250N000013 HC RX MED GY IP 250 OP 250 PS 637: Performed by: NURSE PRACTITIONER

## 2023-03-25 RX ADMIN — SUCRALFATE 1 G: 1 SUSPENSION ORAL at 21:51

## 2023-03-25 RX ADMIN — OXYCODONE HYDROCHLORIDE 2.5 MG: 5 TABLET ORAL at 11:11

## 2023-03-25 RX ADMIN — GABAPENTIN 100 MG: 100 CAPSULE ORAL at 08:47

## 2023-03-25 RX ADMIN — TROLAMINE SALICYLATE: 10 CREAM TOPICAL at 10:49

## 2023-03-25 RX ADMIN — TROLAMINE SALICYLATE: 10 CREAM TOPICAL at 17:15

## 2023-03-25 RX ADMIN — TROLAMINE SALICYLATE: 10 CREAM TOPICAL at 19:54

## 2023-03-25 RX ADMIN — ACETAMINOPHEN 975 MG: 325 TABLET ORAL at 08:39

## 2023-03-25 RX ADMIN — ACETAMINOPHEN 975 MG: 325 TABLET ORAL at 13:25

## 2023-03-25 RX ADMIN — SENNOSIDES AND DOCUSATE SODIUM 1 TABLET: 8.6; 5 TABLET ORAL at 08:47

## 2023-03-25 RX ADMIN — OXYCODONE HYDROCHLORIDE 2.5 MG: 5 TABLET ORAL at 06:06

## 2023-03-25 RX ADMIN — LIDOCAINE 2 PATCH: 560 PATCH PERCUTANEOUS; TOPICAL; TRANSDERMAL at 08:43

## 2023-03-25 RX ADMIN — SUCRALFATE 1 G: 1 SUSPENSION ORAL at 08:39

## 2023-03-25 RX ADMIN — HYDROXYZINE HYDROCHLORIDE 50 MG: 50 TABLET, FILM COATED ORAL at 05:20

## 2023-03-25 RX ADMIN — HEPARIN SODIUM 5000 UNITS: 10000 INJECTION, SOLUTION INTRAVENOUS; SUBCUTANEOUS at 21:53

## 2023-03-25 RX ADMIN — THIAMINE HCL TAB 100 MG 100 MG: 100 TAB at 08:47

## 2023-03-25 RX ADMIN — OXYCODONE HYDROCHLORIDE 2.5 MG: 5 TABLET ORAL at 21:50

## 2023-03-25 RX ADMIN — ATORVASTATIN CALCIUM 80 MG: 40 TABLET, FILM COATED ORAL at 21:50

## 2023-03-25 RX ADMIN — OXYCODONE HYDROCHLORIDE 2.5 MG: 5 TABLET ORAL at 17:15

## 2023-03-25 RX ADMIN — PANTOPRAZOLE SODIUM 40 MG: 40 TABLET, DELAYED RELEASE ORAL at 21:50

## 2023-03-25 RX ADMIN — GABAPENTIN 100 MG: 100 CAPSULE ORAL at 21:50

## 2023-03-25 RX ADMIN — Medication 1 SPRAY: at 12:09

## 2023-03-25 RX ADMIN — SENNOSIDES AND DOCUSATE SODIUM 1 TABLET: 8.6; 5 TABLET ORAL at 21:50

## 2023-03-25 RX ADMIN — ASPIRIN 81 MG: 81 TABLET, COATED ORAL at 21:49

## 2023-03-25 RX ADMIN — DOCUSATE SODIUM 100 MG: 100 CAPSULE, LIQUID FILLED ORAL at 21:51

## 2023-03-25 RX ADMIN — PANTOPRAZOLE SODIUM 40 MG: 40 TABLET, DELAYED RELEASE ORAL at 08:46

## 2023-03-25 RX ADMIN — ACETAMINOPHEN 975 MG: 325 TABLET ORAL at 21:50

## 2023-03-25 RX ADMIN — FOLIC ACID 1 MG: 1 TABLET ORAL at 08:47

## 2023-03-25 RX ADMIN — SUCRALFATE 1 G: 1 SUSPENSION ORAL at 17:15

## 2023-03-25 RX ADMIN — HEPARIN SODIUM 5000 UNITS: 10000 INJECTION, SOLUTION INTRAVENOUS; SUBCUTANEOUS at 08:50

## 2023-03-25 RX ADMIN — DOCUSATE SODIUM 100 MG: 100 CAPSULE, LIQUID FILLED ORAL at 08:47

## 2023-03-25 RX ADMIN — OXYCODONE HYDROCHLORIDE 2.5 MG: 5 TABLET ORAL at 13:25

## 2023-03-25 RX ADMIN — SUCRALFATE 1 G: 1 SUSPENSION ORAL at 11:11

## 2023-03-25 RX ADMIN — ASPIRIN 81 MG: 81 TABLET, COATED ORAL at 08:48

## 2023-03-25 ASSESSMENT — ACTIVITIES OF DAILY LIVING (ADL)
ADLS_ACUITY_SCORE: 28
ADLS_ACUITY_SCORE: 30
ADLS_ACUITY_SCORE: 29
ADLS_ACUITY_SCORE: 30
ADLS_ACUITY_SCORE: 29

## 2023-03-25 NOTE — PLAN OF CARE
"Goal Outcome Evaluation:      Plan of Care Reviewed With: patient    Overall Patient Progress: improvingOverall Patient Progress: improving    Outcome Evaluation: Pt is A&O x 4. Ambulated in the hallway thrice this shift, SBA with rolled walker. Pt initiating for a walk. Has a better pain management today and blood pressure improves. PRN Aspercreme utilized pre-ambulation as \"the patch not helping that much.\" Pt thought he is going to the TCU tomorrow and was anxious that it will be on  Monday as he \"he couldn't miss a class for [employer] this Friday.\"    /51 (BP Location: Left arm)   Pulse 74   Temp 98.5  F (36.9  C) (Oral)   Resp 18   Ht 1.854 m (6' 1\")   Wt 114.4 kg (252 lb 3.3 oz)   SpO2 94%   BMI 33.27 kg/m      "

## 2023-03-25 NOTE — PLAN OF CARE
"Goal Outcome Evaluation:      Plan of Care Reviewed With: patient    Overall Patient Progress: no changeOverall Patient Progress: no change    Outcome Evaluation: Pt is A&O x4. Ambulating with Ax1, walker and gait belt. Pain rated 5-10/10. Oxycodone given x2. Atarax given x1. Rice in place. Will continue to monitor Mag and Pot.    /58 (BP Location: Left arm)   Pulse 72   Temp 97.2  F (36.2  C) (Oral)   Resp 18   Ht 1.854 m (6' 1\")   Wt 114.4 kg (252 lb 3.3 oz)   SpO2 95%   BMI 33.27 kg/m      "

## 2023-03-25 NOTE — PROGRESS NOTES
[facility] [place]    Medicine Progress Note - Hospitalist Service    Date of Admission:  3/17/2023    Assessment & Plan   David Thomson is a 82 year old male with multiple complex chronic medical problems re-hospitalized on 3/17/2023 for intractable left hip pain after recent left hip revision total arthroplasty on 3/15/23.  Acute left hip pain postoperatively has improved, he is tolerating advancing activity, and chronic medical problems are stable.  However, he continued to be intermittently hypotensive for several days but as of 3/25 has been normotensive for the last 24 hours after stopping chronic doses of metoprolol and Entresto.  Recent abdominal symptoms also are resolving.  He appears medically stable for discharge as of today but does not yet have a safe disposition plan.    Intractable left hip pain status post recent left hip revision 3/15/2023  Acute blood loss anemia after hip revision surgery  He presented with clinical findings suspicious for bloody seroma at the surgical site.  X-ray done in ER 3/17 showed no acute finding and orthopedic surgery from the [facility] was consulted and advised nonoperative management.  It is suspected that the patient had worsening pain postoperatively on 3/16 probably due to anesthetic block wearing off.  Management of postoperative pain has improved with scheduled doses of Tylenol and oxycodone.  Opioids have dosed with caution due to history of hospital-acquired delirium.  Previous tizanidine was discontinued due to transient hypotension with systolic blood pressure 87 that responded to IV fluids.   He continues to have intermittent severe pain in the mid to distal lateral left thigh distal to the surgical incision and in the region of the iliotibial band.  That pain occurs in certain sitting positions but not in other positions.  Examination of the distal lateral left thigh is generally reassuring suggesting soft tissue strain.   Postoperative hemoglobin decreased from 12.9 preoperatively to 9.4-> 8.9->7.8->8.6 and has stabilized now at 8-9.  Ongoing active bleeding is not suspected, but acute blood losses with acute blood loss anemia from surgery are suspected.  He has not required blood transfusion.  Patient's iron studies were stable.  He has been repeatedly re-evaluated by physical therapy who recommends TCU placement because he remains very unsteady trying to use a cane and is unable to use a walker in his current home environment with which he is more steady.  Discharge planning remains in progress.  -continue scheduled Tylenol, oxycodone, and add topical lidoderm patches to affected painful area of the distal lateral left thigh   -Continuing to recommend discharge to TCU for more aggressive rehabilitation with which the patient has been expressing agreement    Intermittent hypotension, multifactorial  Primary Hypertension  Had ongoing intermittent transient low blood pressure readings throughout his hospital stay that had not required acute intervention and from which he has been asymptomatic.  Suspect hypotension was due to a combination of new addition of scheduled doses of opiate analgesics, recent tizanidine, acute blood loss anemia after hip revision surgery, and continued use of chronic vasoactive medications because of his chronic heart problems.  His chronic heart problems have been stable.  Episodes of hypotension have now resolved after completely stopping metoprolol and Entresto.  -Stopped chronic doses of Metoprolol and Entresto and not anticipate restarting them probably for a few weeks until his blood pressure is consistently rising as he recovers from recent surgery and acute blood loss anemia  -Continue to monitor blood pressures closely    Abdominal pain  Indigestion  Constipation due to opiates  Continues to have intermittent abdominal pain that he believes is due to constipation.  Bowel habits are slower than usual  after he started scheduled doses of opiates, but he is having regular bowel movements with use of laxatives.  Abdominal pain transiently resolves after doses of an antacid raising suspicion for possible nonulcer dyspepsia.  He is at risk for ulcer disease associated with aspirin use.  Abdominal exam is generally reassuring.  Constipation and abdominal pain have resolved.  -Continue current antacids including as needed use of Maalox as needed for breakthrough symptoms  -Continue aspirin and SC heparin  -Continue laxatives while on scheduled doses of opiates    Demand Ischemia and elevated d dimer felt to be secondary to postoperative status  Chronic left Bundle Branch Block with chronic Mild ST Elevation on EKG  CAD with previous CABG  Non Sustained Ventricular tachycardia  Noted to have ST elevation associated with chronic left bundle branch block.  He has not had angina.  Troponin has been elevated but trending downward and is likely due to demand ischemia from known coronary artery disease with previous CABG and recent surgery as well as acute blood loss anemia.   D-dimer mildly elevated at 0.83 in context of recent orthopedic surgery.  Elevated D-dimer has been attributed to postoperative status.  Overnight 03/20, patient had 7 beat run of V. tach and remained asymptomatic.  Patient continued to remain chest pain-free with stable troponin and no recurrence of ventricular dysrhythmia.    -Cardiology Dr. Dhaliwal consulted by telephone on 3/21 who recommended 7-day event monitor after discharge with outpatient cardiology follow-up    Thrombocytopenia  Mild thrombocytopenia noted at admission and has since resolved.  He may have had consumption associated with recent surgery and postoperative bleeding.  -Follow platelet count    Recent Klebsiella UTI  BPH with urinary retention  03/16 Urine culture showed > 100,000 CFU/mL Klebsiella Oxytoca sensitive to Flouroquinolones. Was started on Levaquin and completed  treatment course during this hospitalization.  Has had recurrent urinary retention associated with known BPH.  Was able to void spontaneously but had repeated episodes of intermittent bladder catheterization demonstrating PVR > 500 mL.  Urinary catheter placed during this hospitalization.  Urinary retention may be exacerbated by a combination of opiate analgesics and decreased mobility.  -Continue urinary catheter including at hospital discharge, anticipate voiding trial at TCU as he weans off of narcotics with potential outpatient urology follow-up if necessary    Chronic HFpEF (EF 55-60%)  Chronic CHF is stable and not in acute exacerbation.  However, he continued to have recurrent severe hypotension which was probably multifactorial including effect of metoprolol and Entresto and recent acute blood loss anemia.  CHF has been stable even after stopping metoprolol and Entresto.  -discontinued chronic doses of Entresto and metoprolol because of recurring hypotension  -Monitor clinically for worsening CHF including daily weights    s/p Bioprosthetic AVR for Aortic Stenosis  Echo 1/23/23 with probable TAVR within normal limits.   -No acute intervention    Paroxysmal Atrial Fibrillation  Generally maintaining sinus rhythm during hospitalization.  Chronically had been taking metoprolol.  Previously had been taking Eliquis which was stopped when he developed subdural hematoma in January 2023.  Metoprolol has been discontinued because of hypotension and he continues to maintain sinus rhythm.  -Stopped metoprolol due to hypotension  -Not treating with therapeutic anticoagulation at this time  -Monitor clinically for recurrent atrial fibrillation    Generalized weakness  Physical Deconditioning  Multifactorial.  Anticipate discharge to TCU for rehab when he has a safe disposition plan.  -Continue PT/OT treatment of weakness    Hypoalbuminemia  Admitted with low serum albumin probably due to acute illness and  surgery.  -Monitor as indicated    Hyperlipidemia  Chronic and presumed stable   -continue chronic statin    Neuropathy  Chronic and clinically stable   -Continue chronic dose of Gabapentin.    Obstructive sleep Apnea  Chronic and patient reported he does not tolerate CPAP.   -Use low flow oxygen PRN while hospitalized     History Left Frontoparietal Subdural Hematoma (January 2023) in setting of frequent falls  Evaluated by [facility] on 2/28 with review of imaging from 2/7 showing resolution of SDH.  Neurosurgery has advised previously against restarting chronic Eliquis  -Outpatient follow-up with neurosurgery to determine when to resume chronic prophylactic anticoagulation    Obesity  Chronic and stable but increases his risk for health problems and likely contributes to at least some of his health problems including ISHAN  -No acute intervention     Diet: Diet  2 Gram Sodium Diet    DVT Prophylaxis: Heparin SQ  Rice Catheter: PRESENT, indication: Retention  Lines: None     Cardiac Monitoring: None  Code Status: Full Code      Clinically Significant Risk Factors                         Disposition Plan      Expected Discharge Date: 03/27/2023    Discharge Delays: Other (Add Comment)  Destination: home with help/services  Discharge Comments: TCU bed on Monday          Trever Moreno MD  Hospitalist Service  [facility]  Securely message with [employer] (more info)  Text page via [facility] Paging/Directory   ______________________________________________________________________    Interval History   There were no significant overnight events.  He remains afebrile although did have temperature as low as 96.5.  He has not had any low blood pressure readings for 24 hours.  Oxygenation has been stable.  He has had good urine output.  He is tolerating good oral intake.  He is ambulating using a walker.    Physical Exam   Vital Signs: Temp: 98.6  F (37  C) Temp src: Oral BP: 106/63 Pulse:  73   Resp: 18 SpO2: 96 % O2 Device: None (Room air)     Patient Vitals for the past 24 hrs:   BP Temp Temp src Pulse Resp SpO2   03/25/23 0853 106/63 98.6  F (37  C) Oral 73 18 96 %   03/25/23 0323 115/58 97.2  F (36.2  C) Oral 72 18 95 %   03/24/23 2310 123/58 98.3  F (36.8  C) Oral 74 20 98 %   03/24/23 1929 103/53 97.6  F (36.4  C) Oral 71 18 96 %   03/24/23 1454 100/64 (!) 96.5  F (35.8  C) Oral 76 20 95 %   03/24/23 1129 94/56 -- -- -- -- --   03/24/23 1123 (!) 99/31 -- -- -- -- --   03/24/23 1120 (!) 88/44 96.9  F (36.1  C) Oral 82 18 95 %     Weight: 252 lbs 3.3 oz   Vitals:    03/17/23 1954 03/18/23 0226 03/23/23 0456   Weight: 117 kg (258 lb) 114.5 kg (252 lb 6.8 oz) 114.4 kg (252 lb 3.3 oz)       Intake/Output Summary (Last 24 hours) at 3/25/2023 1035  Last data filed at 3/25/2023 0324  Gross per 24 hour   Intake 720 ml   Output 1150 ml   Net -430 ml     General Appearance: Obese elderly man, no acute distress resting in bed  Cardiovascular: Regular rate and rhythm    Medical Decision Making           Data     I have personally reviewed the following data over the past 24 hrs:    N/A  \   N/A   / N/A     N/A N/A N/A /  N/A   4.0 N/A N/A \        Magnesium 2.2    Recent Labs   Lab 03/25/23  0525 03/24/23  0553 03/23/23  0554 03/22/23  0559 03/21/23  0604 03/20/23  0534 03/19/23  0559 03/18/23  1717   WBC  --   --   --   --   --   --  5.3 4.4   HGB  --  8.7* 8.5* 8.9*  --   --  8.6* 7.6*   MCV  --   --   --   --   --   --  95 97   PLT  --  223 213  --  207  --  167 119*   NA  --   --  135* 137  --   --  139 135*   POTASSIUM 4.0 4.0 4.0 4.1 4.0 3.9 3.8 3.9   CHLORIDE  --   --  100 101  --   --  102 100   CO2  --   --  26 25  --   --  26 27   BUN  --   --  20.9 19.9  --   --  19.4 22.9   CR  --   --  1.14 1.02  --  0.94 0.99 0.95   ANIONGAP  --   --  9 11  --   --  11 8   ANDREW  --   --  8.8 8.8  --   --  8.8 8.0*   GLC  --   --  144* 138*  --   --  170* 117*   ALBUMIN  --   --  3.2*  --   --   --  3.5 3.0*    PROTTOTAL  --   --  5.9*  --   --   --  5.9* 5.1*   BILITOTAL  --  1.2 1.3*  --   --   --  1.2 0.9   ALKPHOS  --   --  82  --   --   --  80 69   ALT  --   --  10  --   --   --  7* 7*   AST  --   --  21  --   --   --  19 17

## 2023-03-25 NOTE — PLAN OF CARE
"Goal Outcome Evaluation:      Plan of Care Reviewed With: patient    Overall Patient Progress: no changeOverall Patient Progress: no change    Outcome Evaluation: Pt is A&O x 4. Still on a Rice Catheter, patent.  BP this mornin/50mmHg, Metropolol and Entresto were withheld as per pharmacist. MD updated and these medications were discontinued. Complained of a left hip pain, new order for lidocaine patch in place and a PRN  Aspercreme applied as per patient's request. Ambulated in the hallway thrice this shift. PT recommends to use the walker. New order for 2g Na diet. Mag and K recheck tomorrow morning.    /64 (BP Location: Left arm)   Pulse 76   Temp (!) 96.5  F (35.8  C) (Oral)   Resp 20   Ht 1.854 m (6' 1\")   Wt 114.4 kg (252 lb 3.3 oz)   SpO2 95%   BMI 33.27 kg/m          "

## 2023-03-26 ENCOUNTER — APPOINTMENT (OUTPATIENT)
Dept: PHYSICAL THERAPY | Facility: CLINIC | Age: 83
DRG: 812 | End: 2023-03-26
Attending: FAMILY MEDICINE
Payer: COMMERCIAL

## 2023-03-26 LAB
HOLD SPECIMEN: NORMAL
MAGNESIUM SERPL-MCNC: 2.2 MG/DL (ref 1.7–2.3)
POTASSIUM SERPL-SCNC: 4.1 MMOL/L (ref 3.4–5.3)

## 2023-03-26 PROCEDURE — 36415 COLL VENOUS BLD VENIPUNCTURE: CPT | Performed by: PEDIATRICS

## 2023-03-26 PROCEDURE — 250N000013 HC RX MED GY IP 250 OP 250 PS 637: Performed by: FAMILY MEDICINE

## 2023-03-26 PROCEDURE — 250N000013 HC RX MED GY IP 250 OP 250 PS 637: Performed by: INTERNAL MEDICINE

## 2023-03-26 PROCEDURE — 83735 ASSAY OF MAGNESIUM: CPT | Performed by: PEDIATRICS

## 2023-03-26 PROCEDURE — 97116 GAIT TRAINING THERAPY: CPT | Mod: GP | Performed by: PHYSICAL THERAPIST

## 2023-03-26 PROCEDURE — 84132 ASSAY OF SERUM POTASSIUM: CPT | Performed by: PEDIATRICS

## 2023-03-26 PROCEDURE — 99231 SBSQ HOSP IP/OBS SF/LOW 25: CPT | Performed by: PEDIATRICS

## 2023-03-26 PROCEDURE — 250N000013 HC RX MED GY IP 250 OP 250 PS 637: Performed by: NURSE PRACTITIONER

## 2023-03-26 PROCEDURE — 250N000013 HC RX MED GY IP 250 OP 250 PS 637: Performed by: PEDIATRICS

## 2023-03-26 PROCEDURE — 250N000011 HC RX IP 250 OP 636: Performed by: INTERNAL MEDICINE

## 2023-03-26 PROCEDURE — 120N000001 HC R&B MED SURG/OB

## 2023-03-26 RX ORDER — ACETAMINOPHEN 500 MG
1000 TABLET ORAL 3 TIMES DAILY
Qty: 180 TABLET | Refills: 0 | Status: SHIPPED | OUTPATIENT
Start: 2023-03-26 | End: 2023-12-29

## 2023-03-26 RX ORDER — TROLAMINE SALICYLATE 10 G/100G
CREAM TOPICAL 4 TIMES DAILY PRN
Qty: 120 G | Refills: 0 | Status: SHIPPED | OUTPATIENT
Start: 2023-03-26 | End: 2023-12-29

## 2023-03-26 RX ORDER — GAUZE BANDAGE 2" X 2"
100 BANDAGE TOPICAL DAILY
Qty: 30 TABLET | Refills: 0 | Status: SHIPPED | OUTPATIENT
Start: 2023-03-26 | End: 2023-12-29

## 2023-03-26 RX ORDER — ATORVASTATIN CALCIUM 80 MG/1
80 TABLET, FILM COATED ORAL AT BEDTIME
Qty: 30 TABLET | Refills: 0 | Status: SHIPPED | OUTPATIENT
Start: 2023-03-26

## 2023-03-26 RX ORDER — FOLIC ACID 1 MG/1
1 TABLET ORAL DAILY
Qty: 30 TABLET | Refills: 0 | Status: SHIPPED | OUTPATIENT
Start: 2023-03-26 | End: 2023-12-29

## 2023-03-26 RX ORDER — GABAPENTIN 100 MG/1
100 CAPSULE ORAL 2 TIMES DAILY
Qty: 60 CAPSULE | Refills: 0 | Status: SHIPPED | OUTPATIENT
Start: 2023-03-26 | End: 2024-01-08

## 2023-03-26 RX ORDER — AMOXICILLIN 250 MG
1 CAPSULE ORAL 2 TIMES DAILY
Qty: 60 TABLET | Refills: 0 | Status: ON HOLD | OUTPATIENT
Start: 2023-03-26 | End: 2024-05-09

## 2023-03-26 RX ADMIN — GABAPENTIN 100 MG: 100 CAPSULE ORAL at 08:29

## 2023-03-26 RX ADMIN — PANTOPRAZOLE SODIUM 40 MG: 40 TABLET, DELAYED RELEASE ORAL at 08:29

## 2023-03-26 RX ADMIN — TROLAMINE SALICYLATE: 10 CREAM TOPICAL at 09:53

## 2023-03-26 RX ADMIN — ASPIRIN 81 MG: 81 TABLET, COATED ORAL at 20:02

## 2023-03-26 RX ADMIN — PANTOPRAZOLE SODIUM 40 MG: 40 TABLET, DELAYED RELEASE ORAL at 20:03

## 2023-03-26 RX ADMIN — DOCUSATE SODIUM 100 MG: 100 CAPSULE, LIQUID FILLED ORAL at 08:29

## 2023-03-26 RX ADMIN — SENNOSIDES AND DOCUSATE SODIUM 1 TABLET: 8.6; 5 TABLET ORAL at 20:03

## 2023-03-26 RX ADMIN — ACETAMINOPHEN 975 MG: 325 TABLET ORAL at 20:01

## 2023-03-26 RX ADMIN — FOLIC ACID 1 MG: 1 TABLET ORAL at 08:30

## 2023-03-26 RX ADMIN — GABAPENTIN 100 MG: 100 CAPSULE ORAL at 20:03

## 2023-03-26 RX ADMIN — SUCRALFATE 1 G: 1 SUSPENSION ORAL at 23:07

## 2023-03-26 RX ADMIN — HYDROXYZINE HYDROCHLORIDE 50 MG: 50 TABLET, FILM COATED ORAL at 23:09

## 2023-03-26 RX ADMIN — ATORVASTATIN CALCIUM 80 MG: 40 TABLET, FILM COATED ORAL at 20:02

## 2023-03-26 RX ADMIN — ASPIRIN 81 MG: 81 TABLET, COATED ORAL at 08:29

## 2023-03-26 RX ADMIN — SUCRALFATE 1 G: 1 SUSPENSION ORAL at 06:00

## 2023-03-26 RX ADMIN — ACETAMINOPHEN 975 MG: 325 TABLET ORAL at 08:29

## 2023-03-26 RX ADMIN — OXYCODONE HYDROCHLORIDE 2.5 MG: 5 TABLET ORAL at 17:14

## 2023-03-26 RX ADMIN — HYDROXYZINE HYDROCHLORIDE 50 MG: 50 TABLET, FILM COATED ORAL at 05:03

## 2023-03-26 RX ADMIN — DOCUSATE SODIUM 100 MG: 100 CAPSULE, LIQUID FILLED ORAL at 20:03

## 2023-03-26 RX ADMIN — HEPARIN SODIUM 5000 UNITS: 10000 INJECTION, SOLUTION INTRAVENOUS; SUBCUTANEOUS at 20:01

## 2023-03-26 RX ADMIN — TROLAMINE SALICYLATE: 10 CREAM TOPICAL at 14:25

## 2023-03-26 RX ADMIN — OXYCODONE HYDROCHLORIDE 2.5 MG: 5 TABLET ORAL at 23:07

## 2023-03-26 RX ADMIN — SENNOSIDES AND DOCUSATE SODIUM 1 TABLET: 8.6; 5 TABLET ORAL at 08:29

## 2023-03-26 RX ADMIN — HEPARIN SODIUM 5000 UNITS: 10000 INJECTION, SOLUTION INTRAVENOUS; SUBCUTANEOUS at 08:30

## 2023-03-26 RX ADMIN — SUCRALFATE 1 G: 1 SUSPENSION ORAL at 11:04

## 2023-03-26 RX ADMIN — OXYCODONE HYDROCHLORIDE 2.5 MG: 5 TABLET ORAL at 05:58

## 2023-03-26 RX ADMIN — ACETAMINOPHEN 975 MG: 325 TABLET ORAL at 13:29

## 2023-03-26 RX ADMIN — TROLAMINE SALICYLATE: 10 CREAM TOPICAL at 19:45

## 2023-03-26 RX ADMIN — THIAMINE HCL TAB 100 MG 100 MG: 100 TAB at 08:30

## 2023-03-26 RX ADMIN — SUCRALFATE 1 G: 1 SUSPENSION ORAL at 17:14

## 2023-03-26 RX ADMIN — TROLAMINE SALICYLATE: 10 CREAM TOPICAL at 05:04

## 2023-03-26 RX ADMIN — OXYCODONE HYDROCHLORIDE 2.5 MG: 5 TABLET ORAL at 11:04

## 2023-03-26 RX ADMIN — OXYCODONE HYDROCHLORIDE 2.5 MG: 5 TABLET ORAL at 13:29

## 2023-03-26 RX ADMIN — LIDOCAINE 2 PATCH: 560 PATCH PERCUTANEOUS; TOPICAL; TRANSDERMAL at 08:38

## 2023-03-26 ASSESSMENT — ACTIVITIES OF DAILY LIVING (ADL)
ADLS_ACUITY_SCORE: 29
ADLS_ACUITY_SCORE: 30
ADLS_ACUITY_SCORE: 29
ADLS_ACUITY_SCORE: 30
ADLS_ACUITY_SCORE: 29
ADLS_ACUITY_SCORE: 30
ADLS_ACUITY_SCORE: 29

## 2023-03-26 NOTE — PLAN OF CARE
"Goal Outcome Evaluation:      Plan of Care Reviewed With: patient    Overall Patient Progress: improvingOverall Patient Progress: improving    Outcome Evaluation: Patient is A&O x 4. Ambulated thrice this shift. Rates 10/10 pain on the operative site, but, when the description was read to him, only at 4/10 pain rate. PRN Aspercreme utilized.      /54 (BP Location: Left arm)   Pulse 63   Temp 98.1  F (36.7  C) (Oral)   Resp 18   Ht 1.854 m (6' 1\")   Wt 114.4 kg (252 lb 3.3 oz)   SpO2 95%   BMI 33.27 kg/m        "

## 2023-03-26 NOTE — PROGRESS NOTES
[facility]    Medicine Progress Note - Hospitalist Service    Date of Admission:  3/17/2023    Assessment & Plan   David Thomson is a 82 year old male with multiple complex chronic medical problems re-hospitalized on 3/17/2023 for intractable left hip pain after recent left hip revision total arthroplasty on 3/15/23.  Acute left hip pain postoperatively has improved, he is tolerating advancing activity, and chronic medical problems are stable.  However, he continued to be intermittently hypotensive for several days but as of afternoon 3/24 has been normotensive after stopping chronic doses of metoprolol and Entresto.  Recent abdominal symptoms have also resolved.  He appears medically stable for discharge as of today but does not yet have a safe disposition plan.    Intractable left hip pain status post recent left hip revision 3/15/2023  Acute blood loss anemia after hip revision surgery  He presented with clinical findings suspicious for bloody seroma at the surgical site.  X-ray done in ER 3/17 showed no acute finding and orthopedic surgery from the [facility] was consulted and advised nonoperative management.  It is suspected that the patient had worsening pain postoperatively on 3/16 probably due to anesthetic block wearing off.  Management of postoperative pain has improved with scheduled doses of Tylenol and oxycodone.  Opioids have dosed with caution due to history of hospital-acquired delirium.  Previous tizanidine was discontinued due to transient hypotension with systolic blood pressure 87 that responded to IV fluids.   He continues to have intermittent severe pain in the mid to distal lateral left thigh distal to the surgical incision and in the region of the iliotibial band.  That pain occurs in certain sitting positions but not in other positions.  Examination of the distal lateral left thigh is generally reassuring suggesting soft tissue strain.  Left pelvis and hip  x-ray at admission demonstrated postop changes after recent left total hip arthroplasty without acute abnormalities.  Postoperative hemoglobin decreased from 12.9 preoperatively to 9.4-> 8.9->7.8->8.6 postoperatively concerning for acute blood loss anemia but has stabilized now at 8-9.  He has not required blood transfusion.  Patient's recent iron studies were normal.  He has been repeatedly re-evaluated by physical therapy who recommends TCU placement because he remains very unsteady trying to use a cane and is unable to use a walker in his current home environment with which he is more steady.    -continue scheduled Tylenol, oxycodone, and topical Aspercreme to affected painful area of the distal lateral left thigh (Lidoderm patches are stopped because they were ineffective)  -Anticipate discharge to TCU 3/27 for ongoing rehabilitation with which the patient continues to express agreement    Intermittent hypotension, multifactorial  Primary Hypertension  Had ongoing intermittent transient low blood pressure readings throughout his hospital stay that had not required acute intervention and from which he has been asymptomatic.  Suspect hypotension was due to a combination of new addition of scheduled doses of opiate analgesics, recent tizanidine, acute blood loss anemia after hip revision surgery, and continued use of chronic vasoactive medications because of his chronic heart problems.  His chronic heart problems have been stable.  Episodes of hypotension resolved after completely stopping metoprolol and Entresto.  -Stopped chronic doses of Metoprolol and Entresto and not anticipate restarting them probably for a few weeks until his blood pressure is consistently rising as he recovers from recent surgery and acute blood loss anemia  -Continue to monitor blood pressures regularly    Abdominal pain  Indigestion  Constipation due to opiates  Continues to have intermittent abdominal pain that he believes is due to  constipation.  Bowel habits are slower than usual after he started scheduled doses of opiates, but he is having regular bowel movements with use of laxatives.  Abdominal pain transiently resolves after doses of an antacid raising suspicion for possible nonulcer dyspepsia.  He is at risk for ulcer disease associated with aspirin use.  Abdominal exam is generally reassuring.  Constipation and abdominal pain have resolved.  -Continue current antacids including Protonix, Carafate, and Maalox with viscous lidocaine as needed for breakthrough symptoms  -Continue aspirin and SC heparin  -Continue laxatives while on scheduled doses of opiates    Demand Ischemia and elevated d dimer felt to be secondary to postoperative status  Chronic left Bundle Branch Block with chronic Mild ST Elevation on EKG  CAD with previous CABG  Non Sustained Ventricular tachycardia  Noted to have ST elevation associated with chronic left bundle branch block.  He has not had angina.  Troponin has been elevated but trending downward and is likely due to demand ischemia from known coronary artery disease with previous CABG and recent surgery as well as acute blood loss anemia.   D-dimer mildly elevated at 0.83 in context of recent orthopedic surgery.  Elevated D-dimer has been attributed to postoperative status.  Overnight 03/20, patient had 7 beat run of V. tach and remained asymptomatic.  Patient continued to remain chest pain-free with stable troponin and no recurrence of ventricular dysrhythmia.    -Cardiology Dr. Dhaliwal consulted by telephone on 3/21 who recommended 7-day event monitor after discharge with outpatient cardiology follow-up    Thrombocytopenia  Mild thrombocytopenia noted at admission and has since resolved.  He may have had consumption associated with recent surgery and postoperative bleeding.  -Follow platelet count    Recent Klebsiella UTI  BPH with urinary retention  03/16 Urine culture showed > 100,000 CFU/mL Klebsiella  Oxytoca sensitive to Flouroquinolones. Was started on Levaquin and completed treatment course during this hospitalization.  Has had recurrent urinary retention associated with known BPH.  Was able to void spontaneously but had repeated episodes of intermittent bladder catheterization demonstrating PVR > 500 mL.  Urinary catheter placed during this hospitalization.  Urinary retention may be exacerbated by a combination of opiate analgesics and decreased mobility.  -Continue urinary catheter including at hospital discharge, anticipate voiding trial at TCU as he weans off of narcotics with potential outpatient urology follow-up if necessary    Chronic HFpEF (EF 55-60%)  Chronic CHF is stable and not in acute exacerbation.  However, he continued to have recurrent severe hypotension which was probably multifactorial including effect of metoprolol and Entresto and recent acute blood loss anemia.  CHF has been stable even after stopping metoprolol and Entresto.  -discontinued chronic doses of Entresto and metoprolol because of recurring hypotension  -Monitor clinically for worsening CHF including daily weights    s/p Bioprosthetic AVR for Aortic Stenosis  Echo 1/23/23 with probable TAVR within normal limits.   -No acute intervention    Paroxysmal Atrial Fibrillation  Generally maintaining sinus rhythm during hospitalization.  Chronically had been taking metoprolol.  Previously had been taking Eliquis which was stopped when he developed subdural hematoma in January 2023.  Metoprolol has been discontinued because of hypotension and he continues to maintain sinus rhythm.  -Stopped metoprolol due to hypotension  -Not treating with therapeutic anticoagulation at this time  -Monitor clinically for recurrent atrial fibrillation    Generalized weakness  Physical Deconditioning  Multifactorial.  Anticipate discharge to TCU for rehab when he has a safe disposition plan.  -Continue PT/OT treatment of  weakness    Hypoalbuminemia  Admitted with low serum albumin probably due to acute illness and surgery.  -Monitor as indicated    Hyperlipidemia  Chronic and presumed stable   -continue chronic statin    Neuropathy  Chronic and clinically stable   -Continue chronic dose of Gabapentin.    Obstructive sleep Apnea  Chronic and patient reported he does not tolerate CPAP.   -Use low flow oxygen PRN while hospitalized     History Left Frontoparietal Subdural Hematoma (January 2023) in setting of frequent falls  Evaluated by [facility] on 2/28 with review of imaging from 2/7 showing resolution of SDH.  Neurosurgery has advised previously against restarting chronic Eliquis  -Outpatient follow-up with neurosurgery to determine when to resume chronic prophylactic anticoagulation    Obesity  Chronic and stable but increases his risk for health problems and likely contributes to at least some of his health problems including ISHAN  -No acute intervention       Diet: Diet  2 Gram Sodium Diet    DVT Prophylaxis: Heparin SQ  Rice Catheter: PRESENT, indication: Retention  Lines: None     Cardiac Monitoring: None  Code Status: Full Code      Clinically Significant Risk Factors                         Disposition Plan      Expected Discharge Date: 03/27/2023    Discharge Delays: *Medically Ready for Discharge  Placement - TCU  Destination: home with help/services  Discharge Comments: TCU bed on Monday          Trever Moreno MD  Hospitalist Service  [facility]  Securely message with [employer] (more info)  Text page via [employer] Paging/Directory   ______________________________________________________________________    Interval History   There were no significant overnight events.  He continues to have pain in his left thigh, sometimes in the region of his surgical bandage and wound and sometimes distal just above the knee on the outside of the leg.  He has not had any further episodes of severe  pain in his left thigh since he stopped sitting in a particular chair in his room.  He has been able to sit up in bed and at the edge of his bed without that severe pain and he has been ambulating without that severe left thigh pain.  He remains afebrile and hemodynamically stable.  Oxygenation remains normal.  He continues to have good urine output.  He is tolerating good oral intake.    Physical Exam   Vital Signs: Temp: 98.1  F (36.7  C) Temp src: Oral BP: 103/54 Pulse: 63   Resp: 18 SpO2: 95 % O2 Device: None (Room air)     Patient Vitals for the past 24 hrs:   BP Temp Temp src Pulse Resp SpO2   03/26/23 1529 103/54 98.1  F (36.7  C) Oral 63 18 95 %   03/26/23 0758 (!) 148/67 98.5  F (36.9  C) -- 74 18 97 %   03/26/23 0341 112/54 98  F (36.7  C) Oral 77 20 93 %   03/25/23 2246 113/60 98.9  F (37.2  C) Oral 72 18 96 %   03/25/23 1940 107/62 98.9  F (37.2  C) Oral 77 17 95 %     Weight: 252 lbs 3.3 oz    General Appearance: Pale obese elderly man without signs of acute distress while resting in bed  Skin: Dry dressing overlying left thigh surgical wound without surrounding erythema or ecchymosis     Medical Decision Making           Data     I have personally reviewed the following data over the past 24 hrs:    N/A  \   N/A   / N/A     N/A N/A N/A /  N/A   4.1 N/A N/A \        Magnesium 2.2    Recent Labs   Lab 03/26/23  0524 03/25/23  0525 03/24/23  0553 03/23/23  0554 03/22/23  0559 03/21/23  0604 03/20/23  0534   HGB  --   --  8.7* 8.5* 8.9*  --   --    PLT  --   --  223 213  --  207  --    NA  --   --   --  135* 137  --   --    POTASSIUM 4.1 4.0 4.0 4.0 4.1 4.0 3.9   CHLORIDE  --   --   --  100 101  --   --    CO2  --   --   --  26 25  --   --    BUN  --   --   --  20.9 19.9  --   --    CR  --   --   --  1.14 1.02  --  0.94   ANIONGAP  --   --   --  9 11  --   --    ANDREW  --   --   --  8.8 8.8  --   --    GLC  --   --   --  144* 138*  --   --    ALBUMIN  --   --   --  3.2*  --   --   --    PROTTOTAL  --   --    --  5.9*  --   --   --    BILITOTAL  --   --  1.2 1.3*  --   --   --    ALKPHOS  --   --   --  82  --   --   --    ALT  --   --   --  10  --   --   --    AST  --   --   --  21  --   --   --

## 2023-03-26 NOTE — PLAN OF CARE
"Goal Outcome Evaluation:      Plan of Care Reviewed With: patient          Outcome Evaluation: Pt is A&O x 4. Ambulated once this shift, SBA with walker and gait belt. Pt still rates pain at a 10/10. Pt given oxycodone x2. Aspercreme applied post ambulation. Lidocaine patch removed as pt stated it is not effective. Pt appears calm and relaxed and can carry out conversation. Blood pressure continues to improve.  /54 (BP Location: Left arm)   Pulse 77   Temp 98  F (36.7  C) (Oral)   Resp 20   Ht 1.854 m (6' 1\")   Wt 114.4 kg (252 lb 3.3 oz)   SpO2 93%   BMI 33.27 kg/m       Atarax given x 1 to help with muscle pain and massage provided.   "

## 2023-03-27 ENCOUNTER — HOSPITAL ENCOUNTER (OUTPATIENT)
Dept: CARDIOLOGY | Facility: CLINIC | Age: 83
Discharge: HOME | DRG: 812 | End: 2023-03-27
Attending: FAMILY MEDICINE
Payer: COMMERCIAL

## 2023-03-27 VITALS
SYSTOLIC BLOOD PRESSURE: 91 MMHG | HEART RATE: 82 BPM | WEIGHT: 245 LBS | TEMPERATURE: 98.3 F | DIASTOLIC BLOOD PRESSURE: 50 MMHG | BODY MASS INDEX: 32.47 KG/M2 | HEIGHT: 73 IN | OXYGEN SATURATION: 96 % | RESPIRATION RATE: 20 BRPM

## 2023-03-27 DIAGNOSIS — I47.29 NSVT (NONSUSTAINED VENTRICULAR TACHYCARDIA) (H): ICD-10-CM

## 2023-03-27 LAB
MAGNESIUM SERPL-MCNC: 2.1 MG/DL (ref 1.7–2.3)
PLATELET # BLD AUTO: 230 10E3/UL (ref 150–450)
POTASSIUM SERPL-SCNC: 3.8 MMOL/L (ref 3.4–5.3)

## 2023-03-27 PROCEDURE — 83735 ASSAY OF MAGNESIUM: CPT | Performed by: PEDIATRICS

## 2023-03-27 PROCEDURE — 250N000013 HC RX MED GY IP 250 OP 250 PS 637: Performed by: INTERNAL MEDICINE

## 2023-03-27 PROCEDURE — 99239 HOSP IP/OBS DSCHRG MGMT >30: CPT | Performed by: FAMILY MEDICINE

## 2023-03-27 PROCEDURE — 250N000011 HC RX IP 250 OP 636: Performed by: INTERNAL MEDICINE

## 2023-03-27 PROCEDURE — 84132 ASSAY OF SERUM POTASSIUM: CPT | Performed by: PEDIATRICS

## 2023-03-27 PROCEDURE — 250N000013 HC RX MED GY IP 250 OP 250 PS 637: Performed by: FAMILY MEDICINE

## 2023-03-27 PROCEDURE — 93242 EXT ECG>48HR<7D RECORDING: CPT

## 2023-03-27 PROCEDURE — 36415 COLL VENOUS BLD VENIPUNCTURE: CPT | Performed by: PEDIATRICS

## 2023-03-27 PROCEDURE — 250N000013 HC RX MED GY IP 250 OP 250 PS 637: Performed by: PEDIATRICS

## 2023-03-27 PROCEDURE — 85049 AUTOMATED PLATELET COUNT: CPT | Performed by: INTERNAL MEDICINE

## 2023-03-27 PROCEDURE — 250N000013 HC RX MED GY IP 250 OP 250 PS 637: Performed by: NURSE PRACTITIONER

## 2023-03-27 RX ORDER — OXYCODONE HYDROCHLORIDE 5 MG/1
2.5 TABLET ORAL
Qty: 10 TABLET | Refills: 0 | Status: SHIPPED | OUTPATIENT
Start: 2023-03-27 | End: 2023-03-31

## 2023-03-27 RX ORDER — OXYCODONE HYDROCHLORIDE 5 MG/1
2.5 TABLET ORAL EVERY 4 HOURS PRN
Qty: 10 TABLET | Refills: 0 | Status: SHIPPED | OUTPATIENT
Start: 2023-03-27 | End: 2023-04-03

## 2023-03-27 RX ORDER — POTASSIUM CHLORIDE 1500 MG/1
20 TABLET, EXTENDED RELEASE ORAL ONCE
Status: COMPLETED | OUTPATIENT
Start: 2023-03-27 | End: 2023-03-27

## 2023-03-27 RX ADMIN — DOCUSATE SODIUM 100 MG: 100 CAPSULE, LIQUID FILLED ORAL at 08:45

## 2023-03-27 RX ADMIN — OXYCODONE HYDROCHLORIDE 2.5 MG: 5 TABLET ORAL at 06:07

## 2023-03-27 RX ADMIN — HEPARIN SODIUM 5000 UNITS: 10000 INJECTION, SOLUTION INTRAVENOUS; SUBCUTANEOUS at 08:45

## 2023-03-27 RX ADMIN — FOLIC ACID 1 MG: 1 TABLET ORAL at 08:45

## 2023-03-27 RX ADMIN — POTASSIUM CHLORIDE 20 MEQ: 1500 TABLET, EXTENDED RELEASE ORAL at 06:35

## 2023-03-27 RX ADMIN — GABAPENTIN 100 MG: 100 CAPSULE ORAL at 08:45

## 2023-03-27 RX ADMIN — ASPIRIN 81 MG: 81 TABLET, COATED ORAL at 08:45

## 2023-03-27 RX ADMIN — THIAMINE HCL TAB 100 MG 100 MG: 100 TAB at 08:45

## 2023-03-27 RX ADMIN — SUCRALFATE 1 G: 1 SUSPENSION ORAL at 06:08

## 2023-03-27 RX ADMIN — PANTOPRAZOLE SODIUM 40 MG: 40 TABLET, DELAYED RELEASE ORAL at 08:45

## 2023-03-27 RX ADMIN — HYDROXYZINE HYDROCHLORIDE 50 MG: 50 TABLET, FILM COATED ORAL at 06:08

## 2023-03-27 RX ADMIN — TROLAMINE SALICYLATE: 10 CREAM TOPICAL at 08:58

## 2023-03-27 RX ADMIN — ACETAMINOPHEN 975 MG: 325 TABLET ORAL at 08:45

## 2023-03-27 RX ADMIN — SENNOSIDES AND DOCUSATE SODIUM 1 TABLET: 8.6; 5 TABLET ORAL at 08:45

## 2023-03-27 RX ADMIN — Medication 2.5 MG: at 08:46

## 2023-03-27 RX ADMIN — SUCRALFATE 1 G: 1 SUSPENSION ORAL at 11:31

## 2023-03-27 RX ADMIN — OXYCODONE HYDROCHLORIDE 2.5 MG: 5 TABLET ORAL at 10:40

## 2023-03-27 ASSESSMENT — ACTIVITIES OF DAILY LIVING (ADL)
ADLS_ACUITY_SCORE: 29

## 2023-03-27 NOTE — PLAN OF CARE
Physical Therapy Discharge Summary    Reason for therapy discharge:    Discharged to transitional care facility.    Progress towards therapy goal(s). See goals on Care Plan in [facility] electronic health record for goal details.  Goals partially met.  Barriers to achieving goals:   limited tolerance for therapy and discharge from facility.    Therapy recommendation(s):    Continued therapy is recommended.  Rationale/Recommendations:  to address post operative mobility, strength, balance and safety prior to returning home to previous living environment.      Thank you for your referral.  Loreto Leger, PT, DPT, ATC, [facility]  O: [phone]  E: [email or web address]

## 2023-03-27 NOTE — PLAN OF CARE
"Goal Outcome Evaluation:                 Outcome Evaluation: Patient is A&O x 4. Ambulated two times this shift. Rates pain at a 5-9/10. Aspercreme applied 1x, Oxycodone given 2x, Atarax given 2x. Pt appears calm, cooperative, and engages in conversation. Pt stated that he was feeling anxious about his transition in the AM and ensuring that all of his belongings were together.    /54 (BP Location: Left arm)   Pulse 63   Temp 98.1  F (36.7  C) (Oral)   Resp 18   Ht 1.854 m (6' 1\")   Wt 111.1 kg (245 lb)   SpO2 95%   BMI 32.32 kg/m     "

## 2023-03-27 NOTE — DISCHARGE SUMMARY
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    Hypotension    Benign prostatic hyperplasia with urinary retention    UTI due to Klebsiella species    Chronic heart failure with preserved ejection fraction (H)    S/P AVR-bioprosthetic    S/P CABG (coronary artery bypass graft)    CAD (coronary artery disease)    ISHAN (obstructive sleep apnea)    History of subdural hematoma Jan 2023    Anemia due to blood loss, acute    Thrombocytopenia (H)    Obesity    Indigestion    Demand ischemia (H)    LBBB (left bundle branch block)    NSVT (nonsustained ventricular tachycardia) (H)    Paroxysmal atrial fibrillation (H)    Neuropathy    Follow-ups Needed After Discharge   Follow-up Appointments     Follow Up and recommended labs and tests      Follow up with Nursing home physician.    Follow up with urology within 2 weeks           Discharge Disposition   Discharged to home  Condition at discharge: Stable    Hospital Course    David Thomson is a 82 year old male with multiple complex chronic medical problems re-hospitalized on 3/17/2023 for intractable left hip pain after recent left hip revision total arthroplasty on 3/15/23.  Acute left hip pain postoperatively has improved with pain regimen adjustments and he is tolerating advancing activity, and chronic medical problems are stable overall however during this stay he continued to be intermittently hypotensive for several days but as of afternoon 3/24 has been normotensive after stopping chronic doses of metoprolol and Entresto.  Recent abdominal symptoms have also resolved with patient stooling well with adjustments to his bowel  regimen.  Patient will be discharge to [facility] for ongoing recovery after his surgery and close clinical follow up by the provider at that site.      Intractable left hip pain status post recent left hip revision 3/15/2023  Acute blood loss anemia after hip revision surgery  He presented with clinical findings suspicious for bloody seroma at the surgical site.  X-ray done in ER 3/17 showed no acute finding and orthopedic surgery from the [facility] was consulted and advised nonoperative management.  It is suspected that the patient had worsening pain postoperatively on 3/16 probably due to anesthetic block wearing off.  Management of postoperative pain has improved with scheduled doses of Tylenol and oxycodone.  He continues to have intermittent severe pain in the mid to distal lateral left thigh distal to the surgical incision and in the region of the iliotibial band.  That pain occurs in certain sitting positions but not in other positions.  Examination of the distal lateral left thigh is generally reassuring suggesting soft tissue strain.  Left pelvis and hip x-ray at admission demonstrated postop changes after recent left total hip arthroplasty without acute abnormalities.  Postoperative hemoglobin decreased from 12.9 preoperatively to 9.4-> 8.9->7.8->8.6 postoperatively concerning for acute blood loss anemia but has stabilized now at 8-9.  He has not required blood transfusion.  Patient's recent iron studies were normal.  He has been repeatedly re-evaluated by physical therapy who recommends TCU placement because he remains very unsteady trying to use a cane and is unable to use a walker in his current home environment with which he is more steady.    -Discharge to [facility] with ongoing scheduled scheduled Tylenol and oxycodone, prn oxycodone for breakthrough pain and prn topical Aspercreme to affected painful area of the distal lateral left thigh   -follow up with orthopedic team as an  outpatient as previously recommended    Intermittent hypotension, multifactorial  Primary Hypertension  Had ongoing intermittent transient low blood pressure readings early in his hospital stay that had not required acute intervention and from which he has been asymptomatic.  Suspect hypotension was due to a combination of new addition of scheduled doses of opiate analgesics, trial of tizanidine, acute blood loss anemia after hip revision surgery, and continued use of chronic vasoactive medications because of his chronic heart problems.  His chronic heart problems have been stable.  Episodes of hypotension resolved after completely stopping metoprolol and Entresto.  -Continue to hold Metoprolol and Entresto at time of discharge but anticipate restarting them probably for a few weeks once his blood pressure is consistently rising as he recovers from recent surgery and acute blood loss anemia  -Continue to monitor blood pressures regularly at the TCU setting     Abdominal pain  Indigestion  Constipation due to opiates  Continues to have intermittent abdominal pain that he believes is due to constipation.  Bowel habits are slower than usual after he started scheduled doses of opiates, but he is having regular bowel movements with use of laxatives.  Abdominal pain transiently resolves after doses of an antacid raising suspicion for possible nonulcer dyspepsia.  He is at risk for ulcer disease associated with aspirin use and patient placed on Protonix and Carafate with improvement noted.  Constipation and abdominal pain have resolved on day of discharge.    -Continue current antacids including Protonix, Carafate  -Continue aspirin  -Continue laxatives while on scheduled doses of opiates    Demand Ischemia and elevated d dimer felt to be secondary to postoperative status  Chronic left Bundle Branch Block with chronic Mild ST Elevation on EKG  CAD with previous CABG  Non Sustained Ventricular tachycardia  Noted to have ST  elevation associated with chronic left bundle branch block.  He has not had angina.  Troponin has been elevated but trending downward and is likely due to demand ischemia from known coronary artery disease with previous CABG and recent surgery as well as acute blood loss anemia.   D-dimer mildly elevated at 0.83 in context of recent orthopedic surgery.  Elevated D-dimer has been attributed to postoperative status.  Overnight 03/20, patient had 7 beat run of V. tach and remained asymptomatic.  Patient continued to remain chest pain-free with stable troponin and no recurrence of ventricular dysrhythmia.    -Cardiology Dr. Dhaliwal consulted by telephone on 3/21 who recommended 7-day heart monitor after discharge with outpatient cardiology follow-up.  Zio patch will be placed prior to discharge to TCU    Thrombocytopenia  Mild thrombocytopenia noted at admission and has since resolved.  He may have had increased platelet consumption associated with recent surgery and postoperative bleeding.  -Follow platelet count as an outpatient to ensure ongoing resolution     Recent Klebsiella UTI  BPH with urinary retention  03/16 Urine culture showed > 100,000 CFU/mL Klebsiella Oxytoca sensitive to Flouroquinolones. Was started on Levaquin and completed treatment course during this hospitalization.  Has had recurrent urinary retention associated with known BPH.  Was able to void spontaneously but had repeated episodes of intermittent bladder catheterization demonstrating PVR > 500 mL.  Urinary catheter placed during this hospitalization.  Urinary retention may be exacerbated by a combination of opiate analgesics and decreased mobility.  -Continue urinary catheter at hospital discharge  - anticipate voiding trial at TCU as he weans off of narcotics with potential outpatient urology follow-up if necessary    Chronic HFpEF (EF 55-60%)  Chronic CHF is stable and not in acute exacerbation.  However, he continued to have recurrent  severe hypotension which was probably multifactorial including effect of metoprolol and Entresto and recent acute blood loss anemia.  CHF has been stable even after stopping metoprolol and Entresto.  -continue to hold chronic doses of Entresto and metoprolol because of recurring hypotension  -Monitor clinically for worsening CHF including daily weights at the TCU  -Restart metoprolol and Entresto as able going forward.    s/p Bioprosthetic AVR for Aortic Stenosis  Echo 1/23/23 with probable TAVR within normal limits.   -No acute intervention at time of discharge     Paroxysmal Atrial Fibrillation  Generally maintaining sinus rhythm during hospitalization.  Chronically had been taking metoprolol.  Previously had been taking Eliquis which was stopped when he developed subdural hematoma in January 2023.  Metoprolol has been discontinued because of hypotension and he continues to maintain sinus rhythm.  -Continue to hold metoprolol due to hypotension present earlier in this stay  -Not treating with therapeutic anticoagulation at this time  -Monitor clinically for recurrent atrial fibrillation at the TCU environment and restart metoprolol when able.    Generalized weakness  Physical Deconditioning  Multifactorial.  Discharging to TCU  -Continue PT/OT treatment of weakness in TCU environment.     Hypoalbuminemia  Admitted with low serum albumin probably due to acute illness and surgery.  -Monitor as indicated in outpatient setting     Hyperlipidemia  Chronic and presumed stable   -continue chronic statin without change at discharge     Neuropathy  Chronic and clinically stable   -Continue chronic dose of Gabapentin at discharge     Obstructive sleep Apnea  Chronic and patient reported he does not tolerate CPAP.   -no acute intervention needed at discharge     History Left Frontoparietal Subdural Hematoma (January 2023) in setting of frequent falls  Evaluated by [facility] on 2/28 with review of imaging from 3/7  showing resolution of SDH.  Neurosurgery has advised previously against restarting chronic Eliquis  -Outpatient follow-up with neurosurgery to determine when to resume chronic prophylactic anticoagulation    Obesity  Chronic and stable but increases his risk for health problems and likely contributes to at least some of his health problems including ISHAN  -No acute intervention at time of discharge but patient would benefit from weight loss in the future      Consultations This Hospital Stay   PHYSICAL THERAPY ADULT IP CONSULT  OCCUPATIONAL THERAPY ADULT IP CONSULT  CARE MANAGEMENT / SOCIAL WORK IP CONSULT  PHYSICAL THERAPY ADULT IP CONSULT  OCCUPATIONAL THERAPY ADULT IP CONSULT  PHYSICAL THERAPY ADULT IP CONSULT    Code Status   Full Code    Time Spent on this Encounter   I, Lashay Alcantar MD, personally saw the patient today and spent greater than 30 minutes discharging this patient.       Lashay Alcantar MD  [address] SURGICAL  [address] [place] [address]  Phone: [phone]  ______________________________________________________________________    Physical Exam   Vital Signs: Temp: 98.3  F (36.8  C) Temp src: Oral BP: 91/50 Pulse: 82   Resp: 20 SpO2: 96 % O2 Device: None (Room air)    Weight: 245 lbs 0 oz  Constitutional: awake, alert, cooperative, no apparent distress, and appears stated age  Respiratory: No increased work of breathing, good air exchange, clear to auscultation bilaterally, no crackles or wheezing  Cardiovascular: RRR without murmur  GI: bowel sounds present, abdomen soft and non-tender  Neurologic: Awake, alert, oriented to name, place and situation        Primary Care Physician   Physician No Ref-Primary    Discharge Orders      Adult Urology  Referral      General info for SNF    Length of Stay Estimate: Short Term Care: Estimated # of Days <30  Condition at Discharge: Improving  Level of care:skilled   Rehabilitation  Potential: Good  Admission H&P remains valid and up-to-date: Yes  Recent Chemotherapy: N/A  Use Nursing Home Standing Orders: Yes     Mantoux instructions    Give two-step Mantoux (PPD) Per Facility Policy Yes     Follow Up and recommended labs and tests    Follow up with Nursing home physician.    Follow up with urology within 2 weeks     Daily weights    Call Provider for weight gain of more than 2 pounds per day or 5 pounds per week.     Rice catheter    To straight gravity drainage.  DO NOT change catheter without a specific Provider order IF diagnosis of benign prostatic hypertrophy (BPH), neurogenic bladder, or other urological conditions     Activity - Up with nursing assistance     Reason for your hospital stay    Continued left thigh pain after your recent hip surgery with ongoing difficulties with balance and walking.  Discharging to TCU for ongoing PT rehab to improve balance while walking     Physical Therapy Adult Consult    Evaluate and treat as clinically indicated.    Reason:  s/p hip revision surgery     Occupational Therapy Adult Consult    Evaluate and treat as clinically indicated.    Reason:  s/p hip revision surgery     Adult Cardiac Event Monitor     Fall precautions     Diet    Follow this diet upon discharge:       Combination Diet Low Saturated Fat Na <2400mg Diet, No Caffeine Diet       Significant Results and Procedures   Results for orders placed or performed during the hospital encounter of 03/17/23   XR Pelvis w Hip Left 1 View    Narrative    EXAM: XR PELVIS AND HIP LEFT 1 VIEW  LOCATION: [facility]  DATE/TIME: 3/17/2023 9:53 PM    INDICATION: post op pain s p left hip replacement  COMPARISON: None.      Impression    IMPRESSION: Postop changes of a recent left total hip arthroplasty. No fracture or dislocation. No degenerative changes in the right hip.   XR Chest Port 1 View    Narrative    EXAM: XR CHEST PORT 1 VIEW  LOCATION: [facility]  DATE/TIME: 3/18/2023 5:35 PM    INDICATION: Chest pain.  COMPARISON: 01/22/2023.      Impression    IMPRESSION: Sternotomy. Stable cardiac enlargement. Pulmonary vascularity is within normal limits. The lungs are clear. No pneumothorax. Aortic calcification. Chronic nondisplaced right clavicular fracture. Anterior fusion in the lower cervical spine.   XR Abdomen Port 1 View    Narrative    EXAM: XR ABDOMEN PORT 1 VIEW  LOCATION: [facility]  DATE/TIME: 3/21/2023 7:21 PM    INDICATION: nausea, bloating, abdominal pain following orthopedic surgery last week. Passing flatus and having stool today still.  evaluate for etiology  COMPARISON: None.      Impression    IMPRESSION: Thecal electrode over the lower thoracic spine. Left total hip arthroplasty. Osseous structures intact. Normal bowel gas pattern. Lung bases clear.       Discharge Medications   Current Discharge Medication List      START taking these medications    Details   trolamine salicylate (ASPERCREME) 10 % external cream Apply topically 4 times daily as needed for moderate pain (4-6)  Qty: 120 g, Refills: 0    Associated Diagnoses: Intractable pain         CONTINUE these medications which have CHANGED    Details   acetaminophen (TYLENOL) 500 MG tablet Take 2 tablets (1,000 mg) by mouth 3 times daily  Qty: 180 tablet, Refills: 0    Associated Diagnoses: S/P revision of total hip      artificial saliva (BIOTENE MT) AERS spray Swish and spit 1 spray in mouth 3 times daily as needed for dry mouth  Qty: 60 mL, Refills: 0    Associated Diagnoses: Neuropathy      aspirin (ASA) 81 MG EC tablet Take 1 tablet (81 mg) by mouth 2 times daily  Qty: 60 tablet, Refills: 0    Associated Diagnoses: S/P revision of total hip      atorvastatin (LIPITOR) 80 MG tablet Take 1 tablet (80 mg) by mouth At Bedtime  Qty: 30 tablet, Refills: 0    Associated Diagnoses: S/P CABG (coronary artery bypass graft)      folic acid  (FOLVITE) 1 MG tablet Take 1 tablet (1 mg) by mouth daily  Qty: 30 tablet, Refills: 0    Associated Diagnoses: Anemia, unspecified type      gabapentin (NEURONTIN) 100 MG capsule Take 1 capsule (100 mg) by mouth 2 times daily  Qty: 60 capsule, Refills: 0    Associated Diagnoses: Hip pain, left; Intractable pain; Deformity of left hip joint      omeprazole (PRILOSEC) 20 MG DR capsule Take 1 capsule (20 mg) by mouth 2 times daily Am and HS  Qty: 60 capsule, Refills: 0    Associated Diagnoses: Indigestion      !! oxyCODONE (ROXICODONE) 5 MG tablet Take 0.5 tablets (2.5 mg) by mouth every 4 hours (while awake) - 0600, 1000, 1400, 1800, 2200 goal  Qty: 10 tablet, Refills: 0    Associated Diagnoses: S/P revision of total hip      !! oxyCODONE (ROXICODONE) 5 MG tablet Take 0.5 tablets (2.5 mg) by mouth every 4 hours as needed for breakthrough pain  Qty: 10 tablet, Refills: 0    Associated Diagnoses: S/P revision of total hip      senna-docusate (SENOKOT-S/PERICOLACE) 8.6-50 MG tablet Take 1 tablet by mouth 2 times daily  Qty: 60 tablet, Refills: 0    Associated Diagnoses: S/P revision of total hip      vitamin B1 (THIAMINE) 100 MG tablet Take 1 tablet (100 mg) by mouth daily  Qty: 30 tablet, Refills: 0    Associated Diagnoses: Anemia, unspecified type       !! - Potential duplicate medications found. Please discuss with provider.      STOP taking these medications       CAMPHOR-EUCALYPTUS-MENTHOL EX Comments:   Reason for Stopping:         ENTRESTO  MG per tablet Comments:   Reason for Stopping:         levofloxacin (LEVAQUIN) 500 MG tablet Comments:   Reason for Stopping:         Menthol-Methyl Salicylate (PAIN RELIEVING) CREA Comments:   Reason for Stopping:         metoprolol succinate ER (TOPROL XL) 25 MG 24 hr tablet Comments:   Reason for Stopping:         polyethylene glycol (MIRALAX) 17 g packet Comments:   Reason for Stopping:         tiZANidine (ZANAFLEX) 4 MG tablet Comments:   Reason for Stopping:              Allergies   Allergies   Allergen Reactions     Lisinopril Cough     Piroxicam Hives     Polysorbate  [Bay Oil]      Prazosin Other (See Comments)     Nightmares     Urea Other (See Comments)     Eruption of skin     Benzalkonium Rash

## 2023-03-27 NOTE — TELEPHONE ENCOUNTER
Pt is getting discharged today to go to a TCU- please call the TCU center at [phone] to schedule an appt for retention please call them tomorrow, 3/28/23, thank you

## 2023-03-27 NOTE — TELEPHONE ENCOUNTER
"Returned call and spoke to nurse on unit.  They are comfortable doing a TOV as they \"do them all the time\".  If patient fails this she will call back and we will schedule him for an eval at that time.    Elly JUSTICE RN Specialty Triage 3/27/2023 12:11 PM    "

## 2023-03-27 NOTE — PLAN OF CARE
"Goal Outcome Evaluation:                 Outcome Evaluation: Outcome Evaluation: Patient is A&O x 4. Ambulated two times this shift. Rates pain at a 5-9/10. Aspercreme applied 1x, Oxycodone given 2x, Atarax given 2x. Pt appears calm, cooperative, and engages in conversation. Pt stated that he was feeling anxious about his transition in the AM and ensuring that all of his belongings were together.     /54 (BP Location: Left arm)   Pulse 63   Temp 98.1  F (36.7  C) (Oral)   Resp 18   Ht 1.854 m (6' 1\")   Wt 111.1 kg (245 lb)   SpO2 95%   BMI 32.32 kg/m       "
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
## 2023-03-27 NOTE — PROGRESS NOTES
Occupational Therapy Discharge Summary    Reason for therapy discharge:    Discharged to transitional care facility.    Progress towards therapy goal(s). See goals on Care Plan in [facility] electronic health record for goal details.  Goals partially met.  Barriers to achieving goals:   discharge from facility.    Therapy recommendation(s):    Continued therapy is recommended.  Rationale/Recommendations:  Continued strengthening, progression toward ADL IND.    Thank you for your referral.  Jenny Lazaro OTR/LLUVIA     [facility]  O: [phone]  E: [email or web address]

## 2023-03-29 ENCOUNTER — TELEPHONE (OUTPATIENT)
Dept: ORTHOPEDICS | Facility: CLINIC | Age: 83
End: 2023-03-29

## 2023-03-29 VITALS
HEIGHT: 73 IN | OXYGEN SATURATION: 96 % | BODY MASS INDEX: 32.31 KG/M2 | RESPIRATION RATE: 17 BRPM | DIASTOLIC BLOOD PRESSURE: 68 MMHG | WEIGHT: 243.8 LBS | SYSTOLIC BLOOD PRESSURE: 124 MMHG | TEMPERATURE: 97.5 F | HEART RATE: 73 BPM

## 2023-03-29 PROBLEM — S54.02XA: Status: ACTIVE | Noted: 2023-03-29

## 2023-03-29 PROBLEM — H93.13 TINNITUS, BILATERAL: Status: ACTIVE | Noted: 2023-03-29

## 2023-03-29 PROBLEM — H10.212 ACUTE TOXIC CONJUNCTIVITIS, LEFT EYE: Status: ACTIVE | Noted: 2023-03-29

## 2023-03-29 PROBLEM — S06.0X0S CONCUSSION WITHOUT LOSS OF CONSCIOUSNESS, SEQUELA (H): Status: ACTIVE | Noted: 2023-03-29

## 2023-03-29 PROBLEM — R45.851 SUICIDAL IDEATIONS: Status: ACTIVE | Noted: 2023-03-29

## 2023-03-29 PROBLEM — Z56.89 OTHER PROBLEMS RELATED TO EMPLOYMENT: Status: ACTIVE | Noted: 2023-03-29

## 2023-03-29 PROBLEM — M70.21 OLECRANON BURSITIS, RIGHT ELBOW: Status: ACTIVE | Noted: 2023-03-29

## 2023-03-29 PROBLEM — K52.9 CHRONIC DIARRHEA: Status: ACTIVE | Noted: 2017-01-01

## 2023-03-29 PROBLEM — F60.3 BORDERLINE PERSONALITY DISORDER (H): Status: ACTIVE | Noted: 2023-03-29

## 2023-03-29 PROBLEM — Z90.49 HISTORY OF CHOLECYSTECTOMY: Status: ACTIVE | Noted: 2023-03-29

## 2023-03-29 PROBLEM — Z63.0 PROBLEMS IN RELATIONSHIP WITH SPOUSE OR PARTNER: Status: ACTIVE | Noted: 2023-03-29

## 2023-03-29 PROBLEM — C44.519 BASAL CELL CARCINOMA OF SKIN OF OTHER PART OF TRUNK: Status: ACTIVE | Noted: 2023-03-29

## 2023-03-29 PROBLEM — N13.8 BENIGN PROSTATIC HYPERPLASIA WITH URINARY OBSTRUCTION: Status: ACTIVE | Noted: 2023-03-22

## 2023-03-29 PROBLEM — Z96.1 PRESENCE OF INTRAOCULAR LENS: Status: ACTIVE | Noted: 2023-03-29

## 2023-03-29 PROBLEM — I50.20: Status: ACTIVE | Noted: 2023-03-29

## 2023-03-29 PROBLEM — H81.01 MENIERE'S DISEASE OF RIGHT EAR: Status: ACTIVE | Noted: 2023-03-29

## 2023-03-29 PROBLEM — L60.3 NAIL DYSTROPHY: Status: ACTIVE | Noted: 2023-03-29

## 2023-03-29 PROBLEM — F32.A MILD DEPRESSION: Status: ACTIVE | Noted: 2023-03-29

## 2023-03-29 PROBLEM — H91.90 HEARING LOSS: Status: ACTIVE | Noted: 2023-03-29

## 2023-03-29 PROBLEM — R42 DIZZINESS AND GIDDINESS: Status: ACTIVE | Noted: 2023-03-29

## 2023-03-29 PROBLEM — U07.1 COVID-19: Status: ACTIVE | Noted: 2023-03-29

## 2023-03-29 PROBLEM — H90.3 SENSORINEURAL HEARING LOSS, BILATERAL: Status: ACTIVE | Noted: 2023-03-29

## 2023-03-29 PROBLEM — F10.20 OTHER AND UNSPECIFIED ALCOHOL DEPENDENCE, UNSPECIFIED DRINKING BEHAVIOR: Status: ACTIVE | Noted: 2023-03-29

## 2023-03-29 PROBLEM — I50.9 HEART FAILURE, UNSPECIFIED (H): Status: ACTIVE | Noted: 2023-03-29

## 2023-03-29 PROBLEM — G31.84 MILD COGNITIVE IMPAIRMENT OF UNCERTAIN OR UNKNOWN ETIOLOGY: Status: ACTIVE | Noted: 2023-03-29

## 2023-03-29 PROBLEM — M54.17 RADICULOPATHY, LUMBOSACRAL REGION: Status: ACTIVE | Noted: 2023-03-29

## 2023-03-29 PROBLEM — L23.9 ALLERGIC CONTACT DERMATITIS, UNSPECIFIED CAUSE: Status: ACTIVE | Noted: 2023-03-29

## 2023-03-29 PROBLEM — R44.3 HALLUCINATIONS: Status: ACTIVE | Noted: 2021-04-19

## 2023-03-29 PROBLEM — I48.92 UNSPECIFIED ATRIAL FLUTTER (H): Status: ACTIVE | Noted: 2020-02-07

## 2023-03-29 PROBLEM — F43.10 PTSD (POST-TRAUMATIC STRESS DISORDER): Status: ACTIVE | Noted: 2021-04-20

## 2023-03-29 PROBLEM — E11.9 TYPE 2 DIABETES MELLITUS (H): Status: ACTIVE | Noted: 2021-04-19

## 2023-03-29 PROBLEM — M20.11 HALLUX VALGUS (ACQUIRED), RIGHT FOOT: Status: ACTIVE | Noted: 2023-03-29

## 2023-03-29 PROBLEM — L57.0 ACTINIC KERATOSIS: Status: ACTIVE | Noted: 2023-03-29

## 2023-03-29 PROBLEM — G56.20 LESION OF ULNAR NERVE: Status: ACTIVE | Noted: 2023-03-29

## 2023-03-29 PROBLEM — M54.31 SCIATICA, RIGHT SIDE: Status: ACTIVE | Noted: 2023-03-29

## 2023-03-29 PROBLEM — I50.21 ACUTE SYSTOLIC (CONGESTIVE) HEART FAILURE (H): Status: ACTIVE | Noted: 2023-03-29

## 2023-03-29 PROBLEM — J32.9 SINUSITIS, CHRONIC: Status: ACTIVE | Noted: 2023-03-29

## 2023-03-29 PROBLEM — H81.10 BENIGN PAROXYSMAL VERTIGO, UNSPECIFIED EAR: Status: ACTIVE | Noted: 2023-03-29

## 2023-03-29 PROBLEM — S09.90XA INJURY OF HEAD: Status: ACTIVE | Noted: 2023-03-29

## 2023-03-29 PROBLEM — S06.0X1A CONCUSSION WITH LOSS OF CONSCIOUSNESS OF 30 MINUTES OR LESS, INITIAL ENCOUNTER: Status: ACTIVE | Noted: 2023-03-29

## 2023-03-29 PROBLEM — N52.9 MALE ERECTILE DYSFUNCTION, UNSPECIFIED: Status: ACTIVE | Noted: 2023-03-29

## 2023-03-29 PROBLEM — M47.812 CERVICAL SPONDYLOSIS: Status: ACTIVE | Noted: 2023-03-29

## 2023-03-29 PROBLEM — Z86.79 HISTORY OF ATRIAL FLUTTER: Status: ACTIVE | Noted: 2023-03-29

## 2023-03-29 PROBLEM — Z86.0101 HISTORY OF ADENOMATOUS POLYP OF COLON: Status: ACTIVE | Noted: 2023-03-29

## 2023-03-29 PROBLEM — D48.5 NEOPLASM OF UNCERTAIN BEHAVIOR OF SKIN: Status: ACTIVE | Noted: 2023-03-29

## 2023-03-29 PROBLEM — Z79.01 LONG TERM CURRENT USE OF ANTICOAGULANT THERAPY: Status: ACTIVE | Noted: 2023-03-29

## 2023-03-29 PROBLEM — K76.0 STEATOSIS OF LIVER: Status: ACTIVE | Noted: 2023-03-29

## 2023-03-29 PROBLEM — D58.1 HEREDITARY ELLIPTOCYTOSIS (H): Status: ACTIVE | Noted: 2023-03-29

## 2023-03-29 PROBLEM — Z85.828 HISTORY OF NONMELANOMA SKIN CANCER: Status: ACTIVE | Noted: 2023-03-29

## 2023-03-29 PROBLEM — F33.1 MAJOR DEPRESSIVE DISORDER, RECURRENT, MODERATE (H): Status: ACTIVE | Noted: 2023-03-29

## 2023-03-29 PROBLEM — M21.611 BUNION OF RIGHT FOOT: Status: ACTIVE | Noted: 2023-03-29

## 2023-03-29 PROBLEM — R41.81 AGE-RELATED COGNITIVE DECLINE: Status: ACTIVE | Noted: 2023-03-29

## 2023-03-29 PROBLEM — G56.01 CARPAL TUNNEL SYNDROME, RIGHT UPPER LIMB: Status: ACTIVE | Noted: 2023-03-29

## 2023-03-29 PROBLEM — J01.10 ACUTE FRONTAL SINUSITIS, UNSPECIFIED: Status: ACTIVE | Noted: 2023-03-29

## 2023-03-29 PROBLEM — R26.9 UNSPECIFIED ABNORMALITIES OF GAIT AND MOBILITY: Status: ACTIVE | Noted: 2023-03-29

## 2023-03-29 NOTE — TELEPHONE ENCOUNTER
[facility] Call Center    Phone Message    May a detailed message be left on voicemail: yes     Reason for Call: Other: David called he said that at the TCU he is at they have not removed the patch on his hipthat was supposed to have been removed 7 days ago. He also said that they put a urinary catheter in him when he arrived and they won't let him walk at all tney make him use a wheelchair. He wants to be removed from the TCU. Please call and discuss with patient about patch and if he needs to be at the TCU.     Action Taken: Other: [facility]    Travel Screening: Not Applicable

## 2023-03-29 NOTE — TELEPHONE ENCOUNTER
Writer phoned patient's RN at [facility].  Gave them the verbal OK to remove surgery dressing as he is now 2 weeks post op. Advised RN to call us back if there are any surgery site concerns such as signs of infection, wound dehiscence, drainage, etc.  RN verbalized understanding, writer provided our call back number as well.    Writer advised we would not be able to comment on the urinary catheter, patient was recently hospitalized for urinary retention.  Per RN, patient has a follow up appt scheduled with Urology who we would defer to for further recommendations on urinary catheter needs.    Per RN on the unit, patient is working with staff on ambulating with assistance.  Also involved with PT to work on exercises.    RN on the unit will remove dressing per VO and relay our recommendations to the patient.    Minda Ramon RN on 3/29/2023 at 9:44 AM

## 2023-03-30 ENCOUNTER — TRANSITIONAL CARE UNIT VISIT (OUTPATIENT)
Dept: GERIATRICS | Facility: CLINIC | Age: 83
End: 2023-03-30
Payer: COMMERCIAL

## 2023-03-30 DIAGNOSIS — R52 INTRACTABLE PAIN: ICD-10-CM

## 2023-03-30 DIAGNOSIS — I50.32 CHRONIC HEART FAILURE WITH PRESERVED EJECTION FRACTION (H): ICD-10-CM

## 2023-03-30 DIAGNOSIS — I10 HYPERTENSION, UNSPECIFIED TYPE: ICD-10-CM

## 2023-03-30 DIAGNOSIS — N13.8 BENIGN PROSTATIC HYPERPLASIA WITH URINARY OBSTRUCTION: Primary | ICD-10-CM

## 2023-03-30 DIAGNOSIS — N40.1 BENIGN PROSTATIC HYPERPLASIA WITH URINARY OBSTRUCTION: Primary | ICD-10-CM

## 2023-03-30 PROCEDURE — 99310 SBSQ NF CARE HIGH MDM 45: CPT | Performed by: FAMILY MEDICINE
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
## 2023-03-31 ENCOUNTER — DOCUMENTATION ONLY (OUTPATIENT)
Dept: OTHER | Facility: CLINIC | Age: 83
End: 2023-03-31

## 2023-03-31 DIAGNOSIS — Z96.649 S/P REVISION OF TOTAL HIP: ICD-10-CM

## 2023-03-31 RX ORDER — OXYCODONE HYDROCHLORIDE 5 MG/1
2.5 TABLET ORAL
Qty: 20 TABLET | Refills: 0 | Status: SHIPPED | OUTPATIENT
Start: 2023-03-31 | End: 2023-04-03

## 2023-04-02 VITALS
SYSTOLIC BLOOD PRESSURE: 114 MMHG | HEIGHT: 73 IN | TEMPERATURE: 97.2 F | WEIGHT: 243.1 LBS | HEART RATE: 67 BPM | OXYGEN SATURATION: 99 % | DIASTOLIC BLOOD PRESSURE: 64 MMHG | BODY MASS INDEX: 32.22 KG/M2 | RESPIRATION RATE: 16 BRPM

## 2023-04-03 ENCOUNTER — TRANSITIONAL CARE UNIT VISIT (OUTPATIENT)
Dept: GERIATRICS | Facility: CLINIC | Age: 83
End: 2023-04-03
Payer: COMMERCIAL

## 2023-04-03 ENCOUNTER — OFFICE VISIT (OUTPATIENT)
Dept: ORTHOPEDICS | Facility: CLINIC | Age: 83
End: 2023-04-03
Payer: COMMERCIAL

## 2023-04-03 DIAGNOSIS — I50.32 CHRONIC HEART FAILURE WITH PRESERVED EJECTION FRACTION (H): ICD-10-CM

## 2023-04-03 DIAGNOSIS — Z96.649 STATUS POST REVISION OF TOTAL HIP REPLACEMENT: Primary | ICD-10-CM

## 2023-04-03 DIAGNOSIS — R52 INTRACTABLE PAIN: ICD-10-CM

## 2023-04-03 DIAGNOSIS — M25.552 HIP PAIN, LEFT: Primary | ICD-10-CM

## 2023-04-03 DIAGNOSIS — I10 HYPERTENSION, UNSPECIFIED TYPE: ICD-10-CM

## 2023-04-03 PROCEDURE — 99024 POSTOP FOLLOW-UP VISIT: CPT | Performed by: PHYSICIAN ASSISTANT

## 2023-04-03 PROCEDURE — 99316 NF DSCHRG MGMT 30 MIN+: CPT | Performed by: FAMILY MEDICINE

## 2023-04-03 RX ORDER — METOPROLOL SUCCINATE 25 MG/1
12.5 TABLET, EXTENDED RELEASE ORAL
COMMUNITY
Start: 2022-12-21 | End: 2024-03-22
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
## 2023-04-03 NOTE — LETTER
4/3/2023        RE: David Thomson  [id number] [facility] [id number]        [facility] GER[facility]ATRIC [facility]    [facility]   [facility] [[id number]]   Code Status: FULL CODE      CHIEF COMPLAINT/REASON FOR VISIT:  Chief Complaint   Patient presents with     Discharge Summary Nursing Home       HISTORY:      HPI: David is a 82 year old male who was hospitalized March 17, 2023 through March 27, 2023 secondary to intractable left hip/IT band pain.  He did undergo a left hip revision total arthroplasty on March 15, 2023.  And was admitted back to the hospital on March 17, 2023 for intractable left hip pain secondary to the left hip revision.  The x-ray on March 17 did not show any acute findings and orthopedic surgery consulted and advised nonoperative management and would benefit from physical therapy.  He did after his surgery have an anesthetic block was probably wearing off.  He did get some relief with narcotic analgesics.  During his stay he did become hypotensive for a few days they did stop the metoprolol as well as Entresto.  It was suspected hypotension was due to combination of new addition of doses of opiate analgesics and a trial of tizanidine.  He also has a history of demand ischemia and elevated initial D-dimer felt to be secondary to postoperative status and he did see cardiology and they did put him on a 7-day Zio patch.  He also did have acute blood loss anemia where his hemoglobin preoperatively was 12.9 and did trend downward but now stable between 8 and 9.  Did not require any blood transfusion.  Iron studies were normal.  He did have abdominal discomfort probably secondary to constipation secondary to the use of narcotic analgesics.  He does have an unsteady gait he does use a cane unable to use a walker in his current home.  He also does have BPH with urinary retention the culture did grow Klebsiella treated for UTI with Levaquin and he had difficulty with  urination they did do PVRs greater than 500 cc a Rice catheter was placed during the hospitalization they thought that the urinary retention was also secondary to his opiate analgesics and decreased mobility.  Other issues addressed in the hospital was generalized weakness and physical deconditioning he also did have hypoalbuminemia and he currently is on a 2 g sodium diet.  Also history of neuropathy currently on gabapentin and a history of sleep apnea does not tolerate CPAP.  He also does have a history of left frontal parietal subdural hematoma from January 2023 in the setting of frequent falls.  At this point not restarting the chronic Eliquis.  He has been in the transitional care unit and during his stay over the weekend we did end up discontinuing his Rice catheter and do PVRs for a couple of days he did pass and is independent urinating without any problems or concerns.  At this point it looks like he will have a discharge either today or tomorrow the date is still not set and the services are still not yet set for his discharge.  He does have a Zio patch which will be sent over to cardiology.  He has been refusing his oxycodone so that will not be discontinued he is continue with the Tylenol 3 times daily and gabapentin.  His left hip looks good.  Systolic blood pressures ranging 120-130 he has been afebrile and also on room air with a weight of 243 pounds in comparison to March 30 at 244 pounds.  See results below his most recent laboratory studies.  Past Medical History:   Diagnosis Date     Anemia      Atrial fibrillation (H)      Bilateral low back pain with left-sided sciatica      Borderline personality disorder (H)      BPH (benign prostatic hyperplasia)      CAD (coronary artery disease)      Cerebral artery occlusion with cerebral infarction (H)      Cervical spondylosis with myelopathy      Depression      Diarrhea      Falls frequently      Gastroesophageal reflux disease with esophagitis       Hereditary elliptocytosis (H)      History of subdural hematoma      Hyperlipidaemia      Hypertension      Neuropathy      ISHAN (obstructive sleep apnea)      Pre-diabetes      PTSD (post-traumatic stress disorder)      S/P TAVR (transcatheter aortic valve replacement)      Thrombocytopenia (H)      Vertigo             Family History   Problem Relation Age of Onset     Heart Disease Mother      Heart Disease Father      Anesthesia Reaction No family hx of      Venous thrombosis No family hx of       Social History     Socioeconomic History     Marital status: Single   Tobacco Use     Smoking status: Never     Smokeless tobacco: Never   Substance and Sexual Activity     Alcohol use: Not Currently     Drug use: Never        REVIEW OF SYSTEM:  He currently denies any new symptoms of fever cough or cold sore throat postnasal drip allergies shortness of breath dyspnea wheezing chest pain dizziness vertigo nausea vomiting diarrhea bladder spasms unusual or new myalgias or arthralgias.  Does have a history of neuropathy, hyperlipidemia, generalized weakness, left total hip arthroplasty, GERD, constipation    PHYSICAL EXAM:   Pleasant gentleman in no acute distress.  Head is normocephalic.  Conjunctiva is pink and sclerae is clear.  Neck is supple without adenopathy.  Lung sounds are clear throughout.  Cardiovascular S1-S2 regular rate and rhythm and does have a Zio patch.  No lower extremity edema.  Gastrointestinal protuberant positive bowel sounds and nontender.  Musculoskeletal left hip looks good no secondary infection of the surgical site good CMS to his left leg.  Psychiatric: Pleasant affect.    Current Outpatient Medications:      acetaminophen (TYLENOL) 500 MG tablet, Take 2 tablets (1,000 mg) by mouth 3 times daily, Disp: 180 tablet, Rfl: 0     artificial saliva (BIOTENE MT) AERS spray, Swish and spit 1 spray in mouth 3 times daily as needed for dry mouth, Disp: 60 mL, Rfl: 0     aspirin (ASA) 81 MG EC tablet,  "Take 1 tablet (81 mg) by mouth 2 times daily, Disp: 60 tablet, Rfl: 0     atorvastatin (LIPITOR) 80 MG tablet, Take 1 tablet (80 mg) by mouth At Bedtime, Disp: 30 tablet, Rfl: 0     folic acid (FOLVITE) 1 MG tablet, Take 1 tablet (1 mg) by mouth daily, Disp: 30 tablet, Rfl: 0     gabapentin (NEURONTIN) 100 MG capsule, Take 1 capsule (100 mg) by mouth 2 times daily, Disp: 60 capsule, Rfl: 0     omeprazole (PRILOSEC) 20 MG DR capsule, Take 1 capsule (20 mg) by mouth 2 times daily Am and HS, Disp: 60 capsule, Rfl: 0     senna-docusate (SENOKOT-S/PERICOLACE) 8.6-50 MG tablet, Take 1 tablet by mouth 2 times daily, Disp: 60 tablet, Rfl: 0     trolamine salicylate (ASPERCREME) 10 % external cream, Apply topically 4 times daily as needed for moderate pain (4-6), Disp: 120 g, Rfl: 0     vitamin B1 (THIAMINE) 100 MG tablet, Take 1 tablet (100 mg) by mouth daily, Disp: 30 tablet, Rfl: 0    /64   Pulse 67   Temp 97.2  F (36.2  C)   Resp 16   Ht 1.854 m (6' 1\")   Wt 110.3 kg (243 lb 1.6 oz)   SpO2 99%   BMI 32.07 kg/m        LABS:   Last Comprehensive Metabolic Panel:  Lab Results   Component Value Date     (L) 03/23/2023    POTASSIUM 3.8 03/27/2023    CHLORIDE 100 03/23/2023    CO2 26 03/23/2023    ANIONGAP 9 03/23/2023     (H) 03/23/2023    BUN 20.9 03/23/2023    CR 1.14 03/23/2023    GFRESTIMATED 65 03/23/2023    ANDREW 8.8 03/23/2023       CBC RESULTS: Recent Labs   Lab Test 03/27/23  0522 03/24/23  0553 03/21/23  0604 03/19/23  0559   WBC  --   --   --  5.3   RBC  --   --   --  2.78*   HGB  --  8.7*   < > 8.6*   HCT  --   --   --  26.5*   MCV  --   --   --  95   MCH  --   --   --  30.9   MCHC  --   --   --  32.5   RDW  --   --   --  14.6    223   < > 167    < > = values in this interval not displayed.     Last Comprehensive Metabolic Panel:  Sodium   Date Value Ref Range Status   03/23/2023 135 (L) 136 - 145 mmol/L Final     Potassium   Date Value Ref Range Status   03/27/2023 3.8 3.4 - 5.3 " mmol/L Final   01/17/2023 3.7 3.4 - 5.3 mmol/L Final   04/19/2016 4.2 3.4 - 5.3 mmol/L Final     Chloride   Date Value Ref Range Status   03/23/2023 100 98 - 107 mmol/L Final   01/17/2023 110 (H) 94 - 109 mmol/L Final     Carbon Dioxide (CO2)   Date Value Ref Range Status   03/23/2023 26 22 - 29 mmol/L Final   01/17/2023 25 20 - 32 mmol/L Final     Anion Gap   Date Value Ref Range Status   03/23/2023 9 7 - 15 mmol/L Final   01/17/2023 7 3 - 14 mmol/L Final     Glucose   Date Value Ref Range Status   03/23/2023 144 (H) 70 - 99 mg/dL Final   01/17/2023 168 (H) 70 - 99 mg/dL Final     GLUCOSE BY METER POCT   Date Value Ref Range Status   03/16/2023 171 (H) 70 - 99 mg/dL Final     Urea Nitrogen   Date Value Ref Range Status   03/23/2023 20.9 8.0 - 23.0 mg/dL Final   01/17/2023 19 7 - 30 mg/dL Final     Creatinine   Date Value Ref Range Status   03/23/2023 1.14 0.67 - 1.17 mg/dL Final   04/19/2016 0.85 0.66 - 1.25 mg/dL Final     GFR Estimate   Date Value Ref Range Status   03/23/2023 65 >60 mL/min/1.73m2 Final     Comment:     eGFR calculated using 2021 CKD-EPI equation.   04/19/2016 89 >60 mL/min/1.7m2 Final     Comment:     Non  GFR Calc     Calcium   Date Value Ref Range Status   03/23/2023 8.8 8.8 - 10.2 mg/dL Final     Bilirubin Total   Date Value Ref Range Status   03/24/2023 1.2 <=1.2 mg/dL Final     Alkaline Phosphatase   Date Value Ref Range Status   03/23/2023 82 40 - 129 U/L Final     ALT   Date Value Ref Range Status   03/23/2023 10 10 - 50 U/L Final     AST   Date Value Ref Range Status   03/23/2023 21 10 - 50 U/L Final             No results for input(s): CHOL, HDL, LDL, TRIG, CHOLHDLRATIO in the last [id number] hours.      ASSESSMENT:    Encounter Diagnoses   Name Primary?     Hip pain, left Yes     Intractable pain-left thigh IT band      Hypertension, unspecified type      Chronic heart failure with preserved ejection fraction (H)        MEDICAL EQUIPMENT NEEDS:  None    DISCHARGE PLAN/FACE  TO FACE:  I certify that services are/were furnished while this patient was under the care of a physician and that a physician or an allowed non-physician practitioner (NPP), had a face-to-face encounter that meets the physician face-to-face encounter requirements. The encounter was in whole, or in part, related to the primary reason for home health. The patient is confined to his/her home and needs intermittent skilled nursing, physical therapy, speech-language pathology, or the continued need for occupational therapy. A plan of care has been established by a physician and is periodically reviewed by a physician.  Date of Face-to-Face Encounter: April 3, 2023    I certify that, based on my findings, the following services are medically necessary home health services: He will not be discharging to home with his current medications with an approximated discharge date of April 3 or April 4, 2023 and the only thing checked off on the discharge list was for nursing to evaluate and make sure he is doing fine however still not sure about the physical and Occupational Therapy and home health aide since it has not been formalized and confirmed.    My clinical findings support the need for the above skilled services because: (Please write a brief narrative summary that describes what the RN, PT, SLP, or other services will be doing in the home. A list of diagnoses in this section does not meet the CMS requirements.)  If he would need services he probably would need the physical and Occupational Therapy and home health aide initially to ensure that he is safe as well as a safe transition to his home but also nursing for medication management    This patient is homebound because: (Please write a brief narrative summary describing the functional limitations as to why this patient is homebound and specifically what makes this patient homebound.)  Secondary to his chronic medical conditions including his recent hospitalization  but also continuation and encouragement of the rehabilitation exercises as well as safety and medication management    The patient is, or has been, under my care and I have initiated the establishment of the plan of care. This patient will be followed by a physician who will periodically review the plan of care.    Schedule follow up visit with primary care provider within 7 days to reestablish care.    He does have a visit with orthopedics either today or tomorrow to go over his care as well as rehabilitation component.  He is no longer having any pain he has been refusing the oxycodone so we will continue the gabapentin and Tylenol.  Looks like there will be a discharge date set either today or tomorrow and all the services are not yet confirmed and also the Rice catheter was removed and he has had no issues with urination and he actually has been independent in his room with his four-wheel walker.  He did not have any other questions.    Discharge coordination of care 30 minutes  Electronically signed by: Ar Koo NP          Sincerely,        Ar Koo NP

## 2023-04-03 NOTE — PROGRESS NOTES
[facility] GERIATRIC SERVICES    Facility:   [facility] ([place], [place]) ([facility]) [[id number]]   Code Status: FULL CODE      CHIEF COMPLAINT/REASON FOR VISIT:  Chief Complaint   Patient presents with     Discharge Summary Nursing Home       HISTORY:      HPI: David is a 82 year old male who was hospitalized March 17, 2023 through March 27, 2023 secondary to intractable left hip/IT band pain.  He did undergo a left hip revision total arthroplasty on March 15, 2023.  And was admitted back to the hospital on March 17, 2023 for intractable left hip pain secondary to the left hip revision.  The x-ray on March 17 did not show any acute findings and orthopedic surgery consulted and advised nonoperative management and would benefit from physical therapy.  He did after his surgery have an anesthetic block was probably wearing off.  He did get some relief with narcotic analgesics.  During his stay he did become hypotensive for a few days they did stop the metoprolol as well as Entresto.  It was suspected hypotension was due to combination of new addition of doses of opiate analgesics and a trial of tizanidine.  He also has a history of demand ischemia and elevated initial D-dimer felt to be secondary to postoperative status and he did see cardiology and they did put him on a 7-day Zio patch.  He also did have acute blood loss anemia where his hemoglobin preoperatively was 12.9 and did trend downward but now stable between 8 and 9.  Did not require any blood transfusion.  Iron studies were normal.  He did have abdominal discomfort probably secondary to constipation secondary to the use of narcotic analgesics.  He does have an unsteady gait he does use a cane unable to use a walker in his current home.  He also does have BPH with urinary retention the culture did grow Klebsiella treated for UTI with Levaquin and he had difficulty with urination they did do PVRs greater than 500 cc a Rice catheter was placed during the  hospitalization they thought that the urinary retention was also secondary to his opiate analgesics and decreased mobility.  Other issues addressed in the hospital was generalized weakness and physical deconditioning he also did have hypoalbuminemia and he currently is on a 2 g sodium diet.  Also history of neuropathy currently on gabapentin and a history of sleep apnea does not tolerate CPAP.  He also does have a history of left frontal parietal subdural hematoma from January 2023 in the setting of frequent falls.  At this point not restarting the chronic Eliquis.  He has been in the transitional care unit and during his stay over the weekend we did end up discontinuing his Rice catheter and do PVRs for a couple of days he did pass and is independent urinating without any problems or concerns.  At this point it looks like he will have a discharge either today or tomorrow the date is still not set and the services are still not yet set for his discharge.  He does have a Zio patch which will be sent over to cardiology.  He has been refusing his oxycodone so that will not be discontinued he is continue with the Tylenol 3 times daily and gabapentin.  His left hip looks good.  Systolic blood pressures ranging 120-130 he has been afebrile and also on room air with a weight of 243 pounds in comparison to March 30 at 244 pounds.  See results below his most recent laboratory studies.  Past Medical History:   Diagnosis Date     Anemia      Atrial fibrillation (H)      Bilateral low back pain with left-sided sciatica      Borderline personality disorder (H)      BPH (benign prostatic hyperplasia)      CAD (coronary artery disease)      Cerebral artery occlusion with cerebral infarction (H)      Cervical spondylosis with myelopathy      Depression      Diarrhea      Falls frequently      Gastroesophageal reflux disease with esophagitis      Hereditary elliptocytosis (H)      History of subdural hematoma      Hyperlipidaemia       Hypertension      Neuropathy      ISHAN (obstructive sleep apnea)      Pre-diabetes      PTSD (post-traumatic stress disorder)      S/P TAVR (transcatheter aortic valve replacement)      Thrombocytopenia (H)      Vertigo             Family History   Problem Relation Age of Onset     Heart Disease Mother      Heart Disease Father      Anesthesia Reaction No family hx of      Venous thrombosis No family hx of       Social History     Socioeconomic History     Marital status: Single   Tobacco Use     Smoking status: Never     Smokeless tobacco: Never   Substance and Sexual Activity     Alcohol use: Not Currently     Drug use: Never        REVIEW OF SYSTEM:  He currently denies any new symptoms of fever cough or cold sore throat postnasal drip allergies shortness of breath dyspnea wheezing chest pain dizziness vertigo nausea vomiting diarrhea bladder spasms unusual or new myalgias or arthralgias.  Does have a history of neuropathy, hyperlipidemia, generalized weakness, left total hip arthroplasty, GERD, constipation    PHYSICAL EXAM:   Pleasant gentleman in no acute distress.  Head is normocephalic.  Conjunctiva is pink and sclerae is clear.  Neck is supple without adenopathy.  Lung sounds are clear throughout.  Cardiovascular S1-S2 regular rate and rhythm and does have a Zio patch.  No lower extremity edema.  Gastrointestinal protuberant positive bowel sounds and nontender.  Musculoskeletal left hip looks good no secondary infection of the surgical site good CMS to his left leg.  Psychiatric: Pleasant affect.    Current Outpatient Medications:      acetaminophen (TYLENOL) 500 MG tablet, Take 2 tablets (1,000 mg) by mouth 3 times daily, Disp: 180 tablet, Rfl: 0     artificial saliva (BIOTENE MT) AERS spray, Swish and spit 1 spray in mouth 3 times daily as needed for dry mouth, Disp: 60 mL, Rfl: 0     aspirin (ASA) 81 MG EC tablet, Take 1 tablet (81 mg) by mouth 2 times daily, Disp: 60 tablet, Rfl: 0     atorvastatin  "(LIPITOR) 80 MG tablet, Take 1 tablet (80 mg) by mouth At Bedtime, Disp: 30 tablet, Rfl: 0     folic acid (FOLVITE) 1 MG tablet, Take 1 tablet (1 mg) by mouth daily, Disp: 30 tablet, Rfl: 0     gabapentin (NEURONTIN) 100 MG capsule, Take 1 capsule (100 mg) by mouth 2 times daily, Disp: 60 capsule, Rfl: 0     omeprazole (PRILOSEC) 20 MG DR capsule, Take 1 capsule (20 mg) by mouth 2 times daily Am and HS, Disp: 60 capsule, Rfl: 0     senna-docusate (SENOKOT-S/PERICOLACE) 8.6-50 MG tablet, Take 1 tablet by mouth 2 times daily, Disp: 60 tablet, Rfl: 0     trolamine salicylate (ASPERCREME) 10 % external cream, Apply topically 4 times daily as needed for moderate pain (4-6), Disp: 120 g, Rfl: 0     vitamin B1 (THIAMINE) 100 MG tablet, Take 1 tablet (100 mg) by mouth daily, Disp: 30 tablet, Rfl: 0    /64   Pulse 67   Temp 97.2  F (36.2  C)   Resp 16   Ht 1.854 m (6' 1\")   Wt 110.3 kg (243 lb 1.6 oz)   SpO2 99%   BMI 32.07 kg/m        LABS:   Last Comprehensive Metabolic Panel:  Lab Results   Component Value Date     (L) 03/23/2023    POTASSIUM 3.8 03/27/2023    CHLORIDE 100 03/23/2023    CO2 26 03/23/2023    ANIONGAP 9 03/23/2023     (H) 03/23/2023    BUN 20.9 03/23/2023    CR 1.14 03/23/2023    GFRESTIMATED 65 03/23/2023    ANDREW 8.8 03/23/2023       CBC RESULTS: Recent Labs   Lab Test 03/27/23  0522 03/24/23  0553 03/21/23  0604 03/19/23  0559   WBC  --   --   --  5.3   RBC  --   --   --  2.78*   HGB  --  8.7*   < > 8.6*   HCT  --   --   --  26.5*   MCV  --   --   --  95   MCH  --   --   --  30.9   MCHC  --   --   --  32.5   RDW  --   --   --  14.6    223   < > 167    < > = values in this interval not displayed.     Last Comprehensive Metabolic Panel:  Sodium   Date Value Ref Range Status   03/23/2023 135 (L) 136 - 145 mmol/L Final     Potassium   Date Value Ref Range Status   03/27/2023 3.8 3.4 - 5.3 mmol/L Final   01/17/2023 3.7 3.4 - 5.3 mmol/L Final   04/19/2016 4.2 3.4 - 5.3 mmol/L " Final     Chloride   Date Value Ref Range Status   03/23/2023 100 98 - 107 mmol/L Final   01/17/2023 110 (H) 94 - 109 mmol/L Final     Carbon Dioxide (CO2)   Date Value Ref Range Status   03/23/2023 26 22 - 29 mmol/L Final   01/17/2023 25 20 - 32 mmol/L Final     Anion Gap   Date Value Ref Range Status   03/23/2023 9 7 - 15 mmol/L Final   01/17/2023 7 3 - 14 mmol/L Final     Glucose   Date Value Ref Range Status   03/23/2023 144 (H) 70 - 99 mg/dL Final   01/17/2023 168 (H) 70 - 99 mg/dL Final     GLUCOSE BY METER POCT   Date Value Ref Range Status   03/16/2023 171 (H) 70 - 99 mg/dL Final     Urea Nitrogen   Date Value Ref Range Status   03/23/2023 20.9 8.0 - 23.0 mg/dL Final   01/17/2023 19 7 - 30 mg/dL Final     Creatinine   Date Value Ref Range Status   03/23/2023 1.14 0.67 - 1.17 mg/dL Final   04/19/2016 0.85 0.66 - 1.25 mg/dL Final     GFR Estimate   Date Value Ref Range Status   03/23/2023 65 >60 mL/min/1.73m2 Final     Comment:     eGFR calculated using 2021 CKD-EPI equation.   04/19/2016 89 >60 mL/min/1.7m2 Final     Comment:     Non  GFR Calc     Calcium   Date Value Ref Range Status   03/23/2023 8.8 8.8 - 10.2 mg/dL Final     Bilirubin Total   Date Value Ref Range Status   03/24/2023 1.2 <=1.2 mg/dL Final     Alkaline Phosphatase   Date Value Ref Range Status   03/23/2023 82 40 - 129 U/L Final     ALT   Date Value Ref Range Status   03/23/2023 10 10 - 50 U/L Final     AST   Date Value Ref Range Status   03/23/2023 21 10 - 50 U/L Final             No results for input(s): CHOL, HDL, LDL, TRIG, CHOLHDLRATIO in the last 09113 hours.      ASSESSMENT:    Encounter Diagnoses   Name Primary?     Hip pain, left Yes     Intractable pain-left thigh IT band      Hypertension, unspecified type      Chronic heart failure with preserved ejection fraction (H)        MEDICAL EQUIPMENT NEEDS:  None    DISCHARGE PLAN/FACE TO FACE:  I certify that services are/were furnished while this patient was under the  care of a physician and that a physician or an allowed non-physician practitioner (NPP), had a face-to-face encounter that meets the physician face-to-face encounter requirements. The encounter was in whole, or in part, related to the primary reason for home health. The patient is confined to his/her home and needs intermittent skilled nursing, physical therapy, speech-language pathology, or the continued need for occupational therapy. A plan of care has been established by a physician and is periodically reviewed by a physician.  Date of Face-to-Face Encounter: April 3, 2023    I certify that, based on my findings, the following services are medically necessary home health services: He will not be discharging to home with his current medications with an approximated discharge date of April 3 or April 4, 2023 and the only thing checked off on the discharge list was for nursing to evaluate and make sure he is doing fine however still not sure about the physical and Occupational Therapy and home health aide since it has not been formalized and confirmed.    My clinical findings support the need for the above skilled services because: (Please write a brief narrative summary that describes what the RN, PT, SLP, or other services will be doing in the home. A list of diagnoses in this section does not meet the CMS requirements.)  If he would need services he probably would need the physical and Occupational Therapy and home health aide initially to ensure that he is safe as well as a safe transition to his home but also nursing for medication management    This patient is homebound because: (Please write a brief narrative summary describing the functional limitations as to why this patient is homebound and specifically what makes this patient homebound.)  Secondary to his chronic medical conditions including his recent hospitalization but also continuation and encouragement of the rehabilitation exercises as well as  safety and medication management    The patient is, or has been, under my care and I have initiated the establishment of the plan of care. This patient will be followed by a physician who will periodically review the plan of care.    Schedule follow up visit with primary care provider within 7 days to reestablish care.    He does have a visit with orthopedics either today or tomorrow to go over his care as well as rehabilitation component.  He is no longer having any pain he has been refusing the oxycodone so we will continue the gabapentin and Tylenol.  Looks like there will be a discharge date set either today or tomorrow and all the services are not yet confirmed and also the Rice catheter was removed and he has had no issues with urination and he actually has been independent in his room with his four-wheel walker.  He did not have any other questions.    Discharge coordination of care 30 minutes  Electronically signed by: Ar Koo NP

## 2023-04-03 NOTE — NURSING NOTE
Reason For Visit:   Chief Complaint   Patient presents with     Surgical Followup     Left total hip arthroplasty revision on 3/15/23 with Dr. Evans. 2 week post-op follow-up. Feeling good since the procedure. Ambulating with a walker.        There were no vitals taken for this visit.    Pain Assessment  Patient Currently in Pain: No    Lashay Garay
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
## 2023-04-05 ENCOUNTER — TELEPHONE (OUTPATIENT)
Dept: FAMILY MEDICINE | Facility: CLINIC | Age: 83
End: 2023-04-05

## 2023-04-05 NOTE — TELEPHONE ENCOUNTER
[facility] in [place] on [facility]/11/23. He is upset stating he has never been here and why was the appt made. He states he was not told about it. He was recently discharged from TCU in [place] after left hip surgery. The appt is scheduled as a hospital/TCU follow up, most likely scheduled in [place] as close to home. He says this is inconvenient because he has things to do, he is still working. Offered appt in [place] which he declined as well. I believe he will be keeping the appointment.  Nan PAULINO RN

## 2023-04-10 ENCOUNTER — TELEPHONE (OUTPATIENT)
Dept: FAMILY MEDICINE | Facility: CLINIC | Age: 83
End: 2023-04-10

## 2023-04-10 ENCOUNTER — TELEPHONE (OUTPATIENT)
Dept: ORTHOPEDICS | Facility: CLINIC | Age: 83
End: 2023-04-10

## 2023-04-10 NOTE — TELEPHONE ENCOUNTER
[facility] Call Center    Phone Message    May a detailed message be left on voicemail: yes     Reason for Call: Other: Pt is calling he doesn't know who is supposed to come to his house for home care and he isn't taking medication because the home care nurse is supposed to fill the boxes and he doesn't know what he should take and when. Pt is frustrated that he can't get any answers      Action Taken: Other: ortho     Travel Screening: Not Applicable

## 2023-04-10 NOTE — TELEPHONE ENCOUNTER
Pt recently discharged from [place] [facility]CU s/p revision lt JUDE. Pt declined homecare last week but has now accepted. Josefina Rodriguez, requesting approval for start of care either tomorrow or Wed- SN, PT, OT.  Pt has  TCU F/U appt with Mariah Harrington NP tomorrow.  Verbal order given for orders as requested.  ONESIMO Flores RN

## 2023-04-11 ENCOUNTER — OFFICE VISIT (OUTPATIENT)
Dept: FAMILY MEDICINE | Facility: CLINIC | Age: 83
End: 2023-04-11
Payer: COMMERCIAL

## 2023-04-11 ENCOUNTER — TELEPHONE (OUTPATIENT)
Dept: ORTHOPEDICS | Facility: CLINIC | Age: 83
End: 2023-04-11

## 2023-04-11 VITALS
WEIGHT: 249 LBS | HEART RATE: 78 BPM | DIASTOLIC BLOOD PRESSURE: 76 MMHG | HEIGHT: 72 IN | RESPIRATION RATE: 14 BRPM | OXYGEN SATURATION: 100 % | SYSTOLIC BLOOD PRESSURE: 130 MMHG | BODY MASS INDEX: 33.72 KG/M2 | TEMPERATURE: 97.3 F

## 2023-04-11 DIAGNOSIS — R73.03 PRE-DIABETES: ICD-10-CM

## 2023-04-11 DIAGNOSIS — I25.10 ATHEROSCLEROSIS OF NATIVE CORONARY ARTERY OF NATIVE HEART WITHOUT ANGINA PECTORIS: ICD-10-CM

## 2023-04-11 DIAGNOSIS — D62 ANEMIA DUE TO BLOOD LOSS, ACUTE: ICD-10-CM

## 2023-04-11 DIAGNOSIS — F33.1 MAJOR DEPRESSIVE DISORDER, RECURRENT, MODERATE (H): ICD-10-CM

## 2023-04-11 DIAGNOSIS — Z96.642 STATUS POST LEFT HIP REPLACEMENT: ICD-10-CM

## 2023-04-11 DIAGNOSIS — Z09 HOSPITAL DISCHARGE FOLLOW-UP: Primary | ICD-10-CM

## 2023-04-11 PROBLEM — E11.9 TYPE 2 DIABETES MELLITUS (H): Status: RESOLVED | Noted: 2021-04-19 | Resolved: 2023-04-11

## 2023-04-11 PROCEDURE — 99495 TRANSJ CARE MGMT MOD F2F 14D: CPT | Performed by: NURSE PRACTITIONER

## 2023-04-11 ASSESSMENT — PAIN SCALES - GENERAL: PAINLEVEL: MODERATE PAIN (4)

## 2023-04-11 NOTE — TELEPHONE ENCOUNTER
Writer called stated that pt should follow up with primary care provider regarding the medication they had questions.     Judit Guzman LPN
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
## 2023-04-11 NOTE — TELEPHONE ENCOUNTER
[facility] Call Center    Phone Message    May a detailed message be left on voicemail: yes     Reason for Call Apixacan when do Patient suppose to take .. How Often . Also Metoprolol 25mg  Please call Elly   Action Taken: Message routed to:  [facility] spencer    Travel Screening: Not Applicable

## 2023-04-28 DIAGNOSIS — Z96.649 STATUS POST REVISION OF TOTAL HIP REPLACEMENT: Primary | ICD-10-CM

## 2023-12-29 ENCOUNTER — TRANSITIONAL CARE UNIT VISIT (OUTPATIENT)
Dept: GERIATRICS | Facility: CLINIC | Age: 83
End: 2023-12-29
Payer: COMMERCIAL

## 2023-12-29 VITALS
WEIGHT: 236.6 LBS | DIASTOLIC BLOOD PRESSURE: 60 MMHG | HEART RATE: 82 BPM | BODY MASS INDEX: 32.09 KG/M2 | RESPIRATION RATE: 18 BRPM | OXYGEN SATURATION: 92 % | SYSTOLIC BLOOD PRESSURE: 110 MMHG | TEMPERATURE: 97 F

## 2023-12-29 DIAGNOSIS — F43.10 PTSD (POST-TRAUMATIC STRESS DISORDER): ICD-10-CM

## 2023-12-29 DIAGNOSIS — I50.32 CHRONIC HEART FAILURE WITH PRESERVED EJECTION FRACTION (H): ICD-10-CM

## 2023-12-29 DIAGNOSIS — I10 HYPERTENSION, UNSPECIFIED TYPE: ICD-10-CM

## 2023-12-29 DIAGNOSIS — S42.402P: Primary | ICD-10-CM

## 2023-12-29 DIAGNOSIS — Z47.89 ORTHOPEDIC AFTERCARE: ICD-10-CM

## 2023-12-29 DIAGNOSIS — F60.3 BORDERLINE PERSONALITY DISORDER (H): ICD-10-CM

## 2023-12-29 DIAGNOSIS — F10.10 ETOH ABUSE: ICD-10-CM

## 2023-12-29 DIAGNOSIS — Z98.890 S/P ORIF (OPEN REDUCTION INTERNAL FIXATION) FRACTURE: ICD-10-CM

## 2023-12-29 DIAGNOSIS — Z87.81 S/P ORIF (OPEN REDUCTION INTERNAL FIXATION) FRACTURE: ICD-10-CM

## 2023-12-29 DIAGNOSIS — M79.622 PAIN OF LEFT UPPER ARM: ICD-10-CM

## 2023-12-29 PROBLEM — G56.03 CARPAL TUNNEL SYNDROME, BILATERAL UPPER LIMBS: Status: ACTIVE | Noted: 2023-12-29

## 2023-12-29 PROBLEM — T07.XXXA MULTIPLE ABRASIONS: Status: ACTIVE | Noted: 2023-12-22

## 2023-12-29 PROBLEM — S42.302A UNSPECIFIED FRACTURE OF SHAFT OF HUMERUS, LEFT ARM, INITIAL ENCOUNTER FOR CLOSED FRACTURE: Status: ACTIVE | Noted: 2023-12-29

## 2023-12-29 PROBLEM — R79.89 ELEVATED TROPONIN: Status: ACTIVE | Noted: 2023-12-22

## 2023-12-29 PROBLEM — S64.10XS: Status: ACTIVE | Noted: 2023-12-29

## 2023-12-29 PROBLEM — I44.0 FIRST DEGREE AV BLOCK: Status: ACTIVE | Noted: 2023-12-22

## 2023-12-29 PROBLEM — Z73.6 LIMITATION OF ACTIVITIES DUE TO DISABILITY: Status: ACTIVE | Noted: 2023-12-29

## 2023-12-29 PROBLEM — Z63.79 OTHER STRESSFUL LIFE EVENTS AFFECTING FAMILY AND HOUSEHOLD: Status: ACTIVE | Noted: 2023-12-29

## 2023-12-29 PROBLEM — S42.213A HUMERAL SURGICAL NECK FRACTURE: Status: ACTIVE | Noted: 2023-12-22

## 2023-12-29 PROBLEM — M54.50 LOW BACK PAIN: Status: ACTIVE | Noted: 2023-12-29

## 2023-12-29 PROBLEM — M79.671 PAIN IN RIGHT FOOT: Status: ACTIVE | Noted: 2023-12-29

## 2023-12-29 PROBLEM — M77.41 METATARSALGIA, RIGHT FOOT: Status: ACTIVE | Noted: 2023-12-29

## 2023-12-29 PROBLEM — R41.0 CONFUSION: Status: ACTIVE | Noted: 2023-12-22

## 2023-12-29 PROBLEM — F10.20 OTHER AND UNSPECIFIED ALCOHOL DEPENDENCE, UNSPECIFIED DRINKING BEHAVIOR: Status: RESOLVED | Noted: 2023-03-29 | Resolved: 2023-12-29

## 2023-12-29 PROBLEM — R25.1 TREMOR: Status: ACTIVE | Noted: 2023-12-22

## 2023-12-29 PROBLEM — R53.1 GENERALIZED WEAKNESS: Status: ACTIVE | Noted: 2023-12-22

## 2023-12-29 PROBLEM — J32.1 CHRONIC FRONTAL SINUSITIS: Status: ACTIVE | Noted: 2023-12-29

## 2023-12-29 PROBLEM — R19.7 DIARRHEA, UNSPECIFIED: Status: ACTIVE | Noted: 2023-12-29

## 2023-12-29 PROBLEM — S44.11XD: Status: ACTIVE | Noted: 2023-12-29

## 2023-12-29 PROBLEM — G47.00 INSOMNIA, UNSPECIFIED: Status: ACTIVE | Noted: 2023-12-29

## 2023-12-29 PROBLEM — J01.91 ACUTE RECURRENT SINUSITIS, UNSPECIFIED: Status: ACTIVE | Noted: 2023-12-29

## 2023-12-29 PROBLEM — E80.6 HYPERBILIRUBINEMIA: Status: ACTIVE | Noted: 2023-12-22

## 2023-12-29 PROBLEM — K52.9 NONINFECTIVE GASTROENTERITIS AND COLITIS, UNSPECIFIED: Status: ACTIVE | Noted: 2023-12-29

## 2023-12-29 PROBLEM — M25.521 PAIN IN RIGHT ELBOW: Status: ACTIVE | Noted: 2023-12-29

## 2023-12-29 PROBLEM — R26.89 OTHER ABNORMALITIES OF GAIT AND MOBILITY: Status: ACTIVE | Noted: 2023-12-29

## 2023-12-29 PROBLEM — K21.9 GASTRO-ESOPHAGEAL REFLUX DISEASE WITHOUT ESOPHAGITIS: Status: ACTIVE | Noted: 2023-12-29

## 2023-12-29 PROBLEM — R20.2 PARESTHESIA OF SKIN: Status: ACTIVE | Noted: 2023-12-29

## 2023-12-29 PROBLEM — Z65.8 OTHER SPECIFIED PROBLEMS RELATED TO PSYCHOSOCIAL CIRCUMSTANCES: Status: ACTIVE | Noted: 2023-12-29

## 2023-12-29 PROBLEM — I69.320 APHASIA FOLLOWING CEREBRAL INFARCTION: Status: ACTIVE | Noted: 2023-12-29

## 2023-12-29 PROBLEM — S42.401A UNSPECIFIED FRACTURE OF LOWER END OF RIGHT HUMERUS, INITIAL ENCOUNTER FOR CLOSED FRACTURE: Status: ACTIVE | Noted: 2023-12-29

## 2023-12-29 PROBLEM — M25.512 PAIN IN LEFT SHOULDER: Status: ACTIVE | Noted: 2023-12-29

## 2023-12-29 PROBLEM — R74.8 ELEVATED CPK: Status: ACTIVE | Noted: 2023-12-22

## 2023-12-29 PROBLEM — M79.603 PAIN IN ARM, UNSPECIFIED: Status: ACTIVE | Noted: 2023-12-29

## 2023-12-29 PROBLEM — H53.9 UNSPECIFIED VISUAL DISTURBANCE: Status: ACTIVE | Noted: 2023-12-29

## 2023-12-29 PROBLEM — R51.9 HEADACHE, UNSPECIFIED: Status: ACTIVE | Noted: 2023-12-29

## 2023-12-29 PROBLEM — G56.91 UNSPECIFIED MONONEUROPATHY OF RIGHT UPPER LIMB: Status: ACTIVE | Noted: 2023-12-29

## 2023-12-29 PROCEDURE — 99309 SBSQ NF CARE MODERATE MDM 30: CPT | Performed by: NURSE PRACTITIONER

## 2023-12-29 RX ORDER — LANOLIN ALCOHOL/MO/W.PET/CERES
100 CREAM (GRAM) TOPICAL DAILY
COMMUNITY
End: 2024-03-22

## 2023-12-29 RX ORDER — OXYCODONE HYDROCHLORIDE 5 MG/1
5 TABLET ORAL EVERY 6 HOURS PRN
COMMUNITY
End: 2024-01-08

## 2023-12-29 RX ORDER — POLYETHYLENE GLYCOL 3350 17 G/17G
17 POWDER, FOR SOLUTION ORAL DAILY
COMMUNITY

## 2023-12-29 RX ORDER — LOPERAMIDE HCL 2 MG
2 CAPSULE ORAL DAILY
COMMUNITY
End: 2023-12-29

## 2023-12-29 RX ORDER — ACETAMINOPHEN 500 MG
1000 TABLET ORAL 3 TIMES DAILY
Start: 2023-12-29 | End: 2024-02-05

## 2023-12-29 RX ORDER — NALOXONE HYDROCHLORIDE 0.4 MG/ML
0.4 INJECTION, SOLUTION INTRAMUSCULAR; INTRAVENOUS; SUBCUTANEOUS
COMMUNITY
End: 2024-05-15

## 2023-12-29 RX ORDER — FOLIC ACID 1 MG/1
1 TABLET ORAL DAILY
COMMUNITY

## 2023-12-29 RX ORDER — HYDROXYZINE PAMOATE 25 MG/1
25 CAPSULE ORAL 3 TIMES DAILY PRN
COMMUNITY
End: 2024-02-04

## 2023-12-29 RX ORDER — FERROUS SULFATE 325(65) MG
325 TABLET, DELAYED RELEASE (ENTERIC COATED) ORAL EVERY OTHER DAY
COMMUNITY

## 2023-12-29 NOTE — PROGRESS NOTES
[facility] TCU Admission  PCP & CLINIC: Physician No Ref-Primary, No address on file  Chief Complaint   Patient presents with    Hospital F/U   Mariana MRN: [id number]. Place of Service where encounter took place:  [facility] (TCU) [[id number]] David Thomson  is a 83 year old  (1940), admitted to the above facility from  [facility]. Hospital stay 12/22 through 12/28/23. Admitted to this facility for  rehab, medical management, and nursing care. HPI information obtained from: facility chart records, facility staff, patient report, [facility] chart review, and Care Everywhere [facility] chart review.     Brief Summary of Hospital Course: Castro presented [place] [facility] [place] [facility] w/ AMS and after being found down by family. [facility]/18-11/21). No acute pathology found but he required significant reconditioning.     Updates since admission to transitional care unit: Castro presented to TCU on 12/28 s/p the above hospitalization. Today, Castro says he is doing okay.  He is frustrated, because he is left-handed and having some moderate pain in the left arm.  He is a little bit swollen here, but overall denies numbness or tingling.  He is not sure when he is supposed to follow-up with orthopedics.  One of his main issues is that he is having a lot of PTSD associated symptoms, such as flashbacks, dissociations, and night terrors.  He cannot get anxious from this, and his sleep can be disrupted.  He denies new shortness of breath, but sometimes has this with activity, and associated coughing.  He denies a fever, sore throat, nasal drainage, chest pain, or palpitations.  He denies nausea or heartburn, but says his appetite is not very good and he is not interested in food.  He denies constipation or diarrhea further.    CODE STATUS/ADVANCE DIRECTIVES DISCUSSION: CPR/Full code . Patient's living condition: lives alone. ALLERGIES: Doxepin, Lisinopril,  Piroxicam, Polysorbate  [bay oil], Prazosin, Thimerosal, Urea, Zolpidem, and Benzalkonium PAST MEDICAL HISTORY:  has a past medical history of Anemia, Atrial fibrillation (H), Bilateral low back pain with left-sided sciatica, Borderline personality disorder (H), BPH (benign prostatic hyperplasia), CAD (coronary artery disease), Cerebral artery occlusion with cerebral infarction (H), Cervical spondylosis with myelopathy, Depression, Diarrhea, Falls frequently, Gastroesophageal reflux disease with esophagitis, Hereditary elliptocytosis (H), History of subdural hematoma, Hyperlipidaemia, Hypertension, Neuropathy, ISHAN (obstructive sleep apnea), Pre-diabetes, PTSD (post-traumatic stress disorder), S/P TAVR (transcatheter aortic valve replacement), Thrombocytopenia (H), and Vertigo.. PAST SURGICAL HISTORY:   has a past surgical history that includes Gallbladder surgery; hip surgery; Wrist surgery (Bilateral); back surgery; Insert stimulator dorsal column (Right, 04/19/2016); IR Fine Needle Aspiration w Ultrasound (01/23/2023); IR Fine Needle Aspiration w Ultrasound (01/26/2023); Abdomen surgery; turp; C6-C7 ACDF (2005); Left C7-T1 foraminotomy  (2015); CABG, VEIN, THREE (2015); aortic valve replacement (2015); and Arthroplasty revision hip (Left, 3/15/2023).. FAMILY HISTORY: family history includes Heart Disease in his father and mother.. SOCIAL HISTORY:   reports that he has never smoked. He has never used smokeless tobacco. He reports that he does not currently use alcohol. He reports that he does not use drugs.  Post Discharge Medication Reconciliation Status: discharge medications reconciled and changed, per note/orders.  Current Outpatient Medications   Medication Sig Dispense Refill    acetaminophen (TYLENOL) 500 MG tablet Take 2 tablets (1,000 mg) by mouth 3 times daily +650mg PO Qday PRN      apixaban ANTICOAGULANT (ELIQUIS) 5 MG tablet 5 mg      atorvastatin (LIPITOR) 80 MG tablet Take 1 tablet (80 mg) by mouth  At Bedtime 30 tablet 0    carboxymethylcellulose (REFRESH LIQUIGEL) 1 % ophthalmic solution Apply 1 drop to eye 4 times daily as needed for dry eyes      ferrous sulfate (FE TABS) 325 (65 Fe) MG EC tablet Take 325 mg by mouth daily      folic acid (FOLVITE) 1 MG tablet Take 1 mg by mouth daily      gabapentin (NEURONTIN) 100 MG capsule Take 1 capsule (100 mg) by mouth 2 times daily 60 capsule 0    hydrOXYzine shalonda (VISTARIL) 25 MG capsule Take 25 mg by mouth 3 times daily as needed for anxiety or other (pain)      metoprolol succinate ER (TOPROL XL) 25 MG 24 hr tablet 12.5 mg      naloxone (NARCAN) 0.4 MG/ML injection Inject 0.4 mg into the vein May give 1ml in 2-3 minutes of patient is still unresponsive      omeprazole (PRILOSEC) 20 MG DR capsule Take 1 capsule (20 mg) by mouth daily Am and HS      oxyCODONE (ROXICODONE) 5 MG tablet Take 5-10 mg by mouth every 4 hours as needed for severe pain      polyethylene glycol (MIRALAX) 17 g packet Take 17 g by mouth daily      PSYLLIUM HUSK PO Take by mouth daily - 3.4gram/5.4gram  powder - mix in liquid per directions      sacubitril-valsartan (ENTRESTO)  MG per tablet Take 1 tablet by mouth 2 times daily      senna-docusate (SENOKOT-S/PERICOLACE) 8.6-50 MG tablet Take 1 tablet by mouth 2 times daily 60 tablet 0    sertraline (ZOLOFT) 50 MG tablet Take 75 mg by mouth daily      thiamine (B-1) 100 MG tablet Take 100 mg by mouth daily       ROS: Limited secondary to cognitive impairment but today pt reports the above and 10 point ROS of systems including Constitutional, Eyes, Respiratory, Cardiovascular, Gastroenterology, Genitourinary, Integumentary, Musculoskeletal, Psychiatric were all negative except for pertinent positives noted in my HPI.    Vitals: /60   Pulse 82   Temp 97  F (36.1  C)   Resp 18   Wt 107.3 kg (236 lb 9.6 oz)   SpO2 92%   BMI 32.09 kg/m    Exam:  GENERAL APPEARANCE: Alert, in no distress, cooperative.   ENT: Mouth/posterior  oropharynx intact w/ moist mucous membranes, hearing acuity [place].  EYES: EOM, conjunctivae, lids, pupils and irises normal, PERRL, except left pupil is irregularly shaped and 3-4 (which is apparent baseline), and right pupil is 2.  RESP: Respiratory effort good, no respiratory distress, Lung sounds clear/diminished. On RA.   CV: Auscultation of heart reveals S1, S2, rate controlled and rhythm irregular, no audible murmur, no rub or gallop, Edema 1+ BLE. Peripheral pulses are 2+.  ABDOMEN: Normal bowel sounds, soft, non-tender abdomen, and no masses palpated.  SKIN: Inspection/Palpation of skin and subcutaneous tissue baseline w/ fragility. No wounds/rashes noted but left elbow/arm incision is covered and splinted.  NEURO: CN II-XII at patient's baseline, sensation baseline PPS.  PSYCH: Insight, judgement, and memory are forgetful at baseline, affect and mood are flat/pleasant.    Lab/Diagnostic data: Recent labs in [facility] reviewed by me today.     ASSESSMENT/PLAN:  Closed fracture of distal end of left humerus with malunion, subsequent encounter  S/P ORIF (open reduction internal fixation) fracture  Orthopedic aftercare  Pain of left upper arm  Borderline personality disorder (H)  PTSD (post-traumatic stress disorder)  ETOH abuse  Hypertension, unspecified type  Chronic heart failure with preserved ejection fraction (H)  Acute on chronic. Tenuous & Complex.   Provider reviewed records from hospitalization, facility, and interpreted most recent imaging/lab work, and vital signs.  Noting historical EtOH abuse, likely secondary to significant PTSD.  Will increase Zoloft, as this is shown to be helpful for PTSD symptoms, will consult in-house psychologist to assist in processing and noting Castro is a [place] .  Will adjunct both anxiety and pain with Vistaril as noted below and apply parameter to meet CMS guidelines. Noted initial PRN orders for non antipsychotic psychotropic medications are limited to 14 days. Start  new psychotropic medication x 14 days for sporadic anxiety and occasional muscle spams.   Noting polypharmacy which can contribute to both a side effect profile, nightmares, and overall care burden.  Will discontinue probiotic, Biotene, and Imodium.  There is no evidence of stomach upset or diarrhea.  Noting significant left arm pain, significant fracture present with previous ORIF.  Records very limited from [facility].  Patient supposed to follow-up with Esequiel Busby after the new year, and this is yet to be set up.  Nursing to assist.  Given significant pain, we will schedule Tylenol as noted below and reduce as needed dosing for safety.  Patient may adjunct with oxycodone which is available.  Provider personally coordinated care with [facility] orthopedic specialists as orders around fracture/splint/follow-up management are unclear and patient presented to TCU without orders for site care or range of motion/weightbearing.  After review with specialist, will place orders to keep left arm nonweightbearing, patient is not to perform range of motion activities, though these items may be changed on follow-up with orthopedics.  There is no history of peptic ulcer disease, gastritis, GI bleeding, or varices.  Bill is on apixaban at baseline.  Will trial reduction in Prilosec as noted below.  Follow up w/in 1 week or as needed.    Orders:  ACP eval/tx x1, asap. Dx: PTSD.  Increase Zoloft to 75mg PO Qday. Dx: PTSD.  Vistaril 25mg PO TID PRN x 14 days. Dx: pain/anxiety.  Discontinue Probiotic.  Discontinue Biotene.  Discontinue Imodium.  Tylenol 1000mg PO TID. Dx: left arm pain.  Change PRN Tylenol to 650mg PO Qday PRN. Dx: pain/fever.   Decrease Prilosec to 20mg PO Qday. Dx: GERD.   Left arm care:  NWB.  No ROM.  Arrange Ortho follow up on or after 1/2/24 ([facility]iel Busby w[facility]).   Dx: left humerus ORIF w/ ulnar nerve transposition.     Electronically signed by:  Dr. Matilde Hatch, APRN CNP DNP

## 2023-12-29 NOTE — LETTER
12/29/2023        RE: David Thomson  [address] [place] [facility] [address]        [facility] Admission  PCP & CLINIC: Physician No Ref-Primary, No address on file  Chief Complaint   Patient presents with     Hospital F/U   Mariana MRN: [id number]. Place of Service where encounter took place:  [facility] (TCU) [[id number]] David Thomson  is a 83 year old  (1940), admitted to the above facility from  [facility]. Hospital stay 12/22 through 12/28/23. Admitted to this facility for  rehab, medical management, and nursing care. HPI information obtained from: facility chart records, facility staff, patient report, [facility] chart review, and Care Everywhere [facility] chart review.     Brief Summary of Hospital Course: Castro presented to [facility] on 12/22 w/ AMS and after being found down by family. [facility]/18-11/21). No acute pathology found but he required significant reconditioning.     Updates since admission to transitional care unit: Castro presented to TCU on 12/28 s/p the above hospitalization. Today, Castro says he is doing okay.  He is frustrated, because he is left-handed and having some moderate pain in the left arm.  He is a little bit swollen here, but overall denies numbness or tingling.  He is not sure when he is supposed to follow-up with orthopedics.  One of his main issues is that he is having a lot of PTSD associated symptoms, such as flashbacks, dissociations, and night terrors.  He cannot get anxious from this, and his sleep can be disrupted.  He denies new shortness of breath, but sometimes has this with activity, and associated coughing.  He denies a fever, sore throat, nasal drainage, chest pain, or palpitations.  He denies nausea or heartburn, but says his appetite is not very good and he is not interested in food.  He denies constipation or diarrhea further.    CODE STATUS/ADVANCE DIRECTIVES  DISCUSSION: CPR/Full code . Patient's living condition: lives alone. ALLERGIES: Doxepin, Lisinopril, Piroxicam, Polysorbate  [bay oil], Prazosin, Thimerosal, Urea, Zolpidem, and Benzalkonium PAST MEDICAL HISTORY:  has a past medical history of Anemia, Atrial fibrillation (H), Bilateral low back pain with left-sided sciatica, Borderline personality disorder (H), BPH (benign prostatic hyperplasia), CAD (coronary artery disease), Cerebral artery occlusion with cerebral infarction (H), Cervical spondylosis with myelopathy, Depression, Diarrhea, Falls frequently, Gastroesophageal reflux disease with esophagitis, Hereditary elliptocytosis (H), History of subdural hematoma, Hyperlipidaemia, Hypertension, Neuropathy, ISHAN (obstructive sleep apnea), Pre-diabetes, PTSD (post-traumatic stress disorder), S/P TAVR (transcatheter aortic valve replacement), Thrombocytopenia (H), and Vertigo.. PAST SURGICAL HISTORY:   has a past surgical history that includes Gallbladder surgery; hip surgery; Wrist surgery (Bilateral); back surgery; Insert stimulator dorsal column (Right, 04/19/2016); IR Fine Needle Aspiration w Ultrasound (01/23/2023); IR Fine Needle Aspiration w Ultrasound (01/26/2023); Abdomen surgery; turp; C6-C7 ACDF (2005); Left C7-T1 foraminotomy  (2015); CABG, VEIN, THREE (2015); aortic valve replacement (2015); and Arthroplasty revision hip (Left, 3/15/2023).. FAMILY HISTORY: family history includes Heart Disease in his father and mother.. SOCIAL HISTORY:   reports that he has never smoked. He has never used smokeless tobacco. He reports that he does not currently use alcohol. He reports that he does not use drugs.  Post Discharge Medication Reconciliation Status: discharge medications reconciled and changed, per note/orders.  Current Outpatient Medications   Medication Sig Dispense Refill     acetaminophen (TYLENOL) 500 MG tablet Take 2 tablets (1,000 mg) by mouth 3 times daily +650mg PO Qday PRN       apixaban  ANTICOAGULANT (ELIQUIS) 5 MG tablet 5 mg       atorvastatin (LIPITOR) 80 MG tablet Take 1 tablet (80 mg) by mouth At Bedtime 30 tablet 0     carboxymethylcellulose (REFRESH LIQUIGEL) 1 % ophthalmic solution Apply 1 drop to eye 4 times daily as needed for dry eyes       ferrous sulfate (FE TABS) 325 (65 Fe) MG EC tablet Take 325 mg by mouth daily       folic acid (FOLVITE) 1 MG tablet Take 1 mg by mouth daily       gabapentin (NEURONTIN) 100 MG capsule Take 1 capsule (100 mg) by mouth 2 times daily 60 capsule 0     hydrOXYzine shalonda (VISTARIL) 25 MG capsule Take 25 mg by mouth 3 times daily as needed for anxiety or other (pain)       metoprolol succinate ER (TOPROL XL) 25 MG 24 hr tablet 12.5 mg       naloxone (NARCAN) 0.4 MG/ML injection Inject 0.4 mg into the vein May give 1ml in 2-3 minutes of patient is still unresponsive       omeprazole (PRILOSEC) 20 MG DR capsule Take 1 capsule (20 mg) by mouth daily Am and HS       oxyCODONE (ROXICODONE) 5 MG tablet Take 5-10 mg by mouth every 4 hours as needed for severe pain       polyethylene glycol (MIRALAX) 17 g packet Take 17 g by mouth daily       PSYLLIUM HUSK PO Take by mouth daily - 3.4gram/5.4gram  powder - mix in liquid per directions       sacubitril-valsartan (ENTRESTO)  MG per tablet Take 1 tablet by mouth 2 times daily       senna-docusate (SENOKOT-S/PERICOLACE) 8.6-50 MG tablet Take 1 tablet by mouth 2 times daily 60 tablet 0     sertraline (ZOLOFT) 50 MG tablet Take 75 mg by mouth daily       thiamine (B-1) 100 MG tablet Take 100 mg by mouth daily       ROS: Limited secondary to cognitive impairment but today pt reports the above and 10 point ROS of systems including Constitutional, Eyes, Respiratory, Cardiovascular, Gastroenterology, Genitourinary, Integumentary, Musculoskeletal, Psychiatric were all negative except for pertinent positives noted in my HPI.    Vitals: /60   Pulse 82   Temp 97  F (36.1  C)   Resp 18   Wt 107.3 kg (236 lb 9.6 oz)    SpO2 92%   BMI 32.09 kg/m    Exam:  GENERAL APPEARANCE: Alert, in no distress, cooperative.   ENT: Mouth/posterior oropharynx intact w/ moist mucous membranes, hearing acuity [place].  EYES: EOM, conjunctivae, lids, pupils and irises normal, PERRL2.   RESP: Respiratory effort good, no respiratory distress, Lung sounds clear/diminished. On RA.   CV: Auscultation of heart reveals S1, S2, rate controlled and rhythm irregular, no audible murmur, no rub or gallop, Edema 1+ BLE. Peripheral pulses are 2+.  ABDOMEN: Normal bowel sounds, soft, non-tender abdomen, and no masses palpated.  SKIN: Inspection/Palpation of skin and subcutaneous tissue baseline w/ fragility. No wounds/rashes noted but left elbow/arm incision is covered and splinted.  NEURO: CN II-XII at patient's baseline, sensation baseline PPS.  PSYCH: Insight, judgement, and memory are forgetful at baseline, affect and mood are flat/pleasant.    Lab/Diagnostic data: Recent labs in [facility] reviewed by me today.     ASSESSMENT/PLAN:  Closed fracture of distal end of left humerus with malunion, subsequent encounter  S/P ORIF (open reduction internal fixation) fracture  Orthopedic aftercare  Pain of left upper arm  Borderline personality disorder (H)  PTSD (post-traumatic stress disorder)  ETOH abuse  Hypertension, unspecified type  Chronic heart failure with preserved ejection fraction (H)  Acute on chronic. Tenuous & Complex.   Provider reviewed records from hospitalization, facility, and interpreted most recent imaging/lab work, and vital signs.  Noting historical EtOH abuse, likely secondary to significant PTSD.  Will increase Zoloft, as this is shown to be helpful for PTSD symptoms, will consult in-house psychologist to assist in processing and noting Castro is a [place] .  Will adjunct both anxiety and pain with Vistaril as noted below and apply parameter to meet CMS guidelines. Noted initial PRN orders for non antipsychotic psychotropic medications are  limited to 14 days. Start new psychotropic medication x 14 days for sporadic anxiety and occasional muscle spams.   Noting polypharmacy which can contribute to both a side effect profile, nightmares, and overall care burden.  Will discontinue probiotic, Biotene, and Imodium.  There is no evidence of stomach upset or diarrhea.  Noting significant left arm pain, significant fracture present with previous ORIF.  Records very limited from [facility].  Patient supposed to follow-up with Esequiel Busby after the new year, and this is yet to be set up.  Nursing to assist.  Given significant pain, we will schedule Tylenol as noted below and reduce as needed dosing for safety.  Patient may adjunct with oxycodone which is available.  Provider personally coordinated care with [facility] orthopedic specialists as orders around fracture/splint/follow-up management are unclear and patient presented to TCU without orders for site care or range of motion/weightbearing.  After review with specialist, will place orders to keep left arm nonweightbearing, patient is not to perform range of motion activities, though these items may be changed on follow-up with orthopedics.  There is no history of peptic ulcer disease, gastritis, GI bleeding, or varices.  Bill is on apixaban at baseline.  Will trial reduction in Prilosec as noted below.  Follow up w/in 1 week or as needed.    Orders:  ACP eval/tx x1, asap. Dx: PTSD.  Increase Zoloft to 75mg PO Qday. Dx: PTSD.  Vistaril 25mg PO TID PRN x 14 days. Dx: pain/anxiety.  Discontinue Probiotic.  Discontinue Biotene.  Discontinue Imodium.  Tylenol 1000mg PO TID. Dx: left arm pain.  Change PRN Tylenol to 650mg PO Qday PRN. Dx: pain/fever.   Decrease Prilosec to 20mg PO Qday. Dx: GERD.   Left arm care:  NWB.  No ROM.  Arrange Ortho follow up on or after 1/2/24 ([facility] Bus[facility]).   Dx: left humerus ORIF w/ ulnar nerve transposition.     Electronically signed by:  Dr. Matilde Hatch, APRN CNP  DNP                        Sincerely,        ANA LILIA Robles CNP

## 2024-01-02 ENCOUNTER — TRANSITIONAL CARE UNIT VISIT (OUTPATIENT)
Dept: GERIATRICS | Facility: CLINIC | Age: 84
End: 2024-01-02
Payer: COMMERCIAL

## 2024-01-02 ENCOUNTER — LAB REQUISITION (OUTPATIENT)
Dept: LAB | Facility: CLINIC | Age: 84
End: 2024-01-02
Payer: MEDICARE

## 2024-01-02 VITALS
RESPIRATION RATE: 16 BRPM | HEART RATE: 67 BPM | OXYGEN SATURATION: 94 % | DIASTOLIC BLOOD PRESSURE: 75 MMHG | SYSTOLIC BLOOD PRESSURE: 125 MMHG | WEIGHT: 240.9 LBS | TEMPERATURE: 97.9 F | BODY MASS INDEX: 32.67 KG/M2

## 2024-01-02 DIAGNOSIS — Z47.89 ORTHOPEDIC AFTERCARE: ICD-10-CM

## 2024-01-02 DIAGNOSIS — N39.0 URINARY TRACT INFECTION, SITE NOT SPECIFIED: ICD-10-CM

## 2024-01-02 DIAGNOSIS — F33.1 MAJOR DEPRESSIVE DISORDER, RECURRENT, MODERATE (H): ICD-10-CM

## 2024-01-02 DIAGNOSIS — I50.32 CHRONIC HEART FAILURE WITH PRESERVED EJECTION FRACTION (H): ICD-10-CM

## 2024-01-02 DIAGNOSIS — S42.402P: Primary | ICD-10-CM

## 2024-01-02 DIAGNOSIS — M79.622 PAIN OF LEFT UPPER ARM: ICD-10-CM

## 2024-01-02 DIAGNOSIS — Z98.890 S/P ORIF (OPEN REDUCTION INTERNAL FIXATION) FRACTURE: ICD-10-CM

## 2024-01-02 DIAGNOSIS — F43.10 PTSD (POST-TRAUMATIC STRESS DISORDER): ICD-10-CM

## 2024-01-02 DIAGNOSIS — R30.0 DYSURIA: ICD-10-CM
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
## 2024-01-02 DIAGNOSIS — D58.1 HEREDITARY ELLIPTOCYTOSIS (H): ICD-10-CM

## 2024-01-02 DIAGNOSIS — F10.10 ETOH ABUSE: ICD-10-CM

## 2024-01-02 DIAGNOSIS — Z87.81 S/P ORIF (OPEN REDUCTION INTERNAL FIXATION) FRACTURE: ICD-10-CM

## 2024-01-02 DIAGNOSIS — F60.3 BORDERLINE PERSONALITY DISORDER (H): ICD-10-CM

## 2024-01-02 PROBLEM — I47.29 NSVT (NONSUSTAINED VENTRICULAR TACHYCARDIA) (H): Status: RESOLVED | Noted: 2023-03-25 | Resolved: 2024-01-02

## 2024-01-02 PROBLEM — I48.92 UNSPECIFIED ATRIAL FLUTTER (H): Status: RESOLVED | Noted: 2020-02-07 | Resolved: 2024-01-02

## 2024-01-02 PROBLEM — S06.0X0S CONCUSSION WITHOUT LOSS OF CONSCIOUSNESS, SEQUELA (H): Status: RESOLVED | Noted: 2023-03-29 | Resolved: 2024-01-02

## 2024-01-02 PROBLEM — S06.5XAA SUBDURAL HEMATOMA (H): Chronic | Status: RESOLVED | Noted: 2023-01-29 | Resolved: 2024-01-02

## 2024-01-02 LAB
ALBUMIN UR-MCNC: NEGATIVE MG/DL
APPEARANCE UR: CLEAR
BILIRUB UR QL STRIP: NEGATIVE
COLOR UR AUTO: YELLOW
GLUCOSE UR STRIP-MCNC: NEGATIVE MG/DL
HGB UR QL STRIP: NEGATIVE
KETONES UR STRIP-MCNC: NEGATIVE MG/DL
LEUKOCYTE ESTERASE UR QL STRIP: NEGATIVE
NITRATE UR QL: NEGATIVE
PH UR STRIP: 5 [PH] (ref 5–7)
SP GR UR STRIP: 1.02 (ref 1–1.03)
UROBILINOGEN UR STRIP-MCNC: 4 MG/DL

## 2024-01-02 PROCEDURE — 99309 SBSQ NF CARE MODERATE MDM 30: CPT | Performed by: NURSE PRACTITIONER

## 2024-01-02 PROCEDURE — 81003 URINALYSIS AUTO W/O SCOPE: CPT | Performed by: NURSE PRACTITIONER

## 2024-01-02 NOTE — LETTER
1/2/2024        RE: David Thomson  [address] [facility] [address]        [facility] GERIATRIC SERVICE  Episodic/Acute/Follow-Up  [place] MRN: [id number]. Place of Service where encounter took place:  [facility] ([facility]) [4002]   Chief Complaint   Patient presents with     RECHECK    HPI: David Thomson  is a 83 year old (1940), who is being seen today for an episodic care visit. Today's concern is:    Castro seen today on routine follow-up as he continues to rehab in TCU.  Last week, we increased Zoloft and gave as needed Vistaril to assist with pain, anxiety, and PTSD symptoms.  Over the holiday weekend, Castro has fallen 3 times, and while no injury has occurred he repeatedly has felt anxious, restless.  His family also reports he is not acting like himself.    Today, a UA/UC was obtained given changes in behavior.  However, Castro states that he is having PTSD symptoms where he is flashing back and reliving moments, and therefore mobilizing as if he is living in the flashback.  He says he is anxious, and he is not sure what will help because medications have not helped in the past.  His sleep is sometimes disturbed by this, appetite is good, he denies headaches, but does have some lightheadedness.  He has a little bit of shortness of breath with activity.  His left arm is painful, but not more than usual.    Past Medical and Surgical History reviewed in Epic today.  MEDICATIONS:  Current Outpatient Medications   Medication Sig Dispense Refill     acetaminophen (TYLENOL) 500 MG tablet Take 2 tablets (1,000 mg) by mouth 3 times daily +650mg PO Qday PRN       apixaban ANTICOAGULANT (ELIQUIS) 5 MG tablet 5 mg       atorvastatin (LIPITOR) 80 MG tablet Take 1 tablet (80 mg) by mouth At Bedtime 30 tablet 0     carboxymethylcellulose (REFRESH LIQUIGEL) 1 % ophthalmic solution Apply 1 drop to eye 4 times daily as needed for dry eyes       ferrous sulfate (FE TABS) 325 (65 Fe) MG EC tablet  Take 325 mg by mouth daily       folic acid (FOLVITE) 1 MG tablet Take 1 mg by mouth daily       gabapentin (NEURONTIN) 100 MG capsule Take 1 capsule (100 mg) by mouth 2 times daily 60 capsule 0     hydrOXYzine shalonda (VISTARIL) 25 MG capsule Take 25 mg by mouth 3 times daily as needed for anxiety or other (pain)       metoprolol succinate ER (TOPROL XL) 25 MG 24 hr tablet 12.5 mg       naloxone (NARCAN) 0.4 MG/ML injection Inject 0.4 mg into the vein May give 1ml in 2-3 minutes of patient is still unresponsive       omeprazole (PRILOSEC) 20 MG DR capsule Take 1 capsule (20 mg) by mouth daily Am and HS       oxyCODONE (ROXICODONE) 5 MG tablet Take 5-10 mg by mouth every 4 hours as needed for severe pain       polyethylene glycol (MIRALAX) 17 g packet Take 17 g by mouth daily       PSYLLIUM HUSK PO Take by mouth daily - 3.4gram/5.4gram  powder - mix in liquid per directions       sacubitril-valsartan (ENTRESTO)  MG per tablet Take 1 tablet by mouth 2 times daily       senna-docusate (SENOKOT-S/PERICOLACE) 8.6-50 MG tablet Take 1 tablet by mouth 2 times daily 60 tablet 0     sertraline (ZOLOFT) 50 MG tablet Take 75 mg by mouth daily       thiamine (B-1) 100 MG tablet Take 100 mg by mouth daily       Objective: /75   Pulse 67   Temp 97.9  F (36.6  C)   Resp 16   Wt 109.3 kg (240 lb 14.4 oz)   SpO2 94%   BMI 32.67 kg/m    Exam:  GENERAL APPEARANCE: Alert, in no distress, cooperative.   RESP: Respiratory effort good, no respiratory distress, Lung sounds clear. On RA.   CV: Auscultation of heart reveals S1, S2, rate controlled and rhythm irregular, no murmur, no rub or gallop, Edema 1+ BLE. Peripheral pulses are 2+.  PSYCH: Insight, judgement, and memory are impaired at baseline with fluctuation, affect and mood are labile.    Labs: Labs done in facility are in EPIC. Please refer to them using [facility]/Care Everywhere.    ASSESSMENT/PLAN:  Fall, initial encounter  Closed fracture of distal end of left humerus  with malunion, subsequent encounter  S/P ORIF (open reduction internal fixation) fracture  Orthopedic aftercare  Pain of left upper arm  Borderline personality disorder (H)  PTSD (post-traumatic stress disorder)  ETOH abuse  CHF (H)  MDD (H)  Hereditary elliptocytosis (H)  Acute on chronic. Tenuous.  Provider reviewed records from facility, and interpreted most recent imaging/lab work, and vital signs.  I have already ordered a metabolic workup, prior to this visit.  Blood work will be completed tomorrow, and a UA/UC is already pending.  Urinalysis does not look suspicious.  Vital signs are stable.  It is unclear if there is a metabolic cause for falling.  Suspect mechanical as patient is left-handed and his range of motion is impaired from baseline, or psychological as causative of his falling.    Castro is actively anxious and experiencing flashbacks.  Provider coordinated care with nursing to administer Vistaril and provide fall prevention measures.  Therapy is working with Castro ongoing surrounding his mobility, but his left arm remains nonweightbearing until his orthopedic follow-up which is being arranged.  To assist further with his psychiatric illness, will consult in group psychiatrist Dr. Riddle to aid in his acute symptoms further.  At this time, CHF and elliptocytosis appear to be at their baseline.  Follow up w/in 1 week or as needed.    Orders:  Consult [facility] psychiatrist. Dx: Borderline personality disorder, PTSD.    Electronically signed by:  ANA LILIA Esparza CNP [facility]        Sincerely,        ANA LILIA Robles CNP

## 2024-01-02 NOTE — PROGRESS NOTES
[facility] GERIATRIC SERVICE  Episodic/Acute/Follow-Up  [place] MRN: [id number]. Place of Service where encounter took place:  [facility] [place] ([place] [4002]   Chief Complaint   Patient presents with    RECHECK    HPI: David Thomson  is a 83 year old (1940), who is being seen today for an episodic care visit. Today's concern is:    Castro seen today on routine follow-up as he continues to rehab in [facility].  Last week, we increased Zoloft and gave as needed Vistaril to assist with pain, anxiety, and PTSD symptoms.  Over the holiday weekend, Castro has fallen 3 times, and while no injury has occurred he repeatedly has felt anxious, restless.  His family also reports he is not acting like himself.    Today, a UA/UC was obtained given changes in behavior.  However, Castro states that he is having PTSD symptoms where he is flashing back and reliving moments, and therefore mobilizing as if he is living in the flashback.  He says he is anxious, and he is not sure what will help because medications have not helped in the past.  His sleep is sometimes disturbed by this, appetite is good, he denies headaches, but does have some lightheadedness.  He has a little bit of shortness of breath with activity.  His left arm is painful, but not more than usual.    Past Medical and Surgical History reviewed in Epic today.  MEDICATIONS:  Current Outpatient Medications   Medication Sig Dispense Refill    acetaminophen (TYLENOL) 500 MG tablet Take 2 tablets (1,000 mg) by mouth 3 times daily +650mg PO Qday PRN      apixaban ANTICOAGULANT (ELIQUIS) 5 MG tablet 5 mg      atorvastatin (LIPITOR) 80 MG tablet Take 1 tablet (80 mg) by mouth At Bedtime 30 tablet 0    carboxymethylcellulose (REFRESH LIQUIGEL) 1 % ophthalmic solution Apply 1 drop to eye 4 times daily as needed for dry eyes      ferrous sulfate (FE TABS) 325 (65 Fe) MG EC tablet Take 325 mg by mouth daily      folic acid (FOLVITE) 1 MG tablet Take 1 mg by mouth daily       gabapentin (NEURONTIN) 100 MG capsule Take 1 capsule (100 mg) by mouth 2 times daily 60 capsule 0    hydrOXYzine shalnoda (VISTARIL) 25 MG capsule Take 25 mg by mouth 3 times daily as needed for anxiety or other (pain)      metoprolol succinate ER (TOPROL XL) 25 MG 24 hr tablet 12.5 mg      naloxone (NARCAN) 0.4 MG/ML injection Inject 0.4 mg into the vein May give 1ml in 2-3 minutes of patient is still unresponsive      omeprazole (PRILOSEC) 20 MG DR capsule Take 1 capsule (20 mg) by mouth daily Am and HS      oxyCODONE (ROXICODONE) 5 MG tablet Take 5-10 mg by mouth every 4 hours as needed for severe pain      polyethylene glycol (MIRALAX) 17 g packet Take 17 g by mouth daily      PSYLLIUM HUSK PO Take by mouth daily - 3.4gram/5.4gram  powder - mix in liquid per directions      sacubitril-valsartan (ENTRESTO)  MG per tablet Take 1 tablet by mouth 2 times daily      senna-docusate (SENOKOT-S/PERICOLACE) 8.6-50 MG tablet Take 1 tablet by mouth 2 times daily 60 tablet 0    sertraline (ZOLOFT) 50 MG tablet Take 75 mg by mouth daily      thiamine (B-1) 100 MG tablet Take 100 mg by mouth daily       Objective: /75   Pulse 67   Temp 97.9  F (36.6  C)   Resp 16   Wt 109.3 kg (240 lb 14.4 oz)   SpO2 94%   BMI 32.67 kg/m    Exam:  GENERAL APPEARANCE: Alert, in no distress, cooperative.   RESP: Respiratory effort good, no respiratory distress, Lung sounds clear. On RA.   CV: Auscultation of heart reveals S1, S2, rate controlled and rhythm irregular, no murmur, no rub or gallop, Edema 1+ BLE. Peripheral pulses are 2+.  PSYCH: Insight, judgement, and memory are impaired at baseline with fluctuation, affect and mood are labile.    Labs: Labs done in facility are in EPIC. Please refer to them using [facility]/Care Everywhere.    ASSESSMENT/PLAN:  Fall, initial encounter  Closed fracture of distal end of left humerus with malunion, subsequent encounter  S/P ORIF (open reduction internal fixation) fracture  Orthopedic  aftercare  Pain of left upper arm  Borderline personality disorder (H)  PTSD (post-traumatic stress disorder)  ETOH abuse  CHF (H)  MDD (H)  Hereditary elliptocytosis (H)  Acute on chronic. Tenuous.  Provider reviewed records from facility, and interpreted most recent imaging/lab work, and vital signs.  I have already ordered a metabolic workup, prior to this visit.  Blood work will be completed tomorrow, and a UA/UC is already pending.  Urinalysis does not look suspicious.  Vital signs are stable.  It is unclear if there is a metabolic cause for falling.  Suspect mechanical as patient is left-handed and his range of motion is impaired from baseline, or psychological as causative of his falling.    Castro is actively anxious and experiencing flashbacks.  Provider coordinated care with nursing to administer Vistaril and provide fall prevention measures.  Therapy is working with Castro ongoing surrounding his mobility, but his left arm remains nonweightbearing until his orthopedic follow-up which is being arranged.  To assist further with his psychiatric illness, will consult in group psychiatrist Dr. Riddle to aid in his acute symptoms further.  At this time, CHF and elliptocytosis appear to be at their baseline.  Follow up w/in 1 week or as needed.    Orders:  Consult [facility] psychiatrist. Dx: Borderline personality disorder, PTSD.    Electronically signed by:  ANA LILIA Esparza CNP DNP

## 2024-01-03 ENCOUNTER — TRANSITIONAL CARE UNIT VISIT (OUTPATIENT)
Dept: GERIATRICS | Facility: CLINIC | Age: 84
End: 2024-01-03
Payer: OTHER GOVERNMENT

## 2024-01-03 VITALS
RESPIRATION RATE: 16 BRPM | HEIGHT: 72 IN | OXYGEN SATURATION: 98 % | HEART RATE: 69 BPM | BODY MASS INDEX: 32.63 KG/M2 | TEMPERATURE: 97.7 F | WEIGHT: 240.9 LBS | SYSTOLIC BLOOD PRESSURE: 106 MMHG | DIASTOLIC BLOOD PRESSURE: 64 MMHG

## 2024-01-03 DIAGNOSIS — F60.3 BORDERLINE PERSONALITY DISORDER (H): ICD-10-CM

## 2024-01-03 DIAGNOSIS — R45.851 SUICIDAL IDEATIONS: ICD-10-CM

## 2024-01-03 DIAGNOSIS — Z86.79 HISTORY OF SUBDURAL HEMATOMA: ICD-10-CM

## 2024-01-03 DIAGNOSIS — F33.1 MAJOR DEPRESSIVE DISORDER, RECURRENT, MODERATE (H): ICD-10-CM

## 2024-01-03 DIAGNOSIS — F10.10 ETOH ABUSE: ICD-10-CM

## 2024-01-03 DIAGNOSIS — F43.10 PTSD (POST-TRAUMATIC STRESS DISORDER): Primary | ICD-10-CM

## 2024-01-03 PROCEDURE — 99207 PR NO BILLABLE SERVICE THIS VISIT: CPT | Performed by: NURSE PRACTITIONER

## 2024-01-03 NOTE — PROGRESS NOTES
"[facility] GERIATRICS    PRIMARY CARE PROVIDER AND CLINIC:  Physician No Ref-Primary, No address on file  Chief Complaint   Patient presents with    Hospital F/U      [facility] Medical Record Number:  [id number]  Place of Service where encounter took place:  [facility] ([place]) [[id number]]    David Thomson  is a 83 year old  (1940), admitted to the above facility from  [facility]. Hospital stay 12/22/23 through 12/28/23..   HPI:    As per DNP's note:\"Brief Summary of Hospital Course: Castro presented to [facility] on 12/22 w/ AMS and after being found down by family. [facility]18-11/21). No acute pathology found but he required significant reconditioning.  \"    Today:  -Lt distal humerus fx/ ORIF: reports pain with holding up the arm.   - Rehab / Recurrent falls:  yesterday was the first day.   - Psych (PTSD): reports sleep is off and on.   - BM is pretty good.     ================================================================  CODE STATUS/ADVANCE DIRECTIVES DISCUSSION:  Full Code    ALLERGIES:   Allergies   Allergen Reactions    Doxepin      Other Reaction(s): Disorientated    Lisinopril Cough    Piroxicam Hives     Other Reaction(s): Eruption of skin    Polysorbate  [Bay Oil]     Prazosin Other (See Comments)     Nightmares    Thimerosal     Urea Other (See Comments)     Eruption of skin    Other Reaction(s): Eruption of skin    Zolpidem      Other Reaction(s): Delirium    Benzalkonium Rash      PAST MEDICAL HISTORY:   Past Medical History:   Diagnosis Date    Anemia     Atrial fibrillation (H)     Bilateral low back pain with left-sided sciatica     Borderline personality disorder (H)     BPH (benign prostatic hyperplasia)     CAD (coronary artery disease)     Cerebral artery occlusion with cerebral infarction (H)     Cervical spondylosis with myelopathy     Depression     Diarrhea     Falls frequently     Gastroesophageal reflux " disease with esophagitis     Hereditary elliptocytosis (H24)     History of subdural hematoma     Hyperlipidaemia     Hypertension     Neuropathy     ISHAN (obstructive sleep apnea)     Pre-diabetes     PTSD (post-traumatic stress disorder)     S/P TAVR (transcatheter aortic valve replacement)     Thrombocytopenia (H24)     Vertigo       PAST SURGICAL HISTORY:   has a past surgical history that includes Gallbladder surgery; hip surgery; Wrist surgery (Bilateral); back surgery; Insert stimulator dorsal column (Right, 04/19/2016); IR Fine Needle Aspiration w Ultrasound (01/23/2023); IR Fine Needle Aspiration w Ultrasound (01/26/2023); Abdomen surgery; turp; C6-C7 ACDF (2005); Left C7-T1 foraminotomy  (2015); CABG, VEIN, THREE (2015); aortic valve replacement (2015); and Arthroplasty revision hip (Left, 3/15/2023).  FAMILY HISTORY: family history includes Heart Disease in his father and mother.  SOCIAL HISTORY:   reports that he has never smoked. He has never used smokeless tobacco. He reports that he does not currently use alcohol. He reports that he does not use drugs.  Patient's living condition: lives alone    Post Discharge Medication Reconciliation Status:   MED REC REQUIRED  Post Medication Reconciliation Status: discharge medications reconciled and changed, per note/orders       Current Outpatient Medications   Medication Sig    acetaminophen (TYLENOL) 500 MG tablet Take 2 tablets (1,000 mg) by mouth 3 times daily +650mg PO Qday PRN    apixaban ANTICOAGULANT (ELIQUIS) 5 MG tablet 5 mg    atorvastatin (LIPITOR) 80 MG tablet Take 1 tablet (80 mg) by mouth At Bedtime    carboxymethylcellulose (REFRESH LIQUIGEL) 1 % ophthalmic solution Apply 1 drop to eye 4 times daily as needed for dry eyes    ferrous sulfate (FE TABS) 325 (65 Fe) MG EC tablet Take 325 mg by mouth daily    folic acid (FOLVITE) 1 MG tablet Take 1 mg by mouth daily    gabapentin (NEURONTIN) 100 MG capsule Take 1 capsule (100 mg) by mouth 2 times daily     hydrOXYzine shalonda (VISTARIL) 25 MG capsule Take 25 mg by mouth 3 times daily as needed for anxiety or other (pain)    metoprolol succinate ER (TOPROL XL) 25 MG 24 hr tablet 12.5 mg    naloxone (NARCAN) 0.4 MG/ML injection Inject 0.4 mg into the vein May give 1ml in 2-3 minutes of patient is still unresponsive    omeprazole (PRILOSEC) 20 MG DR capsule Take 1 capsule (20 mg) by mouth daily Am and HS    oxyCODONE (ROXICODONE) 5 MG tablet Take 5-10 mg by mouth every 4 hours as needed for severe pain    polyethylene glycol (MIRALAX) 17 g packet Take 17 g by mouth daily    PSYLLIUM HUSK PO Take by mouth daily - 3.4gram/5.4gram  powder - mix in liquid per directions    sacubitril-valsartan (ENTRESTO)  MG per tablet Take 1 tablet by mouth 2 times daily    senna-docusate (SENOKOT-S/PERICOLACE) 8.6-50 MG tablet Take 1 tablet by mouth 2 times daily    sertraline (ZOLOFT) 50 MG tablet Take 75 mg by mouth daily    thiamine (B-1) 100 MG tablet Take 100 mg by mouth daily     No current facility-administered medications for this visit.       ROS:  10 point ROS of systems including Constitutional, Eyes, Respiratory, Cardiovascular, Gastroenterology, Genitourinary, Integumentary, Musculoskeletal, Psychiatric were all negative except for pertinent positives noted in my HPI.    Vitals:  /64   Pulse 69   Temp 97.7  F (36.5  C)   Resp 16   Ht 1.829 m (6')   Wt 109.3 kg (240 lb 14.4 oz)   SpO2 98%   BMI 32.67 kg/m    Exam:  GENERAL APPEARANCE:  in no distress,   RESP:  Unlabored breathing. CTA b/l.   CV:  S1S2 audible, regular HR, no murmur appreciated.   ABDOMEN:  soft, NT/ND, BS audible.   M/S:   no joint deformity noted on observation.   SKIN:  No rash noted on observation  NEURO:   No NFD appreciated on observation.   PSYCH:  affect and mood normal    Lab/Diagnostic data: Reviewed in the chart and EHR.        ASSESSMENT/PLAN:  ==================  Closed fracture of distal end of left humerus with malunion,  subsequent encounter  S/P ORIF (open reduction internal fixation) fracture  Orthopedic aftercare  Pain of left upper ar  - - Analgesia optimal with the current regimen. Continue present plan and medications.  - Followed by Orthopedic Team. Follow on the recommendations / instructions.   - DVT prophylaxis according to Orthopedic team recommendations  - Started rehab program, making a progress, continue until desired goal is achieved.   - LUE in sling, edema in the fingers. Sling adjusted. Pt counseled on exercises his finger.     Recurrent falls :  - 2/2 flash back. UA negative for infection.  - last time fell was two  days  ago.     Hypertension, unspecified type  Chronic heart failure with preserved ejection fraction (H)  Mechanical aortic valve and long term current use of Eliquis  - cardiac wise compensated.   - The current medical regimen is effective;  continue present plan and medications.      PTSD (post-traumatic stress disorder)  Major depressive disorder, recurrent, moderate (H)  Borderline personality disorder (H)  Hx of alcoholism  - with flash back. Was seen by VIOLETA Riddle on 1/3/24. Appreciate help.       ELLIOTT: on supplement: continue      Slow transit constipation  - no concern, continue miralax, senna and psyllium      Orders:  NNO    Electronically signed by:  Stefanie Hawkins MD

## 2024-01-03 NOTE — LETTER
1/3/2024        RE: David GARCIA Alix  [address] [facility] [address]        Request for psychiatric evaluation of patient in TCU - upon chart review, [facility]. He has an appointment with her at 10am on 1/25. I recommend nursing follow-up with his [facility] provider for continuity of care. Writer available PRN with clearance from [facility] provider.    Dr Maddison Riddle APRN DNP        Sincerely,        ANA LILIA Yeager CNP
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
## 2024-01-04 ENCOUNTER — TRANSITIONAL CARE UNIT VISIT (OUTPATIENT)
Dept: GERIATRICS | Facility: CLINIC | Age: 84
End: 2024-01-04
Payer: COMMERCIAL

## 2024-01-04 VITALS
RESPIRATION RATE: 16 BRPM | WEIGHT: 240.9 LBS | HEIGHT: 72 IN | TEMPERATURE: 97.7 F | DIASTOLIC BLOOD PRESSURE: 64 MMHG | HEART RATE: 69 BPM | SYSTOLIC BLOOD PRESSURE: 106 MMHG | OXYGEN SATURATION: 98 % | BODY MASS INDEX: 32.63 KG/M2

## 2024-01-04 DIAGNOSIS — K59.01 SLOW TRANSIT CONSTIPATION: ICD-10-CM

## 2024-01-04 DIAGNOSIS — Z87.81 S/P ORIF (OPEN REDUCTION INTERNAL FIXATION) FRACTURE: ICD-10-CM

## 2024-01-04 DIAGNOSIS — I10 HYPERTENSION, UNSPECIFIED TYPE: ICD-10-CM

## 2024-01-04 DIAGNOSIS — Z47.89 ORTHOPEDIC AFTERCARE: ICD-10-CM

## 2024-01-04 DIAGNOSIS — Z98.890 S/P ORIF (OPEN REDUCTION INTERNAL FIXATION) FRACTURE: ICD-10-CM

## 2024-01-04 DIAGNOSIS — F43.10 PTSD (POST-TRAUMATIC STRESS DISORDER): ICD-10-CM

## 2024-01-04 DIAGNOSIS — R29.6 RECURRENT FALLS: ICD-10-CM

## 2024-01-04 DIAGNOSIS — F60.3 BORDERLINE PERSONALITY DISORDER (H): ICD-10-CM

## 2024-01-04 DIAGNOSIS — Z95.2 MECHANICAL HEART VALVE PRESENT: ICD-10-CM

## 2024-01-04 DIAGNOSIS — F33.1 MAJOR DEPRESSIVE DISORDER, RECURRENT, MODERATE (H): ICD-10-CM

## 2024-01-04 DIAGNOSIS — S42.402P: Primary | ICD-10-CM

## 2024-01-04 DIAGNOSIS — M79.622 PAIN OF LEFT UPPER ARM: ICD-10-CM

## 2024-01-04 DIAGNOSIS — I50.32 CHRONIC HEART FAILURE WITH PRESERVED EJECTION FRACTION (H): ICD-10-CM

## 2024-01-04 DIAGNOSIS — Z79.01 LONG TERM CURRENT USE OF ANTICOAGULANT THERAPY: ICD-10-CM

## 2024-01-04 PROCEDURE — 99305 1ST NF CARE MODERATE MDM 35: CPT | Performed by: FAMILY MEDICINE

## 2024-01-04 NOTE — LETTER
"    1/4/2024        RE: David Thomson  [address] [facility] [address]        [facility] GERIATRICS    PRIMARY CARE PROVIDER AND CLINIC:  Physician No Ref-Primary, No address on file  Chief Complaint   Patient presents with     Hospital F/U      [place] Medical Record Number:  [id number]  Place of Service where encounter took place:  [facility] [place] [[id number]]    David Thomson  is a 83 year old  (1940), admitted to the above facility from  [facility]. Hospital stay 12/22/23 through 12/28/23..   HPI:    As per DNP's note:\"Brief Summary of Hospital Course: [place] [facility] [place]/ [place] and after being found down by family. [facility]18-11/21). No acute pathology found but he required significant reconditioning.  \"    Today:  -Lt distal humerus fx/ ORIF: reports pain with holding up the arm.   - Rehab / Recurrent falls:  yesterday was the first day.   - Psych (PTSD): reports sleep is off and on.   - BM is pretty good.     ================================================================  CODE STATUS/ADVANCE DIRECTIVES DISCUSSION:  Full Code    ALLERGIES:   Allergies   Allergen Reactions     Doxepin      Other Reaction(s): Disorientated     Lisinopril Cough     Piroxicam Hives     Other Reaction(s): Eruption of skin     Polysorbate  [Bay Oil]      Prazosin Other (See Comments)     Nightmares     Thimerosal      Urea Other (See Comments)     Eruption of skin    Other Reaction(s): Eruption of skin     Zolpidem      Other Reaction(s): Delirium     Benzalkonium Rash      PAST MEDICAL HISTORY:   Past Medical History:   Diagnosis Date     Anemia      Atrial fibrillation (H)      Bilateral low back pain with left-sided sciatica      Borderline personality disorder (H)      BPH (benign prostatic hyperplasia)      CAD (coronary artery disease)      Cerebral artery occlusion with cerebral infarction (H)      " Cervical spondylosis with myelopathy      Depression      Diarrhea      Falls frequently      Gastroesophageal reflux disease with esophagitis      Hereditary elliptocytosis (H24)      History of subdural hematoma      Hyperlipidaemia      Hypertension      Neuropathy      ISHAN (obstructive sleep apnea)      Pre-diabetes      PTSD (post-traumatic stress disorder)      S/P TAVR (transcatheter aortic valve replacement)      Thrombocytopenia (H24)      Vertigo       PAST SURGICAL HISTORY:   has a past surgical history that includes Gallbladder surgery; hip surgery; Wrist surgery (Bilateral); back surgery; Insert stimulator dorsal column (Right, 04/19/2016); IR Fine Needle Aspiration w Ultrasound (01/23/2023); IR Fine Needle Aspiration w Ultrasound (01/26/2023); Abdomen surgery; turp; C6-C7 ACDF (2005); Left C7-T1 foraminotomy  (2015); CABG, VEIN, THREE (2015); aortic valve replacement (2015); and Arthroplasty revision hip (Left, 3/15/2023).  FAMILY HISTORY: family history includes Heart Disease in his father and mother.  SOCIAL HISTORY:   reports that he has never smoked. He has never used smokeless tobacco. He reports that he does not currently use alcohol. He reports that he does not use drugs.  Patient's living condition: lives alone    Post Discharge Medication Reconciliation Status:   MED REC REQUIRED  Post Medication Reconciliation Status: discharge medications reconciled and changed, per note/orders       Current Outpatient Medications   Medication Sig     acetaminophen (TYLENOL) 500 MG tablet Take 2 tablets (1,000 mg) by mouth 3 times daily +650mg PO Qday PRN     apixaban ANTICOAGULANT (ELIQUIS) 5 MG tablet 5 mg     atorvastatin (LIPITOR) 80 MG tablet Take 1 tablet (80 mg) by mouth At Bedtime     carboxymethylcellulose (REFRESH LIQUIGEL) 1 % ophthalmic solution Apply 1 drop to eye 4 times daily as needed for dry eyes     ferrous sulfate (FE TABS) 325 (65 Fe) MG EC tablet Take 325 mg by mouth daily     folic acid  (FOLVITE) 1 MG tablet Take 1 mg by mouth daily     gabapentin (NEURONTIN) 100 MG capsule Take 1 capsule (100 mg) by mouth 2 times daily     hydrOXYzine shalonda (VISTARIL) 25 MG capsule Take 25 mg by mouth 3 times daily as needed for anxiety or other (pain)     metoprolol succinate ER (TOPROL XL) 25 MG 24 hr tablet 12.5 mg     naloxone (NARCAN) 0.4 MG/ML injection Inject 0.4 mg into the vein May give 1ml in 2-3 minutes of patient is still unresponsive     omeprazole (PRILOSEC) 20 MG DR capsule Take 1 capsule (20 mg) by mouth daily Am and HS     oxyCODONE (ROXICODONE) 5 MG tablet Take 5-10 mg by mouth every 4 hours as needed for severe pain     polyethylene glycol (MIRALAX) 17 g packet Take 17 g by mouth daily     PSYLLIUM HUSK PO Take by mouth daily - 3.4gram/5.4gram  powder - mix in liquid per directions     sacubitril-valsartan (ENTRESTO)  MG per tablet Take 1 tablet by mouth 2 times daily     senna-docusate (SENOKOT-S/PERICOLACE) 8.6-50 MG tablet Take 1 tablet by mouth 2 times daily     sertraline (ZOLOFT) 50 MG tablet Take 75 mg by mouth daily     thiamine (B-1) 100 MG tablet Take 100 mg by mouth daily     No current facility-administered medications for this visit.       ROS:  10 point ROS of systems including Constitutional, Eyes, Respiratory, Cardiovascular, Gastroenterology, Genitourinary, Integumentary, Musculoskeletal, Psychiatric were all negative except for pertinent positives noted in my HPI.    Vitals:  /64   Pulse 69   Temp 97.7  F (36.5  C)   Resp 16   Ht 1.829 m (6')   Wt 109.3 kg (240 lb 14.4 oz)   SpO2 98%   BMI 32.67 kg/m    Exam:  GENERAL APPEARANCE:  in no distress,   RESP:  Unlabored breathing. CTA b/l.   CV:  S1S2 audible, regular HR, no murmur appreciated.   ABDOMEN:  soft, NT/ND, BS audible.   M/S:   no joint deformity noted on observation.   SKIN:  No rash noted on observation  NEURO:   No NFD appreciated on observation.   PSYCH:  affect and mood normal    Lab/Diagnostic  data: Reviewed in the chart and EHR.        ASSESSMENT/PLAN:  ==================  Closed fracture of distal end of left humerus with malunion, subsequent encounter  S/P ORIF (open reduction internal fixation) fracture  Orthopedic aftercare  Pain of left upper ar  - - Analgesia optimal with the current regimen. Continue present plan and medications.  - Followed by Orthopedic Team. Follow on the recommendations / instructions.   - DVT prophylaxis according to Orthopedic team recommendations  - Started rehab program, making a progress, continue until desired goal is achieved.   - LUE in sling, edema in the fingers. Sling adjusted. Pt counseled on exercises his finger.     Recurrent falls :  - 2/2 flash back. UA negative for infection.  - last time fell was two  days  ago.     Hypertension, unspecified type  Chronic heart failure with preserved ejection fraction (H)  Mechanical aortic valve and long term current use of Eliquis  - cardiac wise compensated.   - The current medical regimen is effective;  continue present plan and medications.      PTSD (post-traumatic stress disorder)  Major depressive disorder, recurrent, moderate (H)  Borderline personality disorder (H)  Hx of alcoholism  - with flash back. Was seen by VIOLETA Riddle on 1/3/24. Appreciate help.       ELLIOTT: on supplement: continue      Slow transit constipation  - no concern, continue miralax, senna and psyllium      Orders:  NNO    Electronically signed by:  Stefanie Hawkins MD                     Sincerely,        Stefaine Hawkins MD

## 2024-01-04 NOTE — PROGRESS NOTES
Request for psychiatric evaluation of patient in TCU - upon chart review, [facility]. He has an appointment with her at 10am on 1/25. I recommend nursing follow-up with his [facility] provider for continuity of care. Writer available PRN with clearance from [facility] provider.    Dr Maddison SUNG DNP

## 2024-01-07 ENCOUNTER — LAB REQUISITION (OUTPATIENT)
Dept: LAB | Facility: CLINIC | Age: 84
End: 2024-01-07
Payer: COMMERCIAL

## 2024-01-07 DIAGNOSIS — R41.82 ALTERED MENTAL STATUS, UNSPECIFIED: ICD-10-CM

## 2024-01-08 ENCOUNTER — TRANSITIONAL CARE UNIT VISIT (OUTPATIENT)
Dept: GERIATRICS | Facility: CLINIC | Age: 84
End: 2024-01-08
Payer: COMMERCIAL

## 2024-01-08 VITALS
HEART RATE: 72 BPM | BODY MASS INDEX: 30.84 KG/M2 | HEIGHT: 72 IN | SYSTOLIC BLOOD PRESSURE: 102 MMHG | WEIGHT: 227.7 LBS | TEMPERATURE: 97.1 F | OXYGEN SATURATION: 95 % | RESPIRATION RATE: 16 BRPM | DIASTOLIC BLOOD PRESSURE: 49 MMHG

## 2024-01-08 DIAGNOSIS — Z98.890 S/P ORIF (OPEN REDUCTION INTERNAL FIXATION) FRACTURE: ICD-10-CM

## 2024-01-08 DIAGNOSIS — M25.552 HIP PAIN, LEFT: ICD-10-CM

## 2024-01-08 DIAGNOSIS — R52 INTRACTABLE PAIN: ICD-10-CM

## 2024-01-08 DIAGNOSIS — M79.622 PAIN OF LEFT UPPER ARM: ICD-10-CM

## 2024-01-08 DIAGNOSIS — I50.32 CHRONIC HEART FAILURE WITH PRESERVED EJECTION FRACTION (H): ICD-10-CM

## 2024-01-08 DIAGNOSIS — F33.1 MAJOR DEPRESSIVE DISORDER, RECURRENT, MODERATE (H): ICD-10-CM

## 2024-01-08 DIAGNOSIS — R29.6 RECURRENT FALLS: Primary | ICD-10-CM

## 2024-01-08 DIAGNOSIS — Z87.81 S/P ORIF (OPEN REDUCTION INTERNAL FIXATION) FRACTURE: ICD-10-CM

## 2024-01-08 DIAGNOSIS — M21.952 DEFORMITY OF LEFT HIP JOINT: ICD-10-CM

## 2024-01-08 DIAGNOSIS — S42.402P: ICD-10-CM

## 2024-01-08 DIAGNOSIS — F60.3 BORDERLINE PERSONALITY DISORDER (H): ICD-10-CM

## 2024-01-08 DIAGNOSIS — F43.10 PTSD (POST-TRAUMATIC STRESS DISORDER): ICD-10-CM

## 2024-01-08 DIAGNOSIS — Z47.89 ORTHOPEDIC AFTERCARE: ICD-10-CM

## 2024-01-08 LAB
ANION GAP SERPL CALCULATED.3IONS-SCNC: 13 MMOL/L (ref 7–15)
BUN SERPL-MCNC: 22.5 MG/DL (ref 8–23)
CALCIUM SERPL-MCNC: 9.5 MG/DL (ref 8.8–10.2)
CHLORIDE SERPL-SCNC: 105 MMOL/L (ref 98–107)
CREAT SERPL-MCNC: 0.85 MG/DL (ref 0.67–1.17)
DEPRECATED HCO3 PLAS-SCNC: 22 MMOL/L (ref 22–29)
EGFRCR SERPLBLD CKD-EPI 2021: 87 ML/MIN/1.73M2
ERYTHROCYTE [DISTWIDTH] IN BLOOD BY AUTOMATED COUNT: 14.9 % (ref 10–15)
GLUCOSE SERPL-MCNC: 92 MG/DL (ref 70–99)
HCT VFR BLD AUTO: 30.9 % (ref 40–53)
HGB BLD-MCNC: 9.9 G/DL (ref 13.3–17.7)
MCH RBC QN AUTO: 30.5 PG (ref 26.5–33)
MCHC RBC AUTO-ENTMCNC: 32 G/DL (ref 31.5–36.5)
MCV RBC AUTO: 95 FL (ref 78–100)
PLATELET # BLD AUTO: 302 10E3/UL (ref 150–450)
POTASSIUM SERPL-SCNC: 4.4 MMOL/L (ref 3.4–5.3)
RBC # BLD AUTO: 3.25 10E6/UL (ref 4.4–5.9)
SODIUM SERPL-SCNC: 140 MMOL/L (ref 135–145)
WBC # BLD AUTO: 4.1 10E3/UL (ref 4–11)

## 2024-01-08 PROCEDURE — 99310 SBSQ NF CARE HIGH MDM 45: CPT | Performed by: NURSE PRACTITIONER

## 2024-01-08 PROCEDURE — 85027 COMPLETE CBC AUTOMATED: CPT | Performed by: NURSE PRACTITIONER

## 2024-01-08 PROCEDURE — 80048 BASIC METABOLIC PNL TOTAL CA: CPT | Performed by: NURSE PRACTITIONER

## 2024-01-08 PROCEDURE — P9603 ONE-WAY ALLOW PRORATED MILES: HCPCS | Performed by: NURSE PRACTITIONER

## 2024-01-08 RX ORDER — OXYCODONE HYDROCHLORIDE 5 MG/1
5 TABLET ORAL EVERY 6 HOURS PRN
Qty: 30 TABLET | Refills: 0 | Status: SHIPPED | OUTPATIENT
Start: 2024-01-08 | End: 2024-01-18 | Stop reason: DRUGHIGH

## 2024-01-08 RX ORDER — GABAPENTIN 100 MG/1
200 CAPSULE ORAL 2 TIMES DAILY
Status: ON HOLD
Start: 2024-01-08 | End: 2024-04-22

## 2024-01-08 NOTE — PROGRESS NOTES
"[facility] GERIATRIC SERVICE  Episodic/Acute/Follow-Up  [place] MRN: [id number]. Place of Service where encounter took place:  [facility] [place] [4002]   Chief Complaint   Patient presents with    RECHECK    HPI: David Thomson  is a 83 year old (1940), who is being seen today for an episodic care visit. Today's concern is:    Castro seen today on routine follow-up as he continues to rehab in TCU.  Last week, both family and nursing reported concerns of hallucinations along with falls.  At the time, UA/UC was obtained, and this was negative.  Vital signs have remained stable, and follow-up blood work was ordered for today. PRN Vistaril was also added to assist with anxiety and pain at the same time.  [facility]      Today, Castro is seen sitting up in his room.  He says his left arm pain was really bad this morning, like an icy zinger in his elbow.  This went away with a little bit of time.  He is using oxycodone about once or twice per day.  Family reported to nursing that they were concerned this was causative of his hallucinations.  Castro denies any numbness or tingling, and feels that the swelling has gone down in his arm.  He denies headaches, but sometimes feels lightheaded.  He denies any new shortness of breath, chest pain, palpitations (though he knows he is in A-fib).  He denies any nausea or heartburn, and feels his appetite is improving.  He denies any constipation/diarrhea, or bladder problems.  He does note that when he has to urinate, he has to go \"right now.\"  This is apparently his baseline.  He admits to getting confused sometimes and either hallucinating or flashing back as he has noted in previous visits.  He is feeling pretty good today though.    Past Medical and Surgical History reviewed in Epic today.  MEDICATIONS:  Current Outpatient Medications   Medication Sig Dispense Refill    acetaminophen (TYLENOL) 500 MG tablet Take 2 tablets " (1,000 mg) by mouth 3 times daily +650mg PO Qday PRN      apixaban ANTICOAGULANT (ELIQUIS) 5 MG tablet 5 mg      atorvastatin (LIPITOR) 80 MG tablet Take 1 tablet (80 mg) by mouth At Bedtime 30 tablet 0    carboxymethylcellulose (REFRESH LIQUIGEL) 1 % ophthalmic solution Apply 1 drop to eye 4 times daily as needed for dry eyes      ferrous sulfate (FE TABS) 325 (65 Fe) MG EC tablet Take 325 mg by mouth daily      folic acid (FOLVITE) 1 MG tablet Take 1 mg by mouth daily      gabapentin (NEURONTIN) 100 MG capsule Take 1 capsule (100 mg) by mouth 2 times daily 60 capsule 0    hydrOXYzine shalonda (VISTARIL) 25 MG capsule Take 25 mg by mouth 3 times daily as needed for anxiety or other (pain)      metoprolol succinate ER (TOPROL XL) 25 MG 24 hr tablet 12.5 mg      naloxone (NARCAN) 0.4 MG/ML injection Inject 0.4 mg into the vein May give 1ml in 2-3 minutes of patient is still unresponsive      omeprazole (PRILOSEC) 20 MG DR capsule Take 1 capsule (20 mg) by mouth daily Am and HS      oxyCODONE (ROXICODONE) 5 MG tablet Take 5-10 mg by mouth every 4 hours as needed for severe pain      polyethylene glycol (MIRALAX) 17 g packet Take 17 g by mouth daily      PSYLLIUM HUSK PO Take by mouth daily - 3.4gram/5.4gram  powder - mix in liquid per directions      sacubitril-valsartan (ENTRESTO)  MG per tablet Take 1 tablet by mouth 2 times daily      senna-docusate (SENOKOT-S/PERICOLACE) 8.6-50 MG tablet Take 1 tablet by mouth 2 times daily 60 tablet 0    sertraline (ZOLOFT) 50 MG tablet Take 75 mg by mouth daily      thiamine (B-1) 100 MG tablet Take 100 mg by mouth daily       Objective: /49   Pulse 72   Temp 97.1  F (36.2  C)   Resp 16   Ht 1.829 m (6')   Wt 103.3 kg (227 lb 11.2 oz)   SpO2 95%   BMI 30.88 kg/m    Exam:  GENERAL APPEARANCE: Alert, in no distress, cooperative.   RESP: Respiratory effort good, no respiratory distress, Lung sounds clear/diminished. On RA.   CV: Auscultation of heart reveals S1, S2,  rate and rhythm regular, no murmur, no rub or gallop, Edema trace BLE, trace LUE. Peripheral pulses are 2+.  PSYCH: Insight, judgement, and memory are impaired at baseline, affect and mood are pleasant/engaged.    Labs: Labs done in facility are in EPIC. Please refer to them using [employer] Everywhere.    ASSESSMENT/PLAN:  Recurrent falls  Closed fracture of distal end of left humerus with malunion, subsequent encounter  S/P ORIF (open reduction internal fixation) fracture  Orthopedic aftercare  Pain of left upper arm  Chronic heart failure with preserved ejection fraction (H)  Major depressive disorder, recurrent, moderate (H)  Borderline personality disorder (H)  PTSD (post-traumatic stress disorder)  Acute on chronic. Tenuous.  Provider reviewed records from facility, and interpreted most recent imaging/lab work, and vital signs.  Noting Dr. Riddle performed a chart review, but ultimately is def[facility].  Nursing has attempted to communicate with the [facility] team, but they will not return phone calls.  Well patient's family are concerned that patient is hallucinating/delusional, we maintain that it is more likely he is having flashbacks or intermittent dissociations.  Metabolic workup is negative when reviewing blood work and urine.  Vital signs remained stable.  Certainly, oxycodone could be contributory.  Chart review shows delusional behavior and hallucinations spanning back several years, even warranting the use of Zyprexa.  This could be considered if Castro has ongoing trouble.  Will discontinue current oxycodone orders, and instead lower oxycodone to 5 mg.  Chart review shows that 1-2 doses of 10 mg is being given per day, and perhaps Castro needs a smaller dose.  Refill sent.  Can also not ignore neuropathic component in the way Castro described his pain in his elbow.  He is already on a very low-dose of gabapentin, and perhaps a slight increase would assist in maintaining  comfort.  Provider personally coordinated care with [facility] orthopedic specialist.  There was odor coming from splint, strange sensation described above, and unusual pain/behaviors.  Given orthopedic follow-up is not scheduled for 2 weeks, will ask orthopedic specialist to curbside consult..    Follow up w/in 1 week or as needed.    Orders:  Discontinue all Oxycodone.   Oxycodone 5mg PO Q6h PRN. Dx: severe pain.  Increase Gabapentin to 200mg PO BID. Dx: ROSA pain.  Ortho specialist consult x1 in house. Dx: ROSA pain/odor,     Electronically signed by:  Dr. Matilde Hatch, APRN CNP DNP

## 2024-01-08 NOTE — LETTER
"    1/8/2024        RE: David Thomson  [address] [facility] [address]        [facility] GERIATRIC SERVICE  Episodic/Acute/Follow-Up  Mariana MRN: [id number]. Place of Service where encounter took place:  [facility] ([facility]) [[id number]]   Chief Complaint   Patient presents with     RECHECK    HPI: David Thomson  is a 83 year old (1940), who is being seen today for an episodic care visit. Today's concern is:    Castro seen today on routine follow-up as he continues to rehab in TCU.  Last week, both family and nursing reported concerns of hallucinations along with falls.  At the time, UA/UC was obtained, and this was negative.  Vital signs have remained stable, and follow-up blood work was ordered for today. PRN Vistaril was also added to assist with anxiety and pain at the same time.  [facility]      Today, Castro is seen sitting up in his room.  He says his left arm pain was really bad this morning, like an icy zinger in his elbow.  This went away with a little bit of time.  He is using oxycodone about once or twice per day.  Family reported to nursing that they were concerned this was causative of his hallucinations.  Castro denies any numbness or tingling, and feels that the swelling has gone down in his arm.  He denies headaches, but sometimes feels lightheaded.  He denies any new shortness of breath, chest pain, palpitations (though he knows he is in A-fib).  He denies any nausea or heartburn, and feels his appetite is improving.  He denies any constipation/diarrhea, or bladder problems.  He does note that when he has to urinate, he has to go \"right now.\"  This is apparently his baseline.  He admits to getting confused sometimes and either hallucinating or flashing back as he has noted in previous visits.  He is feeling pretty good today though.    Past Medical and Surgical History reviewed in Epic today.  MEDICATIONS:  Current Outpatient " Medications   Medication Sig Dispense Refill     acetaminophen (TYLENOL) 500 MG tablet Take 2 tablets (1,000 mg) by mouth 3 times daily +650mg PO Qday PRN       apixaban ANTICOAGULANT (ELIQUIS) 5 MG tablet 5 mg       atorvastatin (LIPITOR) 80 MG tablet Take 1 tablet (80 mg) by mouth At Bedtime 30 tablet 0     carboxymethylcellulose (REFRESH LIQUIGEL) 1 % ophthalmic solution Apply 1 drop to eye 4 times daily as needed for dry eyes       ferrous sulfate (FE TABS) 325 (65 Fe) MG EC tablet Take 325 mg by mouth daily       folic acid (FOLVITE) 1 MG tablet Take 1 mg by mouth daily       gabapentin (NEURONTIN) 100 MG capsule Take 1 capsule (100 mg) by mouth 2 times daily 60 capsule 0     hydrOXYzine shalonda (VISTARIL) 25 MG capsule Take 25 mg by mouth 3 times daily as needed for anxiety or other (pain)       metoprolol succinate ER (TOPROL XL) 25 MG 24 hr tablet 12.5 mg       naloxone (NARCAN) 0.4 MG/ML injection Inject 0.4 mg into the vein May give 1ml in 2-3 minutes of patient is still unresponsive       omeprazole (PRILOSEC) 20 MG DR capsule Take 1 capsule (20 mg) by mouth daily Am and HS       oxyCODONE (ROXICODONE) 5 MG tablet Take 5-10 mg by mouth every 4 hours as needed for severe pain       polyethylene glycol (MIRALAX) 17 g packet Take 17 g by mouth daily       PSYLLIUM HUSK PO Take by mouth daily - 3.4gram/5.4gram  powder - mix in liquid per directions       sacubitril-valsartan (ENTRESTO)  MG per tablet Take 1 tablet by mouth 2 times daily       senna-docusate (SENOKOT-S/PERICOLACE) 8.6-50 MG tablet Take 1 tablet by mouth 2 times daily 60 tablet 0     sertraline (ZOLOFT) 50 MG tablet Take 75 mg by mouth daily       thiamine (B-1) 100 MG tablet Take 100 mg by mouth daily       Objective: /49   Pulse 72   Temp 97.1  F (36.2  C)   Resp 16   Ht 1.829 m (6')   Wt 103.3 kg (227 lb 11.2 oz)   SpO2 95%   BMI 30.88 kg/m    Exam:  GENERAL APPEARANCE: Alert, in no distress, cooperative.   RESP: Respiratory  effort good, no respiratory distress, Lung sounds clear/diminished. On RA.   CV: Auscultation of heart reveals S1, S2, rate and rhythm regular, no murmur, no rub or gallop, Edema trace BLE, trace LUE. Peripheral pulses are 2+.  PSYCH: Insight, judgement, and memory are impaired at baseline, affect and mood are pleasant/engaged.    Labs: Labs done in facility are in EPIC. Please refer to them using "[employer]"/Care Everywhere.    ASSESSMENT/PLAN:  Recurrent falls  Closed fracture of distal end of left humerus with malunion, subsequent encounter  S/P ORIF (open reduction internal fixation) fracture  Orthopedic aftercare  Pain of left upper arm  Chronic heart failure with preserved ejection fraction (H)  Major depressive disorder, recurrent, moderate (H)  Borderline personality disorder (H)  PTSD (post-traumatic stress disorder)  Acute on chronic. Tenuous.  Provider reviewed records from facility, and interpreted most recent imaging/lab work, and vital signs.  Noting Dr. Riddle performed a chart review, but ultimately is deferring to the [facility].  Nursing has attempted to communicate with the [facility] psychiatry team, but they will not return phone calls.  Well patient's family are concerned that patient is hallucinating/delusional, we maintain that it is more likely he is having flashbacks or intermittent dissociations.  Metabolic workup is negative when reviewing blood work and urine.  Vital signs remained stable.  Certainly, oxycodone could be contributory.  Chart review shows delusional behavior and hallucinations spanning back several years, even warranting the use of Zyprexa.  This could be considered if Castro has ongoing trouble.  Will discontinue current oxycodone orders, and instead lower oxycodone to 5 mg.  Chart review shows that 1-2 doses of 10 mg is being given per day, and perhaps Castro needs a smaller dose.  Refill sent.  Can also not ignore neuropathic component in the way Castro described his pain in his elbow.   He is already on a very low-dose of gabapentin, and perhaps a slight increase would assist in maintaining comfort.  Provider personally coordinated care with [facility] orthopedic specialist.  There was odor coming from splint, strange sensation described above, and unusual pain/behaviors.  Given orthopedic follow-up is not scheduled for 2 weeks, will ask orthopedic specialist to curbside consult..    Follow up w/in 1 week or as needed.    Orders:  Discontinue all Oxycodone.   Oxycodone 5mg PO Q6h PRN. Dx: severe pain.  Increase Gabapentin to 200mg PO BID. Dx: ROSA pain.  Ortho specialist consult x1 in house. Dx: ROSA pain/odor,     Electronically signed by:  Dr. Matilde Hatch, ANA LILIA CNP DNP        Sincerely,        ANA LILIA Robles CNP

## 2024-01-09 ENCOUNTER — TRANSITIONAL CARE UNIT VISIT (OUTPATIENT)
Dept: GERIATRICS | Facility: CLINIC | Age: 84
End: 2024-01-09
Payer: COMMERCIAL

## 2024-01-09 VITALS
HEIGHT: 72 IN | OXYGEN SATURATION: 92 % | BODY MASS INDEX: 30.84 KG/M2 | TEMPERATURE: 97.7 F | SYSTOLIC BLOOD PRESSURE: 102 MMHG | RESPIRATION RATE: 18 BRPM | WEIGHT: 227.7 LBS | HEART RATE: 73 BPM | DIASTOLIC BLOOD PRESSURE: 61 MMHG

## 2024-01-09 DIAGNOSIS — Z98.890 S/P ORIF (OPEN REDUCTION INTERNAL FIXATION) FRACTURE: ICD-10-CM

## 2024-01-09 DIAGNOSIS — S42.402P: Primary | ICD-10-CM

## 2024-01-09 DIAGNOSIS — Z87.81 S/P ORIF (OPEN REDUCTION INTERNAL FIXATION) FRACTURE: ICD-10-CM

## 2024-01-09 PROCEDURE — 99310 SBSQ NF CARE HIGH MDM 45: CPT | Performed by: NURSE PRACTITIONER

## 2024-01-09 NOTE — LETTER
1/9/2024        RE: David Thomson  [address]  [facility] [address]        [facility]    Chief Complaint   Patient presents with     RECHECK     Splint concerns s/p left humerus ORIF w/ ulnar nerve transposition on 11/18/23     HPI:  David Thomson is a 83 year old  (1940), who is being seen today for an ORTHO episodic care visit at: [facility] ([facility]) [[id number]].     Today's Visit:  Writer was asked to see patient for concerns regarding L arm splint.  He is s/p ORIF L distal humerus ORIF on 11/18/23 [facility].  Was lost to follow-up due to rehospitalization at [facility] 12/22-12/28 s/p fall, acute metabolic toxic encephalopathy.  It appears he remains in the same splint as he was put into following surgery.  Bill is pleasantly confused today, remarks that he notices he is more confused/having issues with memory-- thought he was working in the yard in a [employer] overnight last night.  Regarding his L arm, he reports some mild pain in the wrist area as well as elbow.  Denies numbness/tingling in hand.  Able to move fingers.  He was due for f/up with Dr. Busby last week but [facility] could not get [facility] in until 1/22.    Allergies, and PMH/PSH reviewed in [facility] today.  REVIEW OF SYSTEMS:  MUSCULOSKELETAL: as noted in HPI    Objective:   /61   Pulse 73   Temp 97.7  F (36.5  C)   Resp 18   Ht 1.829 m (6')   Wt 103.3 kg (227 lb 11.2 oz)   SpO2 92%   BMI 30.88 kg/m    GENERAL APPEARANCE:  Alert, in no distress, cooperative  M/S:   Did not unwrap the entire arm as writer did not have supplies to re-splint and does not have recent x-rays to know how stable the healing is.  Unwrapped first layer of ACE bandage which reveals old drainage up the back of the splint (serosanguinous in appearance) and odor.  Intact finger ROM to left.  Sensation appears intact, although patient has some cognitive deficits.  PSYCH:  memory impaired        Assessment/Plan:  (S42.636P) Closed fracture of distal end of left humerus with malunion, subsequent encounter  (primary encounter diagnosis)  (Z98.890,  Z87.81) S/P ORIF (open reduction internal fixation) fracture  Concern for infection and/or skin breakdown under the splint given length of time he has been in it.  Needs urgent ortho follow-up for this and repeat x-rays to assess healing.  Updated his rounding provider (Matilde Hatch).  [facility] called [facility], unable to get him in sooner than 1/22 appointment.  [facility] wants [facility] in to ED.  Plan:   -[facility] to [facility] ED for evaluation of LUE        Electronically signed by: ANA LILIA Schafer CNP         Sincerely,        ANA LILIA Schafer CNP

## 2024-01-09 NOTE — PROGRESS NOTES
[facility] GERIATRICS    Chief Complaint   Patient presents with    RECHECK     Splint concerns s/p left humerus ORIF w/ ulnar nerve transposition on 11/18/23     HPI:  David Thomson is a 83 year old  (1940), who is being seen today for an ORTHO episodic care visit at: [facility] ([facility]) [[id number]].     Today's Visit:  Writer was asked to see patient for concerns regarding L arm splint.  He is s/p ORIF L distal humerus ORIF on 11/18/23 [facility]  Was lost to follow-up due to rehospitalization at [facility] 12/22-12/28 s/p fall, acute metabolic toxic encephalopathy.  It appears he remains in the same splint as he was put into following surgery.  Castro is pleasantly confused today, remarks that he notices he is more confused/having issues with memory-- thought he was working in the yard in a [employer] overnight last night.  Regarding his L arm, he reports some mild pain in the wrist area as well as elbow.  Denies numbness/tingling in hand.  Able to move fingers.  He was due for f/up with Dr. Busby last week but [facility] could not get [facility] in until 1/22.    Allergies, and PMH/PSH reviewed in [facility] today.  REVIEW OF SYSTEMS:  MUSCULOSKELETAL: as noted in HPI    Objective:   /61   Pulse 73   Temp 97.7  F (36.5  C)   Resp 18   Ht 1.829 m (6')   Wt 103.3 kg (227 lb 11.2 oz)   SpO2 92%   BMI 30.88 kg/m    GENERAL APPEARANCE:  Alert, in no distress, cooperative  M/S:   Did not unwrap the entire arm as writer did not have supplies to re-splint and does not have recent x-rays to know how stable the healing is.  Unwrapped first layer of ACE bandage which reveals old drainage up the back of the splint (serosanguinous in appearance) and odor.  Intact finger ROM to left.  Sensation appears intact, although patient has some cognitive deficits.  PSYCH:  memory impaired       Assessment/Plan:  (V31.378V) Closed fracture of distal end of left humerus with malunion, subsequent encounter   (primary encounter diagnosis)  (Z98.890,  Z87.81) S/P ORIF (open reduction internal fixation) fracture  Concern for infection and/or skin breakdown under the splint given length of time he has been in it.  Needs urgent ortho follow-up for this and repeat x-rays to assess healing.  Updated his rounding provider (Matilde Hatch).  [facility], unable to get him in sooner than 1/22 appointment.  [facility] wants [facility] in to ED.  Plan:   -[facility] to [facility] ED for evaluation of LUE        Electronically signed by: ANA LILIA Schafer CNP

## 2024-01-13 NOTE — ED TRIAGE NOTES
Recent L hip surgery - c/o worsening pain, swelling into knee, increased bleeding noted.      Triage Assessment     Row Name 03/17/23 1953       Triage Assessment (Adult)    Airway WDL WDL       Respiratory WDL    Respiratory WDL WDL       Cardiac WDL    Cardiac WDL WDL               92

## 2024-01-18 ENCOUNTER — DOCUMENTATION ONLY (OUTPATIENT)
Dept: GERIATRICS | Facility: CLINIC | Age: 84
End: 2024-01-18
Payer: COMMERCIAL

## 2024-01-18 DIAGNOSIS — R52 SEVERE PAIN: Primary | ICD-10-CM

## 2024-01-18 RX ORDER — OXYCODONE HYDROCHLORIDE 5 MG/1
2.5-5 TABLET ORAL EVERY 4 HOURS PRN
Qty: 30 TABLET | Refills: 0 | Status: SHIPPED | OUTPATIENT
Start: 2024-01-18 | End: 2024-02-05

## 2024-01-18 RX ORDER — OXYCODONE HYDROCHLORIDE 5 MG/1
2.5-5 TABLET ORAL EVERY 4 HOURS PRN
COMMUNITY
End: 2024-01-18

## 2024-01-19 ENCOUNTER — DOCUMENTATION ONLY (OUTPATIENT)
Dept: GERIATRICS | Facility: CLINIC | Age: 84
End: 2024-01-19
Payer: COMMERCIAL

## 2024-01-23 ENCOUNTER — TRANSITIONAL CARE UNIT VISIT (OUTPATIENT)
Dept: GERIATRICS | Facility: CLINIC | Age: 84
End: 2024-01-23
Payer: COMMERCIAL

## 2024-01-23 VITALS
RESPIRATION RATE: 16 BRPM | DIASTOLIC BLOOD PRESSURE: 74 MMHG | OXYGEN SATURATION: 97 % | TEMPERATURE: 96.8 F | WEIGHT: 224.3 LBS | HEIGHT: 72 IN | SYSTOLIC BLOOD PRESSURE: 112 MMHG | HEART RATE: 69 BPM | BODY MASS INDEX: 30.38 KG/M2

## 2024-01-23 DIAGNOSIS — Z47.89 ORTHOPEDIC AFTERCARE: ICD-10-CM

## 2024-01-23 DIAGNOSIS — R29.6 RECURRENT FALLS: ICD-10-CM

## 2024-01-23 DIAGNOSIS — F33.1 MAJOR DEPRESSIVE DISORDER, RECURRENT, MODERATE (H): ICD-10-CM

## 2024-01-23 DIAGNOSIS — F43.10 PTSD (POST-TRAUMATIC STRESS DISORDER): ICD-10-CM

## 2024-01-23 DIAGNOSIS — S42.402P: Primary | ICD-10-CM

## 2024-01-23 DIAGNOSIS — Z87.81 S/P ORIF (OPEN REDUCTION INTERNAL FIXATION) FRACTURE: ICD-10-CM

## 2024-01-23 DIAGNOSIS — Z98.890 S/P ORIF (OPEN REDUCTION INTERNAL FIXATION) FRACTURE: ICD-10-CM

## 2024-01-23 DIAGNOSIS — F60.3 BORDERLINE PERSONALITY DISORDER (H): ICD-10-CM

## 2024-01-23 PROCEDURE — 99309 SBSQ NF CARE MODERATE MDM 30: CPT | Performed by: NURSE PRACTITIONER

## 2024-01-23 NOTE — LETTER
1/23/2024        RE: David Thomson  [address] [facility] [address]        [facility] Follow Up   PCP & CLINIC: Physician No Ref-Primary, No address on file  Chief Complaint   Patient presents with     Hospital F/U   Mariana MRN: [id number]. Place of Service where encounter took place:  [facility] ([facility]) [[id number]] David Thomson  is a 83 year old  (1940), admitted to the above facility from  [facility] . Hospital stay 1/9 through 1/18/24. Admitted to this facility for  rehab, medical management, and nursing care. HPI information obtained from: facility chart records, facility staff, patient report, [facility] chart review, and Care Everywhere [facility] chart review.     Brief Summary of Hospital Course: Castro was rehabbing in TCU and was sent out by myself after coordinating w/ ortho specialist to [facility] on 1/9 for concerning left elbow wound s/p fracture (was rehabbing s/p left humerus fx). Castro was admitted and underwent an I/D washout on 1/10 w/ hardware removal. This issue was caught early enough that he did not require abx. He is to remain NWB and follow up w/ Xrs at 2 & 6 wks postop.     Updates since return to transitional care unit: Castro returned to TCU on 1/18 s/p the above hospitalization. Today, Castro says that his elbow is feeling much better.  He likes the immobilizer that is in place better than the splint and he feels less hot and itchy.  He still does have some moderate pain, but he feels like this is more tolerable.  He recalls hallucinating significantly, and feels like this has not happened for over a week.  He does still have difficulty with sleeping, night terrors, and occasional flashbacks as part of his PTSD.  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  He is open to ongoing therapy around this area.  His bowels are moving well, and his appetite is fair.   He does not really like the food at facility, and says he cooks a lot at home, his favorite thing being roast in the crockpot.  He denies headaches or dizziness, shortness of breath, chest pain, fever or chills.    CODE STATUS/ADVANCE DIRECTIVES DISCUSSION: CPR/Full code . Patient's living condition: lives alone. ALLERGIES: Doxepin, Lisinopril, Piroxicam, Polysorbate  [bay oil], Prazosin, Thimerosal, Urea, Zolpidem, and Benzalkonium PAST MEDICAL HISTORY:  has a past medical history of Anemia, Atrial fibrillation (H), Bilateral low back pain with left-sided sciatica, Borderline personality disorder (H), BPH (benign prostatic hyperplasia), CAD (coronary artery disease), Cerebral artery occlusion with cerebral infarction (H), Cervical spondylosis with myelopathy, Depression, Diarrhea, Falls frequently, Gastroesophageal reflux disease with esophagitis, Hereditary elliptocytosis (H24), History of subdural hematoma, Hyperlipidaemia, Hypertension, Neuropathy, ISHAN (obstructive sleep apnea), Pre-diabetes, PTSD (post-traumatic stress disorder), S/P TAVR (transcatheter aortic valve replacement), Thrombocytopenia (H24), and Vertigo.. PAST SURGICAL HISTORY:   has a past surgical history that includes Gallbladder surgery; hip surgery; Wrist surgery (Bilateral); back surgery; Insert stimulator dorsal column (Right, 04/19/2016); IR Fine Needle Aspiration w Ultrasound (01/23/2023); IR Fine Needle Aspiration w Ultrasound (01/26/2023); Abdomen surgery; turp; C6-C7 ACDF (2005); Left C7-T1 foraminotomy  (2015); CABG, VEIN, THREE (2015); aortic valve replacement (2015); and Arthroplasty revision hip (Left, 3/15/2023).. FAMILY HISTORY: family history includes Heart Disease in his father and mother.. SOCIAL HISTORY:   reports that he has never smoked. He has never used smokeless tobacco. He reports that he does not currently use alcohol. He reports that he does not use drugs.  Post Discharge Medication Reconciliation Status: discharge  medications reconciled and changed, per note/orders.  Current Outpatient Medications   Medication Sig Dispense Refill     acetaminophen (TYLENOL) 500 MG tablet Take 2 tablets (1,000 mg) by mouth 3 times daily +650mg PO Qday PRN       apixaban ANTICOAGULANT (ELIQUIS) 5 MG tablet 5 mg       atorvastatin (LIPITOR) 80 MG tablet Take 1 tablet (80 mg) by mouth At Bedtime 30 tablet 0     carboxymethylcellulose (REFRESH LIQUIGEL) 1 % ophthalmic solution Apply 1 drop to eye 4 times daily as needed for dry eyes       ferrous sulfate (FE TABS) 325 (65 Fe) MG EC tablet Take 325 mg by mouth daily       folic acid (FOLVITE) 1 MG tablet Take 1 mg by mouth daily       gabapentin (NEURONTIN) 100 MG capsule Take 2 capsules (200 mg) by mouth 2 times daily       hydrOXYzine shalonda (VISTARIL) 25 MG capsule Take 25 mg by mouth 3 times daily as needed for anxiety or other (pain)       metoprolol succinate ER (TOPROL XL) 25 MG 24 hr tablet 12.5 mg       naloxone (NARCAN) 0.4 MG/ML injection Inject 0.4 mg into the vein May give 1ml in 2-3 minutes of patient is still unresponsive       omeprazole (PRILOSEC) 20 MG DR capsule Take 1 capsule (20 mg) by mouth daily Am and HS       oxyCODONE (ROXICODONE) 5 MG tablet Take 0.5-1 tablets (2.5-5 mg) by mouth every 4 hours as needed for severe pain 2.5 mg for pain 2-5 and 5 mg for pain 6-10 30 tablet 0     polyethylene glycol (MIRALAX) 17 g packet Take 17 g by mouth daily       PSYLLIUM HUSK PO Take by mouth daily - 3.4gram/5.4gram  powder - mix in liquid per directions       sacubitril-valsartan (ENTRESTO)  MG per tablet Take 1 tablet by mouth 2 times daily       senna-docusate (SENOKOT-S/PERICOLACE) 8.6-50 MG tablet Take 1 tablet by mouth 2 times daily 60 tablet 0     sertraline (ZOLOFT) 50 MG tablet Take 75 mg by mouth daily       thiamine (B-1) 100 MG tablet Take 100 mg by mouth daily       ROS: 10 point ROS of systems including Constitutional, Eyes, Respiratory, Cardiovascular,  Gastroenterology, Genitourinary, Integumentary, Musculoskeletal, Psychiatric were all negative except for pertinent positives noted in my HPI.    Vitals: /74   Pulse 69   Temp 96.8  F (36  C)   Resp 16   Ht 1.829 m (6')   Wt 101.7 kg (224 lb 4.8 oz)   SpO2 97%   BMI 30.42 kg/m    Exam:  GENERAL APPEARANCE: Alert, in no distress, cooperative.   ENT: Mouth/posterior oropharynx intact w/ moist mucous membranes, hearing acuity [place].  EYES: EOM, conjunctivae, lids, pupils and irises normal, PERRL2.   RESP: Respiratory effort good, no respiratory distress, Lung sounds clear. On RA.   CV: Auscultation of heart reveals S1, S2, rate and rhythm regular, no murmur, no rub or gallop, Edema 0+ BLE. Peripheral pulses are 2+.  ABDOMEN: Normal bowel sounds, soft, non-tender abdomen, and no masses palpated.  SKIN: Inspection/Palpation of skin and subcutaneous tissue baseline w/ fragility. No wounds/rashes noted except left elbow which is covered at this time w/ immobilizer in place.   NEURO: CN II-XII at patient's baseline, sensation baseline PPS.  PSYCH: Insight, judgement, and memory are forgetful at baseline, affect and mood are happy/engaged.    Lab/Diagnostic data: Recent labs in [facility] reviewed by me today.     ASSESSMENT/PLAN:  Closed fracture of distal end of left humerus with malunion, subsequent encounter  S/P ORIF (open reduction internal fixation) fracture  Orthopedic aftercare  Recurrent falls  Major depressive disorder, recurrent, moderate (H)  PTSD (post-traumatic stress disorder)  Borderline personality disorder (H)  Acute on chronic. Ongoing.  Provider reviewed records from hospitalization, facility, and interpreted most recent imaging/lab work, and vital signs.  Challenging review as VA documentation is difficult to obtain.  Do appreciate that upon discharge at this time, they have set up appropriate follow-ups to have surveillance on his left arm.  He will have a follow-up next week with Ortho and repeat  x-rays at that time.  Provider coordinated care with nursing.  They have been able to obtain orders for elbow care and range of motion with immobilizer in place.  They will require grab bars to help with aid mobility.  Order as noted below.  Given Castro is postop, will closely trend his hemoglobin.  He otherwise presents as prior.  Given his profound PTSD, will consult in-house psychologist to help further support.  We note a psychiatry follow-up is scheduled in the coming weeks with his usual [facility] provider.  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  He may not be able to return to this level of function, and his rehab will be difficult as he is nonweightbearing to the left arm and he is left-handed.  Follow up w/in 1 week or as needed.    Orders:  Grab bars bilaterally, routine. Dx: bed mobility.  ACP eval/tx x1, routine. Dx: PTSD.   Hgb x1 on 1/29. Dx: anemia.     Electronically signed by:  ANA LILIA Esparza CNP [facility]                        Sincerely,        ANA LILIA Robles CNP

## 2024-01-23 NOTE — PROGRESS NOTES
[facility] Follow Up   PCP & CLINIC: Physician No Ref-Primary, No address on file  Chief Complaint   Patient presents with    Hospital F/U   [place] MRN: [id number]. Place of Service where encounter took place:  [place] [[place]] David Thomson  is a 83 year old  (1940), admitted to the above facility from  [facility] . Hospital stay 1/9 through 1/18/24. Admitted to this facility for  rehab, medical management, and nursing care. HPI information obtained from: facility chart records, facility staff, patient report, [facility] chart review, and Care Everywhere [facility] chart review.     Brief Summary of Hospital Course: Castro was rehabbing in TCU and was sent out by myself after coordinating w/ ortho specialist to [facility] on 1/9 for concerning left elbow wound s/p fracture (was rehabbing s/p left humerus fx). Castro was admitted and underwent an I/D washout on 1/10 w/ hardware removal. This issue was caught early enough that he did not require abx. He is to remain NWB and follow up w/ Xrs at 2 & 6 wks postop.     Updates since return to transitional care unit: Castro returned to TCU on 1/18 s/p the above hospitalization. Today, Castro says that his elbow is feeling much better.  He likes the immobilizer that is in place better than the splint and he feels less hot and itchy.  He still does have some moderate pain, but he feels like this is more tolerable.  He recalls hallucinating significantly, and feels like this has not happened for over a week.  He does still have difficulty with sleeping, night terrors, and occasional flashbacks as part of his PTSD.  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  He is open to ongoing therapy around this area.  His bowels are moving well, and his appetite is fair.  He does not really like the food at facility, and says he cooks a lot at home, his favorite  thing being roast in the crockpot.  He denies headaches or dizziness, shortness of breath, chest pain, fever or chills.    CODE STATUS/ADVANCE DIRECTIVES DISCUSSION: CPR/Full code . Patient's living condition: lives alone. ALLERGIES: Doxepin, Lisinopril, Piroxicam, Polysorbate  [bay oil], Prazosin, Thimerosal, Urea, Zolpidem, and Benzalkonium PAST MEDICAL HISTORY:  has a past medical history of Anemia, Atrial fibrillation (H), Bilateral low back pain with left-sided sciatica, Borderline personality disorder (H), BPH (benign prostatic hyperplasia), CAD (coronary artery disease), Cerebral artery occlusion with cerebral infarction (H), Cervical spondylosis with myelopathy, Depression, Diarrhea, Falls frequently, Gastroesophageal reflux disease with esophagitis, Hereditary elliptocytosis (H24), History of subdural hematoma, Hyperlipidaemia, Hypertension, Neuropathy, ISHAN (obstructive sleep apnea), Pre-diabetes, PTSD (post-traumatic stress disorder), S/P TAVR (transcatheter aortic valve replacement), Thrombocytopenia (H24), and Vertigo.. PAST SURGICAL HISTORY:   has a past surgical history that includes Gallbladder surgery; hip surgery; Wrist surgery (Bilateral); back surgery; Insert stimulator dorsal column (Right, 04/19/2016); IR Fine Needle Aspiration w Ultrasound (01/23/2023); IR Fine Needle Aspiration w Ultrasound (01/26/2023); Abdomen surgery; turp; C6-C7 ACDF (2005); Left C7-T1 foraminotomy  (2015); CABG, VEIN, THREE (2015); aortic valve replacement (2015); and Arthroplasty revision hip (Left, 3/15/2023).. FAMILY HISTORY: family history includes Heart Disease in his father and mother.. SOCIAL HISTORY:   reports that he has never smoked. He has never used smokeless tobacco. He reports that he does not currently use alcohol. He reports that he does not use drugs.  Post Discharge Medication Reconciliation Status: discharge medications reconciled and changed, per note/orders.  Current Outpatient Medications    Medication Sig Dispense Refill    acetaminophen (TYLENOL) 500 MG tablet Take 2 tablets (1,000 mg) by mouth 3 times daily +650mg PO Qday PRN      apixaban ANTICOAGULANT (ELIQUIS) 5 MG tablet 5 mg      atorvastatin (LIPITOR) 80 MG tablet Take 1 tablet (80 mg) by mouth At Bedtime 30 tablet 0    carboxymethylcellulose (REFRESH LIQUIGEL) 1 % ophthalmic solution Apply 1 drop to eye 4 times daily as needed for dry eyes      ferrous sulfate (FE TABS) 325 (65 Fe) MG EC tablet Take 325 mg by mouth daily      folic acid (FOLVITE) 1 MG tablet Take 1 mg by mouth daily      gabapentin (NEURONTIN) 100 MG capsule Take 2 capsules (200 mg) by mouth 2 times daily      hydrOXYzine shalonda (VISTARIL) 25 MG capsule Take 25 mg by mouth 3 times daily as needed for anxiety or other (pain)      metoprolol succinate ER (TOPROL XL) 25 MG 24 hr tablet 12.5 mg      naloxone (NARCAN) 0.4 MG/ML injection Inject 0.4 mg into the vein May give 1ml in 2-3 minutes of patient is still unresponsive      omeprazole (PRILOSEC) 20 MG DR capsule Take 1 capsule (20 mg) by mouth daily Am and HS      oxyCODONE (ROXICODONE) 5 MG tablet Take 0.5-1 tablets (2.5-5 mg) by mouth every 4 hours as needed for severe pain 2.5 mg for pain 2-5 and 5 mg for pain 6-10 30 tablet 0    polyethylene glycol (MIRALAX) 17 g packet Take 17 g by mouth daily      PSYLLIUM HUSK PO Take by mouth daily - 3.4gram/5.4gram  powder - mix in liquid per directions      sacubitril-valsartan (ENTRESTO)  MG per tablet Take 1 tablet by mouth 2 times daily      senna-docusate (SENOKOT-S/PERICOLACE) 8.6-50 MG tablet Take 1 tablet by mouth 2 times daily 60 tablet 0    sertraline (ZOLOFT) 50 MG tablet Take 75 mg by mouth daily      thiamine (B-1) 100 MG tablet Take 100 mg by mouth daily       ROS: 10 point ROS of systems including Constitutional, Eyes, Respiratory, Cardiovascular, Gastroenterology, Genitourinary, Integumentary, Musculoskeletal, Psychiatric were all negative except for pertinent  positives noted in my HPI.    Vitals: /74   Pulse 69   Temp 96.8  F (36  C)   Resp 16   Ht 1.829 m (6')   Wt 101.7 kg (224 lb 4.8 oz)   SpO2 97%   BMI 30.42 kg/m    Exam:  GENERAL APPEARANCE: Alert, in no distress, cooperative.   ENT: Mouth/posterior oropharynx intact w/ moist mucous membranes, hearing acuity [place].  EYES: EOM, conjunctivae, lids, pupils and irises normal, PERRL2.   RESP: Respiratory effort good, no respiratory distress, Lung sounds clear. On RA.   CV: Auscultation of heart reveals S1, S2, rate and rhythm regular, no murmur, no rub or gallop, Edema 0+ BLE. Peripheral pulses are 2+.  ABDOMEN: Normal bowel sounds, soft, non-tender abdomen, and no masses palpated.  SKIN: Inspection/Palpation of skin and subcutaneous tissue baseline w/ fragility. No wounds/rashes noted except left elbow which is covered at this time w/ immobilizer in place.   NEURO: CN II-XII at patient's baseline, sensation baseline PPS.  PSYCH: Insight, judgement, and memory are forgetful at baseline, affect and mood are happy/engaged.    Lab/Diagnostic data: Recent labs in [facility] reviewed by me today.     ASSESSMENT/PLAN:  Closed fracture of distal end of left humerus with malunion, subsequent encounter  S/P ORIF (open reduction internal fixation) fracture  Orthopedic aftercare  Recurrent falls  Major depressive disorder, recurrent, moderate (H)  PTSD (post-traumatic stress disorder)  Borderline personality disorder (H)  Acute on chronic. Ongoing.  Provider reviewed records from hospitalization, facility, and interpreted most recent imaging/lab work, and vital signs.  Challenging review as [facility] documentation is difficult to obtain.  Do appreciate that upon discharge at this time, they have set up appropriate follow-ups to have surveillance on his left arm.  He will have a follow-up next week with Ortho and repeat x-rays at that time.  Provider coordinated care with nursing.  They have been able to obtain orders for elbow care  and range of motion with immobilizer in place.  They will require grab bars to help with aid mobility.  Order as noted below.  Given Castro is postop, will closely trend his hemoglobin.  He otherwise presents as prior.  Given his profound PTSD, will consult in-house psychologist to help further support.  We note a psychiatry follow-up is scheduled in the coming weeks with his usual [facility] provider.  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  He may not be able to return to this level of function, and his rehab will be difficult as he is nonweightbearing to the left arm and he is left-handed.  Follow up w/in 1 week or as needed.    Orders:  Grab bars bilaterally, routine. Dx: bed mobility.  ACP eval/tx x1, routine. Dx: PTSD.   Hgb x1 on 1/29. Dx: anemia.     Electronically signed by:  Dr. Matilde Hatch, APRN CNP DNP

## 2024-01-28 ENCOUNTER — LAB REQUISITION (OUTPATIENT)
Dept: LAB | Facility: CLINIC | Age: 84
End: 2024-01-28
Payer: COMMERCIAL

## 2024-01-28 DIAGNOSIS — D64.9 ANEMIA, UNSPECIFIED: ICD-10-CM

## 2024-01-29 LAB — HGB BLD-MCNC: 8.5 G/DL (ref 13.3–17.7)

## 2024-01-29 PROCEDURE — 85018 HEMOGLOBIN: CPT | Performed by: FAMILY MEDICINE

## 2024-01-29 PROCEDURE — 36415 COLL VENOUS BLD VENIPUNCTURE: CPT | Performed by: FAMILY MEDICINE

## 2024-01-29 PROCEDURE — P9603 ONE-WAY ALLOW PRORATED MILES: HCPCS | Performed by: FAMILY MEDICINE

## 2024-02-05 ENCOUNTER — TRANSITIONAL CARE UNIT VISIT (OUTPATIENT)
Dept: GERIATRICS | Facility: CLINIC | Age: 84
End: 2024-02-05
Payer: COMMERCIAL

## 2024-02-05 VITALS
DIASTOLIC BLOOD PRESSURE: 58 MMHG | HEART RATE: 68 BPM | SYSTOLIC BLOOD PRESSURE: 102 MMHG | RESPIRATION RATE: 17 BRPM | HEIGHT: 72 IN | WEIGHT: 217.2 LBS | BODY MASS INDEX: 29.42 KG/M2 | TEMPERATURE: 97.1 F | OXYGEN SATURATION: 98 %

## 2024-02-05 DIAGNOSIS — Z87.81 S/P ORIF (OPEN REDUCTION INTERNAL FIXATION) FRACTURE: ICD-10-CM

## 2024-02-05 DIAGNOSIS — S42.402P: Primary | ICD-10-CM

## 2024-02-05 DIAGNOSIS — F43.10 PTSD (POST-TRAUMATIC STRESS DISORDER): ICD-10-CM

## 2024-02-05 DIAGNOSIS — R29.6 RECURRENT FALLS: ICD-10-CM

## 2024-02-05 DIAGNOSIS — Z47.89 ORTHOPEDIC AFTERCARE: ICD-10-CM

## 2024-02-05 DIAGNOSIS — F33.1 MAJOR DEPRESSIVE DISORDER, RECURRENT, MODERATE (H): ICD-10-CM

## 2024-02-05 DIAGNOSIS — Z98.890 S/P ORIF (OPEN REDUCTION INTERNAL FIXATION) FRACTURE: ICD-10-CM

## 2024-02-05 PROBLEM — Z79.01 LONG TERM (CURRENT) USE OF ANTICOAGULANTS: Status: ACTIVE | Noted: 2024-02-05

## 2024-02-05 PROBLEM — G56.22 LESION OF ULNAR NERVE, LEFT UPPER LIMB: Status: ACTIVE | Noted: 2024-02-05

## 2024-02-05 PROBLEM — G89.4 CHRONIC PAIN SYNDROME: Status: ACTIVE | Noted: 2024-02-05

## 2024-02-05 PROBLEM — G89.29 OTHER CHRONIC PAIN: Status: ACTIVE | Noted: 2024-02-05

## 2024-02-05 PROBLEM — M62.81 MUSCLE WEAKNESS (GENERALIZED): Status: ACTIVE | Noted: 2024-02-05

## 2024-02-05 PROBLEM — Z96.649 PRESENCE OF UNSPECIFIED ARTIFICIAL HIP JOINT: Status: ACTIVE | Noted: 2024-02-05

## 2024-02-05 PROBLEM — G47.33 OBSTRUCTIVE SLEEP APNEA (ADULT) (PEDIATRIC): Status: ACTIVE | Noted: 2024-02-05
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
## 2024-02-05 PROBLEM — I10 ESSENTIAL (PRIMARY) HYPERTENSION: Status: ACTIVE | Noted: 2024-02-05

## 2024-02-05 PROBLEM — G60.3 IDIOPATHIC PROGRESSIVE NEUROPATHY: Status: ACTIVE | Noted: 2024-02-05

## 2024-02-05 PROBLEM — G56.22 ENTRAPMENT OF LEFT ULNAR NERVE: Status: ACTIVE | Noted: 2024-02-05

## 2024-02-05 PROBLEM — R41.89 COGNITIVE IMPAIRMENT: Status: ACTIVE | Noted: 2023-12-23

## 2024-02-05 PROBLEM — J32.3 CHRONIC SPHENOIDAL SINUSITIS: Status: ACTIVE | Noted: 2024-02-05

## 2024-02-05 PROBLEM — Z95.2 H/O MECHANICAL AORTIC VALVE REPLACEMENT: Status: ACTIVE | Noted: 2023-12-23

## 2024-02-05 PROBLEM — M19.90 OSTEOARTHROSIS: Status: ACTIVE | Noted: 2023-03-29

## 2024-02-05 PROBLEM — F34.1 DYSTHYMIC DISORDER: Status: ACTIVE | Noted: 2024-02-05

## 2024-02-05 PROBLEM — D23.9 OTHER BENIGN NEOPLASM OF SKIN, UNSPECIFIED: Status: ACTIVE | Noted: 2024-02-05

## 2024-02-05 PROBLEM — K29.70 GASTRITIS, UNSPECIFIED, WITHOUT BLEEDING: Status: ACTIVE | Noted: 2024-02-05

## 2024-02-05 PROBLEM — F43.12 POST-TRAUMATIC STRESS DISORDER, CHRONIC: Status: ACTIVE | Noted: 2024-02-05

## 2024-02-05 PROCEDURE — 99309 SBSQ NF CARE MODERATE MDM 30: CPT | Performed by: NURSE PRACTITIONER

## 2024-02-05 RX ORDER — ACETAMINOPHEN 500 MG
1000 TABLET ORAL 3 TIMES DAILY PRN
Start: 2024-02-05 | End: 2024-03-22

## 2024-02-05 RX ORDER — SULFAMETHOXAZOLE/TRIMETHOPRIM 800-160 MG
1 TABLET ORAL 2 TIMES DAILY
COMMUNITY
End: 2024-02-20

## 2024-02-05 NOTE — PROGRESS NOTES
[facility]  Episodic/Acute/Follow-Up  [place] MRN: [id number]. Place of Service where encounter took place:  [facility] ([facility]) [4002]   Chief Complaint   Patient presents with    RECHECK    HPI: David Thomson  is a 83 year old (1940), who is being seen today for an episodic care visit. Today's concern is:    Castro seen on routine follow-up as he continues to [place].  Today, he says he is really not having a whole lot of pain, and does not want to take any of his pain medicines, not even Tylenol.  He does have some ongoing tingling, but says his swelling has gone way down.  He denies any new headache, dizziness, chest pain, fever, cough.  He does feel little bit of shortness of breath with activity.  He denies any wheezing.  His bowels and bladder are working well.  His mental health is the same, but he is following up with his [facility] psychiatrist.    Past Medical and Surgical History reviewed in Epic today.  MEDICATIONS:  Current Outpatient Medications   Medication Sig Dispense Refill    acetaminophen (TYLENOL) 500 MG tablet Take 2 tablets (1,000 mg) by mouth 3 times daily as needed for mild pain      sulfamethoxazole-trimethoprim (BACTRIM DS) 800-160 MG tablet Take 1 tablet by mouth 2 times daily      apixaban ANTICOAGULANT (ELIQUIS) 5 MG tablet 5 mg      atorvastatin (LIPITOR) 80 MG tablet Take 1 tablet (80 mg) by mouth At Bedtime 30 tablet 0    carboxymethylcellulose (REFRESH LIQUIGEL) 1 % ophthalmic solution Apply 1 drop to eye 4 times daily as needed for dry eyes      ferrous sulfate (FE TABS) 325 (65 Fe) MG EC tablet Take 325 mg by mouth daily      folic acid (FOLVITE) 1 MG tablet Take 1 mg by mouth daily      gabapentin (NEURONTIN) 100 MG capsule Take 2 capsules (200 mg) by mouth 2 times daily      metoprolol succinate ER (TOPROL XL) 25 MG 24 hr tablet 12.5 mg      naloxone (NARCAN) 0.4 MG/ML injection Inject 0.4 mg into the vein May give 1ml in 2-3 minutes of patient  is still unresponsive      omeprazole (PRILOSEC) 20 MG DR capsule Take 1 capsule (20 mg) by mouth daily Am and HS      polyethylene glycol (MIRALAX) 17 g packet Take 17 g by mouth daily      PSYLLIUM HUSK PO Take by mouth daily - 3.4gram/5.4gram  powder - mix in liquid per directions      sacubitril-valsartan (ENTRESTO)  MG per tablet Take 1 tablet by mouth 2 times daily      senna-docusate (SENOKOT-S/PERICOLACE) 8.6-50 MG tablet Take 1 tablet by mouth 2 times daily 60 tablet 0    sertraline (ZOLOFT) 50 MG tablet Take 75 mg by mouth daily      thiamine (B-1) 100 MG tablet Take 100 mg by mouth daily       Objective: /58   Pulse 68   Temp 97.1  F (36.2  C)   Resp 17   Ht 1.829 m (6')   Wt 98.5 kg (217 lb 3.2 oz)   SpO2 98%   BMI 29.46 kg/m    Exam:  GENERAL APPEARANCE: Alert, in no distress, cooperative.   RESP: Respiratory effort good, no respiratory distress, Lung sounds clear. On RA.   CV: Auscultation of heart reveals S1, S2, rate and rhythm regular, no murmur, no rub or gallop, Edema 0+ BLE. Peripheral pulses are 2+.  PSYCH: Insight, judgement, and memory are forgetful at baseline, affect and mood are happy/engaged.    Labs: Labs done in facility are in EPIC. Please refer to them using [employer]/Care Everywhere.    ASSESSMENT/PLAN:  Closed fracture of distal end of left humerus with malunion, subsequent encounter  S/P ORIF (open reduction internal fixation) fracture  Orthopedic aftercare  Recurrent falls  Major depressive disorder, recurrent, moderate (H)  PTSD (post-traumatic stress disorder)  Acute on chronic. Ongoing.  Provider reviewed records from facility, and interpreted most recent imaging/lab work, and vital signs.  Remains on Bactrim through 2/20, question this. [facility].   Noting ongoing anemia, but this is slightly improving.  Will trend as noted below.  Will discontinue both oxycodone and Robaxin, but keep Tylenol available.   Castro is following up with orthopedics in  the next 2 weeks.  Mental health is labile at baseline.  Following up with in-house psychologist and [facility] psychiatrist.  Follow up w/in 1 week or as needed.    Orders:  Recheck Hgb x1 on 2/9. Dx: anemia.  Discontinue Oxycodone.  Discontinue Robaxin.    Electronically signed by:  Dr. Matilde Hatch, APRN CNP DNP

## 2024-02-05 NOTE — LETTER
2/5/2024        RE: David Thomson  [id number] [address] [address]        [facility]  Episodic/Acute/Follow-Up  [place] MRN: [id number]. Place of Service where encounter took place:  [facility] ([facility]) [4002]   Chief Complaint   Patient presents with     RECHECK    HPI: David Thomson  is a 83 year old (1940), who is being seen today for an episodic care visit. Today's concern is:    Bill seen on routine follow-up as he continues to rehab in [facility].  Today, he says he is really not having a whole lot of pain, and does not want to take any of his pain medicines, not even Tylenol.  He does have some ongoing tingling, but says his swelling has gone way down.  He denies any new headache, dizziness, chest pain, fever, cough.  He does feel little bit of shortness of breath with activity.  He denies any wheezing.  His bowels and bladder are working well.  His mental health is the same, but he is following up with his [facility] psychiatrist.    Past Medical and Surgical History reviewed in Epic today.  MEDICATIONS:  Current Outpatient Medications   Medication Sig Dispense Refill     acetaminophen (TYLENOL) 500 MG tablet Take 2 tablets (1,000 mg) by mouth 3 times daily as needed for mild pain       sulfamethoxazole-trimethoprim (BACTRIM DS) 800-160 MG tablet Take 1 tablet by mouth 2 times daily       apixaban ANTICOAGULANT (ELIQUIS) 5 MG tablet 5 mg       atorvastatin (LIPITOR) 80 MG tablet Take 1 tablet (80 mg) by mouth At Bedtime 30 tablet 0     carboxymethylcellulose (REFRESH LIQUIGEL) 1 % ophthalmic solution Apply 1 drop to eye 4 times daily as needed for dry eyes       ferrous sulfate (FE TABS) 325 (65 Fe) MG EC tablet Take 325 mg by mouth daily       folic acid (FOLVITE) 1 MG tablet Take 1 mg by mouth daily       gabapentin (NEURONTIN) 100 MG capsule Take 2 capsules (200 mg) by mouth 2 times daily       metoprolol succinate ER (TOPROL XL) 25 MG 24 hr tablet 12.5 mg        naloxone (NARCAN) 0.4 MG/ML injection Inject 0.4 mg into the vein May give 1ml in 2-3 minutes of patient is still unresponsive       omeprazole (PRILOSEC) 20 MG DR capsule Take 1 capsule (20 mg) by mouth daily Am and HS       polyethylene glycol (MIRALAX) 17 g packet Take 17 g by mouth daily       PSYLLIUM HUSK PO Take by mouth daily - 3.4gram/5.4gram  powder - mix in liquid per directions       sacubitril-valsartan (ENTRESTO)  MG per tablet Take 1 tablet by mouth 2 times daily       senna-docusate (SENOKOT-S/PERICOLACE) 8.6-50 MG tablet Take 1 tablet by mouth 2 times daily 60 tablet 0     sertraline (ZOLOFT) 50 MG tablet Take 75 mg by mouth daily       thiamine (B-1) 100 MG tablet Take 100 mg by mouth daily       Objective: /58   Pulse 68   Temp 97.1  F (36.2  C)   Resp 17   Ht 1.829 m (6')   Wt 98.5 kg (217 lb 3.2 oz)   SpO2 98%   BMI 29.46 kg/m    Exam:  GENERAL APPEARANCE: Alert, in no distress, cooperative.   RESP: Respiratory effort good, no respiratory distress, Lung sounds clear. On RA.   CV: Auscultation of heart reveals S1, S2, rate and rhythm regular, no murmur, no rub or gallop, Edema 0+ BLE. Peripheral pulses are 2+.  PSYCH: Insight, judgement, and memory are forgetful at baseline, affect and mood are happy/engaged.    Labs: Labs done in facility are in EPIC. Please refer to them using [employer]/Care Everywhere.    ASSESSMENT/PLAN:  Closed fracture of distal end of left humerus with malunion, subsequent encounter  S/P ORIF (open reduction internal fixation) fracture  Orthopedic aftercare  Recurrent falls  Major depressive disorder, recurrent, moderate (H)  PTSD (post-traumatic stress disorder)  Acute on chronic. Ongoing.  Provider reviewed records from facility, and interpreted most recent imaging/lab work, and vital signs.  Remains on Bactrim through 2/20, question this. [facility].   Noting ongoing anemia, but this is slightly improving.  Will trend as noted  below.  Will discontinue both oxycodone and Robaxin, but keep Tylenol available.   Castro is following up with orthopedics in the next 2 weeks.  Mental health is labile at baseline.  Following up with in-house psychologist and [facility] psychiatrist.  Follow up w/in 1 week or as needed.    Orders:  Recheck Hgb x1 on 2/9. Dx: anemia.  Discontinue Oxycodone.  Discontinue Robaxin.    Electronically signed by:  ANA LILIA Esparza CNP DNP        Sincerely,        ANA LILIA Robles CNP

## 2024-02-06 ENCOUNTER — LAB REQUISITION (OUTPATIENT)
Dept: LAB | Facility: CLINIC | Age: 84
End: 2024-02-06
Payer: COMMERCIAL

## 2024-02-06 DIAGNOSIS — T84.50XD INFECTION AND INFLAMMATORY REACTION DUE TO UNSPECIFIED INTERNAL JOINT PROSTHESIS, SUBSEQUENT ENCOUNTER: ICD-10-CM

## 2024-02-07 LAB
ERYTHROCYTE [DISTWIDTH] IN BLOOD BY AUTOMATED COUNT: 16.6 % (ref 10–15)
HCT VFR BLD AUTO: 29.7 % (ref 40–53)
HGB BLD-MCNC: 9.4 G/DL (ref 13.3–17.7)
MCH RBC QN AUTO: 30.5 PG (ref 26.5–33)
MCHC RBC AUTO-ENTMCNC: 31.6 G/DL (ref 31.5–36.5)
MCV RBC AUTO: 96 FL (ref 78–100)
PLATELET # BLD AUTO: 174 10E3/UL (ref 150–450)
RBC # BLD AUTO: 3.08 10E6/UL (ref 4.4–5.9)
WBC # BLD AUTO: 3.3 10E3/UL (ref 4–11)

## 2024-02-07 PROCEDURE — 36415 COLL VENOUS BLD VENIPUNCTURE: CPT | Performed by: FAMILY MEDICINE

## 2024-02-07 PROCEDURE — 85027 COMPLETE CBC AUTOMATED: CPT | Performed by: FAMILY MEDICINE

## 2024-02-07 PROCEDURE — P9604 ONE-WAY ALLOW PRORATED TRIP: HCPCS | Performed by: FAMILY MEDICINE

## 2024-02-08 ENCOUNTER — LAB REQUISITION (OUTPATIENT)
Dept: LAB | Facility: CLINIC | Age: 84
End: 2024-02-08
Payer: COMMERCIAL

## 2024-02-08 DIAGNOSIS — D64.9 ANEMIA, UNSPECIFIED: ICD-10-CM

## 2024-02-09 LAB — HGB BLD-MCNC: 11.2 G/DL (ref 13.3–17.7)

## 2024-02-09 PROCEDURE — 85018 HEMOGLOBIN: CPT | Performed by: FAMILY MEDICINE

## 2024-02-09 PROCEDURE — P9603 ONE-WAY ALLOW PRORATED MILES: HCPCS | Performed by: FAMILY MEDICINE

## 2024-02-09 PROCEDURE — 36415 COLL VENOUS BLD VENIPUNCTURE: CPT | Performed by: FAMILY MEDICINE

## 2024-02-13 ENCOUNTER — TRANSITIONAL CARE UNIT VISIT (OUTPATIENT)
Dept: GERIATRICS | Facility: CLINIC | Age: 84
End: 2024-02-13
Payer: COMMERCIAL

## 2024-02-13 VITALS
TEMPERATURE: 96.6 F | HEART RATE: 54 BPM | WEIGHT: 224.1 LBS | SYSTOLIC BLOOD PRESSURE: 114 MMHG | RESPIRATION RATE: 17 BRPM | OXYGEN SATURATION: 99 % | BODY MASS INDEX: 30.39 KG/M2 | DIASTOLIC BLOOD PRESSURE: 62 MMHG

## 2024-02-13 DIAGNOSIS — R41.89 COGNITIVE IMPAIRMENT: ICD-10-CM

## 2024-02-13 DIAGNOSIS — Z87.81 S/P ORIF (OPEN REDUCTION INTERNAL FIXATION) FRACTURE: ICD-10-CM

## 2024-02-13 DIAGNOSIS — F43.10 PTSD (POST-TRAUMATIC STRESS DISORDER): ICD-10-CM

## 2024-02-13 DIAGNOSIS — Z47.89 ORTHOPEDIC AFTERCARE: ICD-10-CM

## 2024-02-13 DIAGNOSIS — R29.6 RECURRENT FALLS: ICD-10-CM

## 2024-02-13 DIAGNOSIS — Z98.890 S/P ORIF (OPEN REDUCTION INTERNAL FIXATION) FRACTURE: ICD-10-CM

## 2024-02-13 DIAGNOSIS — F33.1 MAJOR DEPRESSIVE DISORDER, RECURRENT, MODERATE (H): ICD-10-CM

## 2024-02-13 DIAGNOSIS — F60.3 BORDERLINE PERSONALITY DISORDER (H): ICD-10-CM

## 2024-02-13 DIAGNOSIS — S42.402P: Primary | ICD-10-CM

## 2024-02-13 PROCEDURE — 99316 NF DSCHRG MGMT 30 MIN+: CPT | Performed by: NURSE PRACTITIONER

## 2024-02-13 NOTE — LETTER
2024        RE: David Thomson  [address] [facility] [address]        [facility] TCU DISCHARGE SUMMARY  PATIENT'S NAME: David Thomson : 1940 MRN: [id number] Place of Service where encounter took place:  [facility] (TCU) [[id number]] PRIMARY CARE PROVIDER AND CLINIC RESPONSIBLE AFTER TRANSFER: [facility]. Non-[facility] Provider     Transferring providers: Dr. Matilde Hatch, APRN CNP DNP.  Recent Hospitalization/ED: [facility] stay  to 24.  Date of TCU Admission: First from [place] on 23, then readmit from [facility] on 24.   Date of TCU (anticipated) Discharge: W/in 30 days.   Discharged to: previous independent home (with family assistance and home care, hopefully).   Cognitive Scores: SLUMS 20/30, MOCA 30, CPT 4.6/5.6, ACL 3.4/5.8. DMV form submitted as he should not resume driving.   Physical Function: Immobilizer on LUE limiting elbow to 70 degrees, NWB LUE, fall risk, TUG 13 sec, 30 sec STS, BROWNLEE 47/56.   DME: None  CODE STATUS/ADVANCE DIRECTIVES DISCUSSION:  Full Code.  ALLERGIES: Doxepin, Lisinopril, Piroxicam, Polysorbate  [bay oil], Prazosin, Thimerosal, Urea, Zolpidem, and Benzalkonium    DISCHARGE DIAGNOSIS/NURSING FACILITY COURSE:   Closed fracture of distal end of left humerus with malunion, subsequent encounter  S/P ORIF (open reduction internal fixation) fracture  Orthopedic aftercare  Recurrent falls  Major depressive disorder, recurrent, moderate (H)  PTSD (post-traumatic stress disorder)  Borderline personality disorder (H)  Cognitive impairment    Hospitalization: Castro presented to [facility] on  w/ AMS and after being found down by family. [facility] (-). No acute pathology found but he required significant reconditioning.      Rehab: Castro initially presented to TCU on  s/p the above hospitalization.  We reduced polypharmacy, and started scheduled Tylenol to assist in pain management.   He was having significant mental health issues, especially active flashbacks and dissociation as part of PTSD.  At that time, we increased Zoloft and added some Vistaril to help both with pain and anxiety.  We ended up decreasing oxycodone and increase in gabapentin as his pain was not well-controlled but he continued to have psychiatric issues and it was thought oxycodone was contributory.  We consulted in group orthopedist, as it was felt his cast at the time was odorous with strange drainage and could not be easily removed.  Orthopedist recommended [place] evaluation.    Castro was rehabbing in TCU and was sent out by myself after coordinating w/ ortho specialist to [facility] on 1/9 for concerning left elbow wound s/p fracture (was rehabbing s/p left humerus fx). Castro was admitted and underwent an I/D washout on 1/10 w/ hardware removal. This issue was caught early enough that he did not require IV abx. He remained NWB.      Castro returned to TCU on 1/18 s/p the above hospitalization.  He had a new immobilizer, and oral antibiotics.  He had been given clear wound care orders, and his encephalopathy appeared to have resolved.  We supported him with in-house psychology.  He did have some postoperative anemia, which resolved slowly.  He was fully able to participate in rehab and some of his cognitive testing somewhat improved.  We were able to discontinue oxycodone and Robaxin.  It was felt that he may discharge on his own account given he was feeling so well, and rehab team would like to facilitate a safe discharge if possible.    Today, Castro says when he goes home he is good to resume his regular activities.  He needs to buy several head of cattle, and he wants to plan for planting both soybeans and corn this year.  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  Provider counseled Castro that he should not do this especially given his dominant arm is nonweightbearing.  Castro stated that he would have to, and  "nobody would know better.  Provider coordinated care with both rehab and  around safety issues if Castro returns home unsupervised.  We are recommending to family that they assist him in addition to home care (see below).  Fortunately, Castro is no longer having left arm pain, unless he bumps it or moves it the wrong way (though he knows he is nonweightbearing).  He denies any numbness or tingling.  He denies nausea, new shortness of breath or cough.    He has demonstrated leaving AMA from other facilities before, and has later been found down by family.  We are hoping through meticulous planning and communicating with family, that we can set him up for success at home.    Recommendations to PCP:  Castro should not resume driving given his physical limitations right now and cognitive impairment. He has been counseled about this and a DMV form has been submitted. Regardless, he has stated \"who's going to know\" stating he will drive anyway.   Castro has required assistance w/ ADLs secondary to left arm NWB status, because of this, we are recommending daily supervision and assistance. 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  has informed family of this and should they be unwilling to assist in his care along w/ home care, a [facility] report may be filed upon discharge.   FYI, Castro remains on Bactrim through 2/21, and per infectious disease may remain on this even longer.     Past Medical History:  has a past medical history of Anemia, Atrial fibrillation (H), Bilateral low back pain with left-sided sciatica, Borderline personality disorder (H), BPH (benign prostatic hyperplasia), CAD (coronary artery disease), Cerebral artery occlusion with cerebral infarction (H), Cervical spondylosis with myelopathy, Depression, Diarrhea, Falls frequently, Gastroesophageal " reflux disease with esophagitis, Hereditary elliptocytosis (H24), History of subdural hematoma, Hyperlipidaemia, Hypertension, Neuropathy, ISHAN (obstructive sleep apnea), Pre-diabetes, PTSD (post-traumatic stress disorder), S/P TAVR (transcatheter aortic valve replacement), Thrombocytopenia (H24), and Vertigo.  Discharge Medications:  Current Outpatient Medications   Medication Sig Dispense Refill     acetaminophen (TYLENOL) 500 MG tablet Take 2 tablets (1,000 mg) by mouth 3 times daily as needed for mild pain       apixaban ANTICOAGULANT (ELIQUIS) 5 MG tablet 5 mg       atorvastatin (LIPITOR) 80 MG tablet Take 1 tablet (80 mg) by mouth At Bedtime 30 tablet 0     carboxymethylcellulose (REFRESH LIQUIGEL) 1 % ophthalmic solution Apply 1 drop to eye 4 times daily as needed for dry eyes       ferrous sulfate (FE TABS) 325 (65 Fe) MG EC tablet Take 325 mg by mouth daily       folic acid (FOLVITE) 1 MG tablet Take 1 mg by mouth daily       gabapentin (NEURONTIN) 100 MG capsule Take 2 capsules (200 mg) by mouth 2 times daily       metoprolol succinate ER (TOPROL XL) 25 MG 24 hr tablet 12.5 mg       naloxone (NARCAN) 0.4 MG/ML injection Inject 0.4 mg into the vein May give 1ml in 2-3 minutes of patient is still unresponsive       omeprazole (PRILOSEC) 20 MG DR capsule Take 1 capsule (20 mg) by mouth daily Am and HS       polyethylene glycol (MIRALAX) 17 g packet Take 17 g by mouth daily       PSYLLIUM HUSK PO Take by mouth daily - 3.4gram/5.4gram  powder - mix in liquid per directions       sacubitril-valsartan (ENTRESTO)  MG per tablet Take 1 tablet by mouth 2 times daily       senna-docusate (SENOKOT-S/PERICOLACE) 8.6-50 MG tablet Take 1 tablet by mouth 2 times daily 60 tablet 0     sertraline (ZOLOFT) 50 MG tablet Take 75 mg by mouth daily       sulfamethoxazole-trimethoprim (BACTRIM DS) 800-160 MG tablet Take 1 tablet by mouth 2 times daily       thiamine (B-1) 100 MG tablet Take 100 mg by mouth daily       ROS:  Limited secondary to cognitive impairment but today pt reports the above and 4 point ROS including Respiratory, CV, GI and , other than that noted in the HPI, is negative.     Physical Exam: Vitals: /62   Pulse 54   Temp (!) 96.6  F (35.9  C)   Resp 17   Wt 101.7 kg (224 lb 1.6 oz)   SpO2 99%   BMI 30.39 kg/m    GENERAL APPEARANCE: Alert, in no distress, cooperative.   ENT: Mouth/posterior oropharynx intact w/ moist mucous membranes, hearing acuity [place].  EYES: EOM, conjunctivae, lids, pupils and irises normal, PERRL2.   RESP: Respiratory effort good, no respiratory distress, On RA.   SKIN: Inspection/Palpation of skin and subcutaneous tissue baseline w/ fragility. No wounds/rashes noted, left elbow is covered and immobilizer properly in place.   NEURO: CN II-XII at patient's baseline, sensation baseline PPS.  PSYCH: Insight, judgement, and memory are impaired at likely baseline, affect and mood are happy/engaged.    Facility Labs: Labs done in SNF are in [place] EPIC. Please refer to them using [facility]/Care Everywhere.    DISCHARGE PLAN:  Follow up labs: No labs orders/due.  Medical Follow Up:  Follow up with primary care provider in 1-4 weeks.  MTM referral needed: Yes: recommended to defer to PCP.   Current [place] scheduled appointments:  None.  Discharge Services: Home Care:  Occupational Therapy, Physical Therapy, Registered Nurse, and Home Health Aide    Orders:  No new orders.    TOTAL DISCHARGE TIME:   Greater than 30 minutes    Electronically signed by:  Dr. Matilde Hatch, APRN CNP DNP  ______________      Documentation of Face-to-Face and Certification for Home Health Services   Patient: David Thomson YOB: 1940  MRN: [id number]  Today's Date: 2/13/2024  I certify that patient: David Thomson is under my care and that I had a face-to-face encounter that meets the provider  face-to-face encounter requirements with this patient on: 2/13/2024. This encounter with the patient  was in whole, or in part, for the following medical condition, which is the primary reason for home health care: left arm fx. I certify that, based on my findings, the following services are medically necessary home health services: Nursing, Occupational Therapy, and Physical Therapy. My clinical findings support the need for the above services because: Nurse is needed: For complex aftercare of surgical procedures because the patient needs instruction and cannot perform care on their own due to: <ROM and WB restrictions.., Occupational Therapy Services are needed to assess and treat cognitive ability and address ADL safety due to impairment in mobility., and Physical Therapy Services are needed to assess and treat the following functional impairments: ROM.    Further, I certify that my clinical findings support that this patient is homebound (i.e. absences from home require considerable and taxing effort and are for medical reasons or [employer] services or infrequently or of short duration when for other reasons) because: Requires assistance of another person or specialized equipment to access medical services because patient: Range of motion limitations prevents ability to exit home safely...    Based on the above findings. I certify that this patient is confined to the home and needs intermittent skilled nursing care, physical therapy and/or speech therapy.  The patient is under my care, and I have initiated the establishment of the plan of care.  This patient will be followed by a provider who will periodically review the plan of care.    Provider to give follow up care: [facility].    Responsible Medicare certified PECOS Provider: ANA LILIA Esparza CNP, DNP  Provider Signature: See electronic signature associated with these discharge orders.  Date: 2/13/2024                 Sincerely,        ANA LILIA Robles CNP

## 2024-02-13 NOTE — PROGRESS NOTES
[facility] TCU DISCHARGE SUMMARY  PATIENT'S NAME: David Thomson : 1940 MRN: [id number] Place of Service where encounter took place:  [place] (TCU) [[id number]] PRIMARY CARE PROVIDER AND CLINIC RESPONSIBLE AFTER TRANSFER: [facility]. Non-[facility] Provider     Transferring providers: Dr. Matilde Hatch, APRN CNP JOE.  Recent Hospitalization/ED: [facility] stay  to 24.  Date of TCU Admission: First from [place] on 23, then readmit from [facility] on 24.   Date of TCU (anticipated) Discharge: W/in 30 days.   Discharged to: previous independent home (with family assistance and home care, hopefully).   Cognitive Scores: SLUMS , MOCA , CPT 4.6/5.6, ACL 3.4/5.8. DMV form submitted as he should not resume driving.   Physical Function: Immobilizer on LUE limiting elbow to 70 degrees, NWB LUE, fall risk, TUG 13 sec, 30 sec STS, BROWNLEE 47/56.   DME: None  CODE STATUS/ADVANCE DIRECTIVES DISCUSSION:  Full Code.  ALLERGIES: Doxepin, Lisinopril, Piroxicam, Polysorbate  [bay oil], Prazosin, Thimerosal, Urea, Zolpidem, and Benzalkonium    DISCHARGE DIAGNOSIS/NURSING FACILITY COURSE:   Closed fracture of distal end of left humerus with malunion, subsequent encounter  S/P ORIF (open reduction internal fixation) fracture  Orthopedic aftercare  Recurrent falls  Major depressive disorder, recurrent, moderate (H)  PTSD (post-traumatic stress disorder)  Borderline personality disorder (H)  Cognitive impairment    Hospitalization: [place] [facility] [place] and after being found down by family. [facility]). No acute pathology found but he required significant reconditioning.      Rehab: Castro initially presented to TCU on  s/p the above hospitalization.  We reduced polypharmacy, and started scheduled Tylenol to assist in pain management.  He was having significant mental health issues, especially active flashbacks and  dissociation as part of PTSD.  At that time, we increased Zoloft and added some Vistaril to help both with pain and anxiety.  We ended up decreasing oxycodone and increase in gabapentin as his pain was not well-controlled but he continued to have psychiatric issues and it was thought oxycodone was contributory.  We consulted in group orthopedist, as it was felt his cast at the time was odorous with strange drainage and could not be easily removed.  Orthopedist recommended [place] evaluation.    Castro was rehabbing in TCU and was sent out by myself after coordinating w/ ortho specialist to [facility] on 1/9 for concerning left elbow wound s/p fracture (was rehabbing s/p left humerus fx). Castro was admitted and underwent an I/D washout on 1/10 w/ hardware removal. This issue was caught early enough that he did not require IV abx. He remained NWB.      Castro returned to TCU on 1/18 s/p the above hospitalization.  He had a new immobilizer, and oral antibiotics.  He had been given clear wound care orders, and his encephalopathy appeared to have resolved.  We supported him with in-house psychology.  He did have some postoperative anemia, which resolved slowly.  He was fully able to participate in rehab and some of his cognitive testing somewhat improved.  We were able to discontinue oxycodone and Robaxin.  It was felt that he may discharge on his own account given he was feeling so well, and rehab team would like to facilitate a safe discharge if possible.    Today, Castro says when he goes home he is good to resume his regular activities.  He needs to buy several head of cattle, and he wants to plan for planting both soybeans and corn this year.  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  Provider counseled Castro that he should not do this especially given his dominant arm is nonweightbearing.  Castro stated that he would have to, and nobody would know better.  Provider coordinated care with both rehab and social  "services around safety issues if Castro returns home unsupervised.  We are recommending to family that they assist him in addition to home care (see below).  Fortunately, Castro is no longer having left arm pain, unless he bumps it or moves it the wrong way (though he knows he is nonweightbearing).  He denies any numbness or tingling.  He denies nausea, new shortness of breath or cough.    He has demonstrated leaving AMA from other facilities before, and has later been found down by family.  We are hoping through meticulous planning and communicating with family, that we can set him up for success at home.    Recommendations to PCP:  Castro should not resume driving given his physical limitations right now and cognitive impairment. He has been counseled about this and a DMV form has been submitted. Regardless, he has stated \"who's going to know\" stating he will drive anyway.   Castro has required assistance w/ ADLs secondary to left arm NWB status, because of this, we are recommending daily supervision and assistance. 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  has informed family of this and should they be unwilling to assist in his care along w/ home care, a [facility] report may be filed upon discharge.   FYI, Castro remains on Bactrim through 2/21, and per infectious disease may remain on this even longer.     Past Medical History:  has a past medical history of Anemia, Atrial fibrillation (H), Bilateral low back pain with left-sided sciatica, Borderline personality disorder (H), BPH (benign prostatic hyperplasia), CAD (coronary artery disease), Cerebral artery occlusion with cerebral infarction (H), Cervical spondylosis with myelopathy, Depression, Diarrhea, Falls frequently, Gastroesophageal reflux disease with esophagitis, Hereditary elliptocytosis (H24), History of " subdural hematoma, Hyperlipidaemia, Hypertension, Neuropathy, ISHAN (obstructive sleep apnea), Pre-diabetes, PTSD (post-traumatic stress disorder), S/P TAVR (transcatheter aortic valve replacement), Thrombocytopenia (H24), and Vertigo.  Discharge Medications:  Current Outpatient Medications   Medication Sig Dispense Refill    acetaminophen (TYLENOL) 500 MG tablet Take 2 tablets (1,000 mg) by mouth 3 times daily as needed for mild pain      apixaban ANTICOAGULANT (ELIQUIS) 5 MG tablet 5 mg      atorvastatin (LIPITOR) 80 MG tablet Take 1 tablet (80 mg) by mouth At Bedtime 30 tablet 0    carboxymethylcellulose (REFRESH LIQUIGEL) 1 % ophthalmic solution Apply 1 drop to eye 4 times daily as needed for dry eyes      ferrous sulfate (FE TABS) 325 (65 Fe) MG EC tablet Take 325 mg by mouth daily      folic acid (FOLVITE) 1 MG tablet Take 1 mg by mouth daily      gabapentin (NEURONTIN) 100 MG capsule Take 2 capsules (200 mg) by mouth 2 times daily      metoprolol succinate ER (TOPROL XL) 25 MG 24 hr tablet 12.5 mg      naloxone (NARCAN) 0.4 MG/ML injection Inject 0.4 mg into the vein May give 1ml in 2-3 minutes of patient is still unresponsive      omeprazole (PRILOSEC) 20 MG DR capsule Take 1 capsule (20 mg) by mouth daily Am and HS      polyethylene glycol (MIRALAX) 17 g packet Take 17 g by mouth daily      PSYLLIUM HUSK PO Take by mouth daily - 3.4gram/5.4gram  powder - mix in liquid per directions      sacubitril-valsartan (ENTRESTO)  MG per tablet Take 1 tablet by mouth 2 times daily      senna-docusate (SENOKOT-S/PERICOLACE) 8.6-50 MG tablet Take 1 tablet by mouth 2 times daily 60 tablet 0    sertraline (ZOLOFT) 50 MG tablet Take 75 mg by mouth daily      sulfamethoxazole-trimethoprim (BACTRIM DS) 800-160 MG tablet Take 1 tablet by mouth 2 times daily      thiamine (B-1) 100 MG tablet Take 100 mg by mouth daily       ROS: Limited secondary to cognitive impairment but today pt reports the above and 4 point ROS  including Respiratory, CV, GI and , other than that noted in the HPI, is negative.     Physical Exam: Vitals: /62   Pulse 54   Temp (!) 96.6  F (35.9  C)   Resp 17   Wt 101.7 kg (224 lb 1.6 oz)   SpO2 99%   BMI 30.39 kg/m    GENERAL APPEARANCE: Alert, in no distress, cooperative.   ENT: Mouth/posterior oropharynx intact w/ moist mucous membranes, hearing acuity [place].  EYES: EOM, conjunctivae, lids, pupils and irises normal, PERRL2.   RESP: Respiratory effort good, no respiratory distress, On RA.   SKIN: Inspection/Palpation of skin and subcutaneous tissue baseline w/ fragility. No wounds/rashes noted, left elbow is covered and immobilizer properly in place.   NEURO: CN II-XII at patient's baseline, sensation baseline PPS.  PSYCH: Insight, judgement, and memory are impaired at likely baseline, affect and mood are happy/engaged.    Facility Labs: Labs done in SNF are in [place] EPIC. Please refer to them using [facility]/Care Everywhere.    DISCHARGE PLAN:  Follow up labs: No labs orders/due.  Medical Follow Up:  Follow up with primary care provider in 1-4 weeks.  MTM referral needed: Yes: recommended to defer to PCP.   Current [place] scheduled appointments:  None.  Discharge Services: Home Care:  Occupational Therapy, Physical Therapy, Registered Nurse, and Home Health Aide    Orders:  No new orders.    TOTAL DISCHARGE TIME:   Greater than 30 minutes    Electronically signed by:  Dr. Matilde Hatch, APRN CNP DNP  ______________      Documentation of Face-to-Face and Certification for Home Health Services   Patient: David Thomson YOB: 1940  MRN: [id number]  Today's Date: 2/13/2024  I certify that patient: David Thomson is under my care and that I had a face-to-face encounter that meets the provider  face-to-face encounter requirements with this patient on: 2/13/2024. This encounter with the patient was in whole, or in part, for the following medical condition, which is the primary reason  for home health care: left arm fx. I certify that, based on my findings, the following services are medically necessary home health services: Nursing, Occupational Therapy, and Physical Therapy. My clinical findings support the need for the above services because: Nurse is needed: For complex aftercare of surgical procedures because the patient needs instruction and cannot perform care on their own due to: <ROM and WB restrictions.., Occupational Therapy Services are needed to assess and treat cognitive ability and address ADL safety due to impairment in mobility., and Physical Therapy Services are needed to assess and treat the following functional impairments: ROM.    Further, I certify that my clinical findings support that this patient is homebound (i.e. absences from home require considerable and taxing effort and are for medical reasons or [employer] services or infrequently or of short duration when for other reasons) because: Requires assistance of another person or specialized equipment to access medical services because patient: Range of motion limitations prevents ability to exit home safely...    Based on the above findings. I certify that this patient is confined to the home and needs intermittent skilled nursing care, physical therapy and/or speech therapy.  The patient is under my care, and I have initiated the establishment of the plan of care.  This patient will be followed by a provider who will periodically review the plan of care.    Provider to give follow up care: [facility].    Responsible Medicare certified PECOS Provider: ANA LILIA Esparza CNP, DNP  Provider Signature: See electronic signature associated with these discharge orders.  Date: 2/13/2024

## 2024-02-14 NOTE — PROGRESS NOTES
[facility] GERIATRICS    PRIMARY CARE PROVIDER AND CLINIC:  Physician No Ref-Primary, No address on file  Chief Complaint   Patient presents with    Hospital F/U     Readmission      [place] Medical Record Number:  [id number]  Place of Service where encounter took place:  [facility] ([facility]) [[id number]]    David Thomson  is a 83 year old  (1940), [facility]-ad[facility] the above facility from  [facility] . Hospital stay 1/9/24 - 1/18/24. .   HPI:    Pt with PMH notable for left humerus fx s.p ORIF (11/18/23-11/21/23). rehospitalized from 12/22/23/- 12/28/23 with AMs after he was found down on the ground. Transitioned to this facility for rehab. Sent to [facility] on 1/9/24 with concerning for LUE infection s/p ID washout (1/10) and hardware removal. NWB.       Today:  - LUE fx/infection:/hardware removal:  reports elbow hurts off an on.  Allergic to oxycodone.   - Rehab: wonderful. Reports next week will go home due to insurance.ran out.   ==================================================================  CODE STATUS/ADVANCE DIRECTIVES DISCUSSION:  Full Code  CPR/Full code   ALLERGIES:   Allergies   Allergen Reactions    Doxepin      Other Reaction(s): Disorientated    Lisinopril Cough    Piroxicam Hives     Other Reaction(s): Eruption of skin    Polysorbate  [Bay Oil]     Prazosin Other (See Comments)     Nightmares    Thimerosal     Urea Other (See Comments)     Eruption of skin    Other Reaction(s): Eruption of skin    Zolpidem      Other Reaction(s): Delirium    Benzalkonium Rash      PAST MEDICAL HISTORY:   Past Medical History:   Diagnosis Date    Anemia     Atrial fibrillation (H)     Bilateral low back pain with left-sided sciatica     Borderline personality disorder (H)     BPH (benign prostatic hyperplasia)     CAD (coronary artery disease)     Cerebral artery occlusion with cerebral infarction (H)     Cervical spondylosis with myelopathy     Depression     Diarrhea     Falls frequently      Gastroesophageal reflux disease with esophagitis     Hereditary elliptocytosis (H24)     History of subdural hematoma     Hyperlipidaemia     Hypertension     Neuropathy     ISHAN (obstructive sleep apnea)     Pre-diabetes     PTSD (post-traumatic stress disorder)     S/P TAVR (transcatheter aortic valve replacement)     Thrombocytopenia (H24)     Vertigo       PAST SURGICAL HISTORY:   has a past surgical history that includes Gallbladder surgery; hip surgery; Wrist surgery (Bilateral); back surgery; Insert stimulator dorsal column (Right, 04/19/2016); IR Fine Needle Aspiration w Ultrasound (01/23/2023); IR Fine Needle Aspiration w Ultrasound (01/26/2023); Abdomen surgery; turp; C6-C7 ACDF (2005); Left C7-T1 foraminotomy  (2015); CABG, VEIN, THREE (2015); aortic valve replacement (2015); and Arthroplasty revision hip (Left, 3/15/2023).  FAMILY HISTORY: family history includes Heart Disease in his father and mother.  SOCIAL HISTORY:   reports that he has never smoked. He has never used smokeless tobacco. He reports that he does not currently use alcohol. He reports that he does not use drugs.  Patient's living condition: lives alone    Post Discharge Medication Reconciliation Status:   MED REC REQUIRED  Post Medication Reconciliation Status: discharge medications reconciled and changed, per note/orders       Current Outpatient Medications   Medication Sig    acetaminophen (TYLENOL) 500 MG tablet Take 2 tablets (1,000 mg) by mouth 3 times daily as needed for mild pain    apixaban ANTICOAGULANT (ELIQUIS) 5 MG tablet 5 mg    atorvastatin (LIPITOR) 80 MG tablet Take 1 tablet (80 mg) by mouth At Bedtime    carboxymethylcellulose (REFRESH LIQUIGEL) 1 % ophthalmic solution Apply 1 drop to eye 4 times daily as needed for dry eyes    ferrous sulfate (FE TABS) 325 (65 Fe) MG EC tablet Take 325 mg by mouth daily    folic acid (FOLVITE) 1 MG tablet Take 1 mg by mouth daily    gabapentin (NEURONTIN) 100 MG capsule Take 2 capsules  (200 mg) by mouth 2 times daily    metoprolol succinate ER (TOPROL XL) 25 MG 24 hr tablet 12.5 mg    naloxone (NARCAN) 0.4 MG/ML injection Inject 0.4 mg into the vein May give 1ml in 2-3 minutes of patient is still unresponsive    omeprazole (PRILOSEC) 20 MG DR capsule Take 1 capsule (20 mg) by mouth daily Am and HS    polyethylene glycol (MIRALAX) 17 g packet Take 17 g by mouth daily    PSYLLIUM HUSK PO Take by mouth daily - 3.4gram/5.4gram  powder - mix in liquid per directions    sacubitril-valsartan (ENTRESTO)  MG per tablet Take 1 tablet by mouth 2 times daily    senna-docusate (SENOKOT-S/PERICOLACE) 8.6-50 MG tablet Take 1 tablet by mouth 2 times daily    sertraline (ZOLOFT) 50 MG tablet Take 75 mg by mouth daily    sulfamethoxazole-trimethoprim (BACTRIM DS) 800-160 MG tablet Take 1 tablet by mouth 2 times daily    thiamine (B-1) 100 MG tablet Take 100 mg by mouth daily     No current facility-administered medications for this visit.       ROS:  10 point ROS of systems including Constitutional, Eyes, Respiratory, Cardiovascular, Gastroenterology, Genitourinary, Integumentary, Musculoskeletal, Psychiatric were all negative except for pertinent positives noted in my HPI.    Vitals:  BP 98/68   Pulse 56   Temp 98  F (36.7  C)   Resp 16   Ht 1.829 m (6')   Wt 101.7 kg (224 lb 1.6 oz)   SpO2 98%   BMI 30.39 kg/m    Exam:  GENERAL APPEARANCE:  in no distress,   RESP:  Unlabored breathing. CTA b/l.   CV:  S1S2 audible, regular HR, no murmur appreciated.   ABDOMEN:  soft, NT/ND, BS audible.   M/S:   no joint deformity noted on observation.   SKIN:  No rash noted on observation  NEURO:   No NFD appreciated on observation.   PSYCH:  affect and mood normal    Lab/Diagnostic data: Reviewed in the chart and EHR.        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 on 1/9/24 with concerning for LUE infection s/p ID washout (1/10) and hardware removal. NWB.   -Analgesia optimal with the current regimen. Continue present plan and medications.  - Followed by Orthopedic Team. Follow on the recommendations / instructions.   - Started rehab program, making a progress, continue until desired goal is achieved.   - will be discharged next week due to insurance running out. Pt would like to stay for mother 1-2 weeks. LUE still NWB, and pt feels. Discussed with nurse manager and the insurance just extended the stay unitl 29th.   - on bactrim for life time.   - surgical site covered.     Recurrent falls 2/2 flash back:  -  was found at the front of the building looking for his house. Now has wander guard. Query bactrim AE, will continue to monitor for contusion change.      Hypertension, unspecified type  Chronic heart failure with preserved ejection fraction (H)  Mechanical aortic valve and long term current use of Eliquis  - Cardiac wise compensated.   - The current medical regimen is effective;  continue present plan and medications.     PTSD (post-traumatic stress disorder)  Major depressive disorder, recurrent, moderate (H)  Borderline personality disorder (H)  Hx of alcoholism  - with flash back. Was seen by VIOLETA Riddle on 1/3/24. Appreciate help.      ELLIOTT: on supplement: continue       Slow transit constipation  - no concern, continue miralax, senna and psyllium      Orders: NNO      Electronically signed by:  Stefanie Hawkins MD                  same at pt

## 2024-02-15 ENCOUNTER — TRANSITIONAL CARE UNIT VISIT (OUTPATIENT)
Dept: GERIATRICS | Facility: CLINIC | Age: 84
End: 2024-02-15
Payer: COMMERCIAL

## 2024-02-15 VITALS
DIASTOLIC BLOOD PRESSURE: 68 MMHG | BODY MASS INDEX: 30.35 KG/M2 | WEIGHT: 224.1 LBS | HEART RATE: 56 BPM | OXYGEN SATURATION: 98 % | RESPIRATION RATE: 16 BRPM | HEIGHT: 72 IN | TEMPERATURE: 98 F | SYSTOLIC BLOOD PRESSURE: 98 MMHG

## 2024-02-15 DIAGNOSIS — F33.1 MAJOR DEPRESSIVE DISORDER, RECURRENT, MODERATE (H): ICD-10-CM

## 2024-02-15 DIAGNOSIS — Z98.890 S/P ORIF (OPEN REDUCTION INTERNAL FIXATION) FRACTURE: ICD-10-CM

## 2024-02-15 DIAGNOSIS — F43.10 PTSD (POST-TRAUMATIC STRESS DISORDER): ICD-10-CM

## 2024-02-15 DIAGNOSIS — T84.7XXD HARDWARE COMPLICATING WOUND INFECTION, SUBSEQUENT ENCOUNTER: Primary | ICD-10-CM

## 2024-02-15 DIAGNOSIS — Z87.81 S/P ORIF (OPEN REDUCTION INTERNAL FIXATION) FRACTURE: ICD-10-CM

## 2024-02-15 DIAGNOSIS — I50.32 CHRONIC HEART FAILURE WITH PRESERVED EJECTION FRACTION (H): ICD-10-CM

## 2024-02-15 DIAGNOSIS — R29.6 RECURRENT FALLS: ICD-10-CM

## 2024-02-15 DIAGNOSIS — I10 HYPERTENSION, UNSPECIFIED TYPE: ICD-10-CM

## 2024-02-15 DIAGNOSIS — Z95.2 MECHANICAL HEART VALVE PRESENT: ICD-10-CM

## 2024-02-15 DIAGNOSIS — R52 INTRACTABLE PAIN: ICD-10-CM

## 2024-02-15 DIAGNOSIS — S42.402P: ICD-10-CM

## 2024-02-15 DIAGNOSIS — K59.01 SLOW TRANSIT CONSTIPATION: ICD-10-CM

## 2024-02-15 DIAGNOSIS — Z47.89 ORTHOPEDIC AFTERCARE: ICD-10-CM

## 2024-02-15 PROCEDURE — 99305 1ST NF CARE MODERATE MDM 35: CPT | Performed by: FAMILY MEDICINE

## 2024-02-15 NOTE — LETTER
2/15/2024        RE: David Thomson  [address] [facility] [address]        [facility] GERIATRICS    PRIMARY CARE PROVIDER AND CLINIC:  Physician No Ref-Primary, No address on file  Chief Complaint   Patient presents with     Hospital F/U     Readmission      [place] Medical Record Number:  [id number]  Place of Service where encounter took place:  [facility] [place] [facility] [[id number]]    David Thomson  is a 83 year old  (1940), re-admitted to the above facility from  [facility] . Hospital stay 1/9/24 - 1/18/24. .   HPI:    Pt with PMH notable for left humerus fx s.p ORIF (11/18/23-11/21/23). rehospitalized from 12/22/23/- 12/28/23 with AMs after he was found down on the ground. Transitioned to this facility for rehab. [facility] on 1/[facility]/24 with concerning for LUE infection s/p ID washout (1/10) and hardware removal. NWB.       Today:  - LUE fx/infection:/hardware removal:  reports elbow hurts off an on.  Allergic to oxycodone.   - Rehab: wonderful. Reports next week will go home due to insurance.ran out.   ==================================================================  CODE STATUS/ADVANCE DIRECTIVES DISCUSSION:  Full Code  CPR/Full code   ALLERGIES:   Allergies   Allergen Reactions     Doxepin      Other Reaction(s): Disorientated     Lisinopril Cough     Piroxicam Hives     Other Reaction(s): Eruption of skin     Polysorbate  [Bay Oil]      Prazosin Other (See Comments)     Nightmares     Thimerosal      Urea Other (See Comments)     Eruption of skin    Other Reaction(s): Eruption of skin     Zolpidem      Other Reaction(s): Delirium     Benzalkonium Rash      PAST MEDICAL HISTORY:   Past Medical History:   Diagnosis Date     Anemia      Atrial fibrillation (H)      Bilateral low back pain with left-sided sciatica      Borderline personality disorder (H)      BPH (benign prostatic hyperplasia)      CAD (coronary artery disease)      Cerebral artery occlusion with cerebral  infarction (H)      Cervical spondylosis with myelopathy      Depression      Diarrhea      Falls frequently      Gastroesophageal reflux disease with esophagitis      Hereditary elliptocytosis (H24)      History of subdural hematoma      Hyperlipidaemia      Hypertension      Neuropathy      ISHAN (obstructive sleep apnea)      Pre-diabetes      PTSD (post-traumatic stress disorder)      S/P TAVR (transcatheter aortic valve replacement)      Thrombocytopenia (H24)      Vertigo       PAST SURGICAL HISTORY:   has a past surgical history that includes Gallbladder surgery; hip surgery; Wrist surgery (Bilateral); back surgery; Insert stimulator dorsal column (Right, 04/19/2016); IR Fine Needle Aspiration w Ultrasound (01/23/2023); IR Fine Needle Aspiration w Ultrasound (01/26/2023); Abdomen surgery; turp; C6-C7 ACDF (2005); Left C7-T1 foraminotomy  (2015); CABG, VEIN, THREE (2015); aortic valve replacement (2015); and Arthroplasty revision hip (Left, 3/15/2023).  FAMILY HISTORY: family history includes Heart Disease in his father and mother.  SOCIAL HISTORY:   reports that he has never smoked. He has never used smokeless tobacco. He reports that he does not currently use alcohol. He reports that he does not use drugs.  Patient's living condition: lives alone    Post Discharge Medication Reconciliation Status:   MED REC REQUIRED  Post Medication Reconciliation Status: discharge medications reconciled and changed, per note/orders       Current Outpatient Medications   Medication Sig     acetaminophen (TYLENOL) 500 MG tablet Take 2 tablets (1,000 mg) by mouth 3 times daily as needed for mild pain     apixaban ANTICOAGULANT (ELIQUIS) 5 MG tablet 5 mg     atorvastatin (LIPITOR) 80 MG tablet Take 1 tablet (80 mg) by mouth At Bedtime     carboxymethylcellulose (REFRESH LIQUIGEL) 1 % ophthalmic solution Apply 1 drop to eye 4 times daily as needed for dry eyes     ferrous sulfate (FE TABS) 325 (65 Fe) MG EC tablet Take 325 mg by  mouth daily     folic acid (FOLVITE) 1 MG tablet Take 1 mg by mouth daily     gabapentin (NEURONTIN) 100 MG capsule Take 2 capsules (200 mg) by mouth 2 times daily     metoprolol succinate ER (TOPROL XL) 25 MG 24 hr tablet 12.5 mg     naloxone (NARCAN) 0.4 MG/ML injection Inject 0.4 mg into the vein May give 1ml in 2-3 minutes of patient is still unresponsive     omeprazole (PRILOSEC) 20 MG DR capsule Take 1 capsule (20 mg) by mouth daily Am and HS     polyethylene glycol (MIRALAX) 17 g packet Take 17 g by mouth daily     PSYLLIUM HUSK PO Take by mouth daily - 3.4gram/5.4gram  powder - mix in liquid per directions     sacubitril-valsartan (ENTRESTO)  MG per tablet Take 1 tablet by mouth 2 times daily     senna-docusate (SENOKOT-S/PERICOLACE) 8.6-50 MG tablet Take 1 tablet by mouth 2 times daily     sertraline (ZOLOFT) 50 MG tablet Take 75 mg by mouth daily     sulfamethoxazole-trimethoprim (BACTRIM DS) 800-160 MG tablet Take 1 tablet by mouth 2 times daily     thiamine (B-1) 100 MG tablet Take 100 mg by mouth daily     No current facility-administered medications for this visit.       ROS:  10 point ROS of systems including Constitutional, Eyes, Respiratory, Cardiovascular, Gastroenterology, Genitourinary, Integumentary, Musculoskeletal, Psychiatric were all negative except for pertinent positives noted in my HPI.    Vitals:  BP 98/68   Pulse 56   Temp 98  F (36.7  C)   Resp 16   Ht 1.829 m (6')   Wt 101.7 kg (224 lb 1.6 oz)   SpO2 98%   BMI 30.39 kg/m    Exam:  GENERAL APPEARANCE:  in no distress,   RESP:  Unlabored breathing. CTA b/l.   CV:  S1S2 audible, regular HR, no murmur appreciated.   ABDOMEN:  soft, NT/ND, BS audible.   M/S:   no joint deformity noted on observation.   SKIN:  No rash noted on observation  NEURO:   No NFD appreciated on observation.   PSYCH:  affect and mood normal    Lab/Diagnostic data: Reviewed in the chart and EHR.         ASSESSMENT/PLAN:  ----------------------------------  Closed fracture of distal end of left humerus with malunion, subsequent encounter  S/P ORIF (open reduction internal fixation) fracture  Hardware infection s/p wash out and hardware removal  Orthopedic aftercare  Pain of left upper arm  - Sent to [facility] on 1/9/24 with concerning for LUE infection s/p ID washout (1/10) and hardware removal. NWB.   -Analgesia optimal with the current regimen. Continue present plan and medications.  - Followed by Orthopedic Team. Follow on the recommendations / instructions.   - Started rehab program, making a progress, continue until desired goal is achieved.   - will be discharged next week due to insurance running out. Pt would like to stay for mother 1-2 weeks. LUE still NWB, and pt feels. Discussed with nurse manager and the insurance just extended the stay unitl 29th.   - on bactrim for life time.   - surgical site covered.     Recurrent falls 2/2 flash back:  -  was found at the front of the building looking for his house. Now has wander guard. Query bactrim AE, will continue to monitor for contusion change.      Hypertension, unspecified type  Chronic heart failure with preserved ejection fraction (H)  Mechanical aortic valve and long term current use of Eliquis  - Cardiac wise compensated.   - The current medical regimen is effective;  continue present plan and medications.     PTSD (post-traumatic stress disorder)  Major depressive disorder, recurrent, moderate (H)  Borderline personality disorder (H)  Hx of alcoholism  - with flash back. Was seen by VIOLETA Riddle on 1/3/24. Appreciate help.      ELLIOTT: on supplement: continue       Slow transit constipation  - no concern, continue miralax, senna and psyllium      Orders: NNO      Electronically signed by:  Stefanie Hawkins MD                     Sincerely,        Stefanie Hawkins MD

## 2024-02-26 ENCOUNTER — TRANSFERRED RECORDS (OUTPATIENT)
Dept: HEALTH INFORMATION MANAGEMENT | Facility: CLINIC | Age: 84
End: 2024-02-26
Payer: COMMERCIAL

## 2024-03-19 ENCOUNTER — TRANSITIONAL CARE UNIT VISIT (OUTPATIENT)
Dept: GERIATRICS | Facility: CLINIC | Age: 84
End: 2024-03-19
Payer: COMMERCIAL

## 2024-03-19 VITALS
OXYGEN SATURATION: 98 % | DIASTOLIC BLOOD PRESSURE: 64 MMHG | RESPIRATION RATE: 18 BRPM | SYSTOLIC BLOOD PRESSURE: 108 MMHG | BODY MASS INDEX: 31.22 KG/M2 | HEART RATE: 67 BPM | WEIGHT: 230.2 LBS | TEMPERATURE: 97.5 F

## 2024-03-19 DIAGNOSIS — S06.5XAA SDH (SUBDURAL HEMATOMA) (H): ICD-10-CM

## 2024-03-19 DIAGNOSIS — F43.10 PTSD (POST-TRAUMATIC STRESS DISORDER): ICD-10-CM

## 2024-03-19 DIAGNOSIS — F60.3 BORDERLINE PERSONALITY DISORDER (H): ICD-10-CM

## 2024-03-19 DIAGNOSIS — S32.10XD CLOSED FRACTURE OF SACRUM WITH ROUTINE HEALING, UNSPECIFIED PORTION OF SACRUM, SUBSEQUENT ENCOUNTER: ICD-10-CM

## 2024-03-19 DIAGNOSIS — Z47.89 ORTHOPEDIC AFTERCARE: ICD-10-CM
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
## 2024-03-19 DIAGNOSIS — S02.119D CLOSED FRACTURE OF OCCIPITAL BONE WITH ROUTINE HEALING, UNSPECIFIED LATERALITY, UNSPECIFIED OCCIPITAL FRACTURE TYPE, SUBSEQUENT ENCOUNTER: ICD-10-CM

## 2024-03-19 DIAGNOSIS — F33.1 MAJOR DEPRESSIVE DISORDER, RECURRENT, MODERATE (H): ICD-10-CM

## 2024-03-19 DIAGNOSIS — I60.9 SAH (SUBARACHNOID HEMORRHAGE) (H): ICD-10-CM

## 2024-03-19 DIAGNOSIS — R41.89 COGNITIVE IMPAIRMENT: ICD-10-CM

## 2024-03-19 PROCEDURE — 99309 SBSQ NF CARE MODERATE MDM 30: CPT | Performed by: NURSE PRACTITIONER

## 2024-03-19 RX ORDER — SULFAMETHOXAZOLE AND TRIMETHOPRIM 400; 80 MG/1; MG/1
1 TABLET ORAL 2 TIMES DAILY
COMMUNITY

## 2024-03-19 RX ORDER — FLUTICASONE PROPIONATE 50 MCG
1 SPRAY, SUSPENSION (ML) NASAL DAILY
COMMUNITY

## 2024-03-19 RX ORDER — ASCORBIC ACID 500 MG
500 TABLET ORAL DAILY
COMMUNITY
End: 2024-03-20

## 2024-03-19 RX ORDER — QUETIAPINE FUMARATE 50 MG/1
50 TABLET, FILM COATED ORAL
Status: ON HOLD | COMMUNITY
End: 2024-04-22

## 2024-03-19 NOTE — LETTER
3/19/2024        RE: David Thomson  [address] [address] [facility] [id number]        [facility] Admission  PCP & CLINIC: Physician No Ref-Primary, No address on file  Chief Complaint   Patient presents with     Hospital F/U   Mariana MRN: [id number]. Place of Service where encounter took place:  [facility] [place] [facility] [place] ([place]) [4002] David Thomson  is a 83 year old  (1940), admitted to the above facility from  [facility] . Hospital stay 3/8/24 through 3/18/24. Admitted to this facility for  rehab, medical management, and nursing care. HPI information obtained from: facility chart records, facility staff, patient report, [facility] chart review, and Care Everywhere [facility] chart review.     Brief Summary of Hospital Course: Castro was at TCU (awaiting LTC placement) and his son came to take him home for an ISABEL. He apparently had soiled himself, and both he and son would not allow nursing to assist him in hygiene and he left facility w/ son. When Castro and son presented to his home on [place], Castro fell while trying to walk up steps, hitting his head on the concrete. Son called TCU, who directed him to call 911. Castro then presented to [facility] on 3/8.    Castro was found to have SAH in anterior/inferior frontal lobes, several SDHs, large posterior scalp hematoma, and occipital bone fracture. He also had a minimally displaced midline inferior sacral fracture. All injuries were treated non-surgically and required ICU support. He will remain off Eliquis x 4 wks and will follow up w/ neurosurgery. It was floated that Castro might be a good Watchman candidate.     Updates since admission to transitional care unit: Castro presented/returned to TCU on 3/18 s/p the above hospitalization. Today, Castro says he feels fine.  Occasionally he gets a headache or lightheadedness.  Does report having fallen earlier today.  He denies any vision changes, denies shortness of breath or chest pain.  He denies palpitations.  He says he  is sleeping fine, and does not really have any pain.  His left elbow immobilizer is off, and he is not sure if he needs to put it back on.  He denies trouble with bowels or bladder.  He is wondering when he can return home.    CODE STATUS/ADVANCE DIRECTIVES DISCUSSION: CPR/Full code. Patient's living condition: lives alone. ALLERGIES: Doxepin, Lisinopril, Piroxicam, Polysorbate  [bay oil], Prazosin, Thimerosal, Urea, Zolpidem, and Benzalkonium PAST MEDICAL HISTORY:  has a past medical history of Anemia, Atrial fibrillation (H), Bilateral low back pain with left-sided sciatica, Borderline personality disorder (H), BPH (benign prostatic hyperplasia), CAD (coronary artery disease), Cerebral artery occlusion with cerebral infarction (H), Cervical spondylosis with myelopathy, Depression, Diarrhea, Falls frequently, Gastroesophageal reflux disease with esophagitis, Hereditary elliptocytosis (H24), History of subdural hematoma, Hyperlipidaemia, Hypertension, Neuropathy, ISHAN (obstructive sleep apnea), Pre-diabetes, PTSD (post-traumatic stress disorder), S/P TAVR (transcatheter aortic valve replacement), Thrombocytopenia (H24), and Vertigo.. PAST SURGICAL HISTORY:   has a past surgical history that includes Gallbladder surgery; hip surgery; Wrist surgery (Bilateral); back surgery; Insert stimulator dorsal column (Right, 04/19/2016); IR Fine Needle Aspiration w Ultrasound (01/23/2023); IR Fine Needle Aspiration w Ultrasound (01/26/2023); Abdomen surgery; turp; C6-C7 ACDF (2005); Left C7-T1 foraminotomy  (2015); CABG, VEIN, THREE (2015); aortic valve replacement (2015); and Arthroplasty revision hip (Left, 3/15/2023).. FAMILY HISTORY: family history includes Heart Disease in his father and mother.. SOCIAL HISTORY:   reports that he has never smoked. He has never used smokeless tobacco. He reports that he does not currently use alcohol. He reports that he does not use drugs.  Post Discharge Medication Reconciliation Status:  discharge medications reconciled and changed, per note/orders.  Current Outpatient Medications   Medication Sig Dispense Refill     acetaminophen (TYLENOL) 500 MG tablet Take 2 tablets (1,000 mg) by mouth 3 times daily as needed for mild pain       atorvastatin (LIPITOR) 80 MG tablet Take 1 tablet (80 mg) by mouth At Bedtime 30 tablet 0     ferrous sulfate (FE TABS) 325 (65 Fe) MG EC tablet Take 325 mg by mouth daily       fluticasone (FLONASE) 50 MCG/ACT nasal spray Spray 1 spray into both nostrils daily       folic acid (FOLVITE) 1 MG tablet Take 1 mg by mouth daily       gabapentin (NEURONTIN) 100 MG capsule Take 2 capsules (200 mg) by mouth 2 times daily       metoprolol succinate ER (TOPROL XL) 25 MG 24 hr tablet 12.5 mg       naloxone (NARCAN) 0.4 MG/ML injection Inject 0.4 mg into the vein May give 1ml in 2-3 minutes of patient is still unresponsive       omeprazole (PRILOSEC) 20 MG DR capsule Take 1 capsule (20 mg) by mouth daily Am and HS       polyethylene glycol (MIRALAX) 17 g packet Take 17 g by mouth daily       QUEtiapine (SEROQUEL) 50 MG tablet Take 50 mg by mouth daily       sacubitril-valsartan (ENTRESTO) 24-26 MG per tablet Take 1 tablet by mouth 2 times daily       senna-docusate (SENOKOT-S/PERICOLACE) 8.6-50 MG tablet Take 1 tablet by mouth 2 times daily 60 tablet 0     sertraline (ZOLOFT) 50 MG tablet Take 75 mg by mouth daily       sulfamethoxazole-trimethoprim (BACTRIM) 400-80 MG tablet Take 1 tablet by mouth daily       thiamine (B-1) 100 MG tablet Take 100 mg by mouth daily       ROS: Limited secondary to cognitive impairment but today pt reports the above and 10 point ROS of systems including Constitutional, Eyes, Respiratory, Cardiovascular, Gastroenterology, Genitourinary, Integumentary, Musculoskeletal, Psychiatric were all negative except for pertinent positives noted in my HPI.    Vitals: /64   Pulse 67   Temp 97.5  F (36.4  C)   Resp 18   Wt 104.4 kg (230 lb 3.2 oz)   SpO2  98%   BMI 31.22 kg/m    Exam:  GENERAL APPEARANCE: Alert, in no distress, cooperative.   ENT: Mouth/posterior oropharynx intact w/ moist mucous membranes, hearing acuity [place].  EYES: EOM, conjunctivae, lids, pupils and irises normal, PERRL2.   RESP: Respiratory effort good, no respiratory distress, Lung sounds clear. On RA.   CV: Auscultation of heart reveals S1, S2, rate controlled and rhythm irregular, no murmur, no rub or gallop, Edema 0+ BLE. Peripheral pulses are 2+.  ABDOMEN: Normal bowel sounds, soft, non-tender abdomen, and no masses palpated.  SKIN: Inspection/Palpation of skin and subcutaneous tissue baseline w/ fragility. No wounds/rashes noted except scattered scabs/bruising.   NEURO: CN II-XII at patient's baseline, sensation baseline PPS.  PSYCH: Insight, judgement, and memory are impaired from prior baseline, affect and mood are happy/funny.    Lab/Diagnostic data: Recent labs in [facility] reviewed by me today.     ASSESSMENT/PLAN:  Fall, subsequent encounter  SDH (subdural hematoma) (H)  SAH (subarachnoid hemorrhage) (H)  Closed fracture of occipital bone with routine healing, unspecified laterality, unspecified occipital fracture type, subsequent encounter  Closed fracture of sacrum with routine healing, unspecified portion of sacrum, subsequent encounter  Orthopedic aftercare  PTSD (post-traumatic stress disorder)  Major depressive disorder, recurrent, moderate (H)  Cognitive impairment  Borderline personality disorder (H)  Fall, initial encouneter  Acute on chronic. Complex/Tenuous.   Provider reviewed records from hospitalization, facility, and interpreted most recent imaging/lab work, and vital signs.  Noting cognitive impairment potentially worsened from prior.  Will reduce polypharmacy to optimize clarity and simplify plan of care:  Discontinue melatonin, sleeping well.  Discontinue Creon, no indication.  Discontinue vitamin C, no indication.  Discontinue vitamin B1, no indication.  Confusion from  baseline, and noting hospital records indicate need for Seroquel given psychosis and delirium experienced inpatient.  Castro has more comfort in TCU setting as he had been rehabbing here for a long time.  He recognizes other patients and staff, and he may or may not have ongoing delirium or psychosis.  Will keep Seroquel order as is, and may be able to discontinue at future visit.  Will apply parameter per CMS guidelines.  Noted PRN orders for antipsychotic medications are limited to 14 days. Face to Face encounter done today. Evaluation indicates non-pharmacological interventions are not always effective.  Noting unclear need/use for immobilizer.  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  Orthopedics to clarify, but until they do will write immobilizer order as noted below while patient is out of bed.  Noting longstanding history of PTSD, borderline personality disorder, likely dementia, and depression/anxiety.  Will consult in-house psychology team to help support Castro further.  Therapy to continue to evaluate cognitive status.  Castro fell this morning, no head strike.  He continues to be a fall risk, and will therefore evaluate ongoing for metabolic disturbance or other cause to fall.  Will obtain unique testing as noted below.  Discharge disposition is complicated.  Castro continues to want to go home, despite his significant impairments.  His sons are often opposite in their thinking.  Son Gm would like to move patient to [place] where he can provide care, and son Castro thinks he can return home.  We recommend long-term care placement and/or 24-hour care of some kind.  Castro is to follow-up with neurosurgery accordingly.  Follow up w/in 1 week or as needed.    Orders:  Discontinue Melatonin.  Discontinue Creon.  Discontinue Vitamin C.  Discontinue B1.  RENEW PRN Seroquel x 14 days. Dx: psychosis, delirium.   Immobilizer to LUE while OOB. Dx: h/o left humerus fx.   ACP eval/tx x1, routine. Dx:  PTSD.   Hgb + BMP x1 on 3/25. Dx: anemia, fall.     Electronically signed by:  Dr. Matilde Hatch, APRN CNP DNP                        Sincerely,        Matilde Hatch, ANA LILIA CNP

## 2024-03-19 NOTE — PROGRESS NOTES
[facility] Admission  PCP & CLINIC: Physician No Ref-Primary, No address on file  Chief Complaint   Patient presents with    Hospital F/U   Mariana MRN: [id number]. Place of Service where encounter took place:  [facility] (U) [4002] David Thomson  is a 83 year old  (1940), admitted to the above facility from  [facility] . Hospital stay 3/8/24 through 3/18/24. Admitted to this facility for  rehab, medical management, and nursing care. HPI information obtained from: facility chart records, facility staff, patient report, [facility] chart review, and Care Everywhere [facility] chart review.     Brief Summary of Hospital Course: Castro was at U (awaiting LTC placement) and his son came to take him home for an ISABEL. He apparently had soiled himself, and both he and son would not allow nursing to assist him in hygiene and he left facility w/ son. When Castro and son presented to his home on [place], Castro fell while trying to walk up steps, hitting his head on the concrete. Son called TCU, who directed him to call 911. Castro then presented to [facility] on 3/8.    Castro was found to have SAH in anterior/inferior frontal lobes, several SDHs, large posterior scalp hematoma, and occipital bone fracture. He also had a minimally displaced midline inferior sacral fracture. All injuries were treated non-surgically and required ICU support. He will remain off Eliquis x 4 wks and will follow up w/ neurosurgery. It was floated that Castro might be a good Watchman candidate.     Updates since admission to transitional care unit: Castro presented/returned to TCU on 3/18 s/p the above hospitalization. Today, Castro says he feels fine.  Occasionally he gets a headache or lightheadedness.  Does report having fallen earlier today.  He denies any vision changes, denies shortness of breath or chest pain.  He denies palpitations.  He says he is sleeping fine, and does not really have any pain.  His left elbow immobilizer is off, and  he is not sure if he needs to put it back on.  He denies trouble with bowels or bladder.  He is wondering when he can return home.    CODE STATUS/ADVANCE DIRECTIVES DISCUSSION: CPR/Full code. Patient's living condition: lives alone. ALLERGIES: Doxepin, Lisinopril, Piroxicam, Polysorbate  [bay oil], Prazosin, Thimerosal, Urea, Zolpidem, and Benzalkonium PAST MEDICAL HISTORY:  has a past medical history of Anemia, Atrial fibrillation (H), Bilateral low back pain with left-sided sciatica, Borderline personality disorder (H), BPH (benign prostatic hyperplasia), CAD (coronary artery disease), Cerebral artery occlusion with cerebral infarction (H), Cervical spondylosis with myelopathy, Depression, Diarrhea, Falls frequently, Gastroesophageal reflux disease with esophagitis, Hereditary elliptocytosis (H24), History of subdural hematoma, Hyperlipidaemia, Hypertension, Neuropathy, ISHAN (obstructive sleep apnea), Pre-diabetes, PTSD (post-traumatic stress disorder), S/P TAVR (transcatheter aortic valve replacement), Thrombocytopenia (H24), and Vertigo.. PAST SURGICAL HISTORY:   has a past surgical history that includes Gallbladder surgery; hip surgery; Wrist surgery (Bilateral); back surgery; Insert stimulator dorsal column (Right, 04/19/2016); IR Fine Needle Aspiration w Ultrasound (01/23/2023); IR Fine Needle Aspiration w Ultrasound (01/26/2023); Abdomen surgery; turp; C6-C7 ACDF (2005); Left C7-T1 foraminotomy  (2015); CABG, VEIN, THREE (2015); aortic valve replacement (2015); and Arthroplasty revision hip (Left, 3/15/2023).. FAMILY HISTORY: family history includes Heart Disease in his father and mother.. SOCIAL HISTORY:   reports that he has never smoked. He has never used smokeless tobacco. He reports that he does not currently use alcohol. He reports that he does not use drugs.  Post Discharge Medication Reconciliation Status: discharge medications reconciled and changed, per note/orders.  Current Outpatient Medications    Medication Sig Dispense Refill    acetaminophen (TYLENOL) 500 MG tablet Take 2 tablets (1,000 mg) by mouth 3 times daily as needed for mild pain      atorvastatin (LIPITOR) 80 MG tablet Take 1 tablet (80 mg) by mouth At Bedtime 30 tablet 0    ferrous sulfate (FE TABS) 325 (65 Fe) MG EC tablet Take 325 mg by mouth daily      fluticasone (FLONASE) 50 MCG/ACT nasal spray Spray 1 spray into both nostrils daily      folic acid (FOLVITE) 1 MG tablet Take 1 mg by mouth daily      gabapentin (NEURONTIN) 100 MG capsule Take 2 capsules (200 mg) by mouth 2 times daily      metoprolol succinate ER (TOPROL XL) 25 MG 24 hr tablet 12.5 mg      naloxone (NARCAN) 0.4 MG/ML injection Inject 0.4 mg into the vein May give 1ml in 2-3 minutes of patient is still unresponsive      omeprazole (PRILOSEC) 20 MG DR capsule Take 1 capsule (20 mg) by mouth daily Am and HS      polyethylene glycol (MIRALAX) 17 g packet Take 17 g by mouth daily      QUEtiapine (SEROQUEL) 50 MG tablet Take 50 mg by mouth daily      sacubitril-valsartan (ENTRESTO) 24-26 MG per tablet Take 1 tablet by mouth 2 times daily      senna-docusate (SENOKOT-S/PERICOLACE) 8.6-50 MG tablet Take 1 tablet by mouth 2 times daily 60 tablet 0    sertraline (ZOLOFT) 50 MG tablet Take 75 mg by mouth daily      sulfamethoxazole-trimethoprim (BACTRIM) 400-80 MG tablet Take 1 tablet by mouth daily      thiamine (B-1) 100 MG tablet Take 100 mg by mouth daily       ROS: Limited secondary to cognitive impairment but today pt reports the above and 10 point ROS of systems including Constitutional, Eyes, Respiratory, Cardiovascular, Gastroenterology, Genitourinary, Integumentary, Musculoskeletal, Psychiatric were all negative except for pertinent positives noted in my HPI.    Vitals: /64   Pulse 67   Temp 97.5  F (36.4  C)   Resp 18   Wt 104.4 kg (230 lb 3.2 oz)   SpO2 98%   BMI 31.22 kg/m    Exam:  GENERAL APPEARANCE: Alert, in no distress, cooperative.   ENT: Mouth/posterior  oropharynx intact w/ moist mucous membranes, hearing acuity [place].  EYES: EOM, conjunctivae, lids, pupils and irises normal, PERRL2.   RESP: Respiratory effort good, no respiratory distress, Lung sounds clear. On RA.   CV: Auscultation of heart reveals S1, S2, rate controlled and rhythm irregular, no murmur, no rub or gallop, Edema 0+ BLE. Peripheral pulses are 2+.  ABDOMEN: Normal bowel sounds, soft, non-tender abdomen, and no masses palpated.  SKIN: Inspection/Palpation of skin and subcutaneous tissue baseline w/ fragility. No wounds/rashes noted except scattered scabs/bruising.   NEURO: CN II-XII at patient's baseline, sensation baseline PPS.  PSYCH: Insight, judgement, and memory are impaired from prior baseline, affect and mood are happy/funny.    Lab/Diagnostic data: Recent labs in [facility] reviewed by me today.     ASSESSMENT/PLAN:  Fall, subsequent encounter  SDH (subdural hematoma) (H)  SAH (subarachnoid hemorrhage) (H)  Closed fracture of occipital bone with routine healing, unspecified laterality, unspecified occipital fracture type, subsequent encounter  Closed fracture of sacrum with routine healing, unspecified portion of sacrum, subsequent encounter  Orthopedic aftercare  PTSD (post-traumatic stress disorder)  Major depressive disorder, recurrent, moderate (H)  Cognitive impairment  Borderline personality disorder (H)  Fall, initial encouneter  Acute on chronic. Complex/Tenuous.   Provider reviewed records from hospitalization, facility, and interpreted most recent imaging/lab work, and vital signs.  Noting cognitive impairment potentially worsened from prior.  Will reduce polypharmacy to optimize clarity and simplify plan of care:  Discontinue melatonin, sleeping well.  Discontinue Creon, no indication.  Discontinue vitamin C, no indication.  Discontinue vitamin B1, no indication.  Confusion from baseline, and noting hospital records indicate need for Seroquel given psychosis and delirium experienced  inpatient.  Castro has more comfort in TCU setting as he had been rehabbing here for a long time.  He recognizes other patients and staff, and he may or may not have ongoing delirium or psychosis.  Will keep Seroquel order as is, and may be able to discontinue at future visit.  Will apply parameter per CMS guidelines.  Noted PRN orders for antipsychotic medications are limited to 14 days. Face to Face encounter done today. Evaluation indicates non-pharmacological interventions are not always effective.  Noting unclear need/use for immobilizer.  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  Orthopedics to clarify, but until they do will write immobilizer order as noted below while patient is out of bed.  Noting longstanding history of PTSD, borderline personality disorder, likely dementia, and depression/anxiety.  Will consult in-house psychology team to help support Castro further.  Therapy to continue to evaluate cognitive status.  Castro fell this morning, no head strike.  He continues to be a fall risk, and will therefore evaluate ongoing for metabolic disturbance or other cause to fall.  Will obtain unique testing as noted below.  Discharge disposition is complicated.  Castro continues to want to go home, despite his significant impairments.  His sons are often opposite in their thinking.  Son Gm would like to move patient to [place] where he can provide care, and son Castro thinks he can return home.  We recommend long-term care placement and/or 24-hour care of some kind.  Castro is to follow-up with neurosurgery accordingly.  Follow up w/in 1 week or as needed.    Orders:  Discontinue Melatonin.  Discontinue Creon.  Discontinue Vitamin C.  Discontinue B1.  RENEW PRN Seroquel x 14 days. Dx: psychosis, delirium.   Immobilizer to LUE while OOB. Dx: h/o left humerus fx.   ACP eval/tx x1, routine. Dx: PTSD.   Hgb + BMP x1 on 3/25. Dx: anemia, fall.     Electronically signed by:  ANA LILIA Esparza  CNP DNP

## 2024-03-21 ENCOUNTER — APPOINTMENT (OUTPATIENT)
Dept: GENERAL RADIOLOGY | Facility: CLINIC | Age: 84
DRG: 085 | End: 2024-03-21
Attending: EMERGENCY MEDICINE
Payer: COMMERCIAL

## 2024-03-21 ENCOUNTER — APPOINTMENT (OUTPATIENT)
Dept: CT IMAGING | Facility: CLINIC | Age: 84
DRG: 085 | End: 2024-03-21
Attending: EMERGENCY MEDICINE
Payer: COMMERCIAL

## 2024-03-21 ENCOUNTER — HOSPITAL ENCOUNTER (EMERGENCY)
Facility: CLINIC | Age: 84
Discharge: ANOTHER HOSPITAL | DRG: 085 | End: 2024-03-21
Attending: FAMILY MEDICINE | Admitting: FAMILY MEDICINE
Payer: COMMERCIAL

## 2024-03-21 ENCOUNTER — TRANSITIONAL CARE UNIT VISIT (OUTPATIENT)
Dept: GERIATRICS | Facility: CLINIC | Age: 84
End: 2024-03-21
Payer: COMMERCIAL

## 2024-03-21 ENCOUNTER — HOSPITAL ENCOUNTER (INPATIENT)
Facility: CLINIC | Age: 84
LOS: 50 days | Discharge: NURSING, REHAB OR LONG-TERM CARE | DRG: 085 | End: 2024-05-10
Attending: EMERGENCY MEDICINE | Admitting: SURGERY
Payer: COMMERCIAL

## 2024-03-21 ENCOUNTER — APPOINTMENT (OUTPATIENT)
Dept: CT IMAGING | Facility: CLINIC | Age: 84
DRG: 085 | End: 2024-03-21
Attending: FAMILY MEDICINE
Payer: COMMERCIAL

## 2024-03-21 ENCOUNTER — APPOINTMENT (OUTPATIENT)
Dept: CT IMAGING | Facility: CLINIC | Age: 84
DRG: 085 | End: 2024-03-21
Payer: COMMERCIAL

## 2024-03-21 VITALS
TEMPERATURE: 98.1 F | RESPIRATION RATE: 11 BRPM | HEART RATE: 74 BPM | OXYGEN SATURATION: 97 % | SYSTOLIC BLOOD PRESSURE: 124 MMHG | DIASTOLIC BLOOD PRESSURE: 71 MMHG | WEIGHT: 228.5 LBS | HEIGHT: 73 IN | BODY MASS INDEX: 30.28 KG/M2

## 2024-03-21 VITALS
TEMPERATURE: 97.7 F | BODY MASS INDEX: 31.04 KG/M2 | HEART RATE: 79 BPM | WEIGHT: 229.2 LBS | DIASTOLIC BLOOD PRESSURE: 71 MMHG | RESPIRATION RATE: 18 BRPM | OXYGEN SATURATION: 97 % | SYSTOLIC BLOOD PRESSURE: 127 MMHG | HEIGHT: 72 IN

## 2024-03-21 DIAGNOSIS — S06.5XAA SDH (SUBDURAL HEMATOMA) (H): ICD-10-CM

## 2024-03-21 DIAGNOSIS — R45.1 AGITATION: ICD-10-CM

## 2024-03-21 DIAGNOSIS — I62.9 INTRACRANIAL HEMORRHAGE (H): ICD-10-CM

## 2024-03-21 DIAGNOSIS — F43.10 PTSD (POST-TRAUMATIC STRESS DISORDER): ICD-10-CM

## 2024-03-21 DIAGNOSIS — I60.9 SAH (SUBARACHNOID HEMORRHAGE) (H): Primary | ICD-10-CM

## 2024-03-21 DIAGNOSIS — G89.29 OTHER CHRONIC PAIN: ICD-10-CM

## 2024-03-21 DIAGNOSIS — G93.40 ENCEPHALOPATHY: ICD-10-CM

## 2024-03-21 DIAGNOSIS — I10 ESSENTIAL (PRIMARY) HYPERTENSION: ICD-10-CM

## 2024-03-21 DIAGNOSIS — M25.552 HIP PAIN, LEFT: ICD-10-CM

## 2024-03-21 DIAGNOSIS — R23.9 ALTERATION IN SKIN INTEGRITY DUE TO MOISTURE: ICD-10-CM

## 2024-03-21 DIAGNOSIS — S02.119D CLOSED FRACTURE OF OCCIPITAL BONE WITH ROUTINE HEALING, UNSPECIFIED LATERALITY, UNSPECIFIED OCCIPITAL FRACTURE TYPE, SUBSEQUENT ENCOUNTER: ICD-10-CM

## 2024-03-21 DIAGNOSIS — F60.3 BORDERLINE PERSONALITY DISORDER (H): ICD-10-CM

## 2024-03-21 DIAGNOSIS — C44.519 BASAL CELL CARCINOMA OF SKIN OF OTHER PART OF TRUNK: ICD-10-CM

## 2024-03-21 DIAGNOSIS — K59.00 CONSTIPATION, UNSPECIFIED CONSTIPATION TYPE: ICD-10-CM

## 2024-03-21 DIAGNOSIS — R29.6 RECURRENT FALLS: ICD-10-CM

## 2024-03-21 DIAGNOSIS — Z79.01 ANTICOAGULATED: ICD-10-CM

## 2024-03-21 DIAGNOSIS — I60.8: Primary | ICD-10-CM

## 2024-03-21 DIAGNOSIS — I48.91 ATRIAL FIBRILLATION, UNSPECIFIED TYPE (H): ICD-10-CM

## 2024-03-21 DIAGNOSIS — G47.00 INSOMNIA, UNSPECIFIED TYPE: ICD-10-CM
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
## 2024-03-21 DIAGNOSIS — I62.00 NONTRAUMATIC SUBDURAL HEMORRHAGE (H): ICD-10-CM

## 2024-03-21 PROBLEM — S42.302A CLOSED LEFT ARM FRACTURE: Status: ACTIVE | Noted: 2024-03-21

## 2024-03-21 LAB
ABO/RH(D): NORMAL
ALBUMIN SERPL BCG-MCNC: 4.1 G/DL (ref 3.5–5.2)
ALBUMIN SERPL BCG-MCNC: 4.2 G/DL (ref 3.5–5.2)
ALP SERPL-CCNC: 135 U/L (ref 40–150)
ALP SERPL-CCNC: 151 U/L (ref 40–150)
ALT SERPL W P-5'-P-CCNC: 47 U/L (ref 0–70)
ALT SERPL W P-5'-P-CCNC: 52 U/L (ref 0–70)
ANION GAP SERPL CALCULATED.3IONS-SCNC: 11 MMOL/L (ref 7–15)
ANION GAP SERPL CALCULATED.3IONS-SCNC: 13 MMOL/L (ref 7–15)
ANTIBODY SCREEN: NEGATIVE
APTT PPP: 31 SECONDS (ref 22–38)
AST SERPL W P-5'-P-CCNC: 42 U/L (ref 0–45)
AST SERPL W P-5'-P-CCNC: 46 U/L (ref 0–45)
ATRIAL RATE - MUSE: 68 BPM
BASOPHILS # BLD AUTO: 0 10E3/UL (ref 0–0.2)
BASOPHILS # BLD AUTO: 0 10E3/UL (ref 0–0.2)
BASOPHILS NFR BLD AUTO: 0 %
BASOPHILS NFR BLD AUTO: 1 %
BILIRUB SERPL-MCNC: 1.1 MG/DL
BILIRUB SERPL-MCNC: 1.2 MG/DL
BUN SERPL-MCNC: 22.2 MG/DL (ref 8–23)
BUN SERPL-MCNC: 24 MG/DL (ref 8–23)
CALCIUM SERPL-MCNC: 9.2 MG/DL (ref 8.8–10.2)
CALCIUM SERPL-MCNC: 9.3 MG/DL (ref 8.8–10.2)
CHLORIDE SERPL-SCNC: 104 MMOL/L (ref 98–107)
CHLORIDE SERPL-SCNC: 105 MMOL/L (ref 98–107)
CREAT SERPL-MCNC: 0.81 MG/DL (ref 0.67–1.17)
CREAT SERPL-MCNC: 0.88 MG/DL (ref 0.67–1.17)
DEPRECATED HCO3 PLAS-SCNC: 22 MMOL/L (ref 22–29)
DEPRECATED HCO3 PLAS-SCNC: 22 MMOL/L (ref 22–29)
DIASTOLIC BLOOD PRESSURE - MUSE: NORMAL MMHG
EGFRCR SERPLBLD CKD-EPI 2021: 86 ML/MIN/1.73M2
EGFRCR SERPLBLD CKD-EPI 2021: 89 ML/MIN/1.73M2
EOSINOPHIL # BLD AUTO: 0.1 10E3/UL (ref 0–0.7)
EOSINOPHIL # BLD AUTO: 0.1 10E3/UL (ref 0–0.7)
EOSINOPHIL NFR BLD AUTO: 1 %
EOSINOPHIL NFR BLD AUTO: 2 %
ERYTHROCYTE [DISTWIDTH] IN BLOOD BY AUTOMATED COUNT: 14.8 % (ref 10–15)
ERYTHROCYTE [DISTWIDTH] IN BLOOD BY AUTOMATED COUNT: 15.1 % (ref 10–15)
GLUCOSE SERPL-MCNC: 126 MG/DL (ref 70–99)
GLUCOSE SERPL-MCNC: 136 MG/DL (ref 70–99)
HCT VFR BLD AUTO: 31 % (ref 40–53)
HCT VFR BLD AUTO: 33.5 % (ref 40–53)
HGB BLD-MCNC: 10.5 G/DL (ref 13.3–17.7)
HGB BLD-MCNC: 11.3 G/DL (ref 13.3–17.7)
HOLD SPECIMEN: NORMAL
HOLD SPECIMEN: NORMAL
IMM GRANULOCYTES # BLD: 0 10E3/UL
IMM GRANULOCYTES # BLD: 0 10E3/UL
IMM GRANULOCYTES NFR BLD: 0 %
IMM GRANULOCYTES NFR BLD: 1 %
INR PPP: 1.04 (ref 0.85–1.15)
INR PPP: 1.24 (ref 0.85–1.15)
INTERPRETATION ECG - MUSE: NORMAL
LYMPHOCYTES # BLD AUTO: 1.3 10E3/UL (ref 0.8–5.3)
LYMPHOCYTES # BLD AUTO: 1.4 10E3/UL (ref 0.8–5.3)
LYMPHOCYTES NFR BLD AUTO: 23 %
LYMPHOCYTES NFR BLD AUTO: 26 %
MCH RBC QN AUTO: 31 PG (ref 26.5–33)
MCH RBC QN AUTO: 31.1 PG (ref 26.5–33)
MCHC RBC AUTO-ENTMCNC: 33.7 G/DL (ref 31.5–36.5)
MCHC RBC AUTO-ENTMCNC: 33.9 G/DL (ref 31.5–36.5)
MCV RBC AUTO: 92 FL (ref 78–100)
MCV RBC AUTO: 92 FL (ref 78–100)
MONOCYTES # BLD AUTO: 0.5 10E3/UL (ref 0–1.3)
MONOCYTES # BLD AUTO: 0.5 10E3/UL (ref 0–1.3)
MONOCYTES NFR BLD AUTO: 10 %
MONOCYTES NFR BLD AUTO: 9 %
NEUTROPHILS # BLD AUTO: 3.3 10E3/UL (ref 1.6–8.3)
NEUTROPHILS # BLD AUTO: 3.6 10E3/UL (ref 1.6–8.3)
NEUTROPHILS NFR BLD AUTO: 62 %
NEUTROPHILS NFR BLD AUTO: 65 %
NRBC # BLD AUTO: 0 10E3/UL
NRBC # BLD AUTO: 0 10E3/UL
NRBC BLD AUTO-RTO: 0 /100
NRBC BLD AUTO-RTO: 0 /100
P AXIS - MUSE: 27 DEGREES
PLATELET # BLD AUTO: 231 10E3/UL (ref 150–450)
PLATELET # BLD AUTO: 241 10E3/UL (ref 150–450)
POTASSIUM SERPL-SCNC: 3.9 MMOL/L (ref 3.4–5.3)
POTASSIUM SERPL-SCNC: 4.3 MMOL/L (ref 3.4–5.3)
PR INTERVAL - MUSE: 228 MS
PROT SERPL-MCNC: 6.8 G/DL (ref 6.4–8.3)
PROT SERPL-MCNC: 7.4 G/DL (ref 6.4–8.3)
QRS DURATION - MUSE: 166 MS
QT - MUSE: 470 MS
QTC - MUSE: 499 MS
R AXIS - MUSE: -22 DEGREES
RBC # BLD AUTO: 3.38 10E6/UL (ref 4.4–5.9)
RBC # BLD AUTO: 3.64 10E6/UL (ref 4.4–5.9)
SODIUM SERPL-SCNC: 138 MMOL/L (ref 135–145)
SODIUM SERPL-SCNC: 139 MMOL/L (ref 135–145)
SPECIMEN EXPIRATION DATE: NORMAL
SYSTOLIC BLOOD PRESSURE - MUSE: NORMAL MMHG
T AXIS - MUSE: 110 DEGREES
TROPONIN T SERPL HS-MCNC: 24 NG/L
VENTRICULAR RATE- MUSE: 68 BPM
WBC # BLD AUTO: 5.3 10E3/UL (ref 4–11)
WBC # BLD AUTO: 5.5 10E3/UL (ref 4–11)

## 2024-03-21 PROCEDURE — 93005 ELECTROCARDIOGRAM TRACING: CPT | Performed by: FAMILY MEDICINE

## 2024-03-21 PROCEDURE — 85610 PROTHROMBIN TIME: CPT | Performed by: EMERGENCY MEDICINE

## 2024-03-21 PROCEDURE — 84450 TRANSFERASE (AST) (SGOT): CPT | Performed by: FAMILY MEDICINE

## 2024-03-21 PROCEDURE — 84155 ASSAY OF PROTEIN SERUM: CPT | Performed by: EMERGENCY MEDICINE

## 2024-03-21 PROCEDURE — 120N000003 HC R&B IMCU UMMC

## 2024-03-21 PROCEDURE — 70450 CT HEAD/BRAIN W/O DYE: CPT | Mod: XE

## 2024-03-21 PROCEDURE — 250N000011 HC RX IP 250 OP 636: Performed by: FAMILY MEDICINE

## 2024-03-21 PROCEDURE — 70496 CT ANGIOGRAPHY HEAD: CPT

## 2024-03-21 PROCEDURE — 250N000009 HC RX 250: Performed by: FAMILY MEDICINE

## 2024-03-21 PROCEDURE — 70496 CT ANGIOGRAPHY HEAD: CPT | Mod: 26 | Performed by: RADIOLOGY

## 2024-03-21 PROCEDURE — 73060 X-RAY EXAM OF HUMERUS: CPT | Mod: LT

## 2024-03-21 PROCEDURE — 84484 ASSAY OF TROPONIN QUANT: CPT | Performed by: FAMILY MEDICINE

## 2024-03-21 PROCEDURE — 250N000013 HC RX MED GY IP 250 OP 250 PS 637: Performed by: STUDENT IN AN ORGANIZED HEALTH CARE EDUCATION/TRAINING PROGRAM

## 2024-03-21 PROCEDURE — 73030 X-RAY EXAM OF SHOULDER: CPT | Mod: 26 | Performed by: RADIOLOGY

## 2024-03-21 PROCEDURE — 250N000011 HC RX IP 250 OP 636: Mod: JZ | Performed by: PHYSICIAN ASSISTANT

## 2024-03-21 PROCEDURE — 99285 EMERGENCY DEPT VISIT HI MDM: CPT | Mod: 25 | Performed by: FAMILY MEDICINE

## 2024-03-21 PROCEDURE — 71260 CT THORAX DX C+: CPT

## 2024-03-21 PROCEDURE — 73080 X-RAY EXAM OF ELBOW: CPT | Mod: LT

## 2024-03-21 PROCEDURE — 99207 PR NO CHARGE LOS: CPT | Performed by: SURGERY

## 2024-03-21 PROCEDURE — 96374 THER/PROPH/DIAG INJ IV PUSH: CPT | Performed by: FAMILY MEDICINE

## 2024-03-21 PROCEDURE — 99310 SBSQ NF CARE HIGH MDM 45: CPT | Performed by: FAMILY MEDICINE

## 2024-03-21 PROCEDURE — 73080 X-RAY EXAM OF ELBOW: CPT | Mod: 26 | Performed by: RADIOLOGY

## 2024-03-21 PROCEDURE — 70450 CT HEAD/BRAIN W/O DYE: CPT

## 2024-03-21 PROCEDURE — 73110 X-RAY EXAM OF WRIST: CPT | Mod: 26 | Performed by: RADIOLOGY

## 2024-03-21 PROCEDURE — 93010 ELECTROCARDIOGRAM REPORT: CPT | Performed by: FAMILY MEDICINE

## 2024-03-21 PROCEDURE — 99291 CRITICAL CARE FIRST HOUR: CPT | Performed by: EMERGENCY MEDICINE

## 2024-03-21 PROCEDURE — 85025 COMPLETE CBC W/AUTO DIFF WBC: CPT | Performed by: FAMILY MEDICINE

## 2024-03-21 PROCEDURE — 36415 COLL VENOUS BLD VENIPUNCTURE: CPT | Performed by: FAMILY MEDICINE

## 2024-03-21 PROCEDURE — 99292 CRITICAL CARE ADDL 30 MIN: CPT | Mod: 25 | Performed by: FAMILY MEDICINE

## 2024-03-21 PROCEDURE — 85025 COMPLETE CBC W/AUTO DIFF WBC: CPT | Performed by: EMERGENCY MEDICINE

## 2024-03-21 PROCEDURE — 86900 BLOOD TYPING SEROLOGIC ABO: CPT | Performed by: EMERGENCY MEDICINE

## 2024-03-21 PROCEDURE — 250N000011 HC RX IP 250 OP 636: Performed by: EMERGENCY MEDICINE

## 2024-03-21 PROCEDURE — 36415 COLL VENOUS BLD VENIPUNCTURE: CPT | Performed by: EMERGENCY MEDICINE

## 2024-03-21 PROCEDURE — 85610 PROTHROMBIN TIME: CPT | Performed by: FAMILY MEDICINE

## 2024-03-21 PROCEDURE — 250N000011 HC RX IP 250 OP 636: Mod: JZ | Performed by: FAMILY MEDICINE

## 2024-03-21 PROCEDURE — 72125 CT NECK SPINE W/O DYE: CPT

## 2024-03-21 PROCEDURE — 99291 CRITICAL CARE FIRST HOUR: CPT | Mod: 25 | Performed by: FAMILY MEDICINE

## 2024-03-21 PROCEDURE — 84460 ALANINE AMINO (ALT) (SGPT): CPT | Performed by: EMERGENCY MEDICINE

## 2024-03-21 PROCEDURE — 70498 CT ANGIOGRAPHY NECK: CPT | Mod: 26 | Performed by: RADIOLOGY

## 2024-03-21 PROCEDURE — 85730 THROMBOPLASTIN TIME PARTIAL: CPT | Performed by: EMERGENCY MEDICINE

## 2024-03-21 PROCEDURE — 93005 ELECTROCARDIOGRAM TRACING: CPT | Performed by: EMERGENCY MEDICINE

## 2024-03-21 PROCEDURE — 73110 X-RAY EXAM OF WRIST: CPT | Mod: LT

## 2024-03-21 PROCEDURE — 73030 X-RAY EXAM OF SHOULDER: CPT | Mod: LT

## 2024-03-21 PROCEDURE — 73060 X-RAY EXAM OF HUMERUS: CPT | Mod: 26 | Performed by: RADIOLOGY

## 2024-03-21 RX ORDER — ONDANSETRON 2 MG/ML
4 INJECTION INTRAMUSCULAR; INTRAVENOUS EVERY 6 HOURS PRN
Status: DISCONTINUED | OUTPATIENT
Start: 2024-03-21 | End: 2024-05-10 | Stop reason: HOSPADM

## 2024-03-21 RX ORDER — FOLIC ACID 1 MG/1
1 TABLET ORAL DAILY
Status: DISCONTINUED | OUTPATIENT
Start: 2024-03-22 | End: 2024-05-10 | Stop reason: HOSPADM

## 2024-03-21 RX ORDER — ACETAMINOPHEN 325 MG/1
650 TABLET ORAL EVERY 4 HOURS PRN
Status: DISCONTINUED | OUTPATIENT
Start: 2024-03-21 | End: 2024-03-30

## 2024-03-21 RX ORDER — ATORVASTATIN CALCIUM 40 MG/1
80 TABLET, FILM COATED ORAL AT BEDTIME
Status: DISCONTINUED | OUTPATIENT
Start: 2024-03-21 | End: 2024-03-21

## 2024-03-21 RX ORDER — IOPAMIDOL 755 MG/ML
67 INJECTION, SOLUTION INTRAVASCULAR ONCE
Status: COMPLETED | OUTPATIENT
Start: 2024-03-21 | End: 2024-03-21

## 2024-03-21 RX ORDER — PANTOPRAZOLE SODIUM 40 MG/1
40 TABLET, DELAYED RELEASE ORAL DAILY
Status: DISCONTINUED | OUTPATIENT
Start: 2024-03-22 | End: 2024-05-10 | Stop reason: HOSPADM

## 2024-03-21 RX ORDER — ATORVASTATIN CALCIUM 80 MG/1
80 TABLET, FILM COATED ORAL AT BEDTIME
Status: DISCONTINUED | OUTPATIENT
Start: 2024-03-22 | End: 2024-05-10 | Stop reason: HOSPADM

## 2024-03-21 RX ORDER — IOPAMIDOL 755 MG/ML
100 INJECTION, SOLUTION INTRAVASCULAR ONCE
Status: COMPLETED | OUTPATIENT
Start: 2024-03-21 | End: 2024-03-21

## 2024-03-21 RX ORDER — ONDANSETRON 4 MG/1
4 TABLET, ORALLY DISINTEGRATING ORAL EVERY 6 HOURS PRN
Status: DISCONTINUED | OUTPATIENT
Start: 2024-03-21 | End: 2024-05-10 | Stop reason: HOSPADM

## 2024-03-21 RX ORDER — LEVETIRACETAM 10 MG/ML
1000 INJECTION INTRAVASCULAR ONCE
Status: COMPLETED | OUTPATIENT
Start: 2024-03-21 | End: 2024-03-21

## 2024-03-21 RX ORDER — AMOXICILLIN 250 MG
1-2 CAPSULE ORAL 2 TIMES DAILY
Status: DISCONTINUED | OUTPATIENT
Start: 2024-03-21 | End: 2024-03-22

## 2024-03-21 RX ADMIN — Medication 5 MG: at 23:48

## 2024-03-21 RX ADMIN — ACETAMINOPHEN 650 MG: 325 TABLET, FILM COATED ORAL at 23:48

## 2024-03-21 RX ADMIN — IOPAMIDOL 100 ML: 755 INJECTION, SOLUTION INTRAVENOUS at 15:59

## 2024-03-21 RX ADMIN — SODIUM CHLORIDE 68 ML: 9 INJECTION, SOLUTION INTRAVENOUS at 15:59

## 2024-03-21 RX ADMIN — IOPAMIDOL 67 ML: 755 INJECTION, SOLUTION INTRAVENOUS at 22:06

## 2024-03-21 RX ADMIN — PROTHROMBIN, COAGULATION FACTOR VII HUMAN, COAGULATION FACTOR IX HUMAN, COAGULATION FACTOR X HUMAN, PROTEIN C, PROTEIN S HUMAN, AND WATER 5385 UNITS: KIT at 16:45

## 2024-03-21 RX ADMIN — LEVETIRACETAM 1000 MG: 10 INJECTION INTRAVENOUS at 22:49

## 2024-03-21 ASSESSMENT — ACTIVITIES OF DAILY LIVING (ADL)
ADLS_ACUITY_SCORE: 38

## 2024-03-21 ASSESSMENT — COLUMBIA-SUICIDE SEVERITY RATING SCALE - C-SSRS

## 2024-03-21 NOTE — ED NOTES
Called dispatch and changed ambulance transport to helicopter transport due to [facility] not available and [facility] would be coming from [place]. Confirmed change with Dr. Perrin.

## 2024-03-21 NOTE — ED PROVIDER NOTES
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 3/21/2024 4:22 PM    CLINICAL HISTORY: Chest and abdominal pain. Unwitnessed falls. History  of dementia.    TECHNIQUE: CT scan of the chest, abdomen, and pelvis was performed  following injection of IV contrast. Multiplanar reformats were  obtained. Dose reduction techniques were used.   CONTRAST: 100mL Isovue-370  COMPARISON: None.    FINDINGS:   LUNGS AND PLEURA: No pneumothorax. No pleural effusions. No lung  masses or consolidations.    MEDIASTINUM/AXILLAE: Sternotomy and aortic valve prosthesis. No  enlarged lymph nodes in the chest. No pericardial effusion. Moderate  atherosclerotic calcification of the thoracic aorta. Ectasia of the  ascending thoracic aorta measures 3.9 cm. Small hiatal hernia.    CORONARY ARTERY CALCIFICATION: Previous intervention (stents or CABG).    HEPATOBILIARY: Small right hepatic cyst would require no specific  follow-up. Scattered smaller hypodensities in the liver are too small  to characterize, but may also represent cysts. Cholecystectomy.    PANCREAS: Normal.    SPLEEN: Normal.    ADRENAL GLANDS: Normal.    KIDNEYS/BLADDER: Small left renal cysts would require no specific  follow-up. No hydronephrosis.    BOWEL: No bowel obstruction. Colonic diverticulosis. No convincing  evidence for colitis or diverticulitis. Unremarkable appendix.    LYMPH NODES: No enlarged lymph nodes are identified in  the abdomen or  pelvis.    VASCULATURE: Severe atherosclerotic aortoiliac calcification.    PELVIC ORGANS: Unremarkable.    ADDITIONAL FINDINGS: None.    MUSCULOSKELETAL: Degenerative changes throughout the thoracolumbar  spine. Postoperative changes of anterior fusion in the lower cervical  spine. Spinal stimulator device in the subcutaneous fat of the right  buttock region, with two leads extending into the spinal canal. Left  hip arthroplasty. Old right clavicular fracture.      Impression    IMPRESSION:   1.  No acute abnormality in the chest, abdomen, or pelvis.  2.  Colonic diverticulosis, without evidence for diverticulitis.  3.  Ectasia of the ascending thoracic aorta measures 3.9 cm.    DONNA FONTANA MD         SYSTEM ID:  [id number]   CT Cervical Spine w/o Contrast    Narrative    EXAM: CT CERVICAL SPINE W/O CONTRAST  3/21/2024 4:24 PM     HISTORY: Dementia, unwitnessed falls x 2, tender C-spine on exam       COMPARISON: No prior similar studies    TECHNIQUE: Using multidetector thin collimation helical acquisition  technique, axial, coronal and sagittal CT images through the cervical  spine were obtained without intravenous contrast. Dose reduction  techniques were used.    FINDINGS:  No acute fracture or traumatic subluxation. No prevertebral edema.     Postsurgical changes of instrumented anterior spinal fusion at C6-7.  There is complete ventral and partial dorsal bony fusion at C6-7.  Partial dorsal bony fusion at C2-3.    Mild anterolisthesis at C3-4.    Multilevel degenerative changes without high-grade spinal canal or  neural foraminal stenosis.    Normal paraspinous soft tissues.       Impression    IMPRESSION:  No acute fracture or posttraumatic subluxation.    SAM CANO MD         SYSTEM ID:  [id number]   4:21 PM  I was contacted by the interpreting radiologist for the patient's head CT.  He has evidence of extensive intracranial hemorrhage left-sided involving intraparenchymal,  intraventricular, subarachnoid bleeding.  I independently reviewed the head CT myself and I agree with these concerning findings.  The patient is as noted anticoagulated on DOAC apixaban.  I have placed orders for Kcentra reversal and requested neurosurgical consultation at [facility].    4:41 PM  I spoke with Dr. Flannery in the emergency department ED to ED.  We reviewed the patient she accepted the patient in transfer.  Ambulance was called for code 3 transfer.  I am still awaiting a callback from neurosurgery.  I spoke with the patient's granddaughter who is an employee here in the emergency department.  I discussed the seriousness of this intracranial hemorrhage with her.  She checked with the patient's son and medical power of  and they wish to continue with the patient being full code.    4:52 PM  I spoke with Dr. Smith for neurosurgery and reviewed this patient presentation and findings on imaging with him.  He agrees with plan for transporting the patient ED to ED and will assess the patient upon arrival there.    I was informed by my unit clerk that [facility] has no rig available in the area.  [facility] has a unit but in [place] at the present time.  The only other option for expedient transfer would be by helicopter.  Requested helicopter.        Medications   iopamidol (ISOVUE-370) solution 100 mL (100 mLs Intravenous $Given 3/21/24 5639)   sodium chloride 0.9 % bag 500mL for CT scan flush use (68 mLs As instructed $Given 3/21/24 1559)   prothrombin 4 factor complex concentrate (KCENTRA) infusion 5,385 Units (5,385 Units Intravenous $Given 3/21/24 1645)       Assessments & Plan (with Medical Decision Making)   83-year-old male past medical history reviewed as above who presents from nursing home with concerns of altered mental status from baseline mild cognitive impairment per nursing home staff as discussed in the HPI and documented with respect to abnormals on exam.  The patient is a very  poor historian given his cognitive impairment at baseline and more acutely confused making this much more difficult.  Given that the patient is on apixaban imaging was obtained of his head neck with physical exam findings as noted, chest abdomen pelvis.  Unfortunately the patient has extensive intracranial hemorrhage with midline shift.  Anticoagulated on apixaban.  Order placed for immediate reversal with Kcentra.  Discussed with Dr. Flannery in the emergency department at [facility] for ED to ED transfer given the trauma nature of his presentation.  I also spoke with neurosurgery as noted the time stamp above were in agreement with plan for transfer and will assess the patient upon arrival.  Transport options are limited at this time as noted at the time stamp above.  The patient will be transferred by helicopter.  Ongoing discussion with both the patient as well as his granddaughter who is present in the emergency department at the time of his evaluation.    I have reviewed the nursing notes.    I have reviewed the findings, diagnosis, plan and need for follow up with the patient.      New Prescriptions    No medications on file       Final diagnoses:   Intracranial hemorrhage (H)       3/21/2024   [facility] EMERGENCY DEPT       Giovanny Perrin MD  04/08/24 1957

## 2024-03-21 NOTE — LETTER
Recipient: [facility]: Medical Records F: [phone]          Sender: : Nayan Mitch Ph: [phone] ; return fax: [phone]          Date: 2024  Patient Name:  David Thomson  Patient YOB: 1940  Routing Message:  Hoping that Patient David Thomson ( 1940) has a health care directive/advanced directive or POA [facility], [facility]. His MRN is [id number]        The documents accompanying this notice contain confidential information belonging to the sender.  This information is intended only for the use of the individual or entity named above.  The authorized recipient of this information is prohibited from disclosing this information to any other party and is required to destroy the information after its stated need has been fulfilled, unless otherwise required by state law.    If you are not the intended recipient, you are hereby notified that any disclosure, copy, distribution or action taken in reliance on the contents of these documents is strictly prohibited.  If you have received this document in error, please return it by fax to [phone] with a note on the cover sheet explaining why you are returning it (e.g. not your patient, not your provider, etc.).  If you need further assistance, please call .  Documents may also be returned by mail to Health Information Management, , [facility] [address] [place], [place] [address].

## 2024-03-21 NOTE — PROGRESS NOTES
"[facility] GERIATRICS    PRIMARY CARE PROVIDER AND CLINIC:  Physician No Ref-Primary, No address on file  Chief Complaint   Patient presents with    Hospital F/U      [place] Medical Record Number:  [id number]  Place of Service where encounter took place:  [facility] [[id number]]    David Thomson  is a 83 year old  (1940), re-admitted to the above facility from  [facility] . Hospital stay 3/8/24 - 3/18/24. .   HPI:      Pt with PMH notable for left humerus fx s.p ORIF (11/18/23-11/21/23). rehospitalized from 12/22/23/- 12/28/23 with AMs after he was found down on the ground. Transitioned to this facility for rehab. Sent to [facility] on 1/9/24 with concerning for LUE infection s/p ID washout (1/10) and hardware removal. NWB.  Transitioned to this facility for rehab.       As per DNP's note:\"Castro was at [facility] (awaiting [facility] placement) and his son came to take him home for an ISABEL. He apparently had soiled himself, and both he and son would not allow nursing to assist him in hygiene and he left facility w/ son. When Castro and son presented to his home on ISABEL, Castro fell while trying to walk up steps, hitting his head on the concrete. Son called [facility], who directed him to call 911. Castro then presented to [facility] on 3/8.\"    He was found to have SDH and SAH. Repeat CT showed stability.     Today:   Nurse  that the patient had four unwitnessed falls total since his admission to this facility. Pt attempts to self transfer without calling for him. Two falls were last night.  Son reports that his father has not been back to his baseline (prior to fall). RN are doing neuro check.     ===================================================================    CODE STATUS/ADVANCE DIRECTIVES DISCUSSION:  Full Code  CPR/Full code   ALLERGIES:   Allergies   Allergen Reactions    Doxepin      Other Reaction(s): Disorientated    Lisinopril Cough    Piroxicam Hives     Other Reaction(s): Eruption of skin    " Polysorbate  [Bay Oil]     Prazosin Other (See Comments)     Nightmares    Thimerosal     Urea Other (See Comments)     Eruption of skin    Other Reaction(s): Eruption of skin    Zolpidem      Other Reaction(s): Delirium    Benzalkonium Rash      PAST MEDICAL HISTORY:   Past Medical History:   Diagnosis Date    Anemia     Atrial fibrillation (H)     Bilateral low back pain with left-sided sciatica     Borderline personality disorder (H)     BPH (benign prostatic hyperplasia)     CAD (coronary artery disease)     Cerebral artery occlusion with cerebral infarction (H)     Cervical spondylosis with myelopathy     Depression     Diarrhea     Falls frequently     Gastroesophageal reflux disease with esophagitis     Hereditary elliptocytosis (H24)     History of subdural hematoma     Hyperlipidaemia     Hypertension     Neuropathy     ISHAN (obstructive sleep apnea)     Pre-diabetes     PTSD (post-traumatic stress disorder)     S/P TAVR (transcatheter aortic valve replacement)     Thrombocytopenia (H24)     Vertigo       PAST SURGICAL HISTORY:   has a past surgical history that includes Gallbladder surgery; hip surgery; Wrist surgery (Bilateral); back surgery; Insert stimulator dorsal column (Right, 04/19/2016); IR Fine Needle Aspiration w Ultrasound (01/23/2023); IR Fine Needle Aspiration w Ultrasound (01/26/2023); Abdomen surgery; turp; C6-C7 ACDF (2005); Left C7-T1 foraminotomy  (2015); CABG, VEIN, THREE (2015); aortic valve replacement (2015); and Arthroplasty revision hip (Left, 3/15/2023).  FAMILY HISTORY: family history includes Heart Disease in his father and mother.  SOCIAL HISTORY:   reports that he has never smoked. He has never used smokeless tobacco. He reports that he does not currently use alcohol. He reports that he does not use drugs.  Patient's living condition: lives alone    Post Discharge Medication Reconciliation Status:   MED REC REQUIRED  Post Medication Reconciliation Status: discharge medications  reconciled and changed, per note/orders       Current Outpatient Medications   Medication Sig    acetaminophen (TYLENOL) 500 MG tablet Take 2 tablets (1,000 mg) by mouth 3 times daily as needed for mild pain    atorvastatin (LIPITOR) 80 MG tablet Take 1 tablet (80 mg) by mouth At Bedtime    ferrous sulfate (FE TABS) 325 (65 Fe) MG EC tablet Take 325 mg by mouth daily    fluticasone (FLONASE) 50 MCG/ACT nasal spray Spray 1 spray into both nostrils daily    folic acid (FOLVITE) 1 MG tablet Take 1 mg by mouth daily    gabapentin (NEURONTIN) 100 MG capsule Take 2 capsules (200 mg) by mouth 2 times daily    metoprolol succinate ER (TOPROL XL) 25 MG 24 hr tablet 12.5 mg    naloxone (NARCAN) 0.4 MG/ML injection Inject 0.4 mg into the vein May give 1ml in 2-3 minutes of patient is still unresponsive    omeprazole (PRILOSEC) 20 MG DR capsule Take 1 capsule (20 mg) by mouth daily Am and HS    polyethylene glycol (MIRALAX) 17 g packet Take 17 g by mouth daily    QUEtiapine (SEROQUEL) 50 MG tablet Take 50 mg by mouth daily    sacubitril-valsartan (ENTRESTO) 24-26 MG per tablet Take 1 tablet by mouth 2 times daily    senna-docusate (SENOKOT-S/PERICOLACE) 8.6-50 MG tablet Take 1 tablet by mouth 2 times daily    sertraline (ZOLOFT) 50 MG tablet Take 75 mg by mouth daily    sulfamethoxazole-trimethoprim (BACTRIM) 400-80 MG tablet Take 1 tablet by mouth daily    thiamine (B-1) 100 MG tablet Take 100 mg by mouth daily     No current facility-administered medications for this visit.       ROS: limited due to current condition    Vitals:  /71   Pulse 79   Temp 97.7  F (36.5  C)   Resp 18   Ht 1.829 m (6')   Wt 104 kg (229 lb 3.2 oz)   SpO2 97%   BMI 31.09 kg/m    Exam:  GENERAL APPEARANCE: in the bed, lethargic.   RESP:  Unlabored breathing. CTA b/l.   Eye: left pupil/cornea irregular in shape.   CV:  S1S2 audible, regular HR, no murmur appreciated.   ABDOMEN:  soft, NT/ND, BS audible.   M/S:   brace LUE in place.   SKIN:   "No rash noted on observation  NEURO:  right hand  4/5, dorsiflexion 5/5 b/l. . Answer some questions, follow commands, but could not open eyes.   PSYCH:  affect and mood normal      Lab/Diagnostic data: Reviewed in the chart and EHR.        ASSESSMENT/PLAN:    Encephalopathy  SAH (subarachnoid hemorrhage) (H)  SDH (subdural hematoma) (H)  Recurrent falls  - \"subarachnoid hemorrhage in the anterior inferior frontal lobes bilaterally. There is also a small overlying subdural hematoma. Also small volume posttraumatic subarachnoid hemorrhage with possible small contusions in the thin subdural hematoma at the left temporal lobe. In addition there was a parafalcine subdural hematoma measuring approximately 5 mm. \"- while IP was on DVT prophylaxis, eliquis however, on hold for 4 weeks.   - Required 1:1 sitter,  for delirium.   - had four falls in the last 3 days, two of them last night. Yesterday was up and interacting, this am, in the bed, lethargic, follows commands thought. Good hand . Moves toes. Has  been on neuro check since last night.   - left deformed pupil/cornea: old injury?  - given change in condition, discussed with the Son, and nursing staff. Will send to ED for further eval to rule out acute intracerebral pathology.       H/ ox bioprosthetic aortic valve replacement   Chronic Atrial fibrillation  - still off OAC. Appears CVR.     Closed fracture of occipital bone with routine healing, unspecified laterality, unspecified occipital fracture type, subsequent encounter  - routine care. Ortho routine follow up  - analgesia optimal      Orders: NNO        Electronically signed by:  Stefanie Hawkins MD                 "

## 2024-03-21 NOTE — LETTER
Transition Communication Hand-off for Care Transitions to Next Level of Care Provider    Name: David Thomson  : 1940  MRN #: [id number]  Primary Care Provider: Deandra Palacios     Primary Clinic: [address] [place] [address]     Reason for Hospitalization:  Intracranial hemorrhage (H) [I62.9]  Anticoagulated [Z79.01]  Closed left arm fracture [S42.302A]  Admit Date/Time: 3/21/2024  5:50 PM  Discharge Date: 5/10/24  Payor Source: Payor: [facility] / Plan: [facility] CCN / Product Type: Indemnity /     Readmission Assessment Measure (DOMINIC) Risk Score/category: 26%  Reason for Communication Hand-off Referral: Avoidable readmission within 30 days  Other Still needing to find placement at [facility] contracted skilled nursing facility     Discharge Plan: Will discharge to [facility] while the facility  continues to try and find placement at a [facility] contracted facility.     Concern for non-adherence with plan of care:   Y/N No  Discharge Needs Assessment:  Needs      Flowsheet Row Most Recent Value   Equipment Currently Used at Home walker, standard          Follow-up specialty is recommended: No    Follow-up plan:  No future appointments.    Any outstanding tests or procedures:        Referrals       Future Labs/Procedures    Adult Cardiology Eval  Referral     Process Instructions:    Consider Adult E-Consult to Cardiology for the following conditions: bradycardia, chest pain, heart failure, palpitations, syncope.  E-Consult Cost: 0-$125.    Comments:    Please be aware that coverage of these services is subject to the terms and limitations of your health insurance plan.  Call member services at your health plan with any benefit or coverage questions.  [facility] will call you to coordinate your care as prescribed by your provider. If you don't hear from a representative within 2 business days, please call [phone].      Adult Neurology   Referral     Comments:    Please be aware that coverage of these services is subject to the terms and limitations of your health insurance plan.  Call member services at your health plan with any benefit or coverage questions.  [facility] will call you to coordinate your care as prescribed by your provider. If you don't hear from a representative within 2 business days, please call [phone].    Adult Dermatology  Referral     Process Instructions:    Consider Adult E-Consult to Dermatology for the following conditions: acne, alopecia/hair loss, atopic dermatitis/eczema, pruritus without rash, psoriasis, rosacea  E-Consult Cost: 0-$125.    Comments:    Please be aware that coverage of these services is subject to the terms and limitations of your health insurance plan.  Call member services at your health plan with any benefit or coverage questions.  [facility] will call you to coordinate your care as prescribed by your provider. If you don't hear from a representative within 2 business days, please call [phone].      Adult Neurosurgery  Referral     Comments:    To help facilitate your referral to neurosurgery, please request the release of your outside records to assist the scheduling team. X-rays, CTs, MRIs, and other imaging must be pushed electronically to the [employer] system.    Please be aware that coverage of these services is subject to the terms and limitations of your health insurance plan.  Call member services at your health plan with any benefit or coverage questions.  [facility] will call you to coordinate your care as prescribed by your provider. If you don't hear from a representative within 2 business days, please call [phone].                Key Recommendations:  External handoff     Nayan Mitch    AVS/Discharge Summary is the source of truth; this is a helpful guide for improved communication of patient story
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
## 2024-03-21 NOTE — ED NOTES
Called [facility] for ER/Admits. Notification ID#: [id number]; Ref ID#: [id number]

## 2024-03-21 NOTE — ED TRIAGE NOTES
PT transferred from [place] ER for concerns of subdural hematoma from a fall. Pt is a/o x4 at this time. 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 Pt denies headache and nausea at this time.      Triage Assessment (Adult)       Row Name 03/21/24 7071          Triage Assessment    Airway WDL WDL        Respiratory WDL    Respiratory WDL WDL        Skin Circulation/Temperature WDL    Skin Circulation/Temperature WDL WDL        Cardiac WDL    Cardiac WDL X        Peripheral/Neurovascular WDL    Peripheral Neurovascular WDL WDL        Cognitive/Neuro/Behavioral WDL    Cognitive/Neuro/Behavioral WDL X     Level of Consciousness intermittent confusion     Orientation oriented x 4     Speech hoarse     Mood/Behavior cooperative        Pupils (CN II)    Pupil PERRLA no     Pupil Size Left other (see comments)  artifical lens     Pupil Shape Left other (see comments)  artificial lens     Pupil Reaction Left other (see comments)     Pupil Accommodation Left normal response     Pupil Size Right 3 mm     Pupil Shape Right round     Pupil Reaction Right brisk     Pupil Accommodation Right normal response        [place] Coma Scale    Best Eye Response 4-->(E4) spontaneous     Best Motor Response 6-->(M6) obeys commands     Best Verbal Response 5-->(V5) oriented     [place] Coma Scale Score 15

## 2024-03-21 NOTE — ED NOTES
"Bed: ED04  Expected date:   Expected time:   Means of arrival:   Comments:  Trauma transfer from [facility]  [birth year]  extensive intracranial hemorrhage left-sided involving intraparenchymal, intraventricular, subarachnoid bleeding, on warfarin INR 1.24  Had 2 falls overnight, unsure if hit head  Confused  VSS, K centra given   Hx \"off\" for a few months  "

## 2024-03-21 NOTE — ED TRIAGE NOTES
"2 falls overnight. Not sure if hit his head. Altered mental status. Pt main complaint is chills, states he has pain in his abdomen unable to pin point a spot and pain in his head. Staff at [facility] states \"he is more altered than usual.\"     Triage Assessment (Adult)       Row Name 03/21/24 1433          Triage Assessment    Airway WDL WDL        Respiratory WDL    Respiratory WDL WDL        Cognitive/Neuro/Behavioral WDL    Cognitive/Neuro/Behavioral WDL X     Level of Consciousness confused     Arousal Level opens eyes spontaneously     Orientation disoriented to;place     Speech clear     Mood/Behavior calm;cooperative        Pupils (CN II)    Pupil PERRLA yes     Pupil Size Left 3 mm     Pupil Size Right 3 mm        [place] Coma Scale    Best Eye Response 4-->(E4) spontaneous     Best Motor Response 6-->(M6) obeys commands     Best Verbal Response 4-->(V4) confused     [place] Coma Scale Score 14                     "

## 2024-03-21 NOTE — LETTER
"    3/21/2024        RE: David Thomson  [address] [address] [facility] [id number]        [facility] GERIATRICS    PRIMARY CARE PROVIDER AND CLINIC:  Physician No Ref-Primary, No address on file  Chief Complaint   Patient presents with     Hospital F/U      [place] Medical Record Number:  [id number]  Place of Service where encounter took place:  Formerly [facility], [facility] [place] ([facility]) [[id number]]    David Thomson  is a 83 year old  (1940), re-admitted to the above facility from  [facility] . Hospital stay 3/8/24 - 3/18/24. .   HPI:      Pt with PMH notable for left humerus fx s.p ORIF (11/18/23-11/21/23). rehospitalized from 12/22/23/- 12/28/23 with AMs after he was found down on the ground. Transitioned to this facility for rehab. Sent to [facility] on 1/9/24 with concerning for LUE infection s/p ID washout (1/10) and hardware removal. NWB.  Transitioned to this facility for rehab.       As per DNP's note:\"Castro was at [facility] (awaiting [facility]TC placement) and his son came to take him home for an ISABEL. He apparently had soiled himself, and both he and son would not allow nursing to assist him in hygiene and he left facility w/ son. When Castro and son presented to his home on ISABEL, Castro fell while trying to walk up steps, hitting his head on the concrete. Son called [facility], who directed him to call 911. Castro then presented to [facility] on 3/8.\"    He was found to have SDH and SAH. Repeat CT showed stability.     Today:   Nurse  that the patient had four unwitnessed falls total since his admission to this facility. Pt attempts to self transfer without calling for him. Two falls were last night.  Son reports that his father has not been back to his baseline (prior to fall). RN are doing neuro check.     ===================================================================    CODE STATUS/ADVANCE DIRECTIVES DISCUSSION:  Full Code  CPR/Full code   ALLERGIES:   Allergies   Allergen Reactions     Doxepin      Other Reaction(s): " Disorientated     Lisinopril Cough     Piroxicam Hives     Other Reaction(s): Eruption of skin     Polysorbate  [Bay Oil]      Prazosin Other (See Comments)     Nightmares     Thimerosal      Urea Other (See Comments)     Eruption of skin    Other Reaction(s): Eruption of skin     Zolpidem      Other Reaction(s): Delirium     Benzalkonium Rash      PAST MEDICAL HISTORY:   Past Medical History:   Diagnosis Date     Anemia      Atrial fibrillation (H)      Bilateral low back pain with left-sided sciatica      Borderline personality disorder (H)      BPH (benign prostatic hyperplasia)      CAD (coronary artery disease)      Cerebral artery occlusion with cerebral infarction (H)      Cervical spondylosis with myelopathy      Depression      Diarrhea      Falls frequently      Gastroesophageal reflux disease with esophagitis      Hereditary elliptocytosis (H24)      History of subdural hematoma      Hyperlipidaemia      Hypertension      Neuropathy      ISHAN (obstructive sleep apnea)      Pre-diabetes      PTSD (post-traumatic stress disorder)      S/P TAVR (transcatheter aortic valve replacement)      Thrombocytopenia (H24)      Vertigo       PAST SURGICAL HISTORY:   has a past surgical history that includes Gallbladder surgery; hip surgery; Wrist surgery (Bilateral); back surgery; Insert stimulator dorsal column (Right, 04/19/2016); IR Fine Needle Aspiration w Ultrasound (01/23/2023); IR Fine Needle Aspiration w Ultrasound (01/26/2023); Abdomen surgery; turp; C6-C7 ACDF (2005); Left C7-T1 foraminotomy  (2015); CABG, VEIN, THREE (2015); aortic valve replacement (2015); and Arthroplasty revision hip (Left, 3/15/2023).  FAMILY HISTORY: family history includes Heart Disease in his father and mother.  SOCIAL HISTORY:   reports that he has never smoked. He has never used smokeless tobacco. He reports that he does not currently use alcohol. He reports that he does not use drugs.  Patient's living condition: lives alone    Post  Discharge Medication Reconciliation Status:   MED REC REQUIRED  Post Medication Reconciliation Status: discharge medications reconciled and changed, per note/orders       Current Outpatient Medications   Medication Sig     acetaminophen (TYLENOL) 500 MG tablet Take 2 tablets (1,000 mg) by mouth 3 times daily as needed for mild pain     atorvastatin (LIPITOR) 80 MG tablet Take 1 tablet (80 mg) by mouth At Bedtime     ferrous sulfate (FE TABS) 325 (65 Fe) MG EC tablet Take 325 mg by mouth daily     fluticasone (FLONASE) 50 MCG/ACT nasal spray Spray 1 spray into both nostrils daily     folic acid (FOLVITE) 1 MG tablet Take 1 mg by mouth daily     gabapentin (NEURONTIN) 100 MG capsule Take 2 capsules (200 mg) by mouth 2 times daily     metoprolol succinate ER (TOPROL XL) 25 MG 24 hr tablet 12.5 mg     naloxone (NARCAN) 0.4 MG/ML injection Inject 0.4 mg into the vein May give 1ml in 2-3 minutes of patient is still unresponsive     omeprazole (PRILOSEC) 20 MG DR capsule Take 1 capsule (20 mg) by mouth daily Am and HS     polyethylene glycol (MIRALAX) 17 g packet Take 17 g by mouth daily     QUEtiapine (SEROQUEL) 50 MG tablet Take 50 mg by mouth daily     sacubitril-valsartan (ENTRESTO) 24-26 MG per tablet Take 1 tablet by mouth 2 times daily     senna-docusate (SENOKOT-S/PERICOLACE) 8.6-50 MG tablet Take 1 tablet by mouth 2 times daily     sertraline (ZOLOFT) 50 MG tablet Take 75 mg by mouth daily     sulfamethoxazole-trimethoprim (BACTRIM) 400-80 MG tablet Take 1 tablet by mouth daily     thiamine (B-1) 100 MG tablet Take 100 mg by mouth daily     No current facility-administered medications for this visit.       ROS: limited due to current condition    Vitals:  /71   Pulse 79   Temp 97.7  F (36.5  C)   Resp 18   Ht 1.829 m (6')   Wt 104 kg (229 lb 3.2 oz)   SpO2 97%   BMI 31.09 kg/m    Exam:  GENERAL APPEARANCE: in the bed, lethargic.   RESP:  Unlabored breathing. CTA b/l.   Eye: left pupil/cornea irregular  "in shape.   CV:  S1S2 audible, regular HR, no murmur appreciated.   ABDOMEN:  soft, NT/ND, BS audible.   M/S:   brace LUE in place.   SKIN:  No rash noted on observation  NEURO:  right hand  4/5, dorsiflexion 5/5 b/l. . Answer some questions, follow commands, but could not open eyes.   PSYCH:  affect and mood normal      Lab/Diagnostic data: Reviewed in the chart and EHR.        ASSESSMENT/PLAN:    Encephalopathy  SAH (subarachnoid hemorrhage) (H)  SDH (subdural hematoma) (H)  Recurrent falls  - \"subarachnoid hemorrhage in the anterior inferior frontal lobes bilaterally. There is also a small overlying subdural hematoma. Also small volume posttraumatic subarachnoid hemorrhage with possible small contusions in the thin subdural hematoma at the left temporal lobe. In addition there was a parafalcine subdural hematoma measuring approximately 5 mm. \"- while IP was on DVT prophylaxis, eliquis however, on hold for 4 weeks.   - Required 1:1 sitter,  for delirium.   - had four falls in the last 3 days, two of them last night. Yesterday was up and interacting, this am, in the bed, lethargic, follows commands thought. Good hand . Moves toes. Has  been on neuro check since last night.   - left deformed pupil/cornea: old injury?  - given change in condition, discussed with the Son, and nursing staff. Will send to ED for further eval to rule out acute intracerebral pathology.       H/ ox bioprosthetic aortic valve replacement   Chronic Atrial fibrillation  - still off OAC. Appears CVR.     Closed fracture of occipital bone with routine healing, unspecified laterality, unspecified occipital fracture type, subsequent encounter  - routine care. Ortho routine follow up  - analgesia optimal      Orders: NNO        Electronically signed by:  Stefanie Hawkins MD                   Sincerely,        Stefanie Hawkins MD      "

## 2024-03-22 ENCOUNTER — APPOINTMENT (OUTPATIENT)
Dept: NEUROLOGY | Facility: CLINIC | Age: 84
DRG: 085 | End: 2024-03-22
Payer: COMMERCIAL

## 2024-03-22 PROBLEM — Z91.81 HISTORY OF FALLING: Status: ACTIVE | Noted: 2023-12-29

## 2024-03-22 LAB
ANION GAP SERPL CALCULATED.3IONS-SCNC: 10 MMOL/L (ref 7–15)
ATRIAL RATE - MUSE: 58 BPM
BUN SERPL-MCNC: 19.6 MG/DL (ref 8–23)
CALCIUM SERPL-MCNC: 9.3 MG/DL (ref 8.8–10.2)
CHLORIDE SERPL-SCNC: 106 MMOL/L (ref 98–107)
CREAT SERPL-MCNC: 0.85 MG/DL (ref 0.67–1.17)
DEPRECATED HCO3 PLAS-SCNC: 22 MMOL/L (ref 22–29)
DIASTOLIC BLOOD PRESSURE - MUSE: NORMAL MMHG
EGFRCR SERPLBLD CKD-EPI 2021: 87 ML/MIN/1.73M2
FOLATE SERPL-MCNC: 39.6 NG/ML (ref 4.6–34.8)
GLUCOSE BLDC GLUCOMTR-MCNC: 110 MG/DL (ref 70–99)
GLUCOSE SERPL-MCNC: 112 MG/DL (ref 70–99)
INTERPRETATION ECG - MUSE: NORMAL
MAGNESIUM SERPL-MCNC: 2.6 MG/DL (ref 1.7–2.3)
P AXIS - MUSE: 89 DEGREES
POTASSIUM SERPL-SCNC: 3.8 MMOL/L (ref 3.4–5.3)
PR INTERVAL - MUSE: 216 MS
QRS DURATION - MUSE: 172 MS
QT - MUSE: 504 MS
QTC - MUSE: 494 MS
R AXIS - MUSE: -36 DEGREES
SODIUM SERPL-SCNC: 138 MMOL/L (ref 135–145)
SYSTOLIC BLOOD PRESSURE - MUSE: NORMAL MMHG
T AXIS - MUSE: 100 DEGREES
TSH SERPL DL<=0.005 MIU/L-ACNC: 1.52 UIU/ML (ref 0.3–4.2)
VENTRICULAR RATE- MUSE: 58 BPM
VIT B12 SERPL-MCNC: 717 PG/ML (ref 232–1245)

## 2024-03-22 PROCEDURE — 99221 1ST HOSP IP/OBS SF/LOW 40: CPT | Mod: 25 | Performed by: PSYCHIATRY & NEUROLOGY

## 2024-03-22 PROCEDURE — 250N000013 HC RX MED GY IP 250 OP 250 PS 637

## 2024-03-22 PROCEDURE — 95714 VEEG EA 12-26 HR UNMNTR: CPT

## 2024-03-22 PROCEDURE — 82607 VITAMIN B-12: CPT | Performed by: NURSE PRACTITIONER

## 2024-03-22 PROCEDURE — 36415 COLL VENOUS BLD VENIPUNCTURE: CPT | Performed by: STUDENT IN AN ORGANIZED HEALTH CARE EDUCATION/TRAINING PROGRAM

## 2024-03-22 PROCEDURE — 99233 SBSQ HOSP IP/OBS HIGH 50: CPT | Mod: GC | Performed by: HOSPITALIST

## 2024-03-22 PROCEDURE — 99207 EEG VIDEO 12-26 HR UNMONITORED: CPT | Performed by: PSYCHIATRY & NEUROLOGY

## 2024-03-22 PROCEDURE — 120N000003 HC R&B IMCU UMMC

## 2024-03-22 PROCEDURE — 82746 ASSAY OF FOLIC ACID SERUM: CPT | Performed by: NURSE PRACTITIONER

## 2024-03-22 PROCEDURE — 99221 1ST HOSP IP/OBS SF/LOW 40: CPT | Performed by: NURSE PRACTITIONER

## 2024-03-22 PROCEDURE — 250N000013 HC RX MED GY IP 250 OP 250 PS 637: Performed by: STUDENT IN AN ORGANIZED HEALTH CARE EDUCATION/TRAINING PROGRAM

## 2024-03-22 PROCEDURE — 36415 COLL VENOUS BLD VENIPUNCTURE: CPT | Performed by: NURSE PRACTITIONER

## 2024-03-22 PROCEDURE — 80048 BASIC METABOLIC PNL TOTAL CA: CPT | Performed by: STUDENT IN AN ORGANIZED HEALTH CARE EDUCATION/TRAINING PROGRAM

## 2024-03-22 PROCEDURE — 95720 EEG PHY/QHP EA INCR W/VEEG: CPT | Performed by: PSYCHIATRY & NEUROLOGY

## 2024-03-22 PROCEDURE — 83735 ASSAY OF MAGNESIUM: CPT | Performed by: STUDENT IN AN ORGANIZED HEALTH CARE EDUCATION/TRAINING PROGRAM

## 2024-03-22 PROCEDURE — 84443 ASSAY THYROID STIM HORMONE: CPT | Performed by: NURSE PRACTITIONER

## 2024-03-22 PROCEDURE — 250N000011 HC RX IP 250 OP 636

## 2024-03-22 PROCEDURE — 250N000013 HC RX MED GY IP 250 OP 250 PS 637: Performed by: NURSE PRACTITIONER

## 2024-03-22 RX ORDER — LEVETIRACETAM 500 MG/1
500 TABLET ORAL 2 TIMES DAILY
Status: DISCONTINUED | OUTPATIENT
Start: 2024-03-22 | End: 2024-03-22

## 2024-03-22 RX ORDER — OLANZAPINE 10 MG/1
1.25 INJECTION, POWDER, LYOPHILIZED, FOR SOLUTION INTRAMUSCULAR ONCE
Status: DISCONTINUED | OUTPATIENT
Start: 2024-03-23 | End: 2024-03-22

## 2024-03-22 RX ORDER — AMOXICILLIN 250 MG
1 CAPSULE ORAL AT BEDTIME
Status: DISCONTINUED | OUTPATIENT
Start: 2024-03-22 | End: 2024-05-10 | Stop reason: HOSPADM

## 2024-03-22 RX ORDER — GABAPENTIN 100 MG/1
200 CAPSULE ORAL 2 TIMES DAILY
Status: DISCONTINUED | OUTPATIENT
Start: 2024-03-22 | End: 2024-03-22

## 2024-03-22 RX ORDER — QUETIAPINE FUMARATE 25 MG/1
25 TABLET, FILM COATED ORAL
Status: DISCONTINUED | OUTPATIENT
Start: 2024-03-22 | End: 2024-03-23

## 2024-03-22 RX ORDER — OLANZAPINE 5 MG/1
5 TABLET, ORALLY DISINTEGRATING ORAL
Status: DISCONTINUED | OUTPATIENT
Start: 2024-03-22 | End: 2024-03-23

## 2024-03-22 RX ORDER — FERROUS SULFATE 325(65) MG
325 TABLET ORAL EVERY OTHER DAY
Status: DISCONTINUED | OUTPATIENT
Start: 2024-03-22 | End: 2024-04-10

## 2024-03-22 RX ORDER — DEXTROSE, SODIUM CHLORIDE, SODIUM LACTATE, POTASSIUM CHLORIDE, AND CALCIUM CHLORIDE 5; .6; .31; .03; .02 G/100ML; G/100ML; G/100ML; G/100ML; G/100ML
INJECTION, SOLUTION INTRAVENOUS CONTINUOUS
Status: DISPENSED | OUTPATIENT
Start: 2024-03-22 | End: 2024-03-23

## 2024-03-22 RX ORDER — LEVETIRACETAM 250 MG/1
250 TABLET ORAL ONCE
Qty: 1 TABLET | Refills: 0 | Status: COMPLETED | OUTPATIENT
Start: 2024-03-22 | End: 2024-03-22

## 2024-03-22 RX ORDER — SULFAMETHOXAZOLE AND TRIMETHOPRIM 400; 80 MG/1; MG/1
1 TABLET ORAL 2 TIMES DAILY
Status: DISCONTINUED | OUTPATIENT
Start: 2024-03-22 | End: 2024-05-10 | Stop reason: HOSPADM

## 2024-03-22 RX ORDER — METOPROLOL SUCCINATE 25 MG/1
12.5 TABLET, EXTENDED RELEASE ORAL DAILY
COMMUNITY

## 2024-03-22 RX ORDER — LEVETIRACETAM 750 MG/1
750 TABLET ORAL 2 TIMES DAILY
Status: DISCONTINUED | OUTPATIENT
Start: 2024-03-22 | End: 2024-03-25

## 2024-03-22 RX ADMIN — DOCUSATE SODIUM 50 MG AND SENNOSIDES 8.6 MG 1 TABLET: 8.6; 5 TABLET, FILM COATED ORAL at 08:41

## 2024-03-22 RX ADMIN — QUETIAPINE FUMARATE 25 MG: 25 TABLET ORAL at 01:05

## 2024-03-22 RX ADMIN — FOLIC ACID 1 MG: 1 TABLET ORAL at 08:41

## 2024-03-22 RX ADMIN — SERTRALINE HYDROCHLORIDE 75 MG: 50 TABLET ORAL at 08:41

## 2024-03-22 RX ADMIN — THIAMINE HCL TAB 100 MG 100 MG: 100 TAB at 08:41

## 2024-03-22 RX ADMIN — SULFAMETHOXAZOLE AND TRIMETHOPRIM 1 TABLET: 400; 80 TABLET ORAL at 21:04

## 2024-03-22 RX ADMIN — ACETAMINOPHEN 650 MG: 325 TABLET, FILM COATED ORAL at 08:41

## 2024-03-22 RX ADMIN — GABAPENTIN 200 MG: 100 CAPSULE ORAL at 08:41

## 2024-03-22 RX ADMIN — PANTOPRAZOLE SODIUM 40 MG: 40 TABLET, DELAYED RELEASE ORAL at 08:41

## 2024-03-22 RX ADMIN — SODIUM CHLORIDE, SODIUM LACTATE, POTASSIUM CHLORIDE, CALCIUM CHLORIDE AND DEXTROSE MONOHYDRATE: 5; 600; 310; 30; 20 INJECTION, SOLUTION INTRAVENOUS at 16:33

## 2024-03-22 RX ADMIN — ATORVASTATIN CALCIUM 80 MG: 80 TABLET, FILM COATED ORAL at 21:04

## 2024-03-22 RX ADMIN — LEVETIRACETAM 250 MG: 250 TABLET, FILM COATED ORAL at 10:18

## 2024-03-22 RX ADMIN — LEVETIRACETAM 500 MG: 500 TABLET, FILM COATED ORAL at 08:41

## 2024-03-22 RX ADMIN — SENNOSIDES AND DOCUSATE SODIUM 1 TABLET: 8.6; 5 TABLET ORAL at 21:04

## 2024-03-22 RX ADMIN — LEVETIRACETAM 750 MG: 750 TABLET, FILM COATED ORAL at 21:05

## 2024-03-22 ASSESSMENT — ACTIVITIES OF DAILY LIVING (ADL)
ADLS_ACUITY_SCORE: 38
DEPENDENT_IADLS:: TRANSPORTATION;CLEANING;COOKING;SHOPPING;LAUNDRY
ADLS_ACUITY_SCORE: 38
ADLS_ACUITY_SCORE: 44
ADLS_ACUITY_SCORE: 44
ADLS_ACUITY_SCORE: 38
ADLS_ACUITY_SCORE: 44
ADLS_ACUITY_SCORE: 38
ADLS_ACUITY_SCORE: 44
ADLS_ACUITY_SCORE: 38
ADLS_ACUITY_SCORE: 38
ADLS_ACUITY_SCORE: 44
ADLS_ACUITY_SCORE: 38

## 2024-03-22 NOTE — PROGRESS NOTES
Resident/Fellow Attestation   I, Desire Langley MD, was present with the medical/MAYA student who participated in the service and in the documentation of the note.  I have verified the history and personally performed the physical exam and medical decision making.  I agree with the assessment and plan of care as documented in the note.      Desire Langley MD  PGY3  Date of Service (when I saw the patient): 03/22/24    [facility]    Progress Note - Medicine Service, MAROON TEAM 2       Date of Admission:  3/21/2024    Assessment & Plan   David Thomson is a 83 year old male with history of HTN, T2DM, CAD sp CABG, cognitive impairment, and A fib, Aortic stenosis s/o TAVR with bioprosthetic valve, no longer on Eliquis, admitted on 3/21/2024 after a fall with AMS. He was admitted at OSH 03/08-03/18 after a fall at which time he was found to have IPH, IVH, SAH, and SDH, evalled by NSGY, no indication for intervention, recommended hold Eliquis x4 wks. Re-evalled at OSH ED 2/2 falls on 03/21, transferred to [facility] due to evidence of bleeds on CT. Seen by NSGY here, no further intervention indicated. Evaluated by neurology due to concern for seizure, loaded with Keppra 1000mg, now on 750mg BID x7 days. Care transitioned from trauma service for continued evaluation of recurrent falls and encephalopathy.    # Encephalopathy of undetermined etiology  # Multiple intracranial hemorrhages s/p head injury, subacute  # ?Seizure  Patient with recent history as above. On exam patient appears to be sleeping, minimally responsive but following commands, moving all extremities. Concern for seizure in the setting of recent intracranial bleed. Possible infectious etiology, UA is pending. Currently holding Eliquis, appreciate neurosurgery recs. Seen by neurology, currently on 24hr EEG, loaded with Keppra 1000mg, now on 750mg BID. Electrolytes, glucose, TSH, VitB1, VitB12 WNL. CBC  with mild anemia but unchanged from prior. Unclear baseline but AOx3 per chart review. Plan to continue evaluation of altered mental status.   - Neuro exams q4h  - Keppra x7 days 750mg BID per neuro  - PT/OT  - Per NSGY: Platelets > 100k, Hgb >8, INR<1.4, HOB >30 degs.  - Bladder scan to assess for urinary retention, UA    # Cognitive disorder   # PTSD  # Depression  Patient started on Seroquel for delirium at OSH due to hospitalization in the setting of acute illness. Patient sleeping, minimally responsive as above.   - Continue PTA sertraline  - Hold PTA Seroquel  - Hold PTA Prazosin    --Chronic--  # Afib  # Aortic stenosis s/p TAVR  Patient previously on Eliquis for Afib and AS s/p TAVR with bioprosthetic valve. Eliquis is being held due to recent intracranial hemorrhage as per NSGY. Patient needs outpatient cardiology follow up for consideration of Watchman procedure,.     # HFpEF  # Hypertension   - PTA Toprol XL and Entresto  - Last available Echo shows EF 55-60%, normal right and left ventricles, presence of bioprosthetic aortic valve.    #HLD  -PTA lipitor    #GERD  -Continue PTA omeprazole    Idiopathic progressive neuropathy  Hold PTA Gabapentin due to AMS    Chronic incompletely united distal humerus fracture  Patient is s/p left elbow fracture s/p ORIF at [facility], complicated by elbow wound requiring washout. Per chart review, on lifelong Bactrim.  - Continue PTA Bactrim    -- Outpatient Follow Ups Needed--  Neurosurgery - to assess for hydrocephalus    Cardiology follow up for consideration of Watchman procedure.        Diet: Regular Diet Adult    DVT Prophylaxis: Pneumatic Compression Devices  Rice Catheter: Not present  Fluids: D5 LR 100ml/hr  Lines: None     Cardiac Monitoring: None  Code Status: Full Code      Clinically Significant Risk Factors Present on Admission                  # Hypertension: Noted on problem list  # Chronic heart failure with preserved ejection fraction: heart failure noted on  "problem list and last echo with EF >50%     # Obesity: Estimated body mass index is 30.13 kg/m  as calculated from the following:    Height as of an earlier encounter on 3/21/24: 1.854 m (6' 1\").    Weight as of this encounter: 103.6 kg (228 lb 6.3 oz).              Disposition Plan      Expected Discharge Date: 03/23/2024                The patient's care was discussed with the Attending Physician, Dr. Leal .    Gavino AguiarMurray-Calloway County Hospitalxander  Medical Student  Medicine Service, [address]  Securely message with [employer] (more info)  Text page via [facility] Paging/Directory   See signed in provider for up to date coverage information  ______________________________________________________________________    Interval History   - Patient sleeping on exam, not answering questions    Resident Addendum: Spoke with patients sons this afternoon: patient mentation has not been at baseline since the admission for his last fall. They describe him as witty and sharp but does get confused occasionally and repeat the same story. Since his fall and brain bleed he has been very sleepy. Before the fall he was AxOx3.    He said when he fell in December he took a week and a half before he was not hallucinating and talking to people that were not there. They feel he is similar to this but worse now.     Physical Exam   Vital Signs: Temp: 97.9  F (36.6  C) Temp src: Oral BP: 128/67 Pulse: 55   Resp: 17 SpO2: 97 % O2 Device: None (Room air)    Weight: 228 lbs 6.34 oz    General Appearance: Lethargic, responds to name but not answering question  Respiratory: breathing comfortably on room air, no adventitious sounds in anterior lung fields  Cardiovascular: irregular irregular, bradycardic  GI: no tenderness appreciated  Skin: scattered abrasions to bilateral knees  Neuro: Not responding to questions appropriately, left pupil irregular and larger than right which is baseline,  strength " "intact bilaterally, LE 4/5 hip flexion, plantar and dorsiflexion b/l    Data     I have personally reviewed the following data over the past 24 hrs:    5.5  \   10.5 (L)   / 241     138 106 19.6 /  112 (H)   3.8 22 0.85 \     ALT: 47 AST: 42 AP: 135 TBILI: 1.2   ALB: 4.1 TOT PROTEIN: 6.8 LIPASE: N/A     TSH: 1.52 T4: N/A A1C: N/A     INR:  1.04 PTT:  31   D-dimer:  N/A Fibrinogen:  N/A       Imaging results reviewed over the past 24 hrs:   Recent Results (from the past 24 hour(s))   Elbow  XR, G/E 3 views, left    Narrative    EXAM: XR ELBOW LEFT G/E 3 VIEWS, XR SHOULDER LEFT G/E 3 VIEWS, XR HUMERUS LEFT G/E 2 VIEWS  LOCATION: [facility]  DATE: 3/21/2024    INDICATION: prior L \"arm fracture\". +L wrist pain  COMPARISON: 03/08/2024.      Impression    IMPRESSION:      Again seen are postoperative changes from lateral plate and screw fixation for management of a chronic incompletely united distal humerus fracture. Hardware appears intact. The fracture lucency remains visible, fairly similar to the 03/08/2024   radiographs. There is again heterotopic ossification about the medial and lateral elbow. No definite new fracture is identified. There is soft tissue swelling over the elbow.    No acute fracture or dislocation is seen within the more proximal left humerus or left shoulder. There are mild degenerative changes at the left acromioclavicular and glenohumeral joints.   XR Wrist Left G/E 3 Views    Narrative    EXAM: XR WRIST LEFT G/E 3 VIEWS  LOCATION: [facility]  DATE: 3/21/2024    INDICATION: prior L \"arm fracture\". +L wrist pain  COMPARISON: None.      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 No acute, displaced fracture   is " "identified. Vascular atherosclerotic calcifications are noted.   Humerus XR,  G/E 2 views, left    Narrative    EXAM: XR ELBOW LEFT G/E 3 VIEWS, XR SHOULDER LEFT G/E 3 VIEWS, XR HUMERUS LEFT G/E 2 VIEWS  LOCATION: [facility]  DATE: 3/21/2024    INDICATION: prior L \"arm fracture\". +L wrist pain  COMPARISON: 03/08/2024.      Impression    IMPRESSION:      Again seen are postoperative changes from lateral plate and screw fixation for management of a chronic incompletely united distal humerus fracture. Hardware appears intact. The fracture lucency remains visible, fairly similar to the 03/08/2024   radiographs. There is again heterotopic ossification about the medial and lateral elbow. No definite new fracture is identified. There is soft tissue swelling over the elbow.    No acute fracture or dislocation is seen within the more proximal left humerus or left shoulder. There are mild degenerative changes at the left acromioclavicular and glenohumeral joints.   XR Shoulder Left G/E 3 Views    Narrative    EXAM: XR ELBOW LEFT G/E 3 VIEWS, XR SHOULDER LEFT G/E 3 VIEWS, XR HUMERUS LEFT G/E 2 VIEWS  LOCATION: [facility]  DATE: 3/21/2024    INDICATION: prior L \"arm fracture\". +L wrist pain  COMPARISON: 03/08/2024.      Impression    IMPRESSION:      Again seen are postoperative changes from lateral plate and screw fixation for management of a chronic incompletely united distal humerus fracture. Hardware appears intact. The fracture lucency remains visible, fairly similar to the 03/08/2024   radiographs. There is again heterotopic ossification about the medial and lateral elbow. No definite new fracture is identified. There is soft tissue swelling over the elbow.    No acute fracture or dislocation is seen within the more proximal left humerus or left shoulder. There are mild degenerative changes at the left acromioclavicular and " glenohumeral joints.   CT Head w/o Contrast    Narrative    EXAM: CT HEAD W/O CONTRAST  LOCATION: [facility]  DATE: 3/21/2024    INDICATION: Stability scan six hours after 4 pm.  COMPARISON: 03/21/2024. 3/9/2024 and 3/8/2024 from an outside institution.  TECHNIQUE: Routine CT Head without IV contrast. Multiplanar reformats. Dose reduction techniques were used.    FINDINGS:  INTRACRANIAL CONTENTS: Again seen is an acute intraparenchymal hematoma centered within the left inferior frontal lobe measuring up to 3.2 x 1.7 cm in greatest axial dimensions, previously 3.2 x 1.9 cm in greatest axial dimensions, likely stable given   differences in imaging technique. When compared to exam dated 03/09/2024, this hemorrhage demonstrates interval expected evolution and has not increased in size when compared to prior. Additional small regions of intraparenchymal hemorrhage are noted   along the medial aspect of the inferior frontal lobe on the left and stable compared to prior exam from earlier in the day. Ill-defined regions of intraparenchymal/subarachnoid hemorrhage also noted along the inferior aspect of the right frontal lobe.   Persistent regions of edema are noted involving the bilateral inferior frontal lobes at the skull base, left worse than right, similar to prior exam (but progressed since 03/09/2024). Scattered subarachnoid hemorrhage is noted involving the bilateral   anterior/inferior frontal lobes, left temporal lobe, right temporal lobe and left posterior frontal/parietal lobe, similar to most recent prior exam. Persistent hemorrhage is layering within the occipital horns of the lateral ventricles, similar to prior   exam. Ventricular caliber appears stable when compared to 03/21/2024, but increased from 03/09/2024.    Left-to-right midline shift measures 0.7 cm, previously 0.7 cm. Midline shift on 03/09/2024 measuring approximately 0.6 cm. No CT evidence of acute  infarct. Question small volume and stable bilateral subdural hematomas versus prominence of the bilateral   sphenoparietal sinuses along the anterior middle cranial fossae. Stable-appearing subdural hematomas noted along the tentorial leaflets and falx (decreased from 03/09/2024). Mild presumed chronic small vessel ischemic changes. Mild to moderate   generalized volume loss.      VISUALIZED ORBITS/SINUSES/MASTOIDS: Prior bilateral cataract surgery. Visualized portions of the orbits are otherwise unremarkable. Mild mucosal thickening scattered about the paranasal sinuses. Trace bilateral mastoid effusions.    BONES/SOFT TISSUES: Stable nondisplaced occipital bone fracture.      Impression    IMPRESSION:  1.  Evolving multicompartmental intracranial hemorrhage, as above, without significant interval progression when compared to CT head dated 03/21/2024 earlier in the day. Comparison images were made dated 3/8/2024 and 3/9/2024 from an outside institution   which demonstrated multi compartmental acute intraparenchymal hemorrhage, in a similar distribution as today's examination. The hemorrhage on today's examination is favored to be late acute/subacute given that these findings were present on exam dated   03/09/2024 and demonstrate expected interval evolution.  2.  Stable left-to-right midline shift measuring 0.7 cm. Midline shift dated 03/09/2024 measured approximately 0.6 cm.   3.  Persistent intraventricular hemorrhage with stable ventricular caliber compared to exam dated 03/21/2024. The ventricles have mildly increased in size when compared to exam dated 03/09/2024.   4.  Stable nondisplaced occipital bone fracture.          Dr. Srinivas Fitzpatrick discussed results with Dr. Melgar on 03/21/2024 at 11:22 PM.   CTV Head Neck w Contrast    Narrative    EXAM: CTV HEAD NECK W CONTRAST  LOCATION: [facility]  DATE: 3/21/2024    INDICATION: Assess involvement of occipital  fracture notable and impact to sinus for sinus injury or thrombosis.  COMPARISON: Multiple prior head CTs.  CONTRAST: IsoVue-370, 67 mL.  TECHNIQUE: Head CT venogram with IV contrast. Axial helical CT images of the intracranial vessels obtained during the venous phase of intravenous contrast administration. Axial helical 2D reconstructed images and multiplanar 3D MIP reconstructed images   of the intracranial vessels were performed by the technologist. Dose reduction techniques were used.     FINDINGS:     Examination is degraded by suboptimal contrast bolus timing/opacification of the venous sinuses, internal cerebral veins and major cortical veins.    HEAD CTV:  DURAL SINUSES: No significant stenosis or occlusion. Dominant right and smaller left transverse and sigmoid dural sinuses. Poor contrast opacification involving the jugular bulb/jugular veins is likely related to contrast bolus timing and poor photon   penetration secondary to the adjacent osseous structures.    INTERNAL CEREBRAL VEINS: No definite stenosis or occlusion.      MAJOR CORTICAL VENOUS BRANCHES: No definite stenosis or occlusion.      Impression    IMPRESSION:     HEAD CTV:   1.  No definite intracranial venous thrombosis or stenosis.

## 2024-03-22 NOTE — PHARMACY-ADMISSION MEDICATION HISTORY
Pharmacy Intern Admission Medication History    Admission medication history is complete. The information provided in this note is only as accurate as the sources available at the time of the update.    Information Source(s): Facility (TCU/NH/) medication list/MAR via  fax    Pertinent Information:   U: [place] [facility] (P. [phone])  All last dose dates from TCU MAR    Changes made to PTA medication list:  Added:   Eliquis  Creon  Melatonin  Deleted:   Acetaminophen  Thiamine  Changed:   Ferrous sulfate 325 mg once daily -> 325 mg every other day on even days  Quetiapine 50 mg once daily -> 50 mg every evening PRN  Senna-docusate BID -> BID PRN  Bactrim once daily -> BID    Allergies reviewed with patient and updates made in EHR: yes    Medication History Completed By: Emilie Osorio 3/22/2024 3:03 PM    PTA Med List   Medication Sig Last Dose    apixaban ANTICOAGULANT (ELIQUIS) 5 MG tablet Take 5 mg by mouth 2 times daily 3/8/2024 at AM    atorvastatin (LIPITOR) 80 MG tablet Take 1 tablet (80 mg) by mouth At Bedtime 3/7/2024 at PM    ferrous sulfate (FE TABS) 325 (65 Fe) MG EC tablet Take 325 mg by mouth every other day Take on even numbered days. 3/8/2024 at AM    fluticasone (FLONASE) 50 MCG/ACT nasal spray Spray 1 spray into both nostrils daily 3/21/2024 at AM    folic acid (FOLVITE) 1 MG tablet Take 1 mg by mouth daily 3/21/2024 at AM    gabapentin (NEURONTIN) 100 MG capsule Take 2 capsules (200 mg) by mouth 2 times daily 3/21/2024 at AM    lipase-protease-amylase (CREON 12) [id number] units CPEP Take 1 capsule by mouth daily 3/8/2024 at AM    melatonin 5 MG tablet Take 5 mg by mouth at bedtime 3/18/2024 at HS    metoprolol succinate ER (TOPROL XL) 25 MG 24 hr tablet Take 12.5 mg by mouth daily 3/21/2024 at AM    naloxone (NARCAN) 0.4 MG/ML injection Inject 0.4 mg into the vein May give 1ml in 2-3 minutes of patient is still unresponsive Unknown    omeprazole (PRILOSEC) 20 MG DR capsule Take 1  capsule (20 mg) by mouth daily Am and HS 3/8/2024 at AM    polyethylene glycol (MIRALAX) 17 g packet Take 17 g by mouth daily 3/4/2024 at AM    QUEtiapine (SEROQUEL) 50 MG tablet Take 50 mg by mouth every evening as needed Unknown    sacubitril-valsartan (ENTRESTO) 24-26 MG per tablet Take 1 tablet by mouth 2 times daily 3/21/2024 at AM    senna-docusate (SENOKOT-S/PERICOLACE) 8.6-50 MG tablet Take 1 tablet by mouth 2 times daily Unknown    sertraline (ZOLOFT) 50 MG tablet Take 75 mg by mouth daily 3/21/2024 at AM    sulfamethoxazole-trimethoprim (BACTRIM) 400-80 MG tablet Take 1 tablet by mouth 2 times daily 3/8/2024 at AM

## 2024-03-22 NOTE — PROGRESS NOTES
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  The patient presented from a facility to an outside hospital after a questionable fall (the only presenting symptom was that the patient was found to be below his cognitive baseline, the patient has a history of dementia).  The patient was found to have the CT findings as previously mentioned, and thereby transferred to the [facility].      Of note, the patient was recently hospitalized on 3/8/2024 after a witnessed fall.  The CT images during this hospitalization from [facility] were imported into PACS under Exceptions.  After a comprehensive review of these imaging findings and the hospital course at [facility] with radiology attending at [facility] (Dr. Fitzpatrick), it was determined that the multifocal hemorrhages (including intraparenchymal/contusions, subarachnoid, intraventricular, subdural) and the linear nondisplaced right occipital bone fracture were previously noted during the 3/8-9/2024 hospitalization.  These are not new findings.  Additionally, the hemorrhages noted above are demonstrating expected evolution, and are thought at this point to be late subacute or late on the scan obtained at 4 PM.  There are some new findings which include enlargement of the third and lateral ventricles slightly and some foci which are concerning for possible acute/subacute hemorrhage, however these are demonstrated to be stable on the most recent CT obtained at 10:30 PM 3/21/2024.     The etiology of ventriculomegaly observed on the scans could possibly indicate a posthemorrhagic encephalomalacia contributing to ex vacuo, or a posthemorrhagic hydrocephalus given the presence of intraventricular blood on the initial scan.  On the most recent imaging, there is no evidence of new interventricular hemorrhage or hemorrhage in the  fourth ventricle, suggesting a possible communicating hydrocephalus.  Given the patient's clinical exam, despite the baseline dementia, the patient is at his neurologic baseline (confused but no focal deficits), there is no acute need for intervention.    Exam:    NEUROLOGIC:  -- Awake; Alert; oriented x 2 (knows name, hospital), slightly confused about the sequence of events that led him to the hospital  -- Follows commands briskly  -- Speech fluent, spontaneous. No aphasia or dysarthria.  -- no gaze preference. No apparent hemineglect.  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  No muscle wasting or fasciculations  No Pronator Drift       Delt Bi Tri Hand Flexion/  Extension Iliopsoas Quadriceps Hamstrings Tibialis Anterior Gastroc     C5 C6 C7 C8/T1 L2 L3 L4-S1 L4 S1   R 5 5 5 5 5 5 5 5 5   L 5 5 4+* 4+* 5 5 5 5 5   *Restricted by pain  Sensory:  intact to LT x 4 extremities to light touch     Reflexes:   1+ reflexes in lower extremities, no evidence of clonus  Gait: deferred     Recommendations:    -- No neurosurgical intervention indicated at this time   -- Continue serial neurologic exams  -- Keppra x 7 days for questionable acute components of blood and confusion  -- Repeat CT head and CT venogram >> completed, stable  -- Neurosurgery will follow peripherally. Repeat Head CT x 1 week. Please reach out with any questions/concerns, and inform if patient is being discharged- we'll arrange the follow up/ can see primary neurosurgery team..    Plan discussed with chief resident,   Do, and staff, Dr. Smith.    [facility]  Neurosurgery Resident PGY3

## 2024-03-22 NOTE — CONSULTS
Care Management Initial Consult    General Information  Assessment completed with: Children, David Thomson Jr  Type of CM/SW Visit: Initial Assessment    Primary Care Provider verified and updated as needed: Yes (but unbale to confirm as son does not know who is the primary PCP of patient)   Readmission within the last 30 days: current reason for admission unrelated to previous admission         Advance Care Planning: Advance Care Planning Reviewed: present on chart          Communication Assessment  Patient's communication style: spoken language (English or Bilingual)             Cognitive  Cognitive/Neuro/Behavioral: .WDL except  Level of Consciousness: confused  Arousal Level: opens eyes spontaneously  Orientation: place, time, situation, disoriented to  Mood/Behavior: calm     Speech: hoarse    Living Environment:   People in home: alone     Current living Arrangements:        Able to return to prior arrangements:         Family/Social Support:  Care provided by:    Provides care for:                  Description of Support System:           Current Resources:   Patient receiving home care services: No     Community Resources: None  Equipment currently used at home:    Supplies currently used at home: None    Employment/Financial:  Employment Status: retired, , previous service     Employment/ Comments:   Financial Concerns: none           Does the patient's insurance plan have a 3 day qualifying hospital stay waiver?  No    Lifestyle & Psychosocial Needs:  Social Determinants of Health     Food Insecurity: Not on file   Depression: Not at risk (2/8/2023)    PHQ-2     PHQ-2 Score: 0   Housing Stability: Not on file   Tobacco Use: Low Risk  (3/20/2024)    Patient History     Smoking Tobacco Use: Never     Smokeless Tobacco Use: Never     Passive Exposure: Not on file   Financial Resource Strain: Not on file   Alcohol Use: Not on file   Transportation Needs: Not on file   Physical Activity:  Not on file   Interpersonal Safety: Not on file   Stress: Not on file   Social Connections: Not on file       Functional Status:  Prior to admission patient needed assistance:   Dependent ADLs:: Ambulation-walker  Dependent IADLs:: Transportation, Cleaning, Cooking, Shopping, Laundry       Mental Health Status: current concern, treated with medication          Chemical Dependency Status: no current concern              Values/Beliefs:  Spiritual, Cultural Beliefs, Zoroastrianism Practices, Values that affect care: yes  Description of Beliefs that Will Affect Care: Garry            Additional Information:     Patient is confused. Called son David Alix Haynes for initial consult. ;introduced self and the role of the care team. Son was open to talk to the writer. Son confirmed patient's address on file. Son said patient has not been home since December 2023 as he has been in and out of the hospital and TCU since then. Son confirmed that patient is now using assistive device walker for ambulation. Patient aguilera snot drive anymore and is retired  and was a .  At home patient has a CPAP machine that he does not use. Per son, patient has some mental issues and is been followed by his provider prior to hospitalization. Son confirmed patient's insurances on file. Son does not remember patient's primary P[facility] but [facility]    CC will continue to follow up.  Liliam Peguero RN, PHN, BSN   Float Nurse Care Coordinator  Covering for [address]  Phone [phone]
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
## 2024-03-22 NOTE — PLAN OF CARE
Goal Outcome Evaluation:      Plan of Care Reviewed With: child    Overall Patient Progress: no changeOverall Patient Progress: no change    Outcome Evaluation: Plan TBD    Liliam Peguero RN, PHN, BSN   Float Nurse Care Coordinator  Covering for [address]  Phone [phone]

## 2024-03-22 NOTE — PROGRESS NOTES
EEG CLINICAL NEUROPHYSIOLOGY PRELIMINARY REPORT    First 90 minutes of video EEG reviewed.  7 Hz posterior dominant rhythm while awake, poorly sustained.  Excessive diffuse theta, some delta.  Shifting good delta over the 2 hemispheres.  Symmetrical sleep spindles during sleep.  No epileptiform discharges or seizures.    Findings consistent with moderate diffuse encephalopathy.  EEG is reactive.  Thus far no epileptiform discharges or seizures.  Will continue video EEG monitoring.  Full report to follow.    Ramiro Tinoco MD  Contact information for physicians covering Epilepsy and EEG is available on [facility].  Click search, enter neurology adult/[facility] in group name box, click on neurology adult/[facility], then click Staff Epilepsy and EEG.

## 2024-03-22 NOTE — CONSULTS
[facility]         NEUROSURGERY CONSULTATION    This consultation was requested by Dr. Doe from the Emergency Department service.    Reason for Consultation: Concern for multifocal hemorrhage    HPI:  David Thomson is an 83-year-old male with a notable past medical history of atrial fibrillation, mechanical valve chronically on Eliquis, baseline dementia and confusion living at assisted living facility, notable history of recent hospitalization on March 8, 2024 due to a fall which resulted in a significant subarachnoid hemorrhage, subdural hematoma, and occipital fracture.  During this hospitalization, the patient was admitted to the neuro ICU for monitoring in [place] for several days and evaluated by neurosurgery at this time.  After a prolonged stay at the other hospital, the patient was discharged to his assisted living facility.    On 3/21/2024, the patient was found confused with a questionable fall with unknown loss of consciousness at the nursing facility.  The story is quite unclear, but staff at this facility report that the patient was more confused than usual.  As a result, they brought the patient to the [facility] emergency department.  A CT scan at 4 PM demonstrated at the time thought to be multifocal acute hemorrhage.  Because of this, the patient was transferred via helicopter to the [facility] for assessment and evaluation.  Notably his anticoagulation was reversed using Kcentra, even though the patient was not taking Eliquis since his most recent hospitalization, and was asked to hold Eliquis for 1 month until his follow-up with neurosurgery.     On presentation to the [facility] emergency department, the patient denies any significant headache, extremity weakness, numbness, tingling.  Notably, the patient reports some pain with motion of his left wrist and forearm.  The patient reports that he does  not fully remember why he is here at the hospital but is able to recall several notable previous events, including the prior hospitalization.    ROS: 10 point ROS in other systems negative other than noted in HPI.    Physical exam:   Blood pressure 112/62, pulse 76, temperature 97.9  F (36.6  C), temperature source Oral, resp. rate 20, weight 103.6 kg (228 lb 6.3 oz), SpO2 93%.  General: Well appearing, NAD, sitting comfortably on emergency department bed  HEENT: atraumatic, normocephalic, without any evidence of clear contusions or abnormalities  PULM: Unlabored respirations  MSK: Limited range of motion of the left forearm and wrist, notably to pronation and grasp due to pain  NEUROLOGIC:  -- Awake; Alert; oriented x 2 (knows name, hospital), slightly confused about the sequence of events that led him to the hospital  -- Follows commands briskly  -- Speech fluent, spontaneous. No aphasia or dysarthria.  -- no gaze preference. No apparent hemineglect.  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  No muscle wasting or fasciculations  No Pronator Drift     Delt Bi Tri Hand Flexion/  Extension Iliopsoas Quadriceps Hamstrings Tibialis Anterior Gastroc    C5 C6 C7 C8/T1 L2 L3 L4-S1 L4 S1   R 5 5 5 5 5 5 5 5 5   L 5 5 4+* 4+* 5 5 5 5 5   *Restricted by pain  Sensory:  intact to LT x 4 extremities to light touch    Reflexes:   1+ reflexes in lower extremities, no evidence of clonus  Gait: deferred     Pertinent Imaging and Labs:  *Please read discussion of imaging findings and the  assessment*  CTH @ 4PM at OSH  IMPRESSION:  1. Multifocal hemorrhagic contusions/interparenchymal hemorrhages in  left greater than right frontal lobes with surrounding vasogenic  edema. Effacement of left lateral ventricle and 6 mm rightward midline  shift.  2. Multifocal subarachnoid hemorrhages in bilateral cerebral  hemispheres.  3. Interventricular hemorrhage in the occipital horns of bilateral  lateral ventricles.  4. Subdural hemorrhages along the falx, tentorial leaflets and in  bilateral middle cranial fossa.  5. Occipital fracture    CTH and Venogram @ 10p @ [facility]  1.  Evolving multicompartmental intracranial hemorrhage, as above, without significant interval progression when compared to CT head dated 03/21/2024 earlier in the day. Comparison images were made dated 3/8/2024 and 3/9/2024 from an outside institution   which demonstrated multi compartmental acute intraparenchymal hemorrhage, in a similar distribution as today's examination. The hemorrhage on today's examination is favored to be late acute/subacute given that these findings were present on exam dated   03/09/2024 and demonstrate expected interval evolution.  2.  Stable left-to-right midline shift measuring 0.7 cm. Midline shift dated 03/09/2024 measured approximately 0.6 cm.   3.  Persistent intraventricular hemorrhage with stable ventricular caliber compared to exam dated 03/21/2024. The ventricles have mildly increased in size when compared to exam dated 03/09/2024.   4.  Stable nondisplaced occipital bone fracture.  -No evidence of venous thrombosis or other venous anomalies    ASSESSMENT:  83-year-old male with a notable past medical history of atrial fibrillation mechanical valve on Eliquis with a history of falls.  The patient presented from a facility to an outside hospital after a questionable fall (the only presenting symptom was that the patient was found to be below his cognitive baseline, the patient has a history of dementia).  The  patient was found to have the CT findings as previously mentioned, and thereby transferred to the [facility].     Of note, the patient was recently hospitalized on 3/8/2024 after a witnessed fall.  The CT images during this hospitalization from [facility] were imported into PACS under Exceptions.  After a comprehensive review of these imaging findings and the hospital course at [facility] with radiology attending at [place] [facility] (Dr. Fitzpatrick), it was determined that the multifocal hemorrhages (including intraparenchymal/contusions, subarachnoid, intraventricular, subdural) and the linear nondisplaced right occipital bone fracture were previously noted during the 3/8-9/2024 hospitalization.  These are not new findings.  Additionally, the hemorrhages noted above are demonstrating expected evolution, and are thought at this point to be late subacute or late on the scan obtained at 4 PM.  There are some new findings which include enlargement of the third and lateral ventricles slightly and some foci which are concerning for possible acute/subacute hemorrhage, however these are demonstrated to be stable on the most recent CT obtained at 10:30 PM 3/21/2024.    The etiology of ventriculomegaly observed on the scans could possibly indicate a posthemorrhagic encephalomalacia contributing to ex vacuo, or a posthemorrhagic hydrocephalus given the presence of intraventricular blood on the initial scan.  On the most recent imaging, there is no evidence of new interventricular hemorrhage or hemorrhage in the fourth ventricle, suggesting a possible communicating hydrocephalus.  Given the patient's clinical exam, despite the baseline dementia, the patient is at his neurologic baseline (confused but no focal deficits), there is no acute need for intervention.    RECOMMENDATIONS:  -- No neurosurgical intervention indicated at this time   -- Continue serial neurologic exams  -- Keppra x 7 days for  questionable acute components of blood and confusion  -- Repeat CT head and CT venogram at 10 PM (6 hours after 4 PM, initial, scan) >> complete, stable  -- Agree with admission under trauma, as there is no indication of acute neurosurgical trauma  -- Recommend physical therapy and Occupational Therapy for assessment of functional status and the need to be under a greater degree of care and oversight given frequent history of falls and dementia  -- Agree with tertiary exam per trauma for evaluation after concern for fall (patient complaining of wrist and forearm pain on the left)  -- Will plan to evaluate and assess patient for hydrocephalus in outpatient setting (we will work to coordinate)  -- Can consider neurology for assessment of sudden fluctuations of cognitive status (seizures in the setting of recent hemorrhage)  -- Recommend platelets > 100 K, hemoglobin>8, INR<1.4 (for now, will need to discuss resumption of anticoagulation plan given the patient's clinical need of Eliquis)  -- Neurosurgery will continue to follow for now    The patient's presentation, exam, and imaging and the listed recommendations/plan were discussed with Dr. Suhail Pollard, neurosurgery chief resident, and Dr. Freedom Smith, neurosurgery staff.    Quan Melgar MD  PGY-1, Department of Neurosurgery  [facility]  Pager: [phone]  On-call: [phone]

## 2024-03-22 NOTE — PROGRESS NOTES
[facility]   Tertiary Survey Progress Note     Date of Service (when I saw the patient): 03/22/2024    History of presentation  David Thomson is a 83 year old male h/o A fib on Eliquis, bioprosthetic aortic valve, HTN, T2DM, CAD, cognitive impairment, frequent falls, and recent left distal humerus fracture  S/P ORIF, who initially presented to [facility] after reportedly had an unwitnessed fall at his TCU and worsening confusion. Initial head CT scan concerning for extensive multicompartment ICH. He was given Kcentra for reversal and transported here for trauma admission and neurosurgery consultation.      Per prior provider chart review   He was admitted to [facility] after a fall 3/8/24-3/18/24 with SAH in the anterior/inferior frontal lobes, small SDHs and a 5 mm parafalcine SDH, which progressed then stabilized on subsequent CT scans. It was also noted he had an occipital bone fracture, minimally displaced sacral fracture and large scalp hematoma. Pupils uneven at baseline due to previous eye surgery per son. After his discharge 3/18/24 neurosurgery recommended remaining off Eliquis x 4 wks and follow up w/ neurosurgery.       EMS gave 3% hypertonic saline en route due to uneven pupils. On arrival to the ED, neurosurgery was consulted. CT head obtained and Keppra loading. Sodium 138. INR 1.04<1.24. Platelets 241.    He was evaluated by Neurosurgery, no new evidence of new IVH rather concern for possible communicating hydrocephalus.    Repeat CT head stable , CTV obtained given occipital bone fracture without venous sinus thrombus.    Assessment and recommendations  On exam he remains encephalopathic. Per chart review he has some underlying cognitive deficits and able to ambulate though with frequent falls.    - Neurology has been consulted to evaluate for possible subclinical seizures given ICH's. +/- VEEG  - Keppra dose increased to 750mg BID per Neurology  recs  - He reports left arm pain and mild headache. Notable unequal pupils L >R. He moves all extremities, no clear focal deficits noted. No further imaging indicated.  - Noted abrasions to both knees. Non tender with passive ROM.  - Patient discussed with the medicine service requesting transfer of service as ne has no further trauma needs at this time.  - Please page or call with questions or concerns    Trevor Moreno CNP  Acute Care Trauma    Physical Exam  Vitals reviewed.   Constitutional:       Comments: Lethargic, oriented to self   HENT:      Head: Normocephalic.      Nose: Nose normal.      Mouth/Throat:      Mouth: Mucous membranes are dry.   Eyes:      Comments: Left pupil > right at baseline   Cardiovascular:      Rate and Rhythm: Normal rate.   Pulmonary:      Effort: Pulmonary effort is normal.   Abdominal:      General: Abdomen is flat. There is no distension.      Palpations: Abdomen is soft.      Tenderness: There is no abdominal tenderness.   Musculoskeletal:         General: Tenderness present.      Cervical back: Normal range of motion.      Comments: Scattered abrasions bilateral knees  Left arm tender, old humerus fracture   Skin:     General: Skin is warm and dry.      Findings: Bruising present.      Comments: Scattered bruising and abrasion on bilateral knees   Neurological:      Comments: Oriented to self, mumbles       Temp: 97.9  F (36.6  C) Temp src: Oral BP: 120/69 Pulse: 59   Resp: 18 SpO2: 95 % O2 Device: None (Room air)    Vitals:    03/21/24 1753   Weight: 103.6 kg (228 lb 6.3 oz)     Vital Signs with Ranges  Temp:  [97.7  F (36.5  C)-98.1  F (36.7  C)] 97.9  F (36.6  C)  Pulse:  [59-79] 59  Resp:  [11-20] 18  BP: (112-133)/(59-76) 120/69  SpO2:  [93 %-99 %] 95 %  No intake/output data recorded.      ANA LILIA Schaffer CNP  To contact the trauma service use job code pager [phone],   Numeric texts or alpha text through [facility]
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
## 2024-03-22 NOTE — PROGRESS NOTES
TRIAGE NOTE:   Contacted by Trevor Moreno NP at [phone] with trauma surgery service requesting this patient be transferred to medicine as primary service for ongoing management.     David Thomson is an 83 year old male with PMHx of aortic stenosis s/p afib on Eliquis, TAVR with bioprosthetic valve, CAD, hyperlipidemia, type 2 diabetes mellitus, PTSD, depression, chronic left humerus fracture s/p ORIF c/b infection and removal of hardware, frequent falls, possible dementia who presented to [facility] after fall at TCU and worsening confusion.     Initial head CT scan at [place] concerning for extensive multicompartment ICH. He was given Kcentra for reveral and transported to [facility] for trauma admission and neurosurgery consultation.     Neurosurgery has since evaluated the patient and per their documentation, patient was recently hospitalized at [facility] on 3/8/2024 after a fall. Neurosurgery reviewed head imaging from this hospitalization and found that the multifocal hemorrhages (including intraparenchymal/contusions, subarachnoid, intraventricular, subdural) and the linear nondisplaced right occipital bone fracture found on imaging on 3/21/2024 were previously noted during the 3/8-9/2024 hospitalization and are not new findings. Further, the hemorrhages noted above are demonstrating expected evolution, and are thought at this point to be late subacute or late as of most recent imaging. However, there are some new findings which include slight enlargement of the third and lateral ventricles and some foci which are concerning for possible acute/subacute hemorrhage - these are demonstrated to be stable on the most recent CT obtained at 2230 on 3/21/2024. Neurosurgery is not recommending any neurosurgical intervention at this time, continue with q4h neuro checks, keppra x 7 days, repeat head CT in 1 week.     Per trauma surgery, they have seen the patient today and there are no further interventions  needed from a trauma surgery standpoint. However, the patient is not at cognitive baseline and they have consulted neurology due to this. Trauma surgery provider states patient is conversational, but forgetful, and only oriented to self today. Unclear what his baseline orientation is, but he has a son who is involved in his care and was at a TCU prior to admission, so may be able to provide further information on baseline mental status. In addition, the etiology of his frequent falls is unclear.     Per neurology, there is concern that patient could be having seizures, so has been loaded with keppra and EEG is ordered. Trauma surgery is requesting transfer to medicine for ongoing management as the patient has no further trauma surgery needs at this time. He is cleared to work with PT/OT.     Transfer to medicine accepted. Patient assigned to [place] [id number] service and handoff provided to [place] [id number] provider(s).     Bonny Faulkner MD  Internal Medicine/Pediatrics Hospitalist   of Internal Medicine and Pediatrics  [facility]

## 2024-03-23 ENCOUNTER — APPOINTMENT (OUTPATIENT)
Dept: NEUROLOGY | Facility: CLINIC | Age: 84
DRG: 085 | End: 2024-03-23
Payer: COMMERCIAL

## 2024-03-23 ENCOUNTER — APPOINTMENT (OUTPATIENT)
Dept: OCCUPATIONAL THERAPY | Facility: CLINIC | Age: 84
DRG: 085 | End: 2024-03-23
Attending: STUDENT IN AN ORGANIZED HEALTH CARE EDUCATION/TRAINING PROGRAM
Payer: COMMERCIAL

## 2024-03-23 ENCOUNTER — APPOINTMENT (OUTPATIENT)
Dept: PHYSICAL THERAPY | Facility: CLINIC | Age: 84
DRG: 085 | End: 2024-03-23
Payer: COMMERCIAL

## 2024-03-23 LAB
ALBUMIN UR-MCNC: NEGATIVE MG/DL
ANION GAP SERPL CALCULATED.3IONS-SCNC: 11 MMOL/L (ref 7–15)
APPEARANCE UR: CLEAR
BACTERIA #/AREA URNS HPF: ABNORMAL /HPF
BASE EXCESS BLDV CALC-SCNC: 1.6 MMOL/L (ref -3–3)
BILIRUB UR QL STRIP: NEGATIVE
BUN SERPL-MCNC: 15.3 MG/DL (ref 8–23)
CALCIUM SERPL-MCNC: 9.2 MG/DL (ref 8.8–10.2)
CHLORIDE SERPL-SCNC: 105 MMOL/L (ref 98–107)
COLOR UR AUTO: YELLOW
CREAT SERPL-MCNC: 0.8 MG/DL (ref 0.67–1.17)
DEPRECATED HCO3 PLAS-SCNC: 24 MMOL/L (ref 22–29)
EGFRCR SERPLBLD CKD-EPI 2021: 89 ML/MIN/1.73M2
ERYTHROCYTE [DISTWIDTH] IN BLOOD BY AUTOMATED COUNT: 15.1 % (ref 10–15)
GLUCOSE SERPL-MCNC: 105 MG/DL (ref 70–99)
GLUCOSE UR STRIP-MCNC: NEGATIVE MG/DL
HCO3 BLDV-SCNC: 27 MMOL/L (ref 21–28)
HCT VFR BLD AUTO: 31.5 % (ref 40–53)
HGB BLD-MCNC: 10.4 G/DL (ref 13.3–17.7)
HGB UR QL STRIP: NEGATIVE
INR PPP: 1.2 (ref 0.85–1.15)
KETONES UR STRIP-MCNC: NEGATIVE MG/DL
LEUKOCYTE ESTERASE UR QL STRIP: ABNORMAL
MAGNESIUM SERPL-MCNC: 2.1 MG/DL (ref 1.7–2.3)
MCH RBC QN AUTO: 30.5 PG (ref 26.5–33)
MCHC RBC AUTO-ENTMCNC: 33 G/DL (ref 31.5–36.5)
MCV RBC AUTO: 92 FL (ref 78–100)
MUCOUS THREADS #/AREA URNS LPF: PRESENT /LPF
NITRATE UR QL: NEGATIVE
O2/TOTAL GAS SETTING VFR VENT: 0 %
OXYHGB MFR BLDV: 43 % (ref 70–75)
PCO2 BLDV: 46 MM HG (ref 40–50)
PH BLDV: 7.38 [PH] (ref 7.32–7.43)
PH UR STRIP: 5.5 [PH] (ref 5–7)
PLATELET # BLD AUTO: 248 10E3/UL (ref 150–450)
PO2 BLDV: 27 MM HG (ref 25–47)
POTASSIUM SERPL-SCNC: 3.6 MMOL/L (ref 3.4–5.3)
RBC # BLD AUTO: 3.41 10E6/UL (ref 4.4–5.9)
RBC URINE: 0 /HPF
SAO2 % BLDV: 43.7 % (ref 70–75)
SODIUM SERPL-SCNC: 140 MMOL/L (ref 135–145)
SP GR UR STRIP: 1.01 (ref 1–1.03)
UROBILINOGEN UR STRIP-MCNC: 3 MG/DL
WBC # BLD AUTO: 5.3 10E3/UL (ref 4–11)
WBC CLUMPS #/AREA URNS HPF: PRESENT /HPF
WBC URINE: 7 /HPF

## 2024-03-23 PROCEDURE — 250N000013 HC RX MED GY IP 250 OP 250 PS 637

## 2024-03-23 PROCEDURE — 97530 THERAPEUTIC ACTIVITIES: CPT | Mod: GO

## 2024-03-23 PROCEDURE — 85610 PROTHROMBIN TIME: CPT

## 2024-03-23 PROCEDURE — 36415 COLL VENOUS BLD VENIPUNCTURE: CPT

## 2024-03-23 PROCEDURE — 250N000011 HC RX IP 250 OP 636

## 2024-03-23 PROCEDURE — 97161 PT EVAL LOW COMPLEX 20 MIN: CPT | Mod: GP

## 2024-03-23 PROCEDURE — 85027 COMPLETE CBC AUTOMATED: CPT

## 2024-03-23 PROCEDURE — 95711 VEEG 2-12 HR UNMONITORED: CPT

## 2024-03-23 PROCEDURE — 82805 BLOOD GASES W/O2 SATURATION: CPT

## 2024-03-23 PROCEDURE — 83735 ASSAY OF MAGNESIUM: CPT

## 2024-03-23 PROCEDURE — 99233 SBSQ HOSP IP/OBS HIGH 50: CPT | Mod: GC | Performed by: HOSPITALIST

## 2024-03-23 PROCEDURE — 95718 EEG PHYS/QHP 2-12 HR W/VEEG: CPT | Performed by: PSYCHIATRY & NEUROLOGY

## 2024-03-23 PROCEDURE — 93010 ELECTROCARDIOGRAM REPORT: CPT | Performed by: INTERNAL MEDICINE

## 2024-03-23 PROCEDURE — 97530 THERAPEUTIC ACTIVITIES: CPT | Mod: GP

## 2024-03-23 PROCEDURE — 97165 OT EVAL LOW COMPLEX 30 MIN: CPT | Mod: GO

## 2024-03-23 PROCEDURE — 80048 BASIC METABOLIC PNL TOTAL CA: CPT

## 2024-03-23 PROCEDURE — 120N000003 HC R&B IMCU UMMC

## 2024-03-23 PROCEDURE — 81001 URINALYSIS AUTO W/SCOPE: CPT

## 2024-03-23 RX ORDER — QUETIAPINE FUMARATE 25 MG/1
25 TABLET, FILM COATED ORAL AT BEDTIME
Status: DISCONTINUED | OUTPATIENT
Start: 2024-03-23 | End: 2024-03-24

## 2024-03-23 RX ORDER — OLANZAPINE 10 MG/2ML
2.5 INJECTION, POWDER, FOR SOLUTION INTRAMUSCULAR
Status: COMPLETED | OUTPATIENT
Start: 2024-03-23 | End: 2024-03-23

## 2024-03-23 RX ORDER — OLANZAPINE 10 MG/2ML
5 INJECTION, POWDER, FOR SOLUTION INTRAMUSCULAR DAILY PRN
Status: COMPLETED | OUTPATIENT
Start: 2024-03-23 | End: 2024-03-23

## 2024-03-23 RX ORDER — OLANZAPINE 5 MG/1
5 TABLET, ORALLY DISINTEGRATING ORAL
Status: DISCONTINUED | OUTPATIENT
Start: 2024-03-23 | End: 2024-03-24 | Stop reason: DRUGHIGH

## 2024-03-23 RX ADMIN — LEVETIRACETAM 750 MG: 750 TABLET, FILM COATED ORAL at 21:25

## 2024-03-23 RX ADMIN — Medication 5 MG: at 21:25

## 2024-03-23 RX ADMIN — QUETIAPINE FUMARATE 25 MG: 25 TABLET ORAL at 21:24

## 2024-03-23 RX ADMIN — OLANZAPINE 2.5 MG: 10 INJECTION, POWDER, FOR SOLUTION INTRAMUSCULAR at 03:12

## 2024-03-23 RX ADMIN — OLANZAPINE 2.5 MG: 10 INJECTION, POWDER, FOR SOLUTION INTRAMUSCULAR at 00:10

## 2024-03-23 RX ADMIN — SODIUM CHLORIDE, SODIUM LACTATE, POTASSIUM CHLORIDE, CALCIUM CHLORIDE AND DEXTROSE MONOHYDRATE: 5; 600; 310; 30; 20 INJECTION, SOLUTION INTRAVENOUS at 03:17

## 2024-03-23 RX ADMIN — THIAMINE HCL TAB 100 MG 100 MG: 100 TAB at 09:25

## 2024-03-23 RX ADMIN — SERTRALINE HYDROCHLORIDE 75 MG: 50 TABLET ORAL at 09:25

## 2024-03-23 RX ADMIN — OLANZAPINE 5 MG: 10 INJECTION, POWDER, FOR SOLUTION INTRAMUSCULAR at 20:18

## 2024-03-23 RX ADMIN — FOLIC ACID 1 MG: 1 TABLET ORAL at 09:25

## 2024-03-23 RX ADMIN — SULFAMETHOXAZOLE AND TRIMETHOPRIM 1 TABLET: 400; 80 TABLET ORAL at 21:25

## 2024-03-23 RX ADMIN — PANTOPRAZOLE SODIUM 40 MG: 40 TABLET, DELAYED RELEASE ORAL at 09:25

## 2024-03-23 RX ADMIN — SULFAMETHOXAZOLE AND TRIMETHOPRIM 1 TABLET: 400; 80 TABLET ORAL at 09:25

## 2024-03-23 RX ADMIN — LEVETIRACETAM 750 MG: 750 TABLET, FILM COATED ORAL at 09:25

## 2024-03-23 ASSESSMENT — ACTIVITIES OF DAILY LIVING (ADL)
ADLS_ACUITY_SCORE: 45
ADLS_ACUITY_SCORE: 44
ADLS_ACUITY_SCORE: 44
ADLS_ACUITY_SCORE: 46
ADLS_ACUITY_SCORE: 45
ADLS_ACUITY_SCORE: 46
ADLS_ACUITY_SCORE: 45
ADLS_ACUITY_SCORE: 46
ADLS_ACUITY_SCORE: 46
ADLS_ACUITY_SCORE: 44
ADLS_ACUITY_SCORE: 46
ADLS_ACUITY_SCORE: 50
ADLS_ACUITY_SCORE: 50
ADLS_ACUITY_SCORE: 46
ADLS_ACUITY_SCORE: 50
ADLS_ACUITY_SCORE: 46
ADLS_ACUITY_SCORE: 46
ADLS_ACUITY_SCORE: 45

## 2024-03-23 NOTE — PROGRESS NOTES
03/23/24 1556   Appointment Info   Signing Clinician's Name / Credentials (OT) Monique Kendell, OTD, OTR/L   Living Environment   People in Home alone   Living Environment Comments Pt questionable historian, [place] but per chart review pt has been in hospital or TCU since December 2023.   Self-Care   Usual Activity Tolerance moderate   Current Activity Tolerance fair   Equipment Currently Used at Home walker, standard   Fall history within last six months yes   Activity/Exercise/Self-Care Comment PLOF difficult to obtain at this time, appears to have had multiple recent falls. Likely IND baseline prior to 12/23 but has been using FWW since.   Instrumental Activities of Daily Living (IADL)   IADL Comments anticipate IND prior to 12/23   General Information   Onset of Illness/Injury or Date of Surgery 03/21/24   Referring Physician Duong Nelson MD   Existing Precautions/Restrictions fall   Left Upper Extremity (Weight-bearing Status) full weight-bearing (FWB)   Right Upper Extremity (Weight-bearing Status) full weight-bearing (FWB)   Left Lower Extremity (Weight-bearing Status) full weight-bearing (FWB)   Right Lower Extremity (Weight-bearing Status) full weight-bearing (FWB)   General Observations and Info OT order: weakness and deconditioning; h/o left humerus fx s/p ORIF c/b infection   Cognitive Status Examination   Orientation Status orientation to person, place and time   Affect/Mental Status (Cognitive) confused  (had been agitated overnight however pt pleasant throughout eval)   Follows Commands follows one-step commands   Safety Deficit moderate deficit;awareness of need for assistance;insight into deficits/self-awareness   Visual Perception   Visual Impairment/Limitations corrective lenses for reading   Sensory   Sensory Quick Adds sensation intact   Range of Motion Comprehensive   Comment, General Range of Motion pt with dec ROM in LUE elbow flexion/extension   Strength  Comprehensive (MMT)   Comment, General Manual Muscle Testing (MMT) Assessment generally deconditioned   Coordination   Upper Extremity Coordination No deficits were identified   Bed Mobility   Comment (Bed Mobility) mod A sup>sit EOB   Transfers   Transfer Comments STS max Ax2   Activities of Daily Living   BADL Assessment/Intervention upper body dressing;lower body dressing;toileting   Upper Body Dressing Assessment/Training   Comment, (Upper Body Dressing) anticipate mod A per clinical judgement   Lower Body Dressing Assessment/Training   Comment, (Lower Body Dressing) anticipate max A per clinical judgement   Toileting   Comment, (Toileting) anticipate max A per clinincal judgement   Clinical Impression   Criteria for Skilled Therapeutic Interventions Met (OT) Yes, treatment indicated   OT Diagnosis dec IND with I/ADLs, dec cognition and safety, overall dec strength   OT Problem List-Impairments impacting ADL problems related to;activity tolerance impaired;balance;cognition;mobility;range of motion (ROM);strength   Assessment of Occupational Performance 5 or more Performance Deficits   Identified Performance Deficits bathing, dressing, toileting, functional mobility, home mgmt, g/h   Planned Therapy Interventions (OT) ADL retraining;IADL retraining;balance training;bed mobility training;cognition;ROM;strengthening;transfer training;home program guidelines;progressive activity/exercise;risk factor education   Clinical Decision Making Complexity (OT) problem focused assessment/low complexity   Risk & Benefits of therapy have been explained evaluation/treatment results reviewed;care plan/treatment goals reviewed;risks/benefits reviewed;current/potential barriers reviewed;participants voiced agreement with care plan;participants included;patient   OT Total Evaluation Time   OT Eval, Low Complexity Minutes (54278) 9   OT Goals   Therapy Frequency (OT) 5 times/week   OT Predicted Duration/Target Date for Goal  Attainment 04/22/24   OT Goals Hygiene/Grooming;Upper Body Dressing;Lower Body Dressing;Toilet Transfer/Toileting;Cognition   OT: Hygiene/Grooming supervision/stand-by assist   OT: Upper Body Dressing Supervision/stand-by assist   OT: Lower Body Dressing Minimal assist   OT: Toilet Transfer/Toileting Minimal assist   OT: Cognitive Patient/caregiver will verbalize understanding of cognitive assessment results/recommendations as needed for safe discharge planning   OT Discharge Planning   OT Plan STS's, progress SPT, g/h, LUE ROM   OT Discharge Recommendation (DC Rec) Transitional Care Facility   OT Rationale for DC Rec anticipate pt likley far below functional baseline with dec IND, strength, cog, overall act rosa. pt would benefit from TCU to continue to progress, may benefit from LTC pending pt progress with IP therapies. will continue to update.   OT Brief overview of current status max AX2 STS, milan neal transfers   Total Session Time   Total Session Time (sum of timed and untimed services) 9

## 2024-03-23 NOTE — PROGRESS NOTES
EEG CLINICAL NEUROPHYSIOLOGY PRELIMINARY REPORT    Video EEG through approximately 6 AM today reviewed.    Findings are Abnormal.  1) background activity consistent with mild to moderate diffuse encephalopathy.  2) intermittent evidence of additional left hemispheric focal cerebral dysfunction, consistent with predominance of known structural abnormalities in this area.  3) no epileptiform discharges, periodic activity, or seizures.  No indication of nonconvulsive status epilepticus.    Detailed report in chart.    Ramiro Tinoco MD  Contact information for physicians covering Epilepsy and EEG is available on [facility].  Click search, enter neurology adult/[facility] in group name box, click on neurology adult/[facility], then click Staff Epilepsy and EEG.

## 2024-03-23 NOTE — PROGRESS NOTES
03/23/24 1500   Appointment Info   Signing Clinician's Name / Credentials (PT) Asa Medrano DPT   Living Environment   People in Home alone   Living Environment Comments Patient reports living alone in a farmhouse, per chart review pt has been in hospital or TCU since December 2023 after fall. Patient is a retired  and does not drive.   Self-Care   Usual Activity Tolerance moderate   Current Activity Tolerance fair   Equipment Currently Used at Home walker, standard   Fall history within last six months yes   Activity/Exercise/Self-Care Comment Unclear PLOF, was living alone therefore at least mod IND with all mobility at baseline, has been using a walker for ambulation.   General Information   Onset of Illness/Injury or Date of Surgery 03/21/24   Referring Physician Duong Nelson MD   Patient/Family Therapy Goals Statement (PT) to return home   Pertinent History of Current Problem (include personal factors and/or comorbidities that impact the POC) David Thomson is a 83 year old male h/o A fib on Eliquis, bioprosthetic aortic valve, HTN, T2DM, CAD, cognitive impairment, frequent falls, and recent left distal humerus fracture  S/P ORIF, who initially presented to [facility] after reportedly had an unwitnessed fall at his TCU and worsening confusion. Initial head CT scan concerning for extensive multicompartment ICH. He was given Kcentra for reversal and transported here for trauma admission and neurosurgery consultation.   Existing Precautions/Restrictions fall   Weight-Bearing Status - LUE   (unknown LUE weightbearing restrictions, has brace for recent L distal humerus fracture 2/p ORIF)   Cognition   Affect/Mental Status (Cognition) WFL;confused   Orientation Status (Cognition) oriented to;person;place;time   Pain Assessment   Patient Currently in Pain Yes, see Vital Sign flowsheet   Integumentary/Edema   Integumentary/Edema Comments scabs on bilateral knees   Range of Motion (ROM)   Range  of Motion ROM is WFL   Strength (Manual Muscle Testing)   Strength Comments generalized weakness BLE   Bed Mobility   Comment, (Bed Mobility) supine>sit mod Ax2   Transfers   Comment, (Transfers) sit<>stand mod-max Ax2 from EOB, knees blocked   Gait/Stairs (Locomotion)   Comment, (Gait/Stairs) Unable to assess 2/2 weakness   Balance   Balance Comments fair sitting balance, poor standing balance requiring B UE support/assist   Sensory Examination   Sensory Perception Comments patient reports some sensory changes to L hand since injury   Clinical Impression   Criteria for Skilled Therapeutic Intervention Yes, treatment indicated   PT Diagnosis (PT) impaired functional mobiltiy   Influenced by the following impairments strength, balance, activity tolerance, pain   Clinical Presentation (PT Evaluation Complexity) stable   Clinical Presentation Rationale PMH/comorbidities, clinical judgement   Clinical Decision Making (Complexity) low complexity   Planned Therapy Interventions (PT) balance training;bed mobility training;gait training;patient/family education;strengthening;transfer training   Risk & Benefits of therapy have been explained evaluation/treatment results reviewed;care plan/treatment goals reviewed;risks/benefits reviewed;participants voiced agreement with care plan;participants included;patient   PT Discharge Planning   PT Discharge Recommendation (DC Rec) Transitional Care Facility   PT Rationale for DC Rec Patient below baseline, currently Ax2 for mobility, will require continued therapy to maximize functional status.

## 2024-03-23 NOTE — PROGRESS NOTES
[facility]  General Neurology - Progress Note    Patient Name:  David Thomson  Date of Service:  March 23, 2024    Subjective:    Agitated overnight, required wrist restraints.  Still confused morning, but more interactive.  Unable to tell why he is in the hospital, but denies any new concerns.     Objective:    Vitals: /67 (BP Location: Left arm)   Pulse 57   Temp 97.9  F (36.6  C) (Oral)   Resp 16   Wt 102.8 kg (226 lb 10.1 oz)   SpO2 98%   BMI 29.90 kg/m    General: Lying in bed, NAD  Head: Atraumatic, normocephalic   Cardiac: no lower extremity edema  Neurologic:  Mental status: Awake, alert, interactive.  Oriented to self and month, but not to location, year, situation.  Unable to show thumbs up on right hand, but does follow simple commands such as opening and closing eyes.  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 No abnormal movements. 5/5 strength bilaterally in deltoids, biceps, triceps, hand , hip flexors, plantarflexion, dorsiflexion.   Reflexes: Normo-reflexic and symmetric biceps, brachioradialis, triceps, patellae, and achilles. Negative Croft, no clonus  Sensory: Intact to noxious stimuli in all 4 extremities   Coordination: NEHA   Gait: NEHA    Pertinent Investigations:    I have personally reviewed most recent and pertinent labs, tests, and radiological images.     Assessment:  This is an 83-year-old gentleman with a history of baseline dementia and recent hospitalization for traumatic subdural hematoma and subarachnoid hemorrhage who presents after worsening confusion at his assisted living facility and was admitted as a transfer from an outside hospital due to abnormal CT head findings concerning for subacute as well as some possible areas of acute blood.  Neurology  was consulted due to concerns for seizures due to significant fluctuation in mental status.     Patient was given a 1000 mg IV load of levetiracetam and started on a 500 mg twice daily maintenance regimen.  We do agree that Mr. Thomson is at high risk for epileptic seizures as a result of the subacute and possible acute blood in his brain.  As such, we will recommend increasing his maintenance levetiracetam dose to 750 mg twice daily.    He was connected to video EEG throughout the day yesterday and overnight. There were no electrographic seizures or epileptiform discharges captured.      Appears to be conflicting reports regarding the circumstances of his fall.  Multiple notes report unwitnessed at TCU, however TCU note says patient fell while at home with son.  If the fall was truly unwitnessed, could have been precipitated by seizure. Given the fact that he does have new multifocal hemorrhages and is high risk for seizures, will recommend the patient continue on Keppra 750 mg twice daily until he is able to follow-up with an outpatient neurologist.  Will disconnect him from EEG today.       Recommendations  -Continue Levetiracetam 750 mg twice daily maintenance until outpatient neurology follow up  -Disconnect vEEG    Patient was seen and discussed with Dr. Robin.    Neurology service will sign off at this time.  Kindly reach back out to our team with any further questions regarding Mr. Thomson's care.    Arturo Paul MD  Resident Physician, PGY-1  Department of Neurology    Dragon disclaimer: This documentation was completed with the aid of dictation software.  Please note that there may be some inconsistencies due to software errors.  Errors are corrected in real time, however if there is a remaining error, please do not hesitate to reach out for clarification.

## 2024-03-23 NOTE — PLAN OF CARE
Neuro: A&Ox3, disoriented to place. Confusion, 1:1 sitter for impulsivity and line pulling.  Cardiac: SR w/PAC and PVC, VSS   Respiratory: Sating >92% on RA.  GI/: 1x incontinent urine. BM X1 large formed  Diet/appetite: Declined any intake during shift  Activity:  Assist of 2 to commode  Pain: Denies  Skin: L knee and forearm abrasions, L heel scab, blanchable redness bilateral heels  LDA's: R PIV    Plan: Given IM zyprexa 2x and wrist restraints for agitation, impulsivity and line pulling with confusion. Mild improvement after doses. EEG present.

## 2024-03-23 NOTE — PROVIDER NOTIFICATION
Time of notification: 2334  Provider notified: Rosa Leblanc  Patient status: Agitated, aggressive, combative, verbally abusive, impulsive, attempted physical aggression, uncooperative, not redirectable.     Orders received: IM Zyprexa, wrist restraints.

## 2024-03-23 NOTE — PLAN OF CARE
Status [id number]    /69 (BP Location: Left arm)   Pulse 65   Temp 98.4  F (36.9  C) (Oral)   Resp 18   Wt 103.6 kg (228 lb 6.3 oz)   SpO2 97%   BMI 30.13 kg/m      Oriented to self only, somnolent this shift. Calm, cooperative. VSS, afebrile. Placed on continuous EEG monitor. D5LR infusing at 100mL/hr. Slept most of the shift despite trying to wake pt up multiple times throughout the day. R pupil round/brisk/normal response, L pupil abnormal/no response at baseline as pt has artificial lens. Awaiting bed on 6B. Continues on 1:1.

## 2024-03-23 NOTE — PLAN OF CARE
Neuro: Patient alert, oriented x2-3. Confused, but seems to be improving this afternoon. Restraints discontinued. Sitter at bedside.   Cardiac: SB/SR/dysrhythmia with PAC's and PVC's. BP's stable. Afebrile.   Respiratory: Sating >92% on RA.   GI/: Straight cathed x1 this shift for 550. UA sent. 1 BM today.   Diet/appetite: Regular diet, eating well. Swallows pills well whole.   Activity: Assist of 2 with milan steady to chair x2 today.   Pain: Denies.   Skin: L knee and forearm abrasions, L heel scab, blanchable redness bilateral heels. Brace on left arm for humerus fracture.   LDA's: R PIV - SL.      Plan: EEG completed. Will continue with plan of care.

## 2024-03-23 NOTE — PROGRESS NOTES
Admission          3/22/2024  2145  -----------------------------------------------------------  Reason for admission: Intracranial Hematoma  Primary team notified of pt arrival.  Admitted from: ED  Via: Bed  Accompanied by: family  Belongings: At bedside  Admission Profile: complete  Teaching: orientation to unit and call light- call light within reach, call don't fall, use of console, meal times, when to call for the RN, and enforced importance of safety   Access: R PIV   Telemetry: Placed on pt  Ht./Wt.: complete  Code Status verified on armband: yes  2 RN Skin Assessment Completed By: Karlos CROFT RN, Lucille KIRKPATRICK RN. Deficits noted in flowsheets.  Med Rec completed: Pharmacy completed  Suction/Ambu bag/Flowmeter at bedside: yes  Is patient having diarrhea upon admission- if YES fill out testing algorithm : No

## 2024-03-23 NOTE — PROGRESS NOTES
"[facility]    Progress Note - Medicine Service, PAMELLA TEAM 2       Date of Admission:  3/21/2024    Assessment & Plan   David Thomson is a 83 year old male with history of cognitive impairment, CAD s/p CABG (2015), afib, Aortic stenosis s/o TAVR with bioprosthetic valve, with recent admission 03/08-03/18 after a fall at which time he was found to have IPH, IVH, SAH, and SDH, admitted to OSH on 3/21/2024 after a fall with AMS. He was transferred to [facility] due to concern for new intracranial hemorrhage but imaging findings did not demonstrate new ICH. Care transitioned from trauma to medicine service 3/22 for continued evaluation of recurrent falls and encephalopathy.     Today:  - Significant agitation overnight  - No seizure on EEG  - Resume PTA Seroquel and melatonin    # Encephalopathy, suspect multifactorial  # Recent Intraparenchymal hemorrhage with extension into ventricles andmidline shift, SAH, and SDH post fall (3/8)  # Risk for seizure  # Cognitive impairment  Family notes was AxOx3 prior to IPH and since discharge to TCU has been improving but still lethargic and not at baseline. Patient presenting again after fall with AMS. No new intracranial hemorrhage on repeat CT head. Patient is at risk for seizure in the setting of recent intracranial bleed and neurology started empiric antiepileptic therapy for persistent AMS. Thus far EEG with \"intermittent evidence of additional left hemispheric focal cerebral dysfunction, consistent with predominance of known structural abnormalities in this area\" but no epileptiform discharges.Otherwise can consider infecitous etiology such as UTI as he has had urinary retention in the past. Otherwise calcium, sodium, glucose, TSH, VitB1, VitB12 WNL. CBC with mild anemia but unchanged from prior. Was initially somnolent on medicine transfer (possible related to Keppra loading?) but overnight with agitateted delerium. Will " continue with EEG and optimize conditions to prevent hospital acquired delirium.   - Diagnostics: EEG, bladder scans, UA  - Neuro exams q4h  - Keppra x7 days 750mg BID per neuro  - Begin PTA seroquel 25mg tonight  - Begin PTA melatonin 10mg   - Per NSGY: Platelets > 100k, Hgb >8, INR<1.4, HOB >30 degs  - PT/OT consult     # Falls  Has had multiple hospital admissions for fall with unknown etiology. Unclear if mechanical vs syncopal episodes. Has extensive cardiac history but recent TTE without abnormalities of bioprosthetic valve.Can consider arrhythmia as well with prior afib.   - Consider ZioPatch on discharge.    # PTSD  # Depression  Patient started on Seroquel for delirium at OSH due to hospitalization in the setting of acute illness. Patient sleeping, minimally responsive as above.   - Continue PTA sertraline     --Chronic--  # Chronic HFpEF  # Hypertension   Last available Echo 1/2023 shows EF 55-60%, normal right and left ventricles size and function.  - Hold PTA Toprol XL  - Hold PTA entrestos  - Not currently on SGLT2i    # Afib  Eliquis is being held due to recent intracranial hemorrhage as per NSGY for at least four weeks.  - Hold PTA metoprolol as above  - Patient needs outpatient cardiology follow up for consideration of Watchman procedure.     # CAD s/p 3V CABG (2015)  Not on aspirin currently  - Statin as below    # Aortic stenosis s/p TAVR (2016)  Last TTE 1/2023 with mean gradient 14mmHg, suspected to be WNL     #HLD  -PTA lipitor     #GERD  - PTA omeprazole     # Idiopathic progressive neuropathy  - Hold PTA Gabapentin due to AMS     # Chronic incompletely united distal humerus fracture  Patient is s/p left elbow fracture s/p ORIF at [facility], complicated by elbow wound requiring washout. Per chart review, on lifelong Bactrim.  - Continue PTA Bactrim     -- Outpatient Follow Ups Needed--  Neurosurgery - to assess for hydrocephalus     Cardiology follow up for consideration of Watchman procedure.       "  Diet: Regular Diet Adult    DVT Prophylaxis: Pneumatic Compression Devices  Rice Catheter: Not present  Fluids: None  Lines: None     Cardiac Monitoring: None  Code Status: Full Code      Clinically Significant Risk Factors               # Coagulation Defect: INR = 1.20 (Ref range: 0.85 - 1.15) and/or PTT = 31 Seconds (Ref range: 22 - 38 Seconds), will monitor for bleeding    # Hypertension: Noted on problem list  # Chronic heart failure with preserved ejection fraction: heart failure noted on problem list and last echo with EF >50%       # Overweight: Estimated body mass index is 29.9 kg/m  as calculated from the following:    Height as of an earlier encounter on 3/21/24: 1.854 m (6' 1\").    Weight as of this encounter: 102.8 kg (226 lb 10.1 oz)., PRESENT ON ADMISSION     # Financial/Environmental Concerns: none         Disposition Plan         The patient's care was discussed with the Attending Physician, Dr. Leal .    Desire Langley MD  Medicine Service, [address]  Securely message with [employer] (more info)  Text page via [facility] Paging/Directory   See signed in provider for up to date coverage information  ______________________________________________________________________    Interval History   Patient became agitated overnight and attempted physical aggression per RN note. Required restraints and was givens. IM zyprexa 2.5mg x2. Was incontinent of urine and had a BM.    This morning patient endorses a headache in the back of his head. Able to answer yes or no questions - no new vision changes, chest pain, SOB, nausea or abdominal pain.      Physical Exam   Vital Signs: Temp: 97.6  F (36.4  C) Temp src: Axillary BP: 133/79 Pulse: 59   Resp: 18 SpO2: 99 % O2 Device: None (Room air)    Weight: 226 lbs 10.13 oz    General Appearance: NAD, sleeping but awakens to voice  Respiratory: CTAB anteriorly  Cardiovascular: Irregular, normal S1 and " S2  GI: Soft, non-tender, non-distended, no rebound or guarding  Skin: Warm, no lesions  Neuro: AxOx1-2 (to self only, said year 2023 and location is restaurant), not answering questions appropriately (asking for a  to take his food order when asked if he knows why he is in the hospital), following commands,  strength intact bilaterally with elbow flexin and extension 5/5, LE hip flexion 5/5 b/l      Data     I have personally reviewed the following data over the past 24 hrs:    5.3  \   10.4 (L)   / 248     140 105 15.3 /  105 (H)   3.6 24 0.80 \     INR:  1.20 (H) PTT:  N/A   D-dimer:  N/A Fibrinogen:  N/A       Imaging results reviewed over the past 24 hrs:   No results found for this or any previous visit (from the past 24 hour(s)).

## 2024-03-24 ENCOUNTER — LAB REQUISITION (OUTPATIENT)
Dept: LAB | Facility: CLINIC | Age: 84
End: 2024-03-24
Payer: COMMERCIAL

## 2024-03-24 ENCOUNTER — APPOINTMENT (OUTPATIENT)
Dept: OCCUPATIONAL THERAPY | Facility: CLINIC | Age: 84
DRG: 085 | End: 2024-03-24
Payer: COMMERCIAL

## 2024-03-24 DIAGNOSIS — D64.9 ANEMIA, UNSPECIFIED: ICD-10-CM
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
## 2024-03-24 LAB
ANION GAP SERPL CALCULATED.3IONS-SCNC: 13 MMOL/L (ref 7–15)
BUN SERPL-MCNC: 14.3 MG/DL (ref 8–23)
CALCIUM SERPL-MCNC: 9.1 MG/DL (ref 8.8–10.2)
CHLORIDE SERPL-SCNC: 106 MMOL/L (ref 98–107)
CREAT SERPL-MCNC: 0.9 MG/DL (ref 0.67–1.17)
DEPRECATED HCO3 PLAS-SCNC: 20 MMOL/L (ref 22–29)
EGFRCR SERPLBLD CKD-EPI 2021: 86 ML/MIN/1.73M2
ERYTHROCYTE [DISTWIDTH] IN BLOOD BY AUTOMATED COUNT: 15.2 % (ref 10–15)
GLUCOSE SERPL-MCNC: 94 MG/DL (ref 70–99)
HCT VFR BLD AUTO: 31 % (ref 40–53)
HGB BLD-MCNC: 10.1 G/DL (ref 13.3–17.7)
MAGNESIUM SERPL-MCNC: 2.1 MG/DL (ref 1.7–2.3)
MCH RBC QN AUTO: 30.4 PG (ref 26.5–33)
MCHC RBC AUTO-ENTMCNC: 32.6 G/DL (ref 31.5–36.5)
MCV RBC AUTO: 93 FL (ref 78–100)
PLATELET # BLD AUTO: 240 10E3/UL (ref 150–450)
POTASSIUM SERPL-SCNC: 3.7 MMOL/L (ref 3.4–5.3)
RBC # BLD AUTO: 3.32 10E6/UL (ref 4.4–5.9)
SODIUM SERPL-SCNC: 139 MMOL/L (ref 135–145)
WBC # BLD AUTO: 5.6 10E3/UL (ref 4–11)

## 2024-03-24 PROCEDURE — 250N000011 HC RX IP 250 OP 636

## 2024-03-24 PROCEDURE — 97530 THERAPEUTIC ACTIVITIES: CPT | Mod: GO

## 2024-03-24 PROCEDURE — 85027 COMPLETE CBC AUTOMATED: CPT

## 2024-03-24 PROCEDURE — 36415 COLL VENOUS BLD VENIPUNCTURE: CPT

## 2024-03-24 PROCEDURE — 250N000013 HC RX MED GY IP 250 OP 250 PS 637

## 2024-03-24 PROCEDURE — 93005 ELECTROCARDIOGRAM TRACING: CPT

## 2024-03-24 PROCEDURE — 250N000013 HC RX MED GY IP 250 OP 250 PS 637: Performed by: HOSPITALIST

## 2024-03-24 PROCEDURE — 120N000003 HC R&B IMCU UMMC

## 2024-03-24 PROCEDURE — 258N000003 HC RX IP 258 OP 636: Performed by: STUDENT IN AN ORGANIZED HEALTH CARE EDUCATION/TRAINING PROGRAM

## 2024-03-24 PROCEDURE — 99232 SBSQ HOSP IP/OBS MODERATE 35: CPT | Mod: GC | Performed by: HOSPITALIST

## 2024-03-24 PROCEDURE — 93010 ELECTROCARDIOGRAM REPORT: CPT | Performed by: INTERNAL MEDICINE

## 2024-03-24 PROCEDURE — 83735 ASSAY OF MAGNESIUM: CPT | Performed by: HOSPITALIST

## 2024-03-24 PROCEDURE — 80048 BASIC METABOLIC PNL TOTAL CA: CPT

## 2024-03-24 RX ORDER — OLANZAPINE 5 MG/1
5 TABLET, ORALLY DISINTEGRATING ORAL
Status: DISCONTINUED | OUTPATIENT
Start: 2024-03-24 | End: 2024-03-24

## 2024-03-24 RX ORDER — QUETIAPINE FUMARATE 25 MG/1
25 TABLET, FILM COATED ORAL EVERY MORNING
Status: DISCONTINUED | OUTPATIENT
Start: 2024-03-25 | End: 2024-03-25

## 2024-03-24 RX ORDER — QUETIAPINE FUMARATE 25 MG/1
25 TABLET, FILM COATED ORAL EVERY 24 HOURS
Status: DISCONTINUED | OUTPATIENT
Start: 2024-03-24 | End: 2024-03-25

## 2024-03-24 RX ORDER — OLANZAPINE 5 MG/1
5 TABLET, ORALLY DISINTEGRATING ORAL
Status: DISCONTINUED | OUTPATIENT
Start: 2024-03-24 | End: 2024-04-06

## 2024-03-24 RX ORDER — OLANZAPINE 5 MG/1
5 TABLET, ORALLY DISINTEGRATING ORAL
Status: COMPLETED | OUTPATIENT
Start: 2024-03-24 | End: 2024-03-24

## 2024-03-24 RX ADMIN — LEVETIRACETAM 750 MG: 750 TABLET, FILM COATED ORAL at 19:02

## 2024-03-24 RX ADMIN — SULFAMETHOXAZOLE AND TRIMETHOPRIM 1 TABLET: 400; 80 TABLET ORAL at 19:02

## 2024-03-24 RX ADMIN — ATORVASTATIN CALCIUM 80 MG: 80 TABLET, FILM COATED ORAL at 19:02

## 2024-03-24 RX ADMIN — SODIUM CHLORIDE, POTASSIUM CHLORIDE, SODIUM LACTATE AND CALCIUM CHLORIDE 500 ML: 600; 310; 30; 20 INJECTION, SOLUTION INTRAVENOUS at 19:11

## 2024-03-24 RX ADMIN — FOLIC ACID 1 MG: 1 TABLET ORAL at 08:35

## 2024-03-24 RX ADMIN — QUETIAPINE FUMARATE 25 MG: 25 TABLET ORAL at 18:59

## 2024-03-24 RX ADMIN — ONDANSETRON 4 MG: 2 INJECTION INTRAMUSCULAR; INTRAVENOUS at 05:32

## 2024-03-24 RX ADMIN — SULFAMETHOXAZOLE AND TRIMETHOPRIM 1 TABLET: 400; 80 TABLET ORAL at 08:35

## 2024-03-24 RX ADMIN — OLANZAPINE 5 MG: 5 TABLET, ORALLY DISINTEGRATING ORAL at 01:14

## 2024-03-24 RX ADMIN — PANTOPRAZOLE SODIUM 40 MG: 40 TABLET, DELAYED RELEASE ORAL at 08:35

## 2024-03-24 RX ADMIN — LEVETIRACETAM 750 MG: 750 TABLET, FILM COATED ORAL at 08:35

## 2024-03-24 RX ADMIN — SERTRALINE HYDROCHLORIDE 75 MG: 50 TABLET ORAL at 08:35

## 2024-03-24 RX ADMIN — Medication 5 MG: at 18:59

## 2024-03-24 RX ADMIN — THIAMINE HCL TAB 100 MG 100 MG: 100 TAB at 08:35

## 2024-03-24 ASSESSMENT — ACTIVITIES OF DAILY LIVING (ADL)
ADLS_ACUITY_SCORE: 49
ADLS_ACUITY_SCORE: 53
ADLS_ACUITY_SCORE: 51
ADLS_ACUITY_SCORE: 49
ADLS_ACUITY_SCORE: 46
ADLS_ACUITY_SCORE: 49
ADLS_ACUITY_SCORE: 49
ADLS_ACUITY_SCORE: 53
ADLS_ACUITY_SCORE: 49
ADLS_ACUITY_SCORE: 53
ADLS_ACUITY_SCORE: 53
ADLS_ACUITY_SCORE: 49

## 2024-03-24 NOTE — PROGRESS NOTES
Neuro: Alert. Oriented to self only. Restless overnight, constantly trying to get out of bed. Intermittently aggressive with staff, kicking and swearing. One-time PRN Zyprexa given. Patient cooperative with cares after PRN.   Cardiac: SR with frequent PVC and PACs. VSS. HR 60-70s  Respiratory: Sating >92% on RA  GI/: Incontinent void. Bladder scanned for 361 ml. No BM overnight  Diet/appetite: Tolerating regular diet. Minimal intake overnight. Mild nausea- PRN zofran given  Activity:  Assist of 2 via sera steady. Assisted with repositioning in bed  Pain: Denies  Skin: No new deficits noted.  LDA's: R PIV SL    Plan: Continue with POC. Notify primary team with changes.

## 2024-03-24 NOTE — PROVIDER NOTIFICATION
"Time of notification: 7:59 PM  Provider notified: Imtiaz Mcgowan MD  Patient status:\"Pt is getting agitated and restless,would you be able to order something IV, or last night they gave IM zyprexa.\"   8:01 PM   \"Pt is starting to try to kick us\"  Writer called MD and explained to MD the pt is agitated and is swearing and is not redirectable and is trying to kick and hit staff.  Orders received:  MD ordered one time dose of IM Zyprexa    Update 9:30 pm  Pt more cooperative and less agitated. Pt agreeable to take meds and vitals. Pt still swearing under his breath but agrees to allow staff to do cares. Pt is calm and is not threatening to hit or kick staff.  "

## 2024-03-24 NOTE — PLAN OF CARE
Neuro: Patient lethargic throughout the day, intermittently arouses by himself, but easily arouses to voice or touch. Falls back to sleep quickly. Confused. Can be impulsive. Sitter at bedside.   Cardiac: SB/SR/dysrhythmia with PAC's and PVC's. BP's stable. Afebrile.   Respiratory: Sating >92% on RA.   GI/: Straight cathed x1 this shift for 550 at 1200. Bladder scan 22ml at 1800. MD notified - plans to order LR bolus.   Diet/appetite: Regular diet, did not eat today, and only had small amount of liquid.   Activity: Assist of 2 with milan steady or lift to chair x2 today.   Pain: Denies.   Skin: Mepilex's placed on heels and coccyx for blanchable redness.   LDA's: R PIV - SL.      Plan: Plans to give sleeping meds at 1900 tonight in hopes that patient can sleep throughout the night. Monitor urine output and cognitive status.

## 2024-03-24 NOTE — PROGRESS NOTES
"[facility]    Progress Note - Medicine Service, P[facility]A TEAM 2       Date of Admission:  3/21/2024    Assessment & Plan   David Thomson is a 83 year old male with history of cognitive impairment, CAD s/p CABG (2015), afib, Aortic stenosis s/o TAVR with bioprosthetic valve, with recent admission 03/08-03/18 after a fall at which time he was found to have IPH, IVH, SAH, and SDH, admitted to OSH on 3/21/2024 after a fall with AMS. He was transferred to [facility] due to concern for new intracranial hemorrhage but imaging findings did not demonstrate new ICH. Care transitioned from trauma to medicine service 3/22 for continued evaluation of recurrent falls and encephalopathy. Mentation improved but now with significant agitation overnight.     Today:  - Significant agitation overnight requiring PRN olanzapine, sleeping this morning  - ECG this AM with corrected Qtc 464  - Increase quetiapine from 25mg daily to 25mg BID  - Change med administration for earlier dosing of melatonin and quetiapine  - Overnight PRN olanzapine ODT    # Encephalopathy, suspect multifactorial with delerium  # Recent Intraparenchymal hemorrhage with extension into ventricles andmidline shift, SAH, and SDH post fall (3/8)  # Risk for seizure  # Cognitive impairment  Family notes was AxOx3 prior to IPH and since discharge to TCU has been improving but still lethargic and not at baseline. Patient presenting again after fall with AMS. No new intracranial hemorrhage on repeat CT head. Patient is at risk for seizure in the setting of recent intracranial bleed and neurology started empiric antiepileptic therapy for persistent AMS. Thus far EEG with \"intermittent evidence of additional left hemispheric focal cerebral dysfunction, consistent with predominance of known structural abnormalities in this area\" but no epileptiform discharges. Considered UTI in context of urinary retention but only trace LE and " bacteria - otherwise afebrile without LE. Otherwise calcium, sodium, glucose, TSH, VitB1, VitB12 WNL. CBC with mild anemia but unchanged from prior. Was initially somnolent on medicine transfer (possible related to Keppra loading?) but overnights has had agitated delerium. Will optimize conditions and evening medications to prevent hospital acquired delirium.   - Diagnostics: Bladder scans  - Neuro exams q4h  - Keppra x7 days 750mg BID per neuro  - Increase seroquel from 25 at bedtime to 25mg BID - will time evening dose earlier at 1900  - Change PTA melatonin 5mg dose time to be given earlier, 1900  - Begin PRN olanzapine 5mg ODT for agitation  - Begin delerium precautions  - Per NSGY: Platelets > 100k, Hgb >8, INR<1.4, HOB >30 degs  - PT/OT consult    # Risk of Qtc prolongation  # LBBB  Qtc read as 518 - correction for LBBB is 464  - Continue antispsychotics as above.  - ECG in AM tomorrow with new increased dose of quetiapine     # Falls  Has had multiple hospital admissions for fall with unknown etiology. Unclear if mechanical vs syncopal episodes. Has extensive cardiac history but recent TTE without abnormalities of bioprosthetic valve.Can consider arrhythmia as well with prior afib.   - Consider ZioPatch on discharge.    # PTSD  # Depression  Patient started on Seroquel for delirium at OSH due to hospitalization in the setting of acute illness. Patient sleeping, minimally responsive as above.   - Continue PTA sertraline     --Chronic--  # Chronic HFpEF  # Hypertension   Last available Echo 1/2023 shows EF 55-60%, normal right and left ventricles size and function.  - Hold PTA Toprol XL  - Hold PTA entrestos  - Not currently on SGLT2i    # Afib  Eliquis is being held due to recent intracranial hemorrhage as per NSGY for at least four weeks.  - Hold PTA metoprolol as above  - Patient needs outpatient cardiology follow up for consideration of Watchman procedure.     # CAD s/p 3V CABG (2015)  Not on aspirin  "currently  - Statin as below    # Aortic stenosis s/p TAVR (2016)  Last TTE 1/2023 with mean gradient 14mmHg, suspected to be WNL     #HLD  -PTA lipitor     #GERD  - PTA omeprazole     # Idiopathic progressive neuropathy  - Hold PTA Gabapentin due to AMS     # Chronic incompletely united distal humerus fracture  Patient is s/p left elbow fracture s/p ORIF at [facility], complicated by elbow wound requiring washout. Per chart review, on lifelong Bactrim.  - Continue PTA Bactrim     -- Outpatient Follow Ups Needed--  Neurosurgery - to assess for hydrocephalus     Cardiology follow up for consideration of Watchman procedure.        Diet: Regular Diet Adult    DVT Prophylaxis: Pneumatic Compression Devices  Rice Catheter: Not present  Fluids: None  Lines: None     Cardiac Monitoring: None  Code Status: Full Code    Family: Son updated over phone 3/24, will call again this afternoo    Clinically Significant Risk Factors               # Coagulation Defect: INR = 1.20 (Ref range: 0.85 - 1.15) and/or PTT = 31 Seconds (Ref range: 22 - 38 Seconds), will monitor for bleeding    # Hypertension: Noted on problem list  # Chronic heart failure with preserved ejection fraction: heart failure noted on problem list and last echo with EF >50%       # Overweight: Estimated body mass index is 29.52 kg/m  as calculated from the following:    Height as of an earlier encounter on 3/21/24: 1.854 m (6' 1\").    Weight as of this encounter: 101.5 kg (223 lb 12.3 oz)., PRESENT ON ADMISSION       # Financial/Environmental Concerns: none       Disposition Plan      Expected Discharge Date: 03/25/2024      Destination: inpatient rehabilitation facility  Discharge Comments: Pending improvement in mentation, was at TCU before      The patient's care was discussed with the Attending Physician, Dr. Leal .    Desire Langley MD  Medicine Service, [address]  Securely message with Lin " (more info)  Text page via [facility] Paging/Directory   See signed in provider for up to date coverage information  ______________________________________________________________________    Interval History   Patient mentation improved by the afternoon and restraints were removed. By the evening patient was restless and trying to kick staff - required IM zyprexa 5mg x1 and PRN ODT. Patient endorsed nausea and had one time zofran given overnight. Incontinent of urine.     This morning patient sleeping heavily and unable to answer questions      Physical Exam   Vital Signs: Temp: 97.4  F (36.3  C) Temp src: Axillary BP: 98/75 Pulse: 64   Resp: 22 SpO2: 96 % O2 Device: None (Room air)    Weight: 223 lbs 12.27 oz    General Appearance: NAD, sleeping, opens eyes to voice and then falls asleep  Respiratory: CTAB anteriorly  Cardiovascular: Irregular, normal S1 and S2  GI: Soft, non-tender, non-distended, no rebound or guarding  Skin: Warm, no lesions  Neuro: Not answering orientation questions or following commands    Data     I have personally reviewed the following data over the past 24 hrs:    5.6  \   10.1 (L)   / 240     139 106 14.3 /  94   3.7 20 (L) 0.90 \

## 2024-03-24 NOTE — PLAN OF CARE
Shift: 5863-6062    Neuro:  Disoriented to place, situation. Pt agitated and restless at the beginning of shift (see provider notification.) After one time does of medication, pt was cooperative with cares. Pt still swearing under breath at staff, but agrees to cares when asked. Sitter at bedside for impulsiveness and high fall risk.  VS: VSS  Respiratory: RA, lung sounds diminished. No complaints of SOB  Cardiac: Sinus rhythm  Pain: No complaints of pain and pt doesn't appear to be in pain  GI/: Pt incontinent to bowel and bladder.  Diet: Regular diet, swallows pills fine  IV/Drips: Right PIV SL  Activity: Assist of 2 with milan steady  Skin/Drains: Abrasion of left knee, brace on left arm, blanchable redness on bilateral heels    P: Report Changes to treatment team marsanam 2

## 2024-03-25 ENCOUNTER — APPOINTMENT (OUTPATIENT)
Dept: PHYSICAL THERAPY | Facility: CLINIC | Age: 84
DRG: 085 | End: 2024-03-25
Payer: COMMERCIAL

## 2024-03-25 ENCOUNTER — APPOINTMENT (OUTPATIENT)
Dept: OCCUPATIONAL THERAPY | Facility: CLINIC | Age: 84
DRG: 085 | End: 2024-03-25
Payer: COMMERCIAL

## 2024-03-25 LAB
ANION GAP SERPL CALCULATED.3IONS-SCNC: 12 MMOL/L (ref 7–15)
ATRIAL RATE - MUSE: 60 BPM
ATRIAL RATE - MUSE: 65 BPM
ATRIAL RATE - MUSE: 69 BPM
BUN SERPL-MCNC: 13.2 MG/DL (ref 8–23)
CALCIUM SERPL-MCNC: 9 MG/DL (ref 8.8–10.2)
CHLORIDE SERPL-SCNC: 106 MMOL/L (ref 98–107)
CREAT SERPL-MCNC: 0.91 MG/DL (ref 0.67–1.17)
DEPRECATED HCO3 PLAS-SCNC: 22 MMOL/L (ref 22–29)
DIASTOLIC BLOOD PRESSURE - MUSE: NORMAL MMHG
EGFRCR SERPLBLD CKD-EPI 2021: 85 ML/MIN/1.73M2
ERYTHROCYTE [DISTWIDTH] IN BLOOD BY AUTOMATED COUNT: 15.3 % (ref 10–15)
GLUCOSE SERPL-MCNC: 76 MG/DL (ref 70–99)
HCT VFR BLD AUTO: 31.4 % (ref 40–53)
HGB BLD-MCNC: 10.2 G/DL (ref 13.3–17.7)
INTERPRETATION ECG - MUSE: NORMAL
MAGNESIUM SERPL-MCNC: 2.1 MG/DL (ref 1.7–2.3)
MCH RBC QN AUTO: 30.5 PG (ref 26.5–33)
MCHC RBC AUTO-ENTMCNC: 32.5 G/DL (ref 31.5–36.5)
MCV RBC AUTO: 94 FL (ref 78–100)
P AXIS - MUSE: 21 DEGREES
P AXIS - MUSE: 54 DEGREES
P AXIS - MUSE: NORMAL DEGREES
PLATELET # BLD AUTO: 222 10E3/UL (ref 150–450)
POTASSIUM SERPL-SCNC: 3.7 MMOL/L (ref 3.4–5.3)
PR INTERVAL - MUSE: 200 MS
PR INTERVAL - MUSE: 226 MS
PR INTERVAL - MUSE: 242 MS
QRS DURATION - MUSE: 160 MS
QRS DURATION - MUSE: 174 MS
QRS DURATION - MUSE: 176 MS
QT - MUSE: 484 MS
QT - MUSE: 498 MS
QT - MUSE: 508 MS
QTC - MUSE: 498 MS
QTC - MUSE: 518 MS
QTC - MUSE: 528 MS
R AXIS - MUSE: -12 DEGREES
R AXIS - MUSE: -14 DEGREES
R AXIS - MUSE: -20 DEGREES
RBC # BLD AUTO: 3.34 10E6/UL (ref 4.4–5.9)
SODIUM SERPL-SCNC: 140 MMOL/L (ref 135–145)
SYSTOLIC BLOOD PRESSURE - MUSE: NORMAL MMHG
T AXIS - MUSE: 105 DEGREES
T AXIS - MUSE: 87 DEGREES
T AXIS - MUSE: 97 DEGREES
VENTRICULAR RATE- MUSE: 60 BPM
VENTRICULAR RATE- MUSE: 65 BPM
VENTRICULAR RATE- MUSE: 69 BPM
WBC # BLD AUTO: 4.9 10E3/UL (ref 4–11)

## 2024-03-25 PROCEDURE — 999N000248 HC STATISTIC IV INSERT WITH US BY RN

## 2024-03-25 PROCEDURE — 120N000003 HC R&B IMCU UMMC

## 2024-03-25 PROCEDURE — 97110 THERAPEUTIC EXERCISES: CPT | Mod: GP | Performed by: PHYSICAL THERAPIST

## 2024-03-25 PROCEDURE — 250N000013 HC RX MED GY IP 250 OP 250 PS 637

## 2024-03-25 PROCEDURE — 80048 BASIC METABOLIC PNL TOTAL CA: CPT

## 2024-03-25 PROCEDURE — 97530 THERAPEUTIC ACTIVITIES: CPT | Mod: GO

## 2024-03-25 PROCEDURE — 97530 THERAPEUTIC ACTIVITIES: CPT | Mod: GP | Performed by: PHYSICAL THERAPIST

## 2024-03-25 PROCEDURE — 36415 COLL VENOUS BLD VENIPUNCTURE: CPT

## 2024-03-25 PROCEDURE — 93010 ELECTROCARDIOGRAM REPORT: CPT | Performed by: INTERNAL MEDICINE

## 2024-03-25 PROCEDURE — 83735 ASSAY OF MAGNESIUM: CPT | Performed by: HOSPITALIST

## 2024-03-25 PROCEDURE — 99232 SBSQ HOSP IP/OBS MODERATE 35: CPT | Mod: GC | Performed by: HOSPITALIST

## 2024-03-25 PROCEDURE — 97535 SELF CARE MNGMENT TRAINING: CPT | Mod: GO

## 2024-03-25 PROCEDURE — 250N000011 HC RX IP 250 OP 636

## 2024-03-25 PROCEDURE — 258N000003 HC RX IP 258 OP 636

## 2024-03-25 PROCEDURE — 85049 AUTOMATED PLATELET COUNT: CPT

## 2024-03-25 RX ORDER — QUETIAPINE FUMARATE 50 MG/1
50 TABLET, FILM COATED ORAL EVERY 24 HOURS
Status: DISCONTINUED | OUTPATIENT
Start: 2024-03-26 | End: 2024-03-30

## 2024-03-25 RX ORDER — QUETIAPINE FUMARATE 25 MG/1
25 TABLET, FILM COATED ORAL EVERY 24 HOURS
Status: COMPLETED | OUTPATIENT
Start: 2024-03-25 | End: 2024-03-25

## 2024-03-25 RX ORDER — DEXTROSE, SODIUM CHLORIDE, SODIUM LACTATE, POTASSIUM CHLORIDE, AND CALCIUM CHLORIDE 5; .6; .31; .03; .02 G/100ML; G/100ML; G/100ML; G/100ML; G/100ML
500 INJECTION, SOLUTION INTRAVENOUS ONCE
Status: COMPLETED | OUTPATIENT
Start: 2024-03-25 | End: 2024-03-25

## 2024-03-25 RX ORDER — QUETIAPINE FUMARATE 25 MG/1
25 TABLET, FILM COATED ORAL DAILY PRN
Status: DISCONTINUED | OUTPATIENT
Start: 2024-03-25 | End: 2024-03-27

## 2024-03-25 RX ORDER — LEVETIRACETAM 750 MG/1
750 TABLET ORAL 2 TIMES DAILY
Status: DISCONTINUED | OUTPATIENT
Start: 2024-03-25 | End: 2024-05-10 | Stop reason: HOSPADM

## 2024-03-25 RX ADMIN — LEVETIRACETAM 750 MG: 750 TABLET, FILM COATED ORAL at 08:23

## 2024-03-25 RX ADMIN — ATORVASTATIN CALCIUM 80 MG: 80 TABLET, FILM COATED ORAL at 20:35

## 2024-03-25 RX ADMIN — FOLIC ACID 1 MG: 1 TABLET ORAL at 08:23

## 2024-03-25 RX ADMIN — SULFAMETHOXAZOLE AND TRIMETHOPRIM 1 TABLET: 400; 80 TABLET ORAL at 08:23

## 2024-03-25 RX ADMIN — SERTRALINE HYDROCHLORIDE 75 MG: 50 TABLET ORAL at 08:23

## 2024-03-25 RX ADMIN — PANTOPRAZOLE SODIUM 40 MG: 40 TABLET, DELAYED RELEASE ORAL at 08:23

## 2024-03-25 RX ADMIN — THIAMINE HCL TAB 100 MG 100 MG: 100 TAB at 08:23

## 2024-03-25 RX ADMIN — SODIUM CHLORIDE, POTASSIUM CHLORIDE, SODIUM LACTATE AND CALCIUM CHLORIDE 250 ML: 600; 310; 30; 20 INJECTION, SOLUTION INTRAVENOUS at 03:46

## 2024-03-25 RX ADMIN — SULFAMETHOXAZOLE AND TRIMETHOPRIM 1 TABLET: 400; 80 TABLET ORAL at 20:35

## 2024-03-25 RX ADMIN — QUETIAPINE FUMARATE 25 MG: 25 TABLET ORAL at 18:19

## 2024-03-25 RX ADMIN — SENNOSIDES AND DOCUSATE SODIUM 1 TABLET: 8.6; 5 TABLET ORAL at 20:35

## 2024-03-25 RX ADMIN — QUETIAPINE FUMARATE 25 MG: 25 TABLET ORAL at 08:23

## 2024-03-25 RX ADMIN — Medication 5 MG: at 18:19

## 2024-03-25 RX ADMIN — LEVETIRACETAM 750 MG: 750 TABLET, FILM COATED ORAL at 20:35

## 2024-03-25 RX ADMIN — SODIUM CHLORIDE, SODIUM LACTATE, POTASSIUM CHLORIDE, CALCIUM CHLORIDE AND DEXTROSE MONOHYDRATE 500 ML: 5; 600; 310; 30; 20 INJECTION, SOLUTION INTRAVENOUS at 08:24

## 2024-03-25 ASSESSMENT — ACTIVITIES OF DAILY LIVING (ADL)
ADLS_ACUITY_SCORE: 49

## 2024-03-25 NOTE — PROVIDER NOTIFICATION
Time of notification: 3:28 AM  Provider notified: Graham rodriguezcover  Patient status: Pt hasn't voided in over 4 hours, bladder scanned for only 19 ml. No oral intake during day or night. Do we want a fluid bolus?    Orders received: 250 ml LR bolus ordered

## 2024-03-25 NOTE — PROGRESS NOTES
Resident/Fellow Attestation   I, Desire Langley MD, was present with the medical/MAYA student who participated in the service and in the documentation of the note.  I have verified the history and personally performed the physical exam and medical decision making.  I agree with the assessment and plan of care as documented in the note.      Desire Langley MD  PGY3  Date of Service (when I saw the patient): 03/25/24        [facility]    Progress Note - Medicine Service, [facility] TEAM 2       Date of Admission:  3/21/2024    Assessment & Plan   David Thomson is a 83 year old male with history of cognitive impairment, CAD s/p CABG (2015), afib, Aortic stenosis s/o TAVR with bioprosthetic valve, with recent admission 03/08-03/18 after a fall at which time he was found to have IPH, IVH, SAH, and SDH, admitted to OSH on 3/21/2024 after a fall with AMS. He was transferred to [facility] due to concern for new intracranial hemorrhage but imaging findings did not demonstrate new ICH. Care transitioned from trauma to medicine service 3/22 for continued evaluation of recurrent falls and encephalopathy. Mentation and overnight agitation improved but still not sleeping at night .      Today:  - ECG this AM with corrected QTc of 472  - Received AM quetiapine 25mg, additional 25mg PM dose today  > Switch to once daily 50mg quetiapine in the evening tomorrow  - Change med administration for earlier dosing of melatonin and quetiapine  - D5 LR 500mL bolus     # Encephalopathy, suspect multifactorial with delerium  # Recent Intraparenchymal hemorrhage with extension into ventricles andmidline shift, SAH, and SDH post fall (3/8)  # Risk for seizure  # Cognitive impairment  Family notes was AxOx3 prior to IPH and since discharge to TCU has been improving but still lethargic and not at baseline. Patient presenting again after fall with AMS. No new intracranial hemorrhage on repeat CT  "head. Patient is at risk for seizure in the setting of recent intracranial bleed and neurology started empiric antiepileptic therapy for persistent AMS. Thus far EEG with \"intermittent evidence of additional left hemispheric focal cerebral dysfunction, consistent with predominance of known structural abnormalities in this area\" but no epileptiform discharges. Considered UTI in context of urinary retention but only trace LE and bacteria - otherwise afebrile without LE. Otherwise calcium, sodium, glucose, TSH, VitB1, VitB12 WNL. CBC with mild anemia but unchanged from prior. Was initially somnolent on medicine transfer (possible related to Keppra loading?) but overnights has had agitated delerium. Will optimize conditions and evening medications to prevent hospital acquired delirium.   - Delirium precautions  - Neuro exams q4h  - Keppra 750mg BID until able to follow up as an outpatient  - Increase seroquel from 25 at bedtime to 25mg BID - will time evening dose earlier at 1900   - Plan to switch to 50mg qd in the PM starting tomorrow  - PTA melatonin 5mg dose time to be given earlier, 1900  - PRN olanzapine 5mg ODT for agitation  - Per NSGY: Platelets > 100k, Hgb >8, INR<1.4, HOB >30 degs  - PT/OT consult    # Risk of Qtc prolongation  # LBBB  Qtc read as 528 - correction for LBBB is 472  - Continue antispsychotics as above.  - ECG in AM tomorrow with new increased dose of quetiapine     # Falls  Has had multiple hospital admissions for fall with unknown etiology. Unclear if mechanical vs syncopal episodes. Has extensive cardiac history but recent TTE without abnormalities of bioprosthetic valve.Can consider arrhythmia as well with prior afib.   - Consider ZioPatch on discharge.    # PTSD  # Depression  Patient started on Seroquel for delirium at OSH due to hospitalization in the setting of acute illness. Patient sleeping, minimally responsive as above.   - Continue PTA sertraline  - Scheduled quetiapine, PRN " olanzapine as above      --Chronic--  # ISHAN  Patient has history of ISHAN. Patient noted to be desatting when asleep, improves with Oxymask. If not tolerating mask can consider repeat blood gas to check PCO2. Bicarb on BMP is WNL.   - Night time Oxymask    # Chronic HFpEF  # Hypertension   Last available Echo 1/2023 shows EF 55-60%, normal right and left ventricles size and function.  - Hold PTA Toprol XL  - Hold PTA entrestos  - Not currently on SGLT2i    # Afib  Eliquis is being held due to recent intracranial hemorrhage as per NSGY for at least four weeks.  - Hold PTA metoprolol as above  - Patient needs outpatient cardiology follow up for consideration of Watchman procedure.     # CAD s/p 3V CABG (2015)  Not on aspirin currently  - Statin as below    # Aortic stenosis s/p TAVR (2016)  Last TTE 1/2023 with mean gradient 14mmHg, suspected to be WNL     #HLD  -PTA lipitor     #GERD  - PTA omeprazole     # Idiopathic progressive neuropathy  - Hold PTA Gabapentin due to AMS     # Chronic incompletely united distal humerus fracture  Patient is s/p left elbow fracture s/p ORIF at [facility], complicated by elbow wound requiring washout. Per chart review, on lifelong Bactrim.  - Continue PTA Bactrim       -- Outpatient Follow Ups Needed--  Neurosurgery - to assess for hydrocephalus     Cardiology follow up for consideration of Watchman procedure.        Diet: Regular Diet Adult    DVT Prophylaxis: Pneumatic Compression Devices  Rice Catheter: Not present  Fluids: None  Lines: None     Cardiac Monitoring: None  Code Status: Full Code    Family: Son updated over phone 3/25    Clinically Significant Risk Factors               # Coagulation Defect: INR = 1.20 (Ref range: 0.85 - 1.15) and/or PTT = 31 Seconds (Ref range: 22 - 38 Seconds), will monitor for bleeding    # Hypertension: Noted on problem list  # Chronic heart failure with preserved ejection fraction: heart failure noted on problem list and last echo with EF >50%       #  "Overweight: Estimated body mass index is 29.67 kg/m  as calculated from the following:    Height as of an earlier encounter on 3/21/24: 1.854 m (6' 1\").    Weight as of this encounter: 102 kg (224 lb 13.9 oz)., PRESENT ON ADMISSION       # Financial/Environmental Concerns: none       Disposition Plan      Expected Discharge Date: 03/27/2024      Destination: inpatient rehabilitation facility  Discharge Comments: Pending improvement in mentation, was at TCU before (as of 3/25)  Son (POA) would like to talk to  about TCU      The patient's care was discussed with the Attending Physician, Dr. Leal .    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       Physical Exam   Vital Signs: Temp: 98  F (36.7  C) Temp src: Axillary BP: 112/64 Pulse: 61   Resp: 18 SpO2: 95 % O2 Device: None (Room air)    Weight: 224 lbs 13.91 oz    General Appearance: NAD, laying in chair with eyes closed, opens eyes to voice  Respiratory: CTAB anteriorly  Cardiovascular: Irregular, normal S1 and S2  GI: Soft, non-tender, non-distended, no rebound or guarding  Skin: Warm, healing abrasions to bilateral knees  Neuro: Alert, oriented to person but not age (states he is 40 years old), states he is in the [facility]. Follows one step commands, does not follow two step commands    Data     I have " personally reviewed the following data over the past 24 hrs:    4.9  \   10.2 (L)   / 222     140 106 13.2 /  76   3.7 22 0.91 \

## 2024-03-25 NOTE — PLAN OF CARE
Goal Outcome Evaluation:    Problem: Adult Inpatient Plan of Care  Goal: Absence of Hospital-Acquired Illness or Injury  Intervention: Prevent Skin Injury  Recent Flowsheet Documentation  Taken 3/25/2024 1145 by Swetha Zheng RN  Body Position: turned  Taken 3/25/2024 1100 by Swetha Zheng RN  Body Position: turned  Taken 3/25/2024 0800 by Swetha Zheng RN  Body Position: turned            Neuro: Q4 neuro checks. Alert. Oriented to self only. Drowsy for most of the day d/t not sleeping at night.   Cardiac: SR with first degree AV block with frequent PVC and PACs. VSS.  Respiratory: Sating >90% on room air   GI/: Incontinent x3. Large amounts of urine. Bladder scan x1 PRV 48. No BM this shift.  Diet/appetite: Tolerating regular diet. No oral intake overnight.   Activity:  Assist of 2 via milan steady. Up in chair x2. Used sling to get back to bed. Patient moves and shifts weight in bed independently.   Pain: Pt denies pain  Skin: No new deficits noted.  LDA's: R PIV SL    Plan: 0800 scheduled seroquel discontinued starting 3/26 in hopes to help keep patient awake during the day.   Continue with POC. Notify primary team with changes.    Swetha Zheng RN on 3/25/2024 at 6:54 PM

## 2024-03-25 NOTE — PLAN OF CARE
Neuro: Alert. Oriented to self only. Restless overnight, did not sleep. No aggression with staff- cooperative with cares  Cardiac: SR with first degree AV block with frequent PVC and PACs. VSS. HR 60-70s  Respiratory: Sating >92% on RA  GI/: Incontinent void at 2300. Bladder scanned for 19 ml at 0320. 250 ml bolus given. No BM overnight  Diet/appetite: Tolerating regular diet. No oral intake overnight.   Activity:  Assist of 2 via sera steady. Assisted with repositioning in bed  Pain: Denies  Skin: No new deficits noted.  LDA's: R PIV SL     Plan: Continue with POC. Notify primary team with changes.

## 2024-03-26 ENCOUNTER — TRANSCRIBE ORDERS (OUTPATIENT)
Dept: OTHER | Age: 84
End: 2024-03-26

## 2024-03-26 ENCOUNTER — APPOINTMENT (OUTPATIENT)
Dept: PHYSICAL THERAPY | Facility: CLINIC | Age: 84
DRG: 085 | End: 2024-03-26
Payer: COMMERCIAL

## 2024-03-26 DIAGNOSIS — C44.319 BASAL CELL CARCINOMA OF SKIN OF OTHER PARTS OF FACE: Primary | ICD-10-CM

## 2024-03-26 LAB
ANION GAP SERPL CALCULATED.3IONS-SCNC: 14 MMOL/L (ref 7–15)
BUN SERPL-MCNC: 14.1 MG/DL (ref 8–23)
CALCIUM SERPL-MCNC: 9.3 MG/DL (ref 8.8–10.2)
CHLORIDE SERPL-SCNC: 103 MMOL/L (ref 98–107)
CREAT SERPL-MCNC: 0.96 MG/DL (ref 0.67–1.17)
DEPRECATED HCO3 PLAS-SCNC: 22 MMOL/L (ref 22–29)
EGFRCR SERPLBLD CKD-EPI 2021: 79 ML/MIN/1.73M2
ERYTHROCYTE [DISTWIDTH] IN BLOOD BY AUTOMATED COUNT: 15.7 % (ref 10–15)
GLUCOSE SERPL-MCNC: 81 MG/DL (ref 70–99)
HCT VFR BLD AUTO: 34 % (ref 40–53)
HGB BLD-MCNC: 11.2 G/DL (ref 13.3–17.7)
MAGNESIUM SERPL-MCNC: 2.1 MG/DL (ref 1.7–2.3)
MCH RBC QN AUTO: 30.6 PG (ref 26.5–33)
MCHC RBC AUTO-ENTMCNC: 32.9 G/DL (ref 31.5–36.5)
MCV RBC AUTO: 93 FL (ref 78–100)
PLATELET # BLD AUTO: 237 10E3/UL (ref 150–450)
POTASSIUM SERPL-SCNC: 3.6 MMOL/L (ref 3.4–5.3)
POTASSIUM SERPL-SCNC: 3.6 MMOL/L (ref 3.4–5.3)
RBC # BLD AUTO: 3.66 10E6/UL (ref 4.4–5.9)
SODIUM SERPL-SCNC: 139 MMOL/L (ref 135–145)
WBC # BLD AUTO: 5.9 10E3/UL (ref 4–11)

## 2024-03-26 PROCEDURE — 250N000013 HC RX MED GY IP 250 OP 250 PS 637

## 2024-03-26 PROCEDURE — 83735 ASSAY OF MAGNESIUM: CPT | Performed by: HOSPITALIST

## 2024-03-26 PROCEDURE — 97530 THERAPEUTIC ACTIVITIES: CPT | Mod: GP | Performed by: PHYSICAL THERAPIST

## 2024-03-26 PROCEDURE — 120N000003 HC R&B IMCU UMMC

## 2024-03-26 PROCEDURE — 85027 COMPLETE CBC AUTOMATED: CPT

## 2024-03-26 PROCEDURE — 93010 ELECTROCARDIOGRAM REPORT: CPT | Performed by: INTERNAL MEDICINE

## 2024-03-26 PROCEDURE — 250N000011 HC RX IP 250 OP 636

## 2024-03-26 PROCEDURE — 36415 COLL VENOUS BLD VENIPUNCTURE: CPT

## 2024-03-26 PROCEDURE — 93005 ELECTROCARDIOGRAM TRACING: CPT

## 2024-03-26 PROCEDURE — 99233 SBSQ HOSP IP/OBS HIGH 50: CPT | Mod: GC | Performed by: STUDENT IN AN ORGANIZED HEALTH CARE EDUCATION/TRAINING PROGRAM

## 2024-03-26 PROCEDURE — 80048 BASIC METABOLIC PNL TOTAL CA: CPT

## 2024-03-26 RX ORDER — OLANZAPINE 10 MG/2ML
5 INJECTION, POWDER, FOR SOLUTION INTRAMUSCULAR
Status: COMPLETED | OUTPATIENT
Start: 2024-03-26 | End: 2024-03-26

## 2024-03-26 RX ADMIN — ATORVASTATIN CALCIUM 80 MG: 80 TABLET, FILM COATED ORAL at 20:34

## 2024-03-26 RX ADMIN — PANTOPRAZOLE SODIUM 40 MG: 40 TABLET, DELAYED RELEASE ORAL at 09:08

## 2024-03-26 RX ADMIN — Medication 5 MG: at 20:35

## 2024-03-26 RX ADMIN — SULFAMETHOXAZOLE AND TRIMETHOPRIM 1 TABLET: 400; 80 TABLET ORAL at 09:08

## 2024-03-26 RX ADMIN — THIAMINE HCL TAB 100 MG 100 MG: 100 TAB at 09:08

## 2024-03-26 RX ADMIN — SERTRALINE HYDROCHLORIDE 75 MG: 50 TABLET ORAL at 09:08

## 2024-03-26 RX ADMIN — LEVETIRACETAM 750 MG: 750 TABLET, FILM COATED ORAL at 20:35

## 2024-03-26 RX ADMIN — ACETAMINOPHEN 650 MG: 325 TABLET, FILM COATED ORAL at 21:20

## 2024-03-26 RX ADMIN — SULFAMETHOXAZOLE AND TRIMETHOPRIM 1 TABLET: 400; 80 TABLET ORAL at 20:34

## 2024-03-26 RX ADMIN — FOLIC ACID 1 MG: 1 TABLET ORAL at 09:08

## 2024-03-26 RX ADMIN — QUETIAPINE FUMARATE 50 MG: 50 TABLET ORAL at 20:43

## 2024-03-26 RX ADMIN — OLANZAPINE 5 MG: 5 TABLET, ORALLY DISINTEGRATING ORAL at 07:18

## 2024-03-26 RX ADMIN — LEVETIRACETAM 750 MG: 750 TABLET, FILM COATED ORAL at 09:08

## 2024-03-26 RX ADMIN — OLANZAPINE 5 MG: 10 INJECTION, POWDER, FOR SOLUTION INTRAMUSCULAR at 12:28

## 2024-03-26 ASSESSMENT — ACTIVITIES OF DAILY LIVING (ADL)
ADLS_ACUITY_SCORE: 45
ADLS_ACUITY_SCORE: 49
ADLS_ACUITY_SCORE: 45
ADLS_ACUITY_SCORE: 49
ADLS_ACUITY_SCORE: 49
ADLS_ACUITY_SCORE: 45
ADLS_ACUITY_SCORE: 45
ADLS_ACUITY_SCORE: 49
ADLS_ACUITY_SCORE: 45
ADLS_ACUITY_SCORE: 49
ADLS_ACUITY_SCORE: 49
ADLS_ACUITY_SCORE: 45
ADLS_ACUITY_SCORE: 45
ADLS_ACUITY_SCORE: 49
ADLS_ACUITY_SCORE: 49

## 2024-03-26 NOTE — PROVIDER NOTIFICATION
Time of notification: 07:39, 07:53, 08:55, 09:29  Provider notified: Graham Hoffman 2  Patient status: Patient agitated and restless kicking legs and physically harming staff. PRN olanzapine was given without relief. Bilateral ankle restraints applied for staff and patient safety  Temp:  [97.4  F (36.3  C)-98.4  F (36.9  C)] 98.4  F (36.9  C)  Pulse:  [55-71] 58  Resp:  [16-18] 18  BP: (112-126)/(56-69) 124/63  SpO2:  [94 %-100 %] 100 %  Orders received: Order for restraints was not received, restraints were removed after 1 hour.

## 2024-03-26 NOTE — PLAN OF CARE
Neuro: Agitated and restless. Oriented to self. Inconsistently follows commands. R pupil > L pupil. PRN PO and IM olanzapine given for agitation. Sitter at bedside and bilateral ankle restraints in place.  Cardiac: No tele. VSS. B/P: 124/63, T: 98.4, P: 58, R: 18  Respiratory: O2 sats stable on RA.  GI/: Adequate urine output, unmeasured incontinent voids. PVR 78 and 114. Large formed BM this shift.  Diet/appetite: Tolerating regular diet. Moderate appetite.  Activity:  Not OOB due to inconsistency following commands and agitation. ROM completed. Weight shifting independently.  Pain: At acceptable level on current regimen. Denies pain.  Skin: No new deficits noted.   LDA's: R PIV SL    Plan: Continue with POC. Notify primary team with changes.

## 2024-03-26 NOTE — PROGRESS NOTES
Resident/Fellow Attestation   I, Desire Langley was present with the medical/MAYA student who participated in the service and in the documentation of the note.  I have verified the history and personally performed the physical exam and medical decision making.  I agree with the assessment and plan of care as documented in the note.      Desire Langley  PGY3  Date of Service (when I saw the patient): 03/25/24        [facility]    Progress Note - Medicine Service, MARWOLF TEAM 2       Date of Admission:  3/21/2024    Assessment & Plan   David Thomson is a 83 year old male with history of cognitive impairment, CAD s/p CABG (2015), afib, Aortic stenosis s/o TAVR with bioprosthetic valve, with recent admission 03/08-03/18 after a fall at which time he was found to have IPH, IVH, SAH, and SDH, admitted to OSH on 3/21/2024 after a fall with AMS. [facility] was [facility] due to concern for new intracranial hemorrhage but imaging findings did not demonstrate new ICH. Care transitioned from trauma to medicine service 3/22 for continued evaluation of recurrent falls and encephalopathy. Remains oriented to self and age but not place or situation. Sleeping overnight and agitation requiring PRN olanzapine, improved but patient continues to need a 1:1 sitter. PT/OT evaluated, recommend TCU on discharge.      Today:  - Recreational consult  - Required IM olanzapine 5mg for agitation  - Switch to once daily 50mg quetiapine in the evening today  - ECG with corrected Qtc <500    # Encephalopathy, suspect multifactorial with delerium  # Recent Intraparenchymal hemorrhage with extension into ventricles andmidline shift, SAH, and SDH post fall (3/8)  # Risk for seizure  # Cognitive impairment  Family notes was AxOx3 prior to IPH and since discharge to TCU has been improving but still lethargic and not at baseline. Patient presenting again after fall with AMS. No new  "intracranial hemorrhage on repeat CT head. Patient is at risk for seizure in the setting of recent intracranial bleed and neurology started empiric antiepileptic therapy for persistent AMS. Thus far EEG with \"intermittent evidence of additional left hemispheric focal cerebral dysfunction, consistent with predominance of known structural abnormalities in this area\" but no epileptiform discharges. Considered UTI in context of urinary retention but only trace LE and bacteria - otherwise afebrile without LE. Otherwise calcium, sodium, glucose, TSH, VitB1, VitB12 WNL. CBC with mild anemia but unchanged from prior. Was initially somnolent on medicine transfer (possible related to Keppra loading?) but overnights has had agitated delerium. Required restraints and IM olnazapine 3/26.Will optimize conditions and evening medications to prevent hospital acquired delirium.   - Delirium precautions  - Recreational therapy  - Neuro exams q4h  - Keppra 750mg BID until able to follow up as an outpatient  - Increase seroquel from 25 at bedtime to 50mg evening dose earlier at 1900  - PTA melatonin 5mg dose time to be given at 1900  - PRN olanzapine 5mg ODT for agitation, not to exceed 20mg per day  - Per NSGY: Platelets > 100k, Hgb >8, INR<1.4, HOB >30 degs  - PT/OT consult    # Risk of Qtc prolongation  # LBBB  Qtc read as 528 - correction for LBBB is 472  - Continue antispsychotics as above.  - ECG in AM tomorrow with new increased dose of quetiapine     # Falls  Has had multiple hospital admissions for fall with unknown etiology. Unclear if mechanical vs syncopal episodes. Has extensive cardiac history but recent TTE without abnormalities of bioprosthetic valve.Can consider arrhythmia as well with prior afib.   - Consider ZioPatch on discharge.    # PTSD  # Depression  Patient started on Seroquel for delirium at OSH due to hospitalization in the setting of acute illness. Patient sleeping, minimally responsive as above.   - Continue " PTA sertraline  - Scheduled quetiapine, PRN olanzapine as above      --Chronic--  # ISHAN  Patient has history of ISHAN. Patient noted to be desatting when asleep, improves with Oxymask. If not tolerating mask can consider repeat blood gas to check PCO2. Bicarb on BMP is WNL.   - Night time Oxymask    # Chronic HFpEF  # Hypertension   Last available Echo 1/2023 shows EF 55-60%, normal right and left ventricles size and function.  - Hold PTA Toprol XL  - Hold PTA entrestos  - Not currently on SGLT2i    # Afib  Eliquis is being held due to recent intracranial hemorrhage as per NSGY for at least four weeks.  - Hold PTA metoprolol as above  - Patient needs outpatient cardiology follow up for consideration of Watchman procedure.     # CAD s/p 3V CABG (2015)  Not on aspirin currently  - Statin as below    # Aortic stenosis s/p TAVR (2016)  Last TTE 1/2023 with mean gradient 14mmHg, suspected to be WNL     #HLD  -PTA lipitor     #GERD  - PTA omeprazole     # Idiopathic progressive neuropathy  - Hold PTA Gabapentin due to AMS     # Chronic incompletely united distal humerus fracture  Patient is s/p left elbow fracture s/p ORIF at [facility], complicated by elbow wound requiring washout. Per chart review, on lifelong Bactrim.  - Continue PTA Bactrim       -- Outpatient Follow Ups Needed--  Neurosurgery - to assess for hydrocephalus     Cardiology follow up for consideration of Watchman procedure.        Diet: Regular Diet Adult    DVT Prophylaxis: Pneumatic Compression Devices  Rice Catheter: Not present  Fluids: None  Lines: None     Cardiac Monitoring: None  Code Status: Full Code    Family: Son updated over phone 3/25    Clinically Significant Risk Factors                  # Hypertension: Noted on problem list  # Chronic heart failure with preserved ejection fraction: heart failure noted on problem list and last echo with EF >50%       # Overweight: Estimated body mass index is 28.77 kg/m  as calculated from the following:     "Height as of an earlier encounter on 3/21/24: 1.854 m (6' 1\").    Weight as of this encounter: 98.9 kg (218 lb 0.6 oz).        # Financial/Environmental Concerns: none       Disposition Plan         The patient's care was discussed with the Attending Physician, Dr. Sandhu .    Gavino [facility], [facility]  [facility]  Securely message with [employer] (more info)  Text page via [facility] Paging/Directory   See signed in provider for up to date coverage information  ______________________________________________________________________    Interval History   - Slept intermittently over night but improved compared to prior  - Intermittently required 2-3L O2 by NC over night  - Bladder scan overnight showed 507cc, straight cathed for 510cc  - In the AM was aggressive, kicking aide, received Olanzapine 5mg, placed in restraints  - Aside from the restraints, patient has no complaints this morning.  - Eating about half of meal   - Last BM was earlier today.       Physical Exam   Vital Signs: Temp: 98.3  F (36.8  C) Temp src: Axillary BP: 126/69 Pulse: 71   Resp: 18 SpO2: 98 % O2 Device: None (Room air) Oxygen Delivery: 3 LPM  Weight: 218 lbs .56 oz    General Appearance: NAD, laying in bed, appears awake  Respiratory: CTAB anteriorly  Cardiovascular: Irregular, normal S1 and S2  GI: Soft, non-tender, non-distended, no rebound or guarding  Skin: Warm, healing abrasions to bilateral knees  Neuro: Alert, oriented to self, states he is 83 years old, when asked what year it is states [birth year], when asked about location states \"[place].\" Able to follow two step commands including crossing the midline.     Data     I have personally reviewed the following data over the past 24 hrs:    5.9  \   11.2 (L)   / 237     139 103 14.1 /  81   3.6; 3.6 22 0.96 \         "

## 2024-03-26 NOTE — PLAN OF CARE
Goal Outcome Evaluation:       Neuro: Alert & Oriented to self only. Mostly calm and cooperative but was restless between 11pm and 1am and attempted to get out of bed intermittently. At 7:20, received report that pt became agitated and kicked one of the NSTs. PRN Zyprexa given.   Cardiac: SR with first degree AV Block. VSS. 120s over 60s.  Respiratory: Sating >95% on RA for most of shift, intermittent use of 2-3 NC.   GI/: Adequate urine output via straight cath. Bladder scanned for 507cc and straight cathed for 510cc.   Diet/appetite: On regular diet, poor appetite.  Activity:  Assist of 2 via milan steady or sling, pt did not get out of bed. Patient moves and shifts weight on bed independently.   Pain: At acceptable level on current regimen.   Skin: No new deficits noted.  LDA's: R PIV SL.     Plan: Continue with POC. Notify primary team with changes.

## 2024-03-26 NOTE — PROGRESS NOTES
Care Management Follow Up    Length of Stay (days): 5    Expected Discharge Date: 03/27/2024     Concerns to be Addressed:   Disposition planning    Patient plan of care discussed at interdisciplinary rounds: Yes    Anticipated Discharge Disposition:  OT and Physical Therapy are recommending TCU placement     Anticipated Discharge Services:  TCU placement  Anticipated Discharge DME:  Not applicable at this time    Patient/family educated on Medicare website which has current facility and service quality ratings:  Not on this date as pt continues to have a one to one sitter in place  Education Provided on the Discharge Plan:  Not on this date  Patient/Family in Agreement with the Plan:   Discharge planning was not discussed on this date as pt continues to have a one to one sitter in place    Referrals Placed by CM/SW:    None on this date  Private pay costs discussed: Not applicable at this time    Additional Information:  SW is following pt for discharge planning.  OT and Physical Therapy are recommending a TCU stay.  Pt continues to have a one to one sitter in place. The sitter is a barrier to placement.  JAVY phoned Carissa Davis at the [facility] ([phone]) and left a message requesting that she call to indicate whether or not  pt is service connected and if so, what percentage.      SW will continue to follow for discharge planning.    VIET Donohue  Social Work, 6A  Phone:  [phone]  Pager:  [phone]  3/26/2024       LIU Acuña

## 2024-03-26 NOTE — PROGRESS NOTES
"Care Management Follow Up     Length of Stay (days): 5     Expected Discharge Date: 03/27/2024     Concerns to be Addressed:   Disposition planning    Patient plan of care discussed at interdisciplinary rounds: Yes     Anticipated Discharge Disposition:  OT and Physical Therapy are recommending TCU placement     Anticipated Discharge Services:  TCU placement  Anticipated Discharge DME:  Not applicable at this time     Patient/family educated on Medicare website which has current facility and service quality ratings:   Yes, JAVY provided a \"Medicare Care Compare\" to pt as pt stated that he wanted to \"look at it.\"    David, states that he  toured [facility] today and it is first choice.   Per pt's son's, pt has been at [facility] in the past and they are familiar with it and agree that this is their 2nd choice.    Education Provided on the Discharge Plan:   yes  Patient/Family in Agreement with the Plan:    yes     Referrals Placed by CM/SW:    None on this date  Private pay costs discussed: Not applicable at this time     Additional Information:  SW is following pt for discharge planning.  OT and Physical Therapy are recommending a TCU stay.  Pt continues to have a one to one sitter in place. The sitter is a barrier to placement.  JAVY  received a return call from Carissa Davis at the [facility] ([phone])  who states that pt is enrolled in [facility] and pt is 90% service connected.    JAVY spoke with pt's son's (David Haynes and Valente) via conference call.  Per discussion, pt has reportedly had several short term rehab stays at [facility] since 12/2023.  Pt's son's would like pt placed closer to David and indicate that [facility] would be first choice followed by [facility].  Pt's son's spoke of the possibility of pt needing long term care if pt does not get back to baseline level of functioning to allow pt to live indep.    At this time, is is not known how much " functional gains pt will make.   JAVY explained that most nursing homes/SNF's offer both TCU and LTC.  JAVY  indicated that ideally, pt would be referred to a facility that offers both so that pt would not need to be moved to a different facility if LTC is needed.  JAVY explained to pt's son's that the assessing facility may determine that pt's therapy needs can be best served on the TCU or they could assess and conclude that pt's therapy needs could be met on the LTC unit.  JAVY explained that as pt is 90% service connected, pt would have 100% coverage if pt were placed in a [facility] contracted SNF for TCU/LTC (if needed).  JAVY informed pt's son's that both [facility] and [facility] are [facility] contracted SNF's.    JAVY explained that referrals for TCU/SNF will not be made until pt is successfully off the one to one sitter for 24 hours.   David states that when pt was at [facility], [facility].   Per discussion, pt states that he served in the [employer].       JAVY will continue to follow for discharge planning.     VIET Donohue  Social Work, 6A  Phone:  [phone]  Pager:  [phone]  3/26/202

## 2024-03-27 LAB
ATRIAL RATE - MUSE: 65 BPM
DIASTOLIC BLOOD PRESSURE - MUSE: NORMAL MMHG
INTERPRETATION ECG - MUSE: NORMAL
MAGNESIUM SERPL-MCNC: 2.1 MG/DL (ref 1.7–2.3)
P AXIS - MUSE: -5 DEGREES
POTASSIUM SERPL-SCNC: 3.6 MMOL/L (ref 3.4–5.3)
PR INTERVAL - MUSE: 188 MS
QRS DURATION - MUSE: 166 MS
QT - MUSE: 490 MS
QTC - MUSE: 509 MS
R AXIS - MUSE: -15 DEGREES
SYSTOLIC BLOOD PRESSURE - MUSE: NORMAL MMHG
T AXIS - MUSE: 111 DEGREES
VENTRICULAR RATE- MUSE: 65 BPM

## 2024-03-27 PROCEDURE — 250N000013 HC RX MED GY IP 250 OP 250 PS 637

## 2024-03-27 PROCEDURE — 93005 ELECTROCARDIOGRAM TRACING: CPT

## 2024-03-27 PROCEDURE — 99222 1ST HOSP IP/OBS MODERATE 55: CPT | Performed by: PSYCHIATRY & NEUROLOGY

## 2024-03-27 PROCEDURE — 84132 ASSAY OF SERUM POTASSIUM: CPT

## 2024-03-27 PROCEDURE — 36415 COLL VENOUS BLD VENIPUNCTURE: CPT

## 2024-03-27 PROCEDURE — 93010 ELECTROCARDIOGRAM REPORT: CPT | Performed by: INTERNAL MEDICINE

## 2024-03-27 PROCEDURE — 83735 ASSAY OF MAGNESIUM: CPT

## 2024-03-27 PROCEDURE — 99233 SBSQ HOSP IP/OBS HIGH 50: CPT | Mod: GC | Performed by: STUDENT IN AN ORGANIZED HEALTH CARE EDUCATION/TRAINING PROGRAM

## 2024-03-27 PROCEDURE — 120N000003 HC R&B IMCU UMMC

## 2024-03-27 RX ORDER — QUETIAPINE FUMARATE 50 MG/1
50 TABLET, FILM COATED ORAL
Status: DISCONTINUED | OUTPATIENT
Start: 2024-03-27 | End: 2024-05-10 | Stop reason: HOSPADM

## 2024-03-27 RX ADMIN — QUETIAPINE FUMARATE 50 MG: 50 TABLET ORAL at 18:27

## 2024-03-27 RX ADMIN — THIAMINE HCL TAB 100 MG 100 MG: 100 TAB at 07:43

## 2024-03-27 RX ADMIN — SULFAMETHOXAZOLE AND TRIMETHOPRIM 1 TABLET: 400; 80 TABLET ORAL at 19:50

## 2024-03-27 RX ADMIN — FOLIC ACID 1 MG: 1 TABLET ORAL at 07:42

## 2024-03-27 RX ADMIN — LEVETIRACETAM 750 MG: 750 TABLET, FILM COATED ORAL at 19:50

## 2024-03-27 RX ADMIN — SULFAMETHOXAZOLE AND TRIMETHOPRIM 1 TABLET: 400; 80 TABLET ORAL at 07:44

## 2024-03-27 RX ADMIN — SENNOSIDES AND DOCUSATE SODIUM 1 TABLET: 8.6; 5 TABLET ORAL at 19:50

## 2024-03-27 RX ADMIN — QUETIAPINE FUMARATE 50 MG: 50 TABLET ORAL at 20:24

## 2024-03-27 RX ADMIN — ATORVASTATIN CALCIUM 80 MG: 80 TABLET, FILM COATED ORAL at 19:50

## 2024-03-27 RX ADMIN — SERTRALINE HYDROCHLORIDE 75 MG: 50 TABLET ORAL at 07:43

## 2024-03-27 RX ADMIN — Medication 5 MG: at 18:27

## 2024-03-27 RX ADMIN — PANTOPRAZOLE SODIUM 40 MG: 40 TABLET, DELAYED RELEASE ORAL at 07:44

## 2024-03-27 RX ADMIN — LEVETIRACETAM 750 MG: 750 TABLET, FILM COATED ORAL at 07:43

## 2024-03-27 ASSESSMENT — ACTIVITIES OF DAILY LIVING (ADL)
ADLS_ACUITY_SCORE: 47
ADLS_ACUITY_SCORE: 43
ADLS_ACUITY_SCORE: 47
ADLS_ACUITY_SCORE: 45
ADLS_ACUITY_SCORE: 43
ADLS_ACUITY_SCORE: 47
ADLS_ACUITY_SCORE: 47
ADLS_ACUITY_SCORE: 43
ADLS_ACUITY_SCORE: 43
ADLS_ACUITY_SCORE: 45
ADLS_ACUITY_SCORE: 45
ADLS_ACUITY_SCORE: 43

## 2024-03-27 NOTE — PROGRESS NOTES
Resident/Fellow Attestation   IDuong present with the medical/MAYA student who participated in the service and in the documentation of the note.  I have verified the history and personally performed the physical exam and medical decision making.  I agree with the assessment and plan of care as documented in the note.      Duong Nelson  PGY1  Date of Service (when I saw the patient): 03/27/24        [facility]    Progress Note - Medicine Service, PAMELLA TEAM 2       Date of Admission:  3/21/2024    Assessment & Plan   David Thomson is a 83 year old male with history of cognitive impairment, CAD s/p CABG (2015), afib, Aortic stenosis s/o TAVR with bioprosthetic valve, with recent admission 03/08-03/18 after a fall at which time he was found to have IPH, IVH, SAH, and SDH, admitted to OSH on 3/21/2024 after a fall with AMS. He was transferred to [facility] due to concern for new intracranial hemorrhage but imaging findings did not demonstrate new ICH. Care transitioned from trauma to medicine service 3/22 for continued evaluation of recurrent falls and encephalopathy. Remains oriented to self and age, occasionally oriented to year, but not place or situation. Sleeping, no longer agitated on Quetiapine 50mg at night, not requiring PRN antipsychotics, however still requiring 1:1 sitter. PT/OT evaluated, recommend TCU on discharge.      Today:  - Recreational therapy  - Psychiatric consult, signed off  - Social work managing TCU/LTC placement    # Encephalopathy, suspect multifactorial with delerium  # Recent Intraparenchymal hemorrhage with extension into ventricles andmidline shift, SAH, and SDH post fall (3/8)  # Risk for seizure  # Cognitive impairment  Family notes was AxOx3 prior to IPH and since discharge to TCU has been improving but still lethargic and not at baseline. Patient presenting again after fall with AMS. No new intracranial hemorrhage on repeat CT  head. Patient is at risk for seizure in the setting of recent intracranial bleed and neurology started empiric antiepileptic therapy for persistent AMS. EEG with evidence of additional left hemispheric focal cerebral dysfunction, but no epileptiform discharges. Occasional urinary retention but UA unremarkable and patient afebrile. Continues to have intermittent delirium overnight. Will optimize conditions and evening medications to prevent hospital acquired delirium. Continuing 50mg seroquel at 9pm, with PRN of 50mg one hour later if not sleeping per [facility] recs.  - Recreational therapy  - Neuro exams q4h  - PRN olanzapine 5mg ODT for agitation, not to exceed 20mg per day  - PRN quetiapine 50mg qHS  - Per NSGY: Platelets > 100k, Hgb >8, INR<1.4, HOB >30 degs    # Risk of Qtc prolongation  # LBBB  Qtc read as 509 - correction for LBBB is 464  - Continue antispsychotics as above.     # Falls  Has had multiple hospital admissions for fall with unknown etiology. Unclear if mechanical vs syncopal episodes. Has extensive cardiac history but recent TTE without abnormalities of bioprosthetic valve.Can consider arrhythmia as well with prior afib.   - Consider ZioPatch on discharge.     --Chronic--  # ISHAN  Patient has history of ISHAN. Patient noted to be desatting when asleep, improves with Oxymask. If not tolerating mask can consider repeat blood gas to check PCO2. Bicarb on BMP is WNL.   - Night time Oxymask    # Chronic HFpEF  # Hypertension   Last available Echo 1/2023 shows EF 55-60%, normal right and left ventricles size and function.  - Hold PTA Toprol XL, PTA entrestos    # Afib  Eliquis is being held due to recent intracranial hemorrhage as per NSGY for at least four weeks.  - Hold PTA metoprolol as above  - Patient needs outpatient cardiology follow up for consideration of Watchman procedure.     # CAD s/p 3V CABG (2015)  Not on aspirin currently  - Statin as below    # Aortic stenosis s/p TAVR (2016)  Last TTE 1/2023  "with mean gradient 14mmHg, suspected to be WNL     #HLD  -PTA lipitor     #GERD  - PTA omeprazole     # Idiopathic progressive neuropathy  - Hold PTA Gabapentin due to AMS     # Chronic incompletely united distal humerus fracture  Patient is s/p left elbow fracture s/p ORIF at [facility], complicated by elbow wound requiring washout. Per chart review, on lifelong Bactrim.  - Continue PTA Bactrim     # PTSD  # Depression  Patient started on Seroquel for delirium at OSH due to hospitalization in the setting of acute illness. Patient sleeping, minimally responsive as above.   - Continue PTA sertraline  - Scheduled quetiapine, PRN olanzapine as above     -- Outpatient Follow Ups Needed--  Neurosurgery - to assess for hydrocephalus    Neurology - assess need for Keppra, until then continue 750mg BID     Cardiology follow up for consideration of Watchman procedure.        Diet: Regular Diet Adult    DVT Prophylaxis: Pneumatic Compression Devices  Rice Catheter: Not present  Fluids: None  Lines: None     Cardiac Monitoring: None  Code Status: Full Code    Family: Son updated over phone 3/25    Clinically Significant Risk Factors                  # Hypertension: Noted on problem list  # Chronic heart failure with preserved ejection fraction: heart failure noted on problem list and last echo with EF >50%       # Overweight: Estimated body mass index is 28.48 kg/m  as calculated from the following:    Height as of an earlier encounter on 3/21/24: 1.854 m (6' 1\").    Weight as of this encounter: 97.9 kg (215 lb 13.3 oz).        # Financial/Environmental Concerns: none       Disposition Plan         The patient's care was discussed with the Attending Physician, Dr. Sandhu .    Gavino Schroeder  Medicine Service, [facility] TEAM 2  [facility]  Securely message with [employer] (more info)  Text page via [facility] Paging/Directory   See signed in provider for up to date coverage " information  ______________________________________________________________________    Interval History   - Yesterday night patient appeared agitated and uncooperative but calmed overnight and soft restraints were removed  - Otherwise NAEO, sleeping improved compared to prior  - Not voicing complaints  - Son present here, states patient looks unchanged from when he was discharged on 03/18. [facility]     Physical Exam   Vital Signs: Temp: 97.7  F (36.5  C) Temp src: Oral BP: 115/61 Pulse: 70   Resp: 14 SpO2: 97 % O2 Device: None (Room air) Oxygen Delivery: 3 LPM  Weight: 215 lbs 13.29 oz    General Appearance: NAD, sitting up in bed, calm, cooperative  Respiratory: CTAB anteriorly  Cardiovascular: Irregular, normal S1 and S2  GI: Soft, non-tender, non-distended, no rebound or guarding  Skin: Warm, healing abrasions to bilateral knees  Neuro: Alert, stating full name, correctly states he is 83 years old, correctly answers that the year is 2024, when asked about place states he is in a hotel in [place], follows all commands    Data     I have personally reviewed the following data over the past 24 hrs:    N/A  \   N/A   / N/A     N/A N/A N/A /  N/A   3.6 N/A N/A \
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
## 2024-03-27 NOTE — PROGRESS NOTES
Care Management Follow Up    Length of Stay (days): 6    Expected Discharge Date: TBD     Concerns to be Addressed: discharge planning     Patient plan of care discussed at interdisciplinary rounds: Yes    Anticipated Discharge Disposition: TBD  Anticipated Discharge Services: TBD  Anticipated Discharge DME:  TBD    Patient/family educated on Medicare website which has current facility and service quality ratings:  NA  Education Provided on the Discharge Plan: Not at this time  Patient/Family in Agreement with the Plan:  Unable to assess    Referrals Placed by CM/SW:  No new referrals at this time by RNCC.  Private pay costs discussed: Not applicable    Additional Information:  Per conversation w/the primary team, they ware inquriing on the [facility], nothing that the patient is 90% service connected, has continued waxing/waning mentation, ongoing need for PT and potential need for consideration of Watchman procedure. Writer provided the VA referral line contact number to the primary team to contact the [facility] directly to initiate the referral process. Patient continues to have a 1:1 in place, hopeful to remove later today per the bedside nurse. SW following for potential TCU placement, writer will continue to follow for potential [facility] transfer if appropriate.     VA referral line:  [phone] Addendum:  Per discussion w/the primary team, will not pursue [facility] to the [facility], they do not have a bed available, will move forward w/community TCU placement, SW updated.    Care coordination will continue to follow.     Mariah Ko, RNCC, BSN    [facility]    [address]  [address] [place] [address]    [email or web address]  [email or web address]    Office: [phone] Pager: [phone]

## 2024-03-27 NOTE — PLAN OF CARE
/76 (BP Location: Left arm)   Pulse 76   Temp 98  F (36.7  C) (Oral)   Resp 16   Wt 97.9 kg (215 lb 13.3 oz)   SpO2 97%   BMI 28.48 kg/m      Hours of Care: 1121-1892.    Neuro: Oriented to self only. Less agitated today. Sitter removed at 1500.  Vitals: VSS.   Respiratory: On RA during the day.  Cardiac: No tele orders. Otherwise WDL.    GI/: Incontinent of bowel/bladder. Straight cathed for 600 mL at 1300.  Skin/Wounds: No new deficits noted.  Lines: R PIV  Diet: Regular.  Activity: No OOB  Pain: Denies.    Plan: TCU recommendations.      Continue to monitor and follow POC    Ar Watkins RN on 3/27/2024 at 6:08 PM    6B-Intermediate Care

## 2024-03-27 NOTE — PLAN OF CARE
Goal Outcome Evaluation:       Neuro: Alert & Oriented to self only. Agitated at start of shift and uncooperative. Calmed throughout shift thereafter. Soft restraints removed at 4am.  Cardiac: Not on tele. Had intermittent bradycardic (upper 40s) episodes while asleep.  Respiratory: Sating >96% on RA while awake and 3L oxymask while asleep- pt has ISHAN..   GI/: Adequate urine output via spontaneous voiding and straight cath. Bladder scanned PRN.   Diet/appetite: On regular diet, poor appetite.  Activity:  Assist of 2 via milan steady or sling, pt did not get out of bed. Patient moves and shifts weight on bed independently. ROM completed.   Pain: At acceptable level on current regimen. PRN Tylenol given.   Skin: No new deficits noted.  LDA's: R PIV SL.      Plan: Continue with POC. Notify primary team with changes.

## 2024-03-28 ENCOUNTER — APPOINTMENT (OUTPATIENT)
Dept: PHYSICAL THERAPY | Facility: CLINIC | Age: 84
DRG: 085 | End: 2024-03-28
Payer: COMMERCIAL

## 2024-03-28 LAB
ATRIAL RATE - MUSE: 66 BPM
DIASTOLIC BLOOD PRESSURE - MUSE: NORMAL MMHG
INTERPRETATION ECG - MUSE: NORMAL
MAGNESIUM SERPL-MCNC: 1.9 MG/DL (ref 1.7–2.3)
P AXIS - MUSE: -47 DEGREES
POTASSIUM SERPL-SCNC: 3.7 MMOL/L (ref 3.4–5.3)
PR INTERVAL - MUSE: 200 MS
QRS DURATION - MUSE: 172 MS
QT - MUSE: 478 MS
QTC - MUSE: 501 MS
R AXIS - MUSE: -2 DEGREES
SYSTOLIC BLOOD PRESSURE - MUSE: NORMAL MMHG
T AXIS - MUSE: 83 DEGREES
VENTRICULAR RATE- MUSE: 66 BPM

## 2024-03-28 PROCEDURE — 99233 SBSQ HOSP IP/OBS HIGH 50: CPT | Mod: GC | Performed by: STUDENT IN AN ORGANIZED HEALTH CARE EDUCATION/TRAINING PROGRAM

## 2024-03-28 PROCEDURE — 120N000002 HC R&B MED SURG/OB UMMC

## 2024-03-28 PROCEDURE — 97530 THERAPEUTIC ACTIVITIES: CPT | Mod: GP

## 2024-03-28 PROCEDURE — 83735 ASSAY OF MAGNESIUM: CPT

## 2024-03-28 PROCEDURE — 36415 COLL VENOUS BLD VENIPUNCTURE: CPT

## 2024-03-28 PROCEDURE — 84132 ASSAY OF SERUM POTASSIUM: CPT

## 2024-03-28 PROCEDURE — 250N000013 HC RX MED GY IP 250 OP 250 PS 637

## 2024-03-28 PROCEDURE — 93005 ELECTROCARDIOGRAM TRACING: CPT

## 2024-03-28 RX ADMIN — LEVETIRACETAM 750 MG: 750 TABLET, FILM COATED ORAL at 08:54

## 2024-03-28 RX ADMIN — ATORVASTATIN CALCIUM 80 MG: 80 TABLET, FILM COATED ORAL at 19:29

## 2024-03-28 RX ADMIN — SULFAMETHOXAZOLE AND TRIMETHOPRIM 1 TABLET: 400; 80 TABLET ORAL at 19:29

## 2024-03-28 RX ADMIN — Medication 5 MG: at 19:29

## 2024-03-28 RX ADMIN — LEVETIRACETAM 750 MG: 750 TABLET, FILM COATED ORAL at 19:28

## 2024-03-28 RX ADMIN — THIAMINE HCL TAB 100 MG 100 MG: 100 TAB at 08:54

## 2024-03-28 RX ADMIN — SULFAMETHOXAZOLE AND TRIMETHOPRIM 1 TABLET: 400; 80 TABLET ORAL at 08:55

## 2024-03-28 RX ADMIN — QUETIAPINE FUMARATE 50 MG: 50 TABLET ORAL at 19:28

## 2024-03-28 RX ADMIN — FOLIC ACID 1 MG: 1 TABLET ORAL at 08:54

## 2024-03-28 RX ADMIN — PANTOPRAZOLE SODIUM 40 MG: 40 TABLET, DELAYED RELEASE ORAL at 08:55

## 2024-03-28 RX ADMIN — SENNOSIDES AND DOCUSATE SODIUM 1 TABLET: 8.6; 5 TABLET ORAL at 19:29

## 2024-03-28 RX ADMIN — SERTRALINE HYDROCHLORIDE 75 MG: 50 TABLET ORAL at 08:54

## 2024-03-28 ASSESSMENT — ACTIVITIES OF DAILY LIVING (ADL)
ADLS_ACUITY_SCORE: 50
ADLS_ACUITY_SCORE: 45
ADLS_ACUITY_SCORE: 43
ADLS_ACUITY_SCORE: 46
ADLS_ACUITY_SCORE: 45
ADLS_ACUITY_SCORE: 43
ADLS_ACUITY_SCORE: 43
ADLS_ACUITY_SCORE: 45
ADLS_ACUITY_SCORE: 43
ADLS_ACUITY_SCORE: 43
ADLS_ACUITY_SCORE: 45
ADLS_ACUITY_SCORE: 50
ADLS_ACUITY_SCORE: 43
ADLS_ACUITY_SCORE: 45
ADLS_ACUITY_SCORE: 46
ADLS_ACUITY_SCORE: 43
ADLS_ACUITY_SCORE: 45

## 2024-03-28 NOTE — PLAN OF CARE
Neuro: A&O to self, left pupil irregular, lethargic on and off, but arouses to voice  Cardiac: No tele, VSS.   Respiratory: 1L oyxmask when sleeping for apnea, otherwise RA  GI/: No voiding yet today, bladder scanned for 170 and got 75ml out in straight cath. last BM 3/26 no PRNs available.   Diet/appetite: Tolerating reg diet. Low appetite, needs assistance to eat, standard trays ordered  Activity:  Assist of 2, sat up to chair with the milan steady  Pain: At acceptable level on current regimen.   Skin: No new deficits noted. Scabs and bruises  LDA's: PIV SL    Plan: Recs sent for TCU    Transfer  Transferred to:   Via:bed  Reason for transfer:Pt no longer appropriate for 6B- improved patient condition  Family: No family at bedside  Belongings: Packed and sent with pt including glasses and hat  Chart: Delivered with pt to next unit  Medications: Meds sent to new unit with pt  Report given to: Jose Elias POLANCO  Pt status: lethargic, stable.

## 2024-03-28 NOTE — PLAN OF CARE
Care provided [id number]    Neuro: A&Ox1-2. Overall more calm and cooperative than previous nights. Restless at times but redirectable. Making inappropriate comments to female staff. Did not require bedside attendant this shift. PRN seroquel given after tiff dose was ineffective, pt appeared to sleep for several hours overnight.  Cardiac: VSS.   Respiratory: Sating > 92% on RA.  GI/: Incont void x1 at 2200. Bladder scanned for 244 mL at 0515, plan to rescan in 2 hr per orders. No BM this shift.   Diet/appetite: Tolerating regular diet. Poor appetite today. Encouraging PO fluid intake.   Activity:  Assist of 2, remained in bed this shift. Often refusing turn/repo when offered, able to independently shift weight slightly in bed.  Pain: At acceptable level on current regimen.   Skin: No new deficits noted.  LDA's: PIV x1.     Plan: Continue with POC. Notify primary team with changes.

## 2024-03-28 NOTE — PLAN OF CARE
Goal Outcome Evaluation:      Plan of Care Reviewed With: patient (bedside RN)    Overall Patient Progress: decliningOverall Patient Progress: declining    Outcome Evaluation: See RD note for details of assessment 3/28

## 2024-03-28 NOTE — PROGRESS NOTES
CLINICAL NUTRITION SERVICES - ASSESSMENT NOTE     Nutrition Prescription    RECOMMENDATIONS FOR MDs/PROVIDERS TO ORDER:  Consider increasing frequency of bowel meds. Currently scheduled to receive Senna once daily. Infrequent BMs per review of I/O.    Malnutrition Status:    Severe malnutrition in the context of acute illness     Recommendations already ordered by Registered Dietitian (RD):  Automatic/standard meal trays TID.  Please assist with meal time/feeding as needed  Begin oral nutrition supplements with meal trays TID - Ensure Plus     Future/Additional Recommendations:  Monitor nutrition-related findings and follow pt per protocol     REASON FOR ASSESSMENT  David Thomson is a/an 83 year old male assessed by the dietitian for LOS    CLINICAL HISTORY  Hx of dementia, A-fib, bioprosthetic aortic valve, HTN, T2DM, CAD, cognitive impairment, frequent falls, and recent L humerus fx and recent hospitalization for traumatic SDH/SAH, admitted after unwitnessed fall. Initial concern for new ICH but this was not found on imaging, per MD notes. Pt with some agitation/kicking staff at times per chart.     NUTRITION HISTORY  Pt with altered mentation/disorientation at baseline. Sleeping during visit, did not attempt to wake given previous aggressive behaviors. Per bedside RN, poor to fair appetite. Not able to order meals for himself.  Significant weight loss per chart review. Will begin automatic meal trays and oral nutrition supplements.     CURRENT NUTRITION ORDERS  Diet: Regular  Supplements: None at present    Intake/Tolerance: Variable but overall low intake trends per I/O; range 0-25-50% of meal trays documented. Per review of room service order hx, pt receiving 2-3 meals per day.      GI  BM x4 on 3/26; infrequent BMs otherwise.   Monitor for need to increase bowel med schedule/frequency    LABS:  Reviewed    MEDICATIONS  Ferosul 325 mg daily  Folvite 1 mg daily   Senna daily    Thiamine 100 mg daily  "    SKIN  WOCN not following pt      ANTHROPOMETRICS  Height: 6'1\"   Admit wt 103.6 kg (228 lbs) 3/21/24  Most Recent Weight: 98.9 kg (218 lb 0.6 oz)  IBW: 83.6 kg  118%IBW   BMI: Overweight BMI 25-29.9    Weight History:   - Lowest weight this admit of 97.9 kg (215 lbs) on  3/27/24; using this weight, loss of 5.5% since admission (severe)  - Overall weight is down 9.5% over past 3-months.  Wt Readings from Last 15 Encounters:   03/28/24 98.9 kg (218 lb 0.6 oz)   03/21/24 103.6 kg (228 lb 8 oz)   03/21/24 104 kg (229 lb 3.2 oz)   03/19/24 104.4 kg (230 lb 3.2 oz)   02/15/24 101.7 kg (224 lb 1.6 oz)   02/13/24 101.7 kg (224 lb 1.6 oz)   02/05/24 98.5 kg (217 lb 3.2 oz)   01/23/24 101.7 kg (224 lb 4.8 oz)   01/09/24 103.3 kg (227 lb 11.2 oz)   01/08/24 103.3 kg (227 lb 11.2 oz)   01/04/24 109.3 kg (240 lb 14.4 oz)   01/03/24 109.3 kg (240 lb 14.4 oz)   01/02/24 109.3 kg (240 lb 14.4 oz)   12/29/23 107.3 kg (236 lb 9.6 oz)   04/11/23 112.9 kg (249 lb)     Dosing Weight: 97.9 kg (lowest/driest wt this admit) IBW 83.6 kg     ASSESSED NUTRITION NEEDS  Estimated Energy Needs: 5511-3644 kcals/day (20 - 25 kcals/kg)  Justification: Maintenance/overweight  Estimated Protein Needs: 100-125 grams protein/day (1.2 - 1.5 grams of pro/kg)  Justification: Increased needs/lean body mass preservation   Estimated Fluid Needs: (1 mL/kcal)   Justification: Maintenance    PHYSICAL FINDINGS  See malnutrition section below.  No abnormal nutrition-related physical findings observed.     MALNUTRITION  % Intake: </= 50% for >/= 5 days (severe)  % Weight Loss: > 2% in 1 week (severe)  Subcutaneous Fat Loss: None observed  Muscle Loss: None observed  Fluid Accumulation/Edema: None noted  Malnutrition Diagnosis: Severe malnutrition in the context of acute illness     NUTRITION DIAGNOSIS  Inadequate oral intake related to altered mentation, reduced appetite as evidenced by 0-25-50% of meal trays per I/O, et loss >2% in 1 week and >7.5% over past " 3-months.       INTERVENTIONS  Implementation  Nutrition Education: Not appropriate at this time due to patient condition   Medical food supplement therapy - Initiate TID   Modify composition of meals/snacks - Automatic meal trays TID     Goals  Patient to consume % of nutritionally adequate meal trays TID, or the equivalent with supplements/snacks.     Monitoring/Evaluation  Progress toward goals will be monitored and evaluated per protocol.  Danie King, MS, RDN, LD, CNSC  Available by Vocera or phone   Vocera: M-F (7:00-3:30)  6B Clinical Dietitian  or 2 Obs Clinical Dietitian  Weekend/Holiday (7:00-3:30) - Weekend Clinical Dietitian   6B RD desk: [phone]       **Clinical Dietitians are no longer available by pager.

## 2024-03-28 NOTE — PROGRESS NOTES
[facility]    Progress Note - Medicine Service, PAMELLA TEAM 2       Date of Admission:  3/21/2024    Assessment & Plan   David Thomson is a 83 year old male with history of cognitive impairment, CAD s/p CABG (2015), afib, Aortic stenosis s/o TAVR with bioprosthetic valve, with recent admission 03/08-03/18 after a fall at which time he was found to have IPH, IVH, SAH, and SDH, admitted to OSH on 3/21/2024 after a fall with AMS. He was transferred to [facility] due to concern for new intracranial hemorrhage but imaging findings did not demonstrate new ICH. Care transitioned from trauma to medicine service 3/22 for continued evaluation of recurrent falls and encephalopathy. Remains oriented to self and age, occasionally oriented to year, but not place or situation. Sleeping, no longer agitated on Quetiapine 50mg at night, not requiring PRN antipsychotics. PT/OT evaluated, recommend TCU on discharge.      Today:  - ECG for Qtc  - [place] [facility] [place] Medically ready for discharge, off 1:1 - pending TCU placement    # Delirium, improving  # Recent Intraparenchymal hemorrhage with extension into ventricles andmidline shift, SAH, and SDH post fall (3/8)  # Cognitive impairment  Family notes was AxOx3 prior to IPH and since discharge to TCU has been improving but still lethargic and not at baseline. Patient presenting again after fall with AMS. No new intracranial hemorrhage on repeat CT head. Patient is at risk for seizure in the setting of recent intracranial bleed and neurology started empiric antiepileptic therapy for persistent AMS. EEG with evidence of additional left hemispheric focal cerebral dysfunction, but no epileptiform discharges. Occasional urinary retention but UA unremarkable and patient afebrile. Continues to have intermittent delirium overnight and agitation (kicking) requiring restraints, IM olanzapine and a 1:1 Have increased evening quetiapine with  improvement.    - Recreational therapy  - Neuro exams q4h  - Continue quetiapine 50mg scheduled 1900 with PRN quetiapine 50mg one hour after  - PRN olanzapine 5mg ODT for agitation, not to exceed 20mg per day  - Per NSGY: Platelets > 100k, Hgb >8, INR<1.4, HOB >30 degs    # Risk of Qtc prolongation  # LBBB  Qtc read as 5013/28 - correction for LBBB is WNL.  - Continue antispsychotics as above.     # Falls  Has had multiple hospital admissions for fall with unknown etiology. Unclear if mechanical vs syncopal episodes. Has extensive cardiac history but recent TTE without abnormalities of bioprosthetic valve.Can consider arrhythmia as well with prior afib.   - Consider ZioPatch on discharge.     --Chronic--  # ISHAN  Patient has history of ISHAN. Patient noted to be desatting when asleep, improves with Oxymask. If not tolerating mask can consider repeat blood gas to check PCO2. Bicarb on BMP is WNL.   - Night time Oxymask    # Chronic HFpEF  # Hypertension   Last available Echo 1/2023 shows EF 55-60%, normal right and left ventricles size and function.  - Hold PTA Toprol XL, PTA entresto    # Afib  Eliquis is being held due to recent intracranial hemorrhage as per NSGY for at least four weeks.  - Hold PTA metoprolol as above  - Patient needs outpatient cardiology follow up for consideration of Watchman procedure.     # CAD s/p 3V CABG (2015)  Not on aspirin currently  - Statin as below    # Aortic stenosis s/p TAVR (2016)  Last TTE 1/2023 with mean gradient 14mmHg, suspected to be WNL     #HLD  -PTA lipitor     #GERD  - PTA omeprazole     # Idiopathic progressive neuropathy  - Hold PTA Gabapentin due to AMS     # Chronic incompletely united distal humerus fracture  Patient is s/p left elbow fracture s/p ORIF at [facility], complicated by elbow wound requiring washout. Per chart review, on lifelong Bactrim.  - Continue PTA Bactrim     # PTSD  # Depression  Patient started on Seroquel for delirium at OSH due to hospitalization in  "the setting of acute illness. Patient sleeping, minimally responsive as above.   - Continue PTA sertraline  - Scheduled quetiapine, PRN olanzapine as above     -- Outpatient Follow Ups Needed--  Neurosurgery - to assess for hydrocephalus    Neurology - assess need for Keppra, until then continue 750mg BID     Cardiology follow up for consideration of Watchman procedure.        Diet: Regular Diet Adult    DVT Prophylaxis: Pneumatic Compression Devices  Rice Catheter: Not present  Fluids: None  Lines: None     Cardiac Monitoring: None  Code Status: Full Code    Family: Son updated in person 3/27    Clinically Significant Risk Factors                  # Hypertension: Noted on problem list  # Chronic heart failure with preserved ejection fraction: heart failure noted on problem list and last echo with EF >50%       # Overweight: Estimated body mass index is 28.77 kg/m  as calculated from the following:    Height as of an earlier encounter on 3/21/24: 1.854 m (6' 1\").    Weight as of this encounter: 98.9 kg (218 lb 0.6 oz).        # Financial/Environmental Concerns: none       Disposition Plan      Expected Discharge Date: 03/29/2024      Destination: inpatient rehabilitation facility  Discharge Comments: Pending improvement in mentation and removal of 1:1, was at TCU before (as of 3/28)  Son (POA) would like to talk to  about TCU      The patient's care was discussed with the Attending Physician, Dr. Sandhu .    Desire Langley MD  Medicine Service, [address]  Securely message with [email or web address] (more info)  Text page via [facility] Paging/Directory   See signed in provider for up to date coverage information  ______________________________________________________________________    Interval History   Overnight no acute events - no 1:1 required or IM antipsychotics. Was observed to sleep overnight. This morning patient is sleeping soundly.     Physical Exam "   Vital Signs: Temp: 97.8  F (36.6  C) Temp src: Oral BP: 114/62 Pulse: 65   Resp: 18 SpO2: 100 % O2 Device: None (Room air)    Weight: 218 lbs .56 oz    General Appearance: NAD, sleeping in bed with eyes open, does not awake with physical exam assessment   Respiratory: CTAB anteriorly  Cardiovascular: Irregular, normal S1 and S2  GI: Soft, non-tender, non-distended, no rebound or guarding  Neuro: Sleeping soundly    Data     I have personally reviewed the following data over the past 24 hrs:    N/A  \   N/A   / N/A     N/A N/A N/A /  N/A   3.7 N/A N/A \

## 2024-03-28 NOTE — PROGRESS NOTES
Care Management Follow Up    Length of Stay (days): 7    Expected Discharge Date: 03/29/2024     Concerns to be Addressed: discharge planning     Patient plan of care discussed at interdisciplinary rounds: Yes    Anticipated Discharge Disposition:  TCU     Anticipated Discharge Services:  TBD  Anticipated Discharge DME:  TBD    Patient/family educated on Medicare website which has current facility and service quality ratings:  yes  Education Provided on the Discharge Plan:  yes  Patient/Family in Agreement with the Plan:  TBD    Referrals Placed by CM/SW:  TCU  Private pay costs discussed: insurance costs [facility]    Additional Information:  Patient has been off of a sitter for over 24 hours, now able to send [facility] contracted TCU referrals. The below referrals were sent.       PENDING:   [facility] (first choice)  [address] [place]  [address]  P: [phone]  F: [phone]     [facility]   [address] [place] [address]  Ph: [phone]  Adm: [phone]  Fax: [phone]         LEROY Almeida, [facility]  [address]   [address]  Ph: [phone]   none

## 2024-03-29 LAB
MAGNESIUM SERPL-MCNC: 1.9 MG/DL (ref 1.7–2.3)
POTASSIUM SERPL-SCNC: 3.5 MMOL/L (ref 3.4–5.3)

## 2024-03-29 PROCEDURE — 36415 COLL VENOUS BLD VENIPUNCTURE: CPT | Performed by: STUDENT IN AN ORGANIZED HEALTH CARE EDUCATION/TRAINING PROGRAM

## 2024-03-29 PROCEDURE — 120N000002 HC R&B MED SURG/OB UMMC

## 2024-03-29 PROCEDURE — 84132 ASSAY OF SERUM POTASSIUM: CPT | Performed by: STUDENT IN AN ORGANIZED HEALTH CARE EDUCATION/TRAINING PROGRAM

## 2024-03-29 PROCEDURE — 250N000011 HC RX IP 250 OP 636

## 2024-03-29 PROCEDURE — 99233 SBSQ HOSP IP/OBS HIGH 50: CPT | Mod: GC | Performed by: INTERNAL MEDICINE

## 2024-03-29 PROCEDURE — 250N000013 HC RX MED GY IP 250 OP 250 PS 637

## 2024-03-29 PROCEDURE — 83735 ASSAY OF MAGNESIUM: CPT | Performed by: STUDENT IN AN ORGANIZED HEALTH CARE EDUCATION/TRAINING PROGRAM

## 2024-03-29 RX ORDER — OLANZAPINE 10 MG/2ML
5 INJECTION, POWDER, FOR SOLUTION INTRAMUSCULAR ONCE
Status: COMPLETED | OUTPATIENT
Start: 2024-03-29 | End: 2024-03-29

## 2024-03-29 RX ADMIN — Medication 5 MG: at 19:56

## 2024-03-29 RX ADMIN — LEVETIRACETAM 750 MG: 750 TABLET, FILM COATED ORAL at 09:35

## 2024-03-29 RX ADMIN — PANTOPRAZOLE SODIUM 40 MG: 40 TABLET, DELAYED RELEASE ORAL at 09:35

## 2024-03-29 RX ADMIN — LEVETIRACETAM 750 MG: 750 TABLET, FILM COATED ORAL at 19:56

## 2024-03-29 RX ADMIN — THIAMINE HCL TAB 100 MG 100 MG: 100 TAB at 09:35

## 2024-03-29 RX ADMIN — SERTRALINE HYDROCHLORIDE 75 MG: 50 TABLET ORAL at 09:35

## 2024-03-29 RX ADMIN — QUETIAPINE FUMARATE 50 MG: 50 TABLET ORAL at 19:56

## 2024-03-29 RX ADMIN — SULFAMETHOXAZOLE AND TRIMETHOPRIM 1 TABLET: 400; 80 TABLET ORAL at 19:56

## 2024-03-29 RX ADMIN — FOLIC ACID 1 MG: 1 TABLET ORAL at 09:35

## 2024-03-29 RX ADMIN — SENNOSIDES AND DOCUSATE SODIUM 1 TABLET: 8.6; 5 TABLET ORAL at 19:56

## 2024-03-29 RX ADMIN — OLANZAPINE 5 MG: 10 INJECTION, POWDER, FOR SOLUTION INTRAMUSCULAR at 06:42

## 2024-03-29 RX ADMIN — ATORVASTATIN CALCIUM 80 MG: 80 TABLET, FILM COATED ORAL at 19:56

## 2024-03-29 RX ADMIN — SULFAMETHOXAZOLE AND TRIMETHOPRIM 1 TABLET: 400; 80 TABLET ORAL at 09:35

## 2024-03-29 ASSESSMENT — ACTIVITIES OF DAILY LIVING (ADL)
ADLS_ACUITY_SCORE: 44
ADLS_ACUITY_SCORE: 44
ADLS_ACUITY_SCORE: 50
ADLS_ACUITY_SCORE: 50
ADLS_ACUITY_SCORE: 44
ADLS_ACUITY_SCORE: 44
ADLS_ACUITY_SCORE: 50
ADLS_ACUITY_SCORE: 50
ADLS_ACUITY_SCORE: 44
ADLS_ACUITY_SCORE: 50
ADLS_ACUITY_SCORE: 44
ADLS_ACUITY_SCORE: 48
ADLS_ACUITY_SCORE: 44
ADLS_ACUITY_SCORE: 44
ADLS_ACUITY_SCORE: 46
ADLS_ACUITY_SCORE: 44
ADLS_ACUITY_SCORE: 50
ADLS_ACUITY_SCORE: 50
ADLS_ACUITY_SCORE: 44
ADLS_ACUITY_SCORE: 50
ADLS_ACUITY_SCORE: 44
ADLS_ACUITY_SCORE: 48
ADLS_ACUITY_SCORE: 50

## 2024-03-29 NOTE — PLAN OF CARE
Assumed care [id number]     V/S: VSS on RA  Pain: denies  Neuro: A&Ox1-2 (ex time, place). Generally pleasant & cooperative. Illogical speech at times, occasional hallucinations.   Respiratory: WDL on RA  Cardiac: WDL ex intermittently angela; HR 50-60s. -130s/60s.  Skin: No new deficits  GI/: Large incontinent BM x1. Urinary retention. Denies N/V  Nutrition: Regular diet with poor appetite & feeding assistance  Lines/drains: R PIV SL  Activity: x2 w GB & walker pivot to BSC     Events this shift: 1:1 bedside attendant for safety. Refused breakfast. Up to BSC. Bladder scan 518mL, straight cathed ~500mL; pt tolerated well. Later scan 119mL. Pt continues to try to get OOB, needs frequent safety reminders & redirection. Occasionally swatting at staff, making racist comments to/about staff, flirtatious comments to staff. Generally easily redirected. Poor oral intake despite encouragement.     Plan: Discharge to TCU pending d/c'd 1:1. Continue POC & notify providers with any acute changes.

## 2024-03-29 NOTE — PLAN OF CARE
/70 (BP Location: Left arm)   Pulse 57   Temp 98.4  F (36.9  C) (Axillary)   Resp 16   Wt 98.9 kg (218 lb 0.6 oz)   SpO2 93%   BMI 28.77 kg/m      Care from: [id number]    VS & Pain: VSS  Neuro: A+Ox1 to self only  Respiratory: WDL  Cardiac: WDL  Peripheral neurovascular: Generalized weakness   GI/: Incontinent of bowel and bladder  Nutrition: Regular diet  Skin: Has some scraps and scabs on shins and knees from past fall. Mepi on sacrum as preventative  Lines: R PIV: SL  Activity: Ax2 w/ lift  Events this shift: pt placed with sitter at 0600 after setting off bed alarm and attempting to get out of bed. Attempts were made to redirect pt but he became agitated and started swearing at, kicking, threatening, and attempting to bite staff. Provider notified d/t only Prn being oral. IM zyprexa given.    Plan:  Continue plan of care, discharge to TCU hopefully closer to his son once accepted at [facility]. Continue keppra d/t falls possibly being caused from seizures.    Goal Outcome Evaluation:      Plan of Care Reviewed With: patient    Overall Patient Progress: no changeOverall Patient Progress: no change    Outcome Evaluation: Pt has beem calm this shift and has been in and out of sleep

## 2024-03-29 NOTE — PROGRESS NOTES
"Pt set off bed alarm this AM.  Trying to get out of bed,  not following direction.  Kicking at staff when they attempt to reposition pt more comfortably with legs in bed.  Swearing and trying to bite as well.  Oriented to self, believes he is in his house and the bed are \"blocks\" someone put there.  Pt attempting to go to work, unable to reorient.   Unable to given ODT zyprexa Jim Brewer MD notified, requesting for something to help relax pt and keep him safe in bed (lift dependent).  IM zyprexa ordered along with bedside attendant.    "

## 2024-03-29 NOTE — PROGRESS NOTES
[facility]    Progress Note - Medicine Service, PAMELLA TEAM 2       Date of Admission:  3/21/2024    Assessment & Plan   David Thomson is a 83 year old male with history of cognitive impairment, CAD s/p CABG (2015), afib, Aortic stenosis s/o TAVR with bioprosthetic valve, with recent admission 03/08-03/18 after a fall at which time he was found to have IPH, IVH, SAH, and SDH, admitted to OSH on 3/21/2024 after a fall with AMS. He was transferred to [facility] due to concern for new intracranial hemorrhage but imaging findings did not demonstrate new ICH. Care transitioned from trauma to medicine service 3/22 for continued evaluation of recurrent falls and encephalopathy. Remains oriented to self and age, occasionally oriented to year, but not place or situation. Sleeping, no longer agitated on Quetiapine 50mg at night, not requiring PRN antipsychotics. PT/OT evaluated, recommend TCU on discharge.      Today:  -Continuing agitation, kicking and biting staff, necessitating sitter.  - Medically ready for discharge, off 1:1 - pending TCU placement     # Delirium, improving  # Recent Intraparenchymal hemorrhage with extension into ventricles andmidline shift, SAH, and SDH post fall (3/8)  # Cognitive impairment  Family notes was AxOx3 prior to IPH and since discharge to TCU has been improving but still lethargic and not at baseline. Patient presenting again after fall with AMS. No new intracranial hemorrhage on repeat CT head. Patient is at risk for seizure in the setting of recent intracranial bleed and neurology started empiric antiepileptic therapy for persistent AMS. EEG with evidence of additional left hemispheric focal cerebral dysfunction, but no epileptiform discharges. Occasional urinary retention but UA unremarkable and patient afebrile. Continues to have intermittent delirium overnight and agitation (kicking) requiring restraints, IM olanzapine and a 1:1 Have  increased evening quetiapine with improvement.    - Recreational therapy  - Neuro exams q4h  - Continue quetiapine 50mg scheduled 1900 with PRN quetiapine 50mg one hour after  - PRN olanzapine 5mg ODT for agitation, not to exceed 20mg per day  - Per NSGY: Platelets > 100k, Hgb >8, INR<1.4, HOB >30 degs     # Risk of Qtc prolongation  # LBBB  Qtc read as 5013/28 - correction for LBBB is WNL.  - Continue antispsychotics as above.     # Falls  Has had multiple hospital admissions for fall with unknown etiology. Unclear if mechanical vs syncopal episodes. Has extensive cardiac history but recent TTE without abnormalities of bioprosthetic valve.Can consider arrhythmia as well with prior afib.   - Consider ZioPatch on discharge.     --Chronic--  # ISHAN  Patient has history of ISHAN. Patient noted to be desatting when asleep, improves with Oxymask. If not tolerating mask can consider repeat blood gas to check PCO2. Bicarb on BMP is WNL.   - Night time Oxymask     # Chronic HFpEF 55% 1/2023  # Hypertension   #Afib  Continue to hold PTA eliquis in the setting of recent intracranial hemorrhage per NSGY recs. [facility] about possible Watchman procedure as outpatient.   - Hold PTA Toprol XL, PTA entresto        # CAD s/p 3V CABG (2015)  Not on aspirin currently  - Statin as below     # Aortic stenosis s/p TAVR (2016)  Last TTE 1/2023 with mean gradient 14mmHg, suspected to be WNL     #HLD  -PTA lipitor     #GERD  - PTA omeprazole     # Idiopathic progressive neuropathy  - Hold PTA Gabapentin due to AMS     # Chronic incompletely united distal humerus fracture  Patient is s/p left elbow fracture s/p ORIF at [facility], complicated by elbow wound requiring washout. Per chart review, on lifelong Bactrim.  - Continue PTA Bactrim     # PTSD  # Depression  Patient started on Seroquel for delirium at OSH due to hospitalization in the setting of acute illness. Patient sleeping, minimally responsive  "as above.   - Continue PTA sertraline  - Scheduled quetiapine, PRN olanzapine as above      -- Outpatient Follow Ups Needed--  Neurosurgery - to assess for hydrocephalus     Neurology - assess need for Keppra, until then continue 750mg BID     Cardiology follow up for consideration of Watchman procedure.     Diet: Regular Diet Adult    DVT Prophylaxis: Pneumatic Compression Devices  Rice Catheter: Not present  Fluids: None  Lines: None     Cardiac Monitoring: None  Code Status: Full Code      Clinically Significant Risk Factors                  # Hypertension: Noted on problem list  # Chronic heart failure with preserved ejection fraction: heart failure noted on problem list and last echo with EF >50%       # Overweight: Estimated body mass index is 28.77 kg/m  as calculated from the following:    Height as of an earlier encounter on 3/21/24: 1.854 m (6' 1\").    Weight as of this encounter: 98.9 kg (218 lb 0.6 oz).   # Severe Malnutrition: based on nutrition assessment    # Financial/Environmental Concerns: none         Disposition Plan      Expected Discharge Date: 04/01/2024      Destination: inpatient rehabilitation facility  Discharge Comments: Pending improvement in mentation and removal of 1:1, was at TCU before (as of 3/28)  Son (POA) would like to talk to  about TCU        The patient's care was discussed with the Attending Physician, Dr. Hogan .    Duong Nelson MD  Medicine Service, [address]  Securely message with [employer] (more info)  Text page via [facility] Paging/Directory   See signed in provider for up to date coverage information  ______________________________________________________________________    Interval History   Patient attempting to use commode this AM, able to accurately answer name/location/year.     Physical Exam   Vital Signs: Temp: 97.7  F (36.5  C) Temp src: Oral BP: 116/61 Pulse: 62   Resp: 16 SpO2: 97 % O2 Device: None " (Room air)    Weight: 218 lbs .56 oz    General Appearance:  NAD, sleeping in bed with eyes open, does not awake with physical exam assessment   Respiratory: CTAB anteriorly  Cardiovascular: Irregular, normal S1 and S2  GI: Soft, non-tender, non-distended, no rebound or guarding  Neuro: Sleeping soundly      Data     I have personally reviewed the following data over the past 24 hrs:    N/A  \   N/A   / N/A     N/A N/A N/A /  N/A   3.5 N/A N/A \

## 2024-03-29 NOTE — PLAN OF CARE
Goal Outcome Evaluation:      Plan of Care Reviewed With: patient    Overall Patient Progress: no changeOverall Patient Progress: no change    Outcome Evaluation: Admitted for fall at TCU. Transferred from  at 19:00. Pt oriented to self only. Incont inent x1 at 21:20. Bladder scan q4 if not voiding. No BM since 3/26. Per reprot pt can feed self but needs tray set up and has poor appeite. Up with lift. PIV SL. EEG monitoring don to see if seizure could have caused all at TCU. EEG NEG for seizure activity, postive for encephalopathy. Keppra continued since pts fall at TCU was unwitnessed and could have been caused by a seizure. SW/CC pursuing [facility] to pts Son's. 2 referrals sent today since pt has been without a sitter for >24hrs. Pt has been calm and behaving appropriately since arriving from

## 2024-03-29 NOTE — PLAN OF CARE
Goal Outcome Evaluation:                      Transferred from:  6B  Report received from:       Denise RN    2 RN skin assessment completed by: Daphney DIAZ and Jose Elias TEIXEIRA      - Findings (add LDA if needed): Scabs on BLE on shins, knees. Scratches on L foot. Sacral mepi in place for preventative measures. L tempal scab  Care plan (primary problem) and education initiated if not already done: Done  MDRO education done if applicable: na  Pt informed about policy regarding no IV pumps off unit: Pt only oriented to self, but was updated  Suction set up in room? Yes  Flu shot ordered? (October-April only): Pt only oriented to self, does not know  Detailed Belongings:   Black duffle bag.   Pair of [employer] shoes.   Black brace, looks like for foot  [employer] hat  Glasses  White socks  Gray and blue shirt  Red shorts

## 2024-03-30 PROCEDURE — 250N000013 HC RX MED GY IP 250 OP 250 PS 637

## 2024-03-30 PROCEDURE — 250N000011 HC RX IP 250 OP 636

## 2024-03-30 PROCEDURE — 99233 SBSQ HOSP IP/OBS HIGH 50: CPT | Mod: GC | Performed by: INTERNAL MEDICINE

## 2024-03-30 PROCEDURE — 120N000002 HC R&B MED SURG/OB UMMC

## 2024-03-30 RX ORDER — CAPSAICIN 0.025 %
CREAM (GRAM) TOPICAL 3 TIMES DAILY PRN
Status: DISCONTINUED | OUTPATIENT
Start: 2024-03-30 | End: 2024-04-03

## 2024-03-30 RX ORDER — QUETIAPINE FUMARATE 100 MG/1
100 TABLET, FILM COATED ORAL EVERY 24 HOURS
Status: DISCONTINUED | OUTPATIENT
Start: 2024-03-30 | End: 2024-04-01

## 2024-03-30 RX ORDER — OLANZAPINE 10 MG/2ML
2.5 INJECTION, POWDER, FOR SOLUTION INTRAMUSCULAR ONCE
Status: COMPLETED | OUTPATIENT
Start: 2024-03-30 | End: 2024-03-30

## 2024-03-30 RX ORDER — ACETAMINOPHEN 325 MG/1
975 TABLET ORAL EVERY 8 HOURS PRN
Status: DISCONTINUED | OUTPATIENT
Start: 2024-03-30 | End: 2024-05-06

## 2024-03-30 RX ORDER — LIDOCAINE 4 G/G
2 PATCH TOPICAL
Status: DISCONTINUED | OUTPATIENT
Start: 2024-03-30 | End: 2024-05-10 | Stop reason: HOSPADM

## 2024-03-30 RX ADMIN — ATORVASTATIN CALCIUM 80 MG: 80 TABLET, FILM COATED ORAL at 21:56

## 2024-03-30 RX ADMIN — SERTRALINE HYDROCHLORIDE 75 MG: 50 TABLET ORAL at 09:23

## 2024-03-30 RX ADMIN — SULFAMETHOXAZOLE AND TRIMETHOPRIM 1 TABLET: 400; 80 TABLET ORAL at 09:24

## 2024-03-30 RX ADMIN — OLANZAPINE 5 MG: 5 TABLET, ORALLY DISINTEGRATING ORAL at 19:47

## 2024-03-30 RX ADMIN — LEVETIRACETAM 750 MG: 750 TABLET, FILM COATED ORAL at 21:56

## 2024-03-30 RX ADMIN — THIAMINE HCL TAB 100 MG 100 MG: 100 TAB at 09:24

## 2024-03-30 RX ADMIN — OLANZAPINE 2.5 MG: 10 INJECTION, POWDER, FOR SOLUTION INTRAMUSCULAR at 20:26

## 2024-03-30 RX ADMIN — SULFAMETHOXAZOLE AND TRIMETHOPRIM 1 TABLET: 400; 80 TABLET ORAL at 21:57

## 2024-03-30 RX ADMIN — LEVETIRACETAM 750 MG: 750 TABLET, FILM COATED ORAL at 09:24

## 2024-03-30 RX ADMIN — Medication 5 MG: at 21:57

## 2024-03-30 RX ADMIN — ACETAMINOPHEN 975 MG: 325 TABLET, FILM COATED ORAL at 21:56

## 2024-03-30 RX ADMIN — PANTOPRAZOLE SODIUM 40 MG: 40 TABLET, DELAYED RELEASE ORAL at 09:24

## 2024-03-30 RX ADMIN — ACETAMINOPHEN 650 MG: 325 TABLET, FILM COATED ORAL at 11:03

## 2024-03-30 RX ADMIN — QUETIAPINE FUMARATE 100 MG: 100 TABLET ORAL at 21:56

## 2024-03-30 RX ADMIN — FOLIC ACID 1 MG: 1 TABLET ORAL at 09:24

## 2024-03-30 RX ADMIN — SENNOSIDES AND DOCUSATE SODIUM 1 TABLET: 8.6; 5 TABLET ORAL at 21:56

## 2024-03-30 ASSESSMENT — ACTIVITIES OF DAILY LIVING (ADL)
ADLS_ACUITY_SCORE: 53
ADLS_ACUITY_SCORE: 48
ADLS_ACUITY_SCORE: 53
ADLS_ACUITY_SCORE: 52
ADLS_ACUITY_SCORE: 53
ADLS_ACUITY_SCORE: 52
ADLS_ACUITY_SCORE: 53
ADLS_ACUITY_SCORE: 52
ADLS_ACUITY_SCORE: 53
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
## 2024-03-31 LAB — GLUCOSE BLDC GLUCOMTR-MCNC: 112 MG/DL (ref 70–99)

## 2024-03-31 PROCEDURE — 120N000002 HC R&B MED SURG/OB UMMC

## 2024-03-31 PROCEDURE — 250N000011 HC RX IP 250 OP 636

## 2024-03-31 PROCEDURE — 250N000013 HC RX MED GY IP 250 OP 250 PS 637

## 2024-03-31 PROCEDURE — 93005 ELECTROCARDIOGRAM TRACING: CPT

## 2024-03-31 PROCEDURE — 99233 SBSQ HOSP IP/OBS HIGH 50: CPT | Mod: GC | Performed by: INTERNAL MEDICINE

## 2024-03-31 RX ORDER — QUETIAPINE FUMARATE 25 MG/1
25 TABLET, FILM COATED ORAL EVERY 24 HOURS
Status: DISCONTINUED | OUTPATIENT
Start: 2024-03-31 | End: 2024-04-06

## 2024-03-31 RX ORDER — OLANZAPINE 10 MG/2ML
5 INJECTION, POWDER, FOR SOLUTION INTRAMUSCULAR DAILY PRN
Status: DISCONTINUED | OUTPATIENT
Start: 2024-03-31 | End: 2024-04-23

## 2024-03-31 RX ADMIN — CAPSAICIN: 0.25 CREAM TOPICAL at 14:41

## 2024-03-31 RX ADMIN — SERTRALINE HYDROCHLORIDE 75 MG: 50 TABLET ORAL at 14:29

## 2024-03-31 RX ADMIN — OLANZAPINE 5 MG: 10 INJECTION, POWDER, FOR SOLUTION INTRAMUSCULAR at 20:05

## 2024-03-31 RX ADMIN — LEVETIRACETAM 750 MG: 750 TABLET, FILM COATED ORAL at 14:29

## 2024-03-31 RX ADMIN — SULFAMETHOXAZOLE AND TRIMETHOPRIM 1 TABLET: 400; 80 TABLET ORAL at 14:29

## 2024-03-31 RX ADMIN — LEVETIRACETAM 750 MG: 750 TABLET, FILM COATED ORAL at 21:19

## 2024-03-31 RX ADMIN — THIAMINE HCL TAB 100 MG 100 MG: 100 TAB at 14:29

## 2024-03-31 RX ADMIN — ACETAMINOPHEN 975 MG: 325 TABLET, FILM COATED ORAL at 15:27

## 2024-03-31 RX ADMIN — SULFAMETHOXAZOLE AND TRIMETHOPRIM 1 TABLET: 400; 80 TABLET ORAL at 21:18

## 2024-03-31 RX ADMIN — QUETIAPINE FUMARATE 100 MG: 100 TABLET ORAL at 20:25

## 2024-03-31 RX ADMIN — PANTOPRAZOLE SODIUM 40 MG: 40 TABLET, DELAYED RELEASE ORAL at 14:29

## 2024-03-31 RX ADMIN — SENNOSIDES AND DOCUSATE SODIUM 1 TABLET: 8.6; 5 TABLET ORAL at 21:19

## 2024-03-31 RX ADMIN — ATORVASTATIN CALCIUM 80 MG: 80 TABLET, FILM COATED ORAL at 21:19

## 2024-03-31 RX ADMIN — LIDOCAINE 4% 2 PATCH: 40 PATCH TOPICAL at 14:57

## 2024-03-31 RX ADMIN — CAPSAICIN: 0.25 CREAM TOPICAL at 10:40

## 2024-03-31 RX ADMIN — Medication 5 MG: at 20:26

## 2024-03-31 RX ADMIN — FOLIC ACID 1 MG: 1 TABLET ORAL at 14:29

## 2024-03-31 ASSESSMENT — ACTIVITIES OF DAILY LIVING (ADL)
ADLS_ACUITY_SCORE: 52
ADLS_ACUITY_SCORE: 53
ADLS_ACUITY_SCORE: 52
ADLS_ACUITY_SCORE: 52
ADLS_ACUITY_SCORE: 53
ADLS_ACUITY_SCORE: 52
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
## 2024-03-31 NOTE — SUMMARY OF CARE
Provider notified (name): Jim Brewer  Reason for notification: 1955 just gave pt po Zyprexa but he is half off bed, still kicking staff. Angry and swearing, very difficult to redirect.      Recommendation/request given to provider: 2017 could we get order for restraints? Still waiting for IM Zyprexa    Response from provider: 2001 new order in for IM Zyprexa  2019 provider came to bedside, soft restraints started at 2030

## 2024-03-31 NOTE — SUMMARY OF CARE
Provider notified (name): Jim Brewer  Reason for notification: verbally spoke w provider at 2205, pt is much calmer and took all po meds. No longer trying to leave bed. Plan to remove restraints.  Recommendation/request given to provider: discontinue restraints and order  Response from provider: ok to discontinue  Soft wrist restaints removed at 2206

## 2024-03-31 NOTE — PROGRESS NOTES
[facility]    Progress Note - Medicine Service, PAMELLA TEAM 2       Date of Admission:  3/21/2024    Assessment & Plan   David Thomson is a 83 year old male with history of cognitive impairment, CAD s/p CABG (2015), afib, Aortic stenosis s/o TAVR with bioprosthetic valve, with recent admission 03/08-03/18 after a fall at which time he was found to have IPH, IVH, SAH, and SDH, admitted to OSH on 3/21/2024 after a fall with AMS. He was transferred to [facility] due to concern for new intracranial hemorrhage but imaging findings did not demonstrate new ICH. Care transitioned from trauma to medicine service 3/22 for continued evaluation of recurrent falls and encephalopathy. Remains oriented to self and age, occasionally oriented to year, but not place or situation. Sleeping, no longer agitated on Quetiapine 50mg at night, not requiring PRN antipsychotics. PT/OT evaluated, recommend TCU on discharge.      Today:  - Continuing agitation necessitating 1:1  - Encourage delirium precautions:   > Awake with lighting on in AM; uninterrupted sleep at night in PM   > Frequent reorientation   > Encourage cognitive stimulation  - Quetiapine changed to 25mg at 1500 followed by 100mg at 1700   > PRN 50mg Quetiapine available  - Olanzapine 5mg ODT or IM PRN available, maximum daily dose of 20mg  - EKG with LBBB, calculated QTc of 452   > Repeat in AM with increased antipsychotic dose  - Medically ready for discharge pending removal of 1:1     # Delirium, improving  # Recent Intraparenchymal hemorrhage with extension into ventricles andmidline shift, SAH, and SDH post fall (3/8)  # Cognitive impairment  Family notes was AxOx3 prior to IPH and since discharge to TCU has been improving but still lethargic and not at baseline. Patient presenting again after fall with AMS. No new intracranial hemorrhage on repeat CT head. Patient is at risk for seizure in the setting of recent intracranial  bleed and neurology started empiric antiepileptic therapy for persistent AMS. EEG with evidence of additional left hemispheric focal cerebral dysfunction, but no epileptiform discharges. Occasional urinary retention but UA unremarkable and patient afebrile. Continues to have intermittent delirium overnight and agitation (kicking) requiring restraints, IM olanzapine and a 1:1. Evening quetiapine increased to 100mg with improvement. Plan to add additional 25mg Quetiapine at 1500.   - Delirium precautions   - Encourage patient to be awake during the day, keep lights on, windows open   - Encourage uninterrupted sleep at night, limit amount light   - Have glasses available during the day  - Encourage frequent reorientation    - Encourage cognitive stimulation  - Recreational therapy  - Neuro exams q4h  - Change Quetiapine dosing to 25mg at 1500 and 100mg at 1900 for total dose of 125mg qd   > Additional 50mg quetiapine available as PRN   > EKG in AM  - PRN olanzapine 5mg ODT for agitation, not to exceed 20mg per day  - Per NSGY: Platelets > 100k, Hgb >8, INR<1.4, HOB >30 degs     # Risk of Qtc prolongation  # LBBB  Qtc read as 507- correction for LBBB is WNL at 452  - Continue antispsychotics as above.     --Chronic--  # Chronic incompletely united distal humerus fracture  Patient is s/p left elbow fracture s/p ORIF at [facility], complicated by elbow wound requiring washout. Per chart review, on lifelong Bactrim. Having occasional elbow pain  - Continue PTA Bactrim  - Increase PRN tylenol to 975mg q8 hours and begin capsaicin cream    # Falls  Has had multiple hospital admissions for fall with unknown etiology. Unclear if mechanical vs syncopal episodes. Has extensive cardiac history but recent TTE without abnormalities of bioprosthetic valve.Can consider arrhythmia as well with prior afib.   - Consider ZioPatch on discharge.    # ISHAN  Patient has history of ISHAN. Patient noted to be desatting when asleep, improves with  "Oxymask. If not tolerating mask can consider repeat blood gas to check PCO2. Bicarb on BMP is WNL.   - Night time Oxymask as needed     # Chronic HFpEF 55% 1/2023  # Hypertension   #Afib  Continue to hold PTA eliquis in the setting of recent intracranial hemorrhage per NSGY recs. [facility] about possible Watchman procedure as outpatient.   - Hold PTA Toprol XL, PTA entresto     # CAD s/p 3V CABG (2015)  Not on aspirin currently  - Statin as below     # Aortic stenosis s/p TAVR (2016)  Last TTE 1/2023 with mean gradient 14mmHg, suspected to be WNL     #HLD  -PTA lipitor     #GERD  - PTA omeprazole     # Idiopathic progressive neuropathy  - Hold PTA Gabapentin due to AMS      # PTSD  # Depression  Patient started on Seroquel for delirium at OSH due to hospitalization in the setting of acute illness. Patient sleeping, minimally responsive as above.   - Continue PTA sertraline  - Scheduled quetiapine, PRN olanzapine as above      -- Outpatient Follow Ups Needed--  Neurosurgery - to assess for hydrocephalus     Neurology - assess need for Keppra, until then continue 750mg BID     Cardiology follow up for consideration of Watchman procedure.     Diet: Regular Diet Adult    DVT Prophylaxis: Pneumatic Compression Devices  Rice Catheter: Not present  Fluids: None  Lines: None     Cardiac Monitoring: None  Code Status: Full Code      Clinically Significant Risk Factors                  # Hypertension: Noted on problem list  # Chronic heart failure with preserved ejection fraction: heart failure noted on problem list and last echo with EF >50%       # Overweight: Estimated body mass index is 28.45 kg/m  as calculated from the following:    Height as of an earlier encounter on 3/21/24: 1.854 m (6' 1\").    Weight as of this encounter: 97.8 kg (215 lb 9.8 oz).   # Severe Malnutrition: based on nutrition assessment      # Financial/Environmental Concerns: none         Disposition Plan "      Expected Discharge Date: 04/02/2024    Discharge Delays: Placement - TCU  1:1 Sitter still ordered - MD to assess  Destination: inpatient rehabilitation facility  Discharge Comments: Still needing 1:1    [facility]  Barrier: 1:1 Sitter. Needs to be off of sitter for at leats 24 hours.        The patient's care was discussed with the Attending Physician, Dr. Hogan .    [facility], [facility] TEAM 2  [facility]  Securely message with [employer] (more info)  Text page via [facility] Paging/Directory   See signed in provider for up to date coverage information  ______________________________________________________________________    Interval History   - Yesterday evening patient was agitated, kicking, trying to get out of bed; required physical restraints  - Given olanzapine 5mg x1 and 2.5mg x1, with improvement of agitations, restraints subsequently removed  - No further episodes of agitation over night.  - In the morning patient sleeping, difficult to arouse for conversation    Physical Exam   Vital Signs: Temp: 98.3  F (36.8  C) Temp src: Axillary BP: 117/75 Pulse: 59   Resp: 14 SpO2: 98 % O2 Device: None (Room air) Oxygen Delivery: 2 LPM  Weight: 215 lbs 9.76 oz    General Appearance:  NAD, sleeping, intermittently snoring, awakens to touch but falls asleep quickly, motions examiner to move away  Respiratory: breathing comfortably on room air, no increased work of breathing  Cardiovascular: distal extremities well perfused, bradycardic and irregular per monitor      Data

## 2024-03-31 NOTE — PLAN OF CARE
"Assumed care [id number]     V/S: VSS on RA  Pain: Generalized joint aches, L heel soreness  Neuro: A&Ox1-2. Orientation waxes & wanes. Generally calm & cooperative.   Respiratory: WDL on RA  Cardiac: WDL ex intermittently angela; HR 50-70s. -130s/60-70s.  Skin: No new deficits  GI/: Incontinent BM x1. Urinary incontinence & retention. Denies N/V  Nutrition: Regular diet with improving appetite; feeding assistance  Lines/drains: R PIV SL  Activity: x2/lift     Events this shift: 1:1 bedside attendant for safety. Pt calm, pleasant while eating breakfast & visiting with family this AM. PRN tylenol given x1-- note, family stated that pt may decline tylenol if described as \"pain medicine\" d/t previous AE to \"pain medicine\" (i.e. opioids).   Mentation continues to wax & wane; occasionally swatting/kicking/grabbing staff, verbally hostile to/about staff, flirtatious comments to staff. Violent behavior easily redirectable. Pt restless this afternoon, repeatedly attempting to get OOB, stating, \"I need to go.\" When asked where, pt replies, \"I need to go home.\" Attempted pivot transfer x2; then lift to w/c. Pt content in w/c ~1hr before lift back to bed. Explaining current situation with analogies to pt's previous hip replacement recovery helped pt understand why he is in the hospital.  Evening bladder scan 680mL, possible misread: straight cathed 275mL; immediate post-cath scan 43mL. Pt tolerated procedure very well.    Plan: Continue 1:1. Increased seroquel tonight. Discharge to TCU pending d/c'd 1:1. Continue POC & notify providers with any acute changes.  "
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
## 2024-03-31 NOTE — PLAN OF CARE
"Goal Outcome Evaluation:      Plan of Care Reviewed With: patient    Overall Patient Progress: no changeOverall Patient Progress: no change    Outcome Evaluation: 4366-5628. Alert, oriented to self. Very aggressive and agitated at start of the shift. Pt was attempting to get OOB, kicking and grabbing at staff, swearing, yelling and unable to be redirected. Repeatedly saying he \"[place] get to the farm\". PO and IM Zyprexa were given. Order for soft wrist restraints was placed and applied at 2030. Pt calmed down after this and was able to take rest of scheduled meds. Restraints were removed at 2206. Pt had 3-5 seconds of apnea every so often while asleep desatting to 88, oxymask 2L applied. Pt's HR also dips to 45-50s before correcting to the 60s, MD notified. Straight cathed this AM for 750ml, tolerated well.       "

## 2024-04-01 ENCOUNTER — APPOINTMENT (OUTPATIENT)
Dept: PHYSICAL THERAPY | Facility: CLINIC | Age: 84
DRG: 085 | End: 2024-04-01
Payer: COMMERCIAL

## 2024-04-01 LAB
ATRIAL RATE - MUSE: 63 BPM
DIASTOLIC BLOOD PRESSURE - MUSE: NORMAL MMHG
INTERPRETATION ECG - MUSE: NORMAL
P AXIS - MUSE: 81 DEGREES
PR INTERVAL - MUSE: 200 MS
QRS DURATION - MUSE: 176 MS
QT - MUSE: 496 MS
QTC - MUSE: 507 MS
R AXIS - MUSE: -10 DEGREES
SYSTOLIC BLOOD PRESSURE - MUSE: NORMAL MMHG
T AXIS - MUSE: 113 DEGREES
VENTRICULAR RATE- MUSE: 63 BPM

## 2024-04-01 PROCEDURE — 250N000013 HC RX MED GY IP 250 OP 250 PS 637

## 2024-04-01 PROCEDURE — 120N000002 HC R&B MED SURG/OB UMMC

## 2024-04-01 PROCEDURE — 97530 THERAPEUTIC ACTIVITIES: CPT | Mod: GP

## 2024-04-01 PROCEDURE — 250N000011 HC RX IP 250 OP 636

## 2024-04-01 PROCEDURE — 99233 SBSQ HOSP IP/OBS HIGH 50: CPT | Mod: GC | Performed by: INTERNAL MEDICINE

## 2024-04-01 RX ADMIN — THIAMINE HCL TAB 100 MG 100 MG: 100 TAB at 10:41

## 2024-04-01 RX ADMIN — ACETAMINOPHEN 975 MG: 325 TABLET, FILM COATED ORAL at 18:09

## 2024-04-01 RX ADMIN — SULFAMETHOXAZOLE AND TRIMETHOPRIM 1 TABLET: 400; 80 TABLET ORAL at 10:42

## 2024-04-01 RX ADMIN — SENNOSIDES AND DOCUSATE SODIUM 1 TABLET: 8.6; 5 TABLET ORAL at 20:14

## 2024-04-01 RX ADMIN — Medication 5 MG: at 20:14

## 2024-04-01 RX ADMIN — QUETIAPINE FUMARATE 25 MG: 25 TABLET ORAL at 14:26

## 2024-04-01 RX ADMIN — LIDOCAINE 4% 2 PATCH: 40 PATCH TOPICAL at 12:40

## 2024-04-01 RX ADMIN — QUETIAPINE FUMARATE 75 MG: 50 TABLET ORAL at 18:09

## 2024-04-01 RX ADMIN — PANTOPRAZOLE SODIUM 40 MG: 40 TABLET, DELAYED RELEASE ORAL at 10:42

## 2024-04-01 RX ADMIN — QUETIAPINE FUMARATE 50 MG: 50 TABLET ORAL at 21:42

## 2024-04-01 RX ADMIN — SULFAMETHOXAZOLE AND TRIMETHOPRIM 1 TABLET: 400; 80 TABLET ORAL at 20:14

## 2024-04-01 RX ADMIN — ACETAMINOPHEN 975 MG: 325 TABLET, FILM COATED ORAL at 10:42

## 2024-04-01 RX ADMIN — OLANZAPINE 5 MG: 10 INJECTION, POWDER, FOR SOLUTION INTRAMUSCULAR at 22:55

## 2024-04-01 RX ADMIN — FOLIC ACID 1 MG: 1 TABLET ORAL at 10:42

## 2024-04-01 RX ADMIN — SERTRALINE HYDROCHLORIDE 75 MG: 50 TABLET ORAL at 10:41

## 2024-04-01 RX ADMIN — LEVETIRACETAM 750 MG: 750 TABLET, FILM COATED ORAL at 20:14

## 2024-04-01 RX ADMIN — ATORVASTATIN CALCIUM 80 MG: 80 TABLET, FILM COATED ORAL at 20:14

## 2024-04-01 RX ADMIN — LEVETIRACETAM 750 MG: 750 TABLET, FILM COATED ORAL at 10:41

## 2024-04-01 ASSESSMENT — ACTIVITIES OF DAILY LIVING (ADL)
ADLS_ACUITY_SCORE: 53

## 2024-04-01 NOTE — PLAN OF CARE
Goal Outcome Evaluation:      Plan of Care Reviewed With: patient, child, family    Overall Patient Progress: improvingOverall Patient Progress: improving    Outcome Evaluation: 7643-1266. 1:1 continued, no restraints used [id number]. Lethargic in early shift. Calm, cooperative most of day. Delirium precautions in place, lights on, pt sitting up wathcing TV, eating. Family visiting, patient conversational and interactive with family. Pt began to sundown around 1600. Yellling out, trying to get out of bed, but able to redirect. Scheduled doses of Seroquel given. PRN tylenol for pain, lidocaine patches applied. Pt moving frequently in bed, self repositioning. Heels elevated when pt tolerating. Pt eating 1 meal today, a late breakfast, but did not eat much of lunch of dinner. Encouraging PO fluid intake. Bladder scanned per order, all under 300 mL. Pt spontaneously urinated 450 mL at approx 1645, refused PVR bladder scan, very agitated and seemed to be made worse by touching patient at that time. Will pass on to oncoming RN to try bladder scan again at start of shift.

## 2024-04-01 NOTE — PROGRESS NOTES
[facility]    Progress Note - Medicine Service, PAMELLA TEAM 2       Date of Admission:  3/21/2024    Assessment & Plan   David Thomson is a 83 year old male with history of cognitive impairment, CAD s/p CABG (2015), afib, Aortic stenosis s/o TAVR with bioprosthetic valve, with recent admission 03/08-03/18 after a fall at which time he was found to have IPH, IVH, SAH, and SDH, admitted to OSH on 3/21/2024 after a fall with AMS. He was transferred to [facility] due to concern for new intracranial hemorrhage but imaging findings did not demonstrate new ICH. Care transitioned from trauma to medicine service 3/22 for continued evaluation of recurrent falls and encephalopathy. Remains oriented to self and age, occasionally oriented to year, but not place or situation. PT/OT evaluated, recommend TCU on discharge.      Today:  - Continuing agitation necessitating 1:1  - Encourage delirium precautions:   > Awake with lighting on in AM; uninterrupted sleep at night in PM   > Frequent reorientation   > Encourage cognitive stimulation  - Quetiapine changed to 75 mg at 1900   > PRN 50mg Quetiapine available  - Medically ready for discharge pending removal of 1:1     # Delirium, improving  # Recent Intraparenchymal hemorrhage with extension into ventricles andmidline shift, SAH, and SDH post fall (3/8)  # Cognitive impairment  Family notes was AxOx3 prior to IPH and since discharge to TCU has been improving but still lethargic and not at baseline. Patient presenting again after fall with AMS. No new intracranial hemorrhage on repeat CT head. Patient is at risk for seizure in the setting of recent intracranial bleed and neurology started empiric antiepileptic therapy for persistent AMS. EEG with evidence of additional left hemispheric focal cerebral dysfunction, but no epileptiform discharges. Occasional urinary retention but UA unremarkable and patient afebrile. Continues to have  intermittent delirium overnight and agitation (kicking) requiring restraints, IM olanzapine and a 1:1. Evening quetiapine increased to 100mg with improvement. Plan to add additional 25mg Quetiapine at 1500.   - Delirium precautions   - Encourage patient to be awake during the day, keep lights on, windows open   - Encourage uninterrupted sleep at night, limit amount light   - Have glasses available during the day  - Encourage frequent reorientation    - Encourage cognitive stimulation  - Recreational therapy  - Neuro exams q4h  - Change Quetiapine dosing to 25mg at 1500 and 75mg at 1900 for total dose of 125mg qd   > Additional 50mg quetiapine available as PRN   > EKG in AM  - PRN olanzapine 5mg ODT for agitation, not to exceed 20mg per day  - Per NSGY: Platelets > 100k, Hgb >8, INR<1.4, HOB >30 degs     # Risk of Qtc prolongation  # LBBB  Qtc read as 507- correction for LBBB is WNL at 452  - Continue antispsychotics as above.     --Chronic--  # Chronic incompletely united distal humerus fracture  Patient is s/p left elbow fracture s/p ORIF at [facility], complicated by elbow wound requiring washout. Per chart review, on lifelong Bactrim. Having occasional elbow pain  - Continue PTA Bactrim  - Increase PRN tylenol to 975mg q8 hours and begin capsaicin cream    # Falls  Has had multiple hospital admissions for fall with unknown etiology. Unclear if mechanical vs syncopal episodes. Has extensive cardiac history but recent TTE without abnormalities of bioprosthetic valve.Can consider arrhythmia as well with prior afib.   - Consider ZioPatch on discharge.    # ISHAN  Patient has history of ISHAN. Patient noted to be desatting when asleep, improves with Oxymask. If not tolerating mask can consider repeat blood gas to check PCO2. Bicarb on BMP is WNL.   - Night time Oxymask as needed     # Chronic HFpEF 55% 1/2023  # Hypertension   #Afib  Continue to hold PTA eliquis in the setting of recent intracranial hemorrhage per NSGY recs. He  [facility] about possible Watchman procedure as outpatient.   - Hold PTA Toprol XL, PTA entresto     # CAD s/p 3V CABG (2015)  Not on aspirin currently  - Statin as below     # Aortic stenosis s/p TAVR (2016)  Last TTE 1/2023 with mean gradient 14mmHg, suspected to be WNL     #HLD  -PTA lipitor     #GERD  - PTA omeprazole     # Idiopathic progressive neuropathy  - Hold PTA Gabapentin due to AMS      # PTSD  # Depression  Patient started on Seroquel for delirium at OSH due to hospitalization in the setting of acute illness. Patient sleeping, minimally responsive as above.   - Continue PTA sertraline  - Scheduled quetiapine, PRN olanzapine as above      -- Outpatient Follow Ups Needed--  Neurosurgery - to assess for hydrocephalus     Neurology - assess need for Keppra, until then continue 750mg BID     Cardiology follow up for consideration of Watchman procedure.     Diet: Regular Diet Adult    DVT Prophylaxis: Pneumatic Compression Devices  Rice Catheter: Not present  Fluids: None  Lines: None     Cardiac Monitoring: None  Code Status: Full Code      Clinically Significant Risk Factors                  # Hypertension: Noted on problem list  # Chronic heart failure with preserved ejection fraction: heart failure noted on problem list and last echo with EF >50%       # Overweight: Estimated body mass index is 28.45 kg/m  as calculated from the following:    Height as of an earlier encounter on 3/21/24: 1.854 m (6' 1\").    Weight as of this encounter: 97.8 kg (215 lb 9.8 oz).   # Severe Malnutrition: based on nutrition assessment      # Financial/Environmental Concerns: none         Disposition Plan         The patient's care was discussed with the Attending Physician, Dr. Hogan .    TOMER Muniz MD  Medicine Service, [address]  Securely message with [employer] (more info)  Text page via [facility] Paging/Directory "   See signed in provider for up to date coverage information  ______________________________________________________________________    Interval History   Restraints required around time he was due for seroquel and needing straight cath, received olanzapine 5mg x1, discontinued at 2030. No additional agitation. Resting in bed, appears comfortable.        Physical Exam   Vital Signs: Temp: 97.2  F (36.2  C) Temp src: Axillary BP: 124/71 Pulse: 58   Resp: 20 SpO2: 100 % O2 Device: None (Room air) Oxygen Delivery: 2 LPM  Weight: 215 lbs 9.76 oz    General Appearance:  NAD, sleeping, intermittently snoring, awakens to touch but falls asleep quickly  Respiratory: breathing comfortably on room air, no increased work of breathing  Cardiovascular: distal extremities well perfused      Data

## 2024-04-01 NOTE — PLAN OF CARE
"Goal Outcome Evaluation:      Plan of Care Reviewed With: patient    Overall Patient Progress: no changeOverall Patient Progress: no change    Outcome Evaluation: 4056-0218. Alert, oriented to self. Pt was in 4 pt soft restraints when writer arrived on unit. Was repeatedly asking the attendant to \"cut these things off\". IM zyprexa prn given and pt started to calm down. Restraints were discontinued at 2030 and pt was able to take rest of scheduled meds po no issue. Slept after that for most of shift. Wore oxymask 2L while sleeping. Inc void once and straight cathed this morning for 275ml, tolerated well.       "

## 2024-04-02 ENCOUNTER — APPOINTMENT (OUTPATIENT)
Dept: OCCUPATIONAL THERAPY | Facility: CLINIC | Age: 84
DRG: 085 | End: 2024-04-02
Payer: COMMERCIAL

## 2024-04-02 ENCOUNTER — APPOINTMENT (OUTPATIENT)
Dept: PHYSICAL THERAPY | Facility: CLINIC | Age: 84
DRG: 085 | End: 2024-04-02
Payer: COMMERCIAL

## 2024-04-02 LAB
ATRIAL RATE - MUSE: NORMAL BPM
DIASTOLIC BLOOD PRESSURE - MUSE: NORMAL MMHG
INTERPRETATION ECG - MUSE: NORMAL
P AXIS - MUSE: NORMAL DEGREES
PR INTERVAL - MUSE: NORMAL MS
QRS DURATION - MUSE: 178 MS
QT - MUSE: 524 MS
QTC - MUSE: 501 MS
R AXIS - MUSE: -19 DEGREES
SYSTOLIC BLOOD PRESSURE - MUSE: NORMAL MMHG
T AXIS - MUSE: 138 DEGREES
VENTRICULAR RATE- MUSE: 55 BPM

## 2024-04-02 PROCEDURE — 250N000013 HC RX MED GY IP 250 OP 250 PS 637

## 2024-04-02 PROCEDURE — 97530 THERAPEUTIC ACTIVITIES: CPT | Mod: GO | Performed by: OCCUPATIONAL THERAPIST

## 2024-04-02 PROCEDURE — 97530 THERAPEUTIC ACTIVITIES: CPT | Mod: GP

## 2024-04-02 PROCEDURE — 99233 SBSQ HOSP IP/OBS HIGH 50: CPT | Mod: GC | Performed by: STUDENT IN AN ORGANIZED HEALTH CARE EDUCATION/TRAINING PROGRAM

## 2024-04-02 PROCEDURE — 120N000002 HC R&B MED SURG/OB UMMC

## 2024-04-02 RX ADMIN — LEVETIRACETAM 750 MG: 750 TABLET, FILM COATED ORAL at 10:08

## 2024-04-02 RX ADMIN — ATORVASTATIN CALCIUM 80 MG: 80 TABLET, FILM COATED ORAL at 19:35

## 2024-04-02 RX ADMIN — FOLIC ACID 1 MG: 1 TABLET ORAL at 10:08

## 2024-04-02 RX ADMIN — SENNOSIDES AND DOCUSATE SODIUM 1 TABLET: 8.6; 5 TABLET ORAL at 19:36

## 2024-04-02 RX ADMIN — ACETAMINOPHEN 975 MG: 325 TABLET, FILM COATED ORAL at 10:08

## 2024-04-02 RX ADMIN — SULFAMETHOXAZOLE AND TRIMETHOPRIM 1 TABLET: 400; 80 TABLET ORAL at 19:36

## 2024-04-02 RX ADMIN — THIAMINE HCL TAB 100 MG 100 MG: 100 TAB at 10:08

## 2024-04-02 RX ADMIN — PANTOPRAZOLE SODIUM 40 MG: 40 TABLET, DELAYED RELEASE ORAL at 10:08

## 2024-04-02 RX ADMIN — QUETIAPINE FUMARATE 25 MG: 25 TABLET ORAL at 15:48

## 2024-04-02 RX ADMIN — LEVETIRACETAM 750 MG: 750 TABLET, FILM COATED ORAL at 19:35

## 2024-04-02 RX ADMIN — Medication 5 MG: at 19:35

## 2024-04-02 RX ADMIN — QUETIAPINE FUMARATE 75 MG: 50 TABLET ORAL at 19:35

## 2024-04-02 RX ADMIN — SULFAMETHOXAZOLE AND TRIMETHOPRIM 1 TABLET: 400; 80 TABLET ORAL at 10:08

## 2024-04-02 RX ADMIN — SERTRALINE HYDROCHLORIDE 75 MG: 50 TABLET ORAL at 10:08

## 2024-04-02 ASSESSMENT — ACTIVITIES OF DAILY LIVING (ADL)
ADLS_ACUITY_SCORE: 53
ADLS_ACUITY_SCORE: 46
ADLS_ACUITY_SCORE: 53
ADLS_ACUITY_SCORE: 53
ADLS_ACUITY_SCORE: 47
ADLS_ACUITY_SCORE: 53
ADLS_ACUITY_SCORE: 53
ADLS_ACUITY_SCORE: 46
ADLS_ACUITY_SCORE: 53
ADLS_ACUITY_SCORE: 47
ADLS_ACUITY_SCORE: 53
ADLS_ACUITY_SCORE: 46
ADLS_ACUITY_SCORE: 53
ADLS_ACUITY_SCORE: 46
ADLS_ACUITY_SCORE: 53
ADLS_ACUITY_SCORE: 53
ADLS_ACUITY_SCORE: 47
ADLS_ACUITY_SCORE: 46
ADLS_ACUITY_SCORE: 53

## 2024-04-02 NOTE — PLAN OF CARE
Assumed cares from 1900 to 0700    Pt is A/OX2, on 1:1 for safety and agitation. Calm, cooperative until 2300. Became  aggressive towards sitter, trying to get out of bed. PRN IM Zyprexa given. Delirium precautions in place, bed side rails up. PRN Seroquel given at bedtime.  Pt moving frequently in bed, able to self repositioning. Bladder scan X3, 148 mL, 418 mL, 77 mL. Straight cath X1, 200 mL UOP. Q4 VS, Q4 neuro checks. Pt removed PIV, MD Vicki Cheng informed. No acute events, continue to monitor.    Plan of Care Reviewed With: patient    Overall Patient Progress: no change

## 2024-04-02 NOTE — PROGRESS NOTES
[facility]    Progress Note - Medicine Service, PAMELLA TEAM 2       Date of Admission:  3/21/2024    Assessment & Plan   David Thomson is a 83 year old male with history of cognitive impairment, CAD s/p CABG (2015), afib, Aortic stenosis s/o TAVR with bioprosthetic valve, with recent admission 03/08-03/18 after a fall at which time he was found to have IPH, IVH, SAH, and SDH, admitted to OSH on 3/21/2024 after a fall with AMS. He was transferred to [facility] due to concern for new intracranial hemorrhage but imaging findings did not demonstrate new ICH. Care transitioned from trauma to medicine service 3/22 for continued evaluation of recurrent falls and encephalopathy. Remains oriented to self and age, occasionally oriented to year, but not place or situation. PT/OT evaluated, recommend TCU on discharge.      Today:  - Continuing agitation necessitating 1:1  - Encourage delirium precautions  - Decrease frequency of bladder scans and neuro checks to minimize agitation  - Medically ready for discharge pending removal of 1:1     # Delirium, improving  # Recent Intraparenchymal hemorrhage with extension into ventricles andmidline shift, SAH, and SDH post fall (3/8)  # Cognitive impairment  Family notes was AxOx3 prior to IPH and since discharge to TCU has been improving but still lethargic and not at baseline. Patient presenting again after fall with AMS. No new intracranial hemorrhage on repeat CT head. Patient is at risk for seizure in the setting of recent intracranial bleed and neurology started empiric antiepileptic therapy for persistent AMS. EEG with evidence of additional left hemispheric focal cerebral dysfunction, but no epileptiform discharges. Occasional urinary retention but UA unremarkable and patient afebrile. Continues to have intermittent delirium overnight and agitation (kicking) requiring IM olanzapine and a 1:1.   - Delirium precautions   - Encourage  patient to be awake during the day, keep lights on, windows open   - Encourage uninterrupted sleep at night, limit amount light   - Have glasses available during the day  - Encourage frequent reorientation    - Encourage cognitive stimulation  - Recreational therapy  - Neuro exams qshift   - Decrease bladder scans to q8h   - Change Quetiapine dosing to 25mg at 1500 and 75mg at 1900 for total dose of 100mg qd   > Additional 50mg quetiapine available as PRN  - PRN olanzapine 5mg ODT for agitation, not to exceed 20mg per day  - Per NSGY: Platelets > 100k, Hgb >8, INR<1.4, HOB >30 degs     # Risk of Qtc prolongation  # LBBB  Qtc read as 507- correction for LBBB is WNL at 452  - Continue antispsychotics as above.     --Chronic--  # Chronic incompletely united distal humerus fracture  Patient is s/p left elbow fracture s/p ORIF at [facility], complicated by elbow wound requiring washout. Per chart review, on lifelong Bactrim. Having occasional elbow pain  - Continue PTA Bactrim  - Increase PRN tylenol to 975mg q8 hours and begin capsaicin cream    # Falls  Has had multiple hospital admissions for fall with unknown etiology. Unclear if mechanical vs syncopal episodes. Has extensive cardiac history but recent TTE without abnormalities of bioprosthetic valve.Can consider arrhythmia as well with prior afib.   - Consider ZioPatch on discharge.    # ISHAN  Patient has history of ISHAN. Patient noted to be desatting when asleep, improves with Oxymask. If not tolerating mask can consider repeat blood gas to check PCO2. Bicarb on BMP is WNL.   - Night time Oxymask as needed     # Chronic HFpEF 55% 1/2023  # Hypertension   #Afib  Continue to hold PTA eliquis in the setting of recent intracranial hemorrhage per NSGY recs. [facility] about possible Watchman procedure as outpatient.   - Hold PTA Toprol XL, PTA entresto     # CAD s/p 3V CABG (2015)  Not on aspirin currently  - Statin as below     #  "Aortic stenosis s/p TAVR (2016)  Last TTE 1/2023 with mean gradient 14mmHg, suspected to be WNL     #HLD  -PTA lipitor     #GERD  - PTA omeprazole     # Idiopathic progressive neuropathy  - Hold PTA Gabapentin due to AMS      # PTSD  # Depression  Patient started on Seroquel for delirium at OSH due to hospitalization in the setting of acute illness. Patient sleeping, minimally responsive as above.   - Continue PTA sertraline  - Scheduled quetiapine, PRN olanzapine as above      -- Outpatient Follow Ups Needed--  Neurosurgery - to assess for hydrocephalus     Neurology - assess need for Keppra, until then continue 750mg BID     Cardiology follow up for consideration of Watchman procedure.     Diet: Regular Diet Adult    DVT Prophylaxis: Pneumatic Compression Devices  Rice Catheter: Not present  Fluids: None  Lines: None     Cardiac Monitoring: None  Code Status: Full Code      Clinically Significant Risk Factors                  # Hypertension: Noted on problem list  # Chronic heart failure with preserved ejection fraction: heart failure noted on problem list and last echo with EF >50%       # Overweight: Estimated body mass index is 28.45 kg/m  as calculated from the following:    Height as of an earlier encounter on 3/21/24: 1.854 m (6' 1\").    Weight as of this encounter: 97.8 kg (215 lb 9.8 oz).   # Severe Malnutrition: based on nutrition assessment      # Financial/Environmental Concerns: none         Disposition Plan         The patient's care was discussed with the Attending Physician, Dr. Barclay .    TOMER Muniz MD  Medicine Service, [address]  Securely message with [employer] (more info)  Text page via [employer] Paging/Directory   See signed in provider for up to date coverage information  ______________________________________________________________________    Interval History   Has not required restraints for over 24h. Aggressive at 2300, " requiring IM zyprexa x1. Otherwise no additional behavior problems overnight. Sitting up in chair today, says he slept well, breakfast was good. Pleasant.     RN reports 1:1 helpful to keep him awake during the day, has been pleasant this morning.     Physical Exam   Vital Signs: Temp: 96.9  F (36.1  C) Temp src: Oral BP: 128/74 Pulse: 61   Resp: 20 SpO2: 99 % O2 Device: None (Room air)    Weight: 215 lbs 9.76 oz    General Appearance:  NAD, up in the chair   Respiratory: breathing comfortably on room air, no increased work of breathing, CTAB  Cardiovascular: RRR      Data

## 2024-04-03 ENCOUNTER — APPOINTMENT (OUTPATIENT)
Dept: PHYSICAL THERAPY | Facility: CLINIC | Age: 84
DRG: 085 | End: 2024-04-03
Payer: COMMERCIAL

## 2024-04-03 PROCEDURE — 250N000013 HC RX MED GY IP 250 OP 250 PS 637

## 2024-04-03 PROCEDURE — 99232 SBSQ HOSP IP/OBS MODERATE 35: CPT | Mod: GC | Performed by: STUDENT IN AN ORGANIZED HEALTH CARE EDUCATION/TRAINING PROGRAM

## 2024-04-03 PROCEDURE — 97530 THERAPEUTIC ACTIVITIES: CPT | Mod: GP

## 2024-04-03 PROCEDURE — 120N000002 HC R&B MED SURG/OB UMMC

## 2024-04-03 RX ORDER — POLYETHYLENE GLYCOL 3350 17 G/17G
17 POWDER, FOR SOLUTION ORAL DAILY
Status: DISCONTINUED | OUTPATIENT
Start: 2024-04-03 | End: 2024-05-10 | Stop reason: HOSPADM

## 2024-04-03 RX ADMIN — THIAMINE HCL TAB 100 MG 100 MG: 100 TAB at 09:39

## 2024-04-03 RX ADMIN — FOLIC ACID 1 MG: 1 TABLET ORAL at 09:39

## 2024-04-03 RX ADMIN — QUETIAPINE FUMARATE 25 MG: 50 TABLET ORAL at 19:04

## 2024-04-03 RX ADMIN — ATORVASTATIN CALCIUM 80 MG: 80 TABLET, FILM COATED ORAL at 22:01

## 2024-04-03 RX ADMIN — POLYETHYLENE GLYCOL 3350 17 G: 17 POWDER, FOR SOLUTION ORAL at 11:29

## 2024-04-03 RX ADMIN — LEVETIRACETAM 750 MG: 750 TABLET, FILM COATED ORAL at 09:39

## 2024-04-03 RX ADMIN — SENNOSIDES AND DOCUSATE SODIUM 1 TABLET: 8.6; 5 TABLET ORAL at 22:01

## 2024-04-03 RX ADMIN — LEVETIRACETAM 750 MG: 750 TABLET, FILM COATED ORAL at 22:00

## 2024-04-03 RX ADMIN — SULFAMETHOXAZOLE AND TRIMETHOPRIM 1 TABLET: 400; 80 TABLET ORAL at 22:02

## 2024-04-03 RX ADMIN — PANTOPRAZOLE SODIUM 40 MG: 40 TABLET, DELAYED RELEASE ORAL at 09:39

## 2024-04-03 RX ADMIN — SULFAMETHOXAZOLE AND TRIMETHOPRIM 1 TABLET: 400; 80 TABLET ORAL at 09:39

## 2024-04-03 RX ADMIN — QUETIAPINE FUMARATE 25 MG: 25 TABLET ORAL at 15:00

## 2024-04-03 RX ADMIN — SERTRALINE HYDROCHLORIDE 75 MG: 50 TABLET ORAL at 09:39

## 2024-04-03 ASSESSMENT — ACTIVITIES OF DAILY LIVING (ADL)
ADLS_ACUITY_SCORE: 46
ADLS_ACUITY_SCORE: 46
ADLS_ACUITY_SCORE: 52
ADLS_ACUITY_SCORE: 47
ADLS_ACUITY_SCORE: 46
ADLS_ACUITY_SCORE: 52
ADLS_ACUITY_SCORE: 47
ADLS_ACUITY_SCORE: 46
ADLS_ACUITY_SCORE: 47
ADLS_ACUITY_SCORE: 46
ADLS_ACUITY_SCORE: 46

## 2024-04-03 NOTE — PLAN OF CARE
Goal Outcome Evaluation:      Plan of Care Reviewed With: patient    Overall Patient Progress: no changeOverall Patient Progress: no change    Outcome Evaluation: [id number]. Pt was sleeping for most of shift. Oriented to self. Large inc void this morning, during bed change pt was calm&cooperative. Bsc showed 145. No prns given. Denies pain.

## 2024-04-03 NOTE — PLAN OF CARE
Goal Outcome Evaluation:      Plan of Care Reviewed With: patient    Overall Patient Progress: no changeOverall Patient Progress: no change    Outcome Evaluation: Pleasantly confused and lethargic. A&O to self. 1530 bladder scan showed 475, straight cathed at 1730 for 700ml. No PRNs given. Did not eat dinner.

## 2024-04-03 NOTE — PLAN OF CARE
"Assumed care [id number]     V/S: VSS on RA  Pain: L heel  Neuro: A&Ox1-2, orientation waxes & wanes  Respiratory: WDL on RA  Cardiac: WDL ex intermittently angela, HR 50-70s  Skin: no new deficits  GI/: LBM 3/29; hypoactive BS. Incontinent void x3. Denies N/V  Nutrition: Regular diet with feeding assistance; fair appetite  Activity: x2/lift     Events this shift: Trial 1:1 observing from hallway. Delirium interventions in place. Talked with son (Castro Haynes) on phone this AM. Pt ate breakfast, lunch. Up in chair w PT this AM & in w/c this afternoon. Pt generally alert, pleasant, cooperative until ~14:00. Pt became restless, irritable, swatting at/calling staff derogatory names, accusing staff of \"stealing,\" but able to be redirected. ~19:00 pt further agitated, unable to redirect, but nonviolent. Scheduled seroquel & melatonin given; pt swallowed 25mg of seroquel but spat out the remaining 50mg & melatonin.    Plan: Discharge to TCU pending d/c'd 1:1. Continue POC & notify providers with any acute changes.  "

## 2024-04-03 NOTE — PROGRESS NOTES
[facility]    Progress Note - Medicine Service, PAMELLA TEAM 2       Date of Admission:  3/21/2024    Assessment & Plan   David Thomson is a 83 year old male with history of cognitive impairment, CAD s/p CABG (2015), afib, Aortic stenosis s/o TAVR with bioprosthetic valve, with recent admission 03/08-03/18 after a fall at which time he was found to have IPH, IVH, SAH, and SDH, admitted to OSH on 3/21/2024 after a fall with AMS. He was transferred to [facility] due to concern for new intracranial hemorrhage but imaging findings did not demonstrate new ICH. Care transitioned from trauma to medicine service 3/22 for continued evaluation of recurrent falls and encephalopathy. Remains oriented to self and age, occasionally oriented to year, but not place or situation. PT/OT evaluated, recommend TCU on discharge.      Today:  - Will trial no 1:1 during the day today   - Encourage delirium precautions  - Medically ready for discharge pending removal of 1:1     # Delirium, improving  # Recent Intraparenchymal hemorrhage with extension into ventricles andmidline shift, SAH, and SDH post fall (3/8)  # Cognitive impairment  Family notes was AxOx3 prior to IPH and since discharge to TCU has been improving but still lethargic and not at baseline. Patient presenting again after fall with AMS. No new intracranial hemorrhage on repeat CT head. Patient is at risk for seizure in the setting of recent intracranial bleed and neurology started empiric antiepileptic therapy for persistent AMS. EEG with evidence of additional left hemispheric focal cerebral dysfunction, but no epileptiform discharges. Occasional urinary retention but UA unremarkable and patient afebrile. Continues to have intermittent delirium overnight and agitation (kicking) requiring IM olanzapine and a 1:1.   - Delirium precautions   - Encourage patient to be awake during the day, keep lights on, windows open   - Encourage  uninterrupted sleep at night, limit amount light   - Have glasses available during the day  - Encourage frequent reorientation    - Encourage cognitive stimulation  - Recreational therapy  - Neuro exams qshift   - Decrease bladder scans to q8h   - Change Quetiapine dosing to 25mg at 1500 and 75mg at 1900 for total dose of 100mg qd   > Additional 50mg quetiapine available as PRN  - PRN olanzapine 5mg ODT for agitation, not to exceed 20mg per day  - Per NSGY: Platelets > 100k, Hgb >8, INR<1.4, HOB >30 degs     # Risk of Qtc prolongation  # LBBB  Qtc read as 507- correction for LBBB is WNL at 452  - Continue antispsychotics as above.     --Chronic--  # Chronic incompletely united distal humerus fracture  Patient is s/p left elbow fracture s/p ORIF at [facility], complicated by elbow wound requiring washout. Per chart review, on lifelong Bactrim. Having occasional elbow pain  - Continue PTA Bactrim  - Increase PRN tylenol to 975mg q8 hours and begin capsaicin cream    # Falls  Has had multiple hospital admissions for fall with unknown etiology. Unclear if mechanical vs syncopal episodes. Has extensive cardiac history but recent TTE without abnormalities of bioprosthetic valve.Can consider arrhythmia as well with prior afib.   - Consider ZioPatch on discharge.    # ISHAN  Patient has history of ISHAN. Patient noted to be desatting when asleep, improves with Oxymask. If not tolerating mask can consider repeat blood gas to check PCO2. Bicarb on BMP is WNL.   - Night time Oxymask as needed     # Chronic HFpEF 55% 1/2023  # Hypertension   #Afib  Continue to hold PTA eliquis in the setting of recent intracranial hemorrhage per NSGY recs. [facility] about possible Watchman procedure as outpatient.   - Hold PTA Toprol XL, PTA entresto     # CAD s/p 3V CABG (2015)  Not on aspirin currently  - Statin as below     # Aortic stenosis s/p TAVR (2016)  Last TTE 1/2023 with mean gradient 14mmHg,  "suspected to be WNL     #HLD  -PTA lipitor     #GERD  - PTA omeprazole     # Idiopathic progressive neuropathy  - Hold PTA Gabapentin due to AMS      # PTSD  # Depression  Patient started on Seroquel for delirium at OSH due to hospitalization in the setting of acute illness. Patient sleeping, minimally responsive as above.   - Continue PTA sertraline  - Scheduled quetiapine, PRN olanzapine as above      -- Outpatient Follow Ups Needed--  Neurosurgery - to assess for hydrocephalus     Neurology - assess need for Keppra, until then continue 750mg BID     Cardiology follow up for consideration of Watchman procedure.     Diet: Regular Diet Adult    DVT Prophylaxis: Pneumatic Compression Devices  Rice Catheter: Not present  Fluids: None  Lines: None     Cardiac Monitoring: None  Code Status: Full Code      Clinically Significant Risk Factors                  # Hypertension: Noted on problem list  # Chronic heart failure with preserved ejection fraction: heart failure noted on problem list and last echo with EF >50%       # Overweight: Estimated body mass index is 28.45 kg/m  as calculated from the following:    Height as of an earlier encounter on 3/21/24: 1.854 m (6' 1\").    Weight as of this encounter: 97.8 kg (215 lb 9.8 oz).   # Severe Malnutrition: based on nutrition assessment      # Financial/Environmental Concerns: none         Disposition Plan         The patient's care was discussed with the Attending Physician, Dr. Barclay .    TOMER Muniz MD  Medicine Service, [address]  Securely message with [employer] (more info)  Text page via [employer] Paging/Directory   See signed in provider for up to date coverage information  ______________________________________________________________________    Interval History   No agitation overnight, did not require PRNs. Resting in bed this morning, appears comfortable.       Physical Exam   Vital Signs: Temp: 97.2  F " (36.2  C) Temp src: Oral BP: 126/68 Pulse: 59   Resp: 18 SpO2: 99 % O2 Device: Oxymask Oxygen Delivery: 2 LPM  Weight: 215 lbs 9.76 oz    General Appearance:  NAD, in bed   Respiratory: breathing comfortably on room air, no increased work of breathing,   Cardiovascular: extremities WWP       Data

## 2024-04-04 ENCOUNTER — APPOINTMENT (OUTPATIENT)
Dept: PHYSICAL THERAPY | Facility: CLINIC | Age: 84
DRG: 085 | End: 2024-04-04
Payer: COMMERCIAL

## 2024-04-04 LAB
ANION GAP SERPL CALCULATED.3IONS-SCNC: 14 MMOL/L (ref 7–15)
BUN SERPL-MCNC: 15.1 MG/DL (ref 8–23)
CALCIUM SERPL-MCNC: 9.8 MG/DL (ref 8.8–10.2)
CHLORIDE SERPL-SCNC: 104 MMOL/L (ref 98–107)
CREAT SERPL-MCNC: 0.98 MG/DL (ref 0.67–1.17)
DEPRECATED HCO3 PLAS-SCNC: 22 MMOL/L (ref 22–29)
EGFRCR SERPLBLD CKD-EPI 2021: 77 ML/MIN/1.73M2
ERYTHROCYTE [DISTWIDTH] IN BLOOD BY AUTOMATED COUNT: 16.1 % (ref 10–15)
GLUCOSE SERPL-MCNC: 121 MG/DL (ref 70–99)
HCT VFR BLD AUTO: 35.8 % (ref 40–53)
HGB BLD-MCNC: 12.1 G/DL (ref 13.3–17.7)
MAGNESIUM SERPL-MCNC: 1.8 MG/DL (ref 1.7–2.3)
MCH RBC QN AUTO: 30.6 PG (ref 26.5–33)
MCHC RBC AUTO-ENTMCNC: 33.8 G/DL (ref 31.5–36.5)
MCV RBC AUTO: 91 FL (ref 78–100)
PLATELET # BLD AUTO: 213 10E3/UL (ref 150–450)
POTASSIUM SERPL-SCNC: 3.9 MMOL/L (ref 3.4–5.3)
RBC # BLD AUTO: 3.95 10E6/UL (ref 4.4–5.9)
SODIUM SERPL-SCNC: 140 MMOL/L (ref 135–145)
WBC # BLD AUTO: 4.5 10E3/UL (ref 4–11)

## 2024-04-04 PROCEDURE — 97110 THERAPEUTIC EXERCISES: CPT | Mod: GP

## 2024-04-04 PROCEDURE — 250N000013 HC RX MED GY IP 250 OP 250 PS 637

## 2024-04-04 PROCEDURE — 99233 SBSQ HOSP IP/OBS HIGH 50: CPT | Mod: GC | Performed by: STUDENT IN AN ORGANIZED HEALTH CARE EDUCATION/TRAINING PROGRAM

## 2024-04-04 PROCEDURE — 36415 COLL VENOUS BLD VENIPUNCTURE: CPT

## 2024-04-04 PROCEDURE — 83735 ASSAY OF MAGNESIUM: CPT

## 2024-04-04 PROCEDURE — 85027 COMPLETE CBC AUTOMATED: CPT

## 2024-04-04 PROCEDURE — 80048 BASIC METABOLIC PNL TOTAL CA: CPT

## 2024-04-04 PROCEDURE — 97530 THERAPEUTIC ACTIVITIES: CPT | Mod: GP

## 2024-04-04 PROCEDURE — 97116 GAIT TRAINING THERAPY: CPT | Mod: GP

## 2024-04-04 PROCEDURE — 120N000002 HC R&B MED SURG/OB UMMC

## 2024-04-04 RX ADMIN — SULFAMETHOXAZOLE AND TRIMETHOPRIM 1 TABLET: 400; 80 TABLET ORAL at 09:02

## 2024-04-04 RX ADMIN — QUETIAPINE FUMARATE 75 MG: 50 TABLET ORAL at 17:03

## 2024-04-04 RX ADMIN — FOLIC ACID 1 MG: 1 TABLET ORAL at 09:02

## 2024-04-04 RX ADMIN — PANTOPRAZOLE SODIUM 40 MG: 40 TABLET, DELAYED RELEASE ORAL at 09:02

## 2024-04-04 RX ADMIN — Medication 5 MG: at 20:18

## 2024-04-04 RX ADMIN — ATORVASTATIN CALCIUM 80 MG: 80 TABLET, FILM COATED ORAL at 20:18

## 2024-04-04 RX ADMIN — LEVETIRACETAM 750 MG: 750 TABLET, FILM COATED ORAL at 09:02

## 2024-04-04 RX ADMIN — SERTRALINE HYDROCHLORIDE 75 MG: 50 TABLET ORAL at 09:02

## 2024-04-04 RX ADMIN — LIDOCAINE 4% 2 PATCH: 40 PATCH TOPICAL at 09:00

## 2024-04-04 RX ADMIN — POLYETHYLENE GLYCOL 3350 17 G: 17 POWDER, FOR SOLUTION ORAL at 09:02

## 2024-04-04 RX ADMIN — LEVETIRACETAM 750 MG: 750 TABLET, FILM COATED ORAL at 20:18

## 2024-04-04 RX ADMIN — QUETIAPINE FUMARATE 25 MG: 25 TABLET ORAL at 15:55

## 2024-04-04 RX ADMIN — SENNOSIDES AND DOCUSATE SODIUM 1 TABLET: 8.6; 5 TABLET ORAL at 20:18

## 2024-04-04 RX ADMIN — SULFAMETHOXAZOLE AND TRIMETHOPRIM 1 TABLET: 400; 80 TABLET ORAL at 20:19

## 2024-04-04 RX ADMIN — THIAMINE HCL TAB 100 MG 100 MG: 100 TAB at 09:03

## 2024-04-04 ASSESSMENT — ACTIVITIES OF DAILY LIVING (ADL)
ADLS_ACUITY_SCORE: 48
ADLS_ACUITY_SCORE: 46
ADLS_ACUITY_SCORE: 48
ADLS_ACUITY_SCORE: 46
ADLS_ACUITY_SCORE: 46
ADLS_ACUITY_SCORE: 48
ADLS_ACUITY_SCORE: 46
ADLS_ACUITY_SCORE: 48
ADLS_ACUITY_SCORE: 46
ADLS_ACUITY_SCORE: 48
ADLS_ACUITY_SCORE: 46
ADLS_ACUITY_SCORE: 46
ADLS_ACUITY_SCORE: 48
ADLS_ACUITY_SCORE: 46
ADLS_ACUITY_SCORE: 48
ADLS_ACUITY_SCORE: 48
ADLS_ACUITY_SCORE: 46

## 2024-04-04 NOTE — PROGRESS NOTES
CLINICAL NUTRITION SERVICES - REASSESSMENT NOTE     Nutrition Prescription    RECOMMENDATIONS FOR MDs/PROVIDERS TO ORDER:  None currently    Malnutrition Status:    Severe malnutrition in the context of acute illness     Recommendations already ordered by Registered Dietitian (RD):  Automatic/standard meal trays TID.  Please assist with meal time/feeding as needed  Ensure Enlive with meals    Future/Additional Recommendations:  Monitor nutrition-related findings and follow pt per protocol      EVALUATION OF THE PROGRESS TOWARD GOALS   Diet: Regular and House trays and  Ensure Enlive with meals    Intake/Tolerance: Patient consuming 0-75 % of 3 meal(s) daily.     NEW FINDINGS   Met with pt at bedside. Pt reports poor appetite. Reports intermittent nausea. Denies diarrhea/constipation. Denies issues chewing/swallowing. Assisted with tray set up for breakfast. Pt states he has not tried Ensure supplements yet. Encouraged pt to try supplements to increase protein/kcal intake with poor appetite. Pt declined to list any food preferences at this time. States he believes he is usually able to finish about 1/2 his meal. Declined any snacks currently. Denied further nutrition related questions/concerns at this time.    GI:  Last BM: 04/04/24 (smear)  On scheduled bowel med(s)    Weight:  Most Recent Weight: 97.8 kg (215 lb 9.8 oz)  on 3/29/24 via Bed scale  Body mass index is 28.45 kg/m .  Wt down 5.8 kg from admission.    Meds:  Lipitor  Iron  Folic acid  Melatonin  Protonix  Miralax  Senna-docusate  thiamine    Labs:  reviewed    Skin:     04/04/24 0253   Skin   Skin Color redness blanchable   Skin Temperature warm   Skin Integrity scab   Abrasion / Excoriation [place];Calf   Bruised (ecchymotic) location   (scattered)   Rash Back   Redness blanchable location Sacrum/Coccyx  (protective mepilex in place)   Scab Right;Left;Knee   Other (see comments) bruising bilateral arms and legs   Device Skin Interventions Taken No  adjustments needed       MALNUTRITION  % Intake: </= 50% for >/= 5 days (severe)  % Weight Loss: > 2% in 1 week (severe)  Subcutaneous Fat Loss: None observed  Muscle Loss: None observed  Fluid Accumulation/Edema: None noted  Malnutrition Diagnosis: Severe malnutrition in the context of acute illness     Previous Goals   Patient to consume % of nutritionally adequate meal trays TID, or the equivalent with supplements/snacks.   Evaluation: Not met but improving since last assessment    Previous Nutrition Diagnosis  Inadequate oral intake related to altered mentation, reduced appetite as evidenced by 0-25-50% of meal trays per I/O, weight loss >2% in 1 week and >7.5% over past 3-months.     Evaluation: No change    CURRENT NUTRITION DIAGNOSIS  Inadequate oral intake related to altered mentation, reduced appetite as evidenced by 0-25-50% of meal trays per I/O, weight loss >2% in 1 week and >7.5% over past 3-months.       INTERVENTIONS  Implementation  Medical food supplement therapy  Nutrition education for recommended modifications     Goals  Patient to consume % of nutritionally adequate meal trays TID, or the equivalent with supplements/snacks.     Monitoring/Evaluation  Progress toward goals will be monitored and evaluated per protocol.    Jenny Townsend MS, RD, LD, [facility]    6C (beds 3804-6804) + 7C (beds 7039-8178) + ED   Available in [facility] by name or unit dietitian  No longer available via pager

## 2024-04-04 NOTE — PLAN OF CARE
Goal Outcome Evaluation:      Plan of Care Reviewed With: patient     Overall Patient Progress: no change     Outcome Evaluation:  RN assumed cares @  1900.  VSS on RA; however, has periods of apnea and sats drop to 80% when sleeping so put on oxymask on 2L  oxygen and sats to mid 90s.  Oriented to self only.  Argumentative at times concerned that he was [place] he could go and do the alignment. Frequent redirection.  Multiple attempts to get off the bed. At one point, entered room and patient laying sideways at the end of the bed a repeating  the same  questions.  Eventually able to talk through with 1:1 attendant a plan that he was comfortable with to stop looking for the truck.     Delirium precautions enforced.   Attempted to give  patient pills whole one at a time, but he pocketed the pills so second attempt  crushed medications in applesauce and he was able to consume.  Redirectable with behaviors with slow  short  instructions.   Large incontinence of urine overflowed saturated pull up. Small bm smear. Complete linen change.         Plan:  Anticipate discharge to TCU once medically stable and off bedside attendant for safety/redirection.   Continue to monitor and follow POC. Notify provider with changes.

## 2024-04-04 NOTE — CARE PLAN
04/04/24 1300   Fall Event   Patient Assessed By nurse;provider   Name of Provider Notified Velia Muniz MD   Fall Prevention Plan Updated Yes  (1:1 reinstated)

## 2024-04-04 NOTE — PLAN OF CARE
Goal Outcome Evaluation:      Plan of Care Reviewed With: patient    Overall Patient Progress: improvingOverall Patient Progress: improving    Outcome Evaluation: see RD note 4/4    Jenny Townsend MS, RD, LD, [facility]    6C (beds 8106-6611) + 7C (beds 3496-7347) + ED   Available in [facility] by name or unit dietitian  No longer available via pager

## 2024-04-04 NOTE — PROGRESS NOTES
Brief progress note    Called regarding unwitnessed fall. Patient evaluated at bedside. Per bedside RN, set off bed alarm and found by nursing assistant crouched on he knees with his arm resting on the bed. Do not believe he hit his head. Patient is awake, alert, and answering questions appropriately. He denies head pain, neck pain, headache, LOC or other new changes. Appears normocephalic without laceration or other injury. 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     Overall appears stable and no additional workup needed. 1:1 reinstated.     TOMER Muniz MD  PGY-2 Internal Medicine

## 2024-04-05 ENCOUNTER — APPOINTMENT (OUTPATIENT)
Dept: PHYSICAL THERAPY | Facility: CLINIC | Age: 84
DRG: 085 | End: 2024-04-05
Payer: COMMERCIAL

## 2024-04-05 PROCEDURE — 250N000013 HC RX MED GY IP 250 OP 250 PS 637

## 2024-04-05 PROCEDURE — 97116 GAIT TRAINING THERAPY: CPT | Mod: GP

## 2024-04-05 PROCEDURE — 250N000011 HC RX IP 250 OP 636

## 2024-04-05 PROCEDURE — 97530 THERAPEUTIC ACTIVITIES: CPT | Mod: GP

## 2024-04-05 PROCEDURE — 99232 SBSQ HOSP IP/OBS MODERATE 35: CPT | Mod: GC | Performed by: STUDENT IN AN ORGANIZED HEALTH CARE EDUCATION/TRAINING PROGRAM

## 2024-04-05 PROCEDURE — 120N000002 HC R&B MED SURG/OB UMMC

## 2024-04-05 PROCEDURE — G0463 HOSPITAL OUTPT CLINIC VISIT: HCPCS

## 2024-04-05 RX ORDER — OLANZAPINE 10 MG/2ML
5 INJECTION, POWDER, FOR SOLUTION INTRAMUSCULAR
Status: COMPLETED | OUTPATIENT
Start: 2024-04-05 | End: 2024-04-05

## 2024-04-05 RX ADMIN — ATORVASTATIN CALCIUM 80 MG: 80 TABLET, FILM COATED ORAL at 23:42

## 2024-04-05 RX ADMIN — FOLIC ACID 1 MG: 1 TABLET ORAL at 09:30

## 2024-04-05 RX ADMIN — Medication 5 MG: at 23:42

## 2024-04-05 RX ADMIN — QUETIAPINE FUMARATE 25 MG: 25 TABLET ORAL at 15:35

## 2024-04-05 RX ADMIN — LEVETIRACETAM 750 MG: 750 TABLET, FILM COATED ORAL at 09:30

## 2024-04-05 RX ADMIN — QUETIAPINE FUMARATE 75 MG: 50 TABLET ORAL at 17:15

## 2024-04-05 RX ADMIN — QUETIAPINE FUMARATE 50 MG: 50 TABLET ORAL at 20:58

## 2024-04-05 RX ADMIN — ACETAMINOPHEN 975 MG: 325 TABLET, FILM COATED ORAL at 09:30

## 2024-04-05 RX ADMIN — LIDOCAINE 4% 2 PATCH: 40 PATCH TOPICAL at 09:30

## 2024-04-05 RX ADMIN — SERTRALINE HYDROCHLORIDE 75 MG: 50 TABLET ORAL at 09:30

## 2024-04-05 RX ADMIN — THIAMINE HCL TAB 100 MG 100 MG: 100 TAB at 09:30

## 2024-04-05 RX ADMIN — LEVETIRACETAM 750 MG: 750 TABLET, FILM COATED ORAL at 23:42

## 2024-04-05 RX ADMIN — SULFAMETHOXAZOLE AND TRIMETHOPRIM 1 TABLET: 400; 80 TABLET ORAL at 23:42

## 2024-04-05 RX ADMIN — POLYETHYLENE GLYCOL 3350 17 G: 17 POWDER, FOR SOLUTION ORAL at 09:30

## 2024-04-05 RX ADMIN — OLANZAPINE 5 MG: 5 TABLET, ORALLY DISINTEGRATING ORAL at 17:15

## 2024-04-05 RX ADMIN — SENNOSIDES AND DOCUSATE SODIUM 1 TABLET: 8.6; 5 TABLET ORAL at 23:42

## 2024-04-05 RX ADMIN — PANTOPRAZOLE SODIUM 40 MG: 40 TABLET, DELAYED RELEASE ORAL at 09:30

## 2024-04-05 RX ADMIN — OLANZAPINE 5 MG: 10 INJECTION, POWDER, FOR SOLUTION INTRAMUSCULAR at 19:34

## 2024-04-05 RX ADMIN — SULFAMETHOXAZOLE AND TRIMETHOPRIM 1 TABLET: 400; 80 TABLET ORAL at 09:30

## 2024-04-05 RX ADMIN — OLANZAPINE 5 MG: 10 INJECTION, POWDER, FOR SOLUTION INTRAMUSCULAR at 21:54

## 2024-04-05 ASSESSMENT — ACTIVITIES OF DAILY LIVING (ADL)
ADLS_ACUITY_SCORE: 50
ADLS_ACUITY_SCORE: 48
ADLS_ACUITY_SCORE: 50
ADLS_ACUITY_SCORE: 50
ADLS_ACUITY_SCORE: 48
ADLS_ACUITY_SCORE: 50

## 2024-04-05 NOTE — PLAN OF CARE
Assumed care 4801-0902     V/S: VSS on RA  Pain: L heel  Neuro: A&Ox1-2, orientation waxes & wanes.  Respiratory: WDL on RA  Cardiac: WDL, HR 60-70s, BP 120s/60s  Skin: L knee abrasion, L heel stage 1 PI  GI/: Incontinent BM x1; Incontinent void x4. Denies N/V  Nutrition: Regular diet with feeding assistance; fair appetite  Activity: x2/lift     Events this shift: Trial off 1:1 this AM. Delirium interventions in place. Pt ate breakfast with occasional assistance. Up in chair briefly w PT this AM before asking to go back to bed. Unwitnessed fall at 13:00: low-sensitivity bed alarm activated & pt discovered kneeling at foot of bed by UAPs. RN & MD immediately notified & assessed; no new deficits noted except pre-existing L knee scab fell off. Safety education reinforced & 1:1 attendant reinstated immediately. Pt's son, Castro Haynes, notified via phone call.   Pt remained generally alert, pleasant, cooperative until ~16:00. Pt became restless, irritable, swatting at/calling staff derogatory names, but able to be redirected; 25mg seroquel given. ~17:00 pt further agitated, increasingly difficult to redirect; scheduled 75mg seroquel given early per MD. Pt remained agitated, kicking & threatening staff; boundaries reinforced. Attempted to give PRN zyprexa PO; pt repeatedly refused. Distraction currently successful in avoiding harm to self nor staff.    Plan: Discharge to TCU pending d/c'd 1:1. Continue POC & notify providers with any acute changes.

## 2024-04-05 NOTE — CONSULTS
"[facility]  WO Nurse Inpatient Assessment     Consulted for: heels      Patient History (according to provider note(s):      David Thomson is a 83 year old male with history of cognitive impairment, CAD s/p CABG (2015), afib, Aortic stenosis s/o TAVR with bioprosthetic valve, with recent admission 03/08-03/18 after a fall at which time he was found to have IPH, IVH, SAH, and SDH, admitted to OSH on 3/21/2024 after a fall with AMS. He was transferred to [facility] due to concern for new intracranial hemorrhage but imaging findings did not demonstrate new ICH. Care transitioned from trauma to medicine service 3/22 for continued evaluation of recurrent falls and encephalopathy. Remains oriented to self and age, occasionally oriented to year, but not place or situation. PT/OT evaluated, recommend TCU on discharge.     Assessment:      Areas visualized during today's visit: Heels     Nurse noting stage 1 on patient heels on 4/4. WOC assessed 4/5 in the afternoon and bedside RN assessed 4/5 in the morning and heels were blanchable at these times.   No wound currently present. Possible patient had a stage 1 that resolved.     Treatment Plan:     Pressure Injury Prevention (PIP) Plan:  If patient is declining pressure injury prevention interventions: Explore reason why and address patient's concerns, Educate on pressure injury risk and prevention intervention(s), If patient is still declining, document \"informed refusal\" , and Ensure Care team is aware ( provider, charge nurse, etc)  Mattress: Follow bed algorithm, reassess daily and order specialty mattress, if indicated.  HOB: Maintain at or below 30 degrees, unless contraindicated  Repositioning in bed: Every 1-2 hours , Left/right positioning; avoid supine, and Raise foot of bed prior to raising head of bed, to reduce patient sliding down (shear)  Heels: Keep elevated off mattress, Pillows under calves, and Preventative " Mepilex to heels  Protective Dressing: Sacral Mepilex for prevention (#910638),  especially for the agitated patient   Chair positioning: Repositions self: patient to shift weight every 15 minutes, Assist patient to reposition hourly, and Do NOT use a donut for sitting (this increases pressure to smaller area and creates a higher potential for injury)    If patient has a buttock pressure injury, or high risk for PI use chair cushion or SPS.  Moisture Management: Perineal cleansing /protection: Follow Incontinence Protocol, Avoid brief in bed, Clean and dry skin folds with bathing , Consider InterDry (#[id number]) between folds, and Moisturize dry skin  Under Devices: Inspect skin under all medical devices during skin inspection , Ensure tubes are stabilized without tension, and Ensure patient is not lying on medical devices or equipment when repositioned  Ask provider to discontinue device when no longer needed.      Orders: Written    RECOMMEND PRIMARY TEAM ORDER: None, at this time  Education provided: plan of care  Discussed plan of care with: Nurse  WOC nurse follow-up plan: signing off  Notify WOC if wound(s) deteriorate.  Nursing to notify the Provider(s) and re-consult the WOC Nurse if new skin concern.    DATA:     Current support surface: Standard  Standard gel/foam mattress (IsoFlex, Atmos air, etc)  Containment of urine/stool: Incontinent pad in bed  BMI: Body mass index is 28.45 kg/m .   Active diet order: Orders Placed This Encounter      Regular Diet Adult     Output: I/O last 3 completed shifts:  In: 200 [P.O.:200]  Out: 600 [Urine:600]     Labs:   Recent Labs   Lab 04/04/24  1022   HGB 12.1*   WBC 4.5     Pressure injury risk assessment:   Sensory Perception: 4-->no impairment  Moisture: 3-->occasionally moist  Activity: 2-->chairfast  Mobility: 2-->very limited  Nutrition: 2-->probably inadequate  Friction and Shear: 1-->problem  Johann Score: 14    Octavia Parson RN, CWOCN  Pager no longer is use,  please contact through Vocera   Vocera group: [facility] Nurse [place]  Dept. Office Number: [phone]

## 2024-04-05 NOTE — PLAN OF CARE
Goal Outcome Evaluation:      Plan of Care Reviewed With: patient    Overall Patient Progress: no changeOverall Patient Progress: no change           Goal Outcome Evaluation:      Plan of Care Reviewed With: patient     Overall Patient Progress: no change     Outcome Evaluation:  RN assumed cares @  1900.  VSS on RA; .  Oriented to self only.  Patient argumentative and calling staff :cocksucker when trying to get patient back into bed.  Resistive to cares and needing frequent  cues and simple commands to slowly get to a calmer place.  Able to get scheduled medications with pudding, but  multiple attempts to get to try prn seroquel but patient did not want to take anymore by mouth and medicine cup with seroquel  inside he would firmly swat away.  Wasted this medicine. Patient responded well to the  animated pet therapy dog he named oscar. Focused on farm activities and driving.  1:1 for safety. Adequate UO.   Multiple attempts to get off the bed.    Plan:  Anticipate discharge to TCU once medically stable and off bedside attendant for safety/redirection.   Continue to monitor and follow POC. Notify provider with changes.

## 2024-04-05 NOTE — PROGRESS NOTES
/69 (BP Location: Left arm)   Pulse 60   Temp 98.4  F (36.9  C) (Oral)   Resp 16   Wt 97.8 kg (215 lb 9.8 oz)   SpO2 97%   BMI 28.45 kg/m      Disoriented to time, place, situation. Denies pain. Appetite is fair. No agitation until around 4pm when he is calling attendant names trying to crawl out of bed. PRN Sl zyprexa given, and evening seroquel given early. Continues to swear, talk about leaving. PRN IM zyprexa dose in med bin, restraints at bedside if needed.

## 2024-04-05 NOTE — PROGRESS NOTES
[facility]    Progress Note - Medicine Service, PAMELLA TEAM 2       Date of Admission:  3/21/2024    Assessment & Plan   David Thomson is a 83 year old male with history of cognitive impairment, CAD s/p CABG (2015), afib, Aortic stenosis s/o TAVR with bioprosthetic valve, with recent admission 03/08-03/18 after a fall at which time he was found to have IPH, IVH, SAH, and SDH, admitted to OSH on 3/21/2024 after a fall with AMS. He was transferred to [facility] due to concern for new intracranial hemorrhage but imaging findings did not demonstrate new ICH. Care transitioned from trauma to medicine service 3/22 for continued evaluation of recurrent falls and encephalopathy. Remains oriented to self and age, occasionally oriented to year, but not place or situation. PT/OT evaluated, recommend TCU on discharge.      Today:  - 1:1 reinstated after fall 4/4   - Encourage delirium precautions  - Medically ready for discharge pending removal of 1:1     # Delirium, improving  # Recent Intraparenchymal hemorrhage with extension into ventricles andmidline shift, SAH, and SDH post fall (3/8)  # Cognitive impairment  Family notes was AxOx3 prior to IPH and since discharge to TCU has been improving but still lethargic and not at baseline. Patient presenting again after fall with AMS. No new intracranial hemorrhage on repeat CT head. Patient is at risk for seizure in the setting of recent intracranial bleed and neurology started empiric antiepileptic therapy for persistent AMS. EEG with evidence of additional left hemispheric focal cerebral dysfunction, but no epileptiform discharges. Occasional urinary retention but UA unremarkable and patient afebrile. Continues to have intermittent delirium overnight and agitation (kicking) requiring IM olanzapine and a 1:1.   - Delirium precautions   - Encourage patient to be awake during the day, keep lights on, windows open   - Encourage  uninterrupted sleep at night, limit amount light   - Have glasses available during the day  - Encourage frequent reorientation    - Encourage cognitive stimulation  - Recreational therapy  - Neuro exams qshift   - Decrease bladder scans to q8h   - Change Quetiapine dosing to 25mg at 1500 and 75mg at 1900 for total dose of 100mg qd   > Additional 50mg quetiapine available as PRN  - PRN olanzapine 5mg ODT for agitation, not to exceed 20mg per day  - Per NSGY: Platelets > 100k, Hgb >8, INR<1.4, HOB >30 degs     # Risk of Qtc prolongation  # LBBB  Qtc read as 507- correction for LBBB is WNL at 452  - Continue antispsychotics as above.     --Chronic--  # Chronic incompletely united distal humerus fracture  Patient is s/p left elbow fracture s/p ORIF at [facility], complicated by elbow wound requiring washout. Per chart review, on lifelong Bactrim. Having occasional elbow pain  - Continue PTA Bactrim  - Increase PRN tylenol to 975mg q8 hours and begin capsaicin cream    # Falls  Has had multiple hospital admissions for fall with unknown etiology. Unclear if mechanical vs syncopal episodes. Has extensive cardiac history but recent TTE without abnormalities of bioprosthetic valve.Can consider arrhythmia as well with prior afib.   - Consider ZioPatch on discharge.    # ISHAN  Patient has history of ISHAN. Patient noted to be desatting when asleep, improves with Oxymask. If not tolerating mask can consider repeat blood gas to check PCO2. Bicarb on BMP is WNL.   - Night time Oxymask as needed     # Chronic HFpEF 55% 1/2023  # Hypertension   #Afib  Continue to hold PTA eliquis in the setting of recent intracranial hemorrhage per NSGY recs. [facility] for [facility] about possible Watchman procedure as outpatient.   - Hold PTA Toprol XL, PTA entresto     # CAD s/p 3V CABG (2015)  Not on aspirin currently  - Statin as below     # Aortic stenosis s/p TAVR (2016)  Last TTE 1/2023 with mean gradient 14mmHg,  "suspected to be WNL     #HLD  -PTA lipitor     #GERD  - PTA omeprazole     # Idiopathic progressive neuropathy  - Hold PTA Gabapentin due to AMS      # PTSD  # Depression  Patient started on Seroquel for delirium at OSH due to hospitalization in the setting of acute illness. Patient sleeping, minimally responsive as above.   - Continue PTA sertraline  - Scheduled quetiapine, PRN olanzapine as above      -- Outpatient Follow Ups Needed--  Neurosurgery - to assess for hydrocephalus     Neurology - assess need for Keppra, until then continue 750mg BID     Cardiology follow up for consideration of Watchman procedure.     Diet: Regular Diet Adult    DVT Prophylaxis: Pneumatic Compression Devices  Rice Catheter: Not present  Fluids: None  Lines: None     Cardiac Monitoring: None  Code Status: Full Code      Clinically Significant Risk Factors                  # Hypertension: Noted on problem list  # Chronic heart failure with preserved ejection fraction: heart failure noted on problem list and last echo with EF >50%       # Overweight: Estimated body mass index is 28.45 kg/m  as calculated from the following:    Height as of an earlier encounter on 3/21/24: 1.854 m (6' 1\").    Weight as of this encounter: 97.8 kg (215 lb 9.8 oz).   # Severe Malnutrition: based on nutrition assessment      # Financial/Environmental Concerns: none         Disposition Plan         The patient's care was discussed with the Attending Physician, Dr. Barclay .    TOMER Muniz MD  Medicine Service, [address]  Securely message with [employer] (more info)  Text page via [employer] Paging/Directory   See signed in provider for up to date coverage information  ______________________________________________________________________    Interval History   Unwitnessed fall 4/4. Became agitated 1-2 hours after fall, did not require PRNs, was redirectable. Continued with 1:1 overnight, no aggressive " behavior.     This morning reports intermittent headache but otherwise feels well, awaiting breakfast.       Physical Exam   Vital Signs: Temp: 97.9  F (36.6  C) Temp src: Oral BP: 122/61 Pulse: 60   Resp: 16 SpO2: 97 % O2 Device: None (Room air)    Weight: 215 lbs 9.76 oz    General Appearance:  NAD, in bed   HEENT: NCAT, right pupil round and reactive to light, left pupil asymmetric and stable from prior exam   Respiratory: breathing comfortably on room air, no increased work of breathing,   Cardiovascular: extremities WWP       Data

## 2024-04-06 ENCOUNTER — APPOINTMENT (OUTPATIENT)
Dept: PHYSICAL THERAPY | Facility: CLINIC | Age: 84
DRG: 085 | End: 2024-04-06
Payer: COMMERCIAL

## 2024-04-06 LAB
ANION GAP SERPL CALCULATED.3IONS-SCNC: 11 MMOL/L (ref 7–15)
BUN SERPL-MCNC: 15.6 MG/DL (ref 8–23)
CALCIUM SERPL-MCNC: 9.4 MG/DL (ref 8.8–10.2)
CHLORIDE SERPL-SCNC: 107 MMOL/L (ref 98–107)
CREAT SERPL-MCNC: 0.95 MG/DL (ref 0.67–1.17)
DEPRECATED HCO3 PLAS-SCNC: 24 MMOL/L (ref 22–29)
EGFRCR SERPLBLD CKD-EPI 2021: 80 ML/MIN/1.73M2
ERYTHROCYTE [DISTWIDTH] IN BLOOD BY AUTOMATED COUNT: 16.1 % (ref 10–15)
GLUCOSE SERPL-MCNC: 92 MG/DL (ref 70–99)
HCT VFR BLD AUTO: 33.9 % (ref 40–53)
HGB BLD-MCNC: 11.1 G/DL (ref 13.3–17.7)
MCH RBC QN AUTO: 30.7 PG (ref 26.5–33)
MCHC RBC AUTO-ENTMCNC: 32.7 G/DL (ref 31.5–36.5)
MCV RBC AUTO: 94 FL (ref 78–100)
PLATELET # BLD AUTO: 180 10E3/UL (ref 150–450)
POTASSIUM SERPL-SCNC: 3.7 MMOL/L (ref 3.4–5.3)
RBC # BLD AUTO: 3.61 10E6/UL (ref 4.4–5.9)
SODIUM SERPL-SCNC: 142 MMOL/L (ref 135–145)
WBC # BLD AUTO: 4.1 10E3/UL (ref 4–11)

## 2024-04-06 PROCEDURE — 36415 COLL VENOUS BLD VENIPUNCTURE: CPT

## 2024-04-06 PROCEDURE — 93010 ELECTROCARDIOGRAM REPORT: CPT | Performed by: INTERNAL MEDICINE

## 2024-04-06 PROCEDURE — 250N000013 HC RX MED GY IP 250 OP 250 PS 637

## 2024-04-06 PROCEDURE — 93005 ELECTROCARDIOGRAM TRACING: CPT

## 2024-04-06 PROCEDURE — 250N000011 HC RX IP 250 OP 636

## 2024-04-06 PROCEDURE — 120N000002 HC R&B MED SURG/OB UMMC

## 2024-04-06 PROCEDURE — 99233 SBSQ HOSP IP/OBS HIGH 50: CPT | Mod: GC | Performed by: STUDENT IN AN ORGANIZED HEALTH CARE EDUCATION/TRAINING PROGRAM

## 2024-04-06 PROCEDURE — 80048 BASIC METABOLIC PNL TOTAL CA: CPT

## 2024-04-06 PROCEDURE — 97530 THERAPEUTIC ACTIVITIES: CPT | Mod: GP

## 2024-04-06 PROCEDURE — 85027 COMPLETE CBC AUTOMATED: CPT

## 2024-04-06 RX ORDER — QUETIAPINE FUMARATE 50 MG/1
50 TABLET, FILM COATED ORAL EVERY 24 HOURS
Status: DISCONTINUED | OUTPATIENT
Start: 2024-04-06 | End: 2024-04-07

## 2024-04-06 RX ORDER — OLANZAPINE 5 MG/1
5 TABLET, ORALLY DISINTEGRATING ORAL 2 TIMES DAILY PRN
Status: DISCONTINUED | OUTPATIENT
Start: 2024-04-06 | End: 2024-04-12

## 2024-04-06 RX ORDER — OLANZAPINE 10 MG/2ML
5 INJECTION, POWDER, FOR SOLUTION INTRAMUSCULAR
Status: COMPLETED | OUTPATIENT
Start: 2024-04-06 | End: 2024-04-06

## 2024-04-06 RX ADMIN — SULFAMETHOXAZOLE AND TRIMETHOPRIM 1 TABLET: 400; 80 TABLET ORAL at 19:38

## 2024-04-06 RX ADMIN — LEVETIRACETAM 750 MG: 750 TABLET, FILM COATED ORAL at 08:42

## 2024-04-06 RX ADMIN — FOLIC ACID 1 MG: 1 TABLET ORAL at 08:42

## 2024-04-06 RX ADMIN — LIDOCAINE 4% 2 PATCH: 40 PATCH TOPICAL at 08:43

## 2024-04-06 RX ADMIN — LEVETIRACETAM 750 MG: 750 TABLET, FILM COATED ORAL at 19:38

## 2024-04-06 RX ADMIN — OLANZAPINE 5 MG: 5 TABLET, ORALLY DISINTEGRATING ORAL at 15:25

## 2024-04-06 RX ADMIN — QUETIAPINE FUMARATE 75 MG: 50 TABLET ORAL at 17:35

## 2024-04-06 RX ADMIN — ATORVASTATIN CALCIUM 80 MG: 80 TABLET, FILM COATED ORAL at 19:37

## 2024-04-06 RX ADMIN — PANTOPRAZOLE SODIUM 40 MG: 40 TABLET, DELAYED RELEASE ORAL at 08:42

## 2024-04-06 RX ADMIN — OLANZAPINE 5 MG: 10 INJECTION, POWDER, FOR SOLUTION INTRAMUSCULAR at 20:01

## 2024-04-06 RX ADMIN — SENNOSIDES AND DOCUSATE SODIUM 1 TABLET: 8.6; 5 TABLET ORAL at 19:37

## 2024-04-06 RX ADMIN — QUETIAPINE FUMARATE 50 MG: 50 TABLET ORAL at 19:37

## 2024-04-06 RX ADMIN — THIAMINE HCL TAB 100 MG 100 MG: 100 TAB at 08:42

## 2024-04-06 RX ADMIN — POLYETHYLENE GLYCOL 3350 17 G: 17 POWDER, FOR SOLUTION ORAL at 08:42

## 2024-04-06 RX ADMIN — SERTRALINE HYDROCHLORIDE 75 MG: 50 TABLET ORAL at 08:42

## 2024-04-06 RX ADMIN — QUETIAPINE FUMARATE 50 MG: 50 TABLET ORAL at 15:25

## 2024-04-06 RX ADMIN — SULFAMETHOXAZOLE AND TRIMETHOPRIM 1 TABLET: 400; 80 TABLET ORAL at 08:42

## 2024-04-06 RX ADMIN — OLANZAPINE 5 MG: 10 INJECTION, POWDER, FOR SOLUTION INTRAMUSCULAR at 17:45

## 2024-04-06 RX ADMIN — Medication 5 MG: at 19:37

## 2024-04-06 ASSESSMENT — ACTIVITIES OF DAILY LIVING (ADL)
ADLS_ACUITY_SCORE: 52
ADLS_ACUITY_SCORE: 45
ADLS_ACUITY_SCORE: 49
ADLS_ACUITY_SCORE: 43
ADLS_ACUITY_SCORE: 43
ADLS_ACUITY_SCORE: 45
ADLS_ACUITY_SCORE: 50
ADLS_ACUITY_SCORE: 52
ADLS_ACUITY_SCORE: 45
ADLS_ACUITY_SCORE: 52
ADLS_ACUITY_SCORE: 52
ADLS_ACUITY_SCORE: 45
ADLS_ACUITY_SCORE: 52
ADLS_ACUITY_SCORE: 45
ADLS_ACUITY_SCORE: 49
ADLS_ACUITY_SCORE: 43
ADLS_ACUITY_SCORE: 45
ADLS_ACUITY_SCORE: 52
ADLS_ACUITY_SCORE: 52
ADLS_ACUITY_SCORE: 49

## 2024-04-06 NOTE — PLAN OF CARE
Goal Outcome Evaluation:      Plan of Care Reviewed With: patient    Overall Patient Progress: no changeOverall Patient Progress: no change    Outcome Evaluation: Alert, oriented only to self. Agitated at start of shift, and repeatedly asking to leave/attempting to get out of bed; refused scheduled medications despite being offered in juice, apple sauce, & water. 1:1 attendant then notified writer that pt was getting more agitated & threw a stuffed animal while also attempting to kick at her. Attempts made by staff to redirect, distract, & reposition patient w/ little avail; PRN seroquel given, provider notified & one time dose of IM zyprexa ordered & administered. Later on pt became more calm, agreeable to taking mediciation in brownie & eventually fell asleep around 0300. VSS on RA; denies pain. Urinary incontinence x1. Continue w/ POC.

## 2024-04-06 NOTE — PROGRESS NOTES
/75 (BP Location: Left arm)   Pulse 88   Temp 97.1  F (36.2  C) (Oral)   Resp 18   Wt 97.8 kg (215 lb 9.8 oz)   SpO2 99%   BMI 28.45 kg/m      Denies pain except chronic pain in left hip - lidocaine patch with some relief. Urine output is adequate, no BM. Nursing attempting to have him keep his days and nights straight but he insisted on sleeping until 1330, when he was up to recliner. Calm most of day but has become agitated again at around 1730 - swearing, threatening to leaving hospital, trying get out of bed on his own. He received his evening 75mg seroquel early, prn SL zyprexa, and prn IM zyprexa x1. Currently up in recliner chair. Continues to be agitated but is a little more calm. Will continue with cares. Attendant in room.

## 2024-04-06 NOTE — PROGRESS NOTES
[facility]    Progress Note - Medicine Service, PAMELLA TEAM 2       Date of Admission:  3/21/2024    Assessment & Plan   David Thomson is a 83 year old male with history of cognitive impairment, CAD s/p CABG (2015), afib, Aortic stenosis s/o TAVR with bioprosthetic valve, with recent admission 03/08-03/18 after a fall at which time he was found to have IPH, IVH, SAH, and SDH, admitted to OSH on 3/21/2024 after a fall with AMS. He was transferred to [facility] due to concern for new intracranial hemorrhage but imaging findings did not demonstrate new ICH. Care transitioned from trauma to medicine service 3/22 for continued evaluation of recurrent falls and encephalopathy. Remains oriented to self and age, occasionally oriented to year, but not place or situation. PT/OT evaluated, recommend TCU on discharge.      Today:  - Requiring 1:1   - Increase 1500 seroquel to 50mg        # Delirium, improving  # Recent Intraparenchymal hemorrhage with extension into ventricles andmidline shift, SAH, and SDH post fall (3/8)  # Cognitive impairment  Family notes was AxOx3 prior to IPH and since discharge to TCU has been improving but still lethargic and not at baseline. Patient presenting again after fall with AMS. No new intracranial hemorrhage on repeat CT head. Patient is at risk for seizure in the setting of recent intracranial bleed and neurology started empiric antiepileptic therapy for persistent AMS. EEG with evidence of additional left hemispheric focal cerebral dysfunction, but no epileptiform discharges. Occasional urinary retention but UA unremarkable and patient afebrile. Continues to have intermittent delirium overnight and agitation (kicking) requiring IM olanzapine and a 1:1.   - Delirium precautions   - Encourage patient to be awake during the day, keep lights on, windows open   - Encourage uninterrupted sleep at night, limit amount light   - Have glasses available  during the day  - Encourage frequent reorientation    - Encourage cognitive stimulation  - Recreational therapy  - Neuro exams qshift   - Decrease bladder scans to q8h   - Quetiapine 50mg (increased) at 1500 and 75mg at 1900   > Additional 50mg quetiapine available as PRN  - PRN olanzapine 5mg ODT for agitation, not to exceed 20mg per day  - Per NSGY: Platelets > 100k, Hgb >8, INR<1.4, HOB >30 degs  - Medically ready for discharge pending removal of 1:1     # Risk of Qtc prolongation  # LBBB  Repeat EKG 4/6 showed QTc (corrected for LBBB) at 382.   - Continue antispsychotics as above.     --Chronic--  # Chronic incompletely united distal humerus fracture  Patient is s/p left elbow fracture s/p ORIF at [facility], complicated by elbow wound requiring washout. Per chart review, on lifelong Bactrim. Having occasional elbow pain  - Continue PTA Bactrim  - Increase PRN tylenol to 975mg q8 hours    # Falls  Has had multiple hospital admissions for fall with unknown etiology. Unclear if mechanical vs syncopal episodes. Has extensive cardiac history but recent TTE without abnormalities of bioprosthetic valve.Can consider arrhythmia as well with prior afib.   - Consider ZioPatch on discharge.    # ISHAN  Patient has history of ISHAN. Patient noted to be desatting when asleep, improves with Oxymask. If not tolerating mask can consider repeat blood gas to check PCO2. Bicarb on BMP is WNL.   - Night time Oxymask as needed     # Chronic HFpEF 55% 1/2023  # Hypertension   #Afib   Continue to hold PTA eliquis in the setting of recent intracranial hemorrhage per NSGY recs. [facility] about possible Watchman procedure as outpatient.   - Hold PTA Toprol XL, PTA entresto     # CAD s/p 3V CABG (2015)  Not on aspirin currently  - Statin as below     # Aortic stenosis s/p TAVR (2016)  Last TTE 1/2023 with mean gradient 14mmHg, suspected to be WNL  - Holding PTA eliquis in setting of recent intracranial  "hemorrhage      #HLD  -PTA lipitor     #GERD  - PTA omeprazole     # Idiopathic progressive neuropathy  - Hold PTA Gabapentin due to AMS      # PTSD  # Depression  Patient started on Seroquel for delirium at OSH due to hospitalization in the setting of acute illness. Patient sleeping, minimally responsive as above.   - Continue PTA sertraline  - Scheduled quetiapine, PRN olanzapine as above      -- Outpatient Follow Ups Needed--  Neurosurgery - to assess for hydrocephalus. Would like to be informed on discharge to arrange follow up.      Neurology - assess need for Keppra, until then continue 750mg BID     Cardiology follow up for consideration of Watchman procedure.     Diet: Regular Diet Adult    DVT Prophylaxis: Pneumatic Compression Devices  Rice Catheter: Not present  Fluids: None  Lines: None     Cardiac Monitoring: None  Code Status: Full Code      Clinically Significant Risk Factors                  # Hypertension: Noted on problem list  # Chronic heart failure with preserved ejection fraction: heart failure noted on problem list and last echo with EF >50%       # Overweight: Estimated body mass index is 28.45 kg/m  as calculated from the following:    Height as of an earlier encounter on 3/21/24: 1.854 m (6' 1\").    Weight as of this encounter: 97.8 kg (215 lb 9.8 oz).   # Severe Malnutrition: based on nutrition assessment      # Financial/Environmental Concerns: none         Disposition Plan         The patient's care was discussed with the Attending Physician, Dr. Barclay .    TOMER Muniz MD  Medicine Service, [facility] TEAM [address] [place] [address]  Securely message with [employer] (more info)  Text page via [facility] Paging/Directory   See signed in provider for up to date coverage information  ______________________________________________________________________    Interval History   Calm throughout the day, became agitated in the afternoon again around 1600. Worsening " agitation/agression later in the evening (throwing things at staff, refusing scheduled medications), received IM zyprexa 5mg x2 (1930 and 2200). Became more calm, agreeable to taking medications. Continues to require 1:1.     This morning resting in bed, denies any pain or new concerns. Awaiting breakfast.     Physical Exam   Vital Signs: Temp: 97.5  F (36.4  C) Temp src: Oral BP: 134/72 Pulse: 50   Resp: 16 SpO2: 97 % O2 Device: None (Room air)    Weight: 215 lbs 9.76 oz    General Appearance:  NAD, in bed   HEENT: NCAT, right pupil round and reactive to light, left pupil asymmetric and stable from prior exam   Respiratory: breathing comfortably on room air, no increased work of breathing, CTAB  Cardiovascular: extremities WWP, RRR      Data     I have personally reviewed the following data over the past 24 hrs:    4.1  \   11.1 (L)   / 180     N/A N/A N/A /  N/A   N/A N/A N/A \

## 2024-04-07 PROCEDURE — 99233 SBSQ HOSP IP/OBS HIGH 50: CPT | Mod: GC | Performed by: STUDENT IN AN ORGANIZED HEALTH CARE EDUCATION/TRAINING PROGRAM

## 2024-04-07 PROCEDURE — 250N000013 HC RX MED GY IP 250 OP 250 PS 637

## 2024-04-07 PROCEDURE — 120N000002 HC R&B MED SURG/OB UMMC

## 2024-04-07 RX ADMIN — Medication 5 MG: at 20:45

## 2024-04-07 RX ADMIN — LEVETIRACETAM 750 MG: 750 TABLET, FILM COATED ORAL at 08:53

## 2024-04-07 RX ADMIN — LIDOCAINE 4% 1 PATCH: 40 PATCH TOPICAL at 08:53

## 2024-04-07 RX ADMIN — PANTOPRAZOLE SODIUM 40 MG: 40 TABLET, DELAYED RELEASE ORAL at 08:52

## 2024-04-07 RX ADMIN — SENNOSIDES AND DOCUSATE SODIUM 1 TABLET: 8.6; 5 TABLET ORAL at 20:44

## 2024-04-07 RX ADMIN — ACETAMINOPHEN 975 MG: 325 TABLET, FILM COATED ORAL at 22:59

## 2024-04-07 RX ADMIN — FOLIC ACID 1 MG: 1 TABLET ORAL at 08:53

## 2024-04-07 RX ADMIN — SULFAMETHOXAZOLE AND TRIMETHOPRIM 1 TABLET: 400; 80 TABLET ORAL at 08:53

## 2024-04-07 RX ADMIN — THIAMINE HCL TAB 100 MG 100 MG: 100 TAB at 08:53

## 2024-04-07 RX ADMIN — QUETIAPINE FUMARATE 75 MG: 50 TABLET ORAL at 14:52

## 2024-04-07 RX ADMIN — LEVETIRACETAM 750 MG: 750 TABLET, FILM COATED ORAL at 20:45

## 2024-04-07 RX ADMIN — QUETIAPINE FUMARATE 75 MG: 50 TABLET ORAL at 22:58

## 2024-04-07 RX ADMIN — SERTRALINE HYDROCHLORIDE 75 MG: 50 TABLET ORAL at 08:53

## 2024-04-07 RX ADMIN — POLYETHYLENE GLYCOL 3350 17 G: 17 POWDER, FOR SOLUTION ORAL at 08:53

## 2024-04-07 RX ADMIN — SULFAMETHOXAZOLE AND TRIMETHOPRIM 1 TABLET: 400; 80 TABLET ORAL at 20:45

## 2024-04-07 RX ADMIN — ATORVASTATIN CALCIUM 80 MG: 80 TABLET, FILM COATED ORAL at 20:45

## 2024-04-07 ASSESSMENT — ACTIVITIES OF DAILY LIVING (ADL)
ADLS_ACUITY_SCORE: 45
ADLS_ACUITY_SCORE: 41
ADLS_ACUITY_SCORE: 45
ADLS_ACUITY_SCORE: 45
ADLS_ACUITY_SCORE: 41
ADLS_ACUITY_SCORE: 45

## 2024-04-07 NOTE — PROGRESS NOTES
/69   Pulse 73   Temp 97.3  F (36.3  C) (Axillary)   Resp 16   Wt 97.8 kg (215 lb 9.8 oz)   SpO2 98%   BMI 28.45 kg/m      Better day today. Up in recliner much of day. Up walking a little bit with gait belt, walker, assist one. Eating well. Voiding adequately. No BM, took his miralax - no bm in about 3 or 4 days. Calm and able to follow commands. Washed up in bed. So far today has had no agitation. Attendant at bedside. Will continue with cares.

## 2024-04-07 NOTE — PLAN OF CARE
Goal Outcome Evaluation:    Overall Patient Progress: no changeOverall Patient Progress: no change    Outcome Evaluation: Alert, oriented to self. Agitated once more at start of shift, argumentative with & kicking at staff and attempting to get out of bed despite attempts to redirect/distract. Given PRN seroquel, provider notified & one time dose of IM zyprexa ordered & administered. Pt began to slowly calm down & eventually was able to fall asleep. VSS on RA. Incontinent x1 w/ linen change. LBM 4/6. Dressings CDI. Denies pain, PAINAD score 0. Continue w/ POC.

## 2024-04-07 NOTE — PROGRESS NOTES
[facility]    Progress Note - Medicine Service, P[facility]LLA TEAM 2       Date of Admission:  3/21/2024    Assessment & Plan   David Thomson is a 83 year old male with history of cognitive impairment, CAD s/p CABG (2015), afib, Aortic stenosis s/o TAVR with bioprosthetic valve, with recent admission 03/08-03/18 after a fall at which time he was found to have IPH, IVH, SAH, and SDH, admitted to OSH on 3/21/2024 after a fall with AMS. He was transferred to [facility] due to concern for new intracranial hemorrhage but imaging findings did not demonstrate new ICH. Care transitioned from trauma to medicine service 3/22 for continued evaluation of recurrent falls and encephalopathy. Remains oriented to self and age, occasionally oriented to year, but not place or situation. PT/OT evaluated, recommend TCU on discharge.      Today:  - Requiring 1:1   - Increase 1500 seroquel to 75mg        # Delirium, improving  # Recent Intraparenchymal hemorrhage with extension into ventricles andmidline shift, SAH, and SDH post fall (3/8)  # Cognitive impairment  Family notes was AxOx3 prior to IPH and since discharge to TCU has been improving but still lethargic and not at baseline. Patient presenting again after fall with AMS. No new intracranial hemorrhage on repeat CT head. Patient is at risk for seizure in the setting of recent intracranial bleed and neurology started empiric antiepileptic therapy for persistent AMS. EEG with evidence of additional left hemispheric focal cerebral dysfunction, but no epileptiform discharges. Occasional urinary retention but UA unremarkable and patient afebrile. Continues to have intermittent delirium overnight and agitation (kicking) requiring IM olanzapine and a 1:1.   - Delirium precautions   - Encourage patient to be awake during the day, keep lights on, windows open   - Encourage uninterrupted sleep at night, limit amount light   - Have glasses available  during the day  - Encourage frequent reorientation    - Encourage cognitive stimulation  - Recreational therapy  - Neuro exams qshift   - Decrease bladder scans to q8h   - Quetiapine 75mg (increased) at 1500 and 75mg at 1900   > Additional 50mg quetiapine available as PRN  - PRN olanzapine 5mg ODT for agitation, not to exceed 20mg per day  - Per NSGY: Platelets > 100k, Hgb >8, INR<1.4, HOB >30 degs  - Medically ready for discharge pending removal of 1:1     # Risk of Qtc prolongation  # LBBB  Repeat EKG 4/6 showed QTc (corrected for LBBB) at 382.   - Continue antispsychotics as above.     --Chronic--  # Chronic incompletely united distal humerus fracture  Patient is s/p left elbow fracture s/p ORIF at [facility], complicated by elbow wound requiring washout. Per chart review, on lifelong Bactrim. Having occasional elbow pain  - Continue PTA Bactrim  - Increase PRN tylenol to 975mg q8 hours    # Falls  Has had multiple hospital admissions for fall with unknown etiology. Unclear if mechanical vs syncopal episodes. Has extensive cardiac history but recent TTE without abnormalities of bioprosthetic valve.Can consider arrhythmia as well with prior afib.   - Consider ZioPatch on discharge.    # ISHAN  Patient has history of ISHAN. Patient noted to be desatting when asleep, improves with Oxymask. If not tolerating mask can consider repeat blood gas to check PCO2. Bicarb on BMP is WNL.   - Night time Oxymask as needed     # Chronic HFpEF 55% 1/2023  # Hypertension   #Afib   Continue to hold PTA eliquis in the setting of recent intracranial hemorrhage per NSGY recs. [facility] about possible Watchman procedure as outpatient.   - Hold PTA Toprol XL, PTA entresto     # CAD s/p 3V CABG (2015)  Not on aspirin currently  - Statin as below     # Aortic stenosis s/p TAVR (2016)  Last TTE 1/2023 with mean gradient 14mmHg, suspected to be WNL  - Holding PTA eliquis in setting of recent intracranial  "hemorrhage      #HLD  -PTA lipitor     #GERD  - PTA omeprazole     # Idiopathic progressive neuropathy  - Hold PTA Gabapentin due to AMS      # PTSD  # Depression  Patient started on Seroquel for delirium at OSH due to hospitalization in the setting of acute illness. Patient sleeping, minimally responsive as above.   - Continue PTA sertraline  - Scheduled quetiapine, PRN olanzapine as above      -- Outpatient Follow Ups Needed--  Neurosurgery - to assess for hydrocephalus. Would like to be informed on discharge to arrange follow up.      Neurology - assess need for Keppra, until then continue 750mg BID     Cardiology follow up for consideration of Watchman procedure.     Diet: Regular Diet Adult    DVT Prophylaxis: Pneumatic Compression Devices  Rice Catheter: Not present  Fluids: None  Lines: None     Cardiac Monitoring: None  Code Status: Full Code      Clinically Significant Risk Factors                  # Hypertension: Noted on problem list  # Chronic heart failure with preserved ejection fraction: heart failure noted on problem list and last echo with EF >50%       # Overweight: Estimated body mass index is 28.45 kg/m  as calculated from the following:    Height as of an earlier encounter on 3/21/24: 1.854 m (6' 1\").    Weight as of this encounter: 97.8 kg (215 lb 9.8 oz).   # Severe Malnutrition: based on nutrition assessment      # Financial/Environmental Concerns: none         Disposition Plan         The patient's care was discussed with the Attending Physician, Dr. Barclay .    TOMER Muniz MD  Medicine Service, [address]  Securely message with [employer] (more info)  Text page via [facility] Paging/Directory   See signed in provider for up to date coverage information  ______________________________________________________________________    Interval History   Stayed in bed until 1300 yesterday, then got up to the chair a few times. Became agitated " ~1730 and ~1900.  Required 3 doses of zyprexa 5mg, then calm for the rest of the night. Sleeping in bed this morning.     Physical Exam   Vital Signs: Temp: 98.5  F (36.9  C) Temp src: Oral BP: 139/66 Pulse: 58   Resp: 16 SpO2: 95 % O2 Device: None (Room air)    Weight: 215 lbs 9.76 oz    General Appearance:  NAD, in bed   HEENT: NCAT  Respiratory: breathing comfortably on room air, no increased work of breathing  Cardiovascular: extremities WWP      Data

## 2024-04-08 ENCOUNTER — APPOINTMENT (OUTPATIENT)
Dept: PHYSICAL THERAPY | Facility: CLINIC | Age: 84
DRG: 085 | End: 2024-04-08
Payer: COMMERCIAL

## 2024-04-08 ENCOUNTER — APPOINTMENT (OUTPATIENT)
Dept: OCCUPATIONAL THERAPY | Facility: CLINIC | Age: 84
DRG: 085 | End: 2024-04-08
Payer: COMMERCIAL

## 2024-04-08 LAB
ATRIAL RATE - MUSE: 51 BPM
DIASTOLIC BLOOD PRESSURE - MUSE: NORMAL MMHG
INTERPRETATION ECG - MUSE: NORMAL
P AXIS - MUSE: 61 DEGREES
PR INTERVAL - MUSE: 216 MS
QRS DURATION - MUSE: 178 MS
QT - MUSE: 518 MS
QTC - MUSE: 477 MS
R AXIS - MUSE: -33 DEGREES
SYSTOLIC BLOOD PRESSURE - MUSE: NORMAL MMHG
T AXIS - MUSE: 121 DEGREES
VENTRICULAR RATE- MUSE: 51 BPM

## 2024-04-08 PROCEDURE — 250N000013 HC RX MED GY IP 250 OP 250 PS 637

## 2024-04-08 PROCEDURE — 120N000002 HC R&B MED SURG/OB UMMC

## 2024-04-08 PROCEDURE — 97530 THERAPEUTIC ACTIVITIES: CPT | Mod: GP | Performed by: REHABILITATION PRACTITIONER

## 2024-04-08 PROCEDURE — 97530 THERAPEUTIC ACTIVITIES: CPT | Mod: GO

## 2024-04-08 PROCEDURE — 99233 SBSQ HOSP IP/OBS HIGH 50: CPT | Mod: GC | Performed by: STUDENT IN AN ORGANIZED HEALTH CARE EDUCATION/TRAINING PROGRAM

## 2024-04-08 RX ADMIN — FOLIC ACID 1 MG: 1 TABLET ORAL at 08:24

## 2024-04-08 RX ADMIN — SULFAMETHOXAZOLE AND TRIMETHOPRIM 1 TABLET: 400; 80 TABLET ORAL at 08:24

## 2024-04-08 RX ADMIN — QUETIAPINE FUMARATE 75 MG: 50 TABLET ORAL at 16:50

## 2024-04-08 RX ADMIN — ATORVASTATIN CALCIUM 80 MG: 80 TABLET, FILM COATED ORAL at 20:48

## 2024-04-08 RX ADMIN — Medication 5 MG: at 20:49

## 2024-04-08 RX ADMIN — LEVETIRACETAM 750 MG: 750 TABLET, FILM COATED ORAL at 08:24

## 2024-04-08 RX ADMIN — PANTOPRAZOLE SODIUM 40 MG: 40 TABLET, DELAYED RELEASE ORAL at 08:24

## 2024-04-08 RX ADMIN — LIDOCAINE 4% 2 PATCH: 40 PATCH TOPICAL at 08:29

## 2024-04-08 RX ADMIN — POLYETHYLENE GLYCOL 3350 17 G: 17 POWDER, FOR SOLUTION ORAL at 08:25

## 2024-04-08 RX ADMIN — SERTRALINE HYDROCHLORIDE 75 MG: 50 TABLET ORAL at 08:24

## 2024-04-08 RX ADMIN — SENNOSIDES AND DOCUSATE SODIUM 1 TABLET: 8.6; 5 TABLET ORAL at 20:48

## 2024-04-08 RX ADMIN — QUETIAPINE FUMARATE 75 MG: 50 TABLET ORAL at 20:48

## 2024-04-08 RX ADMIN — SULFAMETHOXAZOLE AND TRIMETHOPRIM 1 TABLET: 400; 80 TABLET ORAL at 20:49

## 2024-04-08 RX ADMIN — THIAMINE HCL TAB 100 MG 100 MG: 100 TAB at 08:24

## 2024-04-08 RX ADMIN — LEVETIRACETAM 750 MG: 750 TABLET, FILM COATED ORAL at 20:49

## 2024-04-08 ASSESSMENT — ACTIVITIES OF DAILY LIVING (ADL)
ADLS_ACUITY_SCORE: 41

## 2024-04-08 NOTE — PROGRESS NOTES
[facility]    Progress Note - Medicine Service, PAMELLA TEAM 2       Date of Admission:  3/21/2024    Assessment & Plan   David Thomson is a 83 year old male with history of cognitive impairment, CAD s/p CABG (2015), afib, Aortic stenosis s/o TAVR with bioprosthetic valve, with recent admission 03/08-03/18 after a fall at which time he was found to have IPH, IVH, SAH, and SDH, admitted to OSH on 3/21/2024 after a fall with AMS. He was transferred to [facility] due to concern for new intracranial hemorrhage but imaging findings did not demonstrate new ICH. Care transitioned from trauma to medicine service 3/22 for continued evaluation of recurrent falls and encephalopathy. Remains oriented to self and age, occasionally oriented to year, but not place or situation. PT/OT evaluated, recommend TCU on discharge.      Today:  - Requiring 1:1     # Delirium, improving  # Recent Intraparenchymal hemorrhage with extension into ventricles andmidline shift, SAH, and SDH post fall (3/8)  # Cognitive impairment  Family notes was AxOx3 prior to IPH and since discharge to TCU has been improving but still lethargic and not at baseline. Patient presenting again after fall with AMS. No new intracranial hemorrhage on repeat CT head. Patient is at risk for seizure in the setting of recent intracranial bleed and neurology started empiric antiepileptic therapy for persistent AMS. EEG with evidence of additional left hemispheric focal cerebral dysfunction, but no epileptiform discharges. Occasional urinary retention but UA unremarkable and patient afebrile. Continues to have intermittent delirium overnight and agitation (kicking) requiring IM olanzapine and a 1:1.   - Delirium precautions   - Encourage patient to be awake during the day, keep lights on, windows open   - Encourage uninterrupted sleep at night, limit amount light   - Have glasses available during the day  - Encourage frequent  reorientation    - Encourage cognitive stimulation  - Recreational therapy  - Neuro exams qshift   - Decrease bladder scans to q8h   - Quetiapine 75mg (increased) at 1500 and 75mg at 1900   > Additional 50mg quetiapine available as PRN  - PRN olanzapine 5mg ODT for agitation, not to exceed 20mg per day  - Per NSGY: Platelets > 100k, Hgb >8, INR<1.4, HOB >30 degs  - Medically ready for discharge pending removal of 1:1     # Risk of Qtc prolongation  # LBBB  Repeat EKG 4/6 showed QTc (corrected for LBBB) at 382.   - Continue antispsychotics as above.     --Chronic--  # Chronic incompletely united distal humerus fracture  Patient is s/p left elbow fracture s/p ORIF at [facility], complicated by elbow wound requiring washout. Per chart review, on lifelong Bactrim. Having occasional elbow pain  - Continue PTA Bactrim  - Increase PRN tylenol to 975mg q8 hours    # Falls  Has had multiple hospital admissions for fall with unknown etiology. Unclear if mechanical vs syncopal episodes. Has extensive cardiac history but recent TTE without abnormalities of bioprosthetic valve.Can consider arrhythmia as well with prior afib.   - Consider ZioPatch on discharge.    # ISHAN  Patient has history of ISHAN. Patient noted to be desatting when asleep, improves with Oxymask. If not tolerating mask can consider repeat blood gas to check PCO2. Bicarb on BMP is WNL.   - Night time Oxymask as needed     # Chronic HFpEF 55% 1/2023  # Hypertension   #Afib   Continue to hold PTA eliquis in the setting of recent intracranial hemorrhage per NSGY recs. [facility] about possible Watchman procedure as outpatient.   - Hold PTA Toprol XL, PTA entresto     # CAD s/p 3V CABG (2015)  Not on aspirin currently  - Statin as below     # Aortic stenosis s/p TAVR (2016)  Last TTE 1/2023 with mean gradient 14mmHg, suspected to be WNL  - Holding PTA eliquis in setting of recent intracranial hemorrhage      #HLD  -PTA lipitor    "  #GERD  - PTA omeprazole     # Idiopathic progressive neuropathy  - Hold PTA Gabapentin due to AMS      # PTSD  # Depression  Patient started on Seroquel for delirium at OSH due to hospitalization in the setting of acute illness. Patient sleeping, minimally responsive as above.   - Continue PTA sertraline  - Scheduled quetiapine, PRN olanzapine as above      -- Outpatient Follow Ups Needed--  Neurosurgery - to assess for hydrocephalus. Would like to be informed on discharge to arrange follow up.      Neurology - assess need for Keppra, until then continue 750mg BID     Cardiology follow up for consideration of Watchman procedure.     Diet: Regular Diet Adult    DVT Prophylaxis: Pneumatic Compression Devices  Rice Catheter: Not present  Fluids: None  Lines: None     Cardiac Monitoring: None  Code Status: Full Code      Clinically Significant Risk Factors                  # Hypertension: Noted on problem list  # Chronic heart failure with preserved ejection fraction: heart failure noted on problem list and last echo with EF >50%       # Overweight: Estimated body mass index is 28.45 kg/m  as calculated from the following:    Height as of an earlier encounter on 3/21/24: 1.854 m (6' 1\").    Weight as of this encounter: 97.8 kg (215 lb 9.8 oz).   # Severe Malnutrition: based on nutrition assessment      # Financial/Environmental Concerns: none         Disposition Plan         The patient's care was discussed with the Attending Physician, Dr. Barclay .    Tri Mcfarlnad MD  [facility]  Department of Internal Medicine   Resident Physician  PGY-1    ______________________________________________________________________    Interval History   Overnight, messaged that pt was more sleepy but still arousable and no other changes to neuros.     Today, patient is alert and talkative. He is oriented to person and time, but not place, thinks he's in [place]. Denies any pain anywhere. No questions/concerns from patient " to primary team this morning.     Physical Exam   Vital Signs: Temp: 98.3  F (36.8  C) Temp src: Axillary BP: 119/72 Pulse: 74   Resp: 16 SpO2: 94 % O2 Device: None (Room air)    Weight: 215 lbs 9.76 oz    General Appearance:  NAD, in bed   HEENT: NCAT  Respiratory: breathing comfortably on room air, no increased work of breathing  Cardiovascular: extremities WWP  Extremities: no BLE edema  NEURO: no sensory deficits in extremities. LUE, LLE weakness 2/5. L eye 3mm, unresponsive to light.       Data

## 2024-04-08 NOTE — PLAN OF CARE
Goal Outcome Evaluation:      Plan of Care Reviewed With: patient    Overall Patient Progress: improvingOverall Patient Progress: improving    Outcome Evaluation: VSS on RA. Oriented to self; lethargic at start of shift, writer paged provider inquiring about seroquel given pt's lethargy; writer advised to give seroquel so long as pt is not overly lethargic; pt became more alert & evening seroquel given. Moderate-severe generalized pain reported; tylenol given w/ improvement; PAINAD score now 0. Retaining urine-- pt bladder scanned for 450 mL after not voiding for some time, 375 mL removed via straight cath, tolerated well. Pt slept through most of the night, and when awake was calm, non-agitated. Continue w/ POC.

## 2024-04-09 ENCOUNTER — APPOINTMENT (OUTPATIENT)
Dept: PHYSICAL THERAPY | Facility: CLINIC | Age: 84
DRG: 085 | End: 2024-04-09
Payer: COMMERCIAL

## 2024-04-09 ENCOUNTER — APPOINTMENT (OUTPATIENT)
Dept: OCCUPATIONAL THERAPY | Facility: CLINIC | Age: 84
DRG: 085 | End: 2024-04-09
Payer: COMMERCIAL

## 2024-04-09 PROCEDURE — 120N000002 HC R&B MED SURG/OB UMMC

## 2024-04-09 PROCEDURE — 250N000013 HC RX MED GY IP 250 OP 250 PS 637

## 2024-04-09 PROCEDURE — 97530 THERAPEUTIC ACTIVITIES: CPT | Mod: GP

## 2024-04-09 PROCEDURE — 99232 SBSQ HOSP IP/OBS MODERATE 35: CPT | Mod: GC | Performed by: STUDENT IN AN ORGANIZED HEALTH CARE EDUCATION/TRAINING PROGRAM

## 2024-04-09 PROCEDURE — 97530 THERAPEUTIC ACTIVITIES: CPT | Mod: GO

## 2024-04-09 RX ORDER — BISACODYL 5 MG
5 TABLET, DELAYED RELEASE (ENTERIC COATED) ORAL DAILY PRN
Status: DISCONTINUED | OUTPATIENT
Start: 2024-04-09 | End: 2024-05-10 | Stop reason: HOSPADM

## 2024-04-09 RX ADMIN — QUETIAPINE FUMARATE 75 MG: 50 TABLET ORAL at 20:31

## 2024-04-09 RX ADMIN — LIDOCAINE 4% 1 PATCH: 40 PATCH TOPICAL at 08:32

## 2024-04-09 RX ADMIN — QUETIAPINE FUMARATE 50 MG: 50 TABLET ORAL at 23:22

## 2024-04-09 RX ADMIN — LEVETIRACETAM 750 MG: 750 TABLET, FILM COATED ORAL at 08:32

## 2024-04-09 RX ADMIN — SULFAMETHOXAZOLE AND TRIMETHOPRIM 1 TABLET: 400; 80 TABLET ORAL at 08:32

## 2024-04-09 RX ADMIN — ATORVASTATIN CALCIUM 80 MG: 80 TABLET, FILM COATED ORAL at 20:32

## 2024-04-09 RX ADMIN — Medication 5 MG: at 20:31

## 2024-04-09 RX ADMIN — PANTOPRAZOLE SODIUM 40 MG: 40 TABLET, DELAYED RELEASE ORAL at 08:32

## 2024-04-09 RX ADMIN — QUETIAPINE FUMARATE 75 MG: 50 TABLET ORAL at 16:45

## 2024-04-09 RX ADMIN — SERTRALINE HYDROCHLORIDE 75 MG: 50 TABLET ORAL at 08:32

## 2024-04-09 RX ADMIN — POLYETHYLENE GLYCOL 3350 17 G: 17 POWDER, FOR SOLUTION ORAL at 08:32

## 2024-04-09 RX ADMIN — SENNOSIDES AND DOCUSATE SODIUM 1 TABLET: 8.6; 5 TABLET ORAL at 20:30

## 2024-04-09 RX ADMIN — FOLIC ACID 1 MG: 1 TABLET ORAL at 08:32

## 2024-04-09 RX ADMIN — SULFAMETHOXAZOLE AND TRIMETHOPRIM 1 TABLET: 400; 80 TABLET ORAL at 20:31

## 2024-04-09 RX ADMIN — LEVETIRACETAM 750 MG: 750 TABLET, FILM COATED ORAL at 20:31

## 2024-04-09 RX ADMIN — THIAMINE HCL TAB 100 MG 100 MG: 100 TAB at 08:32

## 2024-04-09 ASSESSMENT — ACTIVITIES OF DAILY LIVING (ADL)
ADLS_ACUITY_SCORE: 41
ADLS_ACUITY_SCORE: 41
ADLS_ACUITY_SCORE: 43
ADLS_ACUITY_SCORE: 41
ADLS_ACUITY_SCORE: 43
ADLS_ACUITY_SCORE: 47
ADLS_ACUITY_SCORE: 41
ADLS_ACUITY_SCORE: 43
ADLS_ACUITY_SCORE: 41
ADLS_ACUITY_SCORE: 47
ADLS_ACUITY_SCORE: 43
ADLS_ACUITY_SCORE: 47
ADLS_ACUITY_SCORE: 47
ADLS_ACUITY_SCORE: 41
ADLS_ACUITY_SCORE: 41
ADLS_ACUITY_SCORE: 47
ADLS_ACUITY_SCORE: 41

## 2024-04-09 NOTE — PLAN OF CARE
Goal Outcome Evaluation:      Plan of Care Reviewed With: patient    Overall Patient Progress: no change      Vital signs: /73 (BP Location: Left arm)   Pulse 96   Temp 98.2  F (36.8  C) (Oral)   Resp 16   Wt 97.8 kg (215 lb 9.8 oz)   SpO2 94%   BMI 28.45 kg/m    Status:82 y/o hx cognitive impairment, CAD s/p CABG, afib, aortic stenosis. Most recently admitted for a fall on 3/21/24 w/ AMS.   Neuro: Only oriented to self. Confused. Became more agitated as the day progressed- sun downing.   Pain/Nausea: denies  Cardiac: WDL   Respiratory: WDL on RA  Mobility: Assist of 1-2 w/ GB  Diet: Regular  LDAs: No current vascular access.   Skin/incisions: Abrasion on Rt knee. Currently bandaged.   GI/: bladder scan completed this morning at 0848 with 68ml residual. Pt voided 400ml of clear urine this shift. No BM this shift.   Plan: Continue with plan of care.

## 2024-04-09 NOTE — PROGRESS NOTES
[facility]    Progress Note - Medicine Service, PAMELLA TEAM 2       Date of Admission:  3/21/2024    Assessment & Plan   David Thomson is a 83 year old male with history of cognitive impairment, CAD s/p CABG (2015), afib, Aortic stenosis s/o TAVR with bioprosthetic valve, with recent admission 03/08-03/18 after a fall at which time he was found to have IPH, IVH, SAH, and SDH, admitted to OSH on 3/21/2024 after a fall with AMS. He was transferred to [facility] due to concern for new intracranial hemorrhage but imaging findings did not demonstrate new ICH. Care transitioned from trauma to medicine service 3/22 for continued evaluation of recurrent falls and encephalopathy. Remains oriented to self and age, occasionally oriented to year, but not place or situation. PT/OT evaluated, recommend TCU on discharge.      Today:  - Requiring 1:1, will potentially trial discontinuing this afternoon      # Delirium, improving  # Recent Intraparenchymal hemorrhage with extension into ventricles andmidline shift, SAH, and SDH post fall (3/8)  # Cognitive impairment  Family notes was AxOx3 prior to IPH and since discharge to TCU has been improving but still lethargic and not at baseline. Patient presenting again after fall with AMS. No new intracranial hemorrhage on repeat CT head. Patient is at risk for seizure in the setting of recent intracranial bleed and neurology started empiric antiepileptic therapy for persistent AMS. EEG with evidence of additional left hemispheric focal cerebral dysfunction, but no epileptiform discharges. Occasional urinary retention but UA unremarkable and patient afebrile. Continues to have intermittent delirium overnight and agitation (kicking) requiring IM olanzapine and a 1:1.   - Delirium precautions   - Encourage patient to be awake during the day, keep lights on, windows open   - Encourage uninterrupted sleep at night, limit amount light   - Have  glasses available during the day  - Encourage frequent reorientation    - Encourage cognitive stimulation  - Recreational therapy  - Neuro exams qshift   - Decrease bladder scans to q8h   - Quetiapine 75mg (increased) at 1500 and 75mg at 1900   > Additional 50mg quetiapine available as PRN  - PRN olanzapine 5mg ODT for agitation, not to exceed 20mg per day  - Per NSGY: Platelets > 100k, Hgb >8, INR<1.4, HOB >30 degs  - Medically ready for discharge pending removal of 1:1     # Risk of Qtc prolongation  # LBBB  Repeat EKG 4/6 showed QTc (corrected for LBBB) at 382.   - Continue antispsychotics as above.     --Chronic--  # Chronic incompletely united distal humerus fracture  Patient is s/p left elbow fracture s/p ORIF at [facility], complicated by elbow wound requiring washout. Per chart review, on lifelong Bactrim. Having occasional elbow pain  - Continue PTA Bactrim  - Increase PRN tylenol to 975mg q8 hours    # Falls  Has had multiple hospital admissions for fall with unknown etiology. Unclear if mechanical vs syncopal episodes. Has extensive cardiac history but recent TTE without abnormalities of bioprosthetic valve.Can consider arrhythmia as well with prior afib.   - Consider ZioPatch on discharge.    # ISHAN  Patient has history of ISHAN. Patient noted to be desatting when asleep, improves with Oxymask. If not tolerating mask can consider repeat blood gas to check PCO2. Bicarb on BMP is WNL.   - Night time Oxymask as needed     # Chronic HFpEF 55% 1/2023  # Hypertension   #Afib   Continue to hold PTA eliquis in the setting of recent intracranial hemorrhage per NSGY recs. [facility] Watchman procedure as outpatient.   - Hold PTA Toprol XL, PTA entresto     # CAD s/p 3V CABG (2015)  Not on aspirin currently  - Statin as below     # Aortic stenosis s/p TAVR (2016)  Last TTE 1/2023 with mean gradient 14mmHg, suspected to be WNL  - Holding PTA eliquis in setting of  "recent intracranial hemorrhage      #HLD  -PTA lipitor     #GERD  - PTA omeprazole     # Idiopathic progressive neuropathy  - Hold PTA Gabapentin due to AMS      # PTSD  # Depression  Patient started on Seroquel for delirium at OSH due to hospitalization in the setting of acute illness. Patient sleeping, minimally responsive as above.   - Continue PTA sertraline  - Scheduled quetiapine, PRN olanzapine as above      -- Outpatient Follow Ups Needed--  Neurosurgery - to assess for hydrocephalus. Would like to be informed on discharge to arrange follow up.      Neurology - assess need for Keppra, until then continue 750mg BID     Cardiology follow up for consideration of Watchman procedure.     Diet: Regular Diet Adult    DVT Prophylaxis: Pneumatic Compression Devices  Rice Catheter: Not present  Fluids: None  Lines: None     Cardiac Monitoring: None  Code Status: Full Code      Clinically Significant Risk Factors                  # Hypertension: Noted on problem list  # Chronic heart failure with preserved ejection fraction: heart failure noted on problem list and last echo with EF >50%       # Overweight: Estimated body mass index is 28.45 kg/m  as calculated from the following:    Height as of an earlier encounter on 3/21/24: 1.854 m (6' 1\").    Weight as of this encounter: 97.8 kg (215 lb 9.8 oz).   # Severe Malnutrition: based on nutrition assessment      # Financial/Environmental Concerns: none         Disposition Plan         The patient's care was discussed with the Attending Physician, Dr. Bill .    TOMER Muniz MD  PGY-2 Internal Medicine   [facility] 2    ______________________________________________________________________    Interval History   Agitated overnight, spit out one dose of zyprexa. Otherwise did not require additional PRNs. This morning getting up to sit in the chair. No new concerns.       Physical Exam   Vital Signs: Temp: 97.7  F (36.5  C) Temp src: Oral BP: 132/80 Pulse: 70   Resp: " 16 SpO2: 96 % O2 Device: None (Room air)    Weight: 215 lbs 9.76 oz    General Appearance:  NAD, sitting on side of bed   HEENT: NCAT  Respiratory: breathing comfortably on room air, no increased work of breathing      Data

## 2024-04-09 NOTE — PLAN OF CARE
Goal Outcome Evaluation: [id number]      Plan of Care Reviewed With: patient    Overall Patient Progress: no change    Neuros:  Alert to self only, denies dizziness, n/v, tingling or numbness.   Vitals: Vitally stable, Afebrile  Pain: no signs of pain  Resp: Lungs are clear. No SOB, stable on room air.   GI: Bowel sounds active. Incontinent on BM  : Incontinent,voiding well  Cardiovascular: WNL  Lines/[place]aines: No access  Skin: WNL  Diet: Regular diet.  Crushed meds  Activity: not OOB this shift. A1-2,gb,walker  Recommendations/Plan:      Pt was awake most of the shift. He only slept for 2 hours this shift. Had periods of agitation and trying to climb out of bed but after a few tries he stopped. Pt is awake and calm in bed.

## 2024-04-09 NOTE — PROGRESS NOTES
/80 (BP Location: Left arm)   Pulse 70   Temp 97.7  F (36.5  C) (Oral)   Resp 16   Wt 97.8 kg (215 lb 9.8 oz)   SpO2 96%   BMI 28.45 kg/m      Delirium improving. Names year, month, president correctly. 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 Large BMx2, large void x2. Denies pain. Appetite is good. Bedside attendant remains, pt continues to try to get up on his own. As of 1700, he has not had any agitation all day. Son Castro visited. Chair/bed alarm on. May trial discontinuation of sitter again this evening.

## 2024-04-10 ENCOUNTER — APPOINTMENT (OUTPATIENT)
Dept: OCCUPATIONAL THERAPY | Facility: CLINIC | Age: 84
DRG: 085 | End: 2024-04-10
Payer: COMMERCIAL

## 2024-04-10 ENCOUNTER — APPOINTMENT (OUTPATIENT)
Dept: PHYSICAL THERAPY | Facility: CLINIC | Age: 84
DRG: 085 | End: 2024-04-10
Payer: COMMERCIAL

## 2024-04-10 PROCEDURE — 99233 SBSQ HOSP IP/OBS HIGH 50: CPT | Mod: GC | Performed by: STUDENT IN AN ORGANIZED HEALTH CARE EDUCATION/TRAINING PROGRAM

## 2024-04-10 PROCEDURE — 97530 THERAPEUTIC ACTIVITIES: CPT | Mod: GP

## 2024-04-10 PROCEDURE — 97535 SELF CARE MNGMENT TRAINING: CPT | Mod: GO

## 2024-04-10 PROCEDURE — 120N000002 HC R&B MED SURG/OB UMMC

## 2024-04-10 PROCEDURE — 97530 THERAPEUTIC ACTIVITIES: CPT | Mod: GO

## 2024-04-10 PROCEDURE — 97116 GAIT TRAINING THERAPY: CPT | Mod: GP

## 2024-04-10 PROCEDURE — 250N000013 HC RX MED GY IP 250 OP 250 PS 637

## 2024-04-10 RX ADMIN — FOLIC ACID 1 MG: 1 TABLET ORAL at 11:03

## 2024-04-10 RX ADMIN — OLANZAPINE 5 MG: 5 TABLET, ORALLY DISINTEGRATING ORAL at 21:30

## 2024-04-10 RX ADMIN — QUETIAPINE FUMARATE 75 MG: 50 TABLET ORAL at 15:59

## 2024-04-10 RX ADMIN — LEVETIRACETAM 750 MG: 750 TABLET, FILM COATED ORAL at 20:45

## 2024-04-10 RX ADMIN — QUETIAPINE FUMARATE 75 MG: 50 TABLET ORAL at 20:45

## 2024-04-10 RX ADMIN — PANTOPRAZOLE SODIUM 40 MG: 40 TABLET, DELAYED RELEASE ORAL at 11:03

## 2024-04-10 RX ADMIN — SULFAMETHOXAZOLE AND TRIMETHOPRIM 1 TABLET: 400; 80 TABLET ORAL at 20:45

## 2024-04-10 RX ADMIN — LEVETIRACETAM 750 MG: 750 TABLET, FILM COATED ORAL at 11:04

## 2024-04-10 RX ADMIN — THIAMINE HCL TAB 100 MG 100 MG: 100 TAB at 11:03

## 2024-04-10 RX ADMIN — ATORVASTATIN CALCIUM 80 MG: 80 TABLET, FILM COATED ORAL at 20:45

## 2024-04-10 RX ADMIN — SULFAMETHOXAZOLE AND TRIMETHOPRIM 1 TABLET: 400; 80 TABLET ORAL at 11:03

## 2024-04-10 RX ADMIN — POLYETHYLENE GLYCOL 3350 17 G: 17 POWDER, FOR SOLUTION ORAL at 11:03

## 2024-04-10 RX ADMIN — SERTRALINE HYDROCHLORIDE 75 MG: 50 TABLET ORAL at 11:04

## 2024-04-10 RX ADMIN — SENNOSIDES AND DOCUSATE SODIUM 1 TABLET: 8.6; 5 TABLET ORAL at 20:45

## 2024-04-10 RX ADMIN — Medication 5 MG: at 20:45

## 2024-04-10 RX ADMIN — Medication 12.5 MG: at 15:59

## 2024-04-10 ASSESSMENT — ACTIVITIES OF DAILY LIVING (ADL)
ADLS_ACUITY_SCORE: 49
ADLS_ACUITY_SCORE: 47
ADLS_ACUITY_SCORE: 49
ADLS_ACUITY_SCORE: 47
ADLS_ACUITY_SCORE: 49
ADLS_ACUITY_SCORE: 47
ADLS_ACUITY_SCORE: 49

## 2024-04-10 NOTE — PROGRESS NOTES
Shift: 2300- 0730  Change: Ptnt aggressive, uncooperative, kicking, hitting, swearing at RN from [id number], 0430- end of shift ptnt pleasant and cooperative. Ptnt hallucinating intermittently throughout shift, talking to people not present in the room. 1:1 sitter necessary at bedside for multiple attempts to get out of bed, aggression, and hallucinations.    Neuro: A&O to self. See above  Cardiac: Telemetry not ordered- apical pulse reg. VSS  Respiratory: RA, tolerating well.  GI/: Incontinent of bowel and bladder, ptnt incontinent or urine twice this shift.   Diet/Appetite: Diet: Regular Diet Adult  Room Service  Snacks/Supplements Adult: Ensure Enlive; With Meals      appetite good.  Activity: Up with lift assist. Not OOB this shift. Frequent shifting in bed and repositioned by RN as allowed by ptnt.   Pain: Denies.   Skin: No new deficits noted. See flowsheet for L knee wound  Lines: No PIV per MD.   PRN: Seroquel dose following scheduled doses for continued agitation and restlessness at 2300

## 2024-04-10 NOTE — PROGRESS NOTES
Was calm and engaged in meaningful conversation with staff, cooperative,  calm. Forgetful, denies pain, sitter at bedside, medications crushed in applesauce, boosted up in bed with lift device, no changes this shift

## 2024-04-10 NOTE — PROGRESS NOTES
[facility]    Progress Note - Medicine Service, PAMELLA TEAM 2       Date of Admission:  3/21/2024    Assessment & Plan   David Thomson is a 83 year old male with history of cognitive impairment, CAD s/p CABG (2015), afib, Aortic stenosis s/o TAVR with bioprosthetic valve, with recent admission 03/08-03/18 after a fall at which time he was found to have IPH, IVH, SAH, and SDH, admitted to OSH on 3/21/2024 after a fall with AMS. He was transferred to [facility] due to concern for new intracranial hemorrhage but imaging findings did not demonstrate new ICH. Care transitioned from trauma to medicine service 3/22 for continued evaluation of recurrent falls and encephalopathy. Remains oriented to self and age, occasionally oriented to year, but not place or situation. PT/OT evaluated, recommend TCU on discharge.      Today:  - Requiring 1:1 intermittently   - [facility]    - Resume PTA metoprolol     # Delirium, improving  # Recent Intraparenchymal hemorrhage with extension into ventricles andmidline shift, SAH, and SDH post fall (3/8)  # Cognitive impairment  Family notes was AxOx3 prior to IPH and since discharge to TCU has been improving but still lethargic and not at baseline. Patient presenting again after fall with AMS. No new intracranial hemorrhage on repeat CT head. Patient is at risk for seizure in the setting of recent intracranial bleed and neurology started empiric antiepileptic therapy for persistent AMS. EEG with evidence of additional left hemispheric focal cerebral dysfunction, but no epileptiform discharges. Occasional urinary retention but UA unremarkable and patient afebrile. Continues to have intermittent delirium overnight and agitation (kicking) requiring IM olanzapine and a 1:1.   - Delirium precautions   - Encourage patient to be awake during the day, keep lights on, windows open   -  Encourage uninterrupted sleep at night, limit amount light   - Have glasses available during the day  - Encourage frequent reorientation    - Encourage cognitive stimulation  - Recreational therapy  - Neuro exams qshift   - Decrease bladder scans to q8h   - Quetiapine 75mg (increased) at 1500 and 75mg at 1900   > Additional 50mg quetiapine available as PRN  - PRN olanzapine 5mg ODT for agitation, not to exceed 20mg per day  - Per NSGY: Platelets > 100k, Hgb >8, INR<1.4, HOB >30 degs  - Medically ready for discharge pending removal of 1:1     # Risk of Qtc prolongation  # LBBB  Repeat EKG 4/6 showed QTc (corrected for LBBB) at 382.   - Continue antispsychotics as above.     --Chronic--  # Chronic incompletely united distal humerus fracture  Patient is s/p left elbow fracture s/p ORIF at [facility], complicated by elbow wound requiring washout. Per chart review, on lifelong Bactrim. Having occasional elbow pain  - Continue PTA Bactrim  - Increase PRN tylenol to 975mg q8 hours    # Falls  Has had multiple hospital admissions for fall with unknown etiology. Unclear if mechanical vs syncopal episodes. Has extensive cardiac history but recent TTE without abnormalities of bioprosthetic valve.Can consider arrhythmia as well with prior afib.   - Consider ZioPatch on discharge.    # ISHAN  Patient has history of ISHAN. Patient noted to be desatting when asleep, improves with Oxymask. If not tolerating mask can consider repeat blood gas to check PCO2. Bicarb on BMP is WNL.   - Night time Oxymask as needed     # Chronic HFpEF 55% 1/2023  # Hypertension   #Afib   Continue to hold PTA eliquis in the setting of recent intracranial hemorrhage per NSGY recs. [facility] about possible Watchman procedure as outpatient.   - Resume PTA Toprol XL  - Hold PTA entresto     # CAD s/p 3V CABG (2015)  Not on aspirin currently  - Statin as below     # Aortic stenosis s/p TAVR (2016)  Last TTE 1/2023 with mean  "gradient 14mmHg, suspected to be WNL  - Holding PTA eliquis in setting of recent intracranial hemorrhage      #HLD  -PTA lipitor     #GERD  - PTA omeprazole     # Idiopathic progressive neuropathy  - Hold PTA Gabapentin due to AMS      # PTSD  # Depression  Patient started on Seroquel for delirium at OSH due to hospitalization in the setting of acute illness. Patient sleeping, minimally responsive as above.   - Continue PTA sertraline  - Scheduled quetiapine, PRN olanzapine as above      -- Outpatient Follow Ups Needed--  Neurosurgery - to assess for hydrocephalus. Would like to be informed on discharge to arrange follow up.      Neurology - assess need for Keppra, until then continue 750mg BID     Cardiology follow up for consideration of Watchman procedure.     Diet: Regular Diet Adult    DVT Prophylaxis: Pneumatic Compression Devices  Rice Catheter: Not present  Fluids: None  Lines: None     Cardiac Monitoring: None  Code Status: Full Code      Clinically Significant Risk Factors                  # Hypertension: Noted on problem list  # Chronic heart failure with preserved ejection fraction: heart failure noted on problem list and last echo with EF >50%       # Overweight: Estimated body mass index is 28.45 kg/m  as calculated from the following:    Height as of an earlier encounter on 3/21/24: 1.854 m (6' 1\").    Weight as of this encounter: 97.8 kg (215 lb 9.8 oz).   # Severe Malnutrition: based on nutrition assessment      # Financial/Environmental Concerns: none         Disposition Plan         The patient's care was discussed with the Attending Physician, Dr. Bill .    TOMER Muniz MD  PGY-2 Internal Medicine   [facility] 2    ______________________________________________________________________    Interval History   Calm during the day yesterday. Became agitated at 2300 (swearing, kicking, hallucinating) requiring 1:1. This morning up walking with PT, complaining of shoulder pain.       Physical " Exam   Vital Signs: Temp: 97.8  F (36.6  C) Temp src: Oral BP: 136/70 Pulse: 67   Resp: 18 SpO2: 97 % O2 Device: None (Room air)    Weight: 215 lbs 9.76 oz    General Appearance:  NAD, up walking with PT    HEENT: NCAT  Respiratory: breathing comfortably on room air, no increased work of breathing      Data

## 2024-04-11 ENCOUNTER — APPOINTMENT (OUTPATIENT)
Dept: PHYSICAL THERAPY | Facility: CLINIC | Age: 84
DRG: 085 | End: 2024-04-11
Payer: COMMERCIAL

## 2024-04-11 PROCEDURE — 99233 SBSQ HOSP IP/OBS HIGH 50: CPT | Performed by: STUDENT IN AN ORGANIZED HEALTH CARE EDUCATION/TRAINING PROGRAM

## 2024-04-11 PROCEDURE — 250N000013 HC RX MED GY IP 250 OP 250 PS 637

## 2024-04-11 PROCEDURE — 99233 SBSQ HOSP IP/OBS HIGH 50: CPT | Performed by: PSYCHIATRY & NEUROLOGY

## 2024-04-11 PROCEDURE — 97530 THERAPEUTIC ACTIVITIES: CPT | Mod: GP

## 2024-04-11 PROCEDURE — 120N000002 HC R&B MED SURG/OB UMMC

## 2024-04-11 PROCEDURE — 97116 GAIT TRAINING THERAPY: CPT | Mod: GP

## 2024-04-11 RX ORDER — OLANZAPINE 2.5 MG/1
2.5 TABLET, FILM COATED ORAL AT BEDTIME
Status: DISCONTINUED | OUTPATIENT
Start: 2024-04-11 | End: 2024-04-12

## 2024-04-11 RX ADMIN — SULFAMETHOXAZOLE AND TRIMETHOPRIM 1 TABLET: 400; 80 TABLET ORAL at 09:21

## 2024-04-11 RX ADMIN — OLANZAPINE 2.5 MG: 2.5 TABLET, FILM COATED ORAL at 20:02

## 2024-04-11 RX ADMIN — POLYETHYLENE GLYCOL 3350 17 G: 17 POWDER, FOR SOLUTION ORAL at 09:20

## 2024-04-11 RX ADMIN — THIAMINE HCL TAB 100 MG 100 MG: 100 TAB at 09:21

## 2024-04-11 RX ADMIN — SULFAMETHOXAZOLE AND TRIMETHOPRIM 1 TABLET: 400; 80 TABLET ORAL at 19:34

## 2024-04-11 RX ADMIN — OLANZAPINE 5 MG: 5 TABLET, ORALLY DISINTEGRATING ORAL at 16:52

## 2024-04-11 RX ADMIN — LEVETIRACETAM 750 MG: 750 TABLET, FILM COATED ORAL at 19:34

## 2024-04-11 RX ADMIN — FOLIC ACID 1 MG: 1 TABLET ORAL at 09:21

## 2024-04-11 RX ADMIN — Medication 12.5 MG: at 09:21

## 2024-04-11 RX ADMIN — QUETIAPINE FUMARATE 75 MG: 50 TABLET ORAL at 19:34

## 2024-04-11 RX ADMIN — LEVETIRACETAM 750 MG: 750 TABLET, FILM COATED ORAL at 09:21

## 2024-04-11 RX ADMIN — PANTOPRAZOLE SODIUM 40 MG: 40 TABLET, DELAYED RELEASE ORAL at 09:21

## 2024-04-11 RX ADMIN — QUETIAPINE FUMARATE 75 MG: 50 TABLET ORAL at 14:21

## 2024-04-11 RX ADMIN — SERTRALINE HYDROCHLORIDE 75 MG: 50 TABLET ORAL at 09:21

## 2024-04-11 RX ADMIN — Medication 5 MG: at 19:34

## 2024-04-11 RX ADMIN — ATORVASTATIN CALCIUM 80 MG: 80 TABLET, FILM COATED ORAL at 19:34

## 2024-04-11 ASSESSMENT — ACTIVITIES OF DAILY LIVING (ADL)
ADLS_ACUITY_SCORE: 50
ADLS_ACUITY_SCORE: 49
ADLS_ACUITY_SCORE: 49
ADLS_ACUITY_SCORE: 50
ADLS_ACUITY_SCORE: 49
ADLS_ACUITY_SCORE: 50
ADLS_ACUITY_SCORE: 49
ADLS_ACUITY_SCORE: 46
ADLS_ACUITY_SCORE: 49
ADLS_ACUITY_SCORE: 50
ADLS_ACUITY_SCORE: 49
ADLS_ACUITY_SCORE: 50
ADLS_ACUITY_SCORE: 49
ADLS_ACUITY_SCORE: 50
ADLS_ACUITY_SCORE: 49
ADLS_ACUITY_SCORE: 50
ADLS_ACUITY_SCORE: 49
ADLS_ACUITY_SCORE: 50

## 2024-04-11 NOTE — PROGRESS NOTES
Care Management Follow Up    Length of Stay (days): 21    Expected Discharge Date:       Concerns to be Addressed: discharge planning     Patient plan of care discussed at interdisciplinary rounds: Yes    Anticipated Discharge Disposition:  TBD     Anticipated Discharge Services:  TBD  Anticipated Discharge DME:  None    Patient/family educated on Medicare website which has current facility and service quality ratings:  Yes  Education Provided on the Discharge Plan:  Not discussed at this time  Patient/Family in Agreement with the Plan:  Unable to assess    Referrals Placed by CM/SW:    Private pay costs discussed: Not applicable    Additional Information:  Provider asked SW [facility] . SW stated that it is very unlikely however SW will call and inquire. SW started at this point in time the team should consider discussing memory care with the patient's family. SW stated options are limited due to the patient continuing being on a 1:1 and being aggressive. Provider stated that are checking with psych to see if the patient's meds can be switched to address the patient's sundowning.    SW called [facility] [phone]. [facility] reported they currently do not have any [facility] sug beds. They took down the patient's information and encouraged SW to check back at a later date.    SW called [facility] patient placement. Patient placement reported to this SW that they do not have any beds available and do not have a wait list the patient can be placed on. SW was encouraged to call back daily.     SW will continue to follow for discharge needs and placement.     VIET Cutler  [address]   Office: [phone]  Pager: [phone]  [email or web address]

## 2024-04-11 NOTE — PLAN OF CARE
Goal Outcome Evaluation:      Plan of Care Reviewed With: patient    Overall Patient Progress: no change    Outcome Evaluation: VSS on RA. Alert and only oriented to self, int not participating in neuro checks. Pt reported pain in hip but declined any intervention this shift. Pt was agitated in evening around 2100. Hitting staff, verbally abusive, and attempting to kick staff as well. PRN zyprexa given with good results. Pt was more calm and then Pt slept well overnight. Intcontinent of urine. Neuros remain unchanged from previous assessments. Sacrum with blanchable redness and mepilex in place. SW trying to [facility].  Continue bedside attendant as is appropriate and continue with POC.

## 2024-04-11 NOTE — PLAN OF CARE
[facility] cares [id number]     /75 (BP Location: Left arm)   Pulse 61   Temp 98  F (36.7  C) (Oral)   Resp 16   Wt 97.8 kg (215 lb 9.8 oz)   SpO2 96%   BMI 28.45 kg/m       Pain: Denied pain.  Neuro: A&Ox1. Agitated at times.    Respiratory: Lungs clear and equal, diminshed in lower lobes. On RA.   Cardiac/Neurovascular: HR and pulse WDL. No numbness/tingling reported.   GI/: Adequate urine output. Incontinent of urine and stool.   Nutrition: Regular diet.   Activity: Up with assist of 2 with walker.   Skin: Left shin Mepi.   Lines: No PIV.   Events this shift: Psych consulted today, medications changed up a bit. No other changes. Awaiting placement. Remains on the 1:1. Give 1 dose of PRN Zyprexa this afternoon for agitation.      Plan:  Placement    Goal Outcome Evaluation:      Plan of Care Reviewed With: patient    Overall Patient Progress: no changeOverall Patient Progress: no change    Outcome Evaluation: COntinue with plan of care.

## 2024-04-11 NOTE — CONSULTS
New consult received. It appears that he did quite well after 5 mg of PO Zyprexa HS last night.   I would try 5 mg scheduled tonight and I will complete my pended note tomorrow      Contact me as needed.  Stu Goyal M.D.   Consult Liaison Psychiatry   [facility]   Contact information available via [facility] Paging/Directory or [employer]  If I am not available, then [facility] CL line ([phone]) should know who   Is covering our consult service.

## 2024-04-11 NOTE — CONSULTS
"          Psychiatry Consultation; Follow up   Seen on 4/11 and 4/12            Reason for Consult, requesting source:    Ongoing AMS and agitation. I had seen him initially 3/37.   Requesting source: Merna Bill    Labs and imaging reviewed, discussed with nursing and team     Total time spent in chart review, patient interview and coordination of care; 60 minutes -               Interim history:    From today's progress note:   \"David Thomson is a 83 year old male with history of cognitive impairment, CAD s/p CABG (2015), afib, Aortic stenosis s/o TAVR with bioprosthetic valve, with recent admission 03/08-03/18 after a fall at which time he was found to have IPH, IVH, SAH, and SDH, admitted to OSH on 3/21/2024 after a fall with AMS. He was transferred to [facility] due to concern for new intracranial hemorrhage but imaging findings did not demonstrate new ICH. Care transitioned from trauma to medicine service 3/22 for continued evaluation of recurrent falls and encephalopathy. Remains oriented to self and age, occasionally oriented to year, but not place or situation. PT/OT evaluated, recommend TCU on discharge.\"    Psychiatry is again consulted due to ongoing agitation in the evening and overnight.    From 4/11 nursing note;  \"Outcome Evaluation: VSS on RA. Alert and only oriented to self, int not participating in neuro checks. Pt reported pain in hip but declined any intervention this shift. Pt was agitated in evening around 2100. Hitting staff, verbally abusive, and attempting to kick staff as well. PRN zyprexa given with good results. Pt was more calm and then Pt slept well overnight.\"     Per nursing he was a bit calmer last night than previously. On 4/12 he is more interactive, but still very subdued. He admits to depression, but not hopeless, no passive death wish or SI. I cannot elicit any history of AH or VH.     He is receiving 75 mg of Seroquel at 1500 and 1900   He needed 5 mg of PRN Zyprexa IM on " "4/5 and 4/6, and 5 mg PO 4/10 at 2130.   On 4/11 he received Zyprexa 5 mg at 1652 and 2.5 at 2002    From my note 3/27:         Assessment:   He does have a significant insult to his brain leading a significant decline in functioning.   It appears that he is not overly agitated if he is able to sleep the night before.            Summary of Recommendations:   I would try schedule Seroquel 50 mg at 9 pm, with a PRN of 50 an hour or so later as needed if not sleeping   OK to use PRN Zyprexa for agitation.\"        Current Medications:     Current Facility-Administered Medications   Medication Dose Route Frequency Provider Last Rate Last Admin    atorvastatin (LIPITOR) tablet 80 mg  80 mg Oral At Bedtime Duong Nelson MD   80 mg at 04/10/24 2045    folic acid (FOLVITE) tablet 1 mg  1 mg Oral Daily Duong Nelson MD   1 mg at 04/11/24 0921    levETIRAcetam (KEPPRA) tablet 750 mg  750 mg Oral BID Desire Canchola MD   750 mg at 04/11/24 0921    Lidocaine (LIDOCARE) 4 % Patch 2 patch  2 patch Transdermal Q24H Desire Canchola MD   1 patch at 04/09/24 0832    melatonin tablet 5 mg  5 mg Oral Q24H Desire Canchola MD   5 mg at 04/10/24 2045    metoprolol succinate ER (TOPROL-XL) 24 hr half-tab 12.5 mg  12.5 mg Oral Daily Velia Muniz MD   12.5 mg at 04/11/24 0921    pantoprazole (PROTONIX) EC tablet 40 mg  40 mg Oral Daily Duong Nelson MD   40 mg at 04/11/24 0921    polyethylene glycol (MIRALAX) Packet 17 g  17 g Oral Daily Velia Muniz MD   17 g at 04/11/24 0920    QUEtiapine (SEROquel) tablet 75 mg  75 mg Oral Q24H Velia Muniz MD   75 mg at 04/11/24 1421    QUEtiapine (SEROquel) tablet 75 mg  75 mg Oral Q24H Velia Muniz MD   75 mg at 04/10/24 2045    [Held by provider] sacubitril-valsartan (ENTRESTO) 24-26 MG per tablet 1 tablet  1 tablet Oral BID Desire Canchola MD senna-bibianausate (SENOKOT-S/PERICOLACE) 8.6-50 MG per tablet 1 tablet  1 tablet Oral At Bedtime Bossman " "Desire Langley MD   1 tablet at 04/10/24 2045    sertraline (ZOLOFT) tablet 75 mg  75 mg Oral Daily Duong Nelson MD   75 mg at 04/11/24 0921    sulfamethoxazole-trimethoprim (BACTRIM) 400-80 MG per tablet 1 tablet  1 tablet Oral BID Desire Canchola MD   1 tablet at 04/11/24 0921    thiamine (B-1) tablet 100 mg  100 mg Oral Daily Duong Nelson MD   100 mg at 04/11/24 0921            MSE:   Appearance: sleeping, would waken only momentarily. Denies being depressed.    On 4/12 he was a bit more interactive, but staring at the ceiling, no eye contact. Mood is depressed and affect very restricted. He did know the year, and \"hospital\" (but not which one).       Vital signs:  Temp: 98  F (36.7  C) Temp src: Oral BP: 118/75 Pulse: 61   Resp: 16 SpO2: 96 % O2 Device: None (Room air) Oxygen Delivery: 2 LPM      Estimated body mass index is 28.45 kg/m  as calculated from the following:    Height as of an earlier encounter on 3/21/24: 1.854 m (6' 1\").    Weight as of this encounter: 97.8 kg (215 lb 9.8 oz).    Qtc: 477 ms 4/6, 507 ms on 3/31         DSM-5 Diagnosis:   311 (F32.9) Unspecified Depressive Disorder   History of PTSD   unspecified neurocognitive disorder  Delirium           Assessment:   It appears that, at least last night, the 5 mg of PO Zyprexa HS worked quite well.   I'm not certain that Seroquel is doing much; need more collateral information.             Summary of Recommendations:   I would try Zyprexa 5 mg at 1900 and 2200 rather than PM dose of Seroquel. Will need input from nursing and sitter whether these are helping.   May increase Zoloft to 100 mg per day       Contact me or re-consult psychiatry as needed (psychiatry is signing off).     Stu Goyal M.D.   Consult liaison psychiatry   [facility]   Securely message with blinkbox (more info)  Text page via [facility] Paging/Directory  If I am not available, then P intake ([phone]) should " "know who   Is on call      \"Much or all of the text in this note was generated through the use of Dragon Dictate voice to text software. Errors in spelling or words which appear to be out of contact are unintentional, may be present due having escaped editing\"           "

## 2024-04-11 NOTE — PROGRESS NOTES
[facility]    Progress Note - Medicine Service, PAMELLA TEAM 2       Date of Admission:  3/21/2024    Assessment & Plan   David Thomson is a 83 year old male with history of cognitive impairment, CAD s/p CABG (2015), afib, Aortic stenosis s/o TAVR with bioprosthetic valve, with recent admission 03/08-03/18 after a fall at which time he was found to have IPH, IVH, SAH, and SDH, admitted to OSH on 3/21/2024 after a fall with AMS. He was transferred to [facility] due to concern for new intracranial hemorrhage but imaging findings did not demonstrate new ICH. Care transitioned from trauma to medicine service 3/22 for continued evaluation of recurrent falls and encephalopathy. Remains oriented to self and age, occasionally oriented to year, but not place or situation. PT/OT evaluated, recommend TCU on discharge.      Today:  - Continue 1:1  - [facility] a    - Will consider re-consult to psychiatry in AM if persistently having intermittent aggression this evening.     # Delirium, improving  # Recent Intraparenchymal hemorrhage with extension into ventricles andmidline shift, SAH, and SDH post fall (3/8)  # Cognitive impairment  Family notes was AxOx3 prior to IPH and since discharge to TCU has been improving but still lethargic and not at baseline. Patient presenting again after fall with AMS. No new intracranial hemorrhage on repeat CT head. Patient is at risk for seizure in the setting of recent intracranial bleed and neurology started empiric antiepileptic therapy for persistent AMS. EEG with evidence of additional left hemispheric focal cerebral dysfunction, but no epileptiform discharges. Occasional urinary retention but UA unremarkable and patient afebrile. Continues to have intermittent delirium overnight and agitation (kicking) requiring IM olanzapine and a 1:1.   - Delirium precautions   - Encourage patient  to be awake during the day, keep lights on, windows open   - Encourage uninterrupted sleep at night, limit amount light   - Have glasses available during the day  - Encourage frequent reorientation    - Encourage cognitive stimulation  - Recreational therapy  - Neuro exams qshift   - Decrease bladder scans to q8h   - Quetiapine 75mg (increased) at 1500 and 75mg at 1900   > Additional 50mg quetiapine available as PRN  - PRN olanzapine 5mg ODT for agitation, not to exceed 20mg per day  - Per NSGY: Platelets > 100k, Hgb >8, INR<1.4, HOB >30 degs  - Medically ready for discharge pending removal of 1:1     # Risk of Qtc prolongation  # LBBB  Repeat EKG 4/6 showed QTc (corrected for LBBB) at 382.   - Continue antispsychotics as above.     --Chronic--  # Chronic incompletely united distal humerus fracture  Patient is s/p left elbow fracture s/p ORIF at [facility], complicated by elbow wound requiring washout. Per chart review, on lifelong Bactrim. Having occasional elbow pain  - Continue PTA Bactrim  - Increase PRN tylenol to 975mg q8 hours    # Falls  Has had multiple hospital admissions for fall with unknown etiology. Unclear if mechanical vs syncopal episodes. Has extensive cardiac history but recent TTE without abnormalities of bioprosthetic valve.Can consider arrhythmia as well with prior afib.   - Consider ZioPatch on discharge.    # ISHAN  Patient has history of ISHAN. Patient noted to be desatting when asleep, improves with Oxymask. If not tolerating mask can consider repeat blood gas to check PCO2. Bicarb on BMP is WNL.   - Night time Oxymask as needed     # Chronic HFpEF 55% 1/2023  # Hypertension   #Afib   Continue to hold PTA eliquis in the setting of recent intracranial hemorrhage per NSGY recs. [facility] about possible Watchman procedure as outpatient.   - Resume PTA Toprol XL  - Hold PTA entresto     # CAD s/p 3V CABG (2015)  Not on aspirin currently  - Statin as below    "  # Aortic stenosis s/p TAVR (2016)  Last TTE 1/2023 with mean gradient 14mmHg, suspected to be WNL  - Holding PTA eliquis in setting of recent intracranial hemorrhage      #HLD  -PTA lipitor     #GERD  - PTA omeprazole     # Idiopathic progressive neuropathy  - Hold PTA Gabapentin due to AMS      # PTSD  # Depression  Patient started on Seroquel for delirium at OSH due to hospitalization in the setting of acute illness. Patient sleeping, minimally responsive as above.   - Continue PTA sertraline  - Scheduled quetiapine, PRN olanzapine as above      -- Outpatient Follow Ups Needed--  Neurosurgery - to assess for hydrocephalus. Would like to be informed on discharge to arrange follow up.      Neurology - assess need for Keppra, until then continue 750mg BID     Cardiology follow up for consideration of Watchman procedure.     Diet: Regular Diet Adult    DVT Prophylaxis: Pneumatic Compression Devices  Rice Catheter: Not present  Fluids: None  Lines: None     Cardiac Monitoring: None  Code Status: Full Code      Clinically Significant Risk Factors                  # Hypertension: Noted on problem list  # Chronic heart failure with preserved ejection fraction: heart failure noted on problem list and last echo with EF >50%       # Overweight: Estimated body mass index is 28.45 kg/m  as calculated from the following:    Height as of an earlier encounter on 3/21/24: 1.854 m (6' 1\").    Weight as of this encounter: 97.8 kg (215 lb 9.8 oz).   # Moderate Malnutrition: based on nutrition assessment      # Financial/Environmental Concerns: none         Disposition Plan           Merna Bill MD  Internal Medicine-Pediatrics  [facility]    ______________________________________________________________________    Interval History   Patient once again aggressive and agitated yesterday evening as per nursing notes otherwise no acute events overnight. VSS and afebrile on RA. Endorses MSK chest pain from harness. " Denies any new, shortness of breath, abdominal pain, nausea, vomiting, constipation or diarrhea. Denies any other complaints at this time.     Physical Exam   Vital Signs: Temp: 98  F (36.7  C) Temp src: Oral BP: 118/75 Pulse: 61   Resp: 16 SpO2: 96 % O2 Device: None (Room air)    Weight: 215 lbs 9.76 oz    Constitutional: No Acute Distress. Awake and alert  Respiratory: good air movement, clear to auscultation bilaterally, no crackles or wheezing  Cardiovascular: regular rate and rhythm, normal S1 and S2, no murmur noted  GI: normal bowel sounds, soft, non-distended, non-tender, no masses palpated, no hepatosplenomegaly  Skin: No rashes, or suspicious lesions  Musculoskeletal: No pedal edema  Neurologic: no focal neurologic deficits appreciated    Data

## 2024-04-11 NOTE — PROGRESS NOTES
/70 (BP Location: Left arm)   Pulse 67   Temp 97.8  F (36.6  C) (Oral)   Resp 18   Wt 97.8 kg (215 lb 9.8 oz)   SpO2 97%   BMI 28.45 kg/m      No changes in status today. Remains confused though able to intermittently carry on reasonable conversation. Denies pain. Eating well. Voiding adequately. No BM. Up in chair this AM for 3-4 hours, walked in armenta with PT. No agitation. Sitter at bedside.

## 2024-04-11 NOTE — PLAN OF CARE
Goal Outcome Evaluation:      Plan of Care Reviewed With: patient    Overall Patient Progress: no changeOverall Patient Progress: no change    Outcome Evaluation: see RD note 4/11    Jenny Townsend MS, RD, LD, [facility]    6C (beds 2603-2315) + 7C (beds [id number]) + ED   Available in Vocera by name or unit dietitian  No longer available via pager

## 2024-04-11 NOTE — PROGRESS NOTES
"CLINICAL NUTRITION SERVICES - REASSESSMENT NOTE     Nutrition Prescription    RECOMMENDATIONS FOR MDs/PROVIDERS TO ORDER:  None currently     Malnutrition Status:    Moderate malnutrition in the context of acute illness/disease    Recommendations already ordered by Registered Dietitian (RD):  Automatic/standard meal trays TID.  Please assist with meal time/feeding as needed  Ensure Enlive with meals    Future/Additional Recommendations:  Monitor nutrition-related findings and follow pt per protocol      EVALUATION OF THE PROGRESS TOWARD GOALS   Diet: Regular, house trays, and Ensure Enlive TID     Intake/Tolerance: Patient consuming 0-100 % of 3 meal(s) daily.     NEW FINDINGS   Met with pt at bedside. Sitter present d/t intermittent agitation. Pt drinking Ensure at time of visit and reports \"ok\" tolerance to protein shakes. Agreeable to continue with supplements. Denies nausea currently. Reports he is trying to finish at least 1/2 his meals. Lemonade and chocolate donettes at bedside. Pt reports he has been eating these as snacks. Per sitter, pt has not had a BM yet today but is receiving stool softeners. Pt declined additional snack/supplement orders at this time. Encouraged continue Po efforts with supplements.    GI:  Last BM: 04/09/24  On scheduled bowel med(s)    Weight:  Most Recent Weight: 97.8 kg (215 lb 9.8 oz)  on 3/29/24 via Bed scale  Body mass index is 28.45 kg/m .  No new weight     Meds:  Lipitor  Folic acid  Melatonin  Protonix  Miralax  Senna-docusate  thiamine    Labs:  reviewed    Skin:     04/11/24 0915   Wound Knee Abrasion(s)   Date First Assessed/Time First Assessed: 04/04/24 2029   Orientation: Anterior;Left  Location: Knee  Wound Type: Abrasion(s)  Wound Description (Comments): pre-existing scab fell off; now open skin  Pre-existing: Yes   Wound Bed Pink   Kelly-wound Assessment Erythema   Dressing Foam   Dressing Status Clean, dry, intact   Skin   Skin Color redness blanchable   Skin " Temperature warm   Skin Integrity abrasion/excoriation   Abrasion / Excoriation Left;Knee   Scab Left;Heel   Device Skin Interventions Taken No adjustments needed       MALNUTRITION  % Intake: < 75% for > 7 days (moderate)  % Weight Loss: > 2% in 1 week (severe)  Subcutaneous Fat Loss: None observed  Muscle Loss: None observed  Fluid Accumulation/Edema: None noted  Malnutrition Diagnosis: Moderate malnutrition in the context of acute illness/disease    Previous Goals   Patient to consume % of nutritionally adequate meal trays TID, or the equivalent with supplements/snacks.   Evaluation:  partially met    Previous Nutrition Diagnosis  Inadequate oral intake related to altered mentation, reduced appetite as evidenced by 0-25-50% of meal trays per I/O, weight loss >2% in 1 week and >7.5% over past 3-months.   Evaluation: Improving    CURRENT NUTRITION DIAGNOSIS  Inadequate oral intake related to altered mentation, reduced appetite as evidenced by variable meal intake    INTERVENTIONS  Implementation  Medical food supplement therapy     Goals  Patient to consume % of nutritionally adequate meal trays TID, or the equivalent with supplements/snacks.     Monitoring/Evaluation  Progress toward goals will be monitored and evaluated per protocol.    Jenny Townsend MS, RD, LD, [facility]    6C (beds [id number]) + 7C (beds [id number]) + ED   [facility] by name or unit dietitian  No longer available via pager

## 2024-04-12 ENCOUNTER — APPOINTMENT (OUTPATIENT)
Dept: OCCUPATIONAL THERAPY | Facility: CLINIC | Age: 84
DRG: 085 | End: 2024-04-12
Payer: COMMERCIAL

## 2024-04-12 PROCEDURE — 250N000013 HC RX MED GY IP 250 OP 250 PS 637

## 2024-04-12 PROCEDURE — 120N000002 HC R&B MED SURG/OB UMMC

## 2024-04-12 PROCEDURE — 99233 SBSQ HOSP IP/OBS HIGH 50: CPT | Mod: GC | Performed by: STUDENT IN AN ORGANIZED HEALTH CARE EDUCATION/TRAINING PROGRAM

## 2024-04-12 PROCEDURE — 97535 SELF CARE MNGMENT TRAINING: CPT | Mod: GO | Performed by: OCCUPATIONAL THERAPIST

## 2024-04-12 PROCEDURE — 97530 THERAPEUTIC ACTIVITIES: CPT | Mod: GO | Performed by: OCCUPATIONAL THERAPIST

## 2024-04-12 RX ORDER — OLANZAPINE 5 MG/1
5 TABLET, ORALLY DISINTEGRATING ORAL DAILY PRN
Status: DISCONTINUED | OUTPATIENT
Start: 2024-04-12 | End: 2024-04-23

## 2024-04-12 RX ORDER — OLANZAPINE 5 MG/1
5 TABLET ORAL AT BEDTIME
Status: DISCONTINUED | OUTPATIENT
Start: 2024-04-12 | End: 2024-05-10 | Stop reason: HOSPADM

## 2024-04-12 RX ORDER — OLANZAPINE 5 MG/1
5 TABLET ORAL AT BEDTIME
Status: DISCONTINUED | OUTPATIENT
Start: 2024-04-12 | End: 2024-04-13

## 2024-04-12 RX ADMIN — SULFAMETHOXAZOLE AND TRIMETHOPRIM 1 TABLET: 400; 80 TABLET ORAL at 20:40

## 2024-04-12 RX ADMIN — LIDOCAINE 4% 2 PATCH: 40 PATCH TOPICAL at 10:00

## 2024-04-12 RX ADMIN — LEVETIRACETAM 750 MG: 750 TABLET, FILM COATED ORAL at 09:51

## 2024-04-12 RX ADMIN — QUETIAPINE FUMARATE 75 MG: 50 TABLET ORAL at 15:23

## 2024-04-12 RX ADMIN — FOLIC ACID 1 MG: 1 TABLET ORAL at 09:51

## 2024-04-12 RX ADMIN — THIAMINE HCL TAB 100 MG 100 MG: 100 TAB at 09:51

## 2024-04-12 RX ADMIN — Medication 12.5 MG: at 09:51

## 2024-04-12 RX ADMIN — SERTRALINE HYDROCHLORIDE 75 MG: 50 TABLET ORAL at 09:51

## 2024-04-12 RX ADMIN — Medication 5 MG: at 20:40

## 2024-04-12 RX ADMIN — SENNOSIDES AND DOCUSATE SODIUM 1 TABLET: 8.6; 5 TABLET ORAL at 20:40

## 2024-04-12 RX ADMIN — PANTOPRAZOLE SODIUM 40 MG: 40 TABLET, DELAYED RELEASE ORAL at 09:51

## 2024-04-12 RX ADMIN — LEVETIRACETAM 750 MG: 750 TABLET, FILM COATED ORAL at 20:40

## 2024-04-12 RX ADMIN — OLANZAPINE 5 MG: 5 TABLET, FILM COATED ORAL at 22:50

## 2024-04-12 RX ADMIN — POLYETHYLENE GLYCOL 3350 17 G: 17 POWDER, FOR SOLUTION ORAL at 09:52

## 2024-04-12 RX ADMIN — SULFAMETHOXAZOLE AND TRIMETHOPRIM 1 TABLET: 400; 80 TABLET ORAL at 09:51

## 2024-04-12 RX ADMIN — ATORVASTATIN CALCIUM 80 MG: 80 TABLET, FILM COATED ORAL at 20:40

## 2024-04-12 RX ADMIN — OLANZAPINE 5 MG: 5 TABLET, FILM COATED ORAL at 17:13

## 2024-04-12 ASSESSMENT — ACTIVITIES OF DAILY LIVING (ADL)
ADLS_ACUITY_SCORE: 49

## 2024-04-12 NOTE — PROGRESS NOTES
D: 3/21 History of SDH. Recurrent falls and encephalopathy.     I: Monitored vitals and assessed pt status.   Changed: Afebrile, VSS. Alert to self only. Left pupil fixed. Compliant with taking bedtime oral medications. When sleeping became agitated that staff covered him with blanket, kicking at staff momentarily. For the most part pt slept most of night. Sporadically tries to throw legs over bed rails. Sitter at bedside. BM this shift at [facility]. Incontinent of urine. Pain to left elbow. [facility].   Running: none  PRN: none    A: Nuero: Alert to self. Intermittent aggresion kicking at staff  Tele: none  Lung: RA  GI: BM 4/11   : Incontinent urine  Skin: L elbow tender. Implanted nerve stimulator right back.  Pain: L elbow  1-2A to [facility]      Temp:  [96.9  F (36.1  C)-98.3  F (36.8  C)] 96.9  F (36.1  C)  Pulse:  [56-63] 56  Resp:  [15-18] 16  BP: (118-124)/() 123/59  SpO2:  [95 %-97 %] 95 %      P: Continue to monitor Pt status and report changes to treatment team.

## 2024-04-12 NOTE — PLAN OF CARE
"Patient displayed some confusion and agitation at 1700, trying to get out of bed so he could \"work\". Tried to kick and hit staff, not redirectable. He was then given his scheduled Zyprexa. Son visited patient and is currently at bedside and very helpful.Observed closely. IM zyprexa on standby if the situation escalates. Sitter at bedside attentive to cares.                        "

## 2024-04-12 NOTE — PROGRESS NOTES
[facility]    Progress Note - Medicine Service, PAMELLA TEAM 2       Date of Admission:  3/21/2024    Assessment & Plan   David Thomson is a 83 year old male with history of cognitive impairment, CAD s/p CABG (2015), afib, Aortic stenosis s/o TAVR with bioprosthetic valve, with recent admission 03/08-03/18 after a fall at which time he was found to have IPH, IVH, SAH, and SDH, admitted to OSH on 3/21/2024 after a fall with AMS. He was transferred to [facility] due to concern for new intracranial hemorrhage but imaging findings did not demonstrate new ICH. Care transitioned from trauma to medicine service 3/22 for continued evaluation of recurrent falls and encephalopathy. Remains oriented to self and age, occasionally oriented to year, but not place or situation. PT/OT evaluated, recommend TCU on discharge.      Today:  - Continue 1:1  - [facility]    - Discontinue evening Seroquel, start olanzapine 5mg at 1900 and 2200    # Delirium, improving  # Recent Intraparenchymal hemorrhage with extension into ventricles andmidline shift, SAH, and SDH post fall (3/8)  # Cognitive impairment  Family notes was AxOx3 prior to IPH and since discharge to TCU has been improving but still lethargic and not at baseline. Patient presenting again after fall with AMS. No new intracranial hemorrhage on repeat CT head. Patient is at risk for seizure in the setting of recent intracranial bleed and neurology started empiric antiepileptic therapy for persistent AMS. EEG with evidence of additional left hemispheric focal cerebral dysfunction, but no epileptiform discharges. Occasional urinary retention but UA unremarkable and patient afebrile. Continues to have intermittent delirium overnight and agitation (kicking) requiring IM olanzapine and a 1:1.   - Delirium precautions   - Encourage patient to be awake during the day, keep lights  on, windows open   - Encourage uninterrupted sleep at night, limit amount light   - Have glasses available during the day  - Encourage frequent reorientation    - Encourage cognitive stimulation  - Recreational therapy  - Neuro exams qshift   - Decrease bladder scans to q8h   - Discontinue scheduled quetiapine   - 50mg Quetiepine PRN available   - Start olanzapine tablet 5mg at 1900 and 2200   - PRN olanzapine 5mg ODT for agitation, not to exceed 20mg per day  - Per NSGY: Platelets > 100k, Hgb >8, INR<1.4, HOB >30 degs  - Medically ready for discharge pending removal of 1:1     # Risk of Qtc prolongation  # LBBB  Repeat EKG 4/6 showed QTc (corrected for LBBB) at 382.   - Continue antispsychotics as above.     --Chronic--  # Chronic incompletely united distal humerus fracture  Patient is s/p left elbow fracture s/p ORIF at [facility], complicated by elbow wound requiring washout. Per chart review, on lifelong Bactrim. Having occasional elbow pain  - Continue PTA Bactrim  - Increase PRN tylenol to 975mg q8 hours    # Falls  Has had multiple hospital admissions for fall with unknown etiology. Unclear if mechanical vs syncopal episodes. Has extensive cardiac history but recent TTE without abnormalities of bioprosthetic valve.Can consider arrhythmia as well with prior afib.   - Consider ZioPatch on discharge.    # ISHAN  Patient has history of ISHAN. Patient noted to be desatting when asleep, improves with Oxymask. If not tolerating mask can consider repeat blood gas to check PCO2. Bicarb on BMP is WNL.   - Night time Oxymask as needed     # Chronic HFpEF 55% 1/2023  # Hypertension   #Afib   Continue to hold PTA eliquis in the setting of recent intracranial hemorrhage per NSGY recs. [facility] about possible Watchman procedure as outpatient.   - Resume PTA Toprol XL  - Hold PTA entresto     # CAD s/p 3V CABG (2015)  Not on aspirin currently  - Statin as below     # Aortic stenosis s/p  "TAVR (2016)  Last TTE 1/2023 with mean gradient 14mmHg, suspected to be WNL  - Holding PTA eliquis in setting of recent intracranial hemorrhage      #HLD  -PTA lipitor     #GERD  - PTA omeprazole     # Idiopathic progressive neuropathy  - Hold PTA Gabapentin due to AMS      # PTSD  # Depression  Patient started on Seroquel for delirium at OSH due to hospitalization in the setting of acute illness. Patient sleeping, minimally responsive as above.   - Continue PTA sertraline  - Scheduled quetiapine, PRN olanzapine as above      -- Outpatient Follow Ups Needed--  Neurosurgery - to assess for hydrocephalus. Would like to be informed on discharge to arrange follow up.      Neurology - assess need for Keppra, until then continue 750mg BID     Cardiology follow up for consideration of Watchman procedure.     Diet: Regular Diet Adult    DVT Prophylaxis: Pneumatic Compression Devices  Rice Catheter: Not present  Fluids: None  Lines: None     Cardiac Monitoring: None  Code Status: Full Code      Clinically Significant Risk Factors                  # Hypertension: Noted on problem list  # Chronic heart failure with preserved ejection fraction: heart failure noted on problem list and last echo with EF >50%       # Overweight: Estimated body mass index is 28.45 kg/m  as calculated from the following:    Height as of an earlier encounter on 3/21/24: 1.854 m (6' 1\").    Weight as of this encounter: 97.8 kg (215 lb 9.8 oz).   # Moderate Malnutrition: based on nutrition assessment      # Financial/Environmental Concerns: none         Disposition Plan           A. Lesli Muniz MD  PGY-2 Internal Medicine     ______________________________________________________________________    Interval History   Agitated overnight, intermittently kicking at staff. Complaining of ankle pain. No SOB, abdominal pain.        Physical Exam   Vital Signs: Temp: 96.9  F (36.1  C) Temp src: Oral BP: 123/59 Pulse: 56   Resp: 16 SpO2: 95 % O2 Device: " None (Room air)    Weight: 215 lbs 9.76 oz    General Appearance:  NAD, resting in bed     HEENT: NCAT  Respiratory: breathing comfortably on room air, no increased work of breathing    Data

## 2024-04-13 PROCEDURE — 250N000013 HC RX MED GY IP 250 OP 250 PS 637

## 2024-04-13 PROCEDURE — 99233 SBSQ HOSP IP/OBS HIGH 50: CPT | Mod: GC | Performed by: STUDENT IN AN ORGANIZED HEALTH CARE EDUCATION/TRAINING PROGRAM

## 2024-04-13 PROCEDURE — 250N000011 HC RX IP 250 OP 636

## 2024-04-13 PROCEDURE — 120N000002 HC R&B MED SURG/OB UMMC

## 2024-04-13 RX ORDER — OLANZAPINE 5 MG/1
5 TABLET ORAL DAILY
Status: DISCONTINUED | OUTPATIENT
Start: 2024-04-13 | End: 2024-05-10 | Stop reason: HOSPADM

## 2024-04-13 RX ORDER — OLANZAPINE 10 MG/2ML
5 INJECTION, POWDER, FOR SOLUTION INTRAMUSCULAR ONCE
Status: COMPLETED | OUTPATIENT
Start: 2024-04-13 | End: 2024-04-13

## 2024-04-13 RX ADMIN — OLANZAPINE 5 MG: 10 INJECTION, POWDER, FOR SOLUTION INTRAMUSCULAR at 12:51

## 2024-04-13 RX ADMIN — PANTOPRAZOLE SODIUM 40 MG: 40 TABLET, DELAYED RELEASE ORAL at 08:11

## 2024-04-13 RX ADMIN — OLANZAPINE 5 MG: 10 INJECTION, POWDER, FOR SOLUTION INTRAMUSCULAR at 15:09

## 2024-04-13 RX ADMIN — LEVETIRACETAM 750 MG: 750 TABLET, FILM COATED ORAL at 08:11

## 2024-04-13 RX ADMIN — SERTRALINE HYDROCHLORIDE 75 MG: 50 TABLET ORAL at 08:11

## 2024-04-13 RX ADMIN — Medication 5 MG: at 20:34

## 2024-04-13 RX ADMIN — LIDOCAINE 4% 2 PATCH: 40 PATCH TOPICAL at 08:16

## 2024-04-13 RX ADMIN — QUETIAPINE FUMARATE 75 MG: 50 TABLET ORAL at 16:38

## 2024-04-13 RX ADMIN — FOLIC ACID 1 MG: 1 TABLET ORAL at 08:11

## 2024-04-13 RX ADMIN — POLYETHYLENE GLYCOL 3350 17 G: 17 POWDER, FOR SOLUTION ORAL at 08:12

## 2024-04-13 RX ADMIN — SULFAMETHOXAZOLE AND TRIMETHOPRIM 1 TABLET: 400; 80 TABLET ORAL at 08:12

## 2024-04-13 RX ADMIN — Medication 12.5 MG: at 08:11

## 2024-04-13 RX ADMIN — THIAMINE HCL TAB 100 MG 100 MG: 100 TAB at 08:12

## 2024-04-13 RX ADMIN — LEVETIRACETAM 750 MG: 750 TABLET, FILM COATED ORAL at 20:35

## 2024-04-13 ASSESSMENT — ACTIVITIES OF DAILY LIVING (ADL)
ADLS_ACUITY_SCORE: 47
ADLS_ACUITY_SCORE: 43
ADLS_ACUITY_SCORE: 47
ADLS_ACUITY_SCORE: 43
ADLS_ACUITY_SCORE: 47
ADLS_ACUITY_SCORE: 49
ADLS_ACUITY_SCORE: 47
ADLS_ACUITY_SCORE: 49
ADLS_ACUITY_SCORE: 47
ADLS_ACUITY_SCORE: 49

## 2024-04-13 NOTE — PROGRESS NOTES
[facility]    Progress Note - Medicine Service, PAMELLA TEAM 2       Date of Admission:  3/21/2024    Assessment & Plan   David Thomson is a 83 year old male with history of cognitive impairment, CAD s/p CABG (2015), afib, Aortic stenosis s/o TAVR with bioprosthetic valve, with recent admission 03/08-03/18 after a fall at which time he was found to have IPH, IVH, SAH, and SDH, admitted to OSH on 3/21/2024 after a fall with AMS. He was transferred to [facility] due to concern for new intracranial hemorrhage but imaging findings did not demonstrate new ICH. Care transitioned from trauma to medicine service 3/22 for continued evaluation of recurrent falls and encephalopathy. Remains oriented to self and age, occasionally oriented to year, but not place or situation. PT/OT evaluated, recommend TCU on discharge.      Today:  - Changed scheduled zyprexa from 1900 to 1700 when patient is typically more agitated   - Continue 1:1  - [facility] is a      # Delirium  # Recent Intraparenchymal hemorrhage with extension into ventricles andmidline shift, SAH, and SDH post fall (3/8)  # Cognitive impairment  Family notes was AxOx3 prior to IPH and since discharge to TCU has been improving but still lethargic and not at baseline. Patient presenting again after fall with AMS. No new intracranial hemorrhage on repeat CT head. Patient is at risk for seizure in the setting of recent intracranial bleed and neurology started empiric antiepileptic therapy for persistent AMS. EEG with evidence of additional left hemispheric focal cerebral dysfunction, but no epileptiform discharges. Occasional urinary retention but UA unremarkable and patient afebrile. Continues to have intermittent delirium overnight and agitation (kicking) requiring 1:1.   - Delirium precautions   - Encourage patient to be awake during the day, keep lights on, windows  open   - Encourage uninterrupted sleep at night, limit amount light   - Have glasses available during the day  - Encourage frequent reorientation    - Encourage cognitive stimulation  - Recreational therapy  - Neuro exams qshift   - Decrease bladder scans to q8h if not voiding   - Discontinue scheduled quetiapine   - 50mg Quetiepine PRN available   - Start olanzapine tablet 5mg at 1700 and 2200   - PRN olanzapine 5mg ODT for agitation, not to exceed 20mg per day  - Medically ready for discharge pending removal of 1:1     # Risk of Qtc prolongation  # LBBB  Repeat EKG 4/6 showed QTc (corrected for LBBB) at 382.   - Continue antispsychotics as above.     --Chronic--  # Chronic incompletely united distal humerus fracture  Patient is s/p left elbow fracture s/p ORIF at [facility], complicated by elbow wound requiring washout. Per chart review, on lifelong Bactrim. Having occasional elbow pain  - Continue PTA Bactrim  - Increase PRN tylenol to 975mg q8 hours    # Falls  Has had multiple hospital admissions for fall with unknown etiology. Unclear if mechanical vs syncopal episodes. Has extensive cardiac history but recent TTE without abnormalities of bioprosthetic valve.Can consider arrhythmia as well with prior afib.   - Consider ZioPatch on discharge.    # ISHAN  Patient has history of ISHAN. Patient noted to be desatting when asleep, improves with Oxymask. If not tolerating mask can consider repeat blood gas to check PCO2. Bicarb on BMP is WNL.   - Night time Oxymask as needed     # Chronic HFpEF 55% 1/2023  # Hypertension   #Afib   Continue to hold PTA eliquis in the setting of recent intracranial hemorrhage per NSGY recs. [facility] about possible Watchman procedure as outpatient.   - Resume PTA Toprol XL  - Hold PTA entresto     # CAD s/p 3V CABG (2015)  Not on aspirin currently  - Statin as below     # Aortic stenosis s/p TAVR (2016)  Last TTE 1/2023 with mean gradient 14mmHg,  "suspected to be WNL  - Holding PTA eliquis in setting of recent intracranial hemorrhage      #HLD  -PTA lipitor     #GERD  - PTA omeprazole     # Idiopathic progressive neuropathy  - Hold PTA Gabapentin due to AMS      # PTSD  # Depression  Patient started on Seroquel for delirium at OSH due to hospitalization in the setting of acute illness. Patient sleeping, minimally responsive as above.   - Continue PTA sertraline  - Scheduled quetiapine, PRN olanzapine as above      -- Outpatient Follow Ups Needed--  Neurosurgery - to assess for hydrocephalus. Would like to be informed on discharge to arrange follow up.      Neurology - assess need for Keppra, until then continue 750mg BID     Cardiology follow up for consideration of Watchman procedure.     Diet: Regular Diet Adult    DVT Prophylaxis: Pneumatic Compression Devices  Rice Catheter: Not present  Fluids: None  Lines: None     Cardiac Monitoring: None  Code Status: Full Code      Clinically Significant Risk Factors                  # Hypertension: Noted on problem list  # Chronic heart failure with preserved ejection fraction: heart failure noted on problem list and last echo with EF >50%       # Overweight: Estimated body mass index is 28.45 kg/m  as calculated from the following:    Height as of an earlier encounter on 3/21/24: 1.854 m (6' 1\").    Weight as of this encounter: 97.8 kg (215 lb 9.8 oz).   # Moderate Malnutrition: based on nutrition assessment      # Financial/Environmental Concerns: none         Disposition Plan           A. Lesli Muniz MD  PGY-2 Internal Medicine     ______________________________________________________________________    Interval History   Agitation in the late afternoon and overnight. Received scheduled medications, did not require additional PRNs. Confused, redirectable.     This morning resting in bed, says he feels like \"horseshit\". Denies any specific pain or other concerns. Thinks he is at home.       Physical Exam "   Vital Signs: Temp: 97.8  F (36.6  C) Temp src: Oral BP: 117/73 Pulse: 60   Resp: 18 SpO2: 94 % O2 Device: None (Room air)    Weight: 215 lbs 9.76 oz    General Appearance:  NAD, laying in bed    HEENT: NCAT  CV: RRR   Respiratory: breathing comfortably on room air, no increased work of breathing    Data

## 2024-04-13 NOTE — PLAN OF CARE
Goal Outcome Evaluation:      Plan of Care Reviewed With: patient    Overall Patient Progress: no changeOverall Patient Progress: no change    Outcome Evaluation: 1525-2016: Israeljakob pt was awake alert to self only and agitated. No PRN meds needed was easily redirected. Linen change due to large void with smear BM. No acute changes during shift will continue with plan of care.

## 2024-04-13 NOTE — PROGRESS NOTES
Vitals stable, very confused, sitter at bedside, only oriented to self, A2 with lift, swallows meds with applesauce, no BM, incontinent urine, linen changed, perineum cleaned, no new changes

## 2024-04-13 NOTE — PLAN OF CARE
Patient woke up slightly confused and more aggressive and agitated as the morning go by. Zypexa 5mg  was administered intramuscularly at R deltoid muscle but obtained partial effect and was still swinging and kicking at staff, trying to get out of bed. He was not redirectable.Incontinent with bowel and bladder, voiding good amount of urine and had 1 huge BM. Was seen and rounded by primary team. Extra dose of Olanzapine  5 mg IM  was given per MD order. Intervention effective after 1 hr. Patient more calm and cooperative and able to sustain meaningful conversation. Resting in bed as of this time. Sitter at bedside attentive to cares.

## 2024-04-14 LAB
ALBUMIN UR-MCNC: NEGATIVE MG/DL
APPEARANCE UR: CLEAR
BACTERIA #/AREA URNS HPF: ABNORMAL /HPF
BILIRUB UR QL STRIP: NEGATIVE
COLOR UR AUTO: ABNORMAL
GLUCOSE UR STRIP-MCNC: NEGATIVE MG/DL
HGB UR QL STRIP: NEGATIVE
KETONES UR STRIP-MCNC: NEGATIVE MG/DL
LEUKOCYTE ESTERASE UR QL STRIP: ABNORMAL
NITRATE UR QL: NEGATIVE
PH UR STRIP: 7 [PH] (ref 5–7)
RBC URINE: 0 /HPF
SP GR UR STRIP: 1.01 (ref 1–1.03)
UROBILINOGEN UR STRIP-MCNC: 2 MG/DL
WBC URINE: 3 /HPF

## 2024-04-14 PROCEDURE — 250N000011 HC RX IP 250 OP 636

## 2024-04-14 PROCEDURE — 99233 SBSQ HOSP IP/OBS HIGH 50: CPT | Mod: GC | Performed by: STUDENT IN AN ORGANIZED HEALTH CARE EDUCATION/TRAINING PROGRAM

## 2024-04-14 PROCEDURE — 81001 URINALYSIS AUTO W/SCOPE: CPT | Performed by: STUDENT IN AN ORGANIZED HEALTH CARE EDUCATION/TRAINING PROGRAM

## 2024-04-14 PROCEDURE — 250N000013 HC RX MED GY IP 250 OP 250 PS 637

## 2024-04-14 PROCEDURE — 120N000002 HC R&B MED SURG/OB UMMC

## 2024-04-14 RX ORDER — OLANZAPINE 10 MG/2ML
5 INJECTION, POWDER, FOR SOLUTION INTRAMUSCULAR
Status: COMPLETED | OUTPATIENT
Start: 2024-04-14 | End: 2024-04-14

## 2024-04-14 RX ADMIN — POLYETHYLENE GLYCOL 3350 17 G: 17 POWDER, FOR SOLUTION ORAL at 08:49

## 2024-04-14 RX ADMIN — SULFAMETHOXAZOLE AND TRIMETHOPRIM 1 TABLET: 400; 80 TABLET ORAL at 21:57

## 2024-04-14 RX ADMIN — SULFAMETHOXAZOLE AND TRIMETHOPRIM 1 TABLET: 400; 80 TABLET ORAL at 08:46

## 2024-04-14 RX ADMIN — OLANZAPINE 5 MG: 5 TABLET, FILM COATED ORAL at 21:57

## 2024-04-14 RX ADMIN — SENNOSIDES AND DOCUSATE SODIUM 1 TABLET: 8.6; 5 TABLET ORAL at 21:57

## 2024-04-14 RX ADMIN — QUETIAPINE FUMARATE 75 MG: 50 TABLET ORAL at 18:05

## 2024-04-14 RX ADMIN — Medication 12.5 MG: at 08:46

## 2024-04-14 RX ADMIN — LEVETIRACETAM 750 MG: 750 TABLET, FILM COATED ORAL at 08:46

## 2024-04-14 RX ADMIN — LEVETIRACETAM 750 MG: 750 TABLET, FILM COATED ORAL at 21:58

## 2024-04-14 RX ADMIN — ATORVASTATIN CALCIUM 80 MG: 80 TABLET, FILM COATED ORAL at 21:57

## 2024-04-14 RX ADMIN — PANTOPRAZOLE SODIUM 40 MG: 40 TABLET, DELAYED RELEASE ORAL at 08:46

## 2024-04-14 RX ADMIN — THIAMINE HCL TAB 100 MG 100 MG: 100 TAB at 08:46

## 2024-04-14 RX ADMIN — OLANZAPINE 5 MG: 10 INJECTION, POWDER, FOR SOLUTION INTRAMUSCULAR at 15:45

## 2024-04-14 RX ADMIN — Medication 5 MG: at 21:58

## 2024-04-14 RX ADMIN — OLANZAPINE 5 MG: 5 TABLET, FILM COATED ORAL at 18:04

## 2024-04-14 RX ADMIN — LIDOCAINE 4% 2 PATCH: 40 PATCH TOPICAL at 08:50

## 2024-04-14 RX ADMIN — OLANZAPINE 5 MG: 10 INJECTION, POWDER, FOR SOLUTION INTRAMUSCULAR at 05:25

## 2024-04-14 RX ADMIN — FOLIC ACID 1 MG: 1 TABLET ORAL at 08:46

## 2024-04-14 RX ADMIN — SERTRALINE HYDROCHLORIDE 75 MG: 50 TABLET ORAL at 08:46

## 2024-04-14 ASSESSMENT — ACTIVITIES OF DAILY LIVING (ADL)
ADLS_ACUITY_SCORE: 48
ADLS_ACUITY_SCORE: 49
ADLS_ACUITY_SCORE: 48

## 2024-04-14 NOTE — PLAN OF CARE
Patient displayed confusion and more aggressive and agitated at the start of the shift and was challenging to do hygiene cares. Zyprexa 5mg was administered intramuscularly at L deltoid muscle but obtained partial effect and was still swinging and kicking at staff, trying to get out of bed. He was not redirectable.Incontinent with bowel and bladder, voiding good amount of urine . Was seen and rounded by primary team. Informed them that patient's urine is malodorous, ordered urine cultures, sample sent. Behaviors continued thru noon time and order for extra dose of Zyprexa IM obtained and was administered intramuscularly at R deltoid which seem to be effective. Still trying to get out of bed but more subdued. Refused to eat his meals but drank some milk and ate a cup of ice cream. Patient observed closely for safety. Sitter at bedside attentive to cares.

## 2024-04-14 NOTE — PROGRESS NOTES
[facility]    Progress Note - Medicine Service, PAMELLA TEAM 2       Date of Admission:  3/21/2024    Assessment & Plan   David Thomson is a 83 year old male with history of cognitive impairment, CAD s/p CABG (2015), afib, Aortic stenosis s/o TAVR with bioprosthetic valve, with recent admission 03/08-03/18 after a fall at which time he was found to have IPH, IVH, SAH, and SDH, admitted to OSH on 3/21/2024 after a fall with AMS. He was transferred to [facility] due to concern for new intracranial hemorrhage but imaging findings did not demonstrate new ICH. Care transitioned from trauma to medicine service 3/22 for continued evaluation of recurrent falls and encephalopathy. Remains oriented to self and age, occasionally oriented to year, but not place or situation. PT/OT evaluated, recommend TCU on discharge.      Today:  - UA/UC given malodorous urine today   - Continue 1:1  - [facility] is a      # Delirium  # Recent Intraparenchymal hemorrhage with extension into ventricles andmidline shift, SAH, and SDH post fall (3/8)  # Cognitive impairment  Family notes was AxOx3 prior to IPH and since discharge to TCU has been improving but still lethargic and not at baseline. Patient presenting again after fall with AMS. No new intracranial hemorrhage on repeat CT head. Patient is at risk for seizure in the setting of recent intracranial bleed and neurology started empiric antiepileptic therapy for persistent AMS. EEG with evidence of additional left hemispheric focal cerebral dysfunction, but no epileptiform discharges. Occasional urinary retention but UA unremarkable and patient afebrile. Continues to have intermittent delirium overnight and agitation (kicking) requiring 1:1.   - Delirium precautions   - Encourage patient to be awake during the day, keep lights on, windows open   - Encourage uninterrupted sleep at night,  limit amount light   - Have glasses available during the day  - Encourage frequent reorientation    - Encourage cognitive stimulation  - Recreational therapy  - Neuro exams qshift   - Decrease bladder scans to q8h if not voiding   - Discontinue scheduled quetiapine   - 50mg Quetiepine PRN available   - Start olanzapine tablet 5mg at 1700 and 2200   - PRN olanzapine 5mg ODT for agitation, not to exceed 20mg per day  - Medically ready for discharge pending removal of 1:1  - UA/UC given malodorous urine today      # Risk of Qtc prolongation  # LBBB  Repeat EKG 4/6 showed QTc (corrected for LBBB) at 382.   - Continue antispsychotics as above.  - EKG 4/15      --Chronic--  # Chronic incompletely united distal humerus fracture  Patient is s/p left elbow fracture s/p ORIF at [facility], complicated by elbow wound requiring washout. Per chart review, on lifelong Bactrim. Having occasional elbow pain  - Continue PTA Bactrim  - Increase PRN tylenol to 975mg q8 hours    # Falls  Has had multiple hospital admissions for fall with unknown etiology. Unclear if mechanical vs syncopal episodes. Has extensive cardiac history but recent TTE without abnormalities of bioprosthetic valve.Can consider arrhythmia as well with prior afib.   - Consider ZioPatch on discharge.    # ISHAN  Patient has history of ISHAN. Patient noted to be desatting when asleep, improves with Oxymask. If not tolerating mask can consider repeat blood gas to check PCO2. Bicarb on BMP is WNL.   - Night time Oxymask as needed     # Chronic HFpEF 55% 1/2023  # Hypertension   #Afib   Continue to hold PTA eliquis in the setting of recent intracranial hemorrhage per NSGY recs. [facility] about possible Watchman procedure as outpatient.   - Resume PTA Toprol XL  - Hold PTA entresto     # CAD s/p 3V CABG (2015)  Not on aspirin currently  - Statin as below     # Aortic stenosis s/p TAVR (2016)  Last TTE 1/2023 with mean gradient 14mmHg,  "suspected to be WNL  - Holding PTA eliquis in setting of recent intracranial hemorrhage      #HLD  -PTA lipitor     #GERD  - PTA omeprazole     # Idiopathic progressive neuropathy  - Hold PTA Gabapentin due to AMS      # PTSD  # Depression  Patient started on Seroquel for delirium at OSH due to hospitalization in the setting of acute illness. Patient sleeping, minimally responsive as above.   - Continue PTA sertraline  - Scheduled quetiapine, PRN olanzapine as above      -- Outpatient Follow Ups Needed--  Neurosurgery - to assess for hydrocephalus. Would like to be informed on discharge to arrange follow up.      Neurology - assess need for Keppra, until then continue 750mg BID     Cardiology follow up for consideration of Watchman procedure.     Diet: Regular Diet Adult    DVT Prophylaxis: Pneumatic Compression Devices  Rice Catheter: Not present  Fluids: None  Lines: None     Cardiac Monitoring: None  Code Status: Full Code      Clinically Significant Risk Factors                  # Hypertension: Noted on problem list  # Chronic heart failure with preserved ejection fraction: heart failure noted on problem list and last echo with EF >50%       # Overweight: Estimated body mass index is 28.45 kg/m  as calculated from the following:    Height as of an earlier encounter on 3/21/24: 1.854 m (6' 1\").    Weight as of this encounter: 97.8 kg (215 lb 9.8 oz).   # Moderate Malnutrition: based on nutrition assessment      # Financial/Environmental Concerns: none         Disposition Plan           A. Lesli Muniz MD  PGY-2 Internal Medicine     ______________________________________________________________________    Interval History   Would not take PO meds yesterday afternoon/evening. Agitated after bed change this AM, received zyprexa around 0500, subsequently very sleepy when seen this morning. Appears comfortable.       Physical Exam   Vital Signs: Temp: 97.2  F (36.2  C) Temp src: Axillary BP: (!) 141/46 Pulse: 59  "  Resp: 18 SpO2: 97 % O2 Device: None (Room air)    Weight: 215 lbs 9.76 oz    General Appearance:  NAD, sleeping in bed    HEENT: NCAT  Respiratory: breathing comfortably on room air, no increased work of breathing    Data

## 2024-04-14 NOTE — PLAN OF CARE
Goal Outcome Evaluation:      Plan of Care Reviewed With: patient    Overall Patient Progress: no changeOverall Patient Progress: no change    Outcome Evaluation: 0431-1045: Pt had large void at 0430. When trying to clean and change linen due to large void pt became extremely agitated with hallucinations and attempted to punch x2 and kick x1. Pt unable to take ODT zyprexa so IM zyprexa injection was given and helped pt calm down. Will continue with current plan of care.

## 2024-04-14 NOTE — PLAN OF CARE
Shift: 7064-6443    Pt was pleasant at beginning of shift and then became restless with RN in room. Pt refused cares from RN    Team: Graham 2  Neuro: A&Ox1, disorientated to time, place, and situation   Resp: >95% on room air  Cardiac: No tele, afebrile, palpable pulses, vital signs stable   GI/: Incontinent of bowel and bladder  Skin: Refused skin assessment   Activity: Assist 1 with walker and gait belt   LDA's: No IV access team aware  Diet: Regular      Plan: Continue plan of care and notify team with changes.

## 2024-04-15 ENCOUNTER — APPOINTMENT (OUTPATIENT)
Dept: PHYSICAL THERAPY | Facility: CLINIC | Age: 84
DRG: 085 | End: 2024-04-15
Payer: COMMERCIAL

## 2024-04-15 ENCOUNTER — APPOINTMENT (OUTPATIENT)
Dept: OCCUPATIONAL THERAPY | Facility: CLINIC | Age: 84
DRG: 085 | End: 2024-04-15
Payer: COMMERCIAL

## 2024-04-15 LAB
ANION GAP SERPL CALCULATED.3IONS-SCNC: 12 MMOL/L (ref 7–15)
BUN SERPL-MCNC: 21.4 MG/DL (ref 8–23)
CALCIUM SERPL-MCNC: 9.6 MG/DL (ref 8.8–10.2)
CHLORIDE SERPL-SCNC: 105 MMOL/L (ref 98–107)
CREAT SERPL-MCNC: 0.92 MG/DL (ref 0.67–1.17)
DEPRECATED HCO3 PLAS-SCNC: 23 MMOL/L (ref 22–29)
EGFRCR SERPLBLD CKD-EPI 2021: 84 ML/MIN/1.73M2
ERYTHROCYTE [DISTWIDTH] IN BLOOD BY AUTOMATED COUNT: 16.6 % (ref 10–15)
GLUCOSE SERPL-MCNC: 97 MG/DL (ref 70–99)
HCT VFR BLD AUTO: 38.9 % (ref 40–53)
HGB BLD-MCNC: 13.3 G/DL (ref 13.3–17.7)
MCH RBC QN AUTO: 32.5 PG (ref 26.5–33)
MCHC RBC AUTO-ENTMCNC: 34.2 G/DL (ref 31.5–36.5)
MCV RBC AUTO: 95 FL (ref 78–100)
PLATELET # BLD AUTO: 171 10E3/UL (ref 150–450)
POTASSIUM SERPL-SCNC: 3.9 MMOL/L (ref 3.4–5.3)
RBC # BLD AUTO: 4.09 10E6/UL (ref 4.4–5.9)
SODIUM SERPL-SCNC: 140 MMOL/L (ref 135–145)
WBC # BLD AUTO: 6 10E3/UL (ref 4–11)

## 2024-04-15 PROCEDURE — 97530 THERAPEUTIC ACTIVITIES: CPT | Mod: GP

## 2024-04-15 PROCEDURE — 250N000013 HC RX MED GY IP 250 OP 250 PS 637

## 2024-04-15 PROCEDURE — 120N000002 HC R&B MED SURG/OB UMMC

## 2024-04-15 PROCEDURE — 93010 ELECTROCARDIOGRAM REPORT: CPT | Performed by: INTERNAL MEDICINE

## 2024-04-15 PROCEDURE — 97535 SELF CARE MNGMENT TRAINING: CPT | Mod: GO

## 2024-04-15 PROCEDURE — 85027 COMPLETE CBC AUTOMATED: CPT

## 2024-04-15 PROCEDURE — 97116 GAIT TRAINING THERAPY: CPT | Mod: GP

## 2024-04-15 PROCEDURE — 99233 SBSQ HOSP IP/OBS HIGH 50: CPT | Mod: GC | Performed by: STUDENT IN AN ORGANIZED HEALTH CARE EDUCATION/TRAINING PROGRAM

## 2024-04-15 PROCEDURE — 36415 COLL VENOUS BLD VENIPUNCTURE: CPT

## 2024-04-15 PROCEDURE — 80048 BASIC METABOLIC PNL TOTAL CA: CPT

## 2024-04-15 RX ADMIN — SULFAMETHOXAZOLE AND TRIMETHOPRIM 1 TABLET: 400; 80 TABLET ORAL at 09:25

## 2024-04-15 RX ADMIN — SULFAMETHOXAZOLE AND TRIMETHOPRIM 1 TABLET: 400; 80 TABLET ORAL at 20:04

## 2024-04-15 RX ADMIN — PANTOPRAZOLE SODIUM 40 MG: 40 TABLET, DELAYED RELEASE ORAL at 09:25

## 2024-04-15 RX ADMIN — SERTRALINE HYDROCHLORIDE 75 MG: 50 TABLET ORAL at 09:25

## 2024-04-15 RX ADMIN — OLANZAPINE 5 MG: 5 TABLET, FILM COATED ORAL at 22:15

## 2024-04-15 RX ADMIN — FOLIC ACID 1 MG: 1 TABLET ORAL at 09:25

## 2024-04-15 RX ADMIN — Medication 5 MG: at 20:04

## 2024-04-15 RX ADMIN — LEVETIRACETAM 750 MG: 750 TABLET, FILM COATED ORAL at 20:04

## 2024-04-15 RX ADMIN — OLANZAPINE 5 MG: 5 TABLET, FILM COATED ORAL at 16:46

## 2024-04-15 RX ADMIN — LEVETIRACETAM 750 MG: 750 TABLET, FILM COATED ORAL at 09:25

## 2024-04-15 RX ADMIN — POLYETHYLENE GLYCOL 3350 17 G: 17 POWDER, FOR SOLUTION ORAL at 09:25

## 2024-04-15 RX ADMIN — Medication 12.5 MG: at 09:25

## 2024-04-15 RX ADMIN — LIDOCAINE 4% 2 PATCH: 40 PATCH TOPICAL at 10:13

## 2024-04-15 RX ADMIN — QUETIAPINE FUMARATE 75 MG: 50 TABLET ORAL at 16:45

## 2024-04-15 RX ADMIN — ATORVASTATIN CALCIUM 80 MG: 80 TABLET, FILM COATED ORAL at 20:04

## 2024-04-15 RX ADMIN — THIAMINE HCL TAB 100 MG 100 MG: 100 TAB at 09:25

## 2024-04-15 RX ADMIN — SENNOSIDES AND DOCUSATE SODIUM 1 TABLET: 8.6; 5 TABLET ORAL at 20:04

## 2024-04-15 ASSESSMENT — ACTIVITIES OF DAILY LIVING (ADL)
ADLS_ACUITY_SCORE: 49
ADLS_ACUITY_SCORE: 45
ADLS_ACUITY_SCORE: 49
ADLS_ACUITY_SCORE: 45
ADLS_ACUITY_SCORE: 49
ADLS_ACUITY_SCORE: 45
ADLS_ACUITY_SCORE: 49
ADLS_ACUITY_SCORE: 45
ADLS_ACUITY_SCORE: 49
ADLS_ACUITY_SCORE: 45
ADLS_ACUITY_SCORE: 45
ADLS_ACUITY_SCORE: 49
ADLS_ACUITY_SCORE: 45

## 2024-04-15 NOTE — PLAN OF CARE
Goal Outcome Evaluation: Ongoing, progressing    Plan of Care Reviewed With: patient    Overall Patient Progress: no change    Outcome Evaluation: Calm and cooperative this shift. PT and OT completed. 1:1 for confusion. Pt is alert and able to make needs known. External catheter in place. Up to commode x1 with assist of 2 and GB. No significant changes this shift ([id number]). Continue to monitor.

## 2024-04-15 NOTE — PLAN OF CARE
Goal Outcome Evaluation:       Assumed care from 1900 to 0700.     Pt alert, confused during shift. Aggressive and agitated at staff during cares; delusions present, Pt would frequently talk to self . Redirection and reorientation provided to patient. Sitter remains at bedside and delirium precautions in place. Pt not showing signs of pain per PAINAD scale. Pt agitated when VS were attempted in PM shift, AM VS stable on RA. Pt incontinent at start of shift, primafit placed, pt continues to have malodorous urine, UA results from AM has trace leukocytes and bacteria. Retention observed at end of shift, pt bladder scanned and straight cath per protocol.  No BM during shift. Pt able to take PO meds in apple sauce, No PRN needed. Fair appetite during shift, feeding assistance provided. No acute events during shift. Continuing POC.

## 2024-04-15 NOTE — PROGRESS NOTES
[facility]    Progress Note - Medicine Service, PAMELLA TEAM 2       Date of Admission:  3/21/2024    Assessment & Plan   David Thomson is a 83 year old male with history of cognitive impairment, CAD s/p CABG (2015), afib, Aortic stenosis s/o TAVR with bioprosthetic valve, with recent admission 03/08-03/18 after a fall at which time he was found to have IPH, IVH, SAH, and SDH, admitted to OSH on 3/21/2024 after a fall with AMS. He was transferred to [facility] due to concern for new intracranial hemorrhage but imaging findings did not demonstrate new ICH. Care transitioned from trauma to medicine service 3/22 for continued evaluation of recurrent falls and encephalopathy. Remains oriented to self and age, occasionally oriented to year, but not place or situation. PT/OT evaluated, recommend TCU on discharge.      Today:  - Continue 1:1  - Per [facility], [facility] likely only remaining option     # Delirium  # Recent Intraparenchymal hemorrhage with extension into ventricles andmidline shift, SAH, and SDH post fall (3/8)  # Cognitive impairment  Family notes was AxOx3 prior to IPH and since discharge to TCU has been improving but still lethargic and not at baseline. Patient presenting again after fall with AMS. No new intracranial hemorrhage on repeat CT head. Patient is at risk for seizure in the setting of recent intracranial bleed and neurology started empiric antiepileptic therapy for persistent AMS. EEG with evidence of additional left hemispheric focal cerebral dysfunction, but no epileptiform discharges. Occasional urinary retention but UA unremarkable and patient afebrile. Continues to have intermittent delirium overnight and agitation (kicking) requiring 1:1.   - Delirium precautions   - Encourage patient to be awake during the day, keep lights on, windows open   - Encourage uninterrupted sleep at night, limit amount light   - Have glasses  available during the day  - Encourage frequent reorientation    - Encourage cognitive stimulation  - Recreational therapy  - Neuro exams qshift   - Decrease bladder scans to q8h if not voiding   - Discontinue scheduled quetiapine   - 50mg Quetiepine PRN available   - Start olanzapine tablet 5mg at 1700 and 2200   - PRN olanzapine 5mg ODT for agitation, not to exceed 20mg per day  - Medically ready for discharge pending removal of 1:1  - Per SW, [facility], T[facility] placement not possible given ongoing 1:1 needs. Memory care is likely only remaining option      # Risk of Qtc prolongation  # LBBB  Repeat EKG 4/15 showed QTc (corrected for LBBB) at 428.   - Continue antispsychotics as above.  - Weekly EKG      --Chronic--  # Chronic incompletely united distal humerus fracture  Patient is s/p left elbow fracture s/p ORIF at [facility], complicated by elbow wound requiring washout. Per chart review, on lifelong Bactrim. Having occasional elbow pain  - Continue PTA Bactrim  - Increase PRN tylenol to 975mg q8 hours    # Falls  Has had multiple hospital admissions for fall with unknown etiology. Unclear if mechanical vs syncopal episodes. Has extensive cardiac history but recent TTE without abnormalities of bioprosthetic valve.Can consider arrhythmia as well with prior afib.   - Consider ZioPatch on discharge.    # ISHAN  Patient has history of ISHAN. Patient noted to be desatting when asleep, improves with Oxymask. If not tolerating mask can consider repeat blood gas to check PCO2. Bicarb on BMP is WNL.   - Night time Oxymask as needed     # Chronic HFpEF 55% 1/2023  # Hypertension   #Afib   Continue to hold PTA eliquis in the setting of recent intracranial hemorrhage per NSGY recs. [facility] about possible Watchman procedure as outpatient.   - Resume PTA Toprol XL  - Hold PTA entresto     # CAD s/p 3V CABG (2015)  Not on aspirin currently  - Statin as below     # Aortic  "stenosis s/p TAVR (2016)  Last TTE 1/2023 with mean gradient 14mmHg, suspected to be WNL  - Holding PTA eliquis in setting of recent intracranial hemorrhage      #HLD  -PTA lipitor     #GERD  - PTA omeprazole     # Idiopathic progressive neuropathy  - Hold PTA Gabapentin due to AMS      # PTSD  # Depression  Patient started on Seroquel for delirium at OSH due to hospitalization in the setting of acute illness. Patient sleeping, minimally responsive as above.   - Continue PTA sertraline  - Scheduled quetiapine, PRN olanzapine as above      -- Outpatient Follow Ups Needed--  Neurosurgery - to assess for hydrocephalus. Would like to be informed on discharge to arrange follow up.      Neurology - assess need for Keppra, until then continue 750mg BID     Cardiology follow up for consideration of Watchman procedure.     Diet: Regular Diet Adult    DVT Prophylaxis: Pneumatic Compression Devices  Rice Catheter: Not present  Fluids: None  Lines: None     Cardiac Monitoring: None  Code Status: Full Code      Clinically Significant Risk Factors                  # Hypertension: Noted on problem list  # Chronic heart failure with preserved ejection fraction: heart failure noted on problem list and last echo with EF >50%       # Overweight: Estimated body mass index is 28.45 kg/m  as calculated from the following:    Height as of an earlier encounter on 3/21/24: 1.854 m (6' 1\").    Weight as of this encounter: 97.8 kg (215 lb 9.8 oz).   # Moderate Malnutrition: based on nutrition assessment      # Financial/Environmental Concerns: none         Disposition Plan           TOMER Muniz MD  PGY-2 Internal Medicine     ______________________________________________________________________    Interval History   Ongoing intermittent agitation, confusion. This morning seen walking with PT in the hallway. Later sitting up in chair in his room. Denies any pain. Says breakfast was good.       Physical Exam   Vital Signs: Temp: (!) " 96.7  F (35.9  C) Temp src: Oral BP: 122/68 Pulse: 63   Resp: 16 SpO2: 95 % O2 Device: None (Room air)    Weight: 215 lbs 9.76 oz    General Appearance:  NAD, sitting in chair     HEENT: NCAT. Known, stable asymmetric left pupil   CV: RRR  Respiratory: breathing comfortably on room air, CTAB  Abdomen: soft, non-distended     Data     I have personally reviewed the following data over the past 24 hrs:    6.0  \   13.3   / 171     140 105 21.4 /  97   3.9 23 0.92 \

## 2024-04-16 ENCOUNTER — APPOINTMENT (OUTPATIENT)
Dept: OCCUPATIONAL THERAPY | Facility: CLINIC | Age: 84
DRG: 085 | End: 2024-04-16
Payer: COMMERCIAL

## 2024-04-16 ENCOUNTER — APPOINTMENT (OUTPATIENT)
Dept: PHYSICAL THERAPY | Facility: CLINIC | Age: 84
DRG: 085 | End: 2024-04-16
Payer: COMMERCIAL

## 2024-04-16 PROCEDURE — 97530 THERAPEUTIC ACTIVITIES: CPT | Mod: GO

## 2024-04-16 PROCEDURE — 250N000013 HC RX MED GY IP 250 OP 250 PS 637

## 2024-04-16 PROCEDURE — 97530 THERAPEUTIC ACTIVITIES: CPT | Mod: GP

## 2024-04-16 PROCEDURE — 97535 SELF CARE MNGMENT TRAINING: CPT | Mod: GO

## 2024-04-16 PROCEDURE — 99233 SBSQ HOSP IP/OBS HIGH 50: CPT | Mod: GC | Performed by: STUDENT IN AN ORGANIZED HEALTH CARE EDUCATION/TRAINING PROGRAM

## 2024-04-16 PROCEDURE — 120N000002 HC R&B MED SURG/OB UMMC

## 2024-04-16 PROCEDURE — 97116 GAIT TRAINING THERAPY: CPT | Mod: GP

## 2024-04-16 RX ADMIN — SULFAMETHOXAZOLE AND TRIMETHOPRIM 1 TABLET: 400; 80 TABLET ORAL at 19:50

## 2024-04-16 RX ADMIN — QUETIAPINE FUMARATE 75 MG: 50 TABLET ORAL at 15:48

## 2024-04-16 RX ADMIN — PANTOPRAZOLE SODIUM 40 MG: 40 TABLET, DELAYED RELEASE ORAL at 09:12

## 2024-04-16 RX ADMIN — ATORVASTATIN CALCIUM 80 MG: 80 TABLET, FILM COATED ORAL at 19:50

## 2024-04-16 RX ADMIN — ACETAMINOPHEN 975 MG: 325 TABLET, FILM COATED ORAL at 15:48

## 2024-04-16 RX ADMIN — ACETAMINOPHEN 975 MG: 325 TABLET, FILM COATED ORAL at 22:52

## 2024-04-16 RX ADMIN — LEVETIRACETAM 750 MG: 750 TABLET, FILM COATED ORAL at 19:50

## 2024-04-16 RX ADMIN — FOLIC ACID 1 MG: 1 TABLET ORAL at 09:12

## 2024-04-16 RX ADMIN — Medication 5 MG: at 19:50

## 2024-04-16 RX ADMIN — LIDOCAINE 4% 2 PATCH: 40 PATCH TOPICAL at 09:14

## 2024-04-16 RX ADMIN — SENNOSIDES AND DOCUSATE SODIUM 1 TABLET: 8.6; 5 TABLET ORAL at 19:50

## 2024-04-16 RX ADMIN — SERTRALINE HYDROCHLORIDE 75 MG: 50 TABLET ORAL at 09:12

## 2024-04-16 RX ADMIN — OLANZAPINE 5 MG: 5 TABLET, FILM COATED ORAL at 16:00

## 2024-04-16 RX ADMIN — SULFAMETHOXAZOLE AND TRIMETHOPRIM 1 TABLET: 400; 80 TABLET ORAL at 09:12

## 2024-04-16 RX ADMIN — OLANZAPINE 5 MG: 5 TABLET, FILM COATED ORAL at 21:13

## 2024-04-16 RX ADMIN — LEVETIRACETAM 750 MG: 750 TABLET, FILM COATED ORAL at 09:12

## 2024-04-16 RX ADMIN — THIAMINE HCL TAB 100 MG 100 MG: 100 TAB at 09:13

## 2024-04-16 RX ADMIN — Medication 12.5 MG: at 09:12

## 2024-04-16 ASSESSMENT — ACTIVITIES OF DAILY LIVING (ADL)
ADLS_ACUITY_SCORE: 49
ADLS_ACUITY_SCORE: 49
ADLS_ACUITY_SCORE: 45
ADLS_ACUITY_SCORE: 49
ADLS_ACUITY_SCORE: 49
ADLS_ACUITY_SCORE: 47
ADLS_ACUITY_SCORE: 45
ADLS_ACUITY_SCORE: 49
ADLS_ACUITY_SCORE: 47
ADLS_ACUITY_SCORE: 49
ADLS_ACUITY_SCORE: 47
ADLS_ACUITY_SCORE: 49
ADLS_ACUITY_SCORE: 47
ADLS_ACUITY_SCORE: 49
ADLS_ACUITY_SCORE: 47

## 2024-04-16 NOTE — PROGRESS NOTES
Care Management Follow Up    Length of Stay (days): 26    Expected Discharge Date:       Concerns to be Addressed: discharge planning     Patient plan of care discussed at interdisciplinary rounds: Yes    Anticipated Discharge Disposition:  TBD     Anticipated Discharge Services:  TBD  Anticipated Discharge DME:  None    Patient/family educated on Medicare website which has current facility and service quality ratings:  Yes  Education Provided on the Discharge Plan:  Not discussed at this time  Patient/Family in Agreement with the Plan:  Unable to assess at this time    Referrals Placed by CM/SW:  -  Private pay costs discussed: Not applicable    Additional Information:  SW escalated this patient to SWAT.    SW will attempt to further discuss dispo with the patient's family tomorrow.     VIET Cutler  [address]   Office: [phone]  Pager: [phone]  [email or web address]

## 2024-04-16 NOTE — PLAN OF CARE
Goal Outcome Evaluation:      Plan of Care Reviewed With: patient    Overall Patient Progress: no changeOverall Patient Progress: no change    Outcome Evaluation: Calm and cooperative overnight. Slept well, no agitation. No BM this shift. Breathing without visible effort. Confused and disoriented while awake, unable to answer questions, but followed commands.

## 2024-04-16 NOTE — PROGRESS NOTES
/59 (BP Location: Left arm)   Pulse 70   Temp 97.9  F (36.6  C) (Oral)   Resp 20   Wt 97.8 kg (215 lb 9.8 oz)   SpO2 99%   BMI 28.45 kg/m      [id number]    VSS, on room air, oriented to person, denied pain, assist x2. External catheter was removed due to improper fit, incontinent with briefs on. Had one bowel movement today. Patient was combative all shift, verbally threatened and tried to hit and kick both 1:1 sitters and myself. Continue POC.

## 2024-04-16 NOTE — PROGRESS NOTES
[facility]    Progress Note - Medicine Service, PAMELLA TEAM 2       Date of Admission:  3/21/2024    Assessment & Plan   David Thomson is a 83 year old male with history of cognitive impairment, CAD s/p CABG (2015), afib, Aortic stenosis s/o TAVR with bioprosthetic valve, with recent admission 03/08-03/18 after a fall at which time he was found to have IPH, IVH, SAH, and SDH, admitted to OSH on 3/21/2024 after a fall with AMS. He was transferred to [facility] due to concern for new intracranial hemorrhage but imaging findings did not demonstrate new ICH. Care transitioned from trauma to medicine service 3/22 for continued evaluation of recurrent falls and encephalopathy. Remains oriented to self and age, occasionally oriented to year, but not place or situation. PT/OT evaluated, recommend TCU on discharge.      Today:  - Continue 1:1  - SW discussing memory care options with son     # Delirium  # Recent Intraparenchymal hemorrhage with extension into ventricles andmidline shift, SAH, and SDH post fall (3/8)  # Cognitive impairment  Family notes was AxOx3 prior to IPH and since discharge to TCU has been improving but still lethargic and not at baseline. Patient presenting again after fall with AMS. No new intracranial hemorrhage on repeat CT head. Patient is at risk for seizure in the setting of recent intracranial bleed and neurology started empiric antiepileptic therapy for persistent AMS. EEG with evidence of additional left hemispheric focal cerebral dysfunction, but no epileptiform discharges. Occasional urinary retention but UA unremarkable and patient afebrile. Continues to have intermittent delirium overnight and agitation (kicking) requiring 1:1.   - Delirium precautions   - Encourage patient to be awake during the day, keep lights on, windows open   - Encourage uninterrupted sleep at night, limit amount light   - Have glasses available during the day  -  Encourage frequent reorientation    - Encourage cognitive stimulation  - Recreational therapy  - Neuro exams qshift   - Decrease bladder scans to q8h if not voiding   - Discontinue scheduled quetiapine   - 50mg Quetiepine PRN available   - Start olanzapine tablet 5mg at 1700 and 2200   - PRN olanzapine 5mg ODT for agitation, not to exceed 20mg per day  - Medically ready for discharge pending removal of 1:1  - Per SW, [facility], T[facility] placement not possible given ongoing 1:1 needs. Memory care is likely only remaining option      # Risk of Qtc prolongation  # LBBB  Repeat EKG 4/15 showed QTc (corrected for LBBB) at 428.   - Continue antispsychotics as above.  - Weekly EKG      --Chronic--  # Chronic incompletely united distal humerus fracture  Patient is s/p left elbow fracture s/p ORIF at [facility], complicated by elbow wound requiring washout. Per chart review, on lifelong Bactrim. Having occasional elbow pain  - Continue PTA Bactrim  - Increase PRN tylenol to 975mg q8 hours    # Falls  Has had multiple hospital admissions for fall with unknown etiology. Unclear if mechanical vs syncopal episodes. Has extensive cardiac history but recent TTE without abnormalities of bioprosthetic valve.Can consider arrhythmia as well with prior afib.   - Consider ZioPatch on discharge.    # ISHAN  Patient has history of ISHAN. Patient noted to be desatting when asleep, improves with Oxymask. If not tolerating mask can consider repeat blood gas to check PCO2. Bicarb on BMP is WNL.   - Night time Oxymask as needed     # Chronic HFpEF 55% 1/2023  # Hypertension   #Afib   Continue to hold PTA eliquis in the setting of recent intracranial hemorrhage per NSGY recs. [facility] about possible Watchman procedure as outpatient.   - Resume PTA Toprol XL  - Hold PTA entresto     # CAD s/p 3V CABG (2015)  Not on aspirin currently  - Statin as below     # Aortic stenosis s/p TAVR (2016)  Last  "TTE 1/2023 with mean gradient 14mmHg, suspected to be WNL  - Holding PTA eliquis in setting of recent intracranial hemorrhage      #HLD  -PTA lipitor     #GERD  - PTA omeprazole     # Idiopathic progressive neuropathy  - Hold PTA Gabapentin due to AMS      # PTSD  # Depression  Patient started on Seroquel for delirium at OSH due to hospitalization in the setting of acute illness. Patient sleeping, minimally responsive as above.   - Continue PTA sertraline  - Scheduled quetiapine, PRN olanzapine as above      -- Outpatient Follow Ups Needed--  Neurosurgery - to assess for hydrocephalus. Would like to be informed on discharge to arrange follow up.      Neurology - assess need for Keppra, until then continue 750mg BID     Cardiology follow up for consideration of Watchman procedure.     Diet: Regular Diet Adult    DVT Prophylaxis: Pneumatic Compression Devices  Rice Catheter: Not present  Fluids: None  Lines: None     Cardiac Monitoring: None  Code Status: Full Code      Clinically Significant Risk Factors                  # Hypertension: Noted on problem list  # Chronic heart failure with preserved ejection fraction: heart failure noted on problem list and last echo with EF >50%       # Overweight: Estimated body mass index is 28.45 kg/m  as calculated from the following:    Height as of an earlier encounter on 3/21/24: 1.854 m (6' 1\").    Weight as of this encounter: 97.8 kg (215 lb 9.8 oz).   # Moderate Malnutrition: based on nutrition assessment      # Financial/Environmental Concerns: none         Disposition Plan           A. Lesli Muniz MD  PGY-2 Internal Medicine     ______________________________________________________________________    Interval History   Calm overnight, did not require any PRN medications for agitation. Playing card game with OT this morning. At first says he has some pain but can't remember where it is. Appears a little more drowsy today.     Son updated at bedside. Briefly discussed " that memory care is the most likely remaining option for placement.     Physical Exam   Vital Signs: Temp: 97.9  F (36.6  C) Temp src: Oral BP: 103/59 Pulse: 70   Resp: 20 SpO2: 99 % O2 Device: None (Room air)    Weight: 215 lbs 9.76 oz    General Appearance:  NAD, sitting in bed    HEENT: NCAT  Respiratory: breathing comfortably on room air      Data

## 2024-04-16 NOTE — PLAN OF CARE
Goal Outcome Evaluation: Ongoing, progressing    Plan of Care Reviewed With: patient, child    Overall Patient Progress: no change    Outcome Evaluation: Sleeping between cares throughout the shift. Son came to visit. Adequate urine output. 1:1 sitter for confusion. No significant changes. Continue to monitor.

## 2024-04-17 ENCOUNTER — APPOINTMENT (OUTPATIENT)
Dept: OCCUPATIONAL THERAPY | Facility: CLINIC | Age: 84
DRG: 085 | End: 2024-04-17
Payer: COMMERCIAL

## 2024-04-17 LAB
ATRIAL RATE - MUSE: 54 BPM
DIASTOLIC BLOOD PRESSURE - MUSE: NORMAL MMHG
INTERPRETATION ECG - MUSE: NORMAL
P AXIS - MUSE: 58 DEGREES
PR INTERVAL - MUSE: 214 MS
QRS DURATION - MUSE: 174 MS
QT - MUSE: 508 MS
QTC - MUSE: 481 MS
R AXIS - MUSE: -32 DEGREES
SYSTOLIC BLOOD PRESSURE - MUSE: NORMAL MMHG
T AXIS - MUSE: 84 DEGREES
VENTRICULAR RATE- MUSE: 54 BPM

## 2024-04-17 PROCEDURE — 250N000013 HC RX MED GY IP 250 OP 250 PS 637

## 2024-04-17 PROCEDURE — 120N000002 HC R&B MED SURG/OB UMMC

## 2024-04-17 PROCEDURE — 97530 THERAPEUTIC ACTIVITIES: CPT | Mod: GO

## 2024-04-17 PROCEDURE — 99233 SBSQ HOSP IP/OBS HIGH 50: CPT | Mod: GC | Performed by: STUDENT IN AN ORGANIZED HEALTH CARE EDUCATION/TRAINING PROGRAM

## 2024-04-17 RX ADMIN — SULFAMETHOXAZOLE AND TRIMETHOPRIM 1 TABLET: 400; 80 TABLET ORAL at 09:16

## 2024-04-17 RX ADMIN — SULFAMETHOXAZOLE AND TRIMETHOPRIM 1 TABLET: 400; 80 TABLET ORAL at 21:46

## 2024-04-17 RX ADMIN — FOLIC ACID 1 MG: 1 TABLET ORAL at 09:16

## 2024-04-17 RX ADMIN — OLANZAPINE 5 MG: 5 TABLET, FILM COATED ORAL at 16:56

## 2024-04-17 RX ADMIN — QUETIAPINE FUMARATE 75 MG: 50 TABLET ORAL at 16:56

## 2024-04-17 RX ADMIN — SENNOSIDES AND DOCUSATE SODIUM 1 TABLET: 8.6; 5 TABLET ORAL at 21:50

## 2024-04-17 RX ADMIN — PANTOPRAZOLE SODIUM 40 MG: 40 TABLET, DELAYED RELEASE ORAL at 09:16

## 2024-04-17 RX ADMIN — OLANZAPINE 5 MG: 5 TABLET, FILM COATED ORAL at 21:46

## 2024-04-17 RX ADMIN — LEVETIRACETAM 750 MG: 750 TABLET, FILM COATED ORAL at 09:16

## 2024-04-17 RX ADMIN — THIAMINE HCL TAB 100 MG 100 MG: 100 TAB at 09:16

## 2024-04-17 RX ADMIN — POLYETHYLENE GLYCOL 3350 17 G: 17 POWDER, FOR SOLUTION ORAL at 09:16

## 2024-04-17 RX ADMIN — ACETAMINOPHEN 975 MG: 325 TABLET, FILM COATED ORAL at 21:46

## 2024-04-17 RX ADMIN — SERTRALINE HYDROCHLORIDE 75 MG: 50 TABLET ORAL at 09:16

## 2024-04-17 RX ADMIN — LEVETIRACETAM 750 MG: 750 TABLET, FILM COATED ORAL at 21:46

## 2024-04-17 RX ADMIN — Medication 12.5 MG: at 09:17

## 2024-04-17 RX ADMIN — Medication 5 MG: at 21:46

## 2024-04-17 RX ADMIN — ATORVASTATIN CALCIUM 80 MG: 80 TABLET, FILM COATED ORAL at 21:46

## 2024-04-17 ASSESSMENT — ACTIVITIES OF DAILY LIVING (ADL)
ADLS_ACUITY_SCORE: 45

## 2024-04-17 NOTE — PLAN OF CARE
Goal Outcome Evaluation:      Plan of Care Reviewed With: patient    Overall Patient Progress: no changeOverall Patient Progress: no change           Status: Admitted from OSH due to concern for new ICH after a fall, but imaging did not demonstrate new ICH. Hx of cognitive impairment, CAD s/p CABG (2015), afib, aortic stenosis s/p TAVR with bioprosthetic valve, recent admission 3/8-3/18 after fall which he was found to have IPH, IVH, SAH, and SDH.   Activity: Assist x2 pivot, 1:1 sitter at bedside due to impulsivity  Neuro: Oriented to self only, 4/4 throughout, mood is labile.  Cardiac: WDL  Respiratory: WDL on RA  GI/: Incontinent of bowel and bladder, last BM 4/15, incontinent of bladder x1, pt is combative when performing cares.  Diet: Regular, 1:1 feed, needs encouragement, takes pills crushed in applesauce.  Skin: Blanchable redness on sacrum, bruising throughout, abrasion on LLE covered with bandaging.   Lines/Drains: No IV access  Pain/Nausea: Mild (L) hip and shoulder pain, managed with Tylenol and scheduled voltaren gel.  Changes:  Family meeting with SW today to discuss possible places he can be transferred to.

## 2024-04-17 NOTE — PLAN OF CARE
Status: Admitted from OSH due to concern for new ICH after a fall, but imaging did not demonstrate new ICH. Hx of cognitive impairment, CAD s/p CABG (2015), afib, aortic stenosis s/p TAVR with bioprosthetic valve, recent admission 3/8-3/18 after fall which he was found to have IPH, IVH, SAH, and SDH.   Vitals: VSS on RA  Neuros: Oriented to self. Very impulsive can be uncooperative at times.   IV: No PIV   Resp/trach: WNL  Diet: Regular, needs encouragement eating and help ordering food. Takes pills crushed in pudding.   Bowel status: LBM 4/16, intermittently incontinent.   : Voiding with frequency via urinal, intermittently incontinent.   Skin: Bruising throughout, bandage on LLE for abrasion, blanchable redness to sacrum.   Pain: Moderate neck and shoulder pain managed with PRN tylenol.   Activity: Ax2, pivot. 1:1 attendant for impulsive behaviors and can be combative at times. High fall risk.   Plan: Continue with current POC. SW discussing memory care options with son.

## 2024-04-17 NOTE — PLAN OF CARE
Goal Outcome Evaluation:      Plan of Care Reviewed With: patient    Overall Patient Progress: no changeOverall Patient Progress: no change    Outcome Evaluation: Bedside attendant removed this AM, continues with moderate confusion however directable.  Bed alarm in place.    Reason for Admission: Subdural hematoma    Status: No change.     RN assumed cares at 0700    Vitals: WDL  Pain: Some chronic pain in the back and hips.  Declines intervention.   Neuro: Alert and oriented to self only.  Cardiac: WDL  Respiratory: WDL  GI/: Incontinent of urine.  No stool this shift.   IV/Drains: No PIV in place.   Activity: Lift assist.   Skin: WDL with some sacral redness.   Labs: WDL    Plan of Care: RN assumed cares at 0700, Pt alert and oriented to self only, bedside attendant discontinued at 0900.  Bed alarm set with minimal incidents.  Pt able to turn in bed.  VS stable this shift.  Pt reports some chronic pain in the back and hips, declines interventions.  No PIV in place. Plan to send referrals to memory care tomorrow now that attendant discontinued.  No acute incidents this shift.  Continue to monitor and notify MD of any changes.

## 2024-04-17 NOTE — PROGRESS NOTES
[facility]    Progress Note - Medicine Service, PAMELLA TEAM 2       Date of Admission:  3/21/2024    Assessment & Plan   David Thomson is a 83 year old male with history of cognitive impairment, CAD s/p CABG (2015), afib, Aortic stenosis s/o TAVR with bioprosthetic valve, with recent admission 03/08-03/18 after a fall at which time he was found to have IPH, IVH, SAH, and SDH, admitted to OSH on 3/21/2024 after a fall with AMS. He was transferred to [facility] due to concern for new intracranial hemorrhage but imaging findings did not demonstrate new ICH. Care transitioned from trauma to medicine service 3/22 for continued evaluation of recurrent falls and encephalopathy. Remains oriented to self and age, occasionally oriented to year, but not place or situation. PT/OT evaluated, recommend TCU on discharge.      Today:   - on 1:1  - SW excalated patient to SWAT, and will discuss dipso with patient's family today     # Delirium  # Recent Intraparenchymal hemorrhage with extension into ventricles andmidline shift, SAH, and SDH post fall (3/8)  # Cognitive impairment  Family notes was AxOx3 prior to IPH and since discharge to TCU has been improving but still lethargic and not at baseline. Patient presenting again after fall with AMS. No new intracranial hemorrhage on repeat CT head. Patient is at risk for seizure in the setting of recent intracranial bleed and neurology started empiric antiepileptic therapy for persistent AMS. EEG with evidence of additional left hemispheric focal cerebral dysfunction, but no epileptiform discharges. Occasional urinary retention but UA unremarkable and patient afebrile. Continues to have intermittent delirium overnight and agitation (kicking) requiring 1:1.   - Delirium precautions   - Encourage patient to be awake during the day, keep lights on, windows open   - Encourage uninterrupted sleep at night, limit amount light   - Have glasses  available during the day  - Encourage frequent reorientation    - Encourage cognitive stimulation  - Recreational therapy  - Neuro exams qshift   - Decrease bladder scans to q8h if not voiding   - Discontinue scheduled quetiapine   - 50mg Quetiepine PRN available   - Start olanzapine tablet 5mg at 1700 and 2200   - PRN olanzapine 5mg ODT for agitation, not to exceed 20mg per day  - Medically ready for discharge pending removal of 1:1  - [facility], [facility] not possible given ongoing 1:1 needs. Memory care is likely only remaining option      # Risk of Qtc prolongation  # LBBB  Repeat EKG 4/15 showed QTc (corrected for LBBB) at 428.   - Continue antispsychotics as above.  - Weekly EKG      --Chronic--  # Chronic incompletely united distal humerus fracture  Patient is s/p left elbow fracture s/p ORIF at [facility], complicated by elbow wound requiring washout. Per chart review, on lifelong Bactrim. Having occasional elbow pain  - Continue PTA Bactrim  - Increase PRN tylenol to 975mg q8 hours    # Falls  Has had multiple hospital admissions for fall with unknown etiology. Unclear if mechanical vs syncopal episodes. Has extensive cardiac history but recent TTE without abnormalities of bioprosthetic valve.Can consider arrhythmia as well with prior afib.   - Consider ZioPatch on discharge.    # ISHAN  Patient has history of ISHAN. Patient noted to be desatting when asleep, improves with Oxymask. If not tolerating mask can consider repeat blood gas to check PCO2. Bicarb on BMP is WNL.   - Night time Oxymask as needed     # Chronic HFpEF 55% 1/2023  # Hypertension   #Afib   Continue to hold PTA eliquis in the setting of recent intracranial hemorrhage per NSGY recs. [facility] about possible Watchman procedure as outpatient.   - Resume PTA Toprol XL  - Hold PTA entresto     # CAD s/p 3V CABG (2015)  Not on aspirin currently  - Statin as below     # Aortic  "stenosis s/p TAVR (2016)  Last TTE 1/2023 with mean gradient 14mmHg, suspected to be WNL  - Holding PTA eliquis in setting of recent intracranial hemorrhage      #HLD  -PTA lipitor     #GERD  - PTA omeprazole     # Idiopathic progressive neuropathy  - Hold PTA Gabapentin due to AMS      # PTSD  # Depression  Patient started on Seroquel for delirium at OSH due to hospitalization in the setting of acute illness. Patient sleeping, minimally responsive as above.   - Continue PTA sertraline  - Scheduled quetiapine, PRN olanzapine as above      -- Outpatient Follow Ups Needed--  Neurosurgery - to assess for hydrocephalus. Would like to be informed on discharge to arrange follow up.      Neurology - assess need for Keppra, until then continue 750mg BID     Cardiology follow up for consideration of Watchman procedure.     Diet: Regular Diet Adult    DVT Prophylaxis: Pneumatic Compression Devices  Rice Catheter: Not present  Fluids: None  Lines: None     Cardiac Monitoring: None  Code Status: Full Code      Clinically Significant Risk Factors                  # Hypertension: Noted on problem list  # Chronic heart failure with preserved ejection fraction: heart failure noted on problem list and last echo with EF >50%       # Overweight: Estimated body mass index is 28.56 kg/m  as calculated from the following:    Height as of an earlier encounter on 3/21/24: 1.854 m (6' 1\").    Weight as of this encounter: 98.2 kg (216 lb 7.9 oz).   # Moderate Malnutrition: based on nutrition assessment      # Financial/Environmental Concerns: none         Disposition Plan         Tri Mcfarland MD  [facility]  Department of Internal Medicine   Resident Physician  PGY-1    ______________________________________________________________________    Interval History   Overnight. Nurse noting that patient has shoulder and left hip pain with turns which makes him combative overnight. Ordered voltaren gel, warm pack as needed. Held " didn't order anything stronger given concern for AMS, pending day team discussion.    Patient was lying in bed sleeping during rounds; we were able to wake him up and he denied any concerns or questions. Denies any hip or shoulder pain currently. Encouraged use of aceptaminoph PRN to nurse.       Physical Exam   Vital Signs: Temp: 98.8  F (37.1  C) Temp src: Oral BP: 134/85 Pulse: 70   Resp: 16 SpO2: 96 % O2 Device: None (Room air)    Weight: 216 lbs 7.87 oz    General Appearance:  NAD, sitting in bed    HEENT: NCAT  Respiratory: breathing comfortably on room air      Data

## 2024-04-18 ENCOUNTER — APPOINTMENT (OUTPATIENT)
Dept: OCCUPATIONAL THERAPY | Facility: CLINIC | Age: 84
DRG: 085 | End: 2024-04-18
Payer: COMMERCIAL

## 2024-04-18 ENCOUNTER — APPOINTMENT (OUTPATIENT)
Dept: PHYSICAL THERAPY | Facility: CLINIC | Age: 84
DRG: 085 | End: 2024-04-18
Payer: COMMERCIAL

## 2024-04-18 PROCEDURE — 97535 SELF CARE MNGMENT TRAINING: CPT | Mod: GO

## 2024-04-18 PROCEDURE — 97116 GAIT TRAINING THERAPY: CPT | Mod: GP

## 2024-04-18 PROCEDURE — 250N000013 HC RX MED GY IP 250 OP 250 PS 637

## 2024-04-18 PROCEDURE — 120N000002 HC R&B MED SURG/OB UMMC

## 2024-04-18 PROCEDURE — 99232 SBSQ HOSP IP/OBS MODERATE 35: CPT | Mod: GC | Performed by: STUDENT IN AN ORGANIZED HEALTH CARE EDUCATION/TRAINING PROGRAM

## 2024-04-18 PROCEDURE — 97530 THERAPEUTIC ACTIVITIES: CPT | Mod: GP

## 2024-04-18 PROCEDURE — 97110 THERAPEUTIC EXERCISES: CPT | Mod: GP

## 2024-04-18 PROCEDURE — 97530 THERAPEUTIC ACTIVITIES: CPT | Mod: GO

## 2024-04-18 RX ADMIN — LEVETIRACETAM 750 MG: 750 TABLET, FILM COATED ORAL at 09:51

## 2024-04-18 RX ADMIN — PANTOPRAZOLE SODIUM 40 MG: 40 TABLET, DELAYED RELEASE ORAL at 09:50

## 2024-04-18 RX ADMIN — QUETIAPINE FUMARATE 75 MG: 50 TABLET ORAL at 17:04

## 2024-04-18 RX ADMIN — SERTRALINE HYDROCHLORIDE 75 MG: 50 TABLET ORAL at 09:51

## 2024-04-18 RX ADMIN — ACETAMINOPHEN 975 MG: 325 TABLET, FILM COATED ORAL at 09:50

## 2024-04-18 RX ADMIN — THIAMINE HCL TAB 100 MG 100 MG: 100 TAB at 09:50

## 2024-04-18 RX ADMIN — ACETAMINOPHEN 975 MG: 325 TABLET, FILM COATED ORAL at 19:47

## 2024-04-18 RX ADMIN — SULFAMETHOXAZOLE AND TRIMETHOPRIM 1 TABLET: 400; 80 TABLET ORAL at 09:51

## 2024-04-18 RX ADMIN — SENNOSIDES AND DOCUSATE SODIUM 1 TABLET: 8.6; 5 TABLET ORAL at 21:29

## 2024-04-18 RX ADMIN — POLYETHYLENE GLYCOL 3350 17 G: 17 POWDER, FOR SOLUTION ORAL at 09:51

## 2024-04-18 RX ADMIN — FOLIC ACID 1 MG: 1 TABLET ORAL at 09:50

## 2024-04-18 RX ADMIN — Medication 5 MG: at 21:29

## 2024-04-18 RX ADMIN — ATORVASTATIN CALCIUM 80 MG: 80 TABLET, FILM COATED ORAL at 21:29

## 2024-04-18 RX ADMIN — Medication 12.5 MG: at 09:50

## 2024-04-18 RX ADMIN — LEVETIRACETAM 750 MG: 750 TABLET, FILM COATED ORAL at 21:30

## 2024-04-18 RX ADMIN — OLANZAPINE 5 MG: 5 TABLET, FILM COATED ORAL at 21:29

## 2024-04-18 RX ADMIN — OLANZAPINE 5 MG: 5 TABLET, FILM COATED ORAL at 17:04

## 2024-04-18 RX ADMIN — SULFAMETHOXAZOLE AND TRIMETHOPRIM 1 TABLET: 400; 80 TABLET ORAL at 21:30

## 2024-04-18 ASSESSMENT — ACTIVITIES OF DAILY LIVING (ADL)
ADLS_ACUITY_SCORE: 45
ADLS_ACUITY_SCORE: 41
ADLS_ACUITY_SCORE: 45
ADLS_ACUITY_SCORE: 41
ADLS_ACUITY_SCORE: 41
ADLS_ACUITY_SCORE: 45
ADLS_ACUITY_SCORE: 41
ADLS_ACUITY_SCORE: 45

## 2024-04-18 NOTE — PROGRESS NOTES
Care Management Follow Up    Length of Stay (days): 28    Expected Discharge Date:       Concerns to be Addressed: discharge planning     Patient plan of care discussed at interdisciplinary rounds: Yes    Anticipated Discharge Disposition:  Transitional care     Anticipated Discharge Services:  Transportation services   Anticipated Discharge DME:  None    Patient/family educated on Medicare website which has current facility and service quality ratings:  yes  Education Provided on the Discharge Plan:  yes  Patient/Family in Agreement with the Plan:  yes    Referrals Placed by CM/SW:      [facility] referral   [place] liaison: Minda Jenkins  P: [phone]  F: [phone]  [email or web address]  [place]  -[facility]  -[facility]    Private pay costs discussed: Not applicable    Additional Information:  1:1 sitter was discontinued  @1030. Patient has been redirectable.    SW resent initial referrals for TCU with the possibility of the patient transitioning to LTC after TCU.    JAVY spoke with [place] liaison Minda Jenkins who reported due to the patient not being decisional the patient will need a HCD. SW reported to Minda due to the patient not being decisional he is unable to complete a HCD and in that case guardianship would be needed.    @[id number] JAVY called and left a voicemail for [facility] medical records department and requested a callback    JAVY received a call back from [facility] medical records. They reported to  that they can send in a fax cover sheet with the patient's name and  and they will send a copy of the requested documents if they have them on file.    @1254 JAVY faxed a cover sheet with necessary information to [facility] medical records (F: [phone]) requesting a copy of HCD and/or POA paperwork. JAVY provided unit fax number.     SW will continue to follow for discharge needs and placement.      VIET Cutler  [address]   Office: [phone]  Pager: [phone]  [email or web address]

## 2024-04-18 NOTE — PLAN OF CARE
Goal Outcome Evaluation:      Plan of Care Reviewed With: patient    Overall Patient Progress: improvingOverall Patient Progress: improving    Outcome Evaluation: No longer needing attendant, up ambulating with gait belt and assist.      Reason for Admission: Subdural hematoma     Status: Improving      RN assumed cares at 0700     Vitals: WDL  Pain: Some chronic pain in the back and hips.  Declines intervention.   Neuro: Alert and oriented to self only.  Cardiac: WDL  Respiratory: WDL  GI/: No stool this shift. Ambulated to restroom and voided in toilet.   IV/Drains: No PIV in place.   Activity: 1 Assist with gait belt and walker.   Skin: WDL with some sacral redness.   Labs: WDL     Plan of Care: RN assumed cares at 0700, Pt alert and oriented to self only, VS stable throughout the shift.  Pt able to work with PT this AM, up ambulating in the room with RN this afternoon.  Pt denies pain this shit. No PIV in place.  Plan to discharge to Memory care once accepted and medically ready.  No acute incidents this shift. Continue to monitor and notify MD of any changes.

## 2024-04-18 NOTE — PROGRESS NOTES
[facility]    Progress Note - Medicine Service, PAMELLA TEAM 2       Date of Admission:  3/21/2024    Assessment & Plan   David Thomson is a 83 year old male with history of cognitive impairment, CAD s/p CABG (2015), afib, Aortic stenosis s/o TAVR with bioprosthetic valve, with recent admission 03/08-03/18 after a fall at which time he was found to have IPH, IVH, SAH, and SDH, admitted to OSH on 3/21/2024 after a fall with AMS. He was transferred to [facility] due to concern for new intracranial hemorrhage but imaging findings did not demonstrate new ICH. Care transitioned from trauma to medicine service 3/22 for continued evaluation of recurrent falls and encephalopathy. Remains oriented to self and age, occasionally oriented to year, but not place or situation. PT/OT evaluated, recommend TCU on discharge.      Today:   - Off 1:1  -  has sent TCU referrals    # Delirium  # Recent Intraparenchymal hemorrhage with extension into ventricles andmidline shift, SAH, and SDH post fall (3/8)  # Cognitive impairment  Family notes was AxOx3 prior to IPH and since discharge to TCU has been improving but still lethargic and not at baseline. Patient presenting again after fall with AMS. No new intracranial hemorrhage on repeat CT head. Patient is at risk for seizure in the setting of recent intracranial bleed and neurology started empiric antiepileptic therapy for persistent AMS. EEG with evidence of additional left hemispheric focal cerebral dysfunction, but no epileptiform discharges. Occasional urinary retention but UA unremarkable and patient afebrile. Continues to have intermittent delirium overnight and agitation (kicking) requiring 1:1.   - Delirium precautions   - Encourage patient to be awake during the day, keep lights on, windows open   - Encourage uninterrupted sleep at night, limit amount light   - Have glasses available during the day  - Encourage frequent  reorientation    - Encourage cognitive stimulation  - Recreational therapy  - Neuro exams qshift   - Decrease bladder scans to q8h if not voiding   - Discontinue scheduled quetiapine   - 50mg Quetiepine PRN available   - Start olanzapine tablet 5mg at 1700 and 2200   - PRN olanzapine 5mg ODT for agitation, not to exceed 20mg per day  - Medically ready for discharge  - SW assisting with placement, TCU rerral sent      # Risk of Qtc prolongation  # LBBB  Repeat EKG 4/15 showed QTc (corrected for LBBB) at 428.   - Continue antispsychotics as above.  - Weekly EKG      --Chronic--  # Chronic incompletely united distal humerus fracture  Patient is s/p left elbow fracture s/p ORIF at [facility], complicated by elbow wound requiring washout. Per chart review, on lifelong Bactrim. Having occasional elbow pain  - Continue PTA Bactrim  - Increase PRN tylenol to 975mg q8 hours    # Falls  Has had multiple hospital admissions for fall with unknown etiology. Unclear if mechanical vs syncopal episodes. Has extensive cardiac history but recent TTE without abnormalities of bioprosthetic valve.Can consider arrhythmia as well with prior afib.   - Consider ZioPatch on discharge.    # ISHAN  Patient has history of ISHAN. Patient noted to be desatting when asleep, improves with Oxymask. If not tolerating mask can consider repeat blood gas to check PCO2. Bicarb on BMP is WNL.     # Chronic HFpEF 55% 1/2023  # Hypertension   #Afib   Continue to hold PTA eliquis in the setting of recent intracranial hemorrhage per NSGY recs. [facility] possible Watchman procedure as outpatient.   - Resume PTA Toprol XL  - Hold PTA entresto     # CAD s/p 3V CABG (2015)  Not on aspirin currently  - Statin as below     # Aortic stenosis s/p TAVR (2016)  Last TTE 1/2023 with mean gradient 14mmHg, suspected to be WNL  - Holding PTA eliquis in setting of recent intracranial hemorrhage      #HLD  -PTA lipitor     #GERD  -  "PTA omeprazole     # Idiopathic progressive neuropathy  - Hold PTA Gabapentin due to AMS      # PTSD  # Depression  Patient started on Seroquel for delirium at OSH due to hospitalization in the setting of acute illness. Patient sleeping, minimally responsive as above.   - Continue PTA sertraline  - Scheduled quetiapine, PRN olanzapine as above      -- Outpatient Follow Ups Needed--  Neurosurgery - to assess for hydrocephalus. Would like to be informed on discharge to arrange follow up.      Neurology - assess need for Keppra, until then continue 750mg BID     Cardiology follow up for consideration of Watchman procedure.     Diet: Regular Diet Adult    DVT Prophylaxis: Pneumatic Compression Devices  Rice Catheter: Not present  Fluids: None  Lines: None     Cardiac Monitoring: None  Code Status: Full Code      Clinically Significant Risk Factors                  # Hypertension: Noted on problem list  # Chronic heart failure with preserved ejection fraction: heart failure noted on problem list and last echo with EF >50%       # Overweight: Estimated body mass index is 28.56 kg/m  as calculated from the following:    Height as of an earlier encounter on 3/21/24: 1.854 m (6' 1\").    Weight as of this encounter: 98.2 kg (216 lb 7.9 oz).   # Moderate Malnutrition: based on nutrition assessment      # Financial/Environmental Concerns: none         Disposition Plan         A. Lesli Muniz MD  PGY-2 Internal Medicine       ______________________________________________________________________    Interval History   Off 1:1 since 0900 on 4/17. Has not required PRN medications for agitation in several days.This morning pleasant, sitting up in wheelchair working with therapy in the hallway. Endorses some ongoing shoulder pain. No other concerns.       Physical Exam   Vital Signs: Temp: 97.4  F (36.3  C) Temp src: Oral BP: 125/70 Pulse: 60   Resp: 16 SpO2: 90 % O2 Device: None (Room air)    Weight: 216 lbs 7.87 oz    General " Appearance:  NAD, sitting in wheelchair     HEENT: NCAT  Respiratory: breathing comfortably on room air      Data

## 2024-04-18 NOTE — PLAN OF CARE
Goal Outcome Evaluation:      Plan of Care Reviewed With: patient    Overall Patient Progress: improvingOverall Patient Progress: improving    Outcome Evaluation: No acute events during shift    Assumed care from 1900 to 0700.   Pt alert, confused per baseline, cooperative during shift. No aggressive events or attempting to get out of bed during shift. Bed alarm on and awakenings minimized. Tylenol given for pain and gave relief. No BM during shift, Pt due to void, bladder scan 489ml and pt asymptomatic. L knee wound dressing changed during shift. See flowsheet for detailed assessment and VS. Continuing POC.

## 2024-04-18 NOTE — PLAN OF CARE
Goal Outcome Evaluation:      Plan of Care Reviewed With: other (see comments)    Overall Patient Progress: no changeOverall Patient Progress: no change    Outcome Evaluation: see RD note 4/18    Jenny Townsend MS, RD, LD, [facility]    6C (beds 4934-3781) + 7C (beds [id number]) + ED   [facility] by name or unit dietitian  No longer available via pager

## 2024-04-18 NOTE — PROGRESS NOTES
CLINICAL NUTRITION SERVICES - REASSESSMENT NOTE     Nutrition Prescription    RECOMMENDATIONS FOR MDs/PROVIDERS TO ORDER:  None currently     Malnutrition Status:    Moderate malnutrition in the context of acute illness/disease    Recommendations already ordered by Registered Dietitian (RD):  Automatic/standard meal trays TID.  Please assist with meal time/feeding as needed  Ensure Enlive with meals   Encourage PO efforts    Future/Additional Recommendations:  Monitor nutrition-related findings and follow pt per protocol      EVALUATION OF THE PROGRESS TOWARD GOALS   Diet: Regular and Ensure Enlive TID , house trays    Intake/Tolerance: Patient consuming  % of 3 meal(s) daily.     NEW FINDINGS   Room visit. Pt watching TV at time of visit but did not acknowledge presence of RD. Breakfast tray at bedside untouched currently. EMR reviewed. Current interventions remain appropriate.    GI:  Last BM: 04/15/24  On scheduled bowel med(s)    Weight:  Most Recent Weight: 98.2 kg (216 lb 7.9 oz)  on 4/16/24 via Bed scale  Body mass index is 28.56 kg/m .  Wt down 5.4 kg from admission. Unclear initial wt source. Some variability may be related to bed scale    Meds:  Lipitor  Folic acid  Melatonin  Protonix  Miralax  Senna-docusate  thiamine    Labs:  reviewed    Skin:  reviewed    MALNUTRITION  % Intake: < 75% for > 7 days (moderate)  % Weight Loss: > 2% in 1 week (severe)  Subcutaneous Fat Loss: None observed  Muscle Loss: None observed  Fluid Accumulation/Edema: None noted  Malnutrition Diagnosis: Moderate malnutrition in the context of acute illness/disease  Previous Goals   Patient to consume % of nutritionally adequate meal trays TID, or the equivalent with supplements/snacks.   Evaluation: partially met    Previous Nutrition Diagnosis  Inadequate oral intake related to altered mentation, reduced appetite as evidenced by variable meal intake   Evaluation: No change    CURRENT NUTRITION DIAGNOSIS  Inadequate  oral intake related to altered mentation, reduced appetite as evidenced by variable meal intake     INTERVENTIONS  Implementation  Medical food supplement therapy     Goals  Patient to consume % of nutritionally adequate meal trays TID, or the equivalent with supplements/snacks.     Monitoring/Evaluation  Progress toward goals will be monitored and evaluated per protocol.    Jenny Townsend MS, RD, LD, [facility]    6C (beds [id number]) + 7C (beds [id number]) + ED   Available in [employer] by name or unit dietitian  No longer available via pager

## 2024-04-19 ENCOUNTER — APPOINTMENT (OUTPATIENT)
Dept: GENERAL RADIOLOGY | Facility: CLINIC | Age: 84
DRG: 085 | End: 2024-04-19
Payer: COMMERCIAL

## 2024-04-19 ENCOUNTER — APPOINTMENT (OUTPATIENT)
Dept: OCCUPATIONAL THERAPY | Facility: CLINIC | Age: 84
DRG: 085 | End: 2024-04-19
Payer: COMMERCIAL

## 2024-04-19 LAB
ATRIAL RATE - MUSE: 65 BPM
DIASTOLIC BLOOD PRESSURE - MUSE: NORMAL MMHG
INTERPRETATION ECG - MUSE: NORMAL
P AXIS - MUSE: NORMAL DEGREES
PR INTERVAL - MUSE: 224 MS
QRS DURATION - MUSE: 170 MS
QT - MUSE: 484 MS
QTC - MUSE: 503 MS
R AXIS - MUSE: -35 DEGREES
SYSTOLIC BLOOD PRESSURE - MUSE: NORMAL MMHG
T AXIS - MUSE: 115 DEGREES
VENTRICULAR RATE- MUSE: 65 BPM

## 2024-04-19 PROCEDURE — 120N000002 HC R&B MED SURG/OB UMMC

## 2024-04-19 PROCEDURE — 250N000013 HC RX MED GY IP 250 OP 250 PS 637

## 2024-04-19 PROCEDURE — 72100 X-RAY EXAM L-S SPINE 2/3 VWS: CPT | Mod: 26 | Performed by: STUDENT IN AN ORGANIZED HEALTH CARE EDUCATION/TRAINING PROGRAM

## 2024-04-19 PROCEDURE — 99232 SBSQ HOSP IP/OBS MODERATE 35: CPT | Mod: GC | Performed by: STUDENT IN AN ORGANIZED HEALTH CARE EDUCATION/TRAINING PROGRAM

## 2024-04-19 PROCEDURE — 250N000011 HC RX IP 250 OP 636

## 2024-04-19 PROCEDURE — 73502 X-RAY EXAM HIP UNI 2-3 VIEWS: CPT

## 2024-04-19 PROCEDURE — 97530 THERAPEUTIC ACTIVITIES: CPT | Mod: GO

## 2024-04-19 PROCEDURE — 93005 ELECTROCARDIOGRAM TRACING: CPT

## 2024-04-19 PROCEDURE — 93010 ELECTROCARDIOGRAM REPORT: CPT | Performed by: INTERNAL MEDICINE

## 2024-04-19 PROCEDURE — 72100 X-RAY EXAM L-S SPINE 2/3 VWS: CPT

## 2024-04-19 PROCEDURE — 73501 X-RAY EXAM HIP UNI 1 VIEW: CPT | Mod: 26 | Performed by: RADIOLOGY

## 2024-04-19 RX ADMIN — DICLOFENAC 2 G: 10 GEL TOPICAL at 15:40

## 2024-04-19 RX ADMIN — DICLOFENAC 2 G: 10 GEL TOPICAL at 21:42

## 2024-04-19 RX ADMIN — THIAMINE HCL TAB 100 MG 100 MG: 100 TAB at 09:14

## 2024-04-19 RX ADMIN — SULFAMETHOXAZOLE AND TRIMETHOPRIM 1 TABLET: 400; 80 TABLET ORAL at 21:43

## 2024-04-19 RX ADMIN — SERTRALINE HYDROCHLORIDE 75 MG: 50 TABLET ORAL at 09:12

## 2024-04-19 RX ADMIN — DICLOFENAC 2 G: 10 GEL TOPICAL at 09:14

## 2024-04-19 RX ADMIN — POLYETHYLENE GLYCOL 3350 17 G: 17 POWDER, FOR SOLUTION ORAL at 09:14

## 2024-04-19 RX ADMIN — FOLIC ACID 1 MG: 1 TABLET ORAL at 09:14

## 2024-04-19 RX ADMIN — ACETAMINOPHEN 975 MG: 325 TABLET, FILM COATED ORAL at 16:52

## 2024-04-19 RX ADMIN — OLANZAPINE 5 MG: 10 INJECTION, POWDER, FOR SOLUTION INTRAMUSCULAR at 18:20

## 2024-04-19 RX ADMIN — ACETAMINOPHEN 975 MG: 325 TABLET, FILM COATED ORAL at 06:01

## 2024-04-19 RX ADMIN — LEVETIRACETAM 750 MG: 750 TABLET, FILM COATED ORAL at 09:13

## 2024-04-19 RX ADMIN — ATORVASTATIN CALCIUM 80 MG: 80 TABLET, FILM COATED ORAL at 21:42

## 2024-04-19 RX ADMIN — SULFAMETHOXAZOLE AND TRIMETHOPRIM 1 TABLET: 400; 80 TABLET ORAL at 09:14

## 2024-04-19 RX ADMIN — PANTOPRAZOLE SODIUM 40 MG: 40 TABLET, DELAYED RELEASE ORAL at 09:14

## 2024-04-19 RX ADMIN — OLANZAPINE 5 MG: 5 TABLET, FILM COATED ORAL at 16:52

## 2024-04-19 RX ADMIN — Medication 12.5 MG: at 09:15

## 2024-04-19 RX ADMIN — LEVETIRACETAM 750 MG: 750 TABLET, FILM COATED ORAL at 21:42

## 2024-04-19 RX ADMIN — LIDOCAINE 4% 2 PATCH: 40 PATCH TOPICAL at 09:14

## 2024-04-19 RX ADMIN — Medication 5 MG: at 21:42

## 2024-04-19 RX ADMIN — SENNOSIDES AND DOCUSATE SODIUM 1 TABLET: 8.6; 5 TABLET ORAL at 21:42

## 2024-04-19 RX ADMIN — QUETIAPINE FUMARATE 75 MG: 50 TABLET ORAL at 15:37

## 2024-04-19 ASSESSMENT — ACTIVITIES OF DAILY LIVING (ADL)
ADLS_ACUITY_SCORE: 45
ADLS_ACUITY_SCORE: 43
ADLS_ACUITY_SCORE: 45

## 2024-04-19 NOTE — PROGRESS NOTES
[facility]    Progress Note - Medicine Service, PAMELLA TEAM 2       Date of Admission:  3/21/2024    Assessment & Plan   David Thomson is a 83 year old male with history of cognitive impairment, CAD s/p CABG (2015), afib, Aortic stenosis s/o TAVR with bioprosthetic valve, with recent admission 03/08-03/18 after a fall at which time he was found to have IPH, IVH, SAH, and SDH, admitted to OSH on 3/21/2024 after a fall with AMS. He was transferred to [facility] due to concern for new intracranial hemorrhage but imaging findings did not demonstrate new ICH. Care transitioned from trauma to medicine service 3/22 for continued evaluation of recurrent falls and encephalopathy. Remains oriented to self and age, occasionally oriented to year, but not place or situation. PT/OT evaluated, recommend TCU on discharge.      Today:   - Off 1:1  -  has sent TCU referrals    # Delirium  # Recent Intraparenchymal hemorrhage with extension into ventricles andmidline shift, SAH, and SDH post fall (3/8)  # Cognitive impairment  Family notes was AxOx3 prior to IPH and since discharge to TCU has been improving but still lethargic and not at baseline. Patient presenting again after fall with AMS. No new intracranial hemorrhage on repeat CT head. Patient is at risk for seizure in the setting of recent intracranial bleed and neurology started empiric antiepileptic therapy for persistent AMS. EEG with evidence of additional left hemispheric focal cerebral dysfunction, but no epileptiform discharges. Occasional urinary retention but UA unremarkable and patient afebrile. Continues to have intermittent delirium overnight and agitation (kicking) requiring 1:1.   - Delirium precautions   - Encourage patient to be awake during the day, keep lights on, windows open   - Encourage uninterrupted sleep at night, limit amount light   - Have glasses available during the day  - Encourage frequent  reorientation    - Encourage cognitive stimulation  - Recreational therapy  - Neuro exams qshift   - Decrease bladder scans to q8h if not voiding   - Discontinue scheduled quetiapine   - 50mg Quetiepine PRN available   - Start olanzapine tablet 5mg at 1700 and 2200   - PRN olanzapine 5mg ODT for agitation, not to exceed 20mg per day  - Medically ready for discharge  - SW assisting with placement, TCU rerral sent      # Risk of Qtc prolongation  # LBBB  Repeat EKG 4/15 showed QTc (corrected for LBBB) at 428.   - Continue antispsychotics as above.  - Weekly EKG      --Chronic--  # Chronic incompletely united distal humerus fracture  Patient is s/p left elbow fracture s/p ORIF at [facility], complicated by elbow wound requiring washout. Per chart review, on lifelong Bactrim. Having occasional elbow pain  - Continue PTA Bactrim  - Increase PRN tylenol to 975mg q8 hours    # Falls  Has had multiple hospital admissions for fall with unknown etiology. Unclear if mechanical vs syncopal episodes. Has extensive cardiac history but recent TTE without abnormalities of bioprosthetic valve.Can consider arrhythmia as well with prior afib.   - Consider ZioPatch on discharge.    # ISHAN  Patient has history of ISHAN. Patient noted to be desatting when asleep, improves with Oxymask. If not tolerating mask can consider repeat blood gas to check PCO2. Bicarb on BMP is WNL.     # Chronic HFpEF 55% 1/2023  # Hypertension   #Afib   Continue to hold PTA eliquis in the setting of recent intracranial hemorrhage per NSGY recs. [facility] about possible Watchman procedure as outpatient.   - Resume PTA Toprol XL  - Hold PTA entresto     # CAD s/p 3V CABG (2015)  Not on aspirin currently  - Statin as below     # Aortic stenosis s/p TAVR (2016)  Last TTE 1/2023 with mean gradient 14mmHg, suspected to be WNL  - Holding PTA eliquis in setting of recent intracranial hemorrhage      #HLD  -PTA lipitor     #GERD  -  "PTA omeprazole     # Idiopathic progressive neuropathy  - Hold PTA Gabapentin due to AMS      # PTSD  # Depression  Patient started on Seroquel for delirium at OSH due to hospitalization in the setting of acute illness. Patient sleeping, minimally responsive as above.   - Continue PTA sertraline  - Scheduled quetiapine, PRN olanzapine as above      -- Outpatient Follow Ups Needed--  Neurosurgery - to assess for hydrocephalus. Would like to be informed on discharge to arrange follow up.      Neurology - assess need for Keppra, until then continue 750mg BID     Cardiology follow up for consideration of Watchman procedure.     Diet: Regular Diet Adult    DVT Prophylaxis: Pneumatic Compression Devices  Rice Catheter: Not present  Fluids: None  Lines: None     Cardiac Monitoring: None  Code Status: Full Code      Clinically Significant Risk Factors                  # Hypertension: Noted on problem list  # Chronic heart failure with preserved ejection fraction: heart failure noted on problem list and last echo with EF >50%       # Overweight: Estimated body mass index is 28.56 kg/m  as calculated from the following:    Height as of an earlier encounter on 3/21/24: 1.854 m (6' 1\").    Weight as of this encounter: 98.2 kg (216 lb 7.9 oz).   # Moderate Malnutrition: based on nutrition assessment      # Financial/Environmental Concerns: none         Disposition Plan         A. Lesli Muniz MD  PGY-2 Internal Medicine       ______________________________________________________________________    Interval History   Overnight had unwitnessed \"fall\", found at the foot of the chair he was sitting in. No change in mental status, did report some back and left hip pain, wasn't able to articulate whether this was worse than usual. No agitation noted overnight. This morning dozing in bed, endorsed some pain but wasn't able to specify where it was.         Physical Exam   Vital Signs: Temp: 97.7  F (36.5  C) Temp src: Oral BP: " 115/56 Pulse: 64   Resp: 20 SpO2: 97 % O2 Device: None (Room air)    Weight: 216 lbs 7.87 oz    General Appearance:  NAD, drowsy but arousable, resting in bed     HEENT: NCAT  CV: RRR   Respiratory: breathing comfortably on room air      Data

## 2024-04-19 NOTE — PROVIDER NOTIFICATION
"Post Fall Assessment Note    S:Patient had a(n): unwitnessed, unassisted fall on 4/18 @1933.    B:Patient's orientation/mental status at time of fall:  alert and oriented.   Medications administered within 8 hours of fall:  PCA/Opiates:  No  Hypnotics:  No  Psychotropics: Yes  Antihypertensives:  No  Sedatives:  No  Location of fall:  Pt room     A:Type of injury from fall:  No injuries observed    Patient status:  Pt alert and  oriented to person, place and situation. Intermittent confusion per his baseline.   Patient was lifted from fall event:  Equipment used to assist  Interventions:  Bed alarm continued Pt offered toileting and food/drink.   MD notified:  Earlene Neely MD @1938, assessed pt at beside.   Family member/next of kin notified:  No, Provider to notify family     R:Falls assessment completed in [facility]:  Yes   Care Plan updated:  Yes   Per provider: \" ok to hold off on repeat imaging. If he has confusion or persistent back pain, can consider head CT, spine xray.     Continuing POC.   "

## 2024-04-19 NOTE — PROGRESS NOTES
Care Management Follow Up    Length of Stay (days): 29    Expected Discharge Date:       Concerns to be Addressed: discharge planning     Patient plan of care discussed at interdisciplinary rounds: Yes    Anticipated Discharge Disposition:  Transitional care     Anticipated Discharge Services:  Transportation services   Anticipated Discharge DME:  None    Patient/family educated on Medicare website which has current facility and service quality ratings:  yes  Education Provided on the Discharge Plan:  yes  Patient/Family in Agreement with the Plan:  yes    Referrals Placed by CM/SW:  N/A  Private pay costs discussed: Not applicable    Additional Information:  SW had a three way call with the patient's son's David Haynes (lives in [place]) and Valente (lives in [place]). SW reported that the patient will likely need placement after TCU, either LTC or memory care. They both stated understanding and are in agreement. They acknowledged that they want to hold out hope the patient will improve and get back to baseline, however they stated that they realistically realize the patient will likely not improve anymore than he has. SW explained that they may need to petition for guardianship due to not have any POA and HCD documents on file. David and Valente reported that Valente has POA and HCD paperwork that the patient and him previously got in order with their . They reported to SW that Valente handles the patient's legal matters and David Haynes handles physical matters (physically helping the patient as needed). JAVY requested that Valente email JAVY a copy of the HCD and POA SW provided Valente with their direct email and he reported he will send that to SW later in the day. SW stated that with the HCD and POA documents that will help with finding placement. Family stated understanding and reiterated that they are agreeable to the patient going to TCU and then LTC. David stated their first choice is [facility] in the hopes of  keeping the patient close to home and David PALAFOX reported once they have the HCD and POA documents they will resent referrals. SW stated understanding and stated they will follow up with David Batres early to mid next week and provide an update.    JAVY will continue to follow for discharge needs and placement.     Nayan Meyer, [facility] [place]  [address]   Office: [phone]  Pager: [phone]  [email or web address]

## 2024-04-19 NOTE — PROGRESS NOTES
Brief progress note    Called regarding unwitnessed fall. Patient evaluated at bedside. Per bedside RN, set off chair alarm and found on the floor at the foot of the chair with head resting on arm of the chair. Does not believe he hit is head. Not having pain/tenderness over scalp or headache. Having low back pain with turns in bed, isn't sure if this is more than usual. Has history of degenerative lumbosacral spine and left hip replacement with neurostimulator in place. Exam is benign with no bumps or bruises over scalp, lower back, or hips. 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     Overall appears stable with no additional workup needed. Bed alarm in place.    Earlene Neely MD PGY-1  Internal Medicine

## 2024-04-19 NOTE — PLAN OF CARE
Goal Outcome Evaluation: Ongoing, progressing    Plan of Care Reviewed With: patient    Overall Patient Progress: improving    Outcome Evaluation: Pt had hip X ray early this shift. Pt is disoriented to time place and situation. Up in the halls with PT. Pt has been very pleasant this shift. No significant changes to labs or VS. B/A on Continue to monitor.

## 2024-04-19 NOTE — CARE PLAN
04/18/24 2100   Fall Event   Patient Assessed By nurse;provider   Name of Provider Notified Earlene Neely MD   Family/Designated Caregiver Notified of Fall   (Provider to notifty family)   Fall Prevention Plan Updated N/a  (Bed alarm continued, no indicaton for sitter at his time.)

## 2024-04-19 NOTE — CONSULTS
SPIRITUAL HEALTH SERVICES Consult Note  [facility]    Referral Source/Reason for Visit: Routine consult    Summary and Recommendations -  ~Castro's goals are restorative. 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 He attends a [employer] in [place] and is not in need of community support or resources at this time.     ~We prayed together for strength, comfort, and peace.    Plan: Please place consult as emotional and spiritual needs arise.  services remain available upon request.    Rev. NURIA Cunningham.  Chaplain Resident  Pager [phone]    * [facility] remains available 24/7 for emergent requests/referrals, either by having the switchboard page the on-call  or by entering an ASAP/STAT consult in Epic (this will also page the on-call ). Routine Epic consults receive an initial response within 24 hours.*

## 2024-04-20 PROCEDURE — 99233 SBSQ HOSP IP/OBS HIGH 50: CPT | Mod: GC | Performed by: STUDENT IN AN ORGANIZED HEALTH CARE EDUCATION/TRAINING PROGRAM

## 2024-04-20 PROCEDURE — 250N000013 HC RX MED GY IP 250 OP 250 PS 637

## 2024-04-20 PROCEDURE — 120N000002 HC R&B MED SURG/OB UMMC

## 2024-04-20 RX ADMIN — FOLIC ACID 1 MG: 1 TABLET ORAL at 09:18

## 2024-04-20 RX ADMIN — QUETIAPINE FUMARATE 75 MG: 50 TABLET ORAL at 16:04

## 2024-04-20 RX ADMIN — SULFAMETHOXAZOLE AND TRIMETHOPRIM 1 TABLET: 400; 80 TABLET ORAL at 21:10

## 2024-04-20 RX ADMIN — SERTRALINE HYDROCHLORIDE 75 MG: 50 TABLET ORAL at 09:18

## 2024-04-20 RX ADMIN — LEVETIRACETAM 750 MG: 750 TABLET, FILM COATED ORAL at 21:10

## 2024-04-20 RX ADMIN — SULFAMETHOXAZOLE AND TRIMETHOPRIM 1 TABLET: 400; 80 TABLET ORAL at 09:18

## 2024-04-20 RX ADMIN — Medication 5 MG: at 21:10

## 2024-04-20 RX ADMIN — PANTOPRAZOLE SODIUM 40 MG: 40 TABLET, DELAYED RELEASE ORAL at 09:19

## 2024-04-20 RX ADMIN — SENNOSIDES AND DOCUSATE SODIUM 1 TABLET: 8.6; 5 TABLET ORAL at 21:10

## 2024-04-20 RX ADMIN — ATORVASTATIN CALCIUM 80 MG: 80 TABLET, FILM COATED ORAL at 21:10

## 2024-04-20 RX ADMIN — LIDOCAINE 4% 2 PATCH: 40 PATCH TOPICAL at 09:19

## 2024-04-20 RX ADMIN — OLANZAPINE 5 MG: 5 TABLET, FILM COATED ORAL at 16:04

## 2024-04-20 RX ADMIN — Medication 12.5 MG: at 09:18

## 2024-04-20 RX ADMIN — THIAMINE HCL TAB 100 MG 100 MG: 100 TAB at 09:18

## 2024-04-20 RX ADMIN — OLANZAPINE 5 MG: 5 TABLET, FILM COATED ORAL at 21:19

## 2024-04-20 RX ADMIN — LEVETIRACETAM 750 MG: 750 TABLET, FILM COATED ORAL at 09:18

## 2024-04-20 ASSESSMENT — ACTIVITIES OF DAILY LIVING (ADL)
ADLS_ACUITY_SCORE: 43

## 2024-04-20 NOTE — PLAN OF CARE
Goal Outcome Evaluation:      Plan of Care Reviewed With: patient    Overall Patient Progress: no changeOverall Patient Progress: no change    Outcome Evaluation: Pt is oriented to self and place and disoriented to time and situation this shift since midnight. Evening meds given with pudding. However, pt can take oral meds in either applesauce or pudding. Back and hip pain improved. Incontinent of urine, Primofit in placed @ 2245 @ low continuous pressure 80mmHg. No sting Lollipod applied prior to placing the primofit to protect penile skin and prevent breakdown. No BM this shift. LBM 4/19. Pt seems calmer this morning, however, pt become agitated once restraints were released this morning. Sitter at bedside for safety.   Continuous pulse ox on.  Continue to monitor pt, evaluate restraints @2H, and follow plan of care

## 2024-04-20 NOTE — PLAN OF CARE
Goal Outcome Evaluation: Ongoing, progressing    Plan of Care Reviewed With: patient, family    Overall Patient Progress: no change    Outcome Evaluation: Oriented to self and place. Very pleasant this shift. Restraints removed and 1:1 sitter is out of the room while family visited with the patient. Discussed possible preceding factors to violent behavior with previous nurse and pt's son and determined that the pt becomes agitated when not allowed to get out of bed. Sitter wheeled pt around the unit at ~1400. No significant changes this shift. Continue to monitor.

## 2024-04-20 NOTE — PROGRESS NOTES
[facility]    Progress Note - Medicine Service, PAMELLA TEAM 2       Date of Admission:  3/21/2024    Assessment & Plan   David Thomson is a 83 year old male with history of cognitive impairment, CAD s/p CABG (2015), afib, Aortic stenosis s/o TAVR with bioprosthetic valve, with recent admission 03/08-03/18 after a fall at which time he was found to have IPH, IVH, SAH, and SDH, admitted to OSH on 3/21/2024 after a fall with AMS. He was transferred to [facility] due to concern for new intracranial hemorrhage but imaging findings did not demonstrate new ICH. Care transitioned from trauma to medicine service 3/22 for continued evaluation of recurrent falls and encephalopathy. Remains oriented to self and age, occasionally oriented to year, but not place or situation. PT/OT evaluated, recommend TCU on discharge.      Today:   - SW has sent TCU referrals    # Delirium  # Recent Intraparenchymal hemorrhage with extension into ventricles andmidline shift, SAH, and SDH post fall (3/8)  # Cognitive impairment  Family notes was AxOx3 prior to IPH and since discharge to TCU has been improving but still lethargic and not at baseline. Patient presenting again after fall with AMS. No new intracranial hemorrhage on repeat CT head. Patient is at risk for seizure in the setting of recent intracranial bleed and neurology started empiric antiepileptic therapy for persistent AMS. EEG with evidence of additional left hemispheric focal cerebral dysfunction, but no epileptiform discharges. Occasional urinary retention but UA unremarkable and patient afebrile. Continues to have intermittent delirium overnight and agitation (kicking) requiring 1:1.   - Delirium precautions   - Encourage patient to be awake during the day, keep lights on, windows open   - Encourage uninterrupted sleep at night, limit amount light   - Have glasses available during the day  - Encourage frequent reorientation    -  Encourage cognitive stimulation  - Recreational therapy  - Neuro exams qshift   - Decrease bladder scans to q8h if not voiding   - Discontinue scheduled quetiapine   - 50mg Quetiepine PRN available   - Start olanzapine tablet 5mg at 1700 and 2200   - PRN olanzapine 5mg ODT for agitation, not to exceed 20mg per day  - Medically ready for discharge  - SW assisting with placement, TCU rerrals sent      # Risk of Qtc prolongation  # LBBB  Repeat EKG 4/15 showed QTc (corrected for LBBB) at 428.   - Continue antispsychotics as above.  - Weekly EKG      --Chronic--  # Chronic incompletely united distal humerus fracture  Patient is s/p left elbow fracture s/p ORIF at [facility], complicated by elbow wound requiring washout. Per chart review, on lifelong Bactrim. Having occasional elbow pain  - Continue PTA Bactrim  - Increase PRN tylenol to 975mg q8 hours    # Falls  Has had multiple hospital admissions for fall with unknown etiology. Unclear if mechanical vs syncopal episodes. Has extensive cardiac history but recent TTE without abnormalities of bioprosthetic valve.Can consider arrhythmia as well with prior afib.   - Consider ZioPatch on discharge.    # ISHAN  Patient has history of ISHAN. Patient noted to be desatting when asleep, improves with Oxymask. If not tolerating mask can consider repeat blood gas to check PCO2. Bicarb on BMP is WNL.     # Chronic HFpEF 55% 1/2023  # Hypertension   #Afib   Continue to hold PTA eliquis in the setting of recent intracranial hemorrhage per NSGY recs. [facility] about possible Watchman procedure as outpatient.   - Resume PTA Toprol XL  - Hold PTA entresto     # CAD s/p 3V CABG (2015)  Not on aspirin currently  - Statin as below     # Aortic stenosis s/p TAVR (2016)  Last TTE 1/2023 with mean gradient 14mmHg, suspected to be WNL  - Holding PTA eliquis in setting of recent intracranial hemorrhage      #HLD  -PTA lipitor     #GERD  - PTA omeprazole     #  "Idiopathic progressive neuropathy  - Hold PTA Gabapentin due to AMS      # PTSD  # Depression  Patient started on Seroquel for delirium at OSH due to hospitalization in the setting of acute illness. Patient sleeping, minimally responsive as above.   - Continue PTA sertraline  - Scheduled quetiapine, PRN olanzapine as above      -- Outpatient Follow Ups Needed--  Neurosurgery - to assess for hydrocephalus. Would like to be informed on discharge to arrange follow up.      Neurology - assess need for Keppra, until then continue 750mg BID     Cardiology follow up for consideration of Watchman procedure.     Diet: Regular Diet Adult    DVT Prophylaxis: Pneumatic Compression Devices  Rice Catheter: Not present  Fluids: None  Lines: None     Cardiac Monitoring: None  Code Status: Full Code      Clinically Significant Risk Factors                  # Hypertension: Noted on problem list  # Chronic heart failure with preserved ejection fraction: heart failure noted on problem list and last echo with EF >50%       # Overweight: Estimated body mass index is 28.56 kg/m  as calculated from the following:    Height as of an earlier encounter on 3/21/24: 1.854 m (6' 1\").    Weight as of this encounter: 98.2 kg (216 lb 7.9 oz).   # Moderate Malnutrition: based on nutrition assessment      # Financial/Environmental Concerns: none         Disposition Plan         A. Lesli Muniz MD  PGY-2 Internal Medicine       ______________________________________________________________________    Interval History   Code 21 called in the afternoon, received IM zyprexa x1, restraints on for short period of time. 1:1 reinstated. This morning very pleasant, sitting up in bed. Has some pain in his shoulder. Last BM yesterday.       Physical Exam   Vital Signs: Temp: 97.7  F (36.5  C) Temp src: Axillary BP: 115/66 Pulse: 59   Resp: 18 SpO2: 95 % O2 Device: None (Room air)    Weight: 216 lbs 7.87 oz    General Appearance:  NAD, awake, sitting up in " bed      HEENT: NCAT  CV: RRR   Respiratory: breathing comfortably on room air, CTAB  Abdomen: soft, NTND      Data

## 2024-04-20 NOTE — PLAN OF CARE
Goal Outcome Evaluation:      Plan of Care Reviewed With: patient    Overall Patient Progress: no changeOverall Patient Progress: no change    Outcome Evaluation: Assumed cares [id number]. Patient up in chair at start of shift, pleasant mood and oriented to self.   Tolerated taking PO meds in applesauce.   Patient stated he had back and hip pain, tylenol given by writer and assited back to bed.   Patient had one large incontinent BM.   Shortly after getting patient back to bed, patient became increasingly agitated and restless stating he wanted to go home and attempting to leave the bed.   Patient started to become combative and aggressive towards staff, unable to redirect, and code 21 called due to safety concerns for patient and staff.   PRN IM zyprexa given per provider orders.   4 point non violent restraint order placed by provider, restraints initiated at 1830.   Sitter at bedside.   Continuous pulse ox on.  Continue to monitor and notify provider of any changes.

## 2024-04-21 PROCEDURE — 250N000013 HC RX MED GY IP 250 OP 250 PS 637

## 2024-04-21 PROCEDURE — 120N000002 HC R&B MED SURG/OB UMMC

## 2024-04-21 PROCEDURE — 99232 SBSQ HOSP IP/OBS MODERATE 35: CPT | Mod: GC | Performed by: STUDENT IN AN ORGANIZED HEALTH CARE EDUCATION/TRAINING PROGRAM

## 2024-04-21 RX ADMIN — QUETIAPINE FUMARATE 75 MG: 50 TABLET ORAL at 16:50

## 2024-04-21 RX ADMIN — OLANZAPINE 5 MG: 5 TABLET, FILM COATED ORAL at 16:50

## 2024-04-21 RX ADMIN — LEVETIRACETAM 750 MG: 750 TABLET, FILM COATED ORAL at 19:59

## 2024-04-21 RX ADMIN — FOLIC ACID 1 MG: 1 TABLET ORAL at 09:02

## 2024-04-21 RX ADMIN — SULFAMETHOXAZOLE AND TRIMETHOPRIM 1 TABLET: 400; 80 TABLET ORAL at 19:59

## 2024-04-21 RX ADMIN — OLANZAPINE 5 MG: 5 TABLET, FILM COATED ORAL at 21:39

## 2024-04-21 RX ADMIN — PANTOPRAZOLE SODIUM 40 MG: 40 TABLET, DELAYED RELEASE ORAL at 09:03

## 2024-04-21 RX ADMIN — LEVETIRACETAM 750 MG: 750 TABLET, FILM COATED ORAL at 09:03

## 2024-04-21 RX ADMIN — Medication 5 MG: at 19:59

## 2024-04-21 RX ADMIN — SULFAMETHOXAZOLE AND TRIMETHOPRIM 1 TABLET: 400; 80 TABLET ORAL at 09:03

## 2024-04-21 RX ADMIN — DICLOFENAC 2 G: 10 GEL TOPICAL at 12:45

## 2024-04-21 RX ADMIN — SENNOSIDES AND DOCUSATE SODIUM 1 TABLET: 8.6; 5 TABLET ORAL at 19:58

## 2024-04-21 RX ADMIN — SERTRALINE HYDROCHLORIDE 75 MG: 50 TABLET ORAL at 09:03

## 2024-04-21 RX ADMIN — DICLOFENAC 2 G: 10 GEL TOPICAL at 09:16

## 2024-04-21 RX ADMIN — THIAMINE HCL TAB 100 MG 100 MG: 100 TAB at 09:03

## 2024-04-21 RX ADMIN — ATORVASTATIN CALCIUM 80 MG: 80 TABLET, FILM COATED ORAL at 19:59

## 2024-04-21 ASSESSMENT — ACTIVITIES OF DAILY LIVING (ADL)
ADLS_ACUITY_SCORE: 43

## 2024-04-21 NOTE — PROGRESS NOTES
[facility]    Progress Note - Medicine Service, PAMELLA TEAM 2       Date of Admission:  3/21/2024    Assessment & Plan   David Thomson is a 83 year old male with history of cognitive impairment, CAD s/p CABG (2015), afib, Aortic stenosis s/o TAVR with bioprosthetic valve, with recent admission 03/08-03/18 after a fall at which time he was found to have IPH, IVH, SAH, and SDH, admitted to OSH on 3/21/2024 after a fall with AMS. He was transferred to [facility] due to concern for new intracranial hemorrhage but imaging findings did not demonstrate new ICH. Care transitioned from trauma to medicine service 3/22 for continued evaluation of recurrent falls and encephalopathy. Remains oriented to self and age, occasionally oriented to year, but not place or situation. PT/OT evaluated, recommend TCU on discharge.      Today:   - SW has sent TCU referrals  - Will trial off 1:1 tonight     # Delirium  # Recent Intraparenchymal hemorrhage with extension into ventricles andmidline shift, SAH, and SDH post fall (3/8)  # Cognitive impairment  Family notes was AxOx3 prior to IPH and since discharge to TCU has been improving but still lethargic and not at baseline. Patient presenting again after fall with AMS. No new intracranial hemorrhage on repeat CT head. Patient is at risk for seizure in the setting of recent intracranial bleed and neurology started empiric antiepileptic therapy for persistent AMS. EEG with evidence of additional left hemispheric focal cerebral dysfunction, but no epileptiform discharges. Occasional urinary retention but UA unremarkable and patient afebrile. Continues to have intermittent delirium overnight and agitation (kicking) requiring 1:1.   - Delirium precautions   - Encourage patient to be awake during the day, keep lights on, windows open   - Encourage uninterrupted sleep at night, limit amount light   - Have glasses available during the day  - Encourage  frequent reorientation    - Encourage cognitive stimulation  - Recreational therapy  - Neuro exams qshift   - Decrease bladder scans to q8h if not voiding   - Discontinue scheduled quetiapine   - 50mg Quetiepine PRN available   - Start olanzapine tablet 5mg at 1700 and 2200   - PRN olanzapine 5mg ODT for agitation, not to exceed 20mg per day  - Medically ready for discharge  - SW assisting with placement, TCU rerrals sent  - Encourage to get up in wheelchair if agitated as wanting to get out of bed seems to be a trigger       # Risk of Qtc prolongation  # LBBB  Repeat EKG 4/15 showed QTc (corrected for LBBB) at 428.   - Continue antispsychotics as above.  - Weekly EKG      --Chronic--  # Chronic incompletely united distal humerus fracture  Patient is s/p left elbow fracture s/p ORIF at [facility], complicated by elbow wound requiring washout. Per chart review, on lifelong Bactrim. Having occasional elbow pain  - Continue PTA Bactrim  - Increase PRN tylenol to 975mg q8 hours    # Falls  Has had multiple hospital admissions for fall with unknown etiology. Unclear if mechanical vs syncopal episodes. Has extensive cardiac history but recent TTE without abnormalities of bioprosthetic valve.Can consider arrhythmia as well with prior afib.   - Consider ZioPatch on discharge.    # ISHAN  Patient has history of ISHAN. Patient noted to be desatting when asleep, improves with Oxymask. If not tolerating mask can consider repeat blood gas to check PCO2. Bicarb on BMP is WNL.     # Chronic HFpEF 55% 1/2023  # Hypertension   #Afib   Continue to hold PTA eliquis in the setting of recent intracranial hemorrhage per NSGY recs. [facility] about possible Watchman procedure as outpatient.   - Resume PTA Toprol XL  - Hold PTA entresto     # CAD s/p 3V CABG (2015)  Not on aspirin currently  - Statin as below     # Aortic stenosis s/p TAVR (2016)  Last TTE 1/2023 with mean gradient 14mmHg, suspected to be  WNL  - Holding PTA eliquis in setting of recent intracranial hemorrhage      #HLD  -PTA lipitor     #GERD  - PTA omeprazole     # Idiopathic progressive neuropathy  - Hold PTA Gabapentin due to AMS      # PTSD  # Depression  Patient started on Seroquel for delirium at OSH due to hospitalization in the setting of acute illness. Patient sleeping, minimally responsive as above.   - Continue PTA sertraline  - Scheduled quetiapine, PRN olanzapine as above      -- Outpatient Follow Ups Needed--  Neurosurgery - to assess for hydrocephalus. Would like to be informed on discharge to arrange follow up.      Neurology - assess need for Keppra, until then continue 750mg BID     Cardiology follow up for consideration of Watchman procedure.     Diet: Regular Diet Adult    DVT Prophylaxis: Pneumatic Compression Devices  Rice Catheter: Not present  Fluids: None  Lines: None     Cardiac Monitoring: None  Code Status: Full Code      Clinically Significant Risk Factors                  # Hypertension: Noted on problem list  # Chronic heart failure with preserved ejection fraction: heart failure noted on problem list and last echo with EF >50%        # Moderate Malnutrition: based on nutrition assessment      # Financial/Environmental Concerns: none         Disposition Plan         A. Lesli Muniz MD  PGY-2 Internal Medicine       ______________________________________________________________________    Interval History   Calm overnight, seemed to enjoy sitting in the wheelchair for some time yesterday. Per RN discussion with son seems to get agitated when he is getting out of bed so this is a helpful solution. This morning sleeping in bed.       Physical Exam   Vital Signs: Temp: 97.6  F (36.4  C) Temp src: Axillary BP: 121/59 Pulse: 56   Resp: 17 SpO2: 98 % O2 Device: None (Room air)    Weight: 216 lbs 7.87 oz    General Appearance:  NAD, awake, sleeping in bed   HEENT: NCAT  CV: RRR   Respiratory: breathing comfortably on room  air, CTAB  Abdomen: soft, NTND      Data

## 2024-04-21 NOTE — PLAN OF CARE
Goal Outcome Evaluation: Ongoing, progressing    Plan of Care Reviewed With: patient    Overall Patient Progress: no change    Outcome Evaluation: ([id number]) M.D.'s considering taking pt off 1:1 sitter. Violent outbursts seem to correlate with patient being unable to leave the room. Yesterday, writer and 2 staff members got pt into a wheelchair when displaying aggressive behavior and wheeled around the unit. There were no reports of aggression from HUMERA or NOC RN's. Pt has been sleeping throughout this shift but pt is arousable for a short time. Activity encouraged but patient refused. Continue to monitor.

## 2024-04-21 NOTE — PLAN OF CARE
Goal Outcome Evaluation:      Plan of Care Reviewed With: patient    Overall Patient Progress: improvingOverall Patient Progress: improving    Outcome Evaluation: Pt is oriented to self and place, but still impulsive, though calmer and cooperative with cares. However, pt has been sleeping most of the night in between cares. Take oral meds in either applesauce or pudding. Incontinent of urine, New Primofit placed @ 0030. No BM this shift. LBM 4/19. Sitter at bedside for safety.  Continue to monitor mentation, keep pt engaged in activities and promote wakefullness during the day, with all lights on, and door open, and follow plan of care.

## 2024-04-21 NOTE — PROGRESS NOTES
/84 (BP Location: Left arm)   Pulse 64   Temp 98.6  F (37  C) (Axillary)   Resp 18   Wt 98.2 kg (216 lb 7.9 oz)   SpO2 94%   BMI 28.56 kg/m      9040-0495    VSS, on room air, oriented to self and place, denies pain, regular diet. Very pleasant today, patient spent a lot of the shift in his wheelchair instead of his bed which seems to greatly elevate his mood. 1:1 sitter. Continue POC.

## 2024-04-22 ENCOUNTER — APPOINTMENT (OUTPATIENT)
Dept: PHYSICAL THERAPY | Facility: CLINIC | Age: 84
DRG: 085 | End: 2024-04-22
Payer: COMMERCIAL

## 2024-04-22 ENCOUNTER — APPOINTMENT (OUTPATIENT)
Dept: CT IMAGING | Facility: CLINIC | Age: 84
DRG: 085 | End: 2024-04-22
Payer: COMMERCIAL

## 2024-04-22 PROBLEM — R45.1 AGITATION: Status: ACTIVE | Noted: 2024-04-22

## 2024-04-22 LAB
ANION GAP SERPL CALCULATED.3IONS-SCNC: 14 MMOL/L (ref 7–15)
BUN SERPL-MCNC: 27.4 MG/DL (ref 8–23)
CALCIUM SERPL-MCNC: 9.3 MG/DL (ref 8.8–10.2)
CHLORIDE SERPL-SCNC: 108 MMOL/L (ref 98–107)
CREAT SERPL-MCNC: 0.95 MG/DL (ref 0.67–1.17)
DEPRECATED HCO3 PLAS-SCNC: 21 MMOL/L (ref 22–29)
EGFRCR SERPLBLD CKD-EPI 2021: 80 ML/MIN/1.73M2
ERYTHROCYTE [DISTWIDTH] IN BLOOD BY AUTOMATED COUNT: 15.5 % (ref 10–15)
GLUCOSE SERPL-MCNC: 179 MG/DL (ref 70–99)
HCT VFR BLD AUTO: 34.5 % (ref 40–53)
HGB BLD-MCNC: 11.1 G/DL (ref 13.3–17.7)
MCH RBC QN AUTO: 30.6 PG (ref 26.5–33)
MCHC RBC AUTO-ENTMCNC: 32.2 G/DL (ref 31.5–36.5)
MCV RBC AUTO: 95 FL (ref 78–100)
PLATELET # BLD AUTO: 202 10E3/UL (ref 150–450)
POTASSIUM SERPL-SCNC: 3.9 MMOL/L (ref 3.4–5.3)
RBC # BLD AUTO: 3.63 10E6/UL (ref 4.4–5.9)
SODIUM SERPL-SCNC: 143 MMOL/L (ref 135–145)
WBC # BLD AUTO: 5.9 10E3/UL (ref 4–11)

## 2024-04-22 PROCEDURE — 97530 THERAPEUTIC ACTIVITIES: CPT | Mod: GP

## 2024-04-22 PROCEDURE — 70450 CT HEAD/BRAIN W/O DYE: CPT

## 2024-04-22 PROCEDURE — 93010 ELECTROCARDIOGRAM REPORT: CPT | Performed by: INTERNAL MEDICINE

## 2024-04-22 PROCEDURE — 93005 ELECTROCARDIOGRAM TRACING: CPT

## 2024-04-22 PROCEDURE — 36415 COLL VENOUS BLD VENIPUNCTURE: CPT

## 2024-04-22 PROCEDURE — 120N000002 HC R&B MED SURG/OB UMMC

## 2024-04-22 PROCEDURE — 250N000013 HC RX MED GY IP 250 OP 250 PS 637

## 2024-04-22 PROCEDURE — 99232 SBSQ HOSP IP/OBS MODERATE 35: CPT | Mod: GC | Performed by: STUDENT IN AN ORGANIZED HEALTH CARE EDUCATION/TRAINING PROGRAM

## 2024-04-22 PROCEDURE — 80048 BASIC METABOLIC PNL TOTAL CA: CPT

## 2024-04-22 PROCEDURE — 70450 CT HEAD/BRAIN W/O DYE: CPT | Mod: 26 | Performed by: RADIOLOGY

## 2024-04-22 PROCEDURE — 85027 COMPLETE CBC AUTOMATED: CPT

## 2024-04-22 PROCEDURE — 97110 THERAPEUTIC EXERCISES: CPT | Mod: GP

## 2024-04-22 RX ORDER — OLANZAPINE 5 MG/1
5 TABLET ORAL DAILY
DISCHARGE
Start: 2024-04-22 | End: 2024-05-15

## 2024-04-22 RX ORDER — QUETIAPINE FUMARATE 50 MG/1
50 TABLET, FILM COATED ORAL
DISCHARGE
Start: 2024-04-22 | End: 2024-05-16

## 2024-04-22 RX ORDER — QUETIAPINE FUMARATE 50 MG/1
50 TABLET, FILM COATED ORAL
Status: DISCONTINUED | OUTPATIENT
Start: 2024-04-22 | End: 2024-04-29

## 2024-04-22 RX ORDER — OLANZAPINE 5 MG/1
5 TABLET ORAL AT BEDTIME
DISCHARGE
Start: 2024-04-22 | End: 2024-05-15

## 2024-04-22 RX ORDER — QUETIAPINE FUMARATE 25 MG/1
75 TABLET, FILM COATED ORAL EVERY 24 HOURS
DISCHARGE
Start: 2024-04-22 | End: 2024-05-09

## 2024-04-22 RX ORDER — LIDOCAINE 4 G/G
2 PATCH TOPICAL EVERY 24 HOURS
DISCHARGE
Start: 2024-04-22

## 2024-04-22 RX ORDER — OLANZAPINE 5 MG/1
5 TABLET, ORALLY DISINTEGRATING ORAL DAILY PRN
DISCHARGE
Start: 2024-04-22

## 2024-04-22 RX ORDER — LANOLIN ALCOHOL/MO/W.PET/CERES
100 CREAM (GRAM) TOPICAL DAILY
DISCHARGE
Start: 2024-04-22

## 2024-04-22 RX ORDER — LEVETIRACETAM 750 MG/1
750 TABLET ORAL 2 TIMES DAILY
DISCHARGE
Start: 2024-04-22

## 2024-04-22 RX ADMIN — THIAMINE HCL TAB 100 MG 100 MG: 100 TAB at 08:27

## 2024-04-22 RX ADMIN — ACETAMINOPHEN 975 MG: 325 TABLET, FILM COATED ORAL at 08:42

## 2024-04-22 RX ADMIN — SENNOSIDES AND DOCUSATE SODIUM 1 TABLET: 8.6; 5 TABLET ORAL at 19:59

## 2024-04-22 RX ADMIN — QUETIAPINE FUMARATE 75 MG: 50 TABLET ORAL at 14:58

## 2024-04-22 RX ADMIN — LEVETIRACETAM 750 MG: 750 TABLET, FILM COATED ORAL at 19:59

## 2024-04-22 RX ADMIN — FOLIC ACID 1 MG: 1 TABLET ORAL at 08:27

## 2024-04-22 RX ADMIN — OLANZAPINE 5 MG: 5 TABLET, FILM COATED ORAL at 21:44

## 2024-04-22 RX ADMIN — POLYETHYLENE GLYCOL 3350 17 G: 17 POWDER, FOR SOLUTION ORAL at 08:37

## 2024-04-22 RX ADMIN — OLANZAPINE 5 MG: 5 TABLET, FILM COATED ORAL at 19:59

## 2024-04-22 RX ADMIN — ATORVASTATIN CALCIUM 80 MG: 80 TABLET, FILM COATED ORAL at 19:59

## 2024-04-22 RX ADMIN — SERTRALINE HYDROCHLORIDE 75 MG: 50 TABLET ORAL at 08:26

## 2024-04-22 RX ADMIN — LEVETIRACETAM 750 MG: 750 TABLET, FILM COATED ORAL at 08:27

## 2024-04-22 RX ADMIN — SULFAMETHOXAZOLE AND TRIMETHOPRIM 1 TABLET: 400; 80 TABLET ORAL at 08:27

## 2024-04-22 RX ADMIN — PANTOPRAZOLE SODIUM 40 MG: 40 TABLET, DELAYED RELEASE ORAL at 08:27

## 2024-04-22 RX ADMIN — Medication 5 MG: at 21:44

## 2024-04-22 RX ADMIN — Medication 12.5 MG: at 08:36

## 2024-04-22 RX ADMIN — SULFAMETHOXAZOLE AND TRIMETHOPRIM 1 TABLET: 400; 80 TABLET ORAL at 19:59

## 2024-04-22 ASSESSMENT — ACTIVITIES OF DAILY LIVING (ADL)
ADLS_ACUITY_SCORE: 50
ADLS_ACUITY_SCORE: 50
ADLS_ACUITY_SCORE: 43
ADLS_ACUITY_SCORE: 50
ADLS_ACUITY_SCORE: 43
ADLS_ACUITY_SCORE: 43
ADLS_ACUITY_SCORE: 50
ADLS_ACUITY_SCORE: 43
ADLS_ACUITY_SCORE: 50
ADLS_ACUITY_SCORE: 43
ADLS_ACUITY_SCORE: 50
ADLS_ACUITY_SCORE: 43

## 2024-04-22 NOTE — PROGRESS NOTES
"/62 (BP Location: Left arm)   Pulse 56   Temp 97.3  F (36.3  C) (Oral)   Resp 16   Wt 98.2 kg (216 lb 7.9 oz)   SpO2 95%   BMI 28.56 kg/m      1630-4458    VSS, room air, regular diet tolerated well, assist x2, 1:1 sitter. Disorientated to situation, place, and time. Patient was calm and cooperative all shift until 2130 when he became aggressive and tried escaping his bed. I offered to sit him up in his chair but he said it \"makes no difference where I sit if I can't leave\". Continue POC.   "

## 2024-04-22 NOTE — PLAN OF CARE
Goal Outcome Evaluation:      Plan of Care Reviewed With: patient    Overall Patient Progress: no changeOverall Patient Progress: no change     [id number]  A&O x1, alert to self w/ illogical speech. VSS on RA. Afebrile. Denies cardiac chest pain or shortness of breath. Pt up to chair w/ therapy. Lift back to bed. Scheduled seroquel given pre Head CT. 1:1 attendant off since ~1000. Possible TCU 4/23. Continue POC

## 2024-04-22 NOTE — PROGRESS NOTES
Care Management Follow Up    Length of Stay (days): 32    Expected Discharge Date:       Concerns to be Addressed: discharge planning     Patient plan of care discussed at interdisciplinary rounds: Yes    Anticipated Discharge Disposition:  TCU with LTC     Anticipated Discharge Services:  Transportation services  Anticipated Discharge DME:  None    Patient/family educated on Medicare website which has current facility and service quality ratings:  yes  Education Provided on the Discharge Plan:  yes  Patient/Family in Agreement with the Plan:  yes    Referrals Placed by CM/SW:      Accepted  [facility]  [address] [place] [address]  P: [phone]  F: [phone]    Pending  [facility] liaison: Minda Jenkins  P: [phone]  F: [phone]  [email or web address]  -[place]  -[facility]  -[facility]  Private pay costs discussed: Not applicable    Additional Information:  Trialing off 1:1 sitter starting 4/22 @0830    [facility] admissions reported they are still trying to figure out the [facility] insurance.    SW will continue to follow for discharge needs and placement.     VI[facility] Cutler  [address]   Office: [phone]  Pager: [phone]  [email or web address]

## 2024-04-22 NOTE — PLAN OF CARE
/81 (BP Location: Left arm)   Pulse 77   Temp 99.8  F (37.7  C) (Oral)   Resp 16   Wt 98.2 kg (216 lb 7.9 oz)   SpO2 95%   BMI 28.56 kg/m      Goal Outcome Evaluation:      Plan of Care Reviewed With: patient    Overall Patient Progress: no changeOverall Patient Progress: no change    Outcome Evaluation: Pt is oriented to self and place, but still impulsive, though calmer and cooperative with cares at times. However, pt became agitated at 0300 till 0500. Take oral meds in either applesauce or pudding. Incontinent of urine, Primofit placed @ 0000 but pt pulled it off. 1 large BM this morning. Sitter at bedside for safety.  Continue to monitor mentation, keep pt engaged in activities and promote wakefullness during the day, with all lights on, and door open, and follow plan of care.

## 2024-04-22 NOTE — PROGRESS NOTES
[facility]    Progress Note - Medicine Service, PAMELLA TEAM 2       Date of Admission:  3/21/2024    Assessment & Plan   David Thomson is a 83 year old male with history of cognitive impairment, CAD s/p CABG (2015), afib, Aortic stenosis s/o TAVR with bioprosthetic valve, with recent admission 03/08-03/18 after a fall at which time he was found to have IPH, IVH, SAH, and SDH, admitted to OSH on 3/21/2024 after a fall with AMS. He was transferred to [facility] due to concern for new intracranial hemorrhage but imaging findings did not demonstrate new ICH. Care transitioned from trauma to medicine service 3/22 for continued evaluation of recurrent falls and encephalopathy. Remains oriented to self and age, occasionally oriented to year, but not place or situation. PT/OT evaluated, recommend TCU on discharge.      Today:   - SW has sent TCU referrals  - Trialing off 1:1     # Delirium  # Recent Intraparenchymal hemorrhage with extension into ventricles andmidline shift, SAH, and SDH post fall (3/8)  # Cognitive impairment  Family notes was AxOx3 prior to IPH and since discharge to TCU has been improving but still lethargic and not at baseline. Patient presenting again after fall with AMS. No new intracranial hemorrhage on repeat CT head. Patient is at risk for seizure in the setting of recent intracranial bleed and neurology started empiric antiepileptic therapy for persistent AMS. EEG with evidence of additional left hemispheric focal cerebral dysfunction, but no epileptiform discharges. Occasional urinary retention but UA unremarkable and patient afebrile. Continues to have intermittent delirium overnight and agitation (kicking) requiring 1:1.   - Delirium precautions   - Encourage patient to be awake during the day, keep lights on, windows open   - Encourage uninterrupted sleep at night, limit amount light   - Have glasses available during the day  - Encourage frequent  reorientation    - Encourage cognitive stimulation  - Recreational therapy  - Neuro exams qshift   - Decrease bladder scans to q8h if not voiding   - Discontinue scheduled quetiapine   - 50mg Quetiepine PRN available   - Start olanzapine tablet 5mg at 1700 and 2200   - PRN olanzapine 5mg ODT for agitation, not to exceed 20mg per day  - Medically ready for discharge  - SW assisting with placement, TCU rerrals sent  - Encourage to get up in wheelchair if agitated as wanting to get out of bed seems to be a trigger       # Risk of Qtc prolongation  # LBBB  Repeat EKG 4/15 showed QTc (corrected for LBBB) at 428.   - Continue antispsychotics as above.  - Weekly EKG      --Chronic--  # Chronic incompletely united distal humerus fracture  Patient is s/p left elbow fracture s/p ORIF at [facility], complicated by elbow wound requiring washout. Per chart review, on lifelong Bactrim. Having occasional elbow pain  - Continue PTA Bactrim  - Increase PRN tylenol to 975mg q8 hours    # Falls  Has had multiple hospital admissions for fall with unknown etiology. Unclear if mechanical vs syncopal episodes. Has extensive cardiac history but recent TTE without abnormalities of bioprosthetic valve.Can consider arrhythmia as well with prior afib.   - Consider ZioPatch on discharge.    # ISHAN  Patient has history of ISHAN. Patient noted to be desatting when asleep, improves with Oxymask. If not tolerating mask can consider repeat blood gas to check PCO2. Bicarb on BMP is WNL.     # Chronic HFpEF 55% 1/2023  # Hypertension   #Afib   Continue to hold PTA eliquis in the setting of recent intracranial hemorrhage per NSGY recs. [facility] for evaluation about possible Watchman procedure as outpatient.   - Resume PTA Toprol XL  - Hold PTA entresto     # CAD s/p 3V CABG (2015)  Not on aspirin currently  - Statin as below     # Aortic stenosis s/p TAVR (2016)  Last TTE 1/2023 with mean gradient 14mmHg, suspected to be WNL  -  Holding PTA eliquis in setting of recent intracranial hemorrhage      #HLD  -PTA lipitor     #GERD  - PTA omeprazole     # Idiopathic progressive neuropathy  - Hold PTA Gabapentin due to AMS      # PTSD  # Depression  Patient started on Seroquel for delirium at OSH due to hospitalization in the setting of acute illness. Patient sleeping, minimally responsive as above.   - Continue PTA sertraline  - Scheduled quetiapine, PRN olanzapine as above      -- Outpatient Follow Ups Needed--  Neurosurgery - to assess for hydrocephalus. Would like to be informed on discharge to arrange follow up.      Neurology - assess need for Keppra, until then continue 750mg BID     Cardiology follow up for consideration of Watchman procedure.     Diet: Regular Diet Adult    DVT Prophylaxis: Pneumatic Compression Devices  Rice Catheter: Not present  Fluids: None  Lines: None     Cardiac Monitoring: None  Code Status: Full Code      Clinically Significant Risk Factors                  # Hypertension: Noted on problem list  # Chronic heart failure with preserved ejection fraction: heart failure noted on problem list and last echo with EF >50%        # Moderate Malnutrition: based on nutrition assessment      # Financial/Environmental Concerns: none         Disposition Plan         A. Lesli Muniz MD  PGY-2 Internal Medicine       ______________________________________________________________________    Interval History   Some agitation overnight, redirectable and did not require PRNs. Sitting up in the chair this AM eating breakfast. Knows it's Monday, asking if we are rounding. Some nonsensical statements as well.       Physical Exam   Vital Signs: Temp: 97.8  F (36.6  C) Temp src: Oral BP: 122/81 Pulse: 85   Resp: 18 SpO2: 95 % O2 Device: None (Room air)    Weight: 216 lbs 7.87 oz    General Appearance:  NAD, awake, sitting in chair    HEENT: NCAT  CV: RRR   Respiratory: breathing comfortably on room air, CTAB  Abdomen: soft,  NTND      Data     I have personally reviewed the following data over the past 24 hrs:    5.9  \   11.1 (L)   / 202     143 108 (H) 27.4 (H) /  179 (H)   3.9 21 (L) 0.95 \

## 2024-04-23 ENCOUNTER — APPOINTMENT (OUTPATIENT)
Dept: OCCUPATIONAL THERAPY | Facility: CLINIC | Age: 84
DRG: 085 | End: 2024-04-23
Payer: COMMERCIAL

## 2024-04-23 PROCEDURE — 120N000002 HC R&B MED SURG/OB UMMC

## 2024-04-23 PROCEDURE — 250N000013 HC RX MED GY IP 250 OP 250 PS 637

## 2024-04-23 PROCEDURE — 99232 SBSQ HOSP IP/OBS MODERATE 35: CPT | Mod: GC | Performed by: STUDENT IN AN ORGANIZED HEALTH CARE EDUCATION/TRAINING PROGRAM

## 2024-04-23 PROCEDURE — 97530 THERAPEUTIC ACTIVITIES: CPT | Mod: GO | Performed by: OCCUPATIONAL THERAPIST

## 2024-04-23 RX ORDER — OLANZAPINE 10 MG/2ML
5 INJECTION, POWDER, FOR SOLUTION INTRAMUSCULAR DAILY PRN
Status: DISCONTINUED | OUTPATIENT
Start: 2024-04-23 | End: 2024-05-10 | Stop reason: HOSPADM

## 2024-04-23 RX ORDER — OLANZAPINE 10 MG/2ML
5 INJECTION, POWDER, FOR SOLUTION INTRAMUSCULAR DAILY PRN
Status: DISCONTINUED | OUTPATIENT
Start: 2024-04-23 | End: 2024-04-23

## 2024-04-23 RX ORDER — OLANZAPINE 5 MG/1
5 TABLET, ORALLY DISINTEGRATING ORAL DAILY PRN
Status: DISCONTINUED | OUTPATIENT
Start: 2024-04-23 | End: 2024-05-10 | Stop reason: HOSPADM

## 2024-04-23 RX ADMIN — ATORVASTATIN CALCIUM 80 MG: 80 TABLET, FILM COATED ORAL at 20:55

## 2024-04-23 RX ADMIN — LEVETIRACETAM 750 MG: 750 TABLET, FILM COATED ORAL at 09:22

## 2024-04-23 RX ADMIN — SERTRALINE HYDROCHLORIDE 75 MG: 50 TABLET ORAL at 09:21

## 2024-04-23 RX ADMIN — PANTOPRAZOLE SODIUM 40 MG: 40 TABLET, DELAYED RELEASE ORAL at 09:21

## 2024-04-23 RX ADMIN — LEVETIRACETAM 750 MG: 750 TABLET, FILM COATED ORAL at 20:55

## 2024-04-23 RX ADMIN — Medication 5 MG: at 20:55

## 2024-04-23 RX ADMIN — SENNOSIDES AND DOCUSATE SODIUM 1 TABLET: 8.6; 5 TABLET ORAL at 20:55

## 2024-04-23 RX ADMIN — Medication 12.5 MG: at 09:22

## 2024-04-23 RX ADMIN — FOLIC ACID 1 MG: 1 TABLET ORAL at 09:21

## 2024-04-23 RX ADMIN — POLYETHYLENE GLYCOL 3350 17 G: 17 POWDER, FOR SOLUTION ORAL at 09:22

## 2024-04-23 RX ADMIN — OLANZAPINE 5 MG: 5 TABLET, FILM COATED ORAL at 21:00

## 2024-04-23 RX ADMIN — QUETIAPINE FUMARATE 75 MG: 50 TABLET ORAL at 16:25

## 2024-04-23 RX ADMIN — SULFAMETHOXAZOLE AND TRIMETHOPRIM 1 TABLET: 400; 80 TABLET ORAL at 20:55

## 2024-04-23 RX ADMIN — THIAMINE HCL TAB 100 MG 100 MG: 100 TAB at 09:22

## 2024-04-23 RX ADMIN — SULFAMETHOXAZOLE AND TRIMETHOPRIM 1 TABLET: 400; 80 TABLET ORAL at 09:21

## 2024-04-23 RX ADMIN — OLANZAPINE 5 MG: 5 TABLET, FILM COATED ORAL at 16:25

## 2024-04-23 ASSESSMENT — ACTIVITIES OF DAILY LIVING (ADL)
ADLS_ACUITY_SCORE: 45
ADLS_ACUITY_SCORE: 50
ADLS_ACUITY_SCORE: 45
ADLS_ACUITY_SCORE: 50
ADLS_ACUITY_SCORE: 50
ADLS_ACUITY_SCORE: 45
ADLS_ACUITY_SCORE: 50
ADLS_ACUITY_SCORE: 45
ADLS_ACUITY_SCORE: 50
ADLS_ACUITY_SCORE: 45

## 2024-04-23 NOTE — CARE PLAN
04/23/24 1600   Fall Event   Patient Assessed By nurse   Name of Provider Notified Raul Vásquez MD   Fall Prevention Plan Updated N/a

## 2024-04-23 NOTE — PLAN OF CARE
Goal Outcome Evaluation:      Plan of Care Reviewed With: patient    Overall Patient Progress: no changeOverall Patient Progress: no change     Assumed care from 2300 to 0700.     Pt alert and cooperative , confused per baseline. VVS on RA. PAINAD score of 0. Incontinent to B/B, external catheter in place, no BM. Pt had no aggressive episode on overnight. Bed alarm on and pt frequently rounded on by staff. Plan to discharge to TCU with LTC on 3/23. Continuing POC.

## 2024-04-23 NOTE — PROGRESS NOTES
[facility]    Progress Note - Medicine Service, PAMELLA TEAM 2       Date of Admission:  3/21/2024    Assessment & Plan   David Thomson is a 83 year old male with history of cognitive impairment, CAD s/p CABG (2015), afib, Aortic stenosis s/o TAVR with bioprosthetic valve, with recent admission 03/08-03/18 after a fall at which time he was found to have IPH, IVH, SAH, and SDH, admitted to OSH on 3/21/2024 after a fall with AMS. He was transferred to [facility] due to concern for new intracranial hemorrhage but imaging findings did not demonstrate new ICH. Care transitioned from trauma to medicine service 3/22 for continued evaluation of recurrent falls and encephalopathy. Remains oriented to self and age, occasionally oriented to year, but not place or situation. PT/OT evaluated, recommend TCU on discharge.     Today:  -  - VA insurance does not work. Will consider other TCU referrals.     # Delirium, improving    # Recent Intraparenchymal hemorrhage with extension into ventricles andmidline shift, SAH, and SDH post fall (3/8)  # Cognitive impairment  Family notes was AxOx3 prior to IPH and since discharge to TCU has been improving but still lethargic and not at baseline. Patient presenting again after fall with AMS. No new intracranial hemorrhage on repeat CT head. Patient is at risk for seizure in the setting of recent intracranial bleed and neurology started empiric antiepileptic therapy for persistent AMS. EEG with evidence of additional left hemispheric focal cerebral dysfunction, but no epileptiform discharges. Occasional urinary retention but UA unremarkable and patient afebrile. Continues to have intermittent delirium overnight and agitation (kicking) requiring 1:1.   - Delirium precautions   - Encourage patient to be awake during the day, keep lights on, windows open   - Encourage uninterrupted sleep at night, limit amount light   - Have glasses available  during the day  - Encourage frequent reorientation    - Encourage cognitive stimulation  - Recreational therapy  - Neuro exams qshift   - Decrease bladder scans to q8h if not voiding   - Discontinue scheduled quetiapine   - 50mg Quetiepine PRN available   - Start olanzapine tablet 5mg at 1700 and 2200   - PRN olanzapine 5mg ODT for agitation, not to exceed 20mg per day  - Medically ready for discharge  - SW assisting with placement, TCU rerrals sent  - Encourage to get up in wheelchair if agitated as wanting to get out of bed seems to be a trigger       # Risk of Qtc prolongation  # LBBB  Repeat EKG 4/15 showed QTc (corrected for LBBB) at 428.   - Continue antispsychotics as above.  - Weekly EKG      --Chronic--  # Chronic incompletely united distal humerus fracture  Patient is s/p left elbow fracture s/p ORIF at [facility], complicated by elbow wound requiring washout. Per chart review, on lifelong Bactrim. Having occasional elbow pain  - Continue PTA Bactrim  - Increase PRN tylenol to 975mg q8 hours    # Falls  Has had multiple hospital admissions for fall with unknown etiology. Unclear if mechanical vs syncopal episodes. Has extensive cardiac history but recent TTE without abnormalities of bioprosthetic valve.Can consider arrhythmia as well with prior afib.   - Consider ZioPatch on discharge.    # ISHAN  Patient has history of ISHAN. Patient noted to be desatting when asleep, improves with Oxymask. If not tolerating mask can consider repeat blood gas to check PCO2. Bicarb on BMP is WNL.     # Chronic HFpEF 55% 1/2023  # Hypertension   #Afib   Continue to hold PTA eliquis in the setting of recent intracranial hemorrhage per NSGY recs. [facility] will [facility] for evaluation about possible Watchman procedure as outpatient.   - Resume PTA Toprol XL  - Hold PTA entresto     # CAD s/p 3V CABG (2015)  Not on aspirin currently  - Statin as below     # Aortic stenosis s/p TAVR (2016)  Last TTE 1/2023 with mean  gradient 14mmHg, suspected to be WNL  - Holding PTA eliquis in setting of recent intracranial hemorrhage      #HLD  -PTA lipitor     #GERD  - PTA omeprazole     # Idiopathic progressive neuropathy  - Hold PTA Gabapentin due to AMS      # PTSD  # Depression  Patient started on Seroquel for delirium at OSH due to hospitalization in the setting of acute illness. Patient sleeping, minimally responsive as above.   - Continue PTA sertraline  - Scheduled quetiapine, PRN olanzapine as above      -- Outpatient Follow Ups Needed--  Neurosurgery - to assess for hydrocephalus. Would like to be informed on discharge to arrange follow up.      Neurology - assess need for Keppra, until then continue 750mg BID     Cardiology follow up for consideration of Watchman procedure.     Diet: Regular Diet Adult    DVT Prophylaxis: Pneumatic Compression Devices  Rice Catheter: Not present  Fluids: None  Lines: None     Cardiac Monitoring: None  Code Status: Full Code      Clinically Significant Risk Factors                  # Hypertension: Noted on problem list  # Chronic heart failure with preserved ejection fraction: heart failure noted on problem list and last echo with EF >50%        # Moderate Malnutrition: based on nutrition assessment      # Financial/Environmental Concerns: none         Disposition Plan         Tri Mcfarland MD  [facility]  Department of Internal Medicine   Resident Physician  PGY-1    ______________________________________________________________________    Interval History   NAEON. Ese was sitting up in chair this morning. The most alert that I've seen him this hospital stay. He endorses having chronic pains, but nothing worse than his normal and no changes. He endorsed wanting to go outside and inquired about the TCU placement. We informed him that social work is working on figuring out if they will take his [facility] insurance.       Physical Exam   Vital Signs: Temp: 97.7  F (36.5  C) Temp src:  Axillary BP: (!) 140/65 Pulse: 62   Resp: 17 SpO2: 95 % O2 Device: None (Room air)    Weight: 233 lbs 7.47 oz    General Appearance:  NAD, awake, sitting in chair    HEENT: NCAT  CV: RRR   Respiratory: breathing comfortably on room air, CTAB  Abdomen: soft, NTND      Data

## 2024-04-23 NOTE — PLAN OF CARE
Goal Outcome Evaluation:      Plan of Care Reviewed With: patient    Overall Patient Progress: no change    Outcome Evaluation: Pt continues with confusion and impulse.  Slid out of recliner and onto floor this afternoon without injury.    RN assumed cares at 0700, Pt alert and oriented to self only.  VS stable this afternoon.  Pt denies pain this shift.  More impulsive this afternoon/evening; seroquel and zyprexa given.  No PIV in place.  Plan to discharge to [facility] once insurance confirmed and patient medically ready.  No other acute incidents this shift.  Continue to monitor and notify MD of any changes.

## 2024-04-23 NOTE — PLAN OF CARE
Goal Outcome Evaluation:                    Pt was admitted on 3/21 for falls and AMS. Pt was only alert to self. Pt had illogical speech. Pt slept for a few hours at the beginning of the evening. Pt had a poor appetite for dinner. Primafit was placed on the patient. No PIV. Pt did not have a sitter and did not try to get out of bed by himself. Plan for TCU with LTC tomorrow at [facility]. Continue with plan of care.

## 2024-04-24 ENCOUNTER — APPOINTMENT (OUTPATIENT)
Dept: OCCUPATIONAL THERAPY | Facility: CLINIC | Age: 84
DRG: 085 | End: 2024-04-24
Payer: COMMERCIAL

## 2024-04-24 ENCOUNTER — APPOINTMENT (OUTPATIENT)
Dept: PHYSICAL THERAPY | Facility: CLINIC | Age: 84
DRG: 085 | End: 2024-04-24
Payer: COMMERCIAL

## 2024-04-24 LAB
ATRIAL RATE - MUSE: 67 BPM
ATRIAL RATE - MUSE: NORMAL BPM
DIASTOLIC BLOOD PRESSURE - MUSE: NORMAL MMHG
DIASTOLIC BLOOD PRESSURE - MUSE: NORMAL MMHG
INTERPRETATION ECG - MUSE: NORMAL
INTERPRETATION ECG - MUSE: NORMAL
P AXIS - MUSE: 42 DEGREES
P AXIS - MUSE: NORMAL DEGREES
PR INTERVAL - MUSE: 206 MS
PR INTERVAL - MUSE: NORMAL MS
QRS DURATION - MUSE: 162 MS
QRS DURATION - MUSE: 174 MS
QT - MUSE: 476 MS
QT - MUSE: 516 MS
QTC - MUSE: 497 MS
QTC - MUSE: 502 MS
R AXIS - MUSE: -17 DEGREES
R AXIS - MUSE: -24 DEGREES
SYSTOLIC BLOOD PRESSURE - MUSE: NORMAL MMHG
SYSTOLIC BLOOD PRESSURE - MUSE: NORMAL MMHG
T AXIS - MUSE: 106 DEGREES
T AXIS - MUSE: 111 DEGREES
VENTRICULAR RATE- MUSE: 56 BPM
VENTRICULAR RATE- MUSE: 67 BPM

## 2024-04-24 PROCEDURE — 250N000013 HC RX MED GY IP 250 OP 250 PS 637

## 2024-04-24 PROCEDURE — 97530 THERAPEUTIC ACTIVITIES: CPT | Mod: GO | Performed by: OCCUPATIONAL THERAPIST

## 2024-04-24 PROCEDURE — 93005 ELECTROCARDIOGRAM TRACING: CPT

## 2024-04-24 PROCEDURE — 97535 SELF CARE MNGMENT TRAINING: CPT | Mod: GO | Performed by: OCCUPATIONAL THERAPIST

## 2024-04-24 PROCEDURE — 97530 THERAPEUTIC ACTIVITIES: CPT | Mod: GP

## 2024-04-24 PROCEDURE — 120N000002 HC R&B MED SURG/OB UMMC

## 2024-04-24 PROCEDURE — 97116 GAIT TRAINING THERAPY: CPT | Mod: GP

## 2024-04-24 PROCEDURE — 99232 SBSQ HOSP IP/OBS MODERATE 35: CPT | Mod: GC | Performed by: STUDENT IN AN ORGANIZED HEALTH CARE EDUCATION/TRAINING PROGRAM

## 2024-04-24 PROCEDURE — 93010 ELECTROCARDIOGRAM REPORT: CPT | Mod: GC | Performed by: INTERNAL MEDICINE

## 2024-04-24 RX ADMIN — Medication 12.5 MG: at 09:15

## 2024-04-24 RX ADMIN — FOLIC ACID 1 MG: 1 TABLET ORAL at 09:15

## 2024-04-24 RX ADMIN — ATORVASTATIN CALCIUM 80 MG: 80 TABLET, FILM COATED ORAL at 21:50

## 2024-04-24 RX ADMIN — QUETIAPINE FUMARATE 75 MG: 50 TABLET ORAL at 15:10

## 2024-04-24 RX ADMIN — POLYETHYLENE GLYCOL 3350 17 G: 17 POWDER, FOR SOLUTION ORAL at 09:15

## 2024-04-24 RX ADMIN — SULFAMETHOXAZOLE AND TRIMETHOPRIM 1 TABLET: 400; 80 TABLET ORAL at 09:15

## 2024-04-24 RX ADMIN — QUETIAPINE FUMARATE 50 MG: 50 TABLET ORAL at 21:50

## 2024-04-24 RX ADMIN — Medication 5 MG: at 21:50

## 2024-04-24 RX ADMIN — LIDOCAINE 4% 2 PATCH: 40 PATCH TOPICAL at 09:19

## 2024-04-24 RX ADMIN — OLANZAPINE 5 MG: 5 TABLET, FILM COATED ORAL at 17:02

## 2024-04-24 RX ADMIN — QUETIAPINE FUMARATE 50 MG: 50 TABLET ORAL at 00:21

## 2024-04-24 RX ADMIN — LEVETIRACETAM 750 MG: 750 TABLET, FILM COATED ORAL at 09:15

## 2024-04-24 RX ADMIN — PANTOPRAZOLE SODIUM 40 MG: 40 TABLET, DELAYED RELEASE ORAL at 09:15

## 2024-04-24 RX ADMIN — SULFAMETHOXAZOLE AND TRIMETHOPRIM 1 TABLET: 400; 80 TABLET ORAL at 21:50

## 2024-04-24 RX ADMIN — THIAMINE HCL TAB 100 MG 100 MG: 100 TAB at 09:15

## 2024-04-24 RX ADMIN — LEVETIRACETAM 750 MG: 750 TABLET, FILM COATED ORAL at 21:50

## 2024-04-24 RX ADMIN — SENNOSIDES AND DOCUSATE SODIUM 1 TABLET: 8.6; 5 TABLET ORAL at 21:50

## 2024-04-24 RX ADMIN — SERTRALINE HYDROCHLORIDE 75 MG: 50 TABLET ORAL at 09:14

## 2024-04-24 RX ADMIN — OLANZAPINE 5 MG: 5 TABLET, FILM COATED ORAL at 21:50

## 2024-04-24 ASSESSMENT — ACTIVITIES OF DAILY LIVING (ADL)
ADLS_ACUITY_SCORE: 46
ADLS_ACUITY_SCORE: 47
ADLS_ACUITY_SCORE: 47
ADLS_ACUITY_SCORE: 46
ADLS_ACUITY_SCORE: 46
ADLS_ACUITY_SCORE: 47
ADLS_ACUITY_SCORE: 47
ADLS_ACUITY_SCORE: 45
ADLS_ACUITY_SCORE: 45
ADLS_ACUITY_SCORE: 46
ADLS_ACUITY_SCORE: 47
ADLS_ACUITY_SCORE: 46
ADLS_ACUITY_SCORE: 45
ADLS_ACUITY_SCORE: 45
ADLS_ACUITY_SCORE: 46
ADLS_ACUITY_SCORE: 46
ADLS_ACUITY_SCORE: 45
ADLS_ACUITY_SCORE: 45
ADLS_ACUITY_SCORE: 46
ADLS_ACUITY_SCORE: 45

## 2024-04-24 NOTE — PLAN OF CARE
Goal Outcome Evaluation:      Plan of Care Reviewed With: patient    Overall Patient Progress: no changeOverall Patient Progress: no change    Outcome Evaluation: see RD note 4/24    Jenny Townsend MS, RD, LD, [facility]    6C (beds 8514-4467) + 7C (beds 7765-5234) + ED   Available in Vocera by name or unit dietitian  No longer available via pager

## 2024-04-24 NOTE — PLAN OF CARE
"Plan of Care Reviewed With: patient    Overall Patient Progress: no change    Status: Full code. Vital signs- Q8H. Bed alarm on.   Pain/Nausea: Pt stated they have pain, unable to localize. Two as scheduled lidocaine patches placed one on each shoulder- L shoulder patch removed by pt around 1900 . Denies nausea.   Mobility: Ax2. Gait/walker. Ambulated out of pt room with PT- did very well.   Diet: Reg diet. Feeing assistance.   Labs: Reviewed.   LDAs: No PIV.   Skin/incisions: Some redness around external urinary catheter. Bruising upper arms/forearms -- generalized. Wound covered on L leg. Scar on L hip.   Neuro: AO x1 (self).   Respiratory: Course/diminished breath sounds.  Cardiac: WDL.   GI/: Incontinent. Primafit in place (changed multiple times) - good output. No BM [id number] shift.   New Changes: Improved ambulation, less agitation.   Plan: Waiting for discharge to a TCU. Set bed alarm. Continue POC.      Vital signs:  Temp: 97.6  F (36.4  C) Temp src: Oral BP: 111/63 Pulse: 60   Resp: 16 SpO2: 95 % O2 Device: None (Room air)     Weight: 105.9 kg (233 lb 7.5 oz)  Estimated body mass index is 30.8 kg/m  as calculated from the following:    Height as of an earlier encounter on 3/21/24: 1.854 m (6' 1\").    Weight as of this encounter: 105.9 kg (233 lb 7.5 oz).                "

## 2024-04-24 NOTE — PROGRESS NOTES
[facility]    Progress Note - Medicine Service, PAMELLA TEAM 2       Date of Admission:  3/21/2024    Assessment & Plan   David Thomson is a 83 year old male with history of cognitive impairment, CAD s/p CABG (2015), afib, Aortic stenosis s/o TAVR with bioprosthetic valve, with recent admission 03/08-03/18 after a fall at which time he was found to have IPH, IVH, SAH, and SDH, admitted to OSH on 3/21/2024 after a fall with AMS. He was transferred to [facility] due to concern for new intracranial hemorrhage but imaging findings did not demonstrate new ICH. Care transitioned from trauma to medicine service 3/22 for continued evaluation of recurrent falls and encephalopathy. Remains oriented to self and age, occasionally oriented to year, but not place or situation. PT/OT evaluated, recommend TCU on discharge.     Today:  -  - [facility] insurance does not work. Will consider other TCU referrals.     # Delirium, improving    # Recent Intraparenchymal hemorrhage with extension into ventricles andmidline shift, SAH, and SDH post fall (3/8)  # Cognitive impairment  Family notes was AxOx3 prior to IPH and since discharge to TCU has been improving but still lethargic and not at baseline. Patient presenting again after fall with AMS. No new intracranial hemorrhage on repeat CT head. Patient is at risk for seizure in the setting of recent intracranial bleed and neurology started empiric antiepileptic therapy for persistent AMS. EEG with evidence of additional left hemispheric focal cerebral dysfunction, but no epileptiform discharges. Occasional urinary retention but UA unremarkable and patient afebrile. Continues to have intermittent delirium overnight and agitation (kicking) requiring 1:1.   - Delirium precautions   - Encourage patient to be awake during the day, keep lights on, windows open   - Encourage uninterrupted sleep at night, limit amount light   - Have glasses available  during the day  - Encourage frequent reorientation    - Encourage cognitive stimulation  - Recreational therapy  - Neuro exams qshift   - Decrease bladder scans to q8h if not voiding   - Discontinue scheduled quetiapine   - 50mg Quetiepine PRN available   - Start olanzapine tablet 5mg at 1700 and 2200   - PRN olanzapine 5mg ODT for agitation, not to exceed 20mg per day  - Medically ready for discharge  - SW assisting with placement, TCU rerrals sent  - Encourage to get up in wheelchair if agitated as wanting to get out of bed seems to be a trigger       # Risk of Qtc prolongation  # LBBB  Repeat EKG 4/15 showed QTc (corrected for LBBB) at 428.   - Continue antispsychotics as above.  - Weekly EKG      --Chronic--  # Chronic incompletely united distal humerus fracture  Patient is s/p left elbow fracture s/p ORIF at [facility], complicated by elbow wound requiring washout. Per chart review, on lifelong Bactrim. Having occasional elbow pain  - Continue PTA Bactrim  - Increase PRN tylenol to 975mg q8 hours    # Falls  Has had multiple hospital admissions for fall with unknown etiology. Unclear if mechanical vs syncopal episodes. Has extensive cardiac history but recent TTE without abnormalities of bioprosthetic valve.Can consider arrhythmia as well with prior afib.   - Consider ZioPatch on discharge.    # ISHAN  Patient has history of ISHAN. Patient noted to be desatting when asleep, improves with Oxymask. If not tolerating mask can consider repeat blood gas to check PCO2. Bicarb on BMP is WNL.     # Chronic HFpEF 55% 1/2023  # Hypertension   #Afib   Continue to hold PTA eliquis in the setting of recent intracranial hemorrhage per NSGY recs. He will [facility] for evaluation about possible Watchman procedure as outpatient.   - Resume PTA Toprol XL  - Hold PTA entresto     # CAD s/p 3V CABG (2015)  Not on aspirin currently  - Statin as below     # Aortic stenosis s/p TAVR (2016)  Last TTE 1/2023 with mean  gradient 14mmHg, suspected to be WNL  - Holding PTA eliquis in setting of recent intracranial hemorrhage      #HLD  -PTA lipitor     #GERD  - PTA omeprazole     # Idiopathic progressive neuropathy  - Hold PTA Gabapentin due to AMS      # PTSD  # Depression  Patient started on Seroquel for delirium at OSH due to hospitalization in the setting of acute illness. Patient sleeping, minimally responsive as above.   - Continue PTA sertraline  - Scheduled quetiapine, PRN olanzapine as above      -- Outpatient Follow Ups Needed--  Neurosurgery - to assess for hydrocephalus. Would like to be informed on discharge to arrange follow up.      Neurology - assess need for Keppra, until then continue 750mg BID     Cardiology follow up for consideration of Watchman procedure.     Diet: Regular Diet Adult    DVT Prophylaxis: Pneumatic Compression Devices  Rice Catheter: Not present  Fluids: None  Lines: None     Cardiac Monitoring: None  Code Status: Full Code      Clinically Significant Risk Factors                  # Hypertension: Noted on problem list  # Chronic heart failure with preserved ejection fraction: heart failure noted on problem list and last echo with EF >50%        # Moderate Malnutrition: based on nutrition assessment      # Financial/Environmental Concerns: none         Disposition Plan         AAlee Muniz MD  PGY-2 Internal Medicine     ______________________________________________________________________    Interval History   Some agitation overnight, received PRN seroquel. Not requiring 1:1. This morning sleeping in bed.       Physical Exam   Vital Signs: Temp: 97.9  F (36.6  C) Temp src: Oral BP: 129/72 Pulse: 60   Resp: 16 SpO2: 97 % O2 Device: None (Room air)    Weight: 233 lbs 7.47 oz    General Appearance:  NAD, sleeping in bed    HEENT: NCAT  Respiratory: breathing comfortably on room air  Abdomen: nondistended       Data

## 2024-04-24 NOTE — PLAN OF CARE
Goal Outcome Evaluation: Assumed care from 1900 to 0700.       Plan of Care Reviewed With: patient    Overall Patient Progress: no changeOverall Patient Progress: no change    Outcome Evaluation: Pt continues to have cofusion and restlessness. Able to get throiugh NOC shift without sitter. PRN given X1    Pt alert to self and place, disoriented to situation and time. Frequently tried to get out of bed and leave hospital. Redirection and reorientation provided; pt allowed to roam in hallways in wc while being watched by staff. MD pageshruthi and PRN Seroquel given. EKG done to check QT elevation. Endorsed pain but refused medication offered. VVS on RA. Appetite poor during shift. No concerns observed from recent fall. Incontinent to b/b, primafit replaced X2 during shift. No BM. Pt to discharge to memory care unit when bed available. Continuing POC, bed alarm on, delirium precautions in place.

## 2024-04-24 NOTE — PROGRESS NOTES
CLINICAL NUTRITION SERVICES - REASSESSMENT NOTE     Nutrition Prescription    RECOMMENDATIONS FOR MDs/PROVIDERS TO ORDER:  None currently      Malnutrition Status:    Moderate malnutrition in the context of acute illness/disease     Recommendations already ordered by Registered Dietitian (RD):  Automatic/standard meal trays TID.  Please assist with meal time/feeding as needed  Ensure Enlive with meals   Encourage PO efforts     Future/Additional Recommendations:  Monitor nutrition-related findings and follow pt per protocol      EVALUATION OF THE PROGRESS TOWARD GOALS   Diet: Regular and Ensure Enlive TID     Intake/Tolerance: Patient consuming 25-50 % of 3 meal(s) daily.     NEW FINDINGS   Room visit. Pt sleeping at time of visit and did not wake to knocks/name. Little walter snacks noted at bedside. Pt continues to receive house trays. Variable intake documented. Awaiting placement. Current interventions remain appropriate.    GI:  Last BM: 04/22/24  On scheduled bowel med(s)    Weight:  Most Recent Weight: 105.9 kg (233 lb 7.5 oz)  on 4/22/24 via Bed scale  Body mass index is 30.8 kg/m .  Wt up 2.3 kg from admission. I/O's + 1.3L since admit.     Meds:  Lipitor  Folic acid  Melatonin  Protonix  Miralax  Senna-docusate  Thiamine  abx    Labs:  reviewed    Skin:  reviewed    MALNUTRITION  % Intake: < 75% for > 7 days (moderate)  % Weight Loss: > 2% in 1 week (severe)  Subcutaneous Fat Loss: None observed   Muscle Loss: Thoracic region (clavicle, acromium bone, deltoid, trapezius, pectoral):  mild, likely age appropriate  Fluid Accumulation/Edema:  trace  Malnutrition Diagnosis: Moderate malnutrition in the context of acute illness/disease    Previous Goals   Patient to consume % of nutritionally adequate meal trays TID, or the equivalent with supplements/snacks.   Evaluation: Not met    Previous Nutrition Diagnosis  Inadequate oral intake related to altered mentation, reduced appetite as evidenced by variable  meal intake   Evaluation: No change    CURRENT NUTRITION DIAGNOSIS  Inadequate oral intake related to altered mentation, reduced appetite as evidenced by variable meal intake     INTERVENTIONS  Implementation  Medical food supplement therapy  Multivitamin/mineral supplement therapy     Goals  Patient to consume % of nutritionally adequate meal trays TID, or the equivalent with supplements/snacks.     Monitoring/Evaluation  Progress toward goals will be monitored and evaluated per protocol.    Jenny Townsend MS, RD, LD, CNSC    6C (beds 9480-8605) + 7C (beds 5557-5163) + ED   Available in Vocera by name or unit dietitian  No longer available via pager

## 2024-04-25 ENCOUNTER — DOCUMENTATION ONLY (OUTPATIENT)
Dept: OTHER | Facility: CLINIC | Age: 84
End: 2024-04-25
Payer: COMMERCIAL

## 2024-04-25 ENCOUNTER — APPOINTMENT (OUTPATIENT)
Dept: OCCUPATIONAL THERAPY | Facility: CLINIC | Age: 84
DRG: 085 | End: 2024-04-25
Payer: COMMERCIAL

## 2024-04-25 PROCEDURE — 97530 THERAPEUTIC ACTIVITIES: CPT | Mod: GO

## 2024-04-25 PROCEDURE — 120N000002 HC R&B MED SURG/OB UMMC

## 2024-04-25 PROCEDURE — 250N000013 HC RX MED GY IP 250 OP 250 PS 637

## 2024-04-25 PROCEDURE — 99232 SBSQ HOSP IP/OBS MODERATE 35: CPT | Performed by: STUDENT IN AN ORGANIZED HEALTH CARE EDUCATION/TRAINING PROGRAM

## 2024-04-25 RX ADMIN — OLANZAPINE 5 MG: 5 TABLET, FILM COATED ORAL at 16:38

## 2024-04-25 RX ADMIN — SENNOSIDES AND DOCUSATE SODIUM 1 TABLET: 8.6; 5 TABLET ORAL at 21:20

## 2024-04-25 RX ADMIN — LEVETIRACETAM 750 MG: 750 TABLET, FILM COATED ORAL at 11:56

## 2024-04-25 RX ADMIN — SERTRALINE HYDROCHLORIDE 75 MG: 50 TABLET ORAL at 11:56

## 2024-04-25 RX ADMIN — QUETIAPINE FUMARATE 75 MG: 50 TABLET ORAL at 16:38

## 2024-04-25 RX ADMIN — OLANZAPINE 5 MG: 5 TABLET, FILM COATED ORAL at 22:41

## 2024-04-25 RX ADMIN — POLYETHYLENE GLYCOL 3350 17 G: 17 POWDER, FOR SOLUTION ORAL at 11:56

## 2024-04-25 RX ADMIN — THIAMINE HCL TAB 100 MG 100 MG: 100 TAB at 11:56

## 2024-04-25 RX ADMIN — Medication 12.5 MG: at 11:56

## 2024-04-25 RX ADMIN — ACETAMINOPHEN 975 MG: 325 TABLET, FILM COATED ORAL at 22:41

## 2024-04-25 RX ADMIN — Medication 5 MG: at 21:20

## 2024-04-25 RX ADMIN — LEVETIRACETAM 750 MG: 750 TABLET, FILM COATED ORAL at 21:20

## 2024-04-25 RX ADMIN — PANTOPRAZOLE SODIUM 40 MG: 40 TABLET, DELAYED RELEASE ORAL at 11:56

## 2024-04-25 RX ADMIN — ATORVASTATIN CALCIUM 80 MG: 80 TABLET, FILM COATED ORAL at 21:20

## 2024-04-25 RX ADMIN — SULFAMETHOXAZOLE AND TRIMETHOPRIM 1 TABLET: 400; 80 TABLET ORAL at 21:20

## 2024-04-25 RX ADMIN — FOLIC ACID 1 MG: 1 TABLET ORAL at 11:56

## 2024-04-25 RX ADMIN — SULFAMETHOXAZOLE AND TRIMETHOPRIM 1 TABLET: 400; 80 TABLET ORAL at 11:57

## 2024-04-25 ASSESSMENT — ACTIVITIES OF DAILY LIVING (ADL)
ADLS_ACUITY_SCORE: 49

## 2024-04-25 NOTE — PROGRESS NOTES
[facility]    Medicine Progress Note - Medicine Service, PAMELLA TEAM 2    Date of Admission:  3/21/2024    Assessment & Plan     83 year old male with history of cognitive impairment, CAD s/p CABG (2015), afib, Aortic stenosis s/o TAVR with bioprosthetic valve, with recent admission 03/08-03/18 after a fall at which time he was found to have IPH, IVH, SAH, and SDH, admitted to OSH on 3/21/2024 after a fall with AMS. He was transferred to [facility] due to concern for new intracranial hemorrhage but imaging findings did not demonstrate new ICH. Care transitioned from trauma to medicine service 3/22 for continued evaluation of recurrent falls and encephalopathy. Remains oriented to self and age, occasionally oriented to year, but not place or situation. PT/OT evaluated, recommend TCU on discharge.      Today:  -  - [facility] insurance does not work. Will consider other TCU referrals.      # Delirium, improving    # Recent Intraparenchymal hemorrhage with extension into ventricles andmidline shift, SAH, and SDH post fall (3/8)  # Cognitive impairment  Family notes was AxOx3 prior to IPH and since discharge to TCU has been improving but still lethargic and not at baseline. Patient presenting again after fall with AMS. No new intracranial hemorrhage on repeat CT head. Patient is at risk for seizure in the setting of recent intracranial bleed and neurology started empiric antiepileptic therapy for persistent AMS. EEG with evidence of additional left hemispheric focal cerebral dysfunction, but no epileptiform discharges. Occasional urinary retention but UA unremarkable and patient afebrile. Continues to have intermittent delirium overnight and agitation (kicking) requiring 1:1.   - Delirium precautions              - Encourage patient to be awake during the day, keep lights on, windows open              - Encourage uninterrupted sleep at night, limit amount light              - Have  glasses available during the day  - Encourage frequent reorientation               - Encourage cognitive stimulation  - Recreational therapy  - Neuro exams qshift   - Decrease bladder scans to q8h if not voiding   - Discontinue scheduled quetiapine              - 50mg Quetiepine PRN available   - Start olanzapine tablet 5mg at 1700 and 2200   - PRN olanzapine 5mg ODT for agitation, not to exceed 20mg per day  - Medically ready for discharge  - SW assisting with placement, TCU rerrals sent  - Encourage to get up in wheelchair if agitated as wanting to get out of bed seems to be a trigger       # Risk of Qtc prolongation  # LBBB  Repeat EKG 4/15 showed QTc (corrected for LBBB) at 428.   - Continue antispsychotics as above.  - Weekly EKG      --Chronic--  # Chronic incompletely united distal humerus fracture  Patient is s/p left elbow fracture s/p ORIF at [facility], complicated by elbow wound requiring washout. Per chart review, on lifelong Bactrim. Having occasional elbow pain  - Continue PTA Bactrim  - Increase PRN tylenol to 975mg q8 hours     # Falls  Has had multiple hospital admissions for fall with unknown etiology. Unclear if mechanical vs syncopal episodes. Has extensive cardiac history but recent TTE without abnormalities of bioprosthetic valve.Can consider arrhythmia as well with prior afib.   - Consider ZioPatch on discharge.     # ISHAN  Patient has history of ISHAN. Patient noted to be desatting when asleep, improves with Oxymask. If not tolerating mask can consider repeat blood gas to check PCO2. Bicarb on BMP is WNL.      # Chronic HFpEF 55% 1/2023  # Hypertension   #Afib   Continue to hold PTA eliquis in the setting of recent intracranial hemorrhage per NSGY recs. He will [facility] for evaluation about possible Watchman procedure as outpatient.   - Resume PTA Toprol XL  - Hold PTA entresto     # CAD s/p 3V CABG (2015)  Not on aspirin currently  - Statin as below     # Aortic stenosis s/p  TAVR (2016)  Last TTE 1/2023 with mean gradient 14mmHg, suspected to be WNL  - Holding PTA eliquis in setting of recent intracranial hemorrhage      #HLD  -PTA lipitor     #GERD  - PTA omeprazole     # Idiopathic progressive neuropathy  - Hold PTA Gabapentin due to AMS      # PTSD  # Depression  Patient started on Seroquel for delirium at OSH due to hospitalization in the setting of acute illness. Patient sleeping, minimally responsive as above.   - Continue PTA sertraline  - Scheduled quetiapine, PRN olanzapine as above      -- Outpatient Follow Ups Needed--  Neurosurgery - to assess for hydrocephalus. Would like to be informed on discharge to arrange follow up.      Neurology - assess need for Keppra, until then continue 750mg BID     Cardiology follow up for consideration of Watchman procedure.          Diet: Regular Diet Adult  Room Service  Snacks/Supplements Adult: Ensure Enlive; With Meals  Diet    DVT Prophylaxis: Pneumatic Compression Devices  Rice Catheter: Not present  Lines: None     Cardiac Monitoring: None  Code Status: Full Code      Clinically Significant Risk Factors                  # Hypertension: Noted on problem list  # Chronic heart failure with preserved ejection fraction: heart failure noted on problem list and last echo with EF >50%        # Moderate Malnutrition: based on nutrition assessment    # Financial/Environmental Concerns: none         Disposition Plan     Medically Ready for Discharge: Ready Now             LEVY ONOFRE MD  Medicine Service, [facility] TEAM [address] [place] [address]  Securely message with Jibestream (more info)  Text page via [employer] Paging/Directory   See signed in provider for up to date coverage information  ______________________________________________________________________    Interval History   Lying in bed sleeping comfortably and would like to sleep. No major events overnight     Physical Exam   Vital Signs: Temp: 98.8  F (37.1   C) Temp src: Oral BP: 130/67 Pulse: 73   Resp: 18 SpO2: 94 % O2 Device: None (Room air)    Weight: 233 lbs 7.47 oz    Constitutional: Lying in bed with no acute distress     Medical Decision Making       25 MINUTES SPENT BY ME on the date of service doing chart review, history, exam, documentation & further activities per the note.      Data   ------------------------- PAST 24 HR DATA REVIEWED -----------------------------------------------

## 2024-04-25 NOTE — PLAN OF CARE
Goal Outcome Evaluation:      Plan of Care Reviewed With: patient    Overall Patient Progress: no changeOverall Patient Progress: no change    Outcome Evaluation: Pt got through NOC shift without sitter. No prns given for agitation/restlessness     Assumed care from 1900 to 0700  Pt Pt alert to self and place, disoriented to situation and time. Frequently tried to get out of bed but was redirected back to stay in bed. Bed alarm on, delirium precautions in place. VVS on RA. PAINAD score of 0. Appetite good during shift. Incontinent to b/b, primafit replaced X1 during shift. No BM. Pt to discharge to memory care unit when bed available.

## 2024-04-25 NOTE — PLAN OF CARE
Goal Outcome Evaluation:      Plan of Care Reviewed With: patient    Overall Patient Progress: no change    Outcome Evaluation: Medically ready, awaiting placement.    RN assumed cares at 0700, Pt alert and oriented to self only.  VS stable throughout the shift.  Pt up to chair for much of the shift.  BM this afternoon and large urine incontinence; Primofit replaced and resting comfortably in bed.  No PIV in place.  Pt does not appear in pain and does not verbalize when prompted.  Plan to discharge to LTC/memory care once accepted.  No acute incidents this shift.  Continue to monitor and notify MD of any changes.

## 2024-04-25 NOTE — PROGRESS NOTES
Care Management Follow Up    Length of Stay (days): 35    Expected Discharge Date:       Concerns to be Addressed: discharge planning     Patient plan of care discussed at interdisciplinary rounds: Yes    Anticipated Discharge Disposition:  TCU with the anticipation he will need LTC     Anticipated Discharge Services:  Transportation services  Anticipated Discharge DME:  None    Patient/family educated on Medicare website which has current facility and service quality ratings:  yes  Education Provided on the Discharge Plan:  yes  Patient/Family in Agreement with the Plan:  yes    Referrals Placed by CM/SW:      [facility]  [address] [facility] [address]  P: [phone]  F: [phone]  4/25: Initial referral sent via [facility]  [address] [facility] [address]  P: [phone]  F: [phone]  4/25: Initial referral sent via [facility]  [address] [facility] [address]  P: [phone]  F: [phone]  4/25: Initial referral sent via [facility]  [address] [facility] [address]  P: [phone]  F: [phone]  4/25: Initial referral sent via [facility]  [place]: Behavior concerns  [facility]: Not [facility] contracted  [facility]: Bed not available   [facility], not [facility]    Private pay costs discussed: Not applicable    Additional Information:  JAVY received an email from the patient's son Valente with a copy of the patient's health care directive (HCD)    JAVY emailed the copy to [place] [facility] requesting it be uploaded to the patient's chart.  JAVY placed a copy of the HCD in the patient's physical chart.    JAVY updated [facility] Liaison and requested the [facility] contracted [place] facilities re-review the patient.    JAVY updated the care team.     JAVY will continue to follow for discharge needs and placement    VIET Cutler  Unit 7C Social  Worker  Office: [phone]  Pager: [phone]  [email or web address]

## 2024-04-26 ENCOUNTER — APPOINTMENT (OUTPATIENT)
Dept: PHYSICAL THERAPY | Facility: CLINIC | Age: 84
DRG: 085 | End: 2024-04-26
Payer: COMMERCIAL

## 2024-04-26 PROCEDURE — 250N000013 HC RX MED GY IP 250 OP 250 PS 637

## 2024-04-26 PROCEDURE — 97530 THERAPEUTIC ACTIVITIES: CPT | Mod: GP

## 2024-04-26 PROCEDURE — 99232 SBSQ HOSP IP/OBS MODERATE 35: CPT | Mod: GC | Performed by: STUDENT IN AN ORGANIZED HEALTH CARE EDUCATION/TRAINING PROGRAM

## 2024-04-26 PROCEDURE — 120N000002 HC R&B MED SURG/OB UMMC

## 2024-04-26 PROCEDURE — 97116 GAIT TRAINING THERAPY: CPT | Mod: GP

## 2024-04-26 RX ADMIN — POLYETHYLENE GLYCOL 3350 17 G: 17 POWDER, FOR SOLUTION ORAL at 09:21

## 2024-04-26 RX ADMIN — SULFAMETHOXAZOLE AND TRIMETHOPRIM 1 TABLET: 400; 80 TABLET ORAL at 09:24

## 2024-04-26 RX ADMIN — OLANZAPINE 5 MG: 5 TABLET, FILM COATED ORAL at 17:29

## 2024-04-26 RX ADMIN — SENNOSIDES AND DOCUSATE SODIUM 1 TABLET: 8.6; 5 TABLET ORAL at 19:50

## 2024-04-26 RX ADMIN — QUETIAPINE FUMARATE 75 MG: 50 TABLET ORAL at 17:29

## 2024-04-26 RX ADMIN — FOLIC ACID 1 MG: 1 TABLET ORAL at 09:24

## 2024-04-26 RX ADMIN — SERTRALINE HYDROCHLORIDE 75 MG: 50 TABLET ORAL at 09:24

## 2024-04-26 RX ADMIN — PANTOPRAZOLE SODIUM 40 MG: 40 TABLET, DELAYED RELEASE ORAL at 09:24

## 2024-04-26 RX ADMIN — OLANZAPINE 5 MG: 5 TABLET, FILM COATED ORAL at 23:16

## 2024-04-26 RX ADMIN — Medication 12.5 MG: at 09:24

## 2024-04-26 RX ADMIN — DICLOFENAC 2 G: 10 GEL TOPICAL at 09:29

## 2024-04-26 RX ADMIN — ACETAMINOPHEN 975 MG: 325 TABLET, FILM COATED ORAL at 09:29

## 2024-04-26 RX ADMIN — Medication 5 MG: at 19:50

## 2024-04-26 RX ADMIN — THIAMINE HCL TAB 100 MG 100 MG: 100 TAB at 09:24

## 2024-04-26 RX ADMIN — LEVETIRACETAM 750 MG: 750 TABLET, FILM COATED ORAL at 09:24

## 2024-04-26 RX ADMIN — ATORVASTATIN CALCIUM 80 MG: 80 TABLET, FILM COATED ORAL at 19:50

## 2024-04-26 RX ADMIN — LEVETIRACETAM 750 MG: 750 TABLET, FILM COATED ORAL at 19:50

## 2024-04-26 RX ADMIN — DICLOFENAC 2 G: 10 GEL TOPICAL at 19:50

## 2024-04-26 RX ADMIN — SULFAMETHOXAZOLE AND TRIMETHOPRIM 1 TABLET: 400; 80 TABLET ORAL at 19:50

## 2024-04-26 ASSESSMENT — ACTIVITIES OF DAILY LIVING (ADL)
ADLS_ACUITY_SCORE: 49

## 2024-04-26 NOTE — PLAN OF CARE
Goal Outcome Evaluation:      Plan of Care Reviewed With: patient    Overall Patient Progress: no changeOverall Patient Progress: no change    Outcome Evaluation: Pt alert and oriented to self only. VS on RA. Pain on the right side of upper abdomen. Pt touched where he felt pain and was given tylenol.No BM this shift, large urine incontinence; Primofit replaced and resting comfortably in bed. Pt slept the entire shift and woke up  to take his med. Meds were given with pudding.  No PIV in place. Pt is confused and speech is incoherent. Plan to discharge to LTC/memory care once accepted.  No acute incidents this shift.  Continue to monitor and notify MD of any changes.

## 2024-04-26 NOTE — PROGRESS NOTES
[facility]    Medicine Progress Note - Medicine Service, PAMELLA TEAM 2    Date of Admission:  3/21/2024    Assessment & Plan     83 year old male with history of cognitive impairment, CAD s/p CABG (2015), afib, Aortic stenosis s/o TAVR with bioprosthetic valve, with recent admission 03/08-03/18 after a fall at which time he was found to have IPH, IVH, SAH, and SDH, admitted to OSH on 3/21/2024 after a fall with AMS. He was transferred to [facility] due to concern for new intracranial hemorrhage but imaging findings did not demonstrate new ICH. Care transitioned from trauma to medicine service 3/22 for continued evaluation of recurrent falls and encephalopathy. Remains oriented to self and age, occasionally oriented to year, but not place or situation. PT/OT evaluated, recommend TCU on discharge.      Today:  - TCU placement pending      # Delirium, improving    # Recent Intraparenchymal hemorrhage with extension into ventricles andmidline shift, SAH, and SDH post fall (3/8)  # Cognitive impairment  Family notes was AxOx3 prior to IPH and since discharge to TCU has been improving but still lethargic and not at baseline. Patient presenting again after fall with AMS. No new intracranial hemorrhage on repeat CT head. Patient is at risk for seizure in the setting of recent intracranial bleed and neurology started empiric antiepileptic therapy for persistent AMS. EEG with evidence of additional left hemispheric focal cerebral dysfunction, but no epileptiform discharges. Occasional urinary retention but UA unremarkable and patient afebrile. Continues to have intermittent delirium overnight and agitation (kicking) requiring 1:1.   - Delirium precautions              - Encourage patient to be awake during the day, keep lights on, windows open              - Encourage uninterrupted sleep at night, limit amount light              - Have glasses available during the day  - Encourage  frequent reorientation               - Encourage cognitive stimulation  - Recreational therapy  - Neuro exams qshift   - Decrease bladder scans to q8h if not voiding   - Discontinue scheduled quetiapine              - 50mg Quetiepine PRN available   - Start olanzapine tablet 5mg at 1700 and 2200   - PRN olanzapine 5mg ODT for agitation, not to exceed 20mg per day  - Medically ready for discharge  - SW assisting with placement, TCU rerrals sent  - Encourage to get up in wheelchair if agitated as wanting to get out of bed seems to be a trigger       # Risk of Qtc prolongation  # LBBB  Repeat EKG 4/15 showed QTc (corrected for LBBB) at 428.   - Continue antispsychotics as above.  - Weekly EKG      --Chronic--  # Chronic incompletely united distal humerus fracture  Patient is s/p left elbow fracture s/p ORIF at [facility], complicated by elbow wound requiring washout. Per chart review, on lifelong Bactrim. Having occasional elbow pain  - Continue PTA Bactrim  - Increase PRN tylenol to 975mg q8 hours     # Falls  Has had multiple hospital admissions for fall with unknown etiology. Unclear if mechanical vs syncopal episodes. Has extensive cardiac history but recent TTE without abnormalities of bioprosthetic valve.Can consider arrhythmia as well with prior afib.   - Consider ZioPatch on discharge.     # ISHAN  Patient has history of ISHAN. Patient noted to be desatting when asleep, improves with Oxymask. If not tolerating mask can consider repeat blood gas to check PCO2. Bicarb on BMP is WNL.      # Chronic HFpEF 55% 1/2023  # Hypertension   #Afib   Continue to hold PTA eliquis in the setting of recent intracranial hemorrhage per NSGY recs. [facility] Watchman procedure as outpatient.   - Resume PTA Toprol XL  - Hold PTA entresto     # CAD s/p 3V CABG (2015)  Not on aspirin currently  - Statin as below     # Aortic stenosis s/p TAVR (2016)  Last TTE 1/2023 with mean gradient  14mmHg, suspected to be WNL  - Holding PTA eliquis in setting of recent intracranial hemorrhage      #HLD  -PTA lipitor     #GERD  - PTA omeprazole     # Idiopathic progressive neuropathy  - Hold PTA Gabapentin due to AMS      # PTSD  # Depression  Patient started on Seroquel for delirium at OSH due to hospitalization in the setting of acute illness. Patient sleeping, minimally responsive as above.   - Continue PTA sertraline  - Scheduled quetiapine, PRN olanzapine as above      -- Outpatient Follow Ups Needed--  Neurosurgery - to assess for hydrocephalus. Would like to be informed on discharge to arrange follow up.      Neurology - assess need for Keppra, until then continue 750mg BID     Cardiology follow up for consideration of Watchman procedure.          Diet: Regular Diet Adult  Room Service  Snacks/Supplements Adult: Ensure Enlive; With Meals  Diet    DVT Prophylaxis: Pneumatic Compression Devices  Rice Catheter: Not present  Lines: None     Cardiac Monitoring: None  Code Status: Full Code      Clinically Significant Risk Factors                  # Hypertension: Noted on problem list  # Chronic heart failure with preserved ejection fraction: heart failure noted on problem list and last echo with EF >50%        # Moderate Malnutrition: based on nutrition assessment    # Financial/Environmental Concerns: none         Disposition Plan     Medically Ready for Discharge: Ready Now             Tri Mcfarland MD  Medicine Service, [facility] TEAM [address] [place] [address]  Securely message with [employer] (more info)  Text page via [employer] Paging/Directory   See signed in provider for up to date coverage information  ______________________________________________________________________    Interval History   NAEON. Nursing notes reviewed. Patient was sitting up in chair with staff from LTC services at bedside. Denies any changes. Was not oriented to place.     Physical Exam   Vital Signs:  Temp: 98.7  F (37.1  C) Temp src: Oral BP: 122/70 Pulse: 60   Resp: 16 SpO2: 96 % O2 Device: None (Room air)    Weight: 233 lbs 7.47 oz    Constitutional: Lying in bed with no acute distress     Medical Decision Making       25 MINUTES SPENT BY ME on the date of service doing chart review, history, exam, documentation & further activities per the note.      Data   ------------------------- PAST 24 HR DATA REVIEWED -----------------------------------------------

## 2024-04-26 NOTE — PROGRESS NOTES
Care Management Follow Up    Length of Stay (days): 36    Expected Discharge Date:       Concerns to be Addressed: discharge planning     Patient plan of care discussed at interdisciplinary rounds: Yes  Anticipated Discharge Disposition:  TCU with the anticipation he will need LTC      Anticipated Discharge Services: Transportation services   Anticipated Discharge DME:  None    Patient/family educated on Medicare website which has current facility and service quality ratings:  yes  Education Provided on the Discharge Plan:  yes  Patient/Family in Agreement with the Plan:  yes    Referrals Placed by CM/SW:      Considering  [facility]  [address] [facility] [address]  Main Ph: [phone]  Admissions Ph: [phone]  F: [phone]    Pending  [address] [facility] [address]  P: [phone]  F: [phone]  4/25: Initial referral sent via [facility]  [address] [facility] [address]  P: [phone]  F: [phone]  4/25: Initial referral sent via [facility]  [address] [facility] [address]  P: [phone]  F: [phone]  4/25: Initial referral sent via [facility]  [address] [facility] [address]  P: [phone]  F: [phone]  4/25: Initial referral sent via [facility]  -[facility]: Behavior concerns  -[facility]: Not [facility]  -[facility]: Bed not available   -[facility]: Behavior concerns, not [facility] contracted  -[facility]: Bed not available     Private pay costs discussed: Not applicable    Additional Information:  JAVY received a call from Velia in admissions at [facility]. She rpeorted she is reviewing the patient and is requesting to meet the patient in person to assess the patient better. JAVY stated Velia is welcome to come by. She reported she will be by at about 1000. JAVY updated nursing staff.    AJVY spoke with Velia  after she assesses the patient. Velia reported the assessment went well and feels the patient would do well at their facility. Velia stated she wants to wait on offering a bed until the clinical team reviews.    JAVY will continue to follow for discharge needs and placement    Nayan Meyer, [facility] [place]  [address]   Office: [phone]  Pager: [phone]  [email or web address]

## 2024-04-27 PROCEDURE — 250N000013 HC RX MED GY IP 250 OP 250 PS 637

## 2024-04-27 PROCEDURE — 120N000002 HC R&B MED SURG/OB UMMC

## 2024-04-27 PROCEDURE — 99232 SBSQ HOSP IP/OBS MODERATE 35: CPT | Performed by: STUDENT IN AN ORGANIZED HEALTH CARE EDUCATION/TRAINING PROGRAM

## 2024-04-27 RX ADMIN — LEVETIRACETAM 750 MG: 750 TABLET, FILM COATED ORAL at 21:55

## 2024-04-27 RX ADMIN — SULFAMETHOXAZOLE AND TRIMETHOPRIM 1 TABLET: 400; 80 TABLET ORAL at 21:56

## 2024-04-27 RX ADMIN — THIAMINE HCL TAB 100 MG 100 MG: 100 TAB at 09:21

## 2024-04-27 RX ADMIN — SULFAMETHOXAZOLE AND TRIMETHOPRIM 1 TABLET: 400; 80 TABLET ORAL at 09:21

## 2024-04-27 RX ADMIN — OLANZAPINE 5 MG: 5 TABLET, FILM COATED ORAL at 14:36

## 2024-04-27 RX ADMIN — SERTRALINE HYDROCHLORIDE 75 MG: 50 TABLET ORAL at 09:21

## 2024-04-27 RX ADMIN — ATORVASTATIN CALCIUM 80 MG: 80 TABLET, FILM COATED ORAL at 21:55

## 2024-04-27 RX ADMIN — Medication 5 MG: at 21:55

## 2024-04-27 RX ADMIN — OLANZAPINE 5 MG: 5 TABLET, FILM COATED ORAL at 21:55

## 2024-04-27 RX ADMIN — PANTOPRAZOLE SODIUM 40 MG: 40 TABLET, DELAYED RELEASE ORAL at 09:21

## 2024-04-27 RX ADMIN — POLYETHYLENE GLYCOL 3350 17 G: 17 POWDER, FOR SOLUTION ORAL at 09:21

## 2024-04-27 RX ADMIN — SENNOSIDES AND DOCUSATE SODIUM 1 TABLET: 8.6; 5 TABLET ORAL at 21:56

## 2024-04-27 RX ADMIN — QUETIAPINE FUMARATE 75 MG: 50 TABLET ORAL at 14:36

## 2024-04-27 RX ADMIN — DICLOFENAC 2 G: 10 GEL TOPICAL at 09:21

## 2024-04-27 RX ADMIN — FOLIC ACID 1 MG: 1 TABLET ORAL at 09:21

## 2024-04-27 RX ADMIN — Medication 12.5 MG: at 09:21

## 2024-04-27 RX ADMIN — LEVETIRACETAM 750 MG: 750 TABLET, FILM COATED ORAL at 09:21

## 2024-04-27 ASSESSMENT — ACTIVITIES OF DAILY LIVING (ADL)
ADLS_ACUITY_SCORE: 48
ADLS_ACUITY_SCORE: 49
ADLS_ACUITY_SCORE: 48
ADLS_ACUITY_SCORE: 49
ADLS_ACUITY_SCORE: 48
ADLS_ACUITY_SCORE: 49
ADLS_ACUITY_SCORE: 48
ADLS_ACUITY_SCORE: 49
ADLS_ACUITY_SCORE: 48
ADLS_ACUITY_SCORE: 49
ADLS_ACUITY_SCORE: 48
ADLS_ACUITY_SCORE: 49
ADLS_ACUITY_SCORE: 48
ADLS_ACUITY_SCORE: 49

## 2024-04-27 NOTE — PLAN OF CARE
Goal Outcome Evaluation:      Plan of Care Reviewed With: patient    Overall Patient Progress: no changeOverall Patient Progress: no change    Outcome Evaluation: Pt is oriented to self only, and continues to be impulsive and uncooperative with cares. Slept in between cares. Take oral meds in either applesauce or pudding. Incontinent of urine, pulled primofit off 3 x this shift. No BM this shift.  Continue to monitor mentation, keep pt engaged in activities and promote wakefullness during the day and sleep during night shift, and follow plan of care.

## 2024-04-27 NOTE — PROGRESS NOTES
/72 (BP Location: Left arm)   Pulse 63   Temp 97.8  F (36.6  C) (Oral)   Resp 16   Wt 105.9 kg (233 lb 7.5 oz)   SpO2 97%   BMI 30.80 kg/m      No changes in status. Went for a long walk around the seventh floor. Unsteady on feet and needs maximal assist with gait belt and walker. Eating well. Up in chair throughout day. Voiding adequately through primofit. BMx1. No agitation but has some impulsivity when wanting to get out of bed. Currently up in chair, eating dinner chair alarm is on. Continue with frequent checks.

## 2024-04-27 NOTE — PROGRESS NOTES
/70 (BP Location: Left arm)   Pulse 71   Temp (!) 96.6  F (35.9  C) (Axillary)   Resp 14   Wt 105.9 kg (233 lb 7.5 oz)   SpO2 96%   BMI 30.80 kg/m      Up in recliner for 2-3 hours. BMX1. Voiding adequately. No agitation today. Some impulsivity, trying to get out of bed but is redirectable. Bed alarm on. Continue with cares and plan for TCU placement.

## 2024-04-27 NOTE — PROGRESS NOTES
[facility]    Medicine Progress Note - Medicine Service, P[facility]LLA TEAM 2    Date of Admission:  3/21/2024    Assessment & Plan     83 year old male with history of cognitive impairment, CAD s/p CABG (2015), afib, Aortic stenosis s/o TAVR with bioprosthetic valve, with recent admission 03/08-03/18 after a fall at which time he was found to have IPH, IVH, SAH, and SDH, admitted to OSH on 3/21/2024 after a fall with AMS. He was transferred to [facility] due to concern for new intracranial hemorrhage but imaging findings did not demonstrate new ICH. Care transitioned from trauma to medicine service 3/22 for continued evaluation of recurrent falls and encephalopathy. Remains oriented to self and age, occasionally oriented to year, but not place or situation. PT/OT evaluated, recommend TCU on discharge.      Today:  - TCU placement pending      # Delirium, improving    # Recent Intraparenchymal hemorrhage with extension into ventricles andmidline shift, SAH, and SDH post fall (3/8)  # Cognitive impairment  Family notes was AxOx3 prior to IPH and since discharge to TCU has been improving but still lethargic and not at baseline. Patient presenting again after fall with AMS. No new intracranial hemorrhage on repeat CT head. Patient is at risk for seizure in the setting of recent intracranial bleed and neurology started empiric antiepileptic therapy for persistent AMS. EEG with evidence of additional left hemispheric focal cerebral dysfunction, but no epileptiform discharges. Occasional urinary retention but UA unremarkable and patient afebrile. Continues to have intermittent delirium overnight and agitation (kicking) requiring 1:1.   - Delirium precautions              - Encourage patient to be awake during the day, keep lights on, windows open              - Encourage uninterrupted sleep at night, limit amount light              - Have glasses available during the day  - Encourage  frequent reorientation               - Encourage cognitive stimulation  - Recreational therapy  - Neuro exams qshift   - Decrease bladder scans to q8h if not voiding   - Discontinue scheduled quetiapine              - 50mg Quetiepine PRN available   - Start olanzapine tablet 5mg at 1700 and 2200   - PRN olanzapine 5mg ODT for agitation, not to exceed 20mg per day  - Medically ready for discharge  - SW assisting with placement, TCU rerrals sent  - Encourage to get up in wheelchair if agitated as wanting to get out of bed seems to be a trigger       # Risk of Qtc prolongation  # LBBB  Repeat EKG 4/15 showed QTc (corrected for LBBB) at 428.   - Continue antispsychotics as above.  - Weekly EKG      --Chronic--  # Chronic incompletely united distal humerus fracture  Patient is s/p left elbow fracture s/p ORIF at [facility], complicated by elbow wound requiring washout. Per chart review, on lifelong Bactrim. Having occasional elbow pain  - Continue PTA Bactrim  - Increase PRN tylenol to 975mg q8 hours     # Falls  Has had multiple hospital admissions for fall with unknown etiology. Unclear if mechanical vs syncopal episodes. Has extensive cardiac history but recent TTE without abnormalities of bioprosthetic valve.Can consider arrhythmia as well with prior afib.   - Consider ZioPatch on discharge.     # ISHAN  Patient has history of ISHAN. Patient noted to be desatting when asleep, improves with Oxymask. If not tolerating mask can consider repeat blood gas to check PCO2. Bicarb on BMP is WNL.      # Chronic HFpEF 55% 1/2023  # Hypertension   #Afib   Continue to hold PTA eliquis in the setting of recent intracranial hemorrhage per NSGY recs. [facility] about possible Watchman procedure as outpatient.   - Resume PTA Toprol XL  - Hold PTA entresto     # CAD s/p 3V CABG (2015)  Not on aspirin currently  - Statin as below     # Aortic stenosis s/p TAVR (2016)  Last TTE 1/2023 with mean gradient  14mmHg, suspected to be WNL  - Holding PTA eliquis in setting of recent intracranial hemorrhage      #HLD  -PTA lipitor     #GERD  - PTA omeprazole     # Idiopathic progressive neuropathy  - Hold PTA Gabapentin due to AMS      # PTSD  # Depression  Patient started on Seroquel for delirium at OSH due to hospitalization in the setting of acute illness. Patient sleeping, minimally responsive as above.   - Continue PTA sertraline  - Scheduled quetiapine, PRN olanzapine as above     # Hx basal cell skin cancer   Family requesting this be treated inpatient, discussed this will need outpatient follow up.   - Will need outpatient Derm follow up for this      -- Outpatient Follow Ups Needed--  Neurosurgery - to assess for hydrocephalus. Would like to be informed on discharge to arrange follow up.      Neurology - assess need for Keppra, until then continue 750mg BID     Cardiology follow up for consideration of Watchman procedure.    Dermatology for hx basal cell skin ca         Diet: Regular Diet Adult  Room Service  Snacks/Supplements Adult: Ensure Enlive; With Meals  Diet    DVT Prophylaxis: Pneumatic Compression Devices  Rice Catheter: Not present  Lines: None     Cardiac Monitoring: None  Code Status: Full Code      Clinically Significant Risk Factors                  # Hypertension: Noted on problem list  # Chronic heart failure with preserved ejection fraction: heart failure noted on problem list and last echo with EF >50%        # Moderate Malnutrition: based on nutrition assessment    # Financial/Environmental Concerns: none         Disposition Plan     Medically Ready for Discharge: Ready Now             Velia Muniz MD  Medicine Service, [facility] TEAM [address] [place] [address]  Securely message with [employer] (more info)  Text page via [employer] Paging/Directory   See signed in provider for up to date coverage  information  ______________________________________________________________________    Interval History   NAEON. No agitation but some impulsiveness, no PRNs required. This morning sitting up in chair. Enjoying breakfast, denies pain. No other concerns.       Physical Exam   Vital Signs: Temp: 97.7  F (36.5  C) Temp src: Oral BP: 126/72 Pulse: 61   Resp: 15 SpO2: 97 % O2 Device: None (Room air)    Weight: 233 lbs 7.47 oz    Constitutional: NAD, sitting up in chair   Resp: Normal work of breathing     Medical Decision Making       25 MINUTES SPENT BY ME on the date of service doing chart review, history, exam, documentation & further activities per the note.      Data   ------------------------- PAST 24 HR DATA REVIEWED -----------------------------------------------

## 2024-04-28 PROCEDURE — 99232 SBSQ HOSP IP/OBS MODERATE 35: CPT | Mod: GC | Performed by: STUDENT IN AN ORGANIZED HEALTH CARE EDUCATION/TRAINING PROGRAM

## 2024-04-28 PROCEDURE — 250N000013 HC RX MED GY IP 250 OP 250 PS 637

## 2024-04-28 PROCEDURE — 120N000002 HC R&B MED SURG/OB UMMC

## 2024-04-28 RX ADMIN — DICLOFENAC 2 G: 10 GEL TOPICAL at 22:34

## 2024-04-28 RX ADMIN — ATORVASTATIN CALCIUM 80 MG: 80 TABLET, FILM COATED ORAL at 22:35

## 2024-04-28 RX ADMIN — LEVETIRACETAM 750 MG: 750 TABLET, FILM COATED ORAL at 10:54

## 2024-04-28 RX ADMIN — Medication 5 MG: at 22:36

## 2024-04-28 RX ADMIN — LEVETIRACETAM 750 MG: 750 TABLET, FILM COATED ORAL at 22:35

## 2024-04-28 RX ADMIN — OLANZAPINE 5 MG: 5 TABLET, FILM COATED ORAL at 22:35

## 2024-04-28 RX ADMIN — SERTRALINE HYDROCHLORIDE 75 MG: 50 TABLET ORAL at 10:53

## 2024-04-28 RX ADMIN — QUETIAPINE FUMARATE 75 MG: 50 TABLET ORAL at 15:08

## 2024-04-28 RX ADMIN — PANTOPRAZOLE SODIUM 40 MG: 40 TABLET, DELAYED RELEASE ORAL at 10:54

## 2024-04-28 RX ADMIN — POLYETHYLENE GLYCOL 3350 17 G: 17 POWDER, FOR SOLUTION ORAL at 10:54

## 2024-04-28 RX ADMIN — THIAMINE HCL TAB 100 MG 100 MG: 100 TAB at 10:53

## 2024-04-28 RX ADMIN — Medication 12.5 MG: at 10:54

## 2024-04-28 RX ADMIN — FOLIC ACID 1 MG: 1 TABLET ORAL at 10:54

## 2024-04-28 RX ADMIN — SULFAMETHOXAZOLE AND TRIMETHOPRIM 1 TABLET: 400; 80 TABLET ORAL at 22:36

## 2024-04-28 RX ADMIN — OLANZAPINE 5 MG: 5 TABLET, FILM COATED ORAL at 15:07

## 2024-04-28 RX ADMIN — SULFAMETHOXAZOLE AND TRIMETHOPRIM 1 TABLET: 400; 80 TABLET ORAL at 10:54

## 2024-04-28 RX ADMIN — SENNOSIDES AND DOCUSATE SODIUM 1 TABLET: 8.6; 5 TABLET ORAL at 22:35

## 2024-04-28 ASSESSMENT — ACTIVITIES OF DAILY LIVING (ADL)
ADLS_ACUITY_SCORE: 49
ADLS_ACUITY_SCORE: 49
ADLS_ACUITY_SCORE: 48
ADLS_ACUITY_SCORE: 49
ADLS_ACUITY_SCORE: 48
ADLS_ACUITY_SCORE: 49
ADLS_ACUITY_SCORE: 48
ADLS_ACUITY_SCORE: 49
ADLS_ACUITY_SCORE: 48
ADLS_ACUITY_SCORE: 48

## 2024-04-28 NOTE — PROGRESS NOTES
Set off chair alarm at 1905, found on ground, on his knees, no abrasions or cuts seen. Eduardo trotter set of vitals and sandra clifford MD.

## 2024-04-28 NOTE — PLAN OF CARE
Goal Outcome Evaluation:      Plan of Care Reviewed With: patient    Overall Patient Progress: no changeOverall Patient Progress: no change    Outcome Evaluation: Pt is oriented to self only, a very high fall risk, continues to be impulsive, ocassionally agitated and uncooperative with cares. Slept in between cares. Takes oral meds in either applesauce or pudding. Incontinent of urine. New Primofit placed @ 2200. LBM 4/27. Continue to monitor mentation, keep pt engaged in activities and promote wakefullness during the day and sleep during night shift, and follow plan of care.

## 2024-04-28 NOTE — PROGRESS NOTES
[facility]    Medicine Progress Note - Medicine Service, PAMELLA TEAM 2    Date of Admission:  3/21/2024    Assessment & Plan     83 year old male with history of cognitive impairment, CAD s/p CABG (2015), afib, Aortic stenosis s/o TAVR with bioprosthetic valve, with recent admission 03/08-03/18 after a fall at which time he was found to have IPH, IVH, SAH, and SDH, admitted to OSH on 3/21/2024 after a fall with AMS. He was transferred to [facility] due to concern for new intracranial hemorrhage but imaging findings did not demonstrate new ICH. Care transitioned from trauma to medicine service 3/22 for continued evaluation of recurrent falls and encephalopathy. Remains oriented to self and age, occasionally oriented to year, but not place or situation. PT/OT evaluated, recommend TCU on discharge.      Today:  - TCU placement pending   - 1:1, nursing feel like this will need to continue given impulsivity/unsteadiness with ambulation making him a high fall risk      # Delirium, improving    # Recent Intraparenchymal hemorrhage with extension into ventricles andmidline shift, SAH, and SDH post fall (3/8)  # Cognitive impairment  Family notes was AxOx3 prior to IPH and since discharge to TCU has been improving but still lethargic and not at baseline. Patient presenting again after fall with AMS. No new intracranial hemorrhage on repeat CT head. Patient is at risk for seizure in the setting of recent intracranial bleed and neurology started empiric antiepileptic therapy for persistent AMS. EEG with evidence of additional left hemispheric focal cerebral dysfunction, but no epileptiform discharges. Occasional urinary retention but UA unremarkable and patient afebrile. Continues to have intermittent delirium overnight and agitation (kicking) requiring 1:1.   - Delirium precautions              - Encourage patient to be awake during the day, keep lights on, windows open              -  Encourage uninterrupted sleep at night, limit amount light              - Have glasses available during the day  - Encourage frequent reorientation               - Encourage cognitive stimulation  - Recreational therapy  - Neuro exams qshift   - Decrease bladder scans to q8h if not voiding   - Discontinue scheduled quetiapine              - 50mg Quetiepine PRN available   - Start olanzapine tablet 5mg at 1700 and 2200   - PRN olanzapine 5mg ODT for agitation, not to exceed 20mg per day  - Medically ready for discharge  - SW assisting with placement, TCU rerrals sent  - Encourage to get up in wheelchair if agitated as wanting to get out of bed seems to be a trigger    - Continue 1:1     # Risk of Qtc prolongation  # LBBB  Repeat EKG 4/15 showed QTc (corrected for LBBB) at 428.   - Continue antispsychotics as above.  - Weekly EKG      --Chronic--  # Chronic incompletely united distal humerus fracture  Patient is s/p left elbow fracture s/p ORIF at [facility], complicated by elbow wound requiring washout. Per chart review, on lifelong Bactrim. Having occasional elbow pain  - Continue PTA Bactrim  - Increase PRN tylenol to 975mg q8 hours     # Falls  Has had multiple hospital admissions for fall with unknown etiology. Unclear if mechanical vs syncopal episodes. Has extensive cardiac history but recent TTE without abnormalities of bioprosthetic valve.Can consider arrhythmia as well with prior afib.   - Consider ZioPatch on discharge.     # ISHAN  Patient has history of ISHAN. Patient noted to be desatting when asleep, improves with Oxymask. If not tolerating mask can consider repeat blood gas to check PCO2. Bicarb on BMP is WNL.      # Chronic HFpEF 55% 1/2023  # Hypertension   #Afib   Continue to hold PTA eliquis in the setting of recent intracranial hemorrhage per NSGY recs. [facility] about possible Watchman procedure as outpatient.   - Resume PTA Toprol XL  - Hold PTA entresto     #  CAD s/p 3V CABG (2015)  Not on aspirin currently  - Statin as below     # Aortic stenosis s/p TAVR (2016)  Last TTE 1/2023 with mean gradient 14mmHg, suspected to be WNL  - Holding PTA eliquis in setting of recent intracranial hemorrhage      #HLD  -PTA lipitor     #GERD  - PTA omeprazole     # Idiopathic progressive neuropathy  - Hold PTA Gabapentin due to AMS      # PTSD  # Depression  Patient started on Seroquel for delirium at OSH due to hospitalization in the setting of acute illness. Patient sleeping, minimally responsive as above.   - Continue PTA sertraline  - Scheduled quetiapine, PRN olanzapine as above     # Hx basal cell skin cancer   Family requesting this be treated inpatient, discussed this will need outpatient follow up.   - Will need outpatient Derm follow up for this      -- Outpatient Follow Ups Needed--  Neurosurgery - to assess for hydrocephalus. Would like to be informed on discharge to arrange follow up.      Neurology - assess need for Keppra, until then continue 750mg BID     Cardiology follow up for consideration of Watchman procedure.    Dermatology for hx basal cell skin ca         Diet: Regular Diet Adult  Room Service  Snacks/Supplements Adult: Ensure Enlive; With Meals  Diet    DVT Prophylaxis: Pneumatic Compression Devices  Rice Catheter: Not present  Lines: None     Cardiac Monitoring: None  Code Status: Full Code      Clinically Significant Risk Factors                  # Hypertension: Noted on problem list  # Chronic heart failure with preserved ejection fraction: heart failure noted on problem list and last echo with EF >50%        # Moderate Malnutrition: based on nutrition assessment    # Financial/Environmental Concerns: none         Disposition Plan     Medically Ready for Discharge: Ready Now             Velia Muniz MD  Medicine Service, [facility] TEAM [address] [place] [address]  Securely message with [employer] (more info)  Text page via [employer]  Paging/Directory   See signed in provider for up to date coverage information  ______________________________________________________________________    Interval History   Yesterday PM was in chair, set off chair alarm and found on knees. 1:1 reinstated. This morning sitting up in chair, family in the room. He is in good spirits, ate breakfast recently. Denies pain in his knees. Family notes that left shoulder is not as mobile (previously had surgery there), wondering if PT can work on mobility.          Physical Exam   Vital Signs: Temp: 98.6  F (37  C) Temp src: Axillary BP: 133/65 Pulse: 58   Resp: 16 SpO2: 95 % O2 Device: None (Room air)    Weight: 233 lbs 7.47 oz    Constitutional: NAD, sitting up in chair   Resp: Normal work of breathing, CTAB  CV: RRR    Abd: soft, NTND  Ext: no edema     Medical Decision Making       25 MINUTES SPENT BY ME on the date of service doing chart review, history, exam, documentation & further activities per the note.      Data   ------------------------- PAST 24 HR DATA REVIEWED -----------------------------------------------

## 2024-04-28 NOTE — PLAN OF CARE
Goal Outcome Evaluation:      Plan of Care Reviewed With: patient    Overall Patient Progress: no changeOverall Patient Progress: no change    Outcome Evaluation: Pt is oriented to self only, very high fall risk, continues to be impulsive, ocassionally agitated and uncooperative with cares. Slept in between cares. Takes oral meds in either applesauce or pudding. Incontinent of urine. New Primofit placed @ 2200. LBM 4/27. Continue to monitor mentation, keep pt engaged in activities and promote wakefullness during the day and sleep during night shift, and follow plan of care. 1:1 Attendant @ bedside for safety.

## 2024-04-28 NOTE — PROGRESS NOTES
/60 (BP Location: Right arm)   Pulse 77   Temp 97.4  F (36.3  C) (Oral)   Resp 20   Wt 102.1 kg (225 lb)   SpO2 98%   BMI 29.69 kg/m      Denies pain except chronic pain in left shoulder. Up in recliner for 2-3 hours this morning and up walking in halls with walker, gait belt, assist one and wheelchair following behind. Activity tolerance is good and he is steadier on his feet than yesterday, could benefit from daily 2-3 walks. This afternoon he has been resting in bed, sleeping on and off. Appetite is fair. As of 1700, no agitation or impulsivity noted. Names year and month correctly, confused as to exact day, date, location, and situation. Son visited today. Attendant at bedside.

## 2024-04-29 ENCOUNTER — APPOINTMENT (OUTPATIENT)
Dept: PHYSICAL THERAPY | Facility: CLINIC | Age: 84
DRG: 085 | End: 2024-04-29
Payer: COMMERCIAL

## 2024-04-29 LAB
ANION GAP SERPL CALCULATED.3IONS-SCNC: 12 MMOL/L (ref 7–15)
ATRIAL RATE - MUSE: 60 BPM
BUN SERPL-MCNC: 22.8 MG/DL (ref 8–23)
CALCIUM SERPL-MCNC: 9 MG/DL (ref 8.8–10.2)
CHLORIDE SERPL-SCNC: 105 MMOL/L (ref 98–107)
CREAT SERPL-MCNC: 0.83 MG/DL (ref 0.67–1.17)
DEPRECATED HCO3 PLAS-SCNC: 24 MMOL/L (ref 22–29)
DIASTOLIC BLOOD PRESSURE - MUSE: NORMAL MMHG
EGFRCR SERPLBLD CKD-EPI 2021: 88 ML/MIN/1.73M2
ERYTHROCYTE [DISTWIDTH] IN BLOOD BY AUTOMATED COUNT: 15.4 % (ref 10–15)
GLUCOSE SERPL-MCNC: 114 MG/DL (ref 70–99)
HCT VFR BLD AUTO: 33 % (ref 40–53)
HGB BLD-MCNC: 10.9 G/DL (ref 13.3–17.7)
INTERPRETATION ECG - MUSE: NORMAL
MCH RBC QN AUTO: 30.9 PG (ref 26.5–33)
MCHC RBC AUTO-ENTMCNC: 33 G/DL (ref 31.5–36.5)
MCV RBC AUTO: 94 FL (ref 78–100)
P AXIS - MUSE: NORMAL DEGREES
PLATELET # BLD AUTO: 174 10E3/UL (ref 150–450)
POTASSIUM SERPL-SCNC: 3.8 MMOL/L (ref 3.4–5.3)
PR INTERVAL - MUSE: 228 MS
QRS DURATION - MUSE: 174 MS
QT - MUSE: 496 MS
QTC - MUSE: 496 MS
R AXIS - MUSE: -26 DEGREES
RBC # BLD AUTO: 3.53 10E6/UL (ref 4.4–5.9)
SODIUM SERPL-SCNC: 141 MMOL/L (ref 135–145)
SYSTOLIC BLOOD PRESSURE - MUSE: NORMAL MMHG
T AXIS - MUSE: 110 DEGREES
VENTRICULAR RATE- MUSE: 60 BPM
WBC # BLD AUTO: 7.2 10E3/UL (ref 4–11)

## 2024-04-29 PROCEDURE — 97116 GAIT TRAINING THERAPY: CPT | Mod: GP

## 2024-04-29 PROCEDURE — 93005 ELECTROCARDIOGRAM TRACING: CPT

## 2024-04-29 PROCEDURE — 36415 COLL VENOUS BLD VENIPUNCTURE: CPT

## 2024-04-29 PROCEDURE — 97110 THERAPEUTIC EXERCISES: CPT | Mod: GP

## 2024-04-29 PROCEDURE — 85027 COMPLETE CBC AUTOMATED: CPT

## 2024-04-29 PROCEDURE — 80048 BASIC METABOLIC PNL TOTAL CA: CPT

## 2024-04-29 PROCEDURE — 99232 SBSQ HOSP IP/OBS MODERATE 35: CPT | Performed by: STUDENT IN AN ORGANIZED HEALTH CARE EDUCATION/TRAINING PROGRAM

## 2024-04-29 PROCEDURE — 93010 ELECTROCARDIOGRAM REPORT: CPT | Performed by: INTERNAL MEDICINE

## 2024-04-29 PROCEDURE — 250N000013 HC RX MED GY IP 250 OP 250 PS 637

## 2024-04-29 PROCEDURE — 120N000002 HC R&B MED SURG/OB UMMC

## 2024-04-29 PROCEDURE — 97530 THERAPEUTIC ACTIVITIES: CPT | Mod: GP

## 2024-04-29 RX ADMIN — SULFAMETHOXAZOLE AND TRIMETHOPRIM 1 TABLET: 400; 80 TABLET ORAL at 21:18

## 2024-04-29 RX ADMIN — Medication 12.5 MG: at 09:48

## 2024-04-29 RX ADMIN — FOLIC ACID 1 MG: 1 TABLET ORAL at 09:48

## 2024-04-29 RX ADMIN — SENNOSIDES AND DOCUSATE SODIUM 1 TABLET: 8.6; 5 TABLET ORAL at 21:17

## 2024-04-29 RX ADMIN — OLANZAPINE 5 MG: 5 TABLET, FILM COATED ORAL at 21:18

## 2024-04-29 RX ADMIN — LIDOCAINE 4% 2 PATCH: 40 PATCH TOPICAL at 10:03

## 2024-04-29 RX ADMIN — POLYETHYLENE GLYCOL 3350 17 G: 17 POWDER, FOR SOLUTION ORAL at 09:48

## 2024-04-29 RX ADMIN — ATORVASTATIN CALCIUM 80 MG: 80 TABLET, FILM COATED ORAL at 21:18

## 2024-04-29 RX ADMIN — LEVETIRACETAM 750 MG: 750 TABLET, FILM COATED ORAL at 21:18

## 2024-04-29 RX ADMIN — PANTOPRAZOLE SODIUM 40 MG: 40 TABLET, DELAYED RELEASE ORAL at 09:48

## 2024-04-29 RX ADMIN — LEVETIRACETAM 750 MG: 750 TABLET, FILM COATED ORAL at 09:48

## 2024-04-29 RX ADMIN — SERTRALINE HYDROCHLORIDE 75 MG: 50 TABLET ORAL at 09:47

## 2024-04-29 RX ADMIN — THIAMINE HCL TAB 100 MG 100 MG: 100 TAB at 09:47

## 2024-04-29 RX ADMIN — DICLOFENAC 2 G: 10 GEL TOPICAL at 21:19

## 2024-04-29 RX ADMIN — DICLOFENAC 2 G: 10 GEL TOPICAL at 14:02

## 2024-04-29 RX ADMIN — DICLOFENAC 2 G: 10 GEL TOPICAL at 17:02

## 2024-04-29 RX ADMIN — SULFAMETHOXAZOLE AND TRIMETHOPRIM 1 TABLET: 400; 80 TABLET ORAL at 09:48

## 2024-04-29 RX ADMIN — OLANZAPINE 5 MG: 5 TABLET, FILM COATED ORAL at 17:02

## 2024-04-29 RX ADMIN — Medication 5 MG: at 21:18

## 2024-04-29 RX ADMIN — DICLOFENAC 2 G: 10 GEL TOPICAL at 09:51

## 2024-04-29 RX ADMIN — QUETIAPINE FUMARATE 75 MG: 50 TABLET ORAL at 15:11

## 2024-04-29 ASSESSMENT — ACTIVITIES OF DAILY LIVING (ADL)
ADLS_ACUITY_SCORE: 49
ADLS_ACUITY_SCORE: 47
ADLS_ACUITY_SCORE: 49
ADLS_ACUITY_SCORE: 47
ADLS_ACUITY_SCORE: 49
ADLS_ACUITY_SCORE: 47
ADLS_ACUITY_SCORE: 47
ADLS_ACUITY_SCORE: 49
ADLS_ACUITY_SCORE: 47
ADLS_ACUITY_SCORE: 49
ADLS_ACUITY_SCORE: 49
ADLS_ACUITY_SCORE: 47
ADLS_ACUITY_SCORE: 49
ADLS_ACUITY_SCORE: 49

## 2024-04-29 NOTE — PLAN OF CARE
Goal Outcome Evaluation:      Plan of Care Reviewed With: patient    Overall Patient Progress: no changeOverall Patient Progress: no change    Outcome Evaluation: Pt is oriented to self, had several hospital falls and is high risk for falls. confused and sometimes uncooperative. Reported pain in left elbow and upper arm. Pt was agitated this shift and was given scheduled meds. Takes oral meds with pudding and eat most of his  breakfast but did not eat lunch. Slept between cares and walked with PT today within the unit, sat up for a couple of hours and he is on 1:1 sitter. Pt removed primofit from suction and a new one was placed this evening. Plan is to discharge to TCU. Continue with POC.

## 2024-04-29 NOTE — PROGRESS NOTES
[facility]    Medicine Progress Note - Medicine Service, PAMELLA TEAM 2    Date of Admission:  3/21/2024    Assessment & Plan     83 year old male with history of cognitive impairment, CAD s/p CABG (2015), afib, Aortic stenosis s/o TAVR with bioprosthetic valve, with recent admission 03/08-03/18 after a fall at which time he was found to have IPH, IVH, SAH, and SDH, admitted to OSH on 3/21/2024 after a fall with AMS. He was transferred to [facility] due to concern for new intracranial hemorrhage but imaging findings did not demonstrate new ICH. Care transitioned from trauma to medicine service 3/22 for continued evaluation of recurrent falls and encephalopathy. Remains oriented to self and age, occasionally oriented to year, but not place or situation. PT/OT evaluated, recommend TCU on discharge.      Today 4/29:  - no changes  - medically ready for discharge  - pending placement     # Delirium, improving    # Recent Intraparenchymal hemorrhage with extension into ventricles andmidline shift, SAH, and SDH post fall (3/8)  # Cognitive impairment  Family notes was AxOx3 prior to IPH and since discharge to TCU has been improving but still lethargic and not at baseline. Patient presenting again after fall with AMS. No new intracranial hemorrhage on repeat CT head. Patient is at risk for seizure in the setting of recent intracranial bleed and neurology started empiric antiepileptic therapy for persistent AMS. EEG with evidence of additional left hemispheric focal cerebral dysfunction, but no epileptiform discharges. Occasional urinary retention but UA unremarkable and patient afebrile. Continues to have intermittent delirium overnight and agitation (kicking) requiring 1:1.   - Delirium precautions              - Encourage patient to be awake during the day, keep lights on, windows open              - Encourage uninterrupted sleep at night, limit amount light              - Have  glasses available during the day  - Encourage frequent reorientation               - Encourage cognitive stimulation  - Recreational therapy  - Neuro exams qshift   - Bladder scan q8h  - Discontinue scheduled quetiapine              - 50mg Quetiepine PRN available   - Olanzapine tablet 5mg at 1700 and 2200   - PRN olanzapine 5mg ODT for agitation, not to exceed 20mg per day  - Medically ready for discharge  - SW assisting with placement, TCU rerrals sent  - Encourage to get up in wheelchair if agitated as wanting to get out of bed seems to be a trigger    - Continue 1:1     # Risk of Qtc prolongation  # LBBB  Last EKG 4/29 showed QTc 496, (~440 corrected for LBBB)  - Continue antispsychotics as above.  - Weekly EKG      --Chronic--  # Chronic incompletely united distal humerus fracture  Patient is s/p left elbow fracture s/p ORIF at [facility], complicated by elbow wound requiring washout. Per chart review, on lifelong Bactrim. Having occasional elbow pain  - Continue PTA Bactrim 1 tablet BID  - PRN tylenol 975mg q8 hours     # Falls  Has had multiple hospital admissions for fall with unknown etiology. Unclear if mechanical vs syncopal episodes. Has extensive cardiac history but recent TTE without abnormalities of bioprosthetic valve.Can consider arrhythmia as well with prior afib.   - Consider ZioPatch on discharge.     # ISHAN  Patient has history of ISHAN. Patient noted to be desatting when asleep, improves with Oxymask. If not tolerating mask can consider repeat blood gas to check PCO2. Bicarb on BMP is WNL.      # Chronic HFpEF 55% 1/2023  # Hypertension   #Afib   Continue to hold PTA eliquis in the setting of recent intracranial hemorrhage per NSGY recs. [facility] about possible Watchman procedure as outpatient.   - continue pta metoprolol succinate 12.5mg daily  - Holding PTA Entresto 24-26mg BID     # CAD s/p 3V CABG (2015)  Not on aspirin currently  - Statin as below     #  Aortic stenosis s/p TAVR (2016)  Last TTE 1/2023 with mean gradient 14mmHg, suspected to be WNL  - Holding PTA eliquis in setting of recent intracranial hemorrhage      #HLD  - continue pta atorvastatin 80mg at bedtime      #GERD  - pantoprazole 40mg daily (to replace PTA omeprazole 20mg daily while inpatient)     # Idiopathic progressive neuropathy  - Hold PTA Gabapentin due to AMS      # PTSD  # Depression  Patient started on Seroquel for delirium at OSH due to hospitalization in the setting of acute illness. Patient sleeping, minimally responsive as above.   - Continue PTA sertraline 75mg daily  - Scheduled quetiapine 75mg at 3pm daily  - PRN olanzapine as above     # Hx basal cell skin cancer   Family requesting this be treated inpatient, discussed this will need outpatient follow up.   - Will need outpatient Derm follow up for this      -- Outpatient Follow Ups Needed--  Neurosurgery - to assess for hydrocephalus. Would like to be informed on discharge to arrange follow up.      Neurology - assess need for Keppra, until then continue 750mg BID     Cardiology follow up for consideration of Watchman procedure.    Dermatology for hx basal cell skin ca         Diet: Regular Diet Adult  Room Service  Snacks/Supplements Adult: Ensure Enlive; With Meals  Diet    DVT Prophylaxis: Pneumatic Compression Devices  Rice Catheter: Not present  Lines: None     Cardiac Monitoring: None  Code Status: Full Code      Clinically Significant Risk Factors                  # Hypertension: Noted on problem list  # Chronic heart failure with preserved ejection fraction: heart failure noted on problem list and last echo with EF >50%        # Moderate Malnutrition: based on nutrition assessment    # Financial/Environmental Concerns: none         Disposition Plan     Medically Ready for Discharge: Ready Now         Patient seen and staffed with Dr. Johnson.     Darren Rosenbaum MD  Medicine Service, [address]  [facility]  Securely message with [employer] (more info)  Text page via [employer] Paging/Directory   See signed in provider for up to date coverage information  ______________________________________________________________________    Interval History   NAEON. Has some left arm pain. No other concerns.       Physical Exam   Vital Signs: Temp: 97.4  F (36.3  C) Temp src: Axillary BP: 127/74 Pulse: 67   Resp: 16 SpO2: 100 % O2 Device: None (Room air)    Weight: 225 lbs 0 oz    General: sitting in chair, NAD, appears comfortable, not diaphoretic  HEENT: no scleral icterus or conjunctival injection, oropharynx clear  Resp: clear to auscultation bilaterally, no crackles or wheezes  Cardiac: regular rate and rhythm, no murmurs  Abdomen: soft, non-tender, non-distended. No rebound or guarding.   Extremities: warm, no lower extremity edema  Neuro: alert, answers questions appropriately      Medical Decision Making

## 2024-04-29 NOTE — PLAN OF CARE
Goal Outcome Evaluation:      Plan of Care Reviewed With: patient    Overall Patient Progress: no changeOverall Patient Progress: no change    Outcome Evaluation: Pt is oriented to self only, very high fall risk, continues to be impulsive, ocassionally agitated and uncooperative with cares. Slept in between cares. Takes oral meds in either applesauce or pudding. Incontinent of urine. New Primofit placed @ 2300. LBM 4/27. Continue to monitor mentation, keep pt engaged in activities and promote wakefullness during the day and sleep during night shift, and follow plan of care. 1:1 Attendant @ bedside for safety.

## 2024-04-30 PROCEDURE — 250N000013 HC RX MED GY IP 250 OP 250 PS 637

## 2024-04-30 PROCEDURE — 999N000111 HC STATISTIC OT IP EVAL DEFER: Performed by: OCCUPATIONAL THERAPIST

## 2024-04-30 PROCEDURE — 120N000002 HC R&B MED SURG/OB UMMC

## 2024-04-30 PROCEDURE — 99232 SBSQ HOSP IP/OBS MODERATE 35: CPT | Mod: GC | Performed by: INTERNAL MEDICINE

## 2024-04-30 RX ADMIN — SENNOSIDES AND DOCUSATE SODIUM 1 TABLET: 8.6; 5 TABLET ORAL at 21:15

## 2024-04-30 RX ADMIN — SULFAMETHOXAZOLE AND TRIMETHOPRIM 1 TABLET: 400; 80 TABLET ORAL at 21:15

## 2024-04-30 RX ADMIN — OLANZAPINE 5 MG: 5 TABLET, FILM COATED ORAL at 16:35

## 2024-04-30 RX ADMIN — Medication 5 MG: at 21:15

## 2024-04-30 RX ADMIN — FOLIC ACID 1 MG: 1 TABLET ORAL at 09:49

## 2024-04-30 RX ADMIN — SULFAMETHOXAZOLE AND TRIMETHOPRIM 1 TABLET: 400; 80 TABLET ORAL at 09:49

## 2024-04-30 RX ADMIN — QUETIAPINE FUMARATE 75 MG: 50 TABLET ORAL at 16:35

## 2024-04-30 RX ADMIN — Medication 12.5 MG: at 09:50

## 2024-04-30 RX ADMIN — ATORVASTATIN CALCIUM 80 MG: 80 TABLET, FILM COATED ORAL at 21:15

## 2024-04-30 RX ADMIN — DICLOFENAC 2 G: 10 GEL TOPICAL at 21:16

## 2024-04-30 RX ADMIN — SERTRALINE HYDROCHLORIDE 75 MG: 50 TABLET ORAL at 09:49

## 2024-04-30 RX ADMIN — OLANZAPINE 5 MG: 5 TABLET, FILM COATED ORAL at 21:14

## 2024-04-30 RX ADMIN — LEVETIRACETAM 750 MG: 750 TABLET, FILM COATED ORAL at 09:48

## 2024-04-30 RX ADMIN — DICLOFENAC 2 G: 10 GEL TOPICAL at 09:49

## 2024-04-30 RX ADMIN — PANTOPRAZOLE SODIUM 40 MG: 40 TABLET, DELAYED RELEASE ORAL at 09:49

## 2024-04-30 RX ADMIN — LEVETIRACETAM 750 MG: 750 TABLET, FILM COATED ORAL at 21:15

## 2024-04-30 RX ADMIN — DICLOFENAC 2 G: 10 GEL TOPICAL at 13:51

## 2024-04-30 RX ADMIN — POLYETHYLENE GLYCOL 3350 17 G: 17 POWDER, FOR SOLUTION ORAL at 09:49

## 2024-04-30 RX ADMIN — THIAMINE HCL TAB 100 MG 100 MG: 100 TAB at 09:48

## 2024-04-30 ASSESSMENT — ACTIVITIES OF DAILY LIVING (ADL)
ADLS_ACUITY_SCORE: 47
ADLS_ACUITY_SCORE: 49
ADLS_ACUITY_SCORE: 47
ADLS_ACUITY_SCORE: 49
ADLS_ACUITY_SCORE: 47
ADLS_ACUITY_SCORE: 49
ADLS_ACUITY_SCORE: 49
ADLS_ACUITY_SCORE: 47
ADLS_ACUITY_SCORE: 49
ADLS_ACUITY_SCORE: 47

## 2024-04-30 NOTE — PROGRESS NOTES
/68 (BP Location: Left arm)   Pulse 63   Temp 97.8  F (36.6  C) (Oral)   Resp 18   Wt 102.1 kg (225 lb)   SpO2 96%   BMI 29.69 kg/m      No changes. Up walking x2 in halls with gait belt, walker. Steady on his feet. Small bmx2. Voiding adequately. Appetite is good. Calm and pleasant in morning and afternoon. At 1000 he names date and year correctly. Continues to become combative, verbally abusive, agitated between hours of [id number]. At 1800 he is kicking, threatening to hit. Will get him back to bed from recliner when he calms down.

## 2024-04-30 NOTE — PROGRESS NOTES
CLINICAL NUTRITION SERVICES - REASSESSMENT NOTE     Nutrition Prescription    RECOMMENDATIONS FOR MDs/PROVIDERS TO ORDER:  None currently    Malnutrition Status:    Patient does not meet two of the established criteria necessary for diagnosing malnutrition    Recommendations already ordered by Registered Dietitian (RD):  Automatic/standard meal trays TID.  Please assist with meal time/feeding as needed  Ensure Enlive with meals   Encourage PO efforts     Future/Additional Recommendations:  Monitor nutrition-related findings and follow pt per protocol      EVALUATION OF THE PROGRESS TOWARD GOALS   Diet: Regular and Ensure Enlive TID     Intake/Tolerance: Patient consuming  % of 3 meal(s) daily.     NEW FINDINGS   Room visit. Pt sleeping at time of visit. Sitter at bedside able to provide some information. Per sitter, pt drank all of Ensure at breakfast along with apple juice and some french toast. Pt was distracted during breakfast with feeding stuffed dog so he did not finish some of his sausage. Overall, demonstrating fair but variable intake.     GI:  Last BM: 04/27/24  On scheduled bowel med(s)    Weight:  Most Recent Weight: 102.1 kg (225 lb)  on 4/28/24 via Standing scale  Body mass index is 29.69 kg/m .  Wt down 1.5 kg from admission.    Meds:  Lipitor  Folic acid  Melatonin  Protonix  Miralax  Senna-docusate  thiamine    Labs:  reviewed    Skin:  reviewed    MALNUTRITION  % Intake: < 75% for > 7 days (moderate)  % Weight Loss: Does not meet criteria  Subcutaneous Fat Loss: None observed   Muscle Loss: Thoracic region (clavicle, acromium bone, deltoid, trapezius, pectoral):  mild, likely age appropriate  Fluid Accumulation/Edema:  trace  Malnutrition Diagnosis: Moderate malnutrition in the context of acute illness/disease    Previous Goals   Patient to consume % of nutritionally adequate meal trays TID, or the equivalent with supplements/snacks.   Evaluation: Not met but improving since last  assessment    Previous Nutrition Diagnosis  Inadequate oral intake related to altered mentation, reduced appetite as evidenced by variable meal intake   Evaluation: No change    CURRENT NUTRITION DIAGNOSIS  Inadequate oral intake related to altered mentation, reduced appetite as evidenced by variable meal intake     INTERVENTIONS  Implementation  Medical food supplement therapy  Multivitamin/mineral supplement therapy     Goals  Patient to consume % of nutritionally adequate meal trays TID, or the equivalent with supplements/snacks.     Monitoring/Evaluation  Progress toward goals will be monitored and evaluated per protocol.    Jenny Townsend MS, RD, LD, [facility]    6C (beds 0256-0347) + 7C (beds 6060-1455) + ED   [facility] by name or unit dietitian  No longer available via pager

## 2024-04-30 NOTE — PLAN OF CARE
Goal Outcome Evaluation:      Plan of Care Reviewed With: other (see comments)    Overall Patient Progress: improvingOverall Patient Progress: improving    Outcome Evaluation: see RD note 4/30    Jenny Townsend MS, RD, LD, [facility]    6C (beds 3738-2976) + 7C (beds 3508-5744) + ED   [facility] by name or unit dietitian  No longer available via pager

## 2024-04-30 NOTE — CARE PLAN
Occupational Therapy Discharge Summary    Reason for therapy discharge:    No further expectations of functional progress. Pt has demonstrated consistent performance during OT sessions, needing verbal cuing and Min-Mod A to complete ADLs w/ GCA-Min A for safe mobility w/ FWW. Anticipate pt is at new ADL baseline. No further IP OT needs identified.     Progress towards therapy goal(s). See goals on Care Plan in [facility] electronic health record for goal details.  Goals partially met.  Barriers to achieving goals:   Limited progress w/ IP OT. Reached plateau during IP sessions.     Therapy recommendation(s):    Recommend pt discharge to TCU or LTC as pt requires 24/7 supervision and assist for safety. Once in TCU/LTC anticipate pt will benefit from OT evaluation to assess pt's needs in new setting and to promote IND and safety.

## 2024-04-30 NOTE — PLAN OF CARE
Goal Outcome Evaluation:             Pt disoriented x3 VSS on RA voiding with good output no BM on shift. Took all oral meds with pudding. No need for any prn meds during this shift pt was easily redirectable. Will continue with current plan of care.

## 2024-04-30 NOTE — PROGRESS NOTES
[facility]    Medicine Progress Note - Medicine Service, PAMELLA TEAM 2    Date of Admission:  3/21/2024    Assessment & Plan     83 year old male with history of cognitive impairment, CAD s/p CABG (2015), afib, Aortic stenosis s/o TAVR with bioprosthetic valve, with recent admission 03/08-03/18 after a fall at which time he was found to have IPH, IVH, SAH, and SDH, admitted to OSH on 3/21/2024 after a fall with AMS. He was transferred to [facility] due to concern for new intracranial hemorrhage but imaging findings did not demonstrate new ICH. Care transitioned from trauma to medicine service 3/22 for continued evaluation of recurrent falls and encephalopathy. Awaiting placement.      Today 4/30:  - no changes  - medically ready for discharge  - pending placement, appreciate SW/care coordinator assistance- no new updates from SW today     # Delirium, improving    # Recent Intraparenchymal hemorrhage with extension into ventricles andmidline shift, SAH, and SDH post fall (3/8)  # Cognitive impairment  Family notes was AxOx3 prior to IPH and since discharge to TCU has been improving but still lethargic and not at baseline. Patient presenting again after fall with AMS. No new intracranial hemorrhage on repeat CT head. Patient is at risk for seizure in the setting of recent intracranial bleed and neurology started empiric antiepileptic therapy for persistent AMS. EEG with evidence of additional left hemispheric focal cerebral dysfunction, but no epileptiform discharges. Occasional urinary retention but UA unremarkable and patient afebrile. Continues to have intermittent delirium overnight and agitation (kicking) requiring 1:1.   - Delirium precautions              - Encourage patient to be awake during the day, keep lights on, windows open              - Encourage uninterrupted sleep at night, limit amount light              - Have glasses available during the day  - Encourage  frequent reorientation               - Encourage cognitive stimulation  - Recreational therapy  - Neuro exams qshift   - Bladder scan q8h  - Quetiapine as below  - Olanzapine tablet 5mg at 1700 and 2200   - PRN olanzapine 5mg ODT for agitation, not to exceed 20mg per day  - Medically ready for discharge  - SW assisting with placement, TCU rerrals sent  - Encourage to get up in wheelchair if agitated as wanting to get out of bed seems to be a trigger    - Continue 1:1     # Risk of Qtc prolongation  # LBBB  Last EKG 4/29 showed QTc 496, (~440 corrected for LBBB)  - Continue antispsychotics as above.  - Weekly EKG      --Chronic--  # Chronic incompletely united distal humerus fracture  Patient is s/p left elbow fracture s/p ORIF at [facility], complicated by elbow wound requiring washout. Per chart review, on lifelong Bactrim. Having occasional elbow pain  - Continue PTA Bactrim 1 tablet BID  - PRN tylenol 975mg q8 hours     # Falls  Has had multiple hospital admissions for fall with unknown etiology. Unclear if mechanical vs syncopal episodes. Has extensive cardiac history but recent TTE without abnormalities of bioprosthetic valve.Can consider arrhythmia as well with prior afib.   - Consider ZioPatch on discharge.     # ISHAN  Patient has history of ISHAN. Patient noted to be desatting when asleep, improves with Oxymask. If not tolerating mask can consider repeat blood gas to check PCO2. Bicarb on BMP is WNL.      # Chronic HFpEF 55% 1/2023  # Hypertension   #Afib   Continue to hold PTA eliquis in the setting of recent intracranial hemorrhage per NSGY recs. [facility] about possible Watchman procedure as outpatient.   - continue pta metoprolol succinate 12.5mg daily  - Holding PTA Entresto 24-26mg BID     # CAD s/p 3V CABG (2015)  Not on aspirin currently  - Statin as below     # Aortic stenosis s/p TAVR (2016)  Last TTE 1/2023 with mean gradient 14mmHg, suspected to be WNL  -  Holding PTA eliquis in setting of recent intracranial hemorrhage      #HLD  - continue pta atorvastatin 80mg at bedtime      #GERD  - pantoprazole 40mg daily (to replace PTA omeprazole 20mg daily while inpatient)     # Idiopathic progressive neuropathy  - Hold PTA Gabapentin due to AMS      # PTSD  # Depression  Patient started on Seroquel for delirium at OSH due to hospitalization in the setting of acute illness. Patient sleeping, minimally responsive as above.   - Continue PTA sertraline 75mg daily  - Scheduled quetiapine 75mg at 3pm daily  - PRN olanzapine as above     # Hx basal cell skin cancer   Family requesting this be treated inpatient, discussed this will need outpatient follow up.   - Will need outpatient Derm follow up for this      -- Outpatient Follow Ups Needed--  Neurosurgery - to assess for hydrocephalus. Would like to be informed on discharge to arrange follow up.      Neurology - assess need for Keppra, until then continue 750mg BID     Cardiology follow up for consideration of Watchman procedure.    Dermatology for hx basal cell skin ca         Diet: Regular Diet Adult  Room Service  Snacks/Supplements Adult: Ensure Enlive; With Meals  Diet    DVT Prophylaxis: Pneumatic Compression Devices  Rice Catheter: Not present  Lines: None     Cardiac Monitoring: None  Code Status: Full Code      Clinically Significant Risk Factors                  # Hypertension: Noted on problem list  # Chronic heart failure with preserved ejection fraction: heart failure noted on problem list and last echo with EF >50%        # Moderate Malnutrition: based on nutrition assessment    # Financial/Environmental Concerns: none         Disposition Plan     Medically Ready for Discharge: Ready Now         Patient seen and staffed with Dr. Slater.     Darren Rosenbaum MD  Medicine Service, [facility] TEAM 2  [facility]  Securely message with [employer] (more info)  Text page via [employer]  Paging/Directory   See signed in provider for up to date coverage information  ______________________________________________________________________    Interval History   NAEON. Left arm pain better today. He has right hip pain if he moves it. No new concerns.       Physical Exam   Vital Signs: Temp: 97.8  F (36.6  C) Temp src: Oral BP: 107/60 Pulse: 66   Resp: 20 SpO2: 97 % O2 Device: None (Room air)    Weight: 225 lbs 0 oz    General: sitting in chair, NAD, appears comfortable, not diaphoretic  HEENT: no scleral icterus or conjunctival injection, oropharynx clear  Resp: clear to auscultation bilaterally, no crackles or wheezes  Cardiac: regular rate and rhythm, no murmurs  Abdomen: soft, non-tender, non-distended. No rebound or guarding.   MSK: right hip non-tender to palpation  Extremities: warm, no lower extremity edema  Neuro: alert, answers questions appropriately though some confusion. Able to wiggle fingers and toes to command      Medical Decision Making

## 2024-05-01 PROCEDURE — 250N000013 HC RX MED GY IP 250 OP 250 PS 637

## 2024-05-01 PROCEDURE — 120N000002 HC R&B MED SURG/OB UMMC

## 2024-05-01 PROCEDURE — 99232 SBSQ HOSP IP/OBS MODERATE 35: CPT | Mod: GC | Performed by: INTERNAL MEDICINE

## 2024-05-01 RX ADMIN — SENNOSIDES AND DOCUSATE SODIUM 1 TABLET: 8.6; 5 TABLET ORAL at 21:41

## 2024-05-01 RX ADMIN — DICLOFENAC 2 G: 10 GEL TOPICAL at 07:58

## 2024-05-01 RX ADMIN — LEVETIRACETAM 750 MG: 750 TABLET, FILM COATED ORAL at 21:41

## 2024-05-01 RX ADMIN — PANTOPRAZOLE SODIUM 40 MG: 40 TABLET, DELAYED RELEASE ORAL at 07:58

## 2024-05-01 RX ADMIN — OLANZAPINE 5 MG: 5 TABLET, FILM COATED ORAL at 17:20

## 2024-05-01 RX ADMIN — DICLOFENAC 2 G: 10 GEL TOPICAL at 15:48

## 2024-05-01 RX ADMIN — SULFAMETHOXAZOLE AND TRIMETHOPRIM 1 TABLET: 400; 80 TABLET ORAL at 07:58

## 2024-05-01 RX ADMIN — ATORVASTATIN CALCIUM 80 MG: 80 TABLET, FILM COATED ORAL at 21:41

## 2024-05-01 RX ADMIN — DICLOFENAC 2 G: 10 GEL TOPICAL at 21:41

## 2024-05-01 RX ADMIN — POLYETHYLENE GLYCOL 3350 17 G: 17 POWDER, FOR SOLUTION ORAL at 07:57

## 2024-05-01 RX ADMIN — Medication 5 MG: at 21:41

## 2024-05-01 RX ADMIN — LIDOCAINE 4% 2 PATCH: 40 PATCH TOPICAL at 08:12

## 2024-05-01 RX ADMIN — THIAMINE HCL TAB 100 MG 100 MG: 100 TAB at 07:58

## 2024-05-01 RX ADMIN — FOLIC ACID 1 MG: 1 TABLET ORAL at 07:58

## 2024-05-01 RX ADMIN — SULFAMETHOXAZOLE AND TRIMETHOPRIM 1 TABLET: 400; 80 TABLET ORAL at 21:41

## 2024-05-01 RX ADMIN — LEVETIRACETAM 750 MG: 750 TABLET, FILM COATED ORAL at 07:58

## 2024-05-01 RX ADMIN — Medication 12.5 MG: at 07:58

## 2024-05-01 RX ADMIN — QUETIAPINE FUMARATE 75 MG: 50 TABLET ORAL at 15:48

## 2024-05-01 RX ADMIN — OLANZAPINE 5 MG: 5 TABLET, FILM COATED ORAL at 21:41

## 2024-05-01 RX ADMIN — DICLOFENAC 2 G: 10 GEL TOPICAL at 12:08

## 2024-05-01 RX ADMIN — SERTRALINE HYDROCHLORIDE 75 MG: 50 TABLET ORAL at 07:58

## 2024-05-01 ASSESSMENT — ACTIVITIES OF DAILY LIVING (ADL)
ADLS_ACUITY_SCORE: 49

## 2024-05-01 NOTE — PLAN OF CARE
Goal Outcome Evaluation:      Plan of Care Reviewed With: patient    Overall Patient Progress: no changeOverall Patient Progress: no change         Cares from: 6336-6187    V/S & pain: VSS on RA, pain on left shoulder and elbow, voltaren gel applied with good relief  Neuro: AO x 2, cooperative, did not require any PRN meds   Respiratory: stable on RA  Skin: no new skin concerns present  GI/: incontinent of urine and bowel, no BM this shift  Nutrition: reg diet, feeding assistance needed  Activity: assist x 1 with gait belt walker      Events this shift: No acute events this shift. Pt easily redirectable, will continue with POC.

## 2024-05-01 NOTE — PROGRESS NOTES
[facility]    Medicine Progress Note - Medicine Service, PAMELLA TEAM 2    Date of Admission:  3/21/2024    Assessment & Plan     83 year old male with history of cognitive impairment, CAD s/p CABG (2015), afib, Aortic stenosis s/o TAVR with bioprosthetic valve, with recent admission 03/08-03/18 after a fall at which time he was found to have IPH, IVH, SAH, and SDH, admitted to OSH on 3/21/2024 after a fall with AMS. He was transferred to [facility] due to concern for new intracranial hemorrhage but imaging findings did not demonstrate new ICH. Care transitioned from trauma to medicine service 3/22 for continued evaluation of recurrent falls and encephalopathy. Awaiting placement.      Today 5/1:  - no changes  - medically ready for discharge  - pending placement, appreciate SW/care coordinator assistance     # Delirium, improving    # Recent Intraparenchymal hemorrhage with extension into ventricles andmidline shift, SAH, and SDH post fall (3/8)  # Cognitive impairment  Family notes was AxOx3 prior to IPH and since discharge to TCU has been improving but still lethargic and not at baseline. Patient presenting again after fall with AMS. No new intracranial hemorrhage on repeat CT head. Patient is at risk for seizure in the setting of recent intracranial bleed and neurology started empiric antiepileptic therapy for persistent AMS. EEG with evidence of additional left hemispheric focal cerebral dysfunction, but no epileptiform discharges. Occasional urinary retention but UA unremarkable and patient afebrile. Continues to have intermittent delirium overnight and agitation (kicking) requiring 1:1.   - Delirium precautions              - Encourage patient to be awake during the day, keep lights on, windows open              - Encourage uninterrupted sleep at night, limit amount light              - Have glasses available during the day  - Encourage frequent reorientation                - Encourage cognitive stimulation  - Recreational therapy  - Neuro exams qshift   - Bladder scan q8h  - Quetiapine as below  - Olanzapine tablet 5mg at 1700 and 2200   - PRN olanzapine 5mg ODT for agitation, not to exceed 20mg per day  - Medically ready for discharge  - SW assisting with placement, TCU rerrals sent  - Encourage to get up in wheelchair if agitated as wanting to get out of bed seems to be a trigger    - Continue 1:1     # Risk of Qtc prolongation  # LBBB  Last EKG 4/29 showed QTc 496, (~440 corrected for LBBB)  - Continue antispsychotics as above.  - Weekly EKG      --Chronic--  # Chronic incompletely united distal humerus fracture  Patient is s/p left elbow fracture s/p ORIF at [facility], complicated by elbow wound requiring washout. Per chart review, on lifelong Bactrim. Having occasional elbow pain  - Continue PTA Bactrim 1 tablet BID  - PRN tylenol 975mg q8 hours     # Falls  Has had multiple hospital admissions for fall with unknown etiology. Unclear if mechanical vs syncopal episodes. Has extensive cardiac history but recent TTE without abnormalities of bioprosthetic valve.Can consider arrhythmia as well with prior afib.   - Consider ZioPatch on discharge.     # ISHAN  Patient has history of ISHAN. Patient noted to be desatting when asleep, improves with Oxymask. If not tolerating mask can consider repeat blood gas to check PCO2. Bicarb on BMP is WNL.      # Chronic HFpEF 55% 1/2023  # Hypertension   #Afib   Continue to hold PTA eliquis in the setting of recent intracranial hemorrhage per NSGY recs. [facility] about possible Watchman procedure as outpatient.   - continue pta metoprolol succinate 12.5mg daily  - Holding PTA Entresto 24-26mg BID     # CAD s/p 3V CABG (2015)  Not on aspirin currently  - Statin as below     # Aortic stenosis s/p TAVR (2016)  Last TTE 1/2023 with mean gradient 14mmHg, suspected to be WNL  - Holding PTA eliquis in setting of recent  intracranial hemorrhage      #HLD  - continue pta atorvastatin 80mg at bedtime      #GERD  - pantoprazole 40mg daily (to replace PTA omeprazole 20mg daily while inpatient)     # Idiopathic progressive neuropathy  - Hold PTA Gabapentin due to AMS      # PTSD  # Depression  Patient started on Seroquel for delirium at OSH due to hospitalization in the setting of acute illness. Patient sleeping, minimally responsive as above.   - Continue PTA sertraline 75mg daily  - Scheduled quetiapine 75mg at 3pm daily  - PRN olanzapine as above     # Hx basal cell skin cancer   Family requesting this be treated inpatient, discussed this will need outpatient follow up.   - Will need outpatient Derm follow up for this      -- Outpatient Follow Ups Needed--  Neurosurgery - to assess for hydrocephalus. Would like to be informed on discharge to arrange follow up.      Neurology - assess need for Keppra, until then continue 750mg BID     Cardiology follow up for consideration of Watchman procedure.    Dermatology for hx basal cell skin ca         Diet: Regular Diet Adult  Room Service  Snacks/Supplements Adult: Ensure Enlive; With Meals  Diet    DVT Prophylaxis: Pneumatic Compression Devices  Rice Catheter: Not present  Lines: None     Cardiac Monitoring: None  Code Status: Full Code      Clinically Significant Risk Factors                  # Hypertension: Noted on problem list  # Chronic heart failure with preserved ejection fraction: heart failure noted on problem list and last echo with EF >50%        # Moderate Malnutrition: based on nutrition assessment    # Financial/Environmental Concerns: none         Disposition Plan     Medically Ready for Discharge: Ready Now         Patient seen and staffed with Dr. Slater.     Darren Rosenbaum MD  Medicine Service, [facility] TEAM [address] [place] [address]  Securely message with [employer] (more info)  Text page via [employer] Paging/Directory   See signed in provider  for up to date coverage information  ______________________________________________________________________    Interval History   NAEON. Has some left arm pain. Left hip pain better when not moving. Has been working with PT.       Physical Exam   Vital Signs: Temp: 97.1  F (36.2  C) Temp src: Axillary BP: 132/49 Pulse: 68   Resp: 22 SpO2: 98 % O2 Device: None (Room air)    Weight: 225 lbs 0 oz    General: lying in bed, NAD, appears comfortable, not diaphoretic  HEENT: no scleral icterus or conjunctival injection, oropharynx clear  Resp: clear to auscultation bilaterally, no crackles or wheezes  Cardiac: regular rate and rhythm, no murmurs  Abdomen: soft, non-tender, non-distended. No rebound or guarding.   MSK: left hip non-tender to palpation  Extremities: warm, no lower extremity edema  Neuro: alert, answers questions appropriately though some confusion.       Medical Decision Making

## 2024-05-01 NOTE — PLAN OF CARE
Goal Outcome Evaluation:      Plan of Care Reviewed With: patient    Overall Patient Progress: no changeOverall Patient Progress: no change    Cares from: 8791-6392     Vital Signs & pain: VSS on RA, pain on left shoulder and elbow, sched Voltaren gel, and Lido patches   Neuro: A&O, pleasantly confused, cooperative.   Respiratory: stable on RA  Skin: left/right knee abrasion-new mepilex applied. No new skin concerns present   GI/: incontinent of urine, used urinal once, small BM today.   Nutrition: reg diet, feeding assistance needed  Activity: assist 1-2 with gait belt walker     Events this shift: No acute events this shift. Pt easily redirectable, will continue with POC.

## 2024-05-01 NOTE — PROGRESS NOTES
Care Management Follow Up    Length of Stay (days): 41    Expected Discharge Date:       Concerns to be Addressed: discharge planning     Patient plan of care discussed at interdisciplinary rounds: Yes    Anticipated Discharge Disposition:  Long term care     Anticipated Discharge Services:  Transportation services  Anticipated Discharge DME:  None    Patient/family educated on Medicare website which has current facility and service quality ratings:  yes  Education Provided on the Discharge Plan:  yes  Patient/Family in Agreement with the Plan:  yes    Referrals Placed by CM/SW:      Pending  [facility]  [address] [address]  P: [phone]  F: [phone]  4/25: Initial referral sent via [employer]     [facility]  [address] [address]  P: [phone]  F: [phone]  4/25: Initial referral sent via Epic     [place]  [place]: [place] concerns  -[facility]: Not [facility]  -[facility]: Bed not available   -[facility]: Behavior concerns, not [facility] contracted  -[facility]: Bed not available   -[facility]: Behavior concerns  - [facility]: Fall risk    Private pay costs discussed: Not applicable    Additional Information:  Recs are for long term care. Patient remains on a 1:1 for safety due to being impulsive.     SW continuing to work on finding [facility] contracted LTC facility that can meet the patient's needs.    SW will continue to follow for discharge needs and placement.     VIET Cutler  [address]   Office: [phone]  Pager: [phone]  [email or web address]

## 2024-05-02 PROCEDURE — 250N000013 HC RX MED GY IP 250 OP 250 PS 637: Performed by: INTERNAL MEDICINE

## 2024-05-02 PROCEDURE — 250N000013 HC RX MED GY IP 250 OP 250 PS 637

## 2024-05-02 PROCEDURE — 120N000002 HC R&B MED SURG/OB UMMC

## 2024-05-02 PROCEDURE — 99232 SBSQ HOSP IP/OBS MODERATE 35: CPT | Performed by: INTERNAL MEDICINE

## 2024-05-02 RX ADMIN — SERTRALINE HYDROCHLORIDE 75 MG: 50 TABLET ORAL at 10:27

## 2024-05-02 RX ADMIN — OLANZAPINE 5 MG: 5 TABLET, FILM COATED ORAL at 22:17

## 2024-05-02 RX ADMIN — SENNOSIDES AND DOCUSATE SODIUM 1 TABLET: 8.6; 5 TABLET ORAL at 20:11

## 2024-05-02 RX ADMIN — OLANZAPINE 5 MG: 5 TABLET, FILM COATED ORAL at 16:17

## 2024-05-02 RX ADMIN — DICLOFENAC 2 G: 10 GEL TOPICAL at 16:19

## 2024-05-02 RX ADMIN — SULFAMETHOXAZOLE AND TRIMETHOPRIM 1 TABLET: 400; 80 TABLET ORAL at 10:28

## 2024-05-02 RX ADMIN — SULFAMETHOXAZOLE AND TRIMETHOPRIM 1 TABLET: 400; 80 TABLET ORAL at 20:10

## 2024-05-02 RX ADMIN — QUETIAPINE FUMARATE 75 MG: 50 TABLET ORAL at 16:17

## 2024-05-02 RX ADMIN — THIAMINE HCL TAB 100 MG 100 MG: 100 TAB at 10:27

## 2024-05-02 RX ADMIN — FOLIC ACID 1 MG: 1 TABLET ORAL at 10:28

## 2024-05-02 RX ADMIN — LEVETIRACETAM 750 MG: 750 TABLET, FILM COATED ORAL at 10:28

## 2024-05-02 RX ADMIN — PANTOPRAZOLE SODIUM 40 MG: 40 TABLET, DELAYED RELEASE ORAL at 10:27

## 2024-05-02 RX ADMIN — Medication 10 MG: at 20:10

## 2024-05-02 RX ADMIN — LIDOCAINE 4% 2 PATCH: 40 PATCH TOPICAL at 10:37

## 2024-05-02 RX ADMIN — LEVETIRACETAM 750 MG: 750 TABLET, FILM COATED ORAL at 20:10

## 2024-05-02 RX ADMIN — DICLOFENAC 2 G: 10 GEL TOPICAL at 20:12

## 2024-05-02 RX ADMIN — ATORVASTATIN CALCIUM 80 MG: 80 TABLET, FILM COATED ORAL at 20:10

## 2024-05-02 RX ADMIN — POLYETHYLENE GLYCOL 3350 17 G: 17 POWDER, FOR SOLUTION ORAL at 10:37

## 2024-05-02 ASSESSMENT — ACTIVITIES OF DAILY LIVING (ADL)
ADLS_ACUITY_SCORE: 49

## 2024-05-02 NOTE — PLAN OF CARE
Goal Outcome Evaluation:      Plan of Care Reviewed With: patient    Overall Patient Progress: no changeOverall Patient Progress: no change     Assumed care from 0700 to 1900.     Pt alert to person, place and situation, intermittent confusion. Sitter at bedside and delirium precautions in place. Alert when awoken @ noon. VVSO bradycardia on RA but pt asymptomatic .Pain managed with current pain regimen.  Bilat knee dressings c/d/I. New breakdown observed to pt coccyx, mepilex applied. Provider notified and [facility] nurse consulted. Pt up by 2 assist, walker and gait to bedside commode, walked around unit with 2 assit. Incontinent to bowel and bladder. Reg diet, feeding assistance, fair appetite during shift. No acute events during shift, melatonin added to pt bed time regimen to help with sleeplessness at [facility]. Pt awaiting placement for discharge.

## 2024-05-02 NOTE — PLAN OF CARE
Goal Outcome Evaluation:      Plan of Care Reviewed With: patient    Overall Patient Progress: no changeOverall Patient Progress: no change       Cares from: 8176-7958     V/S & pain: VSS on RA, pain on left shoulder and elbow, voltaren gel applied with good relief  Neuro:  Confused. Cooperative, did not require any PRN meds   Respiratory: stable on RA  Skin: no new skin concerns present  GI/: incontinent of urine and bowel, small BM this shift  Nutrition: reg diet, feeding assistance needed  Activity: assist x 1 with gait belt walker        Events this shift: No acute events this shift. Pt easily redirectable, will continue with POC. Pt is med ready and awaiting placement to long-term care facility.

## 2024-05-02 NOTE — PROGRESS NOTES
[facility]    Medicine Progress Note - Medicine Service, PAMELLA TEAM 2    Date of Admission:  3/21/2024    Assessment & Plan     83 year old male with history of cognitive impairment, CAD s/p CABG (2015), afib, Aortic stenosis s/o TAVR with bioprosthetic valve, with recent admission 03/08-03/18 after a fall at which time he was found to have IPH, IVH, SAH, and SDH, admitted to OSH on 3/21/2024 after a fall with AMS. He was transferred to [facility] due to concern for new intracranial hemorrhage but imaging findings did not demonstrate new ICH. Care transitioned from trauma to medicine service 3/22 for continued evaluation of recurrent falls and encephalopathy. Awaiting placement.      Today:  - increased PM melatonin   - medically ready for discharge  - pending placement, appreciate SW/care coordinator assistance- no new updates from SW today     # Delirium, improving    # Recent Intraparenchymal hemorrhage with extension into ventricles andmidline shift, SAH, and SDH post fall (3/8)  # Cognitive impairment  Family notes was AxOx3 prior to IPH and since discharge to TCU has been improving but still lethargic and not at baseline. Patient presenting again after fall with AMS. No new intracranial hemorrhage on repeat CT head. Patient is at risk for seizure in the setting of recent intracranial bleed and neurology started empiric antiepileptic therapy for persistent AMS. EEG with evidence of additional left hemispheric focal cerebral dysfunction, but no epileptiform discharges. Occasional urinary retention but UA unremarkable and patient afebrile. Continues to have intermittent delirium overnight and agitation (kicking) requiring 1:1.   - Delirium precautions              - Encourage patient to be awake during the day, keep lights on, windows open              - Encourage uninterrupted sleep at night, limit amount light              - Have glasses available during the day  -  Encourage frequent reorientation               - Encourage cognitive stimulation  - Recreational therapy  - Neuro exams qshift   - Bladder scan q8h  - Quetiapine as below  - Olanzapine tablet 5mg at 1700 and 2200   - PRN olanzapine 5mg ODT for agitation, not to exceed 20mg per day  - Medically ready for discharge  - SW assisting with placement, TCU rerrals sent  - Encourage to get up in wheelchair if agitated as wanting to get out of bed seems to be a trigger    - Continue 1:1     # Risk of Qtc prolongation  # LBBB  Last EKG 4/29 showed QTc 496, (~440 corrected for LBBB)  - Continue antispsychotics as above.  - Weekly EKG      --Chronic--  # Chronic incompletely united distal humerus fracture  Patient is s/p left elbow fracture s/p ORIF at [facility], complicated by elbow wound requiring washout. Per chart review, on lifelong Bactrim. Having occasional elbow pain  - Continue PTA Bactrim 1 tablet BID  - PRN tylenol 975mg q8 hours     # Falls  Has had multiple hospital admissions for fall with unknown etiology. Unclear if mechanical vs syncopal episodes. Has extensive cardiac history but recent TTE without abnormalities of bioprosthetic valve.Can consider arrhythmia as well with prior afib.   - Consider ZioPatch on discharge.     # ISHAN  Patient has history of ISHAN. Patient noted to be desatting when asleep, improves with Oxymask. If not tolerating mask can consider repeat blood gas to check PCO2. Bicarb on BMP is WNL.      # Chronic HFpEF 55% 1/2023  # Hypertension   #Afib   Continue to hold PTA eliquis in the setting of recent intracranial hemorrhage per NSGY recs. [facility] possible Watchman procedure as outpatient.   - continue pta metoprolol succinate 12.5mg daily  - Holding PTA Entresto 24-26mg BID for borderline hypotension     # CAD s/p 3V CABG (2015)  Not on aspirin currently  - Statin as below     # Aortic stenosis s/p TAVR (2016)  Last TTE 1/2023 with mean gradient  14mmHg, suspected to be WNL  - Holding PTA eliquis in setting of recent intracranial hemorrhage      #HLD  - continue pta atorvastatin 80mg at bedtime      #GERD  - pantoprazole 40mg daily (to replace PTA omeprazole 20mg daily while inpatient)     # Idiopathic progressive neuropathy  - Held PTA Gabapentin due to AMS, not resumed at this point       # PTSD  # Depression  Patient started on Seroquel for delirium at OSH due to hospitalization in the setting of acute illness. Patient sleeping, minimally responsive as above.   - Continue PTA sertraline 75mg daily  - Scheduled quetiapine 75mg at 3pm daily  - PRN olanzapine as above     # Hx basal cell skin cancer   Family requesting this be treated inpatient, discussed this will need outpatient follow up.   - Will need outpatient Derm follow up for this      -- Outpatient Follow Ups Needed--  Neurosurgery - to assess for hydrocephalus. Would like to be informed on discharge to arrange follow up.      Neurology - assess need for Keppra, until then continue 750mg BID     Cardiology follow up for consideration of Watchman procedure.    Dermatology for hx basal cell skin ca         Diet: Regular Diet Adult  Room Service  Snacks/Supplements Adult: Ensure Enlive; With Meals  Diet    DVT Prophylaxis: Pneumatic Compression Devices  Rice Catheter: Not present  Lines: None     Cardiac Monitoring: None  Code Status: Full Code      Clinically Significant Risk Factors                  # Hypertension: Noted on problem list  # Chronic heart failure with preserved ejection fraction: heart failure noted on problem list and last echo with EF >50%        # Moderate Malnutrition: based on nutrition assessment    # Financial/Environmental Concerns: none         Disposition Plan     Medically Ready for Discharge: Ready Now     Renetta Slater MD  Medicine Service, [facility] TEAM [address] [place] [address]  Securely message with [employer] (more info)  Text page via  [employer] Paging/Directory   See signed in provider for up to date coverage information  ______________________________________________________________________    Interval History    No acute events overnight, nursing notes reviewed. Didn't sleep until in the early hours of the morning, sleeping in today. Took meds but then went back to sleep. Last couple days working with PT has been challenging, frustrated quickly.  Very groggy this morning, wakes a bit to loud voice and touch, grunts when prompted about lunch and PT.     5-pt ROS otherwise unable to obtain         Physical Exam   Vital Signs: Temp: 97.8  F (36.6  C) Temp src: Axillary BP: 116/72 Pulse: 54   Resp: 22 SpO2: 97 % O2 Device: None (Room air)    Weight: 225 lbs 0 oz    General: sleeping, briefly opens eyes/grunts to questions   HEENT: no scleral icterus or conjunctival injection, oropharynx clear  Resp: clear to auscultation bilaterally, no crackles or wheezes  Cardiac: regular rate and rhythm, no murmurs  Abdomen: soft, non-tender, non-distended. No rebound or guarding.   Extremities: warm, no lower extremity edema  Neuro: groggy this morning, not following commands, shifting in bed       Medical Decision Making

## 2024-05-03 ENCOUNTER — APPOINTMENT (OUTPATIENT)
Dept: PHYSICAL THERAPY | Facility: CLINIC | Age: 84
DRG: 085 | End: 2024-05-03
Payer: COMMERCIAL

## 2024-05-03 PROCEDURE — 97116 GAIT TRAINING THERAPY: CPT | Mod: GP

## 2024-05-03 PROCEDURE — 250N000013 HC RX MED GY IP 250 OP 250 PS 637: Performed by: INTERNAL MEDICINE

## 2024-05-03 PROCEDURE — 250N000013 HC RX MED GY IP 250 OP 250 PS 637

## 2024-05-03 PROCEDURE — 97530 THERAPEUTIC ACTIVITIES: CPT | Mod: GP

## 2024-05-03 PROCEDURE — G0463 HOSPITAL OUTPT CLINIC VISIT: HCPCS

## 2024-05-03 PROCEDURE — 99232 SBSQ HOSP IP/OBS MODERATE 35: CPT | Mod: GC | Performed by: INTERNAL MEDICINE

## 2024-05-03 PROCEDURE — 120N000002 HC R&B MED SURG/OB UMMC

## 2024-05-03 RX ADMIN — Medication 10 MG: at 20:58

## 2024-05-03 RX ADMIN — QUETIAPINE FUMARATE 50 MG: 50 TABLET ORAL at 20:58

## 2024-05-03 RX ADMIN — OLANZAPINE 5 MG: 5 TABLET, FILM COATED ORAL at 21:00

## 2024-05-03 RX ADMIN — QUETIAPINE FUMARATE 75 MG: 50 TABLET ORAL at 14:39

## 2024-05-03 RX ADMIN — DICLOFENAC 2 G: 10 GEL TOPICAL at 12:02

## 2024-05-03 RX ADMIN — SULFAMETHOXAZOLE AND TRIMETHOPRIM 1 TABLET: 400; 80 TABLET ORAL at 20:58

## 2024-05-03 RX ADMIN — FOLIC ACID 1 MG: 1 TABLET ORAL at 07:51

## 2024-05-03 RX ADMIN — LIDOCAINE 4% 2 PATCH: 40 PATCH TOPICAL at 07:57

## 2024-05-03 RX ADMIN — POLYETHYLENE GLYCOL 3350 17 G: 17 POWDER, FOR SOLUTION ORAL at 07:51

## 2024-05-03 RX ADMIN — PANTOPRAZOLE SODIUM 40 MG: 40 TABLET, DELAYED RELEASE ORAL at 07:51

## 2024-05-03 RX ADMIN — LEVETIRACETAM 750 MG: 750 TABLET, FILM COATED ORAL at 07:51

## 2024-05-03 RX ADMIN — Medication 12.5 MG: at 07:51

## 2024-05-03 RX ADMIN — ACETAMINOPHEN 975 MG: 325 TABLET, FILM COATED ORAL at 20:57

## 2024-05-03 RX ADMIN — SULFAMETHOXAZOLE AND TRIMETHOPRIM 1 TABLET: 400; 80 TABLET ORAL at 07:51

## 2024-05-03 RX ADMIN — THIAMINE HCL TAB 100 MG 100 MG: 100 TAB at 07:51

## 2024-05-03 RX ADMIN — SERTRALINE HYDROCHLORIDE 75 MG: 50 TABLET ORAL at 07:50

## 2024-05-03 RX ADMIN — LEVETIRACETAM 750 MG: 750 TABLET, FILM COATED ORAL at 20:58

## 2024-05-03 RX ADMIN — ATORVASTATIN CALCIUM 80 MG: 80 TABLET, FILM COATED ORAL at 20:57

## 2024-05-03 RX ADMIN — DICLOFENAC 2 G: 10 GEL TOPICAL at 07:51

## 2024-05-03 RX ADMIN — OLANZAPINE 5 MG: 5 TABLET, FILM COATED ORAL at 18:30

## 2024-05-03 ASSESSMENT — ACTIVITIES OF DAILY LIVING (ADL)
ADLS_ACUITY_SCORE: 49

## 2024-05-03 NOTE — PROGRESS NOTES
[facility]    Medicine Progress Note - Medicine Service, P[facility] TEAM 2    Date of Admission:  3/21/2024    Assessment & Plan     83 year old male with history of cognitive impairment, CAD s/p CABG (2015), afib, Aortic stenosis s/o TAVR with bioprosthetic valve, with recent admission 03/08-03/18 after a fall at which time he was found to have IPH, IVH, SAH, and SDH, admitted to OSH on 3/21/2024 after a fall with AMS. He was transferred to [facility] due to concern for new intracranial hemorrhage but imaging findings did not demonstrate new ICH. Care transitioned from trauma to medicine service 3/22 for continued evaluation of recurrent falls and encephalopathy. Awaiting placement.      Today:  - medically ready for discharge  - pending placement, appreciate SW/care coordinator assistance- no new updates from SW today     # Delirium, improving    # Recent Intraparenchymal hemorrhage with extension into ventricles andmidline shift, SAH, and SDH post fall (3/8)  # Cognitive impairment  Family notes was AxOx3 prior to IPH and since discharge to TCU has been improving but still lethargic and not at baseline. Patient presenting again after fall with AMS. No new intracranial hemorrhage on repeat CT head. Patient is at risk for seizure in the setting of recent intracranial bleed and neurology started empiric antiepileptic therapy for persistent AMS. EEG with evidence of additional left hemispheric focal cerebral dysfunction, but no epileptiform discharges. Occasional urinary retention but UA unremarkable and patient afebrile. Continues to have intermittent delirium overnight and agitation (kicking) requiring 1:1.   - Delirium precautions              - Encourage patient to be awake during the day, keep lights on, windows open              - Encourage uninterrupted sleep at night, limit amount light              - Have glasses available during the day  - Encourage frequent  reorientation               - Encourage cognitive stimulation  - Recreational therapy  - Neuro exams qshift   - Bladder scan q8h  - Quetiapine as below  - Olanzapine tablet 5mg at 1700 and 2200   - PRN olanzapine 5mg ODT for agitation, not to exceed 20mg per day  - Medically ready for discharge  - SW assisting with placement, TCU rerrals sent  - Encourage to get up in wheelchair if agitated as wanting to get out of bed seems to be a trigger    - Continue 1:1     # Risk of Qtc prolongation  # LBBB  Last EKG 4/29 showed QTc 496, (~440 corrected for LBBB)  - Continue antispsychotics as above.  - Weekly EKG      --Chronic--  # Chronic incompletely united distal humerus fracture  Patient is s/p left elbow fracture s/p ORIF at [facility], complicated by elbow wound requiring washout. Per chart review, on lifelong Bactrim. Having occasional elbow pain  - Continue PTA Bactrim 1 tablet BID  - PRN tylenol 975mg q8 hours     # Falls  Has had multiple hospital admissions for fall with unknown etiology. Unclear if mechanical vs syncopal episodes. Has extensive cardiac history but recent TTE without abnormalities of bioprosthetic valve.Can consider arrhythmia as well with prior afib.   - Consider ZioPatch on discharge.     # ISHAN  Patient has history of ISHAN. Patient noted to be desatting when asleep, improves with Oxymask. If not tolerating mask can consider repeat blood gas to check PCO2. Bicarb on BMP is WNL.      # Chronic HFpEF 55% 1/2023  # Hypertension   #Afib   Continue to hold PTA eliquis in the setting of recent intracranial hemorrhage per NSGY recs. [facility] about possible Watchman procedure as outpatient.   - continue pta metoprolol succinate 12.5mg daily  - Holding PTA Entresto 24-26mg BID for borderline hypotension     # CAD s/p 3V CABG (2015)  Not on aspirin currently  - Statin as below     # Aortic stenosis s/p TAVR (2016)  Last TTE 1/2023 with mean gradient 14mmHg, suspected  to be WNL  - Holding PTA eliquis in setting of recent intracranial hemorrhage      #HLD  - continue pta atorvastatin 80mg at bedtime      #GERD  - pantoprazole 40mg daily (to replace PTA omeprazole 20mg daily while inpatient)     # Idiopathic progressive neuropathy  - Held PTA Gabapentin due to AMS, not resumed at this point       # PTSD  # Depression  Patient started on Seroquel for delirium at OSH due to hospitalization in the setting of acute illness. Patient sleeping, minimally responsive as above.   - Continue PTA sertraline 75mg daily  - Scheduled quetiapine 75mg at 3pm daily  - PRN olanzapine as above     # Hx basal cell skin cancer   Family requesting this be treated inpatient, discussed this will need outpatient follow up.   - Will need outpatient Derm follow up for this      -- Outpatient Follow Ups Needed--  Neurosurgery - to assess for hydrocephalus. Would like to be informed on discharge to arrange follow up.      Neurology - assess need for Keppra, until then continue 750mg BID     Cardiology follow up for consideration of Watchman procedure.    Dermatology for hx basal cell skin ca         Diet: Regular Diet Adult  Room Service  Snacks/Supplements Adult: Ensure Enlive; With Meals  Diet    DVT Prophylaxis: Pneumatic Compression Devices  Rice Catheter: Not present  Lines: None     Cardiac Monitoring: None  Code Status: Full Code      Clinically Significant Risk Factors                  # Hypertension: Noted on problem list  # Chronic heart failure with preserved ejection fraction: heart failure noted on problem list and last echo with EF >50%        # Moderate Malnutrition: based on nutrition assessment    # Financial/Environmental Concerns: none         Disposition Plan     Medically Ready for Discharge: Ready Now     Darren Rosenbaum MD  Medicine Service, [facility] TEAM 2  [facility]  Securely message with [employer] (more info)  Text page via [employer]  Paging/Directory   See signed in provider for up to date coverage information  ______________________________________________________________________    Interval History   No acute events overnight, nursing notes reviewed. Sleepy when seen in AM but alert. No new concerns.     Physical Exam   Vital Signs: Temp: 97.6  F (36.4  C) Temp src: Oral BP: 121/72 Pulse: 54   Resp: 18 SpO2: 95 % O2 Device: None (Room air)    Weight: 225 lbs 0 oz    General: NAD, sleepy, not diaphoretic  HEENT: no scleral icterus or conjunctival injection, oropharynx clear  Resp: clear to auscultation bilaterally, no crackles or wheezes  Cardiac: regular rate and rhythm, no murmurs  Abdomen: soft, non-tender, non-distended. No rebound or guarding.   Extremities: warm, no lower extremity edema  Neuro: alert but sleepy this morning, able to answer some questions      Medical Decision Making

## 2024-05-03 NOTE — PLAN OF CARE
Patient appeared calm and comfortable in bed at the start of the shift, verbally responsive and cooperative with nursing cares.. Denies pain but verbalized some occasional pain on his R shoulders, Lidocaine patch applied. No behaviors noted this shift however, patient still continues to display some confusion and disorientation to situation and place. Assisted with meals and eating fairly. Had 2 BMs and voiding freely, incontinent. Participated with therapy this afternoon.  Responding to Seroquel well.Resting in bed as of this time. Sitter at bedside attentive to cares.

## 2024-05-03 NOTE — CONSULTS
[facility]  WOC Nurse Inpatient Assessment     Consulted for: heels  5/3/24: new consult for sacrum/coccyx      Patient History (according to provider note(s):      David Thomson is a 83 year old male with history of cognitive impairment, CAD s/p CABG (2015), afib, Aortic stenosis s/o TAVR with bioprosthetic valve, with recent admission 03/08-03/18 after a fall at which time he was found to have IPH, IVH, SAH, and SDH, admitted to OSH on 3/21/2024 after a fall with AMS. He was transferred to [facility] due to concern for new intracranial hemorrhage but imaging findings did not demonstrate new ICH. Care transitioned from trauma to medicine service 3/22 for continued evaluation of recurrent falls and encephalopathy. Remains oriented to self and age, occasionally oriented to year, but not place or situation. PT/OT evaluated, recommend TCU on discharge.     Assessment:      Areas visualized during today's visit: Heels 5/3/24: Sacrum/coccyx      Nurse noting stage 1 on patient heels on 4/4. WOC assessed 4/5 in the afternoon and bedside RN assessed 4/5 in the morning and heels were blanchable at these times.   No wound currently present. Possible patient had a stage 1 that resolved.     Skin Injury Location: gluteal crease and perianal      Last photo: 5/3/24  Skin injury due to: Incontinence associated dermatitis (IAD) and Moisture associated skin damage (MASD), correction 5/6/24 after chart review  Skin history and plan of care:  linear skin split to gluteal crease with surrounding erythema and slightly macerated pale pink tissue, moisture associated skin damage related to inctoninence. Marianne to reddened skin extends over the coccyx and perianal areas where skin is intact and blanchable but moist.  patient is reported incontinent of bladder and bowels and at time of assessment began having an episode of stool incontinence. Will add moisture barrier cream to assist with skin  protection, I do not think a dressing would be appropriate at this time due to continued episodes of incontinence that would require multiple dressing changes throughout the day. Patient has sitter at the bedside and appears somewhat confused and not answering questions, he did follow instructions to turn but was unable to turn himself without assistance. Recommend to add SILVA pump to isoflex support surface for additional moisture management  Affected area:      Skin assessment: Denudement, Erosion of epidermis, Erythema, and Maceration     Measurements (length x width x depth, in cm) 4  x 0.5  x  0.1 cm      Color: pink, red, and pale pink     Temperature  normal      Drainage: none      Color: none      Odor: none  Pain: unable to assess due to  confusion, none  Pain interventions prior to dressing change: patient tolerated well, no significant pain present , and slow and gentle cares   Treatment goal: Heal , Infection control/prevention, Decrease moisture, Maintain (prevention of deterioration), Protection, and Promote epidermal migration  STATUS: initial assessment  Supplies ordered: at bedside, supplies stored on unit, and discussed with patient      Treatment Plan:     Intergluteal crease: BID and PRN for episodes of incontinence  Cleanse the area with Shira cleanse and protect, very gently with soft cloth.  Apply ostomy powder (#7039) on all open and denuded skin.  Apply thin layer of critic aid paste on top of it.  With repeat application, do not scrub the paste, only remove soiled paste and reapply.  If complete removal of paste is necessary use baby oil/mineral oil (#433350) and soft wash cloth.  Ensure pt has Angel-cushion (#406254) while sitting up in the chair.  Use only one Covidien pad in between mattress and pt. No brief while in bed.   Add low air loss pump to isoflex support surface for additional moisture management    Ensure the pump is plugged in and functioning on SILVA (green light) function.  "    Pressure Injury Prevention (PIP) Plan:  If patient is declining pressure injury prevention interventions: Explore reason why and address patient's concerns, Educate on pressure injury risk and prevention intervention(s), If patient is still declining, document \"informed refusal\" , and Ensure Care team is aware ( provider, charge nurse, etc)  Mattress: Follow bed algorithm, reassess daily and order specialty mattress, if indicated.  HOB: Maintain at or below 30 degrees, unless contraindicated  Repositioning in bed: Every 1-2 hours , Left/right positioning; avoid supine, and Raise foot of bed prior to raising head of bed, to reduce patient sliding down (shear)  Heels: Keep elevated off mattress, Pillows under calves, and Preventative Mepilex to heels  Protective Dressing: Sacral Mepilex for prevention (#912428),  especially for the agitated patient   Chair positioning: Repositions self: patient to shift weight every 15 minutes, Assist patient to reposition hourly, and Do NOT use a donut for sitting (this increases pressure to smaller area and creates a higher potential for injury)    If patient has a buttock pressure injury, or high risk for PI use chair cushion or SPS.  Moisture Management: Perineal cleansing /protection: Follow Incontinence Protocol, Avoid brief in bed, Clean and dry skin folds with bathing , Consider InterDry (#394225) between folds, and Moisturize dry skin  Under Devices: Inspect skin under all medical devices during skin inspection , Ensure tubes are stabilized without tension, and Ensure patient is not lying on medical devices or equipment when repositioned  Ask provider to discontinue device when no longer needed.      Orders: Written    RECOMMEND PRIMARY TEAM ORDER: None, at this time  Education provided: plan of care  Discussed plan of care with: Nurse  WOC nurse follow-up plan: signing off  Notify WOC if wound(s) deteriorate.  Nursing to notify the Provider(s) and re-consult the WOC " Nurse if new skin concern.    DATA:     Current support surface: Standard  Standard gel/foam mattress (IsoFlex, Atmos air, etc)  Containment of urine/stool: Incontinent pad in bed  BMI: Body mass index is 29.69 kg/m .   Active diet order: Orders Placed This Encounter      Regular Diet Adult      Diet     Output: I/O last 3 completed shifts:  In: 200 [P.O.:200]  Out: 350 [Urine:350]     Labs:   Recent Labs   Lab 04/29/24  0558   HGB 10.9*   WBC 7.2     Pressure injury risk assessment:   Sensory Perception: 3-->slightly limited  Moisture: 2-->very moist  Activity: 3-->walks occasionally  Mobility: 3-->slightly limited  Nutrition: 2-->probably inadequate  Friction and Shear: 2-->potential problem  Johann Score: 15    Fatimah [facility], RN, BSN, CWON  Pager no longer is use, please contact through [employer] group: [facility] Nurse [place]  Dept. Office Number: [phone]

## 2024-05-03 NOTE — CARE PLAN
Vitals: Blood pressure 134/56, pulse 54, temperature 97.8  F (36.6  C), temperature source Oral, resp. rate 18, weight 102.1 kg (225 lb), SpO2 94%.  Time: 2562-7089  Neuro: Pt only self. Intermittently confused, on 1 to 1.   Activity: up with assist x2, walker and gait belt. Did not get out of bed over night.   Pain and N/V: Denies Pain and n/v.  GI/: No BM this shift, Voiding spontaneously. AUOP.   Cardiac: Denies cardiac chest pain.   Diet: Regular.   Respiratory: Denies SOB, on RA  Lines/Drains: None.   Incisions/Skin: WDL, No new deficit. Sacrum and knee wound care done 05/02/24.  Lab: Reviewed.  New changes this shift: No significant changes this shift, Continue POC.

## 2024-05-04 PROCEDURE — 250N000013 HC RX MED GY IP 250 OP 250 PS 637

## 2024-05-04 PROCEDURE — 250N000013 HC RX MED GY IP 250 OP 250 PS 637: Performed by: INTERNAL MEDICINE

## 2024-05-04 PROCEDURE — 99232 SBSQ HOSP IP/OBS MODERATE 35: CPT | Mod: GC | Performed by: INTERNAL MEDICINE

## 2024-05-04 PROCEDURE — 120N000002 HC R&B MED SURG/OB UMMC

## 2024-05-04 RX ADMIN — LIDOCAINE 4% 2 PATCH: 40 PATCH TOPICAL at 09:03

## 2024-05-04 RX ADMIN — FOLIC ACID 1 MG: 1 TABLET ORAL at 08:55

## 2024-05-04 RX ADMIN — ACETAMINOPHEN 975 MG: 325 TABLET, FILM COATED ORAL at 21:01

## 2024-05-04 RX ADMIN — THIAMINE HCL TAB 100 MG 100 MG: 100 TAB at 08:55

## 2024-05-04 RX ADMIN — Medication 10 MG: at 21:01

## 2024-05-04 RX ADMIN — PANTOPRAZOLE SODIUM 40 MG: 40 TABLET, DELAYED RELEASE ORAL at 08:55

## 2024-05-04 RX ADMIN — DICLOFENAC 2 G: 10 GEL TOPICAL at 09:03

## 2024-05-04 RX ADMIN — ATORVASTATIN CALCIUM 80 MG: 80 TABLET, FILM COATED ORAL at 21:01

## 2024-05-04 RX ADMIN — OLANZAPINE 5 MG: 5 TABLET, FILM COATED ORAL at 21:01

## 2024-05-04 RX ADMIN — POLYETHYLENE GLYCOL 3350 17 G: 17 POWDER, FOR SOLUTION ORAL at 08:55

## 2024-05-04 RX ADMIN — Medication 1 HALF-TAB: at 21:48

## 2024-05-04 RX ADMIN — DICLOFENAC 2 G: 10 GEL TOPICAL at 21:19

## 2024-05-04 RX ADMIN — Medication 1 HALF-TAB: at 12:39

## 2024-05-04 RX ADMIN — SERTRALINE HYDROCHLORIDE 75 MG: 50 TABLET ORAL at 08:55

## 2024-05-04 RX ADMIN — OLANZAPINE 5 MG: 5 TABLET, FILM COATED ORAL at 18:09

## 2024-05-04 RX ADMIN — SULFAMETHOXAZOLE AND TRIMETHOPRIM 1 TABLET: 400; 80 TABLET ORAL at 21:01

## 2024-05-04 RX ADMIN — QUETIAPINE FUMARATE 75 MG: 50 TABLET ORAL at 15:43

## 2024-05-04 RX ADMIN — SENNOSIDES AND DOCUSATE SODIUM 1 TABLET: 8.6; 5 TABLET ORAL at 21:01

## 2024-05-04 RX ADMIN — DICLOFENAC 2 G: 10 GEL TOPICAL at 12:39

## 2024-05-04 RX ADMIN — LEVETIRACETAM 750 MG: 750 TABLET, FILM COATED ORAL at 08:55

## 2024-05-04 RX ADMIN — SULFAMETHOXAZOLE AND TRIMETHOPRIM 1 TABLET: 400; 80 TABLET ORAL at 08:55

## 2024-05-04 RX ADMIN — DICLOFENAC 2 G: 10 GEL TOPICAL at 15:47

## 2024-05-04 RX ADMIN — LEVETIRACETAM 750 MG: 750 TABLET, FILM COATED ORAL at 21:01

## 2024-05-04 RX ADMIN — Medication 12.5 MG: at 08:55

## 2024-05-04 ASSESSMENT — ACTIVITIES OF DAILY LIVING (ADL)
ADLS_ACUITY_SCORE: 48
ADLS_ACUITY_SCORE: 42
ADLS_ACUITY_SCORE: 49
ADLS_ACUITY_SCORE: 42
ADLS_ACUITY_SCORE: 49
ADLS_ACUITY_SCORE: 42
ADLS_ACUITY_SCORE: 49
ADLS_ACUITY_SCORE: 48
ADLS_ACUITY_SCORE: 49
ADLS_ACUITY_SCORE: 42
ADLS_ACUITY_SCORE: 49

## 2024-05-04 NOTE — PROGRESS NOTES
[facility]    Medicine Progress Note - Medicine Service, P[facility]LLA TEAM 2    Date of Admission:  3/21/2024    Assessment & Plan     83 year old male with history of cognitive impairment, CAD s/p CABG (2015), afib, Aortic stenosis s/o TAVR with bioprosthetic valve, with recent admission 03/08-03/18 after a fall at which time he was found to have IPH, IVH, SAH, and SDH, admitted to OSH on 3/21/2024 after a fall with AMS. He was transferred to [facility] due to concern for new intracranial hemorrhage but imaging findings did not demonstrate new ICH. Care transitioned from trauma to medicine service 3/22 for continued evaluation of recurrent falls and encephalopathy. Awaiting placement.      Today:  - resumed entresto at lower dose (12-13), monitor BP   - medically ready for discharge pending placement      # Delirium, improving    # Recent Intraparenchymal hemorrhage with extension into ventricles andmidline shift, SAH, and SDH post fall (3/8)  # Cognitive impairment  Family notes was AxOx3 prior to IPH and since discharge to TCU has been improving but still lethargic and not at baseline. Patient presenting again after fall with AMS. No new intracranial hemorrhage on repeat CT head. Patient is at risk for seizure in the setting of recent intracranial bleed and neurology started empiric antiepileptic therapy for persistent AMS. EEG with evidence of additional left hemispheric focal cerebral dysfunction, but no epileptiform discharges. Occasional urinary retention but UA unremarkable and patient afebrile. Continues to have intermittent delirium overnight and agitation (kicking) requiring 1:1.   - Delirium precautions              - Encourage patient to be awake during the day, keep lights on, windows open              - Encourage uninterrupted sleep at night, limit amount light              - Have glasses available during the day  - Encourage frequent reorientation               -  Encourage cognitive stimulation  - Recreational therapy  - Neuro exams qshift   - Bladder scan q8h  - Quetiapine as below  - Olanzapine tablet 5mg at 1700 and 2200   - PRN olanzapine 5mg ODT for agitation, not to exceed 20mg per day  - Medically ready for discharge  - SW assisting with placement, TCU rerrals sent  - Encourage to get up in wheelchair if agitated as wanting to get out of bed seems to be a trigger    - Continue 1:1     # Risk of Qtc prolongation  # LBBB  Last EKG 4/29 showed QTc 496, (~440 corrected for LBBB)  - Continue antispsychotics as above.  - Weekly EKG      --Chronic--  # Chronic incompletely united distal humerus fracture  Patient is s/p left elbow fracture s/p ORIF at [facility], complicated by elbow wound requiring washout. Per chart review, on lifelong Bactrim. Having occasional elbow pain  - Continue PTA Bactrim 1 tablet BID  - PRN tylenol 975mg q8 hours     # Falls  Has had multiple hospital admissions for fall with unknown etiology. Unclear if mechanical vs syncopal episodes. Has extensive cardiac history but recent TTE without abnormalities of bioprosthetic valve.Can consider arrhythmia as well with prior afib.   - Consider ZioPatch on discharge.     # ISHAN  Patient has history of ISHAN. Patient noted to be desatting when asleep, improves with Oxymask. If not tolerating mask can consider repeat blood gas to check PCO2. Bicarb on BMP is WNL.      # Chronic HFpEF 55% 1/2023  # Hypertension   #Afib   Continue to hold PTA eliquis in the setting of recent intracranial hemorrhage per NSGY recs. [facility] Watchman procedure as outpatient. PTA on Entresto 24-26mg BID   - continue pta metoprolol succinate 12.5mg daily  - resumed low dose entesto 12-13 and monitor BP, consider increasing as tolerated      # CAD s/p 3V CABG (2015)  Not on aspirin currently  - Statin as below     # Aortic stenosis s/p TAVR (2016)  Last TTE 1/2023 with mean gradient  14mmHg, suspected to be WNL  - Holding PTA eliquis in setting of recent intracranial hemorrhage, possible future watchman procedure       #HLD  - continue pta atorvastatin 80mg at bedtime      #GERD  - pantoprazole 40mg daily (to replace PTA omeprazole 20mg daily while inpatient)     # Idiopathic progressive neuropathy  - Held PTA Gabapentin due to AMS, not resumed at this point       # PTSD  # Depression  Patient started on Seroquel for delirium at OSH due to hospitalization in the setting of acute illness. Patient sleeping, minimally responsive as above.   - Continue PTA sertraline 75mg daily  - Scheduled quetiapine 75mg at 3pm daily  - PRN olanzapine as above     # Hx basal cell skin cancer   Family requesting this be treated inpatient, discussed this will need outpatient follow up.   - Will need outpatient Derm follow up for this      -- Outpatient Follow Ups Needed--  Neurosurgery - to assess for hydrocephalus. Would like to be informed on discharge to arrange follow up.      Neurology - assess need for Keppra, until then continue 750mg BID     Cardiology follow up for consideration of Watchman procedure.    Dermatology for hx basal cell skin ca         Diet: Regular Diet Adult  Room Service  Snacks/Supplements Adult: Ensure Enlive; With Meals  Diet    DVT Prophylaxis: Pneumatic Compression Devices  Rice Catheter: Not present  Lines: None     Cardiac Monitoring: None  Code Status: Full Code      Clinically Significant Risk Factors                  # Hypertension: Noted on problem list  # Chronic heart failure with preserved ejection fraction: heart failure noted on problem list and last echo with EF >50%        # Moderate Malnutrition: based on nutrition assessment    # Financial/Environmental Concerns: none         Disposition Plan     Medically Ready for Discharge: Ready Now     Renetta Slater MD  Medicine Service, [facility] TEAM [address] [place] [address]  Securely message  with [employer] (more info)  Text page via [facility] Paging/Directory   See signed in provider for up to date coverage information  ______________________________________________________________________    Interval History    No acute events overnight, nursing notes reviewed.   Son Castro Haynes and other family and friends at bedside this morning, brought treats to celebrate his birthday. No acute complaints besides continued L shoulder pain. Tolerating some PO, family notes L shoulder pain prevents him from feeding himself    5-pt ROS otherwise negative     Physical Exam   Vital Signs: Temp: 97.4  F (36.3  C) Temp src: Axillary BP: 118/65 Pulse: 54   Resp: 16 SpO2: 94 % O2 Device: None (Room air)    Weight: 164 lbs 1.6 oz    General: awake, calm, NAD, provides short answers   HEENT: no scleral icterus or conjunctival injection, oropharynx clear  Resp: easy WOB on RA   Abdomen: non-distended.  Extremities: warm, trace lower extremity edema  Neuro: grossly non-focal, awake, alert, answering questions appropriately       Medical Decision Making

## 2024-05-04 NOTE — PLAN OF CARE
Patient condition slightly improving. Patient calmer and cooperative this shift and sleeping in between cares. Incontinent with urine, checked and changed Q 2-3 hrs, no BMs so far in spite receiving bowel medications. Family visiting patient today and celebrated his birthday in pt's room. No c/o pain or discomfort and breathing comfortably in room air. Sleeping soundly in between cares the whole shift.

## 2024-05-04 NOTE — PLAN OF CARE
Goal Outcome Evaluation:      Plan of Care Reviewed With: patient    Overall Patient Progress: no changeOverall Patient Progress: no change     Assumed care from 1900 to 0730.     Pt alert to person, disoriented to rest, confused. Sitter at bedside, delirium precautions in place. VVS on RA. PRN tylenol given for pain and gave relief. PRN Seroquel given for restlessness with good results. Poor appetite during shift. Incontinent to B/B. SILVA pump placed during shift. Pt medically ready for discharge, awaiting placement

## 2024-05-05 PROCEDURE — 250N000013 HC RX MED GY IP 250 OP 250 PS 637: Performed by: INTERNAL MEDICINE

## 2024-05-05 PROCEDURE — 250N000013 HC RX MED GY IP 250 OP 250 PS 637

## 2024-05-05 PROCEDURE — 120N000002 HC R&B MED SURG/OB UMMC

## 2024-05-05 PROCEDURE — 99232 SBSQ HOSP IP/OBS MODERATE 35: CPT | Mod: GC | Performed by: INTERNAL MEDICINE

## 2024-05-05 RX ADMIN — OLANZAPINE 5 MG: 5 TABLET, FILM COATED ORAL at 21:28

## 2024-05-05 RX ADMIN — POLYETHYLENE GLYCOL 3350 17 G: 17 POWDER, FOR SOLUTION ORAL at 07:26

## 2024-05-05 RX ADMIN — LEVETIRACETAM 750 MG: 750 TABLET, FILM COATED ORAL at 07:26

## 2024-05-05 RX ADMIN — Medication 1 HALF-TAB: at 07:26

## 2024-05-05 RX ADMIN — SENNOSIDES AND DOCUSATE SODIUM 1 TABLET: 8.6; 5 TABLET ORAL at 21:28

## 2024-05-05 RX ADMIN — THIAMINE HCL TAB 100 MG 100 MG: 100 TAB at 07:26

## 2024-05-05 RX ADMIN — QUETIAPINE FUMARATE 50 MG: 50 TABLET ORAL at 01:16

## 2024-05-05 RX ADMIN — OLANZAPINE 5 MG: 5 TABLET, FILM COATED ORAL at 16:40

## 2024-05-05 RX ADMIN — SERTRALINE HYDROCHLORIDE 75 MG: 50 TABLET ORAL at 07:26

## 2024-05-05 RX ADMIN — Medication 1 HALF-TAB: at 21:28

## 2024-05-05 RX ADMIN — LIDOCAINE 4% 2 PATCH: 40 PATCH TOPICAL at 07:31

## 2024-05-05 RX ADMIN — LEVETIRACETAM 750 MG: 750 TABLET, FILM COATED ORAL at 21:28

## 2024-05-05 RX ADMIN — QUETIAPINE FUMARATE 75 MG: 50 TABLET ORAL at 14:48

## 2024-05-05 RX ADMIN — Medication 12.5 MG: at 07:26

## 2024-05-05 RX ADMIN — SULFAMETHOXAZOLE AND TRIMETHOPRIM 1 TABLET: 400; 80 TABLET ORAL at 21:28

## 2024-05-05 RX ADMIN — Medication 10 MG: at 21:28

## 2024-05-05 RX ADMIN — ATORVASTATIN CALCIUM 80 MG: 80 TABLET, FILM COATED ORAL at 21:28

## 2024-05-05 RX ADMIN — DICLOFENAC 2 G: 10 GEL TOPICAL at 07:27

## 2024-05-05 RX ADMIN — DICLOFENAC 2 G: 10 GEL TOPICAL at 14:37

## 2024-05-05 RX ADMIN — PANTOPRAZOLE SODIUM 40 MG: 40 TABLET, DELAYED RELEASE ORAL at 07:26

## 2024-05-05 RX ADMIN — FOLIC ACID 1 MG: 1 TABLET ORAL at 07:26

## 2024-05-05 RX ADMIN — SULFAMETHOXAZOLE AND TRIMETHOPRIM 1 TABLET: 400; 80 TABLET ORAL at 07:26

## 2024-05-05 ASSESSMENT — ACTIVITIES OF DAILY LIVING (ADL)
ADLS_ACUITY_SCORE: 48
ADLS_ACUITY_SCORE: 48
ADLS_ACUITY_SCORE: 47
ADLS_ACUITY_SCORE: 48
ADLS_ACUITY_SCORE: 47
ADLS_ACUITY_SCORE: 48
ADLS_ACUITY_SCORE: 47
ADLS_ACUITY_SCORE: 48
ADLS_ACUITY_SCORE: 47
ADLS_ACUITY_SCORE: 48
ADLS_ACUITY_SCORE: 48

## 2024-05-05 NOTE — PLAN OF CARE
Goal Outcome Evaluation:      Plan of Care Reviewed With: patient    Overall Patient Progress: no changeOverall Patient Progress: no change  Assumed care from 1900 to 0700   Pt alert and cooperative at start of shift. Began sundowning at bedtime and became aggressive with staff. PRN Seroquel given and gave some relief. VVS on RA. Tylenol given for shoulder pain and gave relief. Pt had fair appetite during shift. Incontinent to bladder, able to use urinal sometimes. Knee dressing changed during shift. No acute events during shift.

## 2024-05-05 NOTE — PLAN OF CARE
No acute events this shift. Patient appeared calm and comfortable in bed at the start of the shift, verbally responsive and cooperative with nursing cares. Denies pain but verbalized some occasional pain on his R shoulders, Lidocaine patch applied. No behaviors noted this shift however, patient still continues to display some confusion and disorientation to situation and place. No bowel movements so far inspite of taking miralax, voiding freely using urinal with 1 episode of incontinence. Assisted with meals and eating fairly.  Responding to Seroquel well.Resting in bed as of this time. Sitter at bedside attentive to cares.

## 2024-05-05 NOTE — PROGRESS NOTES
[facility]    Medicine Progress Note - Medicine Service, P[facility]LLA TEAM 2    Date of Admission:  3/21/2024    Assessment & Plan     83 year old male with history of cognitive impairment, CAD s/p CABG (2015), afib, Aortic stenosis s/o TAVR with bioprosthetic valve, with recent admission 03/08-03/18 after a fall at which time he was found to have IPH, IVH, SAH, and SDH, admitted to OSH on 3/21/2024 after a fall with AMS. He was transferred to [facility] due to concern for new intracranial hemorrhage but imaging findings did not demonstrate new ICH. Care transitioned from trauma to medicine service 3/22 for continued evaluation of recurrent falls and encephalopathy. Awaiting placement.      Today 5/5:  - tolerating low dose Entresto, consider increasing to home dose tomorrow  - check labs and EKG tomorrow  - medically ready for discharge pending placement      # Delirium, improving    # Recent Intraparenchymal hemorrhage with extension into ventricles andmidline shift, SAH, and SDH post fall (3/8)  # Cognitive impairment  Family notes was AxOx3 prior to IPH and since discharge to TCU has been improving but still lethargic and not at baseline. Patient presenting again after fall with AMS. No new intracranial hemorrhage on repeat CT head. Patient is at risk for seizure in the setting of recent intracranial bleed and neurology started empiric antiepileptic therapy for persistent AMS. EEG with evidence of additional left hemispheric focal cerebral dysfunction, but no epileptiform discharges. Occasional urinary retention but UA unremarkable and patient afebrile. Continues to have intermittent delirium overnight and agitation (kicking) requiring 1:1.   - Delirium precautions              - Encourage patient to be awake during the day, keep lights on, windows open              - Encourage uninterrupted sleep at night, limit amount light              - Have glasses available during the  day  - Encourage frequent reorientation               - Encourage cognitive stimulation  - Recreational therapy  - Neuro exams qshift   - Bladder scan q8h  - Quetiapine as below  - Olanzapine tablet 5mg at 1700 and 2200   - PRN olanzapine 5mg ODT for agitation, not to exceed 20mg per day  - Medically ready for discharge  - SW assisting with placement, TCU rerrals sent  - Encourage to get up in wheelchair if agitated as wanting to get out of bed seems to be a trigger    - Continue 1:1     # Risk of Qtc prolongation  # LBBB  Last EKG 4/29 showed QTc 496, (~440 corrected for LBBB)  - Continue antispsychotics as above.  - Weekly EKG      --Chronic--  # Chronic incompletely united distal humerus fracture  Patient is s/p left elbow fracture s/p ORIF at [facility], complicated by elbow wound requiring washout. Per chart review, on lifelong Bactrim. Having occasional elbow pain  - Continue PTA Bactrim 1 tablet BID  - PRN tylenol 975mg q8 hours     # Falls  Has had multiple hospital admissions for fall with unknown etiology. Unclear if mechanical vs syncopal episodes. Has extensive cardiac history but recent TTE without abnormalities of bioprosthetic valve.Can consider arrhythmia as well with prior afib.   - Consider ZioPatch on discharge.     # ISHAN  Patient has history of ISHAN. Patient noted to be desatting when asleep, improves with Oxymask. If not tolerating mask can consider repeat blood gas to check PCO2. Bicarb on BMP is WNL.      # Chronic HFpEF 55% 1/2023  # Hypertension   #Afib   Continue to hold PTA eliquis in the setting of recent intracranial hemorrhage per NSGY recs. [facility] about possible Watchman procedure as outpatient. PTA on Entresto 24-26mg BID   - continue pta metoprolol succinate 12.5mg daily  - low dose entesto 12-13, consider increasing as tolerated      # CAD s/p 3V CABG (2015)  Not on aspirin currently  - Statin as below     # Aortic stenosis s/p TAVR  (2016)  Last TTE 1/2023 with mean gradient 14mmHg, suspected to be WNL  - Holding PTA eliquis in setting of recent intracranial hemorrhage, possible future watchman procedure       #HLD  - continue pta atorvastatin 80mg at bedtime      #GERD  - pantoprazole 40mg daily (to replace PTA omeprazole 20mg daily while inpatient)     # Idiopathic progressive neuropathy  - Held PTA Gabapentin due to AMS, not resumed at this point       # PTSD  # Depression  Patient started on Seroquel for delirium at OSH due to hospitalization in the setting of acute illness. Patient sleeping, minimally responsive as above.   - Continue PTA sertraline 75mg daily  - Scheduled quetiapine 75mg at 3pm daily  - PRN olanzapine as above     # Hx basal cell skin cancer   Family requesting this be treated inpatient, discussed this will need outpatient follow up.   - Will need outpatient Derm follow up for this      -- Outpatient Follow Ups Needed--  Neurosurgery - to assess for hydrocephalus. Would like to be informed on discharge to arrange follow up.      Neurology - assess need for Keppra, until then continue 750mg BID     Cardiology follow up for consideration of Watchman procedure.    Dermatology for hx basal cell skin ca         Diet: Regular Diet Adult  Room Service  Snacks/Supplements Adult: Ensure Enlive; With Meals  Diet    DVT Prophylaxis: Pneumatic Compression Devices  Rice Catheter: Not present  Lines: None     Cardiac Monitoring: None  Code Status: Full Code      Clinically Significant Risk Factors                  # Hypertension: Noted on problem list  # Chronic heart failure with preserved ejection fraction: heart failure noted on problem list and last echo with EF >50%        # Moderate Malnutrition: based on nutrition assessment    # Financial/Environmental Concerns: none         Disposition Plan     Medically Ready for Discharge: Ready Now     Darren Rosenbaum MD  Medicine Service, [facility] TEAM 2  [facility]  [facility]  Securely message with [employer] (more info)  Text page via [employer] Paging/Directory   See signed in provider for up to date coverage information  ______________________________________________________________________    Interval History   No acute events overnight, nursing notes reviewed. No complaints outside left arm heaviness.     Physical Exam   Vital Signs: Temp: 98.2  F (36.8  C) Temp src: Oral BP: 111/62 Pulse: 68   Resp: 16 SpO2: 94 % O2 Device: None (Room air)    Weight: 220 lbs 10.89 oz    General: NAD, not diaphoretic  HEENT: no scleral icterus or conjunctival injection, oropharynx clear  Resp: clear to auscultation bilaterally, no crackles or wheezes  Cardiac: regular rate and rhythm, no murmurs  Abdomen: soft, non-tender, non-distended. No rebound or guarding.   Extremities: warm, no lower extremity edema  Neuro: alert, able to answer some questions      Medical Decision Making

## 2024-05-06 ENCOUNTER — APPOINTMENT (OUTPATIENT)
Dept: PHYSICAL THERAPY | Facility: CLINIC | Age: 84
DRG: 085 | End: 2024-05-06
Payer: COMMERCIAL

## 2024-05-06 LAB
ALBUMIN SERPL BCG-MCNC: 3.9 G/DL (ref 3.5–5.2)
ALP SERPL-CCNC: 102 U/L (ref 40–150)
ALT SERPL W P-5'-P-CCNC: 10 U/L (ref 0–70)
ANION GAP SERPL CALCULATED.3IONS-SCNC: 11 MMOL/L (ref 7–15)
AST SERPL W P-5'-P-CCNC: 23 U/L (ref 0–45)
BASOPHILS # BLD AUTO: 0 10E3/UL (ref 0–0.2)
BASOPHILS NFR BLD AUTO: 1 %
BILIRUB DIRECT SERPL-MCNC: 0.27 MG/DL (ref 0–0.3)
BILIRUB SERPL-MCNC: 0.8 MG/DL
BUN SERPL-MCNC: 24.3 MG/DL (ref 8–23)
CALCIUM SERPL-MCNC: 9.1 MG/DL (ref 8.8–10.2)
CHLORIDE SERPL-SCNC: 104 MMOL/L (ref 98–107)
CREAT SERPL-MCNC: 0.91 MG/DL (ref 0.67–1.17)
DEPRECATED HCO3 PLAS-SCNC: 25 MMOL/L (ref 22–29)
EGFRCR SERPLBLD CKD-EPI 2021: 84 ML/MIN/1.73M2
EOSINOPHIL # BLD AUTO: 0.2 10E3/UL (ref 0–0.7)
EOSINOPHIL NFR BLD AUTO: 4 %
ERYTHROCYTE [DISTWIDTH] IN BLOOD BY AUTOMATED COUNT: 14.7 % (ref 10–15)
GLUCOSE SERPL-MCNC: 129 MG/DL (ref 70–99)
HCT VFR BLD AUTO: 35.7 % (ref 40–53)
HGB BLD-MCNC: 11.4 G/DL (ref 13.3–17.7)
IMM GRANULOCYTES # BLD: 0 10E3/UL
IMM GRANULOCYTES NFR BLD: 0 %
LYMPHOCYTES # BLD AUTO: 1.7 10E3/UL (ref 0.8–5.3)
LYMPHOCYTES NFR BLD AUTO: 37 %
MCH RBC QN AUTO: 30 PG (ref 26.5–33)
MCHC RBC AUTO-ENTMCNC: 31.9 G/DL (ref 31.5–36.5)
MCV RBC AUTO: 94 FL (ref 78–100)
MONOCYTES # BLD AUTO: 0.5 10E3/UL (ref 0–1.3)
MONOCYTES NFR BLD AUTO: 11 %
NEUTROPHILS # BLD AUTO: 2.2 10E3/UL (ref 1.6–8.3)
NEUTROPHILS NFR BLD AUTO: 47 %
NRBC # BLD AUTO: 0 10E3/UL
NRBC BLD AUTO-RTO: 0 /100
PLATELET # BLD AUTO: 174 10E3/UL (ref 150–450)
POTASSIUM SERPL-SCNC: 4 MMOL/L (ref 3.4–5.3)
PROT SERPL-MCNC: 6.4 G/DL (ref 6.4–8.3)
RBC # BLD AUTO: 3.8 10E6/UL (ref 4.4–5.9)
SODIUM SERPL-SCNC: 140 MMOL/L (ref 135–145)
WBC # BLD AUTO: 4.7 10E3/UL (ref 4–11)

## 2024-05-06 PROCEDURE — 250N000013 HC RX MED GY IP 250 OP 250 PS 637: Performed by: INTERNAL MEDICINE

## 2024-05-06 PROCEDURE — 93010 ELECTROCARDIOGRAM REPORT: CPT | Performed by: INTERNAL MEDICINE

## 2024-05-06 PROCEDURE — 36415 COLL VENOUS BLD VENIPUNCTURE: CPT

## 2024-05-06 PROCEDURE — 97530 THERAPEUTIC ACTIVITIES: CPT | Mod: GP

## 2024-05-06 PROCEDURE — 250N000013 HC RX MED GY IP 250 OP 250 PS 637

## 2024-05-06 PROCEDURE — 85025 COMPLETE CBC W/AUTO DIFF WBC: CPT

## 2024-05-06 PROCEDURE — 120N000002 HC R&B MED SURG/OB UMMC

## 2024-05-06 PROCEDURE — 82040 ASSAY OF SERUM ALBUMIN: CPT

## 2024-05-06 PROCEDURE — 80053 COMPREHEN METABOLIC PANEL: CPT

## 2024-05-06 PROCEDURE — 80048 BASIC METABOLIC PNL TOTAL CA: CPT

## 2024-05-06 PROCEDURE — 93005 ELECTROCARDIOGRAM TRACING: CPT

## 2024-05-06 PROCEDURE — 99232 SBSQ HOSP IP/OBS MODERATE 35: CPT | Mod: GC | Performed by: INTERNAL MEDICINE

## 2024-05-06 RX ORDER — ACETAMINOPHEN 325 MG/1
975 TABLET ORAL EVERY 6 HOURS
Status: DISCONTINUED | OUTPATIENT
Start: 2024-05-06 | End: 2024-05-07

## 2024-05-06 RX ADMIN — Medication 12.5 MG: at 10:49

## 2024-05-06 RX ADMIN — SENNOSIDES AND DOCUSATE SODIUM 1 TABLET: 8.6; 5 TABLET ORAL at 20:00

## 2024-05-06 RX ADMIN — SACUBITRIL AND VALSARTAN 1 TABLET: 24; 26 TABLET, FILM COATED ORAL at 20:00

## 2024-05-06 RX ADMIN — SULFAMETHOXAZOLE AND TRIMETHOPRIM 1 TABLET: 400; 80 TABLET ORAL at 10:49

## 2024-05-06 RX ADMIN — LEVETIRACETAM 750 MG: 750 TABLET, FILM COATED ORAL at 10:49

## 2024-05-06 RX ADMIN — THIAMINE HCL TAB 100 MG 100 MG: 100 TAB at 10:49

## 2024-05-06 RX ADMIN — DICLOFENAC 2 G: 10 GEL TOPICAL at 20:20

## 2024-05-06 RX ADMIN — QUETIAPINE FUMARATE 75 MG: 50 TABLET ORAL at 14:37

## 2024-05-06 RX ADMIN — POLYETHYLENE GLYCOL 3350 17 G: 17 POWDER, FOR SOLUTION ORAL at 11:00

## 2024-05-06 RX ADMIN — ACETAMINOPHEN 975 MG: 325 TABLET, FILM COATED ORAL at 20:00

## 2024-05-06 RX ADMIN — PANTOPRAZOLE SODIUM 40 MG: 40 TABLET, DELAYED RELEASE ORAL at 10:49

## 2024-05-06 RX ADMIN — SERTRALINE HYDROCHLORIDE 75 MG: 50 TABLET ORAL at 10:49

## 2024-05-06 RX ADMIN — SULFAMETHOXAZOLE AND TRIMETHOPRIM 1 TABLET: 400; 80 TABLET ORAL at 19:59

## 2024-05-06 RX ADMIN — SACUBITRIL AND VALSARTAN 1 TABLET: 24; 26 TABLET, FILM COATED ORAL at 10:49

## 2024-05-06 RX ADMIN — FOLIC ACID 1 MG: 1 TABLET ORAL at 10:49

## 2024-05-06 RX ADMIN — OLANZAPINE 5 MG: 5 TABLET, FILM COATED ORAL at 17:13

## 2024-05-06 RX ADMIN — ACETAMINOPHEN 975 MG: 325 TABLET, FILM COATED ORAL at 14:36

## 2024-05-06 RX ADMIN — ATORVASTATIN CALCIUM 80 MG: 80 TABLET, FILM COATED ORAL at 20:00

## 2024-05-06 RX ADMIN — LIDOCAINE 4% 2 PATCH: 40 PATCH TOPICAL at 10:49

## 2024-05-06 RX ADMIN — DICLOFENAC 2 G: 10 GEL TOPICAL at 17:17

## 2024-05-06 RX ADMIN — LEVETIRACETAM 750 MG: 750 TABLET, FILM COATED ORAL at 20:00

## 2024-05-06 RX ADMIN — Medication 10 MG: at 19:59

## 2024-05-06 RX ADMIN — OLANZAPINE 5 MG: 5 TABLET, FILM COATED ORAL at 22:02

## 2024-05-06 ASSESSMENT — ACTIVITIES OF DAILY LIVING (ADL)
ADLS_ACUITY_SCORE: 47
ADLS_ACUITY_SCORE: 48
ADLS_ACUITY_SCORE: 47
ADLS_ACUITY_SCORE: 48
ADLS_ACUITY_SCORE: 46
ADLS_ACUITY_SCORE: 46
ADLS_ACUITY_SCORE: 47
ADLS_ACUITY_SCORE: 48
ADLS_ACUITY_SCORE: 47
ADLS_ACUITY_SCORE: 46
ADLS_ACUITY_SCORE: 48
ADLS_ACUITY_SCORE: 47
ADLS_ACUITY_SCORE: 45
ADLS_ACUITY_SCORE: 45
ADLS_ACUITY_SCORE: 46
ADLS_ACUITY_SCORE: 47
ADLS_ACUITY_SCORE: 46
ADLS_ACUITY_SCORE: 47
ADLS_ACUITY_SCORE: 47
ADLS_ACUITY_SCORE: 46
ADLS_ACUITY_SCORE: 47

## 2024-05-06 NOTE — PROGRESS NOTES
[facility]    Medicine Progress Note - Medicine Service, PAMELLA TEAM 2    Date of Admission:  3/21/2024    Assessment & Plan     83 year old male with history of cognitive impairment, CAD s/p CABG (2015), afib, Aortic stenosis s/o TAVR with bioprosthetic valve, with recent admission 03/08-03/18 after a fall at which time he was found to have IPH, IVH, SAH, and SDH, admitted to OSH on 3/21/2024 after a fall with AMS. He was transferred to [facility] due to concern for new intracranial hemorrhage but imaging findings did not demonstrate new ICH. Care transitioned from trauma to medicine service 3/22 for continued evaluation of recurrent falls and encephalopathy. Awaiting placement.      Today 5/6:  - increase Entesto dose from 12-13mg BID to 24-26mg BID which is PTA dose  - scheduled Tylenol 975mg q6h for MSK pain  - medically ready for discharge pending placement      # Delirium, improving    # Recent Intraparenchymal hemorrhage with extension into ventricles andmidline shift, SAH, and SDH post fall (3/8)  # Cognitive impairment  Family notes was AxOx3 prior to IPH and since discharge to TCU has been improving but still lethargic and not at baseline. Patient presenting again after fall with AMS. No new intracranial hemorrhage on repeat CT head. Patient is at risk for seizure in the setting of recent intracranial bleed and neurology started empiric antiepileptic therapy for persistent AMS. EEG with evidence of additional left hemispheric focal cerebral dysfunction, but no epileptiform discharges. Occasional urinary retention but UA unremarkable and patient afebrile. Continues to have intermittent delirium overnight and agitation (kicking) requiring 1:1.   - Delirium precautions              - Encourage patient to be awake during the day, keep lights on, windows open              - Encourage uninterrupted sleep at night, limit amount light              - Have glasses available  during the day  - Encourage frequent reorientation               - Encourage cognitive stimulation  - Recreational therapy  - Neuro exams qshift   - Bladder scan q8h  - Quetiapine as below  - Olanzapine tablet 5mg at 1700 and 2200   - PRN olanzapine 5mg ODT for agitation, not to exceed 20mg per day  - Medically ready for discharge  - SW assisting with placement, TCU rerrals sent  - Encourage to get up in wheelchair if agitated as wanting to get out of bed seems to be a trigger    - Continue 1:1     # Risk of Qtc prolongation  # LBBB  Last EKG 5/6/2024 showed QTc 475.  - Continue antispsychotics as above.  - Weekly EKG     # Moderate malnutrition in the context of acute illness/disease   - Nutrition following     --Chronic--  # Chronic incompletely united distal humerus fracture  Patient is s/p left elbow fracture s/p ORIF at [facility], complicated by elbow wound requiring washout. Per chart review, on lifelong Bactrim. Having occasional elbow pain  - Continue PTA Bactrim 1 tablet BID  - scheduled Tylenol 975mg q6h      # Falls  Has had multiple hospital admissions for fall with unknown etiology. Unclear if mechanical vs syncopal episodes. Has extensive cardiac history but recent TTE without abnormalities of bioprosthetic valve.Can consider arrhythmia as well with prior afib.   - Consider ZioPatch on discharge.     # ISHAN  Patient has history of ISHAN. Patient noted to be desatting when asleep, improves with Oxymask. If not tolerating mask can consider repeat blood gas to check PCO2. Bicarb on BMP is WNL.      # Chronic HFpEF 55% 1/2023  # Hypertension   #Afib   Continue to hold PTA eliquis in the setting of recent intracranial hemorrhage per NSGY recs. [facility] about possible Watchman procedure as outpatient. PTA on Entresto 24-26mg BID   - continue pta metoprolol succinate 12.5mg daily  - increase Entesto dose from 12-13mg BID to 24-26mg BID which is PTA dose     # CAD s/p 3V  CABG (2015)  Not on aspirin currently  - Statin as below     # Aortic stenosis s/p TAVR (2016)  Last TTE 1/2023 with mean gradient 14mmHg, suspected to be WNL  - Holding PTA eliquis in setting of recent intracranial hemorrhage, possible future watchman procedure       #HLD  - continue pta atorvastatin 80mg at bedtime      #GERD  - pantoprazole 40mg daily (to replace PTA omeprazole 20mg daily while inpatient)     # Idiopathic progressive neuropathy  - Held PTA Gabapentin due to AMS, not resumed at this point       # PTSD  # Depression  Patient started on Seroquel for delirium at OSH due to hospitalization in the setting of acute illness. Patient sleeping, minimally responsive as above.   - Continue PTA sertraline 75mg daily  - Scheduled quetiapine 75mg at 3pm daily  - PRN olanzapine as above     # Hx basal cell skin cancer   Family requesting this be treated inpatient, discussed this will need outpatient follow up.   - Will need outpatient Derm follow up for this      -- Outpatient Follow Ups Needed--  Neurosurgery - to assess for hydrocephalus. Would like to be informed on discharge to arrange follow up.      Neurology - assess need for Keppra, until then continue 750mg BID     Cardiology follow up for consideration of Watchman procedure.    Dermatology for hx basal cell skin ca         Diet: Regular Diet Adult  Room Service  Snacks/Supplements Adult: Ensure Enlive; With Meals  Diet    DVT Prophylaxis: Pneumatic Compression Devices  Rice Catheter: Not present  Lines: None     Cardiac Monitoring: None  Code Status: Full Code      Clinically Significant Risk Factors                  # Hypertension: Noted on problem list  # Chronic heart failure with preserved ejection fraction: heart failure noted on problem list and last echo with EF >50%        # Moderate Malnutrition: based on nutrition assessment    # Financial/Environmental Concerns: none         Disposition Plan     Medically Ready for Discharge: Ready Now      Darren Rosenbaum MD  Medicine Service, [facility] TEAM 2  [facility]  Securely message with [employer] (more info)  Text page via [employer] Paging/Directory   See signed in provider for up to date coverage information  ______________________________________________________________________    Interval History   No acute events overnight, nursing notes reviewed. Having right shoulder pain today    Physical Exam   Vital Signs: Temp: 97.3  F (36.3  C) Temp src: Oral BP: (!) 142/78 Pulse: 97   Resp: 18 SpO2: 98 % O2 Device: None (Room air)    Weight: 220 lbs 10.89 oz    General: sitting in chair, NAD, not diaphoretic   HEENT: no scleral icterus or conjunctival injection, oropharynx clear  Resp: clear to auscultation bilaterally, no crackles or wheezes  Cardiac: regular rate and rhythm, no murmurs  Abdomen: soft, non-tender, non-distended. No rebound or guarding.   MSK: right shoulder without effusion   Extremities: warm, no lower extremity edema  Neuro: alert, able to answer some questions      Medical Decision Making

## 2024-05-06 NOTE — PLAN OF CARE
Goal Outcome Evaluation:       Assumed care from 1900 to 0700.       Pt alert to person, disoriented to rest. Reorientation provided, bedside assistance in place. Pt up in w/c which helped with agitation. Denies pain verbally. VVS on RA. Pt up appox 50% of night. No BM, incontinent to urine. Pt medically ready for discharge pending placement.

## 2024-05-06 NOTE — PLAN OF CARE
Assumed care [id number]     V/S: VSS on RA  Pain: bilat shoulders  Neuro: A&Ox1-2, neuros intact  Respiratory: WDL on RA  Cardiac: WDL; HR 60-70s, -120s/60-80s.  Skin: no new deficits  GI/: BM x1. Voiding adequately. Denies N/V  Nutrition: Tolerating regular diet with good appetite  Activity: x2 w GB & walker     Events this shift: 1:1  at bedside. Pt pleasant, confused. Ate meals with minimal assistance. Ambulated in hallway, tolerated well. Pt enjoyed visit from volunteer therapy dog today. Large continent BM x1. Pt calm & cooperative all shift.     Plan: Pending TCU placement. Continue POC & notify providers with any acute changes.

## 2024-05-06 NOTE — PROGRESS NOTES
CLINICAL NUTRITION SERVICES - REASSESSMENT NOTE     Nutrition Prescription    RECOMMENDATIONS FOR MDs/PROVIDERS TO ORDER:  None currently     Malnutrition Status:    Moderate malnutrition in the context of acute illness/disease    Recommendations already ordered by Registered Dietitian (RD):  Automatic/standard meal trays TID.  Please assist with meal time/feeding as needed  Ensure Enlive with meals   Encourage PO efforts     Future/Additional Recommendations:  Monitor nutrition-related findings and follow pt per protocol      EVALUATION OF THE PROGRESS TOWARD GOALS   Diet: Regular and Ensure Enlive TID     Intake/Tolerance: Patient consuming 0-100 % of 3 meal(s) daily.     NEW FINDINGS   Room visit. Sitter at bedside. Pt working on breakfast tray at time of visit(pancakes, banana, potatoes, Ensure). Pt reports good appetite. Denies N/V/D/C. Pt reports good tolerance to Ensure. Denies any nutrition related questions/concerns at this time.     GI:  Last BM: 05/05/24  On scheduled bowel med(s)    Weight:  Most Recent Weight: 100.1 kg (220 lb 10.9 oz)  on 5/5/24 via Bed scale  Body mass index is 29.12 kg/m .  Wt down 3.5 kg from admission.    Meds:  Lipitor  Folic acid  Melatonin  Miralax  Senna-docusate  thiamine    Labs:  BUN 24.3  Glu 129  Hgb 11.4  Hematocrit 35.7    Skin:  Per WOCN 5/3,  gluteal crease and perianal  Incontinence associated dermatitis (IAD) and Medical adhesive related skin injury (MARSI) STATUS: initial assessment     MALNUTRITION  % Intake: < 75% for > 7 days (moderate)  % Weight Loss: Does not meet criteria  Subcutaneous Fat Loss: None observed   Muscle Loss: Thoracic region (clavicle, acromium bone, deltoid, trapezius, pectoral):  mild, likely age appropriate  Fluid Accumulation/Edema:  trace  Malnutrition Diagnosis: Moderate malnutrition in the context of acute illness/disease    Previous Goals   Patient to consume % of nutritionally adequate meal trays TID, or the equivalent with  supplements/snacks.   Evaluation: Not met    Previous Nutrition Diagnosis  Inadequate oral intake related to altered mentation, reduced appetite as evidenced by variable meal intake   Evaluation:  ongoing    CURRENT NUTRITION DIAGNOSIS  Inadequate oral intake related to altered mentation, reduced appetite as evidenced by variable meal intake     INTERVENTIONS  Implementation  Medical food supplement therapy  Multivitamin/mineral supplement therapy     Goals  Patient to consume % of nutritionally adequate meal trays TID, or the equivalent with supplements/snacks.     Monitoring/Evaluation  Progress toward goals will be monitored and evaluated per protocol.    Jenny Townsend MS, RD, LD, CNSC    6C (beds 0807-2351) + 7C (beds 5626-5572) + ED   Available in Vocera by name or unit dietitian  No longer available via pager

## 2024-05-06 NOTE — PLAN OF CARE
Goal Outcome Evaluation:      Plan of Care Reviewed With: patient    Overall Patient Progress: no changeOverall Patient Progress: no change    Outcome Evaluation: see RD note 5/6    Jenny Townsend MS, RD, LD, [facility]    6C (beds 0596-8129) + 7C (beds 4868-6705) + ED   Available in Vocera by name or unit dietitian  No longer available via pager

## 2024-05-07 LAB
ATRIAL RATE - MUSE: 50 BPM
DIASTOLIC BLOOD PRESSURE - MUSE: NORMAL MMHG
INTERPRETATION ECG - MUSE: NORMAL
P AXIS - MUSE: NORMAL DEGREES
PR INTERVAL - MUSE: 280 MS
QRS DURATION - MUSE: 176 MS
QT - MUSE: 522 MS
QTC - MUSE: 475 MS
R AXIS - MUSE: -37 DEGREES
SYSTOLIC BLOOD PRESSURE - MUSE: NORMAL MMHG
T AXIS - MUSE: 111 DEGREES
VENTRICULAR RATE- MUSE: 50 BPM

## 2024-05-07 PROCEDURE — 250N000013 HC RX MED GY IP 250 OP 250 PS 637

## 2024-05-07 PROCEDURE — 99232 SBSQ HOSP IP/OBS MODERATE 35: CPT | Mod: GC | Performed by: STUDENT IN AN ORGANIZED HEALTH CARE EDUCATION/TRAINING PROGRAM

## 2024-05-07 PROCEDURE — 120N000002 HC R&B MED SURG/OB UMMC

## 2024-05-07 PROCEDURE — 250N000013 HC RX MED GY IP 250 OP 250 PS 637: Performed by: INTERNAL MEDICINE

## 2024-05-07 RX ORDER — ACETAMINOPHEN 325 MG/1
975 TABLET ORAL EVERY 6 HOURS PRN
Status: DISCONTINUED | OUTPATIENT
Start: 2024-05-07 | End: 2024-05-10 | Stop reason: HOSPADM

## 2024-05-07 RX ADMIN — LIDOCAINE 4% 2 PATCH: 40 PATCH TOPICAL at 08:20

## 2024-05-07 RX ADMIN — SULFAMETHOXAZOLE AND TRIMETHOPRIM 1 TABLET: 400; 80 TABLET ORAL at 08:20

## 2024-05-07 RX ADMIN — Medication 10 MG: at 20:57

## 2024-05-07 RX ADMIN — QUETIAPINE FUMARATE 50 MG: 50 TABLET ORAL at 20:57

## 2024-05-07 RX ADMIN — LEVETIRACETAM 750 MG: 750 TABLET, FILM COATED ORAL at 08:21

## 2024-05-07 RX ADMIN — OLANZAPINE 5 MG: 5 TABLET, FILM COATED ORAL at 20:56

## 2024-05-07 RX ADMIN — PANTOPRAZOLE SODIUM 40 MG: 40 TABLET, DELAYED RELEASE ORAL at 08:21

## 2024-05-07 RX ADMIN — FOLIC ACID 1 MG: 1 TABLET ORAL at 08:21

## 2024-05-07 RX ADMIN — SERTRALINE HYDROCHLORIDE 75 MG: 50 TABLET ORAL at 08:21

## 2024-05-07 RX ADMIN — OLANZAPINE 5 MG: 5 TABLET, FILM COATED ORAL at 17:47

## 2024-05-07 RX ADMIN — DICLOFENAC 2 G: 10 GEL TOPICAL at 20:58

## 2024-05-07 RX ADMIN — THIAMINE HCL TAB 100 MG 100 MG: 100 TAB at 08:20

## 2024-05-07 RX ADMIN — POLYETHYLENE GLYCOL 3350 17 G: 17 POWDER, FOR SOLUTION ORAL at 08:19

## 2024-05-07 RX ADMIN — LEVETIRACETAM 750 MG: 750 TABLET, FILM COATED ORAL at 20:57

## 2024-05-07 RX ADMIN — DICLOFENAC 2 G: 10 GEL TOPICAL at 16:22

## 2024-05-07 RX ADMIN — QUETIAPINE FUMARATE 75 MG: 50 TABLET ORAL at 14:45

## 2024-05-07 RX ADMIN — SACUBITRIL AND VALSARTAN 1 TABLET: 24; 26 TABLET, FILM COATED ORAL at 20:57

## 2024-05-07 RX ADMIN — DICLOFENAC 2 G: 10 GEL TOPICAL at 11:53

## 2024-05-07 RX ADMIN — ACETAMINOPHEN 975 MG: 325 TABLET, FILM COATED ORAL at 08:21

## 2024-05-07 RX ADMIN — DICLOFENAC 2 G: 10 GEL TOPICAL at 08:29

## 2024-05-07 RX ADMIN — SULFAMETHOXAZOLE AND TRIMETHOPRIM 1 TABLET: 400; 80 TABLET ORAL at 20:57

## 2024-05-07 RX ADMIN — ATORVASTATIN CALCIUM 80 MG: 80 TABLET, FILM COATED ORAL at 20:57

## 2024-05-07 RX ADMIN — SACUBITRIL AND VALSARTAN 1 TABLET: 24; 26 TABLET, FILM COATED ORAL at 08:20

## 2024-05-07 ASSESSMENT — ACTIVITIES OF DAILY LIVING (ADL)
ADLS_ACUITY_SCORE: 45
ADLS_ACUITY_SCORE: 45
ADLS_ACUITY_SCORE: 47
ADLS_ACUITY_SCORE: 45
ADLS_ACUITY_SCORE: 45
ADLS_ACUITY_SCORE: 47
ADLS_ACUITY_SCORE: 49
ADLS_ACUITY_SCORE: 47
ADLS_ACUITY_SCORE: 45
ADLS_ACUITY_SCORE: 45
ADLS_ACUITY_SCORE: 49
ADLS_ACUITY_SCORE: 45
ADLS_ACUITY_SCORE: 47
ADLS_ACUITY_SCORE: 45
ADLS_ACUITY_SCORE: 49
ADLS_ACUITY_SCORE: 47
ADLS_ACUITY_SCORE: 45
ADLS_ACUITY_SCORE: 49
ADLS_ACUITY_SCORE: 47
ADLS_ACUITY_SCORE: 45
ADLS_ACUITY_SCORE: 45

## 2024-05-07 NOTE — PLAN OF CARE
Goal Outcome Evaluation:           Overall Patient Progress: no changeOverall Patient Progress: no change    Outcome Evaluation: A/O x1; confused and uncooperative this shift; sitter at bedside    Shift: 2300 - 0730    Neuro: A/O x1; cooperative with redirection. Sitter at bedside  Cardiac: WDL; no signs of cp/ acute distress  Respiratory: Stable on RA  GI/: incontinent x2; voids adequately; last BM 05/06/2024  Diet: Regular  Activity: 1 assist w/ gait belt & walker; ambulated around unit x1  Skin: blanchable redness in upper back; bilateral knee abrasion; moisture injury buttock; mepilex dressings CDI  Pain: No complaints of pain this shift  Lab: Reviewed  New changes this shift: No acute events this shift  Plan: TCU placement pending; continue to follow POC

## 2024-05-07 NOTE — PROGRESS NOTES
[facility]    Medicine Progress Note - Medicine Service, P[facility] TEAM 2    Date of Admission:  3/21/2024    Assessment & Plan     83 year old male with history of cognitive impairment, CAD s/p CABG (2015), afib, Aortic stenosis s/o TAVR with bioprosthetic valve, with recent admission 03/08-03/18 after a fall at which time he was found to have IPH, IVH, SAH, and SDH, admitted to OSH on 3/21/2024 after a fall with AMS. He was transferred to [facility] due to concern for new intracranial hemorrhage but imaging findings did not demonstrate new ICH. Care transitioned from trauma to medicine service 3/22 for continued evaluation of recurrent falls and encephalopathy. Awaiting placement.      Today 5/7:  - switched Tylenol from scheduled to prn as patient says it didn't help  - 1:1 prn, currently off  - medically ready for discharge pending placement      # Delirium, improving    # Recent Intraparenchymal hemorrhage with extension into ventricles andmidline shift, SAH, and SDH post fall (3/8)  # Cognitive impairment  Family notes was AxOx3 prior to IPH and since discharge to TCU has been improving but still lethargic and not at baseline. Patient presenting again after fall with AMS. No new intracranial hemorrhage on repeat CT head. Patient is at risk for seizure in the setting of recent intracranial bleed and neurology started empiric antiepileptic therapy for persistent AMS. EEG with evidence of additional left hemispheric focal cerebral dysfunction, but no epileptiform discharges. Occasional urinary retention but UA unremarkable and patient afebrile. Continues to have intermittent delirium overnight and agitation (kicking) requiring 1:1.   - Delirium precautions              - Encourage patient to be awake during the day, keep lights on, windows open              - Encourage uninterrupted sleep at night, limit amount light              - Have glasses available during the day  -  Encourage frequent reorientation               - Encourage cognitive stimulation  - Recreational therapy  - Neuro exams qshift   - Bladder scan q8h  - Quetiapine as below  - Olanzapine tablet 5mg at 1700 and 2200   - PRN olanzapine 5mg ODT for agitation, not to exceed 20mg per day  - Medically ready for discharge  - SW assisting with placement, TCU rerrals sent  - Encourage to get up in wheelchair if agitated as wanting to get out of bed seems to be a trigger    - 1:1 prn, currently off     # Risk of Qtc prolongation  # LBBB  Last EKG 5/6/2024 showed QTc 475.  - Continue antispsychotics as above.  - Weekly EKG     # Moderate malnutrition in the context of acute illness/disease   - Nutrition following     --Chronic--  # Chronic incompletely united distal humerus fracture  Patient is s/p left elbow fracture s/p ORIF at [facility], complicated by elbow wound requiring washout. Per chart review, on lifelong Bactrim. Having occasional elbow pain  - Continue PTA Bactrim 1 tablet BID  - Tylenol 975mg q6h prn     # Falls  Has had multiple hospital admissions for fall with unknown etiology. Unclear if mechanical vs syncopal episodes. Has extensive cardiac history but recent TTE without abnormalities of bioprosthetic valve.Can consider arrhythmia as well with prior afib.   - Consider ZioPatch on discharge.     # ISHAN  Patient has history of ISHAN. Patient noted to be desatting when asleep, improves with Oxymask. If not tolerating mask can consider repeat blood gas to check PCO2. Bicarb on BMP is WNL.      # Chronic HFpEF 55% 1/2023  # Hypertension   #Afib   Continue to hold PTA eliquis in the setting of recent intracranial hemorrhage per NSGY recs. [facility] Watchman procedure as outpatient. PTA on Entresto 24-26mg BID   - continue pta metoprolol succinate 12.5mg daily  - continue pta Entesto 24-26mg BID     # CAD s/p 3V CABG (2015)  Not on aspirin currently  - Statin as  below     # Aortic stenosis s/p TAVR (2016)  Last TTE 1/2023 with mean gradient 14mmHg, suspected to be WNL  - Holding PTA eliquis in setting of recent intracranial hemorrhage, possible future watchman procedure       #HLD  - continue pta atorvastatin 80mg at bedtime      #GERD  - pantoprazole 40mg daily (to replace PTA omeprazole 20mg daily while inpatient)     # Idiopathic progressive neuropathy  - Held PTA Gabapentin due to AMS, not resumed at this point       # PTSD  # Depression  Patient started on Seroquel for delirium at OSH due to hospitalization in the setting of acute illness. Patient sleeping, minimally responsive as above.   - Continue PTA sertraline 75mg daily  - Scheduled quetiapine 75mg at 3pm daily  - PRN olanzapine as above     # Hx basal cell skin cancer   Family requesting this be treated inpatient, discussed this will need outpatient follow up.   - Will need outpatient Derm follow up for this      -- Outpatient Follow Ups Needed--  Neurosurgery - to assess for hydrocephalus. Would like to be informed on discharge to arrange follow up.      Neurology - assess need for Keppra, until then continue 750mg BID     Cardiology follow up for consideration of Watchman procedure.    Dermatology for hx basal cell skin ca         Diet: Regular Diet Adult  Room Service  Snacks/Supplements Adult: Ensure Enlive; With Meals  Diet    DVT Prophylaxis: Pneumatic Compression Devices  Rice Catheter: Not present  Lines: None     Cardiac Monitoring: None  Code Status: Full Code      Clinically Significant Risk Factors                  # Hypertension: Noted on problem list  # Chronic heart failure with preserved ejection fraction: heart failure noted on problem list and last echo with EF >50%        # Moderate Malnutrition: based on nutrition assessment    # Financial/Environmental Concerns: none         Disposition Plan     Medically Ready for Discharge: Ready Now      Patient seen and staffed with Dr. Parker.      Darren Rosenbaum MD  Medicine Service, [facility] TEAM 2  [facility]  Securely message with [employer] (more info)  Text page via [employer] Paging/Directory   See signed in provider for up to date coverage information  ______________________________________________________________________    Interval History   No acute events overnight, nursing notes reviewed. He says Tylenol is not effective for pain but moon shine is.     Physical Exam   Vital Signs: Temp: 97.6  F (36.4  C) Temp src: Oral BP: 114/67 Pulse: 57   Resp: 18 SpO2: 97 % O2 Device: None (Room air)    Weight: 220 lbs 10.89 oz    General: sitting in chair, NAD, not diaphoretic   HEENT: no scleral icterus or conjunctival injection, oropharynx clear  Resp: clear to auscultation bilaterally, no crackles or wheezes  Cardiac: regular rate and rhythm, no murmurs  Abdomen: soft, non-tender, non-distended. No rebound or guarding.   Extremities: warm, no lower extremity edema  Neuro: alert, able to answer some questions      Medical Decision Making

## 2024-05-07 NOTE — PLAN OF CARE
Patient condition slightly improving. Patient calmer and cooperative this shift and sleeping in between cares. Incontinent with urine, checked and changed Q 2-3 hrs, Multiple bowel movements today.No c/o pain or discomfort and breathing comfortably in room air. He was seen and rounded by primary team. At 1400 patient requesting to get out of bed. Pt transferred to wheelchair and pushed around the unit and watch a farm channel at the nurses station. Patient was given scheduled Seroquel and became drowsy thereafter. Made comfortable in bed.

## 2024-05-07 NOTE — PROGRESS NOTES
Care Management Follow Up    Length of Stay (days): 47    Expected Discharge Date:       Concerns to be Addressed: discharge planning     Patient plan of care discussed at interdisciplinary rounds: Yes    Anticipated Discharge Disposition: Long Term Care     Anticipated Discharge Services: None  Anticipated Discharge DME: Walker    Patient/family educated on Medicare website which has current facility and service quality ratings: yes  Education Provided on the Discharge Plan: Yes  Patient/Family in Agreement with the Plan: yes    Referrals Placed by CM/SW:      [facility] ([facility]  [address] [facility] [address]  P: [phone]  F: [phone]    Private pay costs discussed: Not applicable    Additional Information:  JAVY consulted with care  regarding a shared agreement with [facility]    Patient was taken off of 1:1 sitter 5/7 @0730    Care  suggested the hospital do a short term shared agreement with [facility] and have the facility SW continue to work toward finding a [facility] that can meet the patient's needs    SW send an updated referral to [facility] and reported the above information.     JAVY received a call from Amanda in admissions at [facility]. JAVY answered questions Amanda had. Amanda reported they will need the patient to be off of a 1:1 sitter for at least 48 hours. Amanda stated she will follow up with JAVY Thursday morning.     JAVY will continue to follow for discharge needs and safe discharge planning     VIET Cutler  [address]   Office: [phone]  Pager: [phone]  [email or web address]

## 2024-05-07 NOTE — PLAN OF CARE
Goal Outcome Evaluation:       Assumed care 1900 to 0700.     Overall Patient Progress: improvingOverall Patient Progress: improving    Outcome Evaluation: Pt more redirectable and oriented this shift.    Pt alert to person, intermittently oriented to place and time. Confused, calm and cooperative during shift. VVS on RA. Pain managed with scheduled tylenol. Sitter remained at bedside at night, Trailing off sitter in AM. Pt up by gait and walker, ambulates around unit X2 during shift. Incontinent to bowel, urinal offered. Poor appetite during shift. Wound dressings c/d/i, dressing change due 5/7. No acute events overnight. Pending TCU placement. Continue POC

## 2024-05-08 ENCOUNTER — APPOINTMENT (OUTPATIENT)
Dept: PHYSICAL THERAPY | Facility: CLINIC | Age: 84
DRG: 085 | End: 2024-05-08
Payer: COMMERCIAL

## 2024-05-08 PROCEDURE — 250N000013 HC RX MED GY IP 250 OP 250 PS 637

## 2024-05-08 PROCEDURE — 250N000013 HC RX MED GY IP 250 OP 250 PS 637: Performed by: INTERNAL MEDICINE

## 2024-05-08 PROCEDURE — 120N000002 HC R&B MED SURG/OB UMMC

## 2024-05-08 PROCEDURE — 97530 THERAPEUTIC ACTIVITIES: CPT | Mod: GP

## 2024-05-08 PROCEDURE — 99232 SBSQ HOSP IP/OBS MODERATE 35: CPT | Mod: GC | Performed by: STUDENT IN AN ORGANIZED HEALTH CARE EDUCATION/TRAINING PROGRAM

## 2024-05-08 PROCEDURE — 97110 THERAPEUTIC EXERCISES: CPT | Mod: GP

## 2024-05-08 RX ADMIN — DICLOFENAC 2 G: 10 GEL TOPICAL at 17:24

## 2024-05-08 RX ADMIN — ACETAMINOPHEN 975 MG: 325 TABLET, FILM COATED ORAL at 23:22

## 2024-05-08 RX ADMIN — QUETIAPINE FUMARATE 75 MG: 50 TABLET ORAL at 14:35

## 2024-05-08 RX ADMIN — DICLOFENAC 2 G: 10 GEL TOPICAL at 20:30

## 2024-05-08 RX ADMIN — THIAMINE HCL TAB 100 MG 100 MG: 100 TAB at 07:59

## 2024-05-08 RX ADMIN — Medication 10 MG: at 20:23

## 2024-05-08 RX ADMIN — SACUBITRIL AND VALSARTAN 1 TABLET: 24; 26 TABLET, FILM COATED ORAL at 07:59

## 2024-05-08 RX ADMIN — DICLOFENAC 2 G: 10 GEL TOPICAL at 08:02

## 2024-05-08 RX ADMIN — SACUBITRIL AND VALSARTAN 1 TABLET: 24; 26 TABLET, FILM COATED ORAL at 20:23

## 2024-05-08 RX ADMIN — LEVETIRACETAM 750 MG: 750 TABLET, FILM COATED ORAL at 20:23

## 2024-05-08 RX ADMIN — QUETIAPINE FUMARATE 50 MG: 50 TABLET ORAL at 23:22

## 2024-05-08 RX ADMIN — ATORVASTATIN CALCIUM 80 MG: 80 TABLET, FILM COATED ORAL at 20:23

## 2024-05-08 RX ADMIN — OLANZAPINE 5 MG: 5 TABLET, FILM COATED ORAL at 17:24

## 2024-05-08 RX ADMIN — SENNOSIDES AND DOCUSATE SODIUM 1 TABLET: 8.6; 5 TABLET ORAL at 20:23

## 2024-05-08 RX ADMIN — LIDOCAINE 4% 2 PATCH: 40 PATCH TOPICAL at 08:00

## 2024-05-08 RX ADMIN — PANTOPRAZOLE SODIUM 40 MG: 40 TABLET, DELAYED RELEASE ORAL at 07:59

## 2024-05-08 RX ADMIN — SERTRALINE HYDROCHLORIDE 75 MG: 50 TABLET ORAL at 07:59

## 2024-05-08 RX ADMIN — SULFAMETHOXAZOLE AND TRIMETHOPRIM 1 TABLET: 400; 80 TABLET ORAL at 20:23

## 2024-05-08 RX ADMIN — LEVETIRACETAM 750 MG: 750 TABLET, FILM COATED ORAL at 07:58

## 2024-05-08 RX ADMIN — OLANZAPINE 5 MG: 5 TABLET, FILM COATED ORAL at 21:23

## 2024-05-08 RX ADMIN — SULFAMETHOXAZOLE AND TRIMETHOPRIM 1 TABLET: 400; 80 TABLET ORAL at 07:58

## 2024-05-08 RX ADMIN — FOLIC ACID 1 MG: 1 TABLET ORAL at 07:58

## 2024-05-08 ASSESSMENT — ACTIVITIES OF DAILY LIVING (ADL)
ADLS_ACUITY_SCORE: 49
ADLS_ACUITY_SCORE: 51
ADLS_ACUITY_SCORE: 49
ADLS_ACUITY_SCORE: 51
ADLS_ACUITY_SCORE: 49
ADLS_ACUITY_SCORE: 47

## 2024-05-08 NOTE — PLAN OF CARE
Goal Outcome Evaluation:           Overall Patient Progress: no changeOverall Patient Progress: no change    Outcome Evaluation: Pt AOx1, oriented only to self. Confused but able to follow commands. Calm and restful for most of night. Incontinent of bladder x2, no BM this shift. No sitter. Attempted to get out of bed, but easy to redirect over night. Became more difficult to redirect around 0530, repeatedly trying to get out of bed.     /64 (BP Location: Left arm)   Pulse 54   Temp 97.7  F (36.5  C) (Oral)   Resp 16   Wt 100.1 kg (220 lb 10.9 oz)   SpO2 93%   BMI 29.12 kg/m

## 2024-05-08 NOTE — PROGRESS NOTES
[facility]    Medicine Progress Note - Medicine Service, PAMELLA TEAM 2    Date of Admission:  3/21/2024    Assessment & Plan     83 year old male with history of cognitive impairment, CAD s/p CABG (2015), afib, Aortic stenosis s/o TAVR with bioprosthetic valve, with recent admission 03/08-03/18 after a fall at which time he was found to have IPH, IVH, SAH, and SDH, admitted to OSH on 3/21/2024 after a fall with AMS. He was transferred to [facility] due to concern for new intracranial hemorrhage but imaging findings did not demonstrate new ICH. Care transitioned from trauma to medicine service 3/22 for continued evaluation of recurrent falls and encephalopathy. Awaiting placement.      Today 5/8:  - remains off 1 to 1 sitter  - medically ready for discharge pending placement      # Delirium, improving    # Recent Intraparenchymal hemorrhage with extension into ventricles andmidline shift, SAH, and SDH post fall (3/8)  # Cognitive impairment  Family notes was AxOx3 prior to IPH and since discharge to TCU has been improving but still lethargic and not at baseline. Patient presenting again after fall with AMS. No new intracranial hemorrhage on repeat CT head. Patient is at risk for seizure in the setting of recent intracranial bleed and neurology started empiric antiepileptic therapy for persistent AMS. EEG with evidence of additional left hemispheric focal cerebral dysfunction, but no epileptiform discharges. Occasional urinary retention but UA unremarkable and patient afebrile. Continues to have intermittent delirium overnight and agitation (kicking) requiring 1:1.   - Delirium precautions              - Encourage patient to be awake during the day, keep lights on, windows open              - Encourage uninterrupted sleep at night, limit amount light              - Have glasses available during the day  - Encourage frequent reorientation               - Encourage cognitive  stimulation  - Recreational therapy  - Neuro exams qshift   - Bladder scan q8h  - Quetiapine as below  - Olanzapine tablet 5mg at 1700 and 2200   - PRN olanzapine 5mg ODT for agitation, not to exceed 20mg per day  - Medically ready for discharge  - SW assisting with placement, TCU rerrals sent  - Encourage to get up in wheelchair if agitated as wanting to get out of bed seems to be a trigger    - 1:1 prn, currently off     # Risk of Qtc prolongation  # LBBB  Last EKG 5/6/2024 showed QTc 475.  - Continue antispsychotics as above.  - Weekly EKG     # Moderate malnutrition in the context of acute illness/disease   - Nutrition following     --Chronic--  # Chronic incompletely united distal humerus fracture  Patient is s/p left elbow fracture s/p ORIF at [facility], complicated by elbow wound requiring washout. Per chart review, on lifelong Bactrim. Having occasional elbow pain  - Continue PTA Bactrim 1 tablet BID  - Tylenol 975mg q6h prn     # Falls  Has had multiple hospital admissions for fall with unknown etiology. Unclear if mechanical vs syncopal episodes. Has extensive cardiac history but recent TTE without abnormalities of bioprosthetic valve.Can consider arrhythmia as well with prior afib.   - Consider ZioPatch on discharge.     # ISHAN  Patient has history of ISHAN. Patient noted to be desatting when asleep, improves with Oxymask. If not tolerating mask can consider repeat blood gas to check PCO2. Bicarb on BMP is WNL.      # Chronic HFpEF 55% 1/2023  # Hypertension   #Afib   Continue to hold PTA eliquis in the setting of recent intracranial hemorrhage per NSGY recs. [facility] about possible Watchman procedure as outpatient. PTA on Entresto 24-26mg BID   - continue pta metoprolol succinate 12.5mg daily  - continue pta Entesto 24-26mg BID     # CAD s/p 3V CABG (2015)  Not on aspirin currently  - Statin as below     # Aortic stenosis s/p TAVR (2016)  Last TTE 1/2023 with mean  gradient 14mmHg, suspected to be WNL  - Holding PTA eliquis in setting of recent intracranial hemorrhage, possible future watchman procedure       #HLD  - continue pta atorvastatin 80mg at bedtime      #GERD  - pantoprazole 40mg daily (to replace PTA omeprazole 20mg daily while inpatient)     # Idiopathic progressive neuropathy  - Held PTA Gabapentin due to AMS, not resumed at this point       # PTSD  # Depression  Patient started on Seroquel for delirium at OSH due to hospitalization in the setting of acute illness. Patient sleeping, minimally responsive as above.   - Continue PTA sertraline 75mg daily  - Scheduled quetiapine 75mg at 3pm daily  - PRN olanzapine as above     # Hx basal cell skin cancer   Family requesting this be treated inpatient, discussed this will need outpatient follow up.   - Will need outpatient Derm follow up for this      -- Outpatient Follow Ups Needed--  Neurosurgery - to assess for hydrocephalus. Would like to be informed on discharge to arrange follow up.      Neurology - assess need for Keppra, until then continue 750mg BID     Cardiology follow up for consideration of Watchman procedure.    Dermatology for hx basal cell skin ca         Diet: Regular Diet Adult  Room Service  Snacks/Supplements Adult: Ensure Enlive; With Meals  Diet    DVT Prophylaxis: Pneumatic Compression Devices  Rice Catheter: Not present  Lines: None     Cardiac Monitoring: None  Code Status: Full Code      Clinically Significant Risk Factors                  # Hypertension: Noted on problem list  # Chronic heart failure with preserved ejection fraction: heart failure noted on problem list and last echo with EF >50%        # Moderate Malnutrition: based on nutrition assessment    # Financial/Environmental Concerns: none         Disposition Plan     Medically Ready for Discharge: Ready Now      Patient seen and staffed with Dr. Parker.     Darren Rosenbaum MD  Medicine Service, [facility] TEAM [address] [place] [address]  [facility]  Securely message with [employer] (more info)  Text page via [employer] Paging/Directory   See signed in provider for up to date coverage information  ______________________________________________________________________    Interval History   No acute events overnight, nursing notes reviewed. Has remained off 1 to 1 sitter. Sleeping when seen in the AM. Told him that we are still awaiting placement.     Physical Exam   Vital Signs: Temp: 97.7  F (36.5  C) Temp src: Oral BP: 107/64 Pulse: 54   Resp: 16 SpO2: 93 % O2 Device: None (Room air)    Weight: 220 lbs 10.89 oz    General: lying in bed, NAD, not diaphoretic   HEENT: no scleral icterus or conjunctival injection  Resp: clear to auscultation bilaterally, no crackles or wheezes  Cardiac: regular rate and rhythm, no murmurs  Abdomen: soft, non-tender, non-distended. No rebound or guarding.   Extremities: warm, no lower extremity edema  Neuro: sleeping but awakens to touch answers questions.       Medical Decision Making

## 2024-05-08 NOTE — PLAN OF CARE
Patient condition continues to improve. He is now eating meals on recliner chair and has been sitting on chair by himself for 2-3 hrs at a time. Fair appetite this shift and ate 25-50% of solid food  and drank all his supplements. Wheeled around the unit on a wheelchair and then watch farm channel at the nurses desk until he became drowsy. Assist of 2 to transfer to bed from  with clear concise commands. Resting in bed as of this time. Bed alarm on.

## 2024-05-08 NOTE — PLAN OF CARE
[id number]  Status: admitted for an unwitnessed fall resulting in multiple SAH, bilateral SDH and nondisplaced occipital bone fracture  Vitals: VSS  Neuros: unchanged: O x self and month and year this shift. Consistently pt remains oriented to self only. May need choices to obtain information  IV: PIV Sl'd  Labs/Electrolytes: WNL  Resp/trach: WNL  Diet: tolerating regular diet with assistance  Bowel status: BM this morning, incontinent  : voiding with incontinence x 1 this shift  Skin: scabbing and bruising throughout  Pain: c/o mild arthritic pain in shoulders and hips, treated effectively with voltaren gel  Activity: up with a lift, turn/repo q2h  Social: no visitors or phone calls this shift  Plan: pending placement

## 2024-05-09 PROCEDURE — 250N000013 HC RX MED GY IP 250 OP 250 PS 637

## 2024-05-09 PROCEDURE — 250N000013 HC RX MED GY IP 250 OP 250 PS 637: Performed by: INTERNAL MEDICINE

## 2024-05-09 PROCEDURE — 120N000002 HC R&B MED SURG/OB UMMC

## 2024-05-09 PROCEDURE — 99232 SBSQ HOSP IP/OBS MODERATE 35: CPT | Mod: GC | Performed by: STUDENT IN AN ORGANIZED HEALTH CARE EDUCATION/TRAINING PROGRAM

## 2024-05-09 PROCEDURE — G0463 HOSPITAL OUTPT CLINIC VISIT: HCPCS

## 2024-05-09 RX ORDER — ACETAMINOPHEN 325 MG/1
975 TABLET ORAL EVERY 6 HOURS PRN
DISCHARGE
Start: 2024-05-09

## 2024-05-09 RX ORDER — AMOXICILLIN 250 MG
1 CAPSULE ORAL AT BEDTIME
DISCHARGE
Start: 2024-05-10

## 2024-05-09 RX ORDER — QUETIAPINE FUMARATE 25 MG/1
75 TABLET, FILM COATED ORAL EVERY 24 HOURS
DISCHARGE
Start: 2024-05-09 | End: 2024-05-16 | Stop reason: DRUGHIGH

## 2024-05-09 RX ORDER — PHENOL 1.4 %
10 AEROSOL, SPRAY (ML) MUCOUS MEMBRANE EVERY 24 HOURS
DISCHARGE
Start: 2024-05-10 | End: 2024-05-15 | Stop reason: DRUGHIGH

## 2024-05-09 RX ADMIN — SULFAMETHOXAZOLE AND TRIMETHOPRIM 1 TABLET: 400; 80 TABLET ORAL at 19:47

## 2024-05-09 RX ADMIN — SENNOSIDES AND DOCUSATE SODIUM 1 TABLET: 8.6; 5 TABLET ORAL at 19:47

## 2024-05-09 RX ADMIN — Medication 10 MG: at 19:47

## 2024-05-09 RX ADMIN — ATORVASTATIN CALCIUM 80 MG: 80 TABLET, FILM COATED ORAL at 19:47

## 2024-05-09 RX ADMIN — SULFAMETHOXAZOLE AND TRIMETHOPRIM 1 TABLET: 400; 80 TABLET ORAL at 08:47

## 2024-05-09 RX ADMIN — DICLOFENAC 2 G: 10 GEL TOPICAL at 08:48

## 2024-05-09 RX ADMIN — PANTOPRAZOLE SODIUM 40 MG: 40 TABLET, DELAYED RELEASE ORAL at 08:47

## 2024-05-09 RX ADMIN — OLANZAPINE 5 MG: 5 TABLET, FILM COATED ORAL at 21:08

## 2024-05-09 RX ADMIN — FOLIC ACID 1 MG: 1 TABLET ORAL at 08:47

## 2024-05-09 RX ADMIN — DICLOFENAC 2 G: 10 GEL TOPICAL at 11:34

## 2024-05-09 RX ADMIN — POLYETHYLENE GLYCOL 3350 17 G: 17 POWDER, FOR SOLUTION ORAL at 08:46

## 2024-05-09 RX ADMIN — LIDOCAINE 4% 2 PATCH: 40 PATCH TOPICAL at 08:47

## 2024-05-09 RX ADMIN — LEVETIRACETAM 750 MG: 750 TABLET, FILM COATED ORAL at 19:47

## 2024-05-09 RX ADMIN — SACUBITRIL AND VALSARTAN 1 TABLET: 24; 26 TABLET, FILM COATED ORAL at 19:47

## 2024-05-09 RX ADMIN — THIAMINE HCL TAB 100 MG 100 MG: 100 TAB at 08:47

## 2024-05-09 RX ADMIN — SACUBITRIL AND VALSARTAN 1 TABLET: 24; 26 TABLET, FILM COATED ORAL at 08:47

## 2024-05-09 RX ADMIN — SERTRALINE HYDROCHLORIDE 75 MG: 50 TABLET ORAL at 08:47

## 2024-05-09 RX ADMIN — QUETIAPINE FUMARATE 75 MG: 50 TABLET ORAL at 15:33

## 2024-05-09 RX ADMIN — LEVETIRACETAM 750 MG: 750 TABLET, FILM COATED ORAL at 08:47

## 2024-05-09 RX ADMIN — DICLOFENAC 2 G: 10 GEL TOPICAL at 20:24

## 2024-05-09 RX ADMIN — DICLOFENAC 2 G: 10 GEL TOPICAL at 15:34

## 2024-05-09 ASSESSMENT — ACTIVITIES OF DAILY LIVING (ADL)
ADLS_ACUITY_SCORE: 50
ADLS_ACUITY_SCORE: 53
ADLS_ACUITY_SCORE: 53
ADLS_ACUITY_SCORE: 50
ADLS_ACUITY_SCORE: 53
ADLS_ACUITY_SCORE: 50
ADLS_ACUITY_SCORE: 53
ADLS_ACUITY_SCORE: 50
ADLS_ACUITY_SCORE: 53
ADLS_ACUITY_SCORE: 53
ADLS_ACUITY_SCORE: 50
ADLS_ACUITY_SCORE: 53
ADLS_ACUITY_SCORE: 53
ADLS_ACUITY_SCORE: 51
ADLS_ACUITY_SCORE: 50

## 2024-05-09 NOTE — PROGRESS NOTES
[facility]    Medicine Progress Note - Medicine Service, PAMELLA TEAM 2    Date of Admission:  3/21/2024    Assessment & Plan     83 year old male with history of cognitive impairment, CAD s/p CABG (2015), afib, Aortic stenosis s/o TAVR with bioprosthetic valve, with recent admission 03/08-03/18 after a fall at which time he was found to have IPH, IVH, SAH, and SDH, admitted to OSH on 3/21/2024 after a fall with AMS. He was transferred to [facility] due to concern for new intracranial hemorrhage but imaging findings did not demonstrate new ICH. Care transitioned from trauma to medicine service 3/22 for continued evaluation of recurrent falls and encephalopathy. Awaiting placement.      Today 5/9:  - remains off 1 to 1 sitter   - medically ready for discharge pending placement      # Delirium, improving    # Recent Intraparenchymal hemorrhage with extension into ventricles andmidline shift, SAH, and SDH post fall (3/8)  # Cognitive impairment  Family notes was AxOx3 prior to IPH and since discharge to TCU has been improving but still lethargic and not at baseline. Patient presenting again after fall with AMS. No new intracranial hemorrhage on repeat CT head. Patient is at risk for seizure in the setting of recent intracranial bleed and neurology started empiric antiepileptic therapy for persistent AMS. EEG with evidence of additional left hemispheric focal cerebral dysfunction, but no epileptiform discharges. Occasional urinary retention but UA unremarkable and patient afebrile. Doing well, awaiting placement.   - Delirium precautions              - Encourage patient to be awake during the day, keep lights on, windows open              - Encourage uninterrupted sleep at night, limit amount light              - Have glasses available during the day  - Encourage frequent reorientation               - Encourage cognitive stimulation  - Recreational therapy  - Neuro exams qshift    - Bladder scan q8h  - Quetiapine as below  - Olanzapine tablet 5mg at 1700 and 2200   - PRN olanzapine 5mg ODT for agitation, not to exceed 20mg per day  - Medically ready for discharge  - SW assisting with placement     # Risk of Qtc prolongation  # LBBB  Last EKG 5/6/2024 showed QTc 475.  - Continue antispsychotics as above.  - Weekly EKG     # Moderate malnutrition in the context of acute illness/disease   - Nutrition following     --Chronic--  # Chronic incompletely united distal humerus fracture  Patient is s/p left elbow fracture s/p ORIF at [facility], complicated by elbow wound requiring washout. Per chart review, on lifelong Bactrim. Having occasional elbow pain  - Continue PTA Bactrim 1 tablet BID  - Tylenol 975mg q6h prn     # Falls  Has had multiple hospital admissions for fall with unknown etiology. Unclear if mechanical vs syncopal episodes. Has extensive cardiac history but recent TTE without abnormalities of bioprosthetic valve.Can consider arrhythmia as well with prior afib.   - Consider ZioPatch on discharge.     # ISHAN  Patient has history of ISHAN. Patient noted to be desatting when asleep, improves with Oxymask. If not tolerating mask can consider repeat blood gas to check PCO2. Bicarb on BMP is WNL.      # Chronic HFpEF 55% 1/2023  # Hypertension   #Afib   Continue to hold PTA eliquis in the setting of recent intracranial hemorrhage per NSGY recs. [facility] about possible Watchman procedure as outpatient. PTA on Entresto 24-26mg BID   - continue pta metoprolol succinate 12.5mg daily  - continue pta Entesto 24-26mg BID     # CAD s/p 3V CABG (2015)  Not on aspirin currently  - Statin as below     # Aortic stenosis s/p TAVR (2016)  Last TTE 1/2023 with mean gradient 14mmHg, suspected to be WNL  - Holding PTA eliquis in setting of recent intracranial hemorrhage, possible future watchman procedure       #HLD  - continue pta atorvastatin 80mg at bedtime       #GERD  - pantoprazole 40mg daily (to replace PTA omeprazole 20mg daily while inpatient)     # Idiopathic progressive neuropathy  - Held PTA Gabapentin due to AMS, not resumed at this point       # PTSD  # Depression  Patient started on Seroquel for delirium at OSH due to hospitalization in the setting of acute illness. Patient sleeping, minimally responsive as above.   - Continue PTA sertraline 75mg daily  - Scheduled quetiapine 75mg at 3pm daily  - PRN olanzapine as above     # Hx basal cell skin cancer   Family requesting this be treated inpatient, discussed this will need outpatient follow up.   - Will need outpatient Derm follow up for this      -- Outpatient Follow Ups Needed--  Neurosurgery - to assess for hydrocephalus. Would like to be informed on discharge to arrange follow up.      Neurology - assess need for Keppra, until then continue 750mg BID     Cardiology follow up for consideration of Watchman procedure.    Dermatology for hx basal cell skin ca         Diet: Regular Diet Adult  Room Service  Snacks/Supplements Adult: Ensure Enlive; With Meals  Diet    DVT Prophylaxis: Pneumatic Compression Devices  Rice Catheter: Not present  Lines: None     Cardiac Monitoring: None  Code Status: Full Code      Clinically Significant Risk Factors                  # Hypertension: Noted on problem list  # Chronic heart failure with preserved ejection fraction: heart failure noted on problem list and last echo with EF >50%        # Moderate Malnutrition: based on nutrition assessment    # Financial/Environmental Concerns: none         Disposition Plan     Medically Ready for Discharge: Ready Now      Patient seen and staffed with Dr. Parker.     Darren Rosenbaum MD  Medicine Service, [facility] TEAM [address] [place] [address]  Securely message with [employer] (more info)  Text page via [facility] Paging/Directory   See signed in provider for up to date coverage  information  ______________________________________________________________________    Interval History   No acute events overnight, nursing notes reviewed. Has remained off 1 to 1 sitter. He reports feeling better today but does not provided specifics. Told him that we are still working on placement.     Physical Exam   Vital Signs: Temp: 97.8  F (36.6  C) Temp src: Oral BP: 104/63 Pulse: 53   Resp: 16 SpO2: 93 % O2 Device: None (Room air)    Weight: 220 lbs 10.89 oz    General: lying in bed, NAD, not diaphoretic   HEENT: no scleral icterus or conjunctival injection  Resp: clear to auscultation bilaterally, no crackles or wheezes  Cardiac: regular rate and rhythm, no murmurs  Abdomen: soft, non-tender, non-distended. No rebound or guarding.   Extremities: warm, no lower extremity edema  Neuro: sleeping but awakens to touch answers questions.       Medical Decision Making

## 2024-05-09 NOTE — TELEPHONE ENCOUNTER
Writer recently discharged from [facility] in [place] who ordered his home care.      Writer phoned [facility]  and Discharge Coordinator, Liat to reach out to patient and explain when to expect home care nurse and review discharge instructions.    Minda Ramon RN on 4/10/2023 at 3:39 PM     2021

## 2024-05-09 NOTE — PLAN OF CARE
Goal Outcome Evaluation:       Assumed care from 1900 to 2300.     Overall Patient Progress: no changeOverall Patient Progress: no change     Pt alert to person, disoriented to rest intermittently. Reorientation and redirection provided. Pt VSS on RA, denies pain verbally. Pt compliant with saying in bed this shift, conversational with staff and accepted cares. Able to void with urinal, incontinent X1. Call light in reach, bed alarm on.        c/w Lipitor c/w Lipitor

## 2024-05-09 NOTE — PLAN OF CARE
Pt rested intermittently during the night. Remains confused but rossi and cooperative with cares. Pt complained of pain and was restless, PRN Tylenol and Seroquel given with effective relief. Pt is able to shift weight in bed. Bed-alarms in place for safety. Remains on RA, /63 (BP Location: Left arm)   Pulse 53   Temp 97.8  F (36.6  C) (Oral)   Resp 16   Wt 100.1 kg (220 lb 10.9 oz)   SpO2 93%   BMI 29.12 kg/m  . Utilizes urinal at times to void other wise incontinent. No acute changes pt medically cleared awaiting placement, SW following disposition needs and care coordination.     Goal Outcome Evaluation: No Change       Plan of Care Reviewed With: patient    Overall Patient Progress: no change    Problem: Pain Acute  Goal: Optimal Pain Control and Function  Outcome: Progressing

## 2024-05-09 NOTE — PLAN OF CARE
No acute events this shift, condition improving, no behaviors today. Patient allowed to sleep in this morning, verbalized being tired. C/o slight pain on shoulders and L hip, Voltaren gel  and lidocaine patch applied to affected areas. Tray set up for lunch at noon, assisted with his meal and ate 50% of solid food and drank good amounts of milk and protein supplements. Swallows food and fluids without any concerns. Pivot transferred to wheelchair with assist of 2 using GB and walker. Wheeled around the unit on a wheelchair and then watch [employer] channel at the nurses desk until he became drowsy. Heavy Assist of 2 to transfer to bed from  with clear concise commands as patient became drowsy after taking Seroquel. Resting in bed as of this time. Bed alarm on.

## 2024-05-09 NOTE — PROGRESS NOTES
[facility]  WOC Nurse Inpatient Assessment     Consulted for: heels  5/3/24: new consult for sacrum/coccyx      Patient History (according to provider note(s):      David Thomson is a 83 year old male with history of cognitive impairment, CAD s/p CABG (2015), afib, Aortic stenosis s/o TAVR with bioprosthetic valve, with recent admission 03/08-03/18 after a fall at which time he was found to have IPH, IVH, SAH, and SDH, admitted to OSH on 3/21/2024 after a fall with AMS. He was transferred to [facility] due to concern for new intracranial hemorrhage but imaging findings did not demonstrate new ICH. Care transitioned from trauma to medicine service 3/22 for continued evaluation of recurrent falls and encephalopathy. Remains oriented to self and age, occasionally oriented to year, but not place or situation. PT/OT evaluated, recommend TCU on discharge.     Assessment:      Areas visualized during today's visit: Sacrum/coccyx      Nurse noting stage 1 on patient heels on 4/4. WOC assessed 4/5 in the afternoon and bedside RN assessed 4/5 in the morning and heels were blanchable at these times.   No wound currently present. Possible patient had a stage 1 that resolved.     Skin Injury Location: gluteal crease and perianal      Last photo: 5/9/24  Skin injury due to: Incontinence associated dermatitis (IAD) and Moisture associated skin damage (MASD), correction 5/6/24 after chart review  Skin history and plan of care:  linear skin split to gluteal crease with surrounding erythema and slightly macerated pale pink tissue, moisture associated skin damage related to inctoninence. Glenvil to reddened skin extends over the coccyx and perianal areas where skin is intact and blanchable but moist.  patient is reported incontinent of bladder and bowels and at time of assessment began having an episode of stool incontinence. Will add moisture barrier cream to assist with skin protection, I do  not think a dressing would be appropriate at this time due to continued episodes of incontinence that would require multiple dressing changes throughout the day. Patient has sitter at the bedside and appears somewhat confused and not answering questions, he did follow instructions to turn but was unable to turn himself without assistance. Recommend to add SILVA pump to isoflex support surface for additional moisture management  Affected area:      Skin assessment: Denudement, Erosion of epidermis, Erythema, and Maceration     Measurements (length x width x depth, in cm) 2.1  x 0.5  x  0.1 cm      Color: pink, red, and pale pink     Temperature  normal      Drainage: none      Color: none      Odor: none  Pain: unable to assess due to  confusion, none  Pain interventions prior to dressing change: patient tolerated well, no significant pain present , and slow and gentle cares   Treatment goal: Heal , Infection control/prevention, Decrease moisture, Maintain (prevention of deterioration), Protection, and Promote epidermal migration  STATUS: healing  Supplies ordered: at bedside, supplies stored on unit, and discussed with patient      Treatment Plan:     Intergluteal crease: BID and PRN for episodes of incontinence  Cleanse the area with Shira cleanse and protect, very gently with soft cloth.  Apply ostomy powder (#2139) on all open and denuded skin.  Apply thin layer of critic aid paste on top of it.  With repeat application, do not scrub the paste, only remove soiled paste and reapply.  If complete removal of paste is necessary use baby oil/mineral oil (#735629) and soft wash cloth.  Ensure pt has Angel-cushion (#736378) while sitting up in the chair.  Use only one Covidien pad in between mattress and pt. No brief while in bed.   Add low air loss pump to isoflex support surface for additional moisture management    Ensure the pump is plugged in and functioning on SILVA (green light) function.     Pressure Injury Prevention  "(PIP) Plan:  If patient is declining pressure injury prevention interventions: Explore reason why and address patient's concerns, Educate on pressure injury risk and prevention intervention(s), If patient is still declining, document \"informed refusal\" , and Ensure Care team is aware ( provider, charge nurse, etc)  Mattress: Follow bed algorithm, reassess daily and order specialty mattress, if indicated.  HOB: Maintain at or below 30 degrees, unless contraindicated  Repositioning in bed: Every 1-2 hours , Left/right positioning; avoid supine, and Raise foot of bed prior to raising head of bed, to reduce patient sliding down (shear)  Heels: Keep elevated off mattress, Pillows under calves, and Preventative Mepilex to heels  Protective Dressing: Sacral Mepilex for prevention (#104899),  especially for the agitated patient   Chair positioning: Repositions self: patient to shift weight every 15 minutes, Assist patient to reposition hourly, and Do NOT use a donut for sitting (this increases pressure to smaller area and creates a higher potential for injury)    If patient has a buttock pressure injury, or high risk for PI use chair cushion or SPS.  Moisture Management: Perineal cleansing /protection: Follow Incontinence Protocol, Avoid brief in bed, Clean and dry skin folds with bathing , Consider InterDry (#588380) between folds, and Moisturize dry skin  Under Devices: Inspect skin under all medical devices during skin inspection , Ensure tubes are stabilized without tension, and Ensure patient is not lying on medical devices or equipment when repositioned  Ask provider to discontinue device when no longer needed.      Orders: Reviewed    RECOMMEND PRIMARY TEAM ORDER: None, at this time  Education provided: plan of care  Discussed plan of care with: Patient  WOC nurse follow-up plan: signing off  Notify WOC if wound(s) deteriorate.  Nursing to notify the Provider(s) and re-consult the WOC Nurse if new skin " concern.    DATA:     Current support surface: Standard  Standard gel/foam mattress (IsoFlex, Atmos air, etc)  Containment of urine/stool: Incontinent pad in bed  BMI: Body mass index is 29.12 kg/m .   Active diet order: Orders Placed This Encounter      Regular Diet Adult      Diet     Output: I/O last 3 completed shifts:  In: 1800 [P.O.:1800]  Out: 400 [Urine:400]     Labs:   Recent Labs   Lab 05/06/24  0543   ALBUMIN 3.9   HGB 11.4*   WBC 4.7     Pressure injury risk assessment:   Sensory Perception: 3-->slightly limited  Moisture: 3-->occasionally moist  Activity: 3-->walks occasionally  Mobility: 3-->slightly limited  Nutrition: 3-->adequate  Friction and Shear: 3-->no apparent problem  Johann Score: 18    Trever Gomez RN   Pager no longer is use, please contact through [employer] group: [facility] Nurse [place]    Dept. Office Number: [phone]

## 2024-05-09 NOTE — PROGRESS NOTES
Care Management Follow Up    Length of Stay (days): 49    Expected Discharge Date: 05/10/2024     Concerns to be Addressed: discharge planning     Patient plan of care discussed at interdisciplinary rounds: Yes    Anticipated Discharge Disposition: Long Term Care     Anticipated Discharge Services: None  Anticipated Discharge DME: Walker    Patient/family educated on Medicare website which has current facility and service quality ratings: yes  Education Provided on the Discharge Plan: Yes  Patient/Family in Agreement with the Plan: yes    Referrals Placed by CM/SW:      [facility]  [address] [facility] [address]  P: [phone]  F: [phone]    Private pay costs discussed: Not applicable    Additional Information:  @9280 JAVY messaged [facility] admissions via Epic in basket reporting the patient has been off of 1:1 sitter for 48 hours.   Amanda in admissions reported the DON is reviewing.     Amanda reported they can accept the patient tomorrow (5/10 between 10-11).    JAVY arranged stretcher transportation arranged through [facility] for 5/10 with a pickup window of [phone]  JAVY confirmed with Amanda in admissions at [facility] that this transport time works for the facility.    JAVY called the patient's son Dandy and reported the above information. JAVY informed Dandy that the facility SW will continue to work towards a facility that is closer to the patient's home in [facility]. Dandy requested JAVY send this information in an email as well ([email or web address])  JAVY sent the above information to Dandy's email    JAVY completed PCS form, faxed it to billing, and placed completed form in the patient's chart.     JAVY updated the provider, charge nurse, and care .     VIET Cutler  [address]   Office: [phone]  Pager: [phone]  [email or web address]

## 2024-05-10 ENCOUNTER — DOCUMENTATION ONLY (OUTPATIENT)
Dept: GERIATRICS | Facility: CLINIC | Age: 84
End: 2024-05-10
Payer: COMMERCIAL

## 2024-05-10 VITALS
SYSTOLIC BLOOD PRESSURE: 118 MMHG | WEIGHT: 220.68 LBS | TEMPERATURE: 97.9 F | DIASTOLIC BLOOD PRESSURE: 71 MMHG | BODY MASS INDEX: 29.12 KG/M2 | RESPIRATION RATE: 18 BRPM | HEART RATE: 60 BPM | OXYGEN SATURATION: 90 %

## 2024-05-10 PROCEDURE — 250N000013 HC RX MED GY IP 250 OP 250 PS 637

## 2024-05-10 PROCEDURE — 250N000013 HC RX MED GY IP 250 OP 250 PS 637: Performed by: INTERNAL MEDICINE

## 2024-05-10 PROCEDURE — 99238 HOSP IP/OBS DSCHRG MGMT 30/<: CPT | Mod: GC | Performed by: STUDENT IN AN ORGANIZED HEALTH CARE EDUCATION/TRAINING PROGRAM

## 2024-05-10 RX ADMIN — PANTOPRAZOLE SODIUM 40 MG: 40 TABLET, DELAYED RELEASE ORAL at 09:32

## 2024-05-10 RX ADMIN — SULFAMETHOXAZOLE AND TRIMETHOPRIM 1 TABLET: 400; 80 TABLET ORAL at 09:33

## 2024-05-10 RX ADMIN — LIDOCAINE 4% 2 PATCH: 40 PATCH TOPICAL at 09:33

## 2024-05-10 RX ADMIN — THIAMINE HCL TAB 100 MG 100 MG: 100 TAB at 09:33

## 2024-05-10 RX ADMIN — POLYETHYLENE GLYCOL 3350 17 G: 17 POWDER, FOR SOLUTION ORAL at 09:33

## 2024-05-10 RX ADMIN — Medication 12.5 MG: at 11:09

## 2024-05-10 RX ADMIN — SACUBITRIL AND VALSARTAN 1 TABLET: 24; 26 TABLET, FILM COATED ORAL at 09:32

## 2024-05-10 RX ADMIN — LEVETIRACETAM 750 MG: 750 TABLET, FILM COATED ORAL at 09:32

## 2024-05-10 RX ADMIN — SERTRALINE HYDROCHLORIDE 75 MG: 50 TABLET ORAL at 09:33

## 2024-05-10 RX ADMIN — FOLIC ACID 1 MG: 1 TABLET ORAL at 09:33

## 2024-05-10 RX ADMIN — DICLOFENAC 2 G: 10 GEL TOPICAL at 09:33

## 2024-05-10 ASSESSMENT — ACTIVITIES OF DAILY LIVING (ADL)
ADLS_ACUITY_SCORE: 53

## 2024-05-10 NOTE — PLAN OF CARE
Goal Outcome Evaluation:       Pt alert and oriented to self, VSS on RA, pain with movement on the left shoulder and elbow and lidocaine patch placed on that arm. Pt is incontinent of bowel and bladder but can ask for the urinal which he uses in bed. He is directable, takes his meds with pudding. He slept through the morning hours but woke up his meds. Discharge report was given to the TCU nurse and the transportation team.

## 2024-05-10 NOTE — PROGRESS NOTES
"Care Management Discharge Note    Discharge Date: 05/10/2024       Discharge Disposition: Long Term Care    Discharge Services: None    Discharge DME: Walker    Discharge Transportation: [email or web address] - Stretcher (requires supervision) [phone]    Private pay costs discussed: transportation costs and LTC    Does the patient's insurance plan have a 3 day qualifying hospital stay waiver?  No    PAS Confirmation Code: [id number]  Patient/family educated on Medicare website which has current facility and service quality ratings: yes    Education Provided on the Discharge Plan: Yes  Persons Notified of Discharge Plans: Patient, patient's family, facility, CM leadership, provider, nursing staff, SW  Patient/Family in Agreement with the Plan: yes    Handoff Referral Completed: Yes    Additional Information:  SW further discussed with CM supervisor. CM supervisor instructed this SW to inform family that they will be privately paying for LTC due to not being able to disclose shared agreement     @0900 SW called [facility] and moved ride back to [facility] around 1100 due to shared agreement not yet being signed.     @0949 JAVY left a VM for Mariah the SW at [facility] to provide a handoff.     SW emailed the patient's sons David  \"Dandy\" and Valente regarding privately paying for EICR.     VIET Cutler  [address]   Office: [phone]  Pager: [phone]  [email or web address]        "

## 2024-05-10 NOTE — DISCHARGE SUMMARY
[facility]  Discharge Summary - Medicine & Pediatrics       Date of Admission:  3/21/2024  Date of Discharge:  5/10/2024  Discharging Provider: Darren Rosenbaum MD (IM PGY3)  Discharge Service: Medicine Service, [facility] TEAM 2    Discharge Diagnoses   # Delirium, improving    # Recent Intraparenchymal hemorrhage with extension into ventricles andmidline shift, SAH, and SDH post fall (3/8)  # Cognitive impairment  # Risk of Qtc prolongation  # LBBB  # Chronic incompletely united distal humerus fracture  # Falls  # ISHAN  # Chronic HFpEF 55% 1/2023  # Hypertension   #Afib   # CAD s/p 3V CABG (2015)  # Aortic stenosis s/p TAVR (2016)  # GERD  # HLD   # Idiopathic progressive neuropathy  # PTSD  # Depression    Clinically Significant Risk Factors     # Moderate Malnutrition: based on nutrition assessment      Follow-ups Needed After Discharge   Follow-up Appointments     Follow Up and recommended labs and tests      Follow up with Nursing home physician.  No follow up labs or test are   needed.  Follow up with primary care provider 2-3 weeks after discharging from the   facility.   Follow up with specialist: Cardiology, Neurology, Neurosurgery in the next   1-2 months  Follow up with Dermatology            Unresulted Labs Ordered in the Past 30 Days of this Admission       No orders found from 2/20/2024 to 3/22/2024.        These results will be followed up by N/A    Discharge Disposition   Discharged to long-term care facility  Condition at discharge: Stable    Hospital Course   David Thomson is an 84 y.o. male with a history of cognitive impairment, CAD s/p CABG (2015), afib, aortic stenosis s/p TAVR with bioprosthetic valve, with recent admission 3/8-3/18/2024 after a fall at which time he was found to have IPH, IVH, SAH, and SDH. He was admitted to an outside hospital on 3/21/2024 after a fall with altered mental status. He was transferred to [facility] due to concern for new  intracranial hemorrhage, but imaging findings did not demonstrate new ICH. Care transitioned from trauma to medicine service on 3/22/2024 for continued evaluation of recurrent falls and encephalopathy. The following problems were addressed during his hospitalization:    # Delirium, improving    # Recent Intraparenchymal hemorrhage with extension into ventricles andmidline shift, SAH, and SDH post fall (3/8)  # Cognitive impairment  Family notes was AxOx3 prior to IPH and since discharge to TCU has been improving but still lethargic and not at baseline. Patient presenting again after fall with AMS. No new intracranial hemorrhage on repeat CT head. Patient is at risk for seizure in the setting of recent intracranial bleed and neurology started empiric antiepileptic therapy for persistent AMS. EEG with evidence of additional left hemispheric focal cerebral dysfunction, but no epileptiform discharges. Occasional urinary retention but UA unremarkable and patient afebrile. Continues to have intermittent delirium overnight and agitation (kicking) requiring 1:1.   - Start olanzapine tablet 5mg at 1700 and 2200   - PRN olanzapine 5mg ODT for agitation, not to exceed 20mg per day  - 75mg Quetiapine scheduled  - 50mg Quetiepine PRN available   - Encourage to get up in wheelchair if agitated as wanting to get out of bed seems to be a trigger    - Outpatient Neurology and Neurosurgery follow-up     # Risk of Qtc prolongation  # LBBB  Last EKG 5/6/2024 showed QTc 475.      --Chronic--  # Chronic incompletely united distal humerus fracture  Patient is s/p left elbow fracture s/p ORIF at [facility], complicated by elbow wound requiring washout. Per chart review, on lifelong Bactrim. Having occasional elbow pain  - Continue PTA Bactrim  - PRN Tylenol     # Falls  Has had multiple hospital admissions for fall with unknown etiology. Unclear if mechanical vs syncopal episodes. Has extensive cardiac history but recent TTE without  abnormalities of bioprosthetic valve.Can consider arrhythmia as well with prior afib.   - Consider ZioPatch in the outpatient setting     # ISHAN  Patient has history of ISHAN. Patient noted to be desatting when asleep, improves with Oxymask. If not tolerating mask can consider repeat blood gas to check PCO2. Bicarb on BMP is WNL.      # Chronic HFpEF 55% 1/2023  # Hypertension   #Afib   Continue to hold PTA eliquis in the setting of recent intracranial hemorrhage per NSGY recs. [facility] about possible Watchman procedure as outpatient.   - Continue PTA Toprol XL  - Continue PTA entresto  - outpatient cardiology follow-up     # CAD s/p 3V CABG (2015)  Not on aspirin currently  - Statin as below     # Aortic stenosis s/p TAVR (2016)  Last TTE 1/2023 with mean gradient 14mmHg, suspected to be WNL  - Holding PTA eliquis in setting of recent intracranial hemorrhage, Watchman to be considered outpatient     #HLD  -PTA lipitor     #GERD  - PTA omeprazole     # Idiopathic progressive neuropathy  - Hold PTA Gabapentin due to AMS      # PTSD  # Depression  Patient started on Seroquel for delirium at OSH due to hospitalization in the setting of acute illness. Patient sleeping, minimally responsive as above.   - Continue PTA sertraline  - Scheduled quetiapine, PRN olanzapine as above     # Hx of basal cell carcinoma of the skin  - outpatient dermatology follow-up    Consultations This Hospital Stay   NEUROSURGERY ADULT IP CONSULT  TRAUMA SURGERY IP CONSULT  PHYSICAL THERAPY ADULT IP CONSULT  OCCUPATIONAL THERAPY ADULT IP CONSULT  CARE MANAGEMENT / SOCIAL WORK IP CONSULT  NEUROLOGY GENERAL ADULT IP CONSULT  CARE MANAGEMENT / SOCIAL WORK IP CONSULT  NURSING TO CONSULT FOR VASCULAR ACCESS CARE IP CONSULT  PSYCHIATRY IP CONSULT  WOUND OSTOMY CONTINENCE NURSE  IP CONSULT  PSYCHIATRY IP CONSULT  SPIRITUAL HEALTH SERVICES IP CONSULT  WOUND OSTOMY CONTINENCE NURSE  IP CONSULT    Code Status    Full Code       The patient was discussed with MD Graham Waters [address] [place] [address] 7C MED SURG  [address]  [place] [address]  Phone: [phone]  ______________________________________________________________________    Physical Exam   Vital Signs: Temp: 97.5  F (36.4  C) Temp src: Oral BP: 128/65 Pulse: 69   Resp: 18 SpO2: 97 % O2 Device: None (Room air)    Weight: 220 lbs 10.89 oz  General: lying in bed, NAD, not diaphoretic   HEENT: no scleral icterus or conjunctival injection  Resp: clear to auscultation bilaterally, no crackles or wheezes  Cardiac: regular rate and rhythm, no murmurs  Abdomen: soft, non-tender, non-distended. No rebound or guarding.   Extremities: warm, no lower extremity edema  Neuro: sleeping but awakens to touch answers questions.      Primary Care Physician   Physician No Ref-Primary    Discharge Orders      Adult Cardiology Eval  Referral      Adult Neurology  Referral      Adult Dermatology  Referral      Adult Neurosurgery  Referral      General info for SNF    Length of Stay Estimate: Short Term Care: Estimated # of Days <30  Condition at Discharge: Stable  Level of care:skilled   Rehabilitation Potential: Good  Admission H&P remains valid and up-to-date: Yes  Recent Chemotherapy: N/A  Use Nursing Home Standing Orders: Yes     Mantoux instructions    Give two-step Mantoux (PPD) Per Facility Policy Yes     Activity - Up with nursing assistance     Reason for your hospital stay    You were admitted to the hospital over concern for bleeding in your head. You were closely monitored and evaluated by multiple specialists including Neurosurgeons and Neurology.   - Follow up with Neurosurgery to continue to monitor the bleeding   - Follow up with Neurology to evaluate whether you need to continue anti-seizure medications  - Follow up with Cardiology to discuss a Watchman device, this lowers your risk of stroke  from Afib when you can't be on a blood thinner  - Follow up with Dermatology for history of skin cancer     Follow Up and recommended labs and tests    Follow up with Nursing home physician.  No follow up labs or test are needed.  Follow up with primary care provider 2-3 weeks after discharging from the facility.   Follow up with specialist: Cardiology, Neurology, Neurosurgery in the next 1-2 months  Follow up with Dermatology     Full Code     Fall precautions     Diet    Follow this diet upon discharge: Regular       Significant Results and Procedures   Results for orders placed or performed during the hospital encounter of 03/21/24   CT Head w/o Contrast    Narrative    EXAM: CT HEAD W/O CONTRAST  LOCATION: [facility]  DATE: 3/21/2024    INDICATION: Stability scan six hours after 4 pm.  COMPARISON: 03/21/2024. 3/9/2024 and 3/8/2024 from an outside institution.  TECHNIQUE: Routine CT Head without IV contrast. Multiplanar reformats. Dose reduction techniques were used.    FINDINGS:  INTRACRANIAL CONTENTS: Again seen is an acute intraparenchymal hematoma centered within the left inferior frontal lobe measuring up to 3.2 x 1.7 cm in greatest axial dimensions, previously 3.2 x 1.9 cm in greatest axial dimensions, likely stable given   differences in imaging technique. When compared to exam dated 03/09/2024, this hemorrhage demonstrates interval expected evolution and has not increased in size when compared to prior. Additional small regions of intraparenchymal hemorrhage are noted   along the medial aspect of the inferior frontal lobe on the left and stable compared to prior exam from earlier in the day. Ill-defined regions of intraparenchymal/subarachnoid hemorrhage also noted along the inferior aspect of the right frontal lobe.   Persistent regions of edema are noted involving the bilateral inferior frontal lobes at the skull base, left worse than right, similar to prior  exam (but progressed since 03/09/2024). Scattered subarachnoid hemorrhage is noted involving the bilateral   anterior/inferior frontal lobes, left temporal lobe, right temporal lobe and left posterior frontal/parietal lobe, similar to most recent prior exam. Persistent hemorrhage is layering within the occipital horns of the lateral ventricles, similar to prior   exam. Ventricular caliber appears stable when compared to 03/21/2024, but increased from 03/09/2024.    Left-to-right midline shift measures 0.7 cm, previously 0.7 cm. Midline shift on 03/09/2024 measuring approximately 0.6 cm. No CT evidence of acute infarct. Question small volume and stable bilateral subdural hematomas versus prominence of the bilateral   sphenoparietal sinuses along the anterior middle cranial fossae. Stable-appearing subdural hematomas noted along the tentorial leaflets and falx (decreased from 03/09/2024). Mild presumed chronic small vessel ischemic changes. Mild to moderate   generalized volume loss.      VISUALIZED ORBITS/SINUSES/MASTOIDS: Prior bilateral cataract surgery. Visualized portions of the orbits are otherwise unremarkable. Mild mucosal thickening scattered about the paranasal sinuses. Trace bilateral mastoid effusions.    BONES/SOFT TISSUES: Stable nondisplaced occipital bone fracture.      Impression    IMPRESSION:  1.  Evolving multicompartmental intracranial hemorrhage, as above, without significant interval progression when compared to CT head dated 03/21/2024 earlier in the day. Comparison images were made dated 3/8/2024 and 3/9/2024 from an outside institution   which demonstrated multi compartmental acute intraparenchymal hemorrhage, in a similar distribution as today's examination. The hemorrhage on today's examination is favored to be late acute/subacute given that these findings were present on exam dated   03/09/2024 and demonstrate expected interval evolution.  2.  Stable left-to-right midline shift measuring  "0.7 cm. Midline shift dated 03/09/2024 measured approximately 0.6 cm.   3.  Persistent intraventricular hemorrhage with stable ventricular caliber compared to exam dated 03/21/2024. The ventricles have mildly increased in size when compared to exam dated 03/09/2024.   4.  Stable nondisplaced occipital bone fracture.          Dr. Srinivas Fitzpatrick discussed results with Dr. Melgar on 03/21/2024 at 11:22 PM.   Elbow  XR, G/E 3 views, left    Narrative    EXAM: XR ELBOW LEFT G/E 3 VIEWS, XR SHOULDER LEFT G/E 3 VIEWS, XR HUMERUS LEFT G/E 2 VIEWS  LOCATION: [facility]  DATE: 3/21/2024    INDICATION: prior L \"arm fracture\". +L wrist pain  COMPARISON: 03/08/2024.      Impression    IMPRESSION:      Again seen are postoperative changes from lateral plate and screw fixation for management of a chronic incompletely united distal humerus fracture. Hardware appears intact. The fracture lucency remains visible, fairly similar to the 03/08/2024   radiographs. There is again heterotopic ossification about the medial and lateral elbow. No definite new fracture is identified. There is soft tissue swelling over the elbow.    No acute fracture or dislocation is seen within the more proximal left humerus or left shoulder. There are mild degenerative changes at the left acromioclavicular and glenohumeral joints.   XR Wrist Left G/E 3 Views    Narrative    EXAM: XR WRIST LEFT G/E 3 VIEWS  LOCATION: [facility]  DATE: 3/21/2024    INDICATION: prior L \"arm fracture\". +L wrist pain  COMPARISON: None.      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 No acute, displaced fracture   is identified. Vascular atherosclerotic calcifications are noted.   Humerus XR,  G/E " "2 views, left    Narrative    EXAM: XR ELBOW LEFT G/E 3 VIEWS, XR SHOULDER LEFT G/E 3 VIEWS, XR HUMERUS LEFT G/E 2 VIEWS  LOCATION: [facility]  DATE: 3/21/2024    INDICATION: prior L \"arm fracture\". +L wrist pain  COMPARISON: 03/08/2024.      Impression    IMPRESSION:      Again seen are postoperative changes from lateral plate and screw fixation for management of a chronic incompletely united distal humerus fracture. Hardware appears intact. The fracture lucency remains visible, fairly similar to the 03/08/2024   radiographs. There is again heterotopic ossification about the medial and lateral elbow. No definite new fracture is identified. There is soft tissue swelling over the elbow.    No acute fracture or dislocation is seen within the more proximal left humerus or left shoulder. There are mild degenerative changes at the left acromioclavicular and glenohumeral joints.   XR Shoulder Left G/E 3 Views    Narrative    EXAM: XR ELBOW LEFT G/E 3 VIEWS, XR SHOULDER LEFT G/E 3 VIEWS, XR HUMERUS LEFT G/E 2 VIEWS  LOCATION: [facility]  DATE: 3/21/2024    INDICATION: prior L \"arm fracture\". +L wrist pain  COMPARISON: 03/08/2024.      Impression    IMPRESSION:      Again seen are postoperative changes from lateral plate and screw fixation for management of a chronic incompletely united distal humerus fracture. Hardware appears intact. The fracture lucency remains visible, fairly similar to the 03/08/2024   radiographs. There is again heterotopic ossification about the medial and lateral elbow. No definite new fracture is identified. There is soft tissue swelling over the elbow.    No acute fracture or dislocation is seen within the more proximal left humerus or left shoulder. There are mild degenerative changes at the left acromioclavicular and glenohumeral joints.   CTV Head Neck w Contrast    Narrative    EXAM: CTV HEAD NECK W " CONTRAST  LOCATION: [facility]  DATE: 3/21/2024    INDICATION: Assess involvement of occipital fracture notable and impact to sinus for sinus injury or thrombosis.  COMPARISON: Multiple prior head CTs.  CONTRAST: IsoVue-370, 67 mL.  TECHNIQUE: Head CT venogram with IV contrast. Axial helical CT images of the intracranial vessels obtained during the venous phase of intravenous contrast administration. Axial helical 2D reconstructed images and multiplanar 3D MIP reconstructed images   of the intracranial vessels were performed by the technologist. Dose reduction techniques were used.     FINDINGS:     Examination is degraded by suboptimal contrast bolus timing/opacification of the venous sinuses, internal cerebral veins and major cortical veins.    HEAD CTV:  DURAL SINUSES: No significant stenosis or occlusion. Dominant right and smaller left transverse and sigmoid dural sinuses. Poor contrast opacification involving the jugular bulb/jugular veins is likely related to contrast bolus timing and poor photon   penetration secondary to the adjacent osseous structures.    INTERNAL CEREBRAL VEINS: No definite stenosis or occlusion.      MAJOR CORTICAL VENOUS BRANCHES: No definite stenosis or occlusion.      Impression    IMPRESSION:     HEAD CTV:   1.  No definite intracranial venous thrombosis or stenosis.   XR Lumbar Spine 2/3 Views    Narrative    EXAM: XR LUMBAR SPINE 2/3 VIEWS  4/19/2024 11:23 AM      HISTORY: Unwitnessed fall, having low back/hip pain    COMPARISON: CT 3/21/2024    FINDINGS:   Frontal and lateral views of the lumbar spine.    5 lumbar type vertebral bodies are assumed for the purpose of this  dictation.    Interbody device at L4-5. Chronic compression fracture deformity of  the superior endplate of L2, similar to CT from 3/21/2024. No new loss  of vertebral body height. Mild retrolisthesis of L3 on L4. No  significant disc space narrowing. Degenerative  changes with  osteophytic spurring and multilevel facet arthropathy. There is likely  neural foraminal narrowing at L5-S1.    The visualized bowel gas pattern is nonobstructive. Partially  visualized spinal stimulator device and left hip arthroplasty.  Vascular calcifications.      Impression    IMPRESSION:   1. No acute osseous abnormality.  2. Chronic compression fracture deformity of the superior endplate of  L2.  3. Advanced degenerative changes in the lumbar spine, greatest at  L5-S1.      JENNIFER BIANCHI MD         SYSTEM ID:  [id number]   XR Pelvis w Hip Left 1 View    Narrative    EXAM: XR PELVIS AND HIP LEFT 1 VIEW  4/19/2024 11:22 AM      HISTORY: Unwitnessed fall, having low back/hip pain    COMPARISON: CT 3/21/2024, radiographs 3/17/2023    FINDINGS: AP pelvis, AP and crosstable lateral views of the left hip.    No acute osseous abnormality. Total left hip arthroplasty without  complication. Similar heterotopic ossification along the proximal  lateral femoral shaft. Vascular calcifications. Interbody device  L4-L5. Spinal stimulator. Mild/moderate hip degenerative change.       Impression    IMPRESSION: No acute osseous abnormality.    IRAJ PERALTA MD (Joe)         SYSTEM ID:  [id number]   CT Head w/o Contrast    Narrative    CT HEAD W/O CONTRAST 4/22/2024 4:04 PM    History: f/u previous bleeds   Comparison: CT head 3/21/2024, multiple priors    Technique: Using multidetector thin collimation helical acquisition  technique, axial, coronal and sagittal CT images from the skull base  to the vertex were obtained without intravenous contrast.   (topogram) image(s) also obtained and reviewed.    Findings: Continued evolution of left greater than right frontal  intraparenchymal hemorrhages with encephalomalacia and improving  edema. There is no new intracranial hemorrhage. Stable 5 mm left to  right midline shift. Gray/white matter differentiation in both  cerebral hemispheres is preserved. Stable  ventricular caliber compared  to 3/21/2024. The basal cisterns are clear.    Stable nondisplaced occipital fracture. Trace ethmoid air cell,  maxillary sinus and mastoid air cell fluid bilaterally similar to  3/21/2024. Soft tissue attenuation within the bilateral external ear  canals. Bilateral cataract surgery.      Impression    Impression:  1. Expected evolution of intracranial hemorrhages with resolution of  edema and new encephalomalacia involving bilateral frontal lobes, left  greater than right. No new intracranial hemorrhage.  2. Stable left-to-right midline shift measuring 5 mm.  3. Stable nondisplaced occipital bone fracture.    I have personally reviewed the examination and initial interpretation  and I agree with the findings.    LAKISHA KELLER MD         SYSTEM ID:  [id number]       Discharge Medications   Current Discharge Medication List        START taking these medications    Details   acetaminophen (TYLENOL) 325 MG tablet Take 3 tablets (975 mg) by mouth every 6 hours as needed for mild pain or fever    Associated Diagnoses: Other chronic pain      diclofenac (VOLTAREN) 1 % topical gel Apply 2 g topically 4 times daily    Associated Diagnoses: Hip pain, left      levETIRAcetam (KEPPRA) 750 MG tablet Take 1 tablet (750 mg) by mouth 2 times daily    Associated Diagnoses: Nontraumatic subdural hemorrhage (H)      Lidocaine (LIDOCARE) 4 % Patch Place 2 patches onto the skin every 24 hours To prevent lidocaine toxicity, patient should be patch free for 12 hrs daily.    Associated Diagnoses: Hip pain, left      !! OLANZapine (ZYPREXA) 5 MG tablet Take 1 tablet (5 mg) by mouth at bedtime    Comments: Give at 2200  Associated Diagnoses: Agitation      !! OLANZapine (ZYPREXA) 5 MG tablet Take 1 tablet (5 mg) by mouth daily    Comments: Give at 1700  Associated Diagnoses: Agitation      OLANZapine zydis (ZYPREXA) 5 MG ODT Take 1 tablet (5 mg) by mouth daily as needed for agitation    Associated Diagnoses:  Agitation       !! - Potential duplicate medications found. Please discuss with provider.        CONTINUE these medications which have CHANGED    Details   melatonin 10 MG TABS tablet Take 1 tablet (10 mg) by mouth every 24 hours    Associated Diagnoses: Insomnia, unspecified type      !! QUEtiapine (SEROQUEL) 25 MG tablet Take 3 tablets (75 mg) by mouth every 24 hours    Comments: To be given at 1500  Associated Diagnoses: Agitation      !! QUEtiapine (SEROQUEL) 50 MG tablet Take 1 tablet (50 mg) by mouth nightly as needed (1 hour after scheduled dose if still agitated)    Associated Diagnoses: Agitation      sacubitril-valsartan (ENTRESTO) 24-26 MG per tablet Take 1 tablet by mouth 2 times daily    Associated Diagnoses: Essential (primary) hypertension      senna-docusate (SENOKOT-S/PERICOLACE) 8.6-50 MG tablet Take 1 tablet by mouth at bedtime    Associated Diagnoses: Constipation, unspecified constipation type      thiamine (B-1) 100 MG tablet Take 1 tablet (100 mg) by mouth daily    Associated Diagnoses: Hip pain, left       !! - Potential duplicate medications found. Please discuss with provider.        CONTINUE these medications which have NOT CHANGED    Details   atorvastatin (LIPITOR) 80 MG tablet Take 1 tablet (80 mg) by mouth At Bedtime  Qty: 30 tablet, Refills: 0    Associated Diagnoses: S/P CABG (coronary artery bypass graft)      ferrous sulfate (FE TABS) 325 (65 Fe) MG EC tablet Take 325 mg by mouth every other day Take on even numbered days.      fluticasone (FLONASE) 50 MCG/ACT nasal spray Spray 1 spray into both nostrils daily      folic acid (FOLVITE) 1 MG tablet Take 1 mg by mouth daily      metoprolol succinate ER (TOPROL XL) 25 MG 24 hr tablet Take 12.5 mg by mouth daily      naloxone (NARCAN) 0.4 MG/ML injection Inject 0.4 mg into the vein May give 1ml in 2-3 minutes of patient is still unresponsive      omeprazole (PRILOSEC) 20 MG DR capsule Take 1 capsule (20 mg) by mouth daily Am and HS       polyethylene glycol (MIRALAX) 17 g packet Take 17 g by mouth daily      sertraline (ZOLOFT) 50 MG tablet Take 75 mg by mouth daily      sulfamethoxazole-trimethoprim (BACTRIM) 400-80 MG tablet Take 1 tablet by mouth 2 times daily           STOP taking these medications       gabapentin (NEURONTIN) 100 MG capsule Comments:   Reason for Stopping:             Allergies   Allergies   Allergen Reactions    Doxepin      Other Reaction(s): Disorientated    Lisinopril Cough    Piroxicam Hives     Other Reaction(s): Eruption of skin    Polysorbate  [Bay Oil]     Prazosin Other (See Comments)     Nightmares    Thimerosal     Urea Other (See Comments)     Eruption of skin    Other Reaction(s): Eruption of skin    Zolpidem      Other Reaction(s): Delirium    Benzalkonium Rash    Capsaicin Rash

## 2024-05-10 NOTE — PLAN OF CARE
Goal Outcome Evaluation:      Plan of Care Reviewed With: patient    Overall Patient Progress: no changeOverall Patient Progress: no change    Outcome Evaluation: Pt is alert with intermittent confusion. Attempted to climb out of bed x 2 and was later transfer to wheelchair an placed by nursing station to distract pt for about 2 hours. Pt was very interactive with staff and ate moderately out of the dinner. Compliant with medications and cares. Back in bed with asist of 2 with walker and gait belt. Pt has since been sleeping wih no concerns. Denid pain/discomfort.

## 2024-05-10 NOTE — PLAN OF CARE
Physical Therapy Discharge Summary    Reason for therapy discharge:    Discharged to long term care facility.    Progress towards therapy goal(s). See goals on Care Plan in [facility] electronic health record for goal details.  Goals partially met.  Barriers to achieving goals:   limited tolerance for therapy.    Therapy recommendation(s):    Recommend continued assist with mobility in setting of poor safety awareness.

## 2024-05-12 ENCOUNTER — LAB REQUISITION (OUTPATIENT)
Dept: LAB | Facility: CLINIC | Age: 84
End: 2024-05-12
Payer: COMMERCIAL

## 2024-05-12 DIAGNOSIS — Z29.9 ENCOUNTER FOR PROPHYLACTIC MEASURES, UNSPECIFIED: ICD-10-CM

## 2024-05-13 ENCOUNTER — TRANSCRIBE ORDERS (OUTPATIENT)
Dept: OTHER | Age: 84
End: 2024-05-13

## 2024-05-13 DIAGNOSIS — C44.319 BASAL CELL CARCINOMA OF SKIN OF OTHER PARTS OF FACE: Primary | ICD-10-CM

## 2024-05-13 PROCEDURE — 36415 COLL VENOUS BLD VENIPUNCTURE: CPT | Mod: ORL | Performed by: NURSE PRACTITIONER

## 2024-05-13 PROCEDURE — 86481 TB AG RESPONSE T-CELL SUSP: CPT | Mod: ORL | Performed by: NURSE PRACTITIONER

## 2024-05-14 VITALS
DIASTOLIC BLOOD PRESSURE: 78 MMHG | TEMPERATURE: 97.5 F | RESPIRATION RATE: 18 BRPM | HEIGHT: 73 IN | WEIGHT: 221.4 LBS | BODY MASS INDEX: 29.34 KG/M2 | OXYGEN SATURATION: 95 % | SYSTOLIC BLOOD PRESSURE: 118 MMHG | HEART RATE: 84 BPM

## 2024-05-15 ENCOUNTER — NURSING HOME VISIT (OUTPATIENT)
Dept: GERIATRICS | Facility: CLINIC | Age: 84
End: 2024-05-15
Payer: COMMERCIAL

## 2024-05-15 ENCOUNTER — TELEPHONE (OUTPATIENT)
Dept: DERMATOLOGY | Facility: CLINIC | Age: 84
End: 2024-05-15

## 2024-05-15 DIAGNOSIS — M25.552 HIP PAIN, LEFT: ICD-10-CM

## 2024-05-15 DIAGNOSIS — Z95.2 H/O AORTIC VALVE REPLACEMENT: ICD-10-CM

## 2024-05-15 DIAGNOSIS — F60.3 BORDERLINE PERSONALITY DISORDER (H): ICD-10-CM

## 2024-05-15 DIAGNOSIS — I95.9 HYPOTENSION, UNSPECIFIED HYPOTENSION TYPE: ICD-10-CM

## 2024-05-15 DIAGNOSIS — I25.10 CORONARY ARTERY DISEASE INVOLVING NATIVE CORONARY ARTERY OF NATIVE HEART WITHOUT ANGINA PECTORIS: ICD-10-CM

## 2024-05-15 DIAGNOSIS — F43.10 PTSD (POST-TRAUMATIC STRESS DISORDER): ICD-10-CM

## 2024-05-15 DIAGNOSIS — Z71.89 ADVANCED DIRECTIVES, COUNSELING/DISCUSSION: ICD-10-CM

## 2024-05-15 DIAGNOSIS — D64.9 ANEMIA, UNSPECIFIED TYPE: ICD-10-CM

## 2024-05-15 DIAGNOSIS — I60.9 SAH (SUBARACHNOID HEMORRHAGE) (H): ICD-10-CM

## 2024-05-15 DIAGNOSIS — R41.89 COGNITIVE IMPAIRMENT: ICD-10-CM

## 2024-05-15 DIAGNOSIS — Z95.1 S/P CABG (CORONARY ARTERY BYPASS GRAFT): ICD-10-CM

## 2024-05-15 DIAGNOSIS — F33.1 MAJOR DEPRESSIVE DISORDER, RECURRENT, MODERATE (H): ICD-10-CM

## 2024-05-15 DIAGNOSIS — I48.91 ATRIAL FIBRILLATION, UNSPECIFIED TYPE (H): ICD-10-CM

## 2024-05-15 DIAGNOSIS — R29.6 RECURRENT FALLS: Primary | ICD-10-CM

## 2024-05-15 DIAGNOSIS — Z87.898 HISTORY OF DELIRIUM: ICD-10-CM

## 2024-05-15 DIAGNOSIS — S06.5XAA SDH (SUBDURAL HEMATOMA) (H): ICD-10-CM

## 2024-05-15 PROBLEM — S42.401A UNSPECIFIED FRACTURE OF LOWER END OF RIGHT HUMERUS, INITIAL ENCOUNTER FOR CLOSED FRACTURE: Status: RESOLVED | Noted: 2023-12-29 | Resolved: 2024-05-15

## 2024-05-15 PROBLEM — H10.212 ACUTE TOXIC CONJUNCTIVITIS, LEFT EYE: Status: RESOLVED | Noted: 2023-03-29 | Resolved: 2024-05-15

## 2024-05-15 LAB
GAMMA INTERFERON BACKGROUND BLD IA-ACNC: 0.05 IU/ML
M TB IFN-G BLD-IMP: NEGATIVE
M TB IFN-G CD4+ BCKGRND COR BLD-ACNC: 9.95 IU/ML
MITOGEN IGNF BCKGRD COR BLD-ACNC: -0.01 IU/ML
MITOGEN IGNF BCKGRD COR BLD-ACNC: -0.01 IU/ML
QUANTIFERON MITOGEN: 10 IU/ML
QUANTIFERON NIL TUBE: 0.05 IU/ML
QUANTIFERON TB1 TUBE: 0.04 IU/ML
QUANTIFERON TB2 TUBE: 0.04

## 2024-05-15 PROCEDURE — 99305 1ST NF CARE MODERATE MDM 35: CPT | Performed by: INTERNAL MEDICINE

## 2024-05-15 RX ORDER — OLANZAPINE 5 MG/1
TABLET ORAL
COMMUNITY
End: 2024-07-29

## 2024-05-15 RX ORDER — PHENOL 1.4 %
10 AEROSOL, SPRAY (ML) MUCOUS MEMBRANE AT BEDTIME
COMMUNITY

## 2024-05-15 NOTE — LETTER
"Sho 3, 2024    David Thomson  [facility]  [address]  [facility] [address]        Dear Caregiver of Mr. Thomson,    You are scheduled for Mohs Surgery on:     Monday July 1 st at 8:00 am at the [place], [place] location:     [address] [facility]  [place] [address]  [phone]      Please check in at Dermatology Clinic \"[facility]\".   (2nd Floor, last  Clinic on right up staircase or elevator -past OB/GYN clinic)    You don't need to arrive more than 5-10 minutes prior to your appointment time.     Be sure to eat a good breakfast and bathe and wash your hair prior to Surgery.    If you are taking any anti-coagulants that are prescribed by your Doctor (such as Coumadin/warfarin, Plavix, Aspirin, Ibuprofen), please continue taking them.     However, If you are taking anti-coagulants over the counter without  a Doctor's order for a Medical condition, please discontinue them 10 days prior to Surgery.      Please wear loose comfortable clothing as it could possibly be 4-6 hours until your surgery is completed depending upon how many layers of tissue need to be removed.     Nursing home residents are required to bring a  with them to help with their needs throughout the day. We are not staffed to assist with individual/specialized care.    Sincerely,     Ar Garcia MD/ Jane Quiñones RN            Sincerely,        Ar Garcia MD          "
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
## 2024-05-15 NOTE — Clinical Note
JYOTHI re: new LTC resident that I believe is transitioning to [facility] tomorrow (Friday). He was only at [facility] 1 week so we only have this one visit note. Let me know if questions. I made lots of adjustments yesterday. Sinai

## 2024-05-16 RX ORDER — QUETIAPINE FUMARATE 25 MG/1
25 TABLET, FILM COATED ORAL AT BEDTIME
COMMUNITY
End: 2024-07-29

## 2024-05-17 ENCOUNTER — DOCUMENTATION ONLY (OUTPATIENT)
Dept: GERIATRICS | Facility: CLINIC | Age: 84
End: 2024-05-17
Payer: COMMERCIAL

## 2024-05-17 ENCOUNTER — TELEPHONE (OUTPATIENT)
Dept: GERIATRICS | Facility: CLINIC | Age: 84
End: 2024-05-17
Payer: COMMERCIAL

## 2024-05-17 NOTE — TELEPHONE ENCOUNTER
David is transitioning from [facility] to [facility] this afternoon. Reason for transition is insurance based as [facility].     See my admit note from earlier this week as reference.    Spoke to greg Victor and Dandy on the phone for 25 minutes this morning and they were appreciative. They asked good questions. We talked about his on-going profound delirium with waxing-waning throughout the past several months with falls, head injury/bleed, prolonged hospital stay, 1 week at [facility] and now transition yet again to new facility later today. They understand this. Discussed still hope delirium will eventually calm but may take several weeks-months to get days-nights back to normal and see what his true new baseline will be. Is on several meds; see my note from earlier this week for details; discussed GDR eventually as able. They plan to eventually follow up with [facility] team (ex: [facility]ology, derm) or [facility] pending insurance but primary objective is now calming delirium and letting him settle in. Yogesh Victor will be able to spend much of this weekend with his dad as he settles in but adult children work so won't always be as available. Shared with them he will be followed by [facility] team on site at [facility] too.      Sinai Graham,

## 2024-05-20 ENCOUNTER — NURSING HOME VISIT (OUTPATIENT)
Dept: GERIATRICS | Facility: CLINIC | Age: 84
End: 2024-05-20

## 2024-05-20 DIAGNOSIS — I60.9 SAH (SUBARACHNOID HEMORRHAGE) (H): ICD-10-CM

## 2024-05-20 DIAGNOSIS — R29.6 FALLS FREQUENTLY: ICD-10-CM

## 2024-05-20 DIAGNOSIS — R41.0 DELIRIUM: Primary | ICD-10-CM

## 2024-05-20 PROCEDURE — 99310 SBSQ NF CARE HIGH MDM 45: CPT | Performed by: FAMILY MEDICINE

## 2024-05-20 NOTE — PROGRESS NOTES
[facility]  PRIMARY CARE PROVIDER AND CLINIC:  Ramiro Carrillo [address] [facility] / [place] [address]  No chief complaint on file.      HPI:    David Thomson is a 84 year old  (1940),admitted to the [facility]. Hospitalized 3/21-5/10/24 and admitted to [facility].Transferred from [facility] for [facility] 5/17/24    Patient had 3 falls during his few days at [facility]. Noted to have low BP, poor appetite (needs assist due arm pain) and at risk for dehydration. Entresto discontinued (had been held in the hospital)  Was taking Zyprexa and given Seroquel for sleep issues. Reported dizziness. Noted to be calmer and more directable prior to transfer here.    On presentation today he is asking for help with his lunch.  He is a poor historian - + knee and left arm pain - chronic  Denies GI or  symptoms  Eating and sleeping better here he thinks  Says he is walking without help  No interim falls documented,  Nursing is monitoring him closely      PMH currently significant for:  Recurring falls - SDH 2023  Cognitive impairment - progression  SAH/SDH/IPH/IVH 3/24  Keppra to reduce seizure risk  Afib - no anticoagulation due to falls 2024  TAVR  Delirium - recurring  Distal left humerus fracture - ORIF 11/23   - washout/hardware removal, life-long suppressive therapy with Bactrim per [facility]  PTSD - war   Left JUDE - revised 3/24  BPH  ISHAN - intolerant of CPAP  Neuropathy - idiopathis  History of ETOH abuse in the past    CAD - CABG  Anemia  Left sciatica  Borderline personality disorder  BPH  CVA  Cervical spondylosis - myelopathy  Cognitive impairment  Hereditary elliptocytosis  Hyperlipidemia  HTN  Vertigo  Dorsal column stimulator placed  Cholecystectomy          ALLERGIES:Doxepin, Lisinopril, Piroxicam, Polysorbate  [bay oil], Prazosin, Thimerosal, Urea, Zolpidem, Benzalkonium, and Capsaicin  PAST MEDICAL HISTORY:  has a past medical history of Anemia, Atrial  fibrillation (H), Bilateral low back pain with left-sided sciatica, Borderline personality disorder (H), BPH (benign prostatic hyperplasia), CAD (coronary artery disease), Cerebral artery occlusion with cerebral infarction (H), Cervical spondylosis with myelopathy, Cognitive impairment, Depression, Diarrhea, Falls frequently, Gastroesophageal reflux disease with esophagitis, Hereditary elliptocytosis (H24), Hyperlipidaemia, Hypertension, Neuropathy, ISHAN (obstructive sleep apnea), Pre-diabetes, PTSD (post-traumatic stress disorder), Recurrent falls, S/P TAVR (transcatheter aortic valve replacement), SAH (subarachnoid hemorrhage) (H) (2024), SDH (subdural hematoma) (H) (2024), Thrombocytopenia (H24), and Vertigo.  PAST SURGICAL HISTORY:  has a past surgical history that includes Gallbladder surgery; hip surgery; Wrist surgery (Bilateral); back surgery; Insert stimulator dorsal column (Right, 2016); IR Fine Needle Aspiration w Ultrasound (2023); IR Fine Needle Aspiration w Ultrasound (2023); Abdomen surgery; turp; C6-C7 ACDF (); Left C7-T1 foraminotomy  (); CABG, VEIN, THREE (); aortic valve replacement (); Arthroplasty revision hip (Left, 03/15/2023); and Humerus Fracture Surgery (2023).  FAMILY HISTORY: family history includes Heart Disease in his father and mother.  SOCIAL HISTORY:  reports that he has never smoked. He has never used smokeless tobacco. He reports that he does not currently use alcohol. He reports that he does not use drugs.  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 Served in the [employer] for 3 years and involved in combat in [place]. He had 5 children, one now  and 3 step children from  second marriage. Wife with dementia is living in [place] with the assistance of her children.     Post Discharge Medication Reconciliation Status: discharge medications reconciled and changed, per note/orders.  Current Outpatient Medications   Medication Sig Dispense Refill    acetaminophen (TYLENOL) 325 MG tablet Take 3 tablets (975 mg) by mouth every 6 hours as needed for mild pain or fever      atorvastatin (LIPITOR) 80 MG tablet Take 1 tablet (80 mg) by mouth At Bedtime 30 tablet 0    diclofenac (VOLTAREN) 1 % topical gel Apply 2 g topically 4 times daily      ferrous sulfate (FE TABS) 325 (65 Fe) MG EC tablet Take 325 mg by mouth every other day      fluticasone (FLONASE) 50 MCG/ACT nasal spray Spray 1 spray into both nostrils daily      folic acid (FOLVITE) 1 MG tablet Take 1 mg by mouth daily      levETIRAcetam (KEPPRA) 750 MG tablet Take 1 tablet (750 mg) by mouth 2 times daily      Lidocaine (LIDOCARE) 4 % Patch Place 2 patches onto the skin every 24 hours To prevent lidocaine toxicity, patient should be patch free for 12 hrs daily.      Melatonin 10 MG TABS tablet Take 10 mg by mouth at bedtime      metoprolol succinate ER (TOPROL XL) 25 MG 24 hr tablet Take 12.5 mg by mouth daily      OLANZapine (ZYPREXA) 5 MG tablet Take 2.5 mg qAM and 5 mg qPM      OLANZapine zydis (ZYPREXA) 5 MG ODT Take 1 tablet (5 mg) by mouth daily as needed for agitation      omeprazole (PRILOSEC) 20 MG DR capsule Take 20 mg by mouth 2 times daily      polyethylene glycol (MIRALAX) 17 g packet Take 17 g by mouth daily      QUEtiapine (SEROQUEL) 25 MG tablet Take 25 mg by mouth at bedtime      senna-docusate (SENOKOT-S/PERICOLACE) 8.6-50 MG tablet Take 1 tablet by mouth at bedtime      sertraline (ZOLOFT) 50 MG tablet Take 75 mg by mouth daily      sulfamethoxazole-trimethoprim (BACTRIM) 400-80 MG tablet Take 1 tablet by mouth 2 times daily Lifelong prophylaxis L elbow infection      thiamine (B-1) 100 MG tablet Take 1 tablet (100 mg)  by mouth daily         Exam:  There were no vitals taken for this visit.  116/67 97.2 72 218lb  Seated in wheelchair - glasses askew  Moist mm  Anisocoria left>right  Heart RR  No audible rales/rhonchi  Abd - NT  Ext - no edema  Moves all 4 extremities  Touch intact  Oriented to self only    Lab/Diagnostic data:     WBC Count   Date Value Ref Range Status   05/06/2024 4.7 4.0 - 11.0 10e3/uL Final   ]  Hemoglobin   Date Value Ref Range Status   05/06/2024 11.4 (L) 13.3 - 17.7 g/dL Final   04/29/2024 10.9 (L) 13.3 - 17.7 g/dL Final   ]  Last Basic Metabolic Panel:  Lab Results   Component Value Date     05/06/2024      Lab Results   Component Value Date    POTASSIUM 4.0 05/06/2024    POTASSIUM 3.7 01/17/2023    POTASSIUM 4.2 04/19/2016     Lab Results   Component Value Date    ANDREW 9.1 05/06/2024     Lab Results   Component Value Date    CO2 25 05/06/2024    CO2 25 01/17/2023     Lab Results   Component Value Date    BUN 24.3 05/06/2024    BUN 19 01/17/2023     Lab Results   Component Value Date    CR 0.91 05/06/2024    CR 0.85 04/19/2016     Lab Results   Component Value Date     05/06/2024     03/31/2024     01/17/2023     TSH   Date Value Ref Range Status   03/22/2024 1.52 0.30 - 4.20 uIU/mL Final      Lab Results   Component Value Date    A1C 5.5 01/23/2023    Liver Function Studies -   Recent Labs   Lab Test 05/06/24  0543   PROTTOTAL 6.4   ALBUMIN 3.9   BILITOTAL 0.8   ALKPHOS 102   AST 23   ALT 10      Jan 2023 TTE:  Interpretation Summary  Technically difficult study limited views obtained.  Left ventricular size, wall motion and function are normal. The ejection  fraction is 55-60%.  Right ventricular function, chamber size, wall motion, and thickness are  normal.  There is a bioprosthetic aortic valve (probably TAVR) with a mean gradient of  14 mmHg; details of the valve are unclear but this is likely within normal  limits.  IVC diameter <2.1 cm collapsing >50% with sniff suggests a  "normal RA pressure  of 3 mmHg.  No pericardial effusion is present.  There is no prior study for direct comparison.    B12 717 3/22/24  Folic acid 39.6 3/22/24      ASSESSMENT/PLAN:    Delirium  Recurring with sleep/wake disruption. Appears to be improving.  Prefer to eliminate Seroquel asap (dose dropped from 75 to 25mg at transfer) and reassess Zyprexa given age and falls  Discuss psychiatric history with family  [facility]    Falls - recurring  Suspect unsteady gate compounded by poor judgement.   Work ups for syncope/arrhythmia - Zio patch not done  Needs close supervision and assistance  PT/OT to see here    SAH/SDHx2  No longer on anticoagulation for afib/TAVR due to bleed and ongoing fall risk.  Keppra for seizure prophylaxis \"until follow up as outpatient\"  Check level  Follow up with neuro and neurosurgery  I don't see that any of that is scheduled.    Cognitive impairment  Likely progressive by history compounded by multiple medical issues. 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    PTSD - war vet  Borderline personality disorder  MDD  All complicated by delirium and cognitive impairment.  Zyprexa tapering down and perhaps to off eventually - as above. ?ongoing antipsychotic therapy mecessary  Followed by [facility] in the past  Continue Sertraline  ACP if needed    BCCA  Needs future MOHS procedure    Infected right distal humerus ORIF  Hardware removed  Chronic suppressive antibiotics - confirm Bactrim 400/80 BID long-term  Support follow up with [facility] to confirm plan    Coronary artery disease involving native coronary artery of native heart without angina pectoris  S/P CABG (coronary artery bypass graft)  Chronic HFpEF 55% 1/23  H/O aortic valve replacement  Atrial fibrillation  LBBB   Currently stable " without anticoagulation and off Entresto.   High does statin, beta blocker    BPH  History of urinary retention  Check PVR if agitated    Information reviewed:  Medications, vital signs, orders, nursing notes, problem list, hospital information. Facility MDS and care plan reviewed.   Spoke to ally Victor by phone 10m      Total time 60m     Electronically signed by:  Sally Lindsay MD

## 2024-05-20 NOTE — TELEPHONE ENCOUNTER
Patient Contact    Attempt # 2 to reach Son, Castro Olivares (Consent on file has his number on it)     We do have an authorization from [facility] for Mohs surgery on L temple in chart.    Biopsy is found on page 8 of 5-14-24 4:56 am scan of [facility] records we received.     Was call answered?  No.    Called patient. No answer. Left message to call back. Clinic number was provided.     Jane Quiñones RN    [facility]   [phone]

## 2024-05-20 NOTE — LETTER
5/20/2024        RE: David Thomson  [facility]  [address] [facility] [address]        [facility]  PRIMARY CARE PROVIDER AND CLINIC:  Ramiro Carrillo [address] [facility] / [place] [address]  No chief complaint on file.      HPI:    David Thomson is a 84 year old  (1940),admitted to the [facility]. Hospitalized 3/21-5/10/24 and admitted to [facility].Transferred from [facility] for [facility] coverage 5/17/24    Patient had 3 falls during his few days at [facility]. Noted to have low BP, poor appetite (needs assist due arm pain) and at risk for dehydration. Entresto discontinued (had been held in the hospital)  Was taking Zyprexa and given Seroquel for sleep issues. Reported dizziness. Noted to be calmer and more directable prior to transfer here.    On presentation today he is asking for help with his lunch.  He is a poor historian - + knee and left arm pain - chronic  Denies GI or  symptoms  Eating and sleeping better here he thinks  Says he is walking without help  No interim falls documented,  Nursing is monitoring him closely      PMH currently significant for:  Recurring falls - SDH 2023  Cognitive impairment - progression  SAH/SDH/IPH/IVH 3/24  Keppra to reduce seizure risk  Afib - no anticoagulation due to falls 2024  TAVR  Delirium - recurring  Distal left humerus fracture - ORIF 11/23   - washout/hardware removal, life-long suppressive therapy with Bactrim per [facility]  PTSD - war   Left JUDE - revised 3/24  BPH  ISHAN - intolerant of CPAP  Neuropathy - idiopathis  History of ETOH abuse in the past    CAD - CABG  Anemia  Left sciatica  Borderline personality disorder  BPH  CVA  Cervical spondylosis - myelopathy  Cognitive impairment  Hereditary elliptocytosis  Hyperlipidemia  HTN  Vertigo  Dorsal column stimulator placed  Cholecystectomy          ALLERGIES:Doxepin, Lisinopril, Piroxicam, Polysorbate  [bay oil], Prazosin,  Thimerosal, Urea, Zolpidem, Benzalkonium, and Capsaicin  PAST MEDICAL HISTORY:  has a past medical history of Anemia, Atrial fibrillation (H), Bilateral low back pain with left-sided sciatica, Borderline personality disorder (H), BPH (benign prostatic hyperplasia), CAD (coronary artery disease), Cerebral artery occlusion with cerebral infarction (H), Cervical spondylosis with myelopathy, Cognitive impairment, Depression, Diarrhea, Falls frequently, Gastroesophageal reflux disease with esophagitis, Hereditary elliptocytosis (H24), Hyperlipidaemia, Hypertension, Neuropathy, ISHAN (obstructive sleep apnea), Pre-diabetes, PTSD (post-traumatic stress disorder), Recurrent falls, S/P TAVR (transcatheter aortic valve replacement), SAH (subarachnoid hemorrhage) (H) (2024), SDH (subdural hematoma) (H) (2024), Thrombocytopenia (H24), and Vertigo.  PAST SURGICAL HISTORY:  has a past surgical history that includes Gallbladder surgery; hip surgery; Wrist surgery (Bilateral); back surgery; Insert stimulator dorsal column (Right, 2016); IR Fine Needle Aspiration w Ultrasound (2023); IR Fine Needle Aspiration w Ultrasound (2023); Abdomen surgery; turp; C6-C7 ACDF (); Left C7-T1 foraminotomy  (); CABG, VEIN, THREE (); aortic valve replacement (); Arthroplasty revision hip (Left, 03/15/2023); and Humerus Fracture Surgery (2023).  FAMILY HISTORY: family history includes Heart Disease in his father and mother.  SOCIAL HISTORY:  reports that he has never smoked. He has never used smokeless tobacco. He reports that he does not currently use alcohol. He reports that he does not use drugs.  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  Current Outpatient Medications   Medication Sig Dispense Refill     acetaminophen (TYLENOL) 325 MG tablet Take 3 tablets (975 mg) by mouth every 6 hours as needed for mild pain or fever       atorvastatin (LIPITOR) 80 MG tablet Take 1 tablet (80 mg) by mouth At Bedtime 30 tablet 0     diclofenac (VOLTAREN) 1 % topical gel Apply 2 g topically 4 times daily       ferrous sulfate (FE TABS) 325 (65 Fe) MG EC tablet Take 325 mg by mouth every other day       fluticasone (FLONASE) 50 MCG/ACT nasal spray Spray 1 spray into both nostrils daily       folic acid (FOLVITE) 1 MG tablet Take 1 mg by mouth daily       levETIRAcetam (KEPPRA) 750 MG tablet Take 1 tablet (750 mg) by mouth 2 times daily       Lidocaine (LIDOCARE) 4 % Patch Place 2 patches onto the skin every 24 hours To prevent lidocaine toxicity, patient should be patch free for 12 hrs daily.       Melatonin 10 MG TABS tablet Take 10 mg by mouth at bedtime       metoprolol succinate ER (TOPROL XL) 25 MG 24 hr tablet Take 12.5 mg by mouth daily       OLANZapine (ZYPREXA) 5 MG tablet Take 2.5 mg qAM and 5 mg qPM       OLANZapine zydis (ZYPREXA) 5 MG ODT Take 1 tablet (5 mg) by mouth daily as needed for agitation       omeprazole (PRILOSEC) 20 MG DR capsule Take 20 mg by mouth 2 times daily       polyethylene glycol (MIRALAX) 17 g packet Take 17 g by mouth daily       QUEtiapine (SEROQUEL) 25 MG tablet Take 25 mg by mouth at bedtime       senna-docusate (SENOKOT-S/PERICOLACE) 8.6-50 MG tablet Take 1 tablet by mouth at bedtime       sertraline (ZOLOFT) 50 MG tablet Take 75 mg by mouth daily       sulfamethoxazole-trimethoprim (BACTRIM) 400-80 MG tablet Take 1 tablet by mouth 2 times daily Lifelong prophylaxis L elbow infection       thiamine (B-1) 100 MG tablet Take 1 tablet (100 mg) by mouth daily         Exam:  There were no vitals taken for this visit.  116/67 97.2 72 218lb  Seated in wheelchair - glasses askew  Moist mm  Anisocoria left>right  Heart  RR  No audible rales/rhonchi  Abd - NT  Ext - no edema  Moves all 4 extremities  Touch intact  Oriented to self only    Lab/Diagnostic data:     WBC Count   Date Value Ref Range Status   05/06/2024 4.7 4.0 - 11.0 10e3/uL Final   ]  Hemoglobin   Date Value Ref Range Status   05/06/2024 11.4 (L) 13.3 - 17.7 g/dL Final   04/29/2024 10.9 (L) 13.3 - 17.7 g/dL Final   ]  Last Basic Metabolic Panel:  Lab Results   Component Value Date     05/06/2024      Lab Results   Component Value Date    POTASSIUM 4.0 05/06/2024    POTASSIUM 3.7 01/17/2023    POTASSIUM 4.2 04/19/2016     Lab Results   Component Value Date    ANDREW 9.1 05/06/2024     Lab Results   Component Value Date    CO2 25 05/06/2024    CO2 25 01/17/2023     Lab Results   Component Value Date    BUN 24.3 05/06/2024    BUN 19 01/17/2023     Lab Results   Component Value Date    CR 0.91 05/06/2024    CR 0.85 04/19/2016     Lab Results   Component Value Date     05/06/2024     03/31/2024     01/17/2023     TSH   Date Value Ref Range Status   03/22/2024 1.52 0.30 - 4.20 uIU/mL Final      Lab Results   Component Value Date    A1C 5.5 01/23/2023    Liver Function Studies -   Recent Labs   Lab Test 05/06/24  0543   PROTTOTAL 6.4   ALBUMIN 3.9   BILITOTAL 0.8   ALKPHOS 102   AST 23   ALT 10      Jan 2023 TTE:  Interpretation Summary  Technically difficult study limited views obtained.  Left ventricular size, wall motion and function are normal. The ejection  fraction is 55-60%.  Right ventricular function, chamber size, wall motion, and thickness are  normal.  There is a bioprosthetic aortic valve (probably TAVR) with a mean gradient of  14 mmHg; details of the valve are unclear but this is likely within normal  limits.  IVC diameter <2.1 cm collapsing >50% with sniff suggests a normal RA pressure  of 3 mmHg.  No pericardial effusion is present.  There is no prior study for direct comparison.    B12 717 3/22/24  Folic acid 39.6  "3/22/24      ASSESSMENT/PLAN:    Delirium  Recurring with sleep/wake disruption. Appears to be improving.  Prefer to eliminate Seroquel asap (dose dropped from 75 to 25mg at transfer) and reassess Zyprexa given age and falls  Discuss psychiatric history with family  [facility]    Falls - recurring  Suspect unsteady gate compounded by poor judgement.   Work ups for syncope/arrhythmia - Zio patch not done  Needs close supervision and assistance  PT/OT to see here    SAH/SDHx2  No longer on anticoagulation for afib/TAVR due to bleed and ongoing fall risk.  Keppra for seizure prophylaxis \"until follow up as outpatient\"  Check level  Follow up with neuro and neurosurgery  I don't see that any of that is scheduled.    Cognitive impairment  Likely progressive by history compounded by multiple medical issues. 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    PTSD - war vet  Borderline personality disorder  MDD  All complicated by delirium and cognitive impairment.  Zyprexa tapering down and perhaps to off eventually - as above. ?ongoing antipsychotic therapy mecessary  [facility] in [facility]  Continue Sertraline  ACP if needed    BCCA  Needs future MOHS procedure    Infected right distal humerus ORIF  Hardware removed  Chronic suppressive antibiotics - confirm Bactrim 400/80 BID long-term  Support follow up with [facility] to confirm plan    Coronary artery disease involving native coronary artery of native heart without angina pectoris  S/P CABG (coronary artery bypass graft)  Chronic HFpEF 55% 1/23  H/O aortic valve replacement  Atrial fibrillation  LBBB   Currently stable without anticoagulation and off Entresto.   High does statin, beta blocker    BPH  History of urinary retention  Check PVR if agitated    Information " reviewed:  Medications, vital signs, orders, nursing notes, problem list, hospital information. Facility MDS and care plan reviewed.   Spoke to ally Victor by phone 10m      Total time 60m     Electronically signed by:  Sally Lindsay MD               [facility]  PRIMARY CARE PROVIDER AND CLINIC:  Ramiro Carrillo [address] [facility] / [place] [address]  No chief complaint on file.      HPI:    David Thomson is a 84 year old  (1940),admitted to the [facility]. Hospitalized 3/21-5/10/24 and admitted to [facility].Transferred from [facility] for [facility] coverage 5/17/24    Patient had 3 falls during his few days at [facility]. Noted to have low BP, poor appetite (needs assist due arm pain) and at risk for dehydration. Entresto discontinued (had been held in the hospital)  Was taking Zyprexa and given Seroquel for sleep issues. Reported dizziness. Noted to be calmer and more directable prior to transfer here.    On presentation today he is asking for help with his lunch.  He is a poor historian - + knee and left arm pain - chronic  Denies GI or  symptoms  Eating and sleeping better here he thinks  Says he is walking without help  No interim falls documented,  Nursing is monitoring him closely      PMH currently significant for:  Recurring falls - SDH 2023  Cognitive impairment - progression  SAH/SDH/IPH/IVH 3/24  Keppra to reduce seizure risk  Afib - no anticoagulation due to falls 2024  TAVR  Delirium - recurring  Distal left humerus fracture - ORIF 11/23   - washout/hardware removal, life-long suppressive therapy with Bactrim per [facility]  PTSD - war   Left JUDE - revised 3/24  BPH  ISHAN - intolerant of CPAP  Neuropathy - idiopathis  History of ETOH abuse in the past    CAD - CABG  Anemia  Left sciatica  Borderline personality disorder  BPH  CVA  Cervical spondylosis - myelopathy  Cognitive impairment  Hereditary elliptocytosis  Hyperlipidemia  HTN  Vertigo  Dorsal  column stimulator placed  Cholecystectomy          ALLERGIES:Doxepin, Lisinopril, Piroxicam, Polysorbate  [bay oil], Prazosin, Thimerosal, Urea, Zolpidem, Benzalkonium, and Capsaicin  PAST MEDICAL HISTORY:  has a past medical history of Anemia, Atrial fibrillation (H), Bilateral low back pain with left-sided sciatica, Borderline personality disorder (H), BPH (benign prostatic hyperplasia), CAD (coronary artery disease), Cerebral artery occlusion with cerebral infarction (H), Cervical spondylosis with myelopathy, Cognitive impairment, Depression, Diarrhea, Falls frequently, Gastroesophageal reflux disease with esophagitis, Hereditary elliptocytosis (H24), Hyperlipidaemia, Hypertension, Neuropathy, ISHAN (obstructive sleep apnea), Pre-diabetes, PTSD (post-traumatic stress disorder), Recurrent falls, S/P TAVR (transcatheter aortic valve replacement), SAH (subarachnoid hemorrhage) (H) (2024), SDH (subdural hematoma) (H) (2024), Thrombocytopenia (H24), and Vertigo.  PAST SURGICAL HISTORY:  has a past surgical history that includes Gallbladder surgery; hip surgery; Wrist surgery (Bilateral); back surgery; Insert stimulator dorsal column (Right, 2016); IR Fine Needle Aspiration w Ultrasound (2023); IR Fine Needle Aspiration w Ultrasound (2023); Abdomen surgery; turp; C6-C7 ACDF (); Left C7-T1 foraminotomy  (); CABG, VEIN, THREE (); aortic valve replacement (); Arthroplasty revision hip (Left, 03/15/2023); and Humerus Fracture Surgery (2023).  FAMILY HISTORY: family history includes Heart Disease in his father and mother.  SOCIAL HISTORY:  reports that he has never smoked. He has never used smokeless tobacco. He reports that he does not currently use alcohol. He reports that he does not use drugs.  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 Served in the [employer] for 3 years and involved in combat in [place]. He had 5 children, one now  and 3 step children from second marriage. Wife with dementia is living in [place] with the assistance of her children.     Post Discharge Medication Reconciliation Status: discharge medications reconciled and changed, per note/orders.  Current Outpatient Medications   Medication Sig Dispense Refill     acetaminophen (TYLENOL) 325 MG tablet Take 3 tablets (975 mg) by mouth every 6 hours as needed for mild pain or fever       atorvastatin (LIPITOR) 80 MG tablet Take 1 tablet (80 mg) by mouth At Bedtime 30 tablet 0     diclofenac (VOLTAREN) 1 % topical gel Apply 2 g topically 4 times daily       ferrous sulfate (FE TABS) 325 (65 Fe) MG EC tablet Take 325 mg by mouth every other day       fluticasone (FLONASE) 50 MCG/ACT nasal spray Spray 1 spray into both nostrils daily       folic acid (FOLVITE) 1 MG tablet Take 1 mg by mouth daily       levETIRAcetam (KEPPRA) 750 MG tablet Take 1 tablet (750 mg) by mouth 2 times daily       Lidocaine (LIDOCARE) 4 % Patch Place 2 patches onto the skin every 24 hours To prevent lidocaine toxicity, patient should be patch free for 12 hrs daily.       Melatonin 10 MG TABS tablet Take 10 mg by mouth at bedtime       metoprolol succinate ER (TOPROL XL) 25 MG 24 hr tablet Take 12.5 mg by mouth daily       OLANZapine (ZYPREXA) 5 MG tablet Take 2.5 mg qAM and 5 mg qPM       OLANZapine zydis (ZYPREXA) 5 MG ODT Take 1 tablet (5 mg) by mouth daily as needed for agitation       omeprazole (PRILOSEC) 20 MG DR capsule Take 20 mg by mouth 2 times daily       polyethylene glycol (MIRALAX) 17 g packet Take 17 g by mouth daily       QUEtiapine (SEROQUEL) 25 MG tablet Take 25 mg by mouth at bedtime       senna-docusate (SENOKOT-S/PERICOLACE) 8.6-50 MG tablet Take 1 tablet by mouth at  bedtime       sertraline (ZOLOFT) 50 MG tablet Take 75 mg by mouth daily       sulfamethoxazole-trimethoprim (BACTRIM) 400-80 MG tablet Take 1 tablet by mouth 2 times daily Lifelong prophylaxis L elbow infection       thiamine (B-1) 100 MG tablet Take 1 tablet (100 mg) by mouth daily         Exam:  There were no vitals taken for this visit.  116/67 97.2 72 218lb  Seated in wheelchair - glasses askew  Moist mm  Anisocoria left>right  Heart RR  No audible rales/rhonchi  Abd - NT  Ext - no edema  Moves all 4 extremities  Touch intact  Oriented to self only    Lab/Diagnostic data:     WBC Count   Date Value Ref Range Status   05/06/2024 4.7 4.0 - 11.0 10e3/uL Final   ]  Hemoglobin   Date Value Ref Range Status   05/06/2024 11.4 (L) 13.3 - 17.7 g/dL Final   04/29/2024 10.9 (L) 13.3 - 17.7 g/dL Final   ]  Last Basic Metabolic Panel:  Lab Results   Component Value Date     05/06/2024      Lab Results   Component Value Date    POTASSIUM 4.0 05/06/2024    POTASSIUM 3.7 01/17/2023    POTASSIUM 4.2 04/19/2016     Lab Results   Component Value Date    ANDREW 9.1 05/06/2024     Lab Results   Component Value Date    CO2 25 05/06/2024    CO2 25 01/17/2023     Lab Results   Component Value Date    BUN 24.3 05/06/2024    BUN 19 01/17/2023     Lab Results   Component Value Date    CR 0.91 05/06/2024    CR 0.85 04/19/2016     Lab Results   Component Value Date     05/06/2024     03/31/2024     01/17/2023     TSH   Date Value Ref Range Status   03/22/2024 1.52 0.30 - 4.20 uIU/mL Final      Lab Results   Component Value Date    A1C 5.5 01/23/2023    Liver Function Studies -   Recent Labs   Lab Test 05/06/24  0543   PROTTOTAL 6.4   ALBUMIN 3.9   BILITOTAL 0.8   ALKPHOS 102   AST 23   ALT 10      Jan 2023 TTE:  Interpretation Summary  Technically difficult study limited views obtained.  Left ventricular size, wall motion and function are normal. The ejection  fraction is 55-60%.  Right ventricular function, chamber  "size, wall motion, and thickness are  normal.  There is a bioprosthetic aortic valve (probably TAVR) with a mean gradient of  14 mmHg; details of the valve are unclear but this is likely within normal  limits.  IVC diameter <2.1 cm collapsing >50% with sniff suggests a normal RA pressure  of 3 mmHg.  No pericardial effusion is present.  There is no prior study for direct comparison.    B12 717 3/22/24  Folic acid 39.6 3/22/24      ASSESSMENT/PLAN:    Delirium  Recurring with sleep/wake disruption. Appears to be improving.  Prefer to eliminate Seroquel asap (dose dropped from 75 to 25mg at transfer) and reassess Zyprexa given age and falls  Discuss psychiatric history with family  [facility] in [facility]    Falls - recurring  Suspect unsteady gate compounded by poor judgement.   Work ups for syncope/arrhythmia - Zio patch not done  Needs close supervision and assistance  PT/OT to see here    SAH/SDHx2  No longer on anticoagulation for afib/TAVR due to bleed and ongoing fall risk.  Keppra for seizure prophylaxis \"until follow up as outpatient\"  Check level  Follow up with neuro and neurosurgery  I don't see that any of that is scheduled.    Cognitive impairment  Likely progressive by history compounded by multiple medical issues. 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    PTSD - war vet  Borderline personality disorder  MDD  All complicated by delirium and cognitive impairment.  Zyprexa tapering down and perhaps to off eventually - as above. ?ongoing antipsychotic therapy mecessary  Follow[facility] by [facility] in [facility]  Continue Sertraline  ACP if needed    BCCA  Needs future MOHS procedure    Infected right distal humerus ORIF  Hardware removed  Chronic suppressive antibiotics - confirm Bactrim 400/80 BID long-term  Support follow up " with [facility] to confirm plan    Coronary artery disease involving native coronary artery of native heart without angina pectoris  S/P CABG (coronary artery bypass graft)  Chronic HFpEF 55% 1/23  H/O aortic valve replacement  Atrial fibrillation  LBBB   Currently stable without anticoagulation and off Entresto.   High does statin, beta blocker    BPH  History of urinary retention  Check PVR if agitated    Information reviewed:  Medications, vital signs, orders, nursing notes, problem list, hospital information. Facility MDS and care plan reviewed.   Spoke to ally Victor by phone 10m      Total time 60m     Electronically signed by:  Sally Lindsay MD                  Sincerely,        Sally Lindsay MD

## 2024-05-22 NOTE — TELEPHONE ENCOUNTER
Patient was seen at facility he resides in on 5-20-24 per chart review, he is having frequent falls/delirium and had a subarachnoid hemorrhage.       Patient Contact    Attempt # 3    Was call answered?  No.    Called patient. No answer. Left message to call back. Clinic number was provided.     Jane Quiñones RN    [facility]   [phone]

## 2024-05-23 NOTE — TELEPHONE ENCOUNTER
"I tried 2nd listed number in chart, but was told by party who answered that she was David's wife, \"But we don't live together and he is in a facility\" and to please remove her number from his list of contacts. She did not state they were  or .     I then looked up phone number for [facility] where he is listed to be residing.     The staff there gave me another Son's number and stated \"His sonValente is the responsible party for David. [phone] is [facility]Val[facility].           Spoke to SonValente, he has a Health care POA for patient which is [facility] under [facility], that I viewed. He will \"discuss this with my brother and will call you back\"    Jane Quiñones RN    "

## 2024-05-28 NOTE — TELEPHONE ENCOUNTER
Spoke to Son, Valente.     He wants to discuss this with his Brother and they will call us back and let us know what they want to do as far as whether or not they want to schedule Mohs surgery.    See 5-14-24 4:57 am scanned authorization in chart under media tab.       Biopsy is found on page 8 of 5-14-24 4:56 am scan of [facility] records we received.     Jane Quiñones RN

## 2024-06-03 NOTE — TELEPHONE ENCOUNTER
"Spoke to SonValente.     \"We are going to do that and we just need to schedule it and deal with setting up transportation later...\"     Scheduled to be seen July 1 st and advised patient will need someone to accompany him as chart shows he is a cognitive issues/ fall hx and we are not staffed to assist with individual needs.     Son stated: \"We are going to try and get someone to come with him..\"     I again advised patient will need someone to sit with him while he waits for tissue to process which can be a couple hours at a time as we are not staffed to assist with individual needs and patient resides in a nursing home and has noted cognitive issues...     SonValente verbalized understanding and asked that Pre op information be sent to patient's facility-[facility] via both fax and us mail.    Jane Quiñones RN  .       "

## 2024-06-11 ENCOUNTER — NURSING HOME VISIT (OUTPATIENT)
Dept: GERIATRICS | Facility: CLINIC | Age: 84
End: 2024-06-11
Payer: COMMERCIAL

## 2024-06-11 VITALS
BODY MASS INDEX: 29.61 KG/M2 | SYSTOLIC BLOOD PRESSURE: 106 MMHG | TEMPERATURE: 97.4 F | HEIGHT: 73 IN | OXYGEN SATURATION: 97 % | HEART RATE: 64 BPM | RESPIRATION RATE: 18 BRPM | WEIGHT: 223.4 LBS | DIASTOLIC BLOOD PRESSURE: 52 MMHG

## 2024-06-11 DIAGNOSIS — R29.6 FALLS FREQUENTLY: Primary | ICD-10-CM

## 2024-06-11 DIAGNOSIS — R33.8 BENIGN PROSTATIC HYPERPLASIA WITH URINARY RETENTION: ICD-10-CM

## 2024-06-11 DIAGNOSIS — N40.1 BENIGN PROSTATIC HYPERPLASIA WITH URINARY RETENTION: ICD-10-CM

## 2024-06-11 PROCEDURE — 99308 SBSQ NF CARE LOW MDM 20: CPT

## 2024-06-11 NOTE — LETTER
6/11/2024      David Thomson  [facility] [place]  [address]  [place] [address]        No notes on file      Sincerely,        ANA LILIA Lyles CNP

## 2024-06-11 NOTE — PROGRESS NOTES
[facility] GERIATRICS  Chief Complaint   Patient presents with    [facility] Regulatory     [place] Medical Record Number:  [id number]  Place of Service where encounter took place:  [facility] () [[id number]]    HPI:    David Thomson  is 84 year old (1940), who is being seen today for a federally mandated E/M visit. Today's concerns are: Fall    Patient recently admitted to the facility from [facility], [place].    Today: Patient seen and examined up in chair in the common areas.  History limited. Denies any physical concerns.  Course reviewed with staff.  Has had multiple falls since admitted to the facility.  No major injuries.  Hemodynamically stable.  No reports of chest pain or shortness of breath.    ALLERGIES:Doxepin, Lisinopril, Piroxicam, Polysorbate  [bay oil], Prazosin, Thimerosal, Urea, Zolpidem, Benzalkonium, and Capsaicin  PAST MEDICAL HISTORY:   Past Medical History:   Diagnosis Date    Anemia     Atrial fibrillation (H)     Bilateral low back pain with left-sided sciatica     Borderline personality disorder (H)     BPH (benign prostatic hyperplasia)     CAD (coronary artery disease)     Cerebral artery occlusion with cerebral infarction (H)     Cervical spondylosis with myelopathy     Cognitive impairment     Depression     Diarrhea     Falls frequently     Gastroesophageal reflux disease with esophagitis     Hereditary elliptocytosis (H24)     Hyperlipidaemia     Hypertension     Neuropathy     ISHAN (obstructive sleep apnea)     Pre-diabetes     PTSD (post-traumatic stress disorder)     Recurrent falls     S/P TAVR (transcatheter aortic valve replacement)     SAH (subarachnoid hemorrhage) (H) 03/2024    SDH (subdural hematoma) (H) 03/2024    Thrombocytopenia (H24)     Vertigo      PAST SURGICAL HISTORY:   has a past surgical history that includes Gallbladder surgery; hip surgery; Wrist surgery (Bilateral); back surgery; Insert stimulator dorsal column (Right,  04/19/2016); IR Fine Needle Aspiration w Ultrasound (01/23/2023); IR Fine Needle Aspiration w Ultrasound (01/26/2023); Abdomen surgery; turp; C6-C7 ACDF (2005); Left C7-T1 foraminotomy  (2015); CABG, VEIN, THREE (2015); aortic valve replacement (2015); Arthroplasty revision hip (Left, 03/15/2023); and Humerus Fracture Surgery (11/2023).  FAMILY HISTORY: family history includes Heart Disease in his father and mother.  SOCIAL HISTORY:  reports that he has never smoked. He has never used smokeless tobacco. He reports that he does not currently use alcohol. He reports that he does not use drugs.    MEDICATIONS:  MED REC REQUIRED  Post Medication Reconciliation Status: discharge medications reconciled, continue medications without change         Review of your medicines            Accurate as of June 11, 2024 11:04 AM. If you have any questions, ask your nurse or doctor.                CONTINUE these medicines which have NOT CHANGED        Dose / Directions   acetaminophen 325 MG tablet  Commonly known as: TYLENOL  Used for: Other chronic pain      Dose: 975 mg  Take 3 tablets (975 mg) by mouth every 6 hours as needed for mild pain or fever  Refills: 0     atorvastatin 80 MG tablet  Commonly known as: LIPITOR  Used for: S/P CABG (coronary artery bypass graft)      Dose: 80 mg  Take 1 tablet (80 mg) by mouth At Bedtime  Quantity: 30 tablet  Refills: 0     diclofenac 1 % topical gel  Commonly known as: VOLTAREN  Used for: Hip pain, left      Dose: 2 g  Apply 2 g topically 4 times daily  Refills: 0     ferrous sulfate 325 (65 Fe) MG EC tablet  Commonly known as: FE TABS      Dose: 325 mg  Take 325 mg by mouth every other day  Refills: 0     fluticasone 50 MCG/ACT nasal spray  Commonly known as: FLONASE      Dose: 1 spray  Spray 1 spray into both nostrils daily  Refills: 0     folic acid 1 MG tablet  Commonly known as: FOLVITE      Dose: 1 mg  Take 1 mg by mouth daily  Refills: 0     levETIRAcetam 750 MG tablet  Commonly  known as: KEPPRA  Used for: Nontraumatic subdural hemorrhage (H)      Dose: 750 mg  Take 1 tablet (750 mg) by mouth 2 times daily  Refills: 0     Lidocaine 4 % Patch  Commonly known as: LIDOCARE  Used for: Hip pain, left      Dose: 2 patch  Place 2 patches onto the skin every 24 hours To prevent lidocaine toxicity, patient should be patch free for 12 hrs daily.  Refills: 0     Melatonin 10 MG Tabs tablet      Dose: 10 mg  Take 10 mg by mouth at bedtime  Refills: 0     metoprolol succinate ER 25 MG 24 hr tablet  Commonly known as: TOPROL XL      Dose: 12.5 mg  Take 12.5 mg by mouth daily  Refills: 0     * OLANZapine zydis 5 MG ODT  Commonly known as: zyPREXA  Used for: Agitation      Dose: 5 mg  Take 1 tablet (5 mg) by mouth daily as needed for agitation  Refills: 0     * OLANZapine 5 MG tablet  Commonly known as: zyPREXA      Take 2.5 mg qAM and 5 mg qPM  Refills: 0     omeprazole 20 MG DR capsule  Commonly known as: PriLOSEC      Dose: 20 mg  Take 20 mg by mouth 2 times daily  Refills: 0     polyethylene glycol 17 g packet  Commonly known as: MIRALAX      Dose: 17 g  Take 17 g by mouth daily  Refills: 0     QUEtiapine 25 MG tablet  Commonly known as: SEROquel      Dose: 25 mg  Take 25 mg by mouth at bedtime  Refills: 0     senna-docusate 8.6-50 MG tablet  Commonly known as: SENOKOT-S/PERICOLACE  Used for: Constipation, unspecified constipation type      Dose: 1 tablet  Take 1 tablet by mouth at bedtime  Refills: 0     sertraline 50 MG tablet  Commonly known as: ZOLOFT      Dose: 75 mg  Take 75 mg by mouth daily  Refills: 0     sulfamethoxazole-trimethoprim 400-80 MG tablet  Commonly known as: BACTRIM      Dose: 1 tablet  Take 1 tablet by mouth 2 times daily Lifelong prophylaxis L elbow infection  Refills: 0     thiamine 100 MG tablet  Commonly known as: B-1  Used for: Hip pain, left      Dose: 100 mg  Take 1 tablet (100 mg) by mouth daily  Refills: 0           * This list has 2 medication(s) that are the same as  "other medications prescribed for you. Read the directions carefully, and ask your doctor or other care provider to review them with you.                   Case Management:  I have reviewed the care plan and MDS and do agree with the plan. Patient's desire to return to the community is not assessible due to cognitive impairment. Information reviewed:  Medications, vital signs, orders, and nursing notes.    ROS:  Limited secondary to cognitive impairment but today pt reports left shoulder pain    Vitals:  /52   Pulse 64   Temp 97.4  F (36.3  C)   Resp 18   Ht 1.854 m (6' 1\")   Wt 101.3 kg (223 lb 6.4 oz)   SpO2 97%   BMI 29.47 kg/m    Body mass index is 29.47 kg/m .    Exam:  GENERAL APPEARANCE: No acute distress   RESPIRATORY: Clear to auscultation, even and unlabored, symmetrical chest wall expansion  CARDIOVASCULAR: RRR, no peripheral edema, S1/S2 normal  GASTROINTESTINAL: Soft, nontender, nondistended, bowel sounds present x4 quadrants   NEUROLOGICAL: Oriented to self only, memory loss, confusion   PSYCHOLOGICAL: Calm    Lab/Diagnostic data:   Recent labs in [facility] reviewed by me today.     ASSESSMENT/PLAN:  Repeated falls  -Likely complicated by underlying cognitive impairment  -Fall precautions, PT as needed    BPH with history of urinary retention  -Incontinent, no  symptoms  -PVR as needed, monitor for signs of retention         Electronically signed by:  Ramiro Carrillo,JOE,APRN,AGNP-BC.             The above note was completed in part using Dragon voice recognition software. Although reviewed after completion, some word and grammatical errors may occur. Please contact the author of this note with any questions.           "

## 2024-07-02 ENCOUNTER — TELEPHONE (OUTPATIENT)
Dept: GERIATRICS | Facility: CLINIC | Age: 84
End: 2024-07-02
Payer: COMMERCIAL

## 2024-07-02 NOTE — TELEPHONE ENCOUNTER
[facility] Geriatrics Triage Nurse Telephone Encounter    Provider: ANA LILIA Rodriguez CNP  Facility: [facility] [place], operated by [facility] Facility Type:  [facility]    Caller: Lashay  Call Back Number: [phone]    Allergies:    Allergies   Allergen Reactions    Doxepin      Other Reaction(s): Disorientated    Lisinopril Cough    Piroxicam Hives     Other Reaction(s): Eruption of skin    Polysorbate  [Bay Oil]     Prazosin Other (See Comments)     Nightmares    Thimerosal     Urea Other (See Comments)     Eruption of skin    Other Reaction(s): Eruption of skin    Zolpidem      Other Reaction(s): Delirium    Benzalkonium Rash    Capsaicin Rash        Reason for call: Patient was transferring self in the bathroom and had an unwitnessed fall.  He denied hitting his head.  Initial BP was 97/55, but later came up to 102/68.  Patient denied pain/weakness/dizziness.  Neuros are per baseline.    Verbal Order/Direction given by Provider: Continue to monitor per protocol and update with any changes.      Provider giving Order:  ANA LILIA Rodriguez CNP    Verbal Order given to: Lashay Spencer RN

## 2024-07-15 ENCOUNTER — NURSING HOME VISIT (OUTPATIENT)
Dept: GERIATRICS | Facility: CLINIC | Age: 84
End: 2024-07-15
Payer: COMMERCIAL

## 2024-07-15 DIAGNOSIS — I25.10 CORONARY ARTERY DISEASE INVOLVING NATIVE CORONARY ARTERY OF NATIVE HEART WITHOUT ANGINA PECTORIS: ICD-10-CM

## 2024-07-15 DIAGNOSIS — F43.10 PTSD (POST-TRAUMATIC STRESS DISORDER): ICD-10-CM

## 2024-07-15 DIAGNOSIS — R41.89 COGNITIVE IMPAIRMENT: ICD-10-CM

## 2024-07-15 DIAGNOSIS — F60.3 BORDERLINE PERSONALITY DISORDER (H): ICD-10-CM

## 2024-07-15 DIAGNOSIS — R29.6 FALLS FREQUENTLY: Primary | ICD-10-CM

## 2024-07-15 PROCEDURE — 99309 SBSQ NF CARE MODERATE MDM 30: CPT | Performed by: FAMILY MEDICINE

## 2024-07-15 NOTE — LETTER
7/15/2024      David Thomson  [facility]  [address]  [facility] [address]        [facility] GERIATRIC SERVICES    No chief complaint on file.      [facility] Medical Record Number:  [id number]  Place of Service where encounter took place:  No question data found.    HPI:    David Thomson is a 84 year old  (1940), who is being seen today for an episodic care visit.   [facility]    Determined to be an elopement risk and transferred to the locked dementia unit.  Nurse on today does not know him well - stable    He is a limited historian. Reports that his arm has healed OK. He is tired. His goal is to return to his home.  SW has contacted family about consideration of transition to comfort care status because of lack of progress in improving function - 6/24    He has had multiple falls - felt due to poor judgement and unstable gait/weakness. Last was 7/8 without significant injury.  PT notes indicate he needs maximum assistance to stand.    Today he reports left knee pain (chronic).  No HA, no vision change, no chest pain or SOB. Thinks he is sleeping and eating well. No dizziness    PMH currently significant for:  Recurring falls - SDH 2023  Cognitive impairment - progression 2/24 SLUMS 21/30 MOCA 15/30  SAH/SDH/IPH/IVH/occipital fracture 3/24  Keppra to reduce seizure risk  Afib - no anticoagulation due to falls 2024  TAVR  Delirium - recurring  Distal left humerus fracture - ORIF 11/23   - washout/hardware removal, life-long suppressive therapy with Bactrim per [facility]  PTSD - war   Left JUDE - revised 3/24  BPH  ISHAN - intolerant of CPAP  Neuropathy - idiopathic  History of ETOH abuse in the past     CAD - CABG  Anemia  Left sciatica  Borderline personality disorder  BPH  CVA  Cervical spondylosis - myelopathy  Cognitive impairment  Hereditary elliptocytosis  Hyperlipidemia  HTN  Vertigo  Dorsal column stimulator  placed  Cholecystectomy  Anisocoria            ALLERGIES: Doxepin, Lisinopril, Piroxicam, Polysorbate  [bay oil], Prazosin, Thimerosal, Urea, Zolpidem, Benzalkonium, and Capsaicin  Past Medical, Surgical, Family and Social History reviewed and updated in EPIC.    Current Outpatient Medications   Medication Sig Dispense Refill     acetaminophen (TYLENOL) 325 MG tablet Take 3 tablets (975 mg) by mouth every 6 hours as needed for mild pain or fever       atorvastatin (LIPITOR) 80 MG tablet Take 1 tablet (80 mg) by mouth At Bedtime 30 tablet 0     diclofenac (VOLTAREN) 1 % topical gel Apply 2 g topically 4 times daily       ferrous sulfate (FE TABS) 325 (65 Fe) MG EC tablet Take 325 mg by mouth every other day       fluticasone (FLONASE) 50 MCG/ACT nasal spray Spray 1 spray into both nostrils daily       folic acid (FOLVITE) 1 MG tablet Take 1 mg by mouth daily       levETIRAcetam (KEPPRA) 750 MG tablet Take 1 tablet (750 mg) by mouth 2 times daily       Lidocaine (LIDOCARE) 4 % Patch Place 2 patches onto the skin every 24 hours To prevent lidocaine toxicity, patient should be patch free for 12 hrs daily.       Melatonin 10 MG TABS tablet Take 10 mg by mouth at bedtime       metoprolol succinate ER (TOPROL XL) 25 MG 24 hr tablet Take 12.5 mg by mouth daily       OLANZapine (ZYPREXA) 5 MG tablet Take 2.5 mg qAM and 5 mg qPM       OLANZapine zydis (ZYPREXA) 5 MG ODT Take 1 tablet (5 mg) by mouth daily as needed for agitation       omeprazole (PRILOSEC) 20 MG DR capsule Take 20 mg by mouth 2 times daily       polyethylene glycol (MIRALAX) 17 g packet Take 17 g by mouth daily       QUEtiapine (SEROQUEL) 25 MG tablet Take 25 mg by mouth at bedtime       senna-docusate (SENOKOT-S/PERICOLACE) 8.6-50 MG tablet Take 1 tablet by mouth at bedtime       sertraline (ZOLOFT) 50 MG tablet Take 75 mg by mouth daily       sulfamethoxazole-trimethoprim (BACTRIM) 400-80 MG tablet Take 1 tablet by mouth 2 times daily Lifelong prophylaxis L  elbow infection       thiamine (B-1) 100 MG tablet Take 1 tablet (100 mg) by mouth daily       Medications reviewed:  Medications reconciled to facility chart and changes were made to reflect current medications as identified as above med list. Below are the changes that were made:   Medications stopped since last EPIC medication reconciliation:   There are no discontinued medications.    Medications started since last [facility] medication reconciliation:  No orders of the defined types were placed in this encounter.    Physical Exam:  There were no vitals taken for this visit.  134/66 98 70 213.6lb (admit 218lb)  Lying sideways on bed under 2 blankets  Awake and participates but blunted affect and inattentive at times  Vision grossly intact - anisocoria L>R (old)  Lungs clear  Heart RR  Abdomen - soft NT  Left arm surgical site healed - no tenderness or erythema, decreased ROM  No LE edema  Oriented to self, attempts to converse, limited verbal output with simple responses  Moves all 4 extremities  No tremor present    Recent Labs:  CBC RESULTS:   Recent Labs   Lab Test 07/18/24  0656 05/06/24  0543   WBC 5.4 4.7   RBC 3.52* 3.80*   HGB 11.0* 11.4*   HCT 33.2* 35.7*   MCV 94 94   MCH 31.3 30.0   MCHC 33.1 31.9   RDW 15.4* 14.7    174       Last Basic Metabolic Panel:  Recent Labs   Lab Test 07/18/24  0656 05/06/24  0543    140   POTASSIUM 3.7 4.0   CHLORIDE 106 104   ANDREW 8.9 9.1   CO2 22 25   BUN 14.1 24.3*   CR 0.85 0.91   GLC 93 129*       Liver Function Studies -   Recent Labs   Lab Test 05/06/24  0543 03/21/24  1757   PROTTOTAL 6.4 6.8   ALBUMIN 3.9 4.1   BILITOTAL 0.8 1.2   ALKPHOS 102 135   AST 23 42   ALT 10 47       TSH   Date Value Ref Range Status   03/22/2024 1.52 0.30 - 4.20 uIU/mL Final   ]    Lab Results   Component Value Date    A1C 4.3 07/18/2024    A1C 5.5 01/23/2023     TSH   Date Value Ref Range Status   03/22/2024 1.52 0.30 - 4.20 uIU/mL Final       Assessment/Plan:    Falls -  recurring  Falls continue despite therapy attempts  He remains weak and unsteady with poor judgement  Work ups for syncope/arrhythmia planned at transfer - Zio patch not done - doubt this is contributing issue currently based on fall documentation  Staff monitoring closely  Continuing therapy for now  Check orthostatics  Address polypharmacy with discharge Seroquel and start to taper Zyprexa - monitor mood and behavior closely    Cognitive impairment  Likely progressive by history compounded by multiple medical issues, chronic mental illness, multiple head traumas.  Prognosis for significant improvement appears limited.  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 + history of ETOH use)  Taper antipsychotics as above and monitor/support  Check Keppra level  Consider further head imaging if neuro changes    PTSD - war vet  Borderline personality disorder  MDD  All complicated cognitive impairment and history of head trauma  Tapering antipsychotics as above - monitor  [facility] in [facility] - will they continue to follow here? If not need ACP support.  Continue Sertraline    BCCA  Needs future MOHS procedure     Infected right distal humerus ORIF  Hardware removed  Chronic suppressive antibiotics - Bactrim 400/80 BID long-term  [facility] follow up?     Coronary artery disease involving native coronary artery of native heart without angina pectoris  S/P CABG (coronary artery bypass graft)  Chronic HFpEF 55% 1/23  H/O aortic valve replacement - TAVR - not anticoagulated  Atrial fibrillation  LBBB   Currently stable without anticoagulation and off Entresto.   High does statin, beta blocker     BPH  History of urinary retention  Check PVR if agitated     Information reviewed:  Medications, vital signs, orders, nursing notes, problem list, Facility MDS and care plan reviewed. Interview and exam,  brief discussion with nurse    40m         Electronically signed by  Sally Lindsay MD       Sincerely,        Sally Lindsay MD

## 2024-07-17 ENCOUNTER — LAB REQUISITION (OUTPATIENT)
Dept: LAB | Facility: CLINIC | Age: 84
End: 2024-07-17
Payer: MEDICARE

## 2024-07-17 DIAGNOSIS — E08.21 DIABETES MELLITUS DUE TO UNDERLYING CONDITION WITH DIABETIC NEPHROPATHY (H): ICD-10-CM

## 2024-07-17 DIAGNOSIS — Z51.81 ENCOUNTER FOR THERAPEUTIC DRUG LEVEL MONITORING: ICD-10-CM

## 2024-07-17 DIAGNOSIS — I10 ESSENTIAL (PRIMARY) HYPERTENSION: ICD-10-CM

## 2024-07-18 LAB
ANION GAP SERPL CALCULATED.3IONS-SCNC: 14 MMOL/L (ref 7–15)
BUN SERPL-MCNC: 14.1 MG/DL (ref 8–23)
CALCIUM SERPL-MCNC: 8.9 MG/DL (ref 8.8–10.4)
CHLORIDE SERPL-SCNC: 106 MMOL/L (ref 98–107)
CREAT SERPL-MCNC: 0.85 MG/DL (ref 0.67–1.17)
EGFRCR SERPLBLD CKD-EPI 2021: 87 ML/MIN/1.73M2
ERYTHROCYTE [DISTWIDTH] IN BLOOD BY AUTOMATED COUNT: 15.4 % (ref 10–15)
GLUCOSE SERPL-MCNC: 93 MG/DL (ref 70–99)
HBA1C MFR BLD: 4.3 %
HCO3 SERPL-SCNC: 22 MMOL/L (ref 22–29)
HCT VFR BLD AUTO: 33.2 % (ref 40–53)
HGB BLD-MCNC: 11 G/DL (ref 13.3–17.7)
LEVETIRACETAM SERPL-MCNC: 24.7 ΜG/ML (ref 10–40)
MCH RBC QN AUTO: 31.3 PG (ref 26.5–33)
MCHC RBC AUTO-ENTMCNC: 33.1 G/DL (ref 31.5–36.5)
MCV RBC AUTO: 94 FL (ref 78–100)
PLATELET # BLD AUTO: 223 10E3/UL (ref 150–450)
POTASSIUM SERPL-SCNC: 3.7 MMOL/L (ref 3.4–5.3)
RBC # BLD AUTO: 3.52 10E6/UL (ref 4.4–5.9)
SODIUM SERPL-SCNC: 142 MMOL/L (ref 135–145)
WBC # BLD AUTO: 5.4 10E3/UL (ref 4–11)

## 2024-07-18 PROCEDURE — P9604 ONE-WAY ALLOW PRORATED TRIP: HCPCS | Mod: ORL | Performed by: FAMILY MEDICINE

## 2024-07-18 PROCEDURE — 83036 HEMOGLOBIN GLYCOSYLATED A1C: CPT | Mod: ORL | Performed by: FAMILY MEDICINE

## 2024-07-18 PROCEDURE — 80177 DRUG SCRN QUAN LEVETIRACETAM: CPT | Mod: ORL | Performed by: FAMILY MEDICINE

## 2024-07-18 PROCEDURE — 80048 BASIC METABOLIC PNL TOTAL CA: CPT | Mod: ORL | Performed by: FAMILY MEDICINE

## 2024-07-18 PROCEDURE — 36415 COLL VENOUS BLD VENIPUNCTURE: CPT | Mod: ORL | Performed by: FAMILY MEDICINE

## 2024-07-18 PROCEDURE — 85027 COMPLETE CBC AUTOMATED: CPT | Mod: ORL | Performed by: FAMILY MEDICINE

## 2024-07-29 NOTE — PROGRESS NOTES
[facility] GERIATRIC SERVICES    No chief complaint on file.      [facility] Medical Record Number:  [id number]  Place of Service where encounter took place:  No question data found.    HPI:    David Thomson is a 84 year old  (1940), who is being seen today for an episodic care visit.   [facility]    Determined to be an elopement risk and transferred to the locked dementia unit.  Nurse on today does not know him well - stable    He is a limited historian. Reports that his arm has healed OK. He is tired. His goal is to return to his home.  SW has contacted family about consideration of transition to comfort care status because of lack of progress in improving function - 6/24    He has had multiple falls - felt due to poor judgement and unstable gait/weakness. Last was 7/8 without significant injury.  PT notes indicate he needs maximum assistance to stand.    Today he reports left knee pain (chronic).  No HA, no vision change, no chest pain or SOB. Thinks he is sleeping and eating well. No dizziness    PMH currently significant for:  Recurring falls - SDH 2023  Cognitive impairment - progression 2/24 SLUMS 21/30 MOCA 15/30  SAH/SDH/IPH/IVH/occipital fracture 3/24  Keppra to reduce seizure risk  Afib - no anticoagulation due to falls 2024  TAVR  Delirium - recurring  Distal left humerus fracture - ORIF 11/23   - washout/hardware removal, life-long suppressive therapy with Bactrim per [facility]  PTSD - war   Left JUDE - revised 3/24  BPH  ISHAN - intolerant of CPAP  Neuropathy - idiopathic  History of ETOH abuse in the past     CAD - CABG  Anemia  Left sciatica  Borderline personality disorder  BPH  CVA  Cervical spondylosis - myelopathy  Cognitive impairment  Hereditary elliptocytosis  Hyperlipidemia  HTN  Vertigo  Dorsal column stimulator placed  Cholecystectomy  Anisocoria            ALLERGIES: Doxepin, Lisinopril, Piroxicam, Polysorbate  [bay oil], Prazosin, Thimerosal, Urea,  Zolpidem, Benzalkonium, and Capsaicin  Past Medical, Surgical, Family and Social History reviewed and updated in [facility].    Current Outpatient Medications   Medication Sig Dispense Refill    acetaminophen (TYLENOL) 325 MG tablet Take 3 tablets (975 mg) by mouth every 6 hours as needed for mild pain or fever      atorvastatin (LIPITOR) 80 MG tablet Take 1 tablet (80 mg) by mouth At Bedtime 30 tablet 0    diclofenac (VOLTAREN) 1 % topical gel Apply 2 g topically 4 times daily      ferrous sulfate (FE TABS) 325 (65 Fe) MG EC tablet Take 325 mg by mouth every other day      fluticasone (FLONASE) 50 MCG/ACT nasal spray Spray 1 spray into both nostrils daily      folic acid (FOLVITE) 1 MG tablet Take 1 mg by mouth daily      levETIRAcetam (KEPPRA) 750 MG tablet Take 1 tablet (750 mg) by mouth 2 times daily      Lidocaine (LIDOCARE) 4 % Patch Place 2 patches onto the skin every 24 hours To prevent lidocaine toxicity, patient should be patch free for 12 hrs daily.      Melatonin 10 MG TABS tablet Take 10 mg by mouth at bedtime      metoprolol succinate ER (TOPROL XL) 25 MG 24 hr tablet Take 12.5 mg by mouth daily      OLANZapine (ZYPREXA) 5 MG tablet Take 2.5 mg qAM and 5 mg qPM      OLANZapine zydis (ZYPREXA) 5 MG ODT Take 1 tablet (5 mg) by mouth daily as needed for agitation      omeprazole (PRILOSEC) 20 MG DR capsule Take 20 mg by mouth 2 times daily      polyethylene glycol (MIRALAX) 17 g packet Take 17 g by mouth daily      QUEtiapine (SEROQUEL) 25 MG tablet Take 25 mg by mouth at bedtime      senna-docusate (SENOKOT-S/PERICOLACE) 8.6-50 MG tablet Take 1 tablet by mouth at bedtime      sertraline (ZOLOFT) 50 MG tablet Take 75 mg by mouth daily      sulfamethoxazole-trimethoprim (BACTRIM) 400-80 MG tablet Take 1 tablet by mouth 2 times daily Lifelong prophylaxis L elbow infection      thiamine (B-1) 100 MG tablet Take 1 tablet (100 mg) by mouth daily       Medications reviewed:  Medications reconciled to facility chart  and changes were made to reflect current medications as identified as above med list. Below are the changes that were made:   Medications stopped since last EPIC medication reconciliation:   There are no discontinued medications.    Medications started since last [facility] medication reconciliation:  No orders of the defined types were placed in this encounter.    Physical Exam:  There were no vitals taken for this visit.  134/66 98 70 213.6lb (admit 218lb)  Lying sideways on bed under 2 blankets  Awake and participates but blunted affect and inattentive at times  Vision grossly intact - anisocoria L>R (old)  Lungs clear  Heart RR  Abdomen - soft NT  Left arm surgical site healed - no tenderness or erythema, decreased ROM  No LE edema  Oriented to self, attempts to converse, limited verbal output with simple responses  Moves all 4 extremities  No tremor present    Recent Labs:  CBC RESULTS:   Recent Labs   Lab Test 07/18/24  0656 05/06/24  0543   WBC 5.4 4.7   RBC 3.52* 3.80*   HGB 11.0* 11.4*   HCT 33.2* 35.7*   MCV 94 94   MCH 31.3 30.0   MCHC 33.1 31.9   RDW 15.4* 14.7    174       Last Basic Metabolic Panel:  Recent Labs   Lab Test 07/18/24  0656 05/06/24  0543    140   POTASSIUM 3.7 4.0   CHLORIDE 106 104   ANDREW 8.9 9.1   CO2 22 25   BUN 14.1 24.3*   CR 0.85 0.91   GLC 93 129*       Liver Function Studies -   Recent Labs   Lab Test 05/06/24  0543 03/21/24  1757   PROTTOTAL 6.4 6.8   ALBUMIN 3.9 4.1   BILITOTAL 0.8 1.2   ALKPHOS 102 135   AST 23 42   ALT 10 47       TSH   Date Value Ref Range Status   03/22/2024 1.52 0.30 - 4.20 uIU/mL Final   ]    Lab Results   Component Value Date    A1C 4.3 07/18/2024    A1C 5.5 01/23/2023     TSH   Date Value Ref Range Status   03/22/2024 1.52 0.30 - 4.20 uIU/mL Final       Assessment/Plan:    Falls - recurring  Falls continue despite therapy attempts  He remains weak and unsteady with poor judgement  Work ups for syncope/arrhythmia planned at transfer - Zio patch not  done - doubt this is contributing issue currently based on fall documentation  Staff monitoring closely  Continuing therapy for now  Check orthostatics  Address polypharmacy with discharge Seroquel and start to taper Zyprexa - monitor mood and behavior closely    Cognitive impairment  Likely progressive by history compounded by multiple medical issues, chronic mental illness, multiple head traumas.  Prognosis for significant improvement appears limited.  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 + history of ETOH use)  Taper antipsychotics as above and monitor/support  Check Keppra level  Consider further head imaging if neuro changes    PTSD - war vet  Borderline personality disorder  MDD  All complicated cognitive impairment and history of head trauma  Tapering antipsychotics as above - monitor  Followed by [facility] in the past - will they continue to follow here? If not need ACP support.  Continue Sertraline    BCCA  Needs future MOHS procedure     Infected right distal humerus ORIF  Hardware removed  Chronic suppressive antibiotics - Bactrim 400/80 BID long-term  [facility] ortho follow up?     Coronary artery disease involving native coronary artery of native heart without angina pectoris  S/P CABG (coronary artery bypass graft)  Chronic HFpEF 55% 1/23  H/O aortic valve replacement - TAVR - not anticoagulated  Atrial fibrillation  LBBB   Currently stable without anticoagulation and off Entresto.   High does statin, beta blocker     BPH  History of urinary retention  Check PVR if agitated     Information reviewed:  Medications, vital signs, orders, nursing notes, problem list, Facility MDS and care plan reviewed. Interview and exam, brief discussion with nurse    40m         Electronically signed by  Sally Lindsay MD

## 2024-08-19 ENCOUNTER — TELEPHONE (OUTPATIENT)
Dept: DERMATOLOGY | Facility: CLINIC | Age: 84
End: 2024-08-19
Payer: COMMERCIAL

## 2024-08-19 NOTE — TELEPHONE ENCOUNTER
Patient no showed Mohs scheduled 7/1/24.  Left [place] [facility] ?Has this been treat elsewhere?     Anne-Marie Workman LPN

## 2024-08-20 NOTE — TELEPHONE ENCOUNTER
"Spoke to Son, David Haynes. (His number is bolded as main contact)    \"I don't know much about this, but I did think my Brother had this all set up... Now that I think about it, they had a conference for him and must have decided not to treat it.. I am on the road on my way back from [place]..\"    ~~~~~~~~~~~~~~~~~~~~~~~~~~~~~~~~~~~~~~~~~~~~~~~~~~~~~~~~~~~~~~~~~~~~~~    [facility] and they didn't know anything about this as to why patient didn't attend the 7-1-24 Mohs appt or why appt was not canceled.    ~~~~~~~~~~~~~~~~~~~~~~~~~~~~~~~~~~~~~~~~~~~~~~~~~~~~~~~~~~~~~~~~~~~~~    Patient Contact    Attempt # 1 to reach SonValente as Brother, David Haynes stated: \"He handles all his appts\"    Was call answered?  No.    Called patient. No answer. Left message to call back. Clinic number was provided.     Jane Quiñones RN    [facility]   [phone]                              "

## 2024-08-21 NOTE — TELEPHONE ENCOUNTER
"FYI:    Per 7/15 Nursing home visit:    \"He is a limited historian. Reports that his arm has healed OK. He is tired. His goal is to return to his home.  SW has contacted family about consideration of transition to comfort care status because of lack of progress in improving function - 6/24\"  "

## 2024-08-26 NOTE — TELEPHONE ENCOUNTER
"Spoke to SonValente.     \"He is in LTC and we just aren't sure it is worth the risk to treat him with everything else he has going on..\"     Since we have not ever seen the patient and pt was referred to us for Mohs [facility] by [facility], advised son Valente to to discuss with patient's [facility] Provider as to whether or not it is worth it to do Mohs surgery on patient since we do not know risks/ patient's other medical conditions.     Patient's SonValente verbalized understanding. Jane Quiñones RN]  Jane Quiñones RN    "

## 2024-09-18 ENCOUNTER — NURSING HOME VISIT (OUTPATIENT)
Dept: GERIATRICS | Facility: CLINIC | Age: 84
End: 2024-09-18
Payer: COMMERCIAL

## 2024-09-18 VITALS
BODY MASS INDEX: 3.02 KG/M2 | RESPIRATION RATE: 18 BRPM | OXYGEN SATURATION: 95 % | HEART RATE: 70 BPM | SYSTOLIC BLOOD PRESSURE: 89 MMHG | WEIGHT: 22.8 LBS | DIASTOLIC BLOOD PRESSURE: 72 MMHG | HEIGHT: 73 IN | TEMPERATURE: 97.5 F

## 2024-09-18 DIAGNOSIS — E78.5 HYPERLIPIDEMIA WITH TARGET LDL LESS THAN 100: ICD-10-CM

## 2024-09-18 DIAGNOSIS — D63.1 ANEMIA DUE TO STAGE 3B CHRONIC KIDNEY DISEASE (H): Chronic | ICD-10-CM

## 2024-09-18 DIAGNOSIS — R33.8 BENIGN PROSTATIC HYPERPLASIA WITH URINARY RETENTION: ICD-10-CM

## 2024-09-18 DIAGNOSIS — E66.9 OBESITY (BMI 30-39.9): ICD-10-CM

## 2024-09-18 DIAGNOSIS — F60.3 BORDERLINE PERSONALITY DISORDER (H): ICD-10-CM

## 2024-09-18 DIAGNOSIS — R41.89 COGNITIVE IMPAIRMENT: Primary | ICD-10-CM

## 2024-09-18 DIAGNOSIS — I10 PRIMARY HYPERTENSION: ICD-10-CM

## 2024-09-18 DIAGNOSIS — D69.6 THROMBOCYTOPENIA (H): ICD-10-CM

## 2024-09-18 DIAGNOSIS — G47.33 OBSTRUCTIVE SLEEP APNEA SYNDROME IN ADULT: ICD-10-CM

## 2024-09-18 DIAGNOSIS — N18.32 ANEMIA DUE TO STAGE 3B CHRONIC KIDNEY DISEASE (H): Chronic | ICD-10-CM

## 2024-09-18 DIAGNOSIS — N40.1 BENIGN PROSTATIC HYPERPLASIA WITH URINARY RETENTION: ICD-10-CM

## 2024-09-18 PROCEDURE — 99310 SBSQ NF CARE HIGH MDM 45: CPT | Performed by: NURSE PRACTITIONER

## 2024-09-18 NOTE — LETTER
9/18/2024      David Thomson  [facility] [place]  [address]  [place] [address]        [facility] GERIATRICS  Chief Complaint   Patient presents with    Annual Comprehensive Nursing Home     [place] Medical Record Number:  [id number]  Place of Service where encounter took place:  No question data found.    HPI:    David Thomson  is a 84 year old  (1940), who is being seen today for an annual comprehensive visit. HPI information obtained from: {FGS HPI:442143}.   ***    ALLERGIES: Doxepin, Lisinopril, Piroxicam, Polysorbate  [bay oil], Prazosin, Thimerosal, Urea, Zolpidem, Benzalkonium, and Capsaicin  PAST MEDICAL HISTORY:   Past Medical History:   Diagnosis Date    Anemia     Atrial fibrillation (H)     Bilateral low back pain with left-sided sciatica     Borderline personality disorder (H)     BPH (benign prostatic hyperplasia)     CAD (coronary artery disease)     Cerebral artery occlusion with cerebral infarction (H)     Cervical spondylosis with myelopathy     Cognitive impairment     Depression     Diarrhea     Falls frequently     Gastroesophageal reflux disease with esophagitis     Hereditary elliptocytosis (H24)     Hyperlipidaemia     Hypertension     Neuropathy     ISHAN (obstructive sleep apnea)     Pre-diabetes     PTSD (post-traumatic stress disorder)     Recurrent falls     S/P TAVR (transcatheter aortic valve replacement)     SAH (subarachnoid hemorrhage) (H) 03/2024    SDH (subdural hematoma) (H) 03/2024    Thrombocytopenia (H24)     Vertigo       PAST SURGICAL HISTORY:  has a past surgical history that includes Gallbladder surgery; hip surgery; Wrist surgery (Bilateral); back surgery; Insert stimulator dorsal column (Right, 04/19/2016); IR Fine Needle Aspiration w Ultrasound (01/23/2023); IR Fine Needle Aspiration w Ultrasound (01/26/2023); Abdomen surgery; turp; C6-C7 ACDF (2005); Left C7-T1 foraminotomy  (2015); CABG, VEIN, THREE (2015); aortic valve  replacement (2015); Arthroplasty revision hip (Left, 03/15/2023); and Humerus Fracture Surgery (11/2023).  ***    Current Outpatient Medications:     acetaminophen (TYLENOL) 325 MG tablet, Take 3 tablets (975 mg) by mouth every 6 hours as needed for mild pain or fever, Disp: , Rfl:     atorvastatin (LIPITOR) 80 MG tablet, Take 1 tablet (80 mg) by mouth At Bedtime, Disp: 30 tablet, Rfl: 0    diclofenac (VOLTAREN) 1 % topical gel, Apply 2 g topically 4 times daily, Disp: , Rfl:     ferrous sulfate (FE TABS) 325 (65 Fe) MG EC tablet, Take 325 mg by mouth every other day, Disp: , Rfl:     fluticasone (FLONASE) 50 MCG/ACT nasal spray, Spray 1 spray into both nostrils daily, Disp: , Rfl:     folic acid (FOLVITE) 1 MG tablet, Take 1 mg by mouth daily, Disp: , Rfl:     levETIRAcetam (KEPPRA) 750 MG tablet, Take 1 tablet (750 mg) by mouth 2 times daily, Disp: , Rfl:     Lidocaine (LIDOCARE) 4 % Patch, Place 2 patches onto the skin every 24 hours To prevent lidocaine toxicity, patient should be patch free for 12 hrs daily., Disp: , Rfl:     Melatonin 10 MG TABS tablet, Take 10 mg by mouth at bedtime, Disp: , Rfl:     metoprolol succinate ER (TOPROL XL) 25 MG 24 hr tablet, Take 12.5 mg by mouth daily, Disp: , Rfl:     OLANZapine zydis (ZYPREXA) 5 MG ODT, Take 1 tablet (5 mg) by mouth daily as needed for agitation, Disp: , Rfl:     omeprazole (PRILOSEC) 20 MG DR capsule, Take 20 mg by mouth 2 times daily, Disp: , Rfl:     polyethylene glycol (MIRALAX) 17 g packet, Take 17 g by mouth daily, Disp: , Rfl:     senna-docusate (SENOKOT-S/PERICOLACE) 8.6-50 MG tablet, Take 1 tablet by mouth at bedtime, Disp: , Rfl:     sertraline (ZOLOFT) 50 MG tablet, Take 75 mg by mouth daily, Disp: , Rfl:     sulfamethoxazole-trimethoprim (BACTRIM) 400-80 MG tablet, Take 1 tablet by mouth 2 times daily Lifelong prophylaxis L elbow infection, Disp: , Rfl:     thiamine (B-1) 100 MG tablet, Take 1 tablet (100 mg) by mouth daily, Disp: , Rfl:      MED  REC REQUIRED{TIP  Click the link below to document or use med rec list, use list to pull in response :962756}  Post Medication Reconciliation Status: {MED REC LIST:876981}      Case Management:  I have reviewed the {fgsannualcareplan1:736356}. {fgs cancer screenin}. Patient's desire to return to the community is {FGS RETURN TO COMMUNITY:510298}. Current Level of Care is appropriate.{mhgeroimmunization:498149}    Advance Directive Discussion:    I reviewed the current advanced directives as reflected in EPIC, the POLST and the facility chart, and verified the congruency of orders ***. I contacted the first party *** and {ADVANCED DIRECTIVE DISCUSSION:138405} plan of care. I {DID/DID NOT:477204} review the advance directives with the resident.     Team Discussion:  I communicated with the appropriate disciplines involved with the Plan of Care: {NURSING HOME DISCIPLINES:567217}.   Patient's goal is: {fgsgoal:717687}.  Information reviewed: Medications, vital signs, orders, and nursing notes.    ROS:  {flntyg40:006969}    Vitals:  There were no vitals taken for this visit. There is no height or weight on file to calculate BMI.  Exam:  {FPC physical exam :765834}     Lab/Diagnostic data:   {fgslab:578455}    ASSESSMENT/PLAN  {FGS DX2:294595}  {HTN}    Orders:  {fgsorders:994031}  ***    Electronically signed by:  Iman Harris MA ***          Sincerely,        Lindy Shelton NP       "Worker  , and Dietitian  .   Patient's goal is: pain control and comfort.  Information reviewed: Medications, vital signs, orders, and nursing notes.    ROS:  Unobtainable secondary to cognitive impairment.     Vitals:  BP (!) 89/72   Pulse 70   Temp 97.5  F (36.4  C)   Resp 18   Ht 1.854 m (6' 1\")   Wt (!) 10.3 kg (22 lb 12.8 oz)   SpO2 95%   BMI 3.01 kg/m   Body mass index is 3.01 kg/m .  Exam:  A & O x 2, NAD. Lungs CTA, non labored. RRR, S1/S2 w/o murmur,gallop or rub.  +1 bilateral LE edema.  Abdomen soft, nontender, +BT'S. No focal neurological deficits.        Lab/Diagnostic data:   Recent labs in [facility] reviewed by me today.   Most Recent 3 CBC's:  Recent Labs   Lab Test 07/18/24  0656 05/06/24  0543 04/29/24  0558   WBC 5.4 4.7 7.2   HGB 11.0* 11.4* 10.9*   MCV 94 94 94    174 174     Most Recent 3 BMP's:  Recent Labs   Lab Test 07/18/24  0656 05/06/24  0543 04/29/24  0558    140 141   POTASSIUM 3.7 4.0 3.8   CHLORIDE 106 104 105   CO2 22 25 24   BUN 14.1 24.3* 22.8   CR 0.85 0.91 0.83   ANIONGAP 14 11 12   ANDREW 8.9 9.1 9.0   GLC 93 129* 114*     Most Recent 3 Creatinines:  Recent Labs   Lab Test 07/18/24  0656 05/06/24  0543 04/29/24  0558   CR 0.85 0.91 0.83     Most Recent 3 Hemoglobins:  Recent Labs   Lab Test 07/18/24  0656 05/06/24  0543 04/29/24  0558   HGB 11.0* 11.4* 10.9*     Most Recent TSH and T4:  Recent Labs   Lab Test 03/22/24  0627   TSH 1.52     Most Recent Hemoglobin A1c:  Recent Labs   Lab Test 07/18/24  0656   A1C 4.3         ASSESSMENT/PLAN  Cognitive impairment  Chronic, progressive.  Continues to have redirectable behaviors.  -Continue sertraline 75 mg p.o. daily and olanzapine 5 mg p.o. nightly.  -Continue to anticipate needs. Chronic condition, ongoing decline expected.   - Continue to provide redirection and reassurance as needed. Maintain safe living situation with goals focused on comfort.    Borderline personality disorder (H)  Longstanding history, stable.  " Continue to monitor and update NP with changes.  Patient does see in-house psychiatry.    Benign prostatic hyperplasia with urinary retention  Patient no longer receiving medications.  Incontinent of urine.    Hyperlipidemia with target LDL less than 100  Remains on atorvastatin 80 mg p.o. daily.Continue with plan of care no changes at this time, adjustment as needed    Anemia due to stage 3b chronic kidney disease (H)  Creatinine   Date Value Ref Range Status   07/18/2024 0.85 0.67 - 1.17 mg/dL Final   04/19/2016 0.85 0.66 - 1.25 mg/dL Final   Continue to renal dose and avoid nephrotoxic medication.  BMP every 6 months and as needed.    Obstructive sleep apnea syndrome in adult  Longstanding history.Continue with plan of care no changes at this time, adjustment as needed    Thrombocytopenia (H)  Stable.     Obesity (BMI 30-39.9)  Body mass index is 3.01 kg/m .  -appreciate dietary monitoring making recommendations.     Primary hypertension  Chornic, stable off medications.       Orders:  The current medical regimen is effective; continue present plan and medications    54 minutes spent on the date of the encounter doing chart review, history and exam, documentation and further activities as noted above.       Electronically signed by:  Lindy Shelton NP             Sincerely,        Lindy Shelton NP

## 2024-09-18 NOTE — PROGRESS NOTES
"[facility] GERIATRICS  Chief Complaint   Patient presents with    Annual Comprehensive Nursing Home     [place] Medical Record Number:  [id number]  Place of Service where encounter took place:  [facility] () [[id number]]    HPI:    David Thomson  is a 84 year old  (1940), who is being seen today for an annual comprehensive visit. HPI information obtained from: facility chart records, facility staff, and patient report.   Patient resting in wheelchair, head laying on dining room table. Although patient is verbal, patient was silent during visit. Behaviors noted by staff, yelling out \"help help\" then not remembering what he needs. Longstanding history of depression and PTSD, last PHQ-9 in charts was 21/27. NP at that time was notified and orders were placed. Sees inhouse ACP.   -no indications of acute pain, SOB or lightheadedness. HPI difficult to obtain during today's visit.     BP Readings from Last 3 Encounters:   09/18/24 (!) 89/72   06/11/24 106/52   05/30/24 127/69     Wt Readings from Last 4 Encounters:   09/18/24 (!) 10.3 kg (22 lb 12.8 oz)   06/11/24 101.3 kg (223 lb 6.4 oz)   05/30/24 104.4 kg (230 lb 3.2 oz)   05/14/24 100.4 kg (221 lb 6.4 oz)     Pulse Readings from Last 4 Encounters:   09/18/24 70   06/11/24 64   05/30/24 59   05/14/24 84         ALLERGIES: Doxepin, Lisinopril, Piroxicam, Polysorbate  [bay oil], Prazosin, Thimerosal, Urea, Zolpidem, Benzalkonium, and Capsaicin  PAST MEDICAL HISTORY:   Past Medical History:   Diagnosis Date    Anemia     Atrial fibrillation (H)     Bilateral low back pain with left-sided sciatica     Borderline personality disorder (H)     BPH (benign prostatic hyperplasia)     CAD (coronary artery disease)     Cerebral artery occlusion with cerebral infarction (H)     Cervical spondylosis with myelopathy     Cognitive impairment     Depression     Diarrhea     Falls frequently     Gastroesophageal reflux disease with esophagitis     " Hereditary elliptocytosis (H24)     Hyperlipidaemia     Hypertension     Neuropathy     ISHAN (obstructive sleep apnea)     Pre-diabetes     PTSD (post-traumatic stress disorder)     Recurrent falls     S/P TAVR (transcatheter aortic valve replacement)     SAH (subarachnoid hemorrhage) (H) 03/2024    SDH (subdural hematoma) (H) 03/2024    Thrombocytopenia (H24)     Vertigo       PAST SURGICAL HISTORY:  has a past surgical history that includes Gallbladder surgery; hip surgery; Wrist surgery (Bilateral); back surgery; Insert stimulator dorsal column (Right, 04/19/2016); IR Fine Needle Aspiration w Ultrasound (01/23/2023); IR Fine Needle Aspiration w Ultrasound (01/26/2023); Abdomen surgery; turp; C6-C7 ACDF (2005); Left C7-T1 foraminotomy  (2015); CABG, VEIN, THREE (2015); aortic valve replacement (2015); Arthroplasty revision hip (Left, 03/15/2023); and Humerus Fracture Surgery (11/2023).      Current Outpatient Medications:     acetaminophen (TYLENOL) 325 MG tablet, Take 3 tablets (975 mg) by mouth every 6 hours as needed for mild pain or fever, Disp: , Rfl:     atorvastatin (LIPITOR) 80 MG tablet, Take 1 tablet (80 mg) by mouth At Bedtime, Disp: 30 tablet, Rfl: 0    diclofenac (VOLTAREN) 1 % topical gel, Apply 2 g topically 4 times daily, Disp: , Rfl:     ferrous sulfate (FE TABS) 325 (65 Fe) MG EC tablet, Take 325 mg by mouth every other day, Disp: , Rfl:     fluticasone (FLONASE) 50 MCG/ACT nasal spray, Spray 1 spray into both nostrils daily, Disp: , Rfl:     folic acid (FOLVITE) 1 MG tablet, Take 1 mg by mouth daily, Disp: , Rfl:     levETIRAcetam (KEPPRA) 750 MG tablet, Take 1 tablet (750 mg) by mouth 2 times daily, Disp: , Rfl:     Lidocaine (LIDOCARE) 4 % Patch, Place 2 patches onto the skin every 24 hours To prevent lidocaine toxicity, patient should be patch free for 12 hrs daily., Disp: , Rfl:     Melatonin 10 MG TABS tablet, Take 10 mg by mouth at bedtime, Disp: , Rfl:     metoprolol succinate ER (TOPROL  XL) 25 MG 24 hr tablet, Take 12.5 mg by mouth daily, Disp: , Rfl:     OLANZapine zydis (ZYPREXA) 5 MG ODT, Take 1 tablet (5 mg) by mouth daily as needed for agitation, Disp: , Rfl:     omeprazole (PRILOSEC) 20 MG DR capsule, Take 20 mg by mouth 2 times daily, Disp: , Rfl:     polyethylene glycol (MIRALAX) 17 g packet, Take 17 g by mouth daily, Disp: , Rfl:     senna-docusate (SENOKOT-S/PERICOLACE) 8.6-50 MG tablet, Take 1 tablet by mouth at bedtime, Disp: , Rfl:     sertraline (ZOLOFT) 50 MG tablet, Take 75 mg by mouth daily, Disp: , Rfl:     sulfamethoxazole-trimethoprim (BACTRIM) 400-80 MG tablet, Take 1 tablet by mouth 2 times daily Lifelong prophylaxis L elbow infection, Disp: , Rfl:     thiamine (B-1) 100 MG tablet, Take 1 tablet (100 mg) by mouth daily, Disp: , Rfl:      MED REC REQUIRED  Post Medication Reconciliation Status: patient was not discharged from an inpatient facility or TCU      Case Management:  I have reviewed the facility/SNF care plan/MDS, including the falls risk, nutrition and pain screening. I also reviewed the current immunizations, and preventive care.. Future cancer screening is not clinically indicated secondary to age/goals of care. Patient's desire to return to the community is not present. Current Level of Care is appropriate.mhgeroimmunization: Annual Influenza per facility protocol    Advance Directive Discussion:    I reviewed the current advanced directives as reflected in EPIC, the POLST and the facility chart, and verified the congruency of orders . I did not contacted the first party  and discussed the plan of care. I did not due to cognitive impairment review the advance directives with the resident.     Team Discussion:  I communicated with the appropriate disciplines involved with the Plan of Care: Nursing  ,   , and Dietitian  .   Patient's goal is: pain control and comfort.  Information reviewed: Medications, vital signs, orders, and nursing  "notes.    ROS:  Unobtainable secondary to cognitive impairment.     Vitals:  BP (!) 89/72   Pulse 70   Temp 97.5  F (36.4  C)   Resp 18   Ht 1.854 m (6' 1\")   Wt (!) 10.3 kg (22 lb 12.8 oz)   SpO2 95%   BMI 3.01 kg/m   Body mass index is 3.01 kg/m .  Exam:  A & O x 2, NAD. Lungs CTA, non labored. RRR, S1/S2 w/o murmur,gallop or rub.  +1 bilateral LE edema.  Abdomen soft, nontender, +BT'S. No focal neurological deficits.        Lab/Diagnostic data:   Recent labs in [facility] reviewed by me today.   Most Recent 3 CBC's:  Recent Labs   Lab Test 07/18/24  0656 05/06/24  0543 04/29/24  0558   WBC 5.4 4.7 7.2   HGB 11.0* 11.4* 10.9*   MCV 94 94 94    174 174     Most Recent 3 BMP's:  Recent Labs   Lab Test 07/18/24  0656 05/06/24  0543 04/29/24  0558    140 141   POTASSIUM 3.7 4.0 3.8   CHLORIDE 106 104 105   CO2 22 25 24   BUN 14.1 24.3* 22.8   CR 0.85 0.91 0.83   ANIONGAP 14 11 12   ANDREW 8.9 9.1 9.0   GLC 93 129* 114*     Most Recent 3 Creatinines:  Recent Labs   Lab Test 07/18/24  0656 05/06/24  0543 04/29/24  0558   CR 0.85 0.91 0.83     Most Recent 3 Hemoglobins:  Recent Labs   Lab Test 07/18/24  0656 05/06/24  0543 04/29/24  0558   HGB 11.0* 11.4* 10.9*     Most Recent TSH and T4:  Recent Labs   Lab Test 03/22/24  0627   TSH 1.52     Most Recent Hemoglobin A1c:  Recent Labs   Lab Test 07/18/24  0656   A1C 4.3         ASSESSMENT/PLAN  Cognitive impairment  Chronic, progressive.  Continues to have redirectable behaviors.  -Continue sertraline 75 mg p.o. daily and olanzapine 5 mg p.o. nightly.  -Continue to anticipate needs. Chronic condition, ongoing decline expected.   - Continue to provide redirection and reassurance as needed. Maintain safe living situation with goals focused on comfort.    Borderline personality disorder (H)  Longstanding history, stable.  Continue to monitor and update NP with changes.  Patient does see in-house psychiatry.    Benign prostatic hyperplasia with urinary " retention  Patient no longer receiving medications.  Incontinent of urine.    Hyperlipidemia with target LDL less than 100  Remains on atorvastatin 80 mg p.o. daily.Continue with plan of care no changes at this time, adjustment as needed    Anemia due to stage 3b chronic kidney disease (H)  Creatinine   Date Value Ref Range Status   07/18/2024 0.85 0.67 - 1.17 mg/dL Final   04/19/2016 0.85 0.66 - 1.25 mg/dL Final   Continue to renal dose and avoid nephrotoxic medication.  BMP every 6 months and as needed.    Obstructive sleep apnea syndrome in adult  Longstanding history.Continue with plan of care no changes at this time, adjustment as needed    Thrombocytopenia (H)  Stable.     Obesity (BMI 30-39.9)  Body mass index is 3.01 kg/m .  -appreciate dietary monitoring making recommendations.     Primary hypertension  Chornic, stable off medications.       Orders:  The current medical regimen is effective; continue present plan and medications    54 minutes spent on the date of the encounter doing chart review, history and exam, documentation and further activities as noted above.       Electronically signed by:  Linyd Shelton NP

## 2024-10-15 ENCOUNTER — TELEPHONE (OUTPATIENT)
Dept: GERIATRICS | Facility: CLINIC | Age: 84
End: 2024-10-15

## 2024-10-15 NOTE — TELEPHONE ENCOUNTER
[facility] Triage Nurse Telephone Encounter    Provider: ANA LILIA Jones CNP  Facility: [facility] Facility Type:  LTC    Caller: Margie    Allergies:    Allergies   Allergen Reactions    Doxepin      Other Reaction(s): Disorientated    Lisinopril Cough    Piroxicam Hives     Other Reaction(s): Eruption of skin    Polysorbate  [Bay Oil]     Prazosin Other (See Comments)     Nightmares    Thimerosal     Urea Other (See Comments)     Eruption of skin    Other Reaction(s): Eruption of skin    Zolpidem      Other Reaction(s): Delirium    Benzalkonium Rash    Capsaicin Rash        Reason for call: Today the nurse is calling to request orders for PT as the family is wanting him to get stronger and has a few falls recently.     MCS/FGS STANDING ORDER GIVEN:  Start in house PT,  if requested by facility staff or family      Provider giving Order:  ANA LILIA Jones CNP    Verbal Order given to: Margie Worley RN

## 2024-10-23 ENCOUNTER — TELEPHONE (OUTPATIENT)
Dept: GERIATRICS | Facility: CLINIC | Age: 84
End: 2024-10-23

## 2024-10-23 DIAGNOSIS — G89.29 OTHER CHRONIC PAIN: ICD-10-CM

## 2024-10-23 DIAGNOSIS — M25.552 HIP PAIN, LEFT: ICD-10-CM

## 2024-10-23 RX ORDER — ACETAMINOPHEN 325 MG/1
TABLET ORAL
Status: SHIPPED
Start: 2024-10-23

## 2024-10-23 RX ORDER — HYDROXYZINE HYDROCHLORIDE 25 MG/1
12.5 TABLET, FILM COATED ORAL 2 TIMES DAILY PRN
COMMUNITY

## 2024-10-23 NOTE — TELEPHONE ENCOUNTER
[facility] Triage Nurse Telephone Encounter    Provider: ANA LILIA Jones CNP  Facility: [facility] Type:  LTC    Caller: Margie  Call Back Number: [phone]    Allergies:    Allergies   Allergen Reactions    Doxepin      Other Reaction(s): Disorientated    Lisinopril Cough    Piroxicam Hives     Other Reaction(s): Eruption of skin    Polysorbate  [Bay Oil]     Prazosin Other (See Comments)     Nightmares    Thimerosal     Urea Other (See Comments)     Eruption of skin    Other Reaction(s): Eruption of skin    Zolpidem      Other Reaction(s): Delirium    Benzalkonium Rash    Capsaicin Rash        Reason for call: Pt has been having increased agitation and aggressive behaviors with confusion since moving to another unit. Nurse reports it's been ongoing for about a week. No increased weakness or other s/s of UTI. Pt has also been c/o increased left elbow and shoulder pain. Nurse has been giving PRN Tylenol 975mg q6h. Pt has orders for Diclofenac Gel 1% for his hip.   Currently takes Zyprexa 5mg at bedtime and Sertraline 75mg daily.     Verbal Order/Direction given by Provider:   - Tylenol 650mg PO TID and 650mg PO daily PRN for pain  - Diclofenac Gel 1% Apply 2g to left elbow and shoulder topically BID  - Hydroxyzine 12.5mg PO BID PRN for anxiety    Provider giving Order:  ANA LILIA Jones CNP    Verbal Order given to: Margie Trejo RN

## 2024-11-11 ENCOUNTER — LAB REQUISITION (OUTPATIENT)
Dept: LAB | Facility: CLINIC | Age: 84
End: 2024-11-11
Payer: COMMERCIAL

## 2024-11-11 DIAGNOSIS — R53.1 WEAKNESS: ICD-10-CM

## 2024-11-12 LAB
ALP SERPL-CCNC: 179 U/L (ref 40–150)
ALP SERPL-CCNC: 179 U/L (ref 40–150)
ANION GAP SERPL CALCULATED.3IONS-SCNC: 24 MMOL/L (ref 7–15)
ANION GAP SERPL CALCULATED.3IONS-SCNC: 24 MMOL/L (ref 7–15)
BUN SERPL-MCNC: 49 MG/DL (ref 8–23)
BUN SERPL-MCNC: 49 MG/DL (ref 8–23)
CALCIUM SERPL-MCNC: 9.7 MG/DL (ref 8.8–10.4)
CALCIUM SERPL-MCNC: 9.7 MG/DL (ref 8.8–10.4)
CHLORIDE SERPL-SCNC: 108 MMOL/L (ref 98–107)
CHLORIDE SERPL-SCNC: 108 MMOL/L (ref 98–107)
CREAT SERPL-MCNC: 1.58 MG/DL (ref 0.67–1.17)
CREAT SERPL-MCNC: 1.58 MG/DL (ref 0.67–1.17)
EGFRCR SERPLBLD CKD-EPI 2021: 43 ML/MIN/1.73M2
EGFRCR SERPLBLD CKD-EPI 2021: 43 ML/MIN/1.73M2
ERYTHROCYTE [DISTWIDTH] IN BLOOD BY AUTOMATED COUNT: 15.2 % (ref 10–15)
ERYTHROCYTE [DISTWIDTH] IN BLOOD BY AUTOMATED COUNT: 15.2 % (ref 10–15)
GLUCOSE SERPL-MCNC: 139 MG/DL (ref 70–99)
GLUCOSE SERPL-MCNC: 139 MG/DL (ref 70–99)
HCO3 SERPL-SCNC: 12 MMOL/L (ref 22–29)
HCO3 SERPL-SCNC: 12 MMOL/L (ref 22–29)
HCT VFR BLD AUTO: 42.6 % (ref 40–53)
HCT VFR BLD AUTO: 42.6 % (ref 40–53)
HGB BLD-MCNC: 13.4 G/DL (ref 13.3–17.7)
HGB BLD-MCNC: 13.4 G/DL (ref 13.3–17.7)
LEVETIRACETAM SERPL-MCNC: 52.8 ÂΜG/ML (ref 10–40)
LEVETIRACETAM SERPL-MCNC: 52.8 ÂΜG/ML (ref 10–40)
MCH RBC QN AUTO: 31.1 PG (ref 26.5–33)
MCH RBC QN AUTO: 31.1 PG (ref 26.5–33)
MCHC RBC AUTO-ENTMCNC: 31.5 G/DL (ref 31.5–36.5)
MCHC RBC AUTO-ENTMCNC: 31.5 G/DL (ref 31.5–36.5)
MCV RBC AUTO: 99 FL (ref 78–100)
MCV RBC AUTO: 99 FL (ref 78–100)
PLATELET # BLD AUTO: 209 10E3/UL (ref 150–450)
PLATELET # BLD AUTO: 209 10E3/UL (ref 150–450)
POTASSIUM SERPL-SCNC: 5.2 MMOL/L (ref 3.4–5.3)
POTASSIUM SERPL-SCNC: 5.2 MMOL/L (ref 3.4–5.3)
RBC # BLD AUTO: 4.31 10E6/UL (ref 4.4–5.9)
RBC # BLD AUTO: 4.31 10E6/UL (ref 4.4–5.9)
SODIUM SERPL-SCNC: 144 MMOL/L (ref 135–145)
SODIUM SERPL-SCNC: 144 MMOL/L (ref 135–145)
WBC # BLD AUTO: 11.1 10E3/UL (ref 4–11)
WBC # BLD AUTO: 11.1 10E3/UL (ref 4–11)

## 2024-11-12 PROCEDURE — P9604 ONE-WAY ALLOW PRORATED TRIP: HCPCS | Mod: ORL | Performed by: FAMILY MEDICINE

## 2024-11-12 PROCEDURE — 80177 DRUG SCRN QUAN LEVETIRACETAM: CPT | Mod: ORL | Performed by: FAMILY MEDICINE

## 2024-11-12 PROCEDURE — 80048 BASIC METABOLIC PNL TOTAL CA: CPT | Mod: ORL | Performed by: FAMILY MEDICINE

## 2024-11-12 PROCEDURE — 85027 COMPLETE CBC AUTOMATED: CPT | Mod: ORL | Performed by: FAMILY MEDICINE

## 2024-11-12 PROCEDURE — 36415 COLL VENOUS BLD VENIPUNCTURE: CPT | Mod: ORL | Performed by: FAMILY MEDICINE

## 2024-11-12 PROCEDURE — 84075 ASSAY ALKALINE PHOSPHATASE: CPT | Mod: ORL | Performed by: FAMILY MEDICINE

## 2024-12-06 NOTE — ANESTHESIA PROCEDURE NOTES
Airway       Patient location during procedure: OR       Procedure Start/Stop Times: 3/15/2023 1:33 PM  Staff -        Anesthesiologist:  Atilio Cooper MD       CRNA: Mariah Goyal APRN CRNA       Performed By: CRNA  Consent for Airway        Urgency: elective  Indications and Patient Condition       Indications for airway management: bnoita-procedural       Induction type:intravenous       Mask difficulty assessment: 2 - vent by mask + OA or adjuvant +/- NMBA    Final Airway Details       Final airway type: endotracheal airway       Successful airway: ETT - single and Oral  Endotracheal Airway Details        ETT size (mm): 7.5       Cuffed: yes       Successful intubation technique: direct laryngoscopy       DL Blade Type: Clemons 2       Grade View of Cords: 1       Adjucts: stylet       Position: Right       Measured from: gums/teeth       Secured at (cm): 22       Bite block used: None    Post intubation assessment        Placement verified by: capnometry, equal breath sounds and chest rise        Number of attempts at approach: 1       Secured with: pink tape       Ease of procedure: easy       Dentition: Intact and Unchanged    Medication(s) Administered   Medication Administration Time: 3/15/2023 1:33 PM      
mom

## (undated) DEVICE — PAD SET CLIP-ON UNIVERSAL LATERAL POSITIONER 1007-CPM

## (undated) DEVICE — DRSG TEGADERM ALGINATE AG 4X5" 90303

## (undated) DEVICE — SU VICRYL 1 CT-1 CR 8X18" J741D

## (undated) DEVICE — Device

## (undated) DEVICE — HOOD SURG T7PLUS PEEL AWAY FACE SHIELD STRL LF 0416-801-100

## (undated) DEVICE — BONE CLEANING TIP INTERPULSE  0210-010-000

## (undated) DEVICE — DRSG TEGADERM 4X10" 1627

## (undated) DEVICE — GLOVE BIOGEL PI MICRO INDICATOR UNDERGLOVE SZ 8.0 48980

## (undated) DEVICE — PREP CHLORAPREP 26ML TINTED HI-LITE ORANGE 930815

## (undated) DEVICE — SOL NACL 0.9% IRRIG 3000ML BAG 2B7477

## (undated) DEVICE — SU VICRYL 0 CT-1 CR 8X27" UND JJ41G

## (undated) DEVICE — SOL WATER IRRIG 1000ML BOTTLE 2F7114

## (undated) DEVICE — DRAPE STERI TOWEL LG 1010

## (undated) DEVICE — LINEN MAYO STAND COVER OVERSIZE PACK 5458

## (undated) DEVICE — ESU GROUND PAD ADULT W/CORD E7507

## (undated) DEVICE — SPONGE RAY-TEC 4X8" 7318

## (undated) DEVICE — KIT CULTURE TRANSPORT SYS A.C.T. II DUAL ANEROBE R124022

## (undated) DEVICE — ESU PENCIL W/SMOKE EVAC NEPTUNE STRYKER 0703-046-000

## (undated) DEVICE — BONE CEMENT MIXEVAC HI VAC W/CARTRIDGE 0306-563-000

## (undated) DEVICE — DRAPE IOBAN INCISE 23X17" 6650EZ

## (undated) DEVICE — SOL NACL 0.9% IRRIG 1000ML BOTTLE 2F7124

## (undated) DEVICE — STRAP KNEE/BODY 31143004

## (undated) DEVICE — SU VICRYL 2-0 CT-1 27" UND J259H

## (undated) DEVICE — DEVICE RETRIEVER HEWSON 71111579

## (undated) DEVICE — ESU ELEC BLADE 6" COATED E1450-6

## (undated) DEVICE — PACK TOTAL HIP W/POUCH RIVERSIDE LATEX FREE

## (undated) DEVICE — SPONGE LAP 18X18" X8435

## (undated) DEVICE — SU MONOCRYL 3-0 PS-1 27" Y936H

## (undated) DEVICE — DRAPE IOBAN INCISE 36X23" 6651EZ

## (undated) DEVICE — SUCTION MANIFOLD NEPTUNE 2 SYS 4 PORT 0702-020-000

## (undated) DEVICE — SUCTION IRR SYSTEM W/O TIP INTERPULSE HANDPIECE 0210-100-000

## (undated) DEVICE — LINEN TOWEL PACK X5 5464

## (undated) DEVICE — GLOVE BIOGEL PI MICRO SZ 7.5 48575

## (undated) DEVICE — DECANTER VIAL 2006S

## (undated) DEVICE — GOWN IMPERVIOUS SPECIALTY XLG/XLONG 32474

## (undated) DEVICE — CONTAINER SPECIMEN 4OZ STERILE 17099

## (undated) DEVICE — STRAP STIRRUP W/SLIP 30187-030

## (undated) DEVICE — LINEN BACK PACK 5440

## (undated) RX ORDER — METHOCARBAMOL 500 MG/1
TABLET, FILM COATED ORAL
Status: DISPENSED
Start: 2023-03-15

## (undated) RX ORDER — DEXAMETHASONE SODIUM PHOSPHATE 4 MG/ML
INJECTION, SOLUTION INTRA-ARTICULAR; INTRALESIONAL; INTRAMUSCULAR; INTRAVENOUS; SOFT TISSUE
Status: DISPENSED
Start: 2023-03-15

## (undated) RX ORDER — LIDOCAINE HYDROCHLORIDE 10 MG/ML
INJECTION, SOLUTION EPIDURAL; INFILTRATION; INTRACAUDAL; PERINEURAL
Status: DISPENSED
Start: 2023-01-23

## (undated) RX ORDER — EPINEPHRINE 1 MG/ML
INJECTION, SOLUTION INTRAMUSCULAR; SUBCUTANEOUS
Status: DISPENSED
Start: 2023-03-15

## (undated) RX ORDER — CEFAZOLIN SODIUM/WATER 2 G/20 ML
SYRINGE (ML) INTRAVENOUS
Status: DISPENSED
Start: 2023-03-15

## (undated) RX ORDER — LIDOCAINE HYDROCHLORIDE 10 MG/ML
INJECTION, SOLUTION EPIDURAL; INFILTRATION; INTRACAUDAL; PERINEURAL
Status: DISPENSED
Start: 2023-01-26

## (undated) RX ORDER — HYDROMORPHONE HYDROCHLORIDE 1 MG/ML
INJECTION, SOLUTION INTRAMUSCULAR; INTRAVENOUS; SUBCUTANEOUS
Status: DISPENSED
Start: 2023-03-15

## (undated) RX ORDER — ONDANSETRON 2 MG/ML
INJECTION INTRAMUSCULAR; INTRAVENOUS
Status: DISPENSED
Start: 2023-03-15

## (undated) RX ORDER — FENTANYL CITRATE 50 UG/ML
INJECTION, SOLUTION INTRAMUSCULAR; INTRAVENOUS
Status: DISPENSED
Start: 2023-03-15

## (undated) RX ORDER — CEFAZOLIN SODIUM 1 G/3ML
INJECTION, POWDER, FOR SOLUTION INTRAMUSCULAR; INTRAVENOUS
Status: DISPENSED
Start: 2023-03-15

## (undated) RX ORDER — OXYCODONE HYDROCHLORIDE 5 MG/1
TABLET ORAL
Status: DISPENSED
Start: 2023-03-15

## (undated) RX ORDER — ACETAMINOPHEN 325 MG/1
TABLET ORAL
Status: DISPENSED
Start: 2023-03-15

## (undated) RX ORDER — TRANEXAMIC ACID 650 MG/1
TABLET ORAL
Status: DISPENSED
Start: 2023-03-15